# Patient Record
Sex: MALE | Race: WHITE | NOT HISPANIC OR LATINO | Employment: FULL TIME | URBAN - METROPOLITAN AREA
[De-identification: names, ages, dates, MRNs, and addresses within clinical notes are randomized per-mention and may not be internally consistent; named-entity substitution may affect disease eponyms.]

---

## 2019-09-07 ENCOUNTER — HOSPITAL ENCOUNTER (EMERGENCY)
Facility: HOSPITAL | Age: 64
Discharge: HOME | End: 2019-09-07
Attending: INTERNAL MEDICINE

## 2019-09-07 VITALS
TEMPERATURE: 98 F | RESPIRATION RATE: 20 BRPM | HEIGHT: 70 IN | DIASTOLIC BLOOD PRESSURE: 79 MMHG | WEIGHT: 155 LBS | OXYGEN SATURATION: 97 % | BODY MASS INDEX: 22.19 KG/M2 | SYSTOLIC BLOOD PRESSURE: 141 MMHG | HEART RATE: 89 BPM

## 2019-09-07 DIAGNOSIS — K52.9 COLITIS: Primary | ICD-10-CM

## 2019-09-07 PROCEDURE — 99281 EMR DPT VST MAYX REQ PHY/QHP: CPT

## 2019-09-07 NOTE — ED PROVIDER NOTES
Encounter Date: 9/7/2019       History     Chief Complaint   Patient presents with    Abdominal Pain     diarrhea for a month     Patient comes in with a history of diarrhea for 2 months.  The diarrhea is usually after meals.  There is no blood no mucus.  No cramping.  No fever chills sweats. Has no history of any colon disease in the past.  He is not on any meds that would be provocative for colitis.  He has otherwise been healthy has never been no doctor.  Is on no medication.        Review of patient's allergies indicates:  No Known Allergies  History reviewed. No pertinent past medical history.  History reviewed. No pertinent surgical history.  History reviewed. No pertinent family history.  Social History     Tobacco Use    Smoking status: Former Smoker   Substance Use Topics    Alcohol use: Not on file    Drug use: Not on file     Review of Systems   Constitutional: Negative for fever.   HENT: Negative for sore throat.    Respiratory: Negative for shortness of breath.    Cardiovascular: Negative for chest pain.   Gastrointestinal: Positive for diarrhea. Negative for nausea.   Genitourinary: Negative for dysuria.   Musculoskeletal: Negative for back pain.   Skin: Negative for rash.   Neurological: Negative for weakness.   Hematological: Does not bruise/bleed easily.   All other systems reviewed and are negative.      Physical Exam     Initial Vitals [09/07/19 1153]   BP Pulse Resp Temp SpO2   (!) 141/79 89 20 98.1 °F (36.7 °C) 97 %      MAP       --         Physical Exam    Nursing note and vitals reviewed.  Constitutional: Vital signs are normal. He appears well-developed and well-nourished. He is active and cooperative.   HENT:   Head: Normocephalic and atraumatic.   Eyes: Conjunctivae and lids are normal. Lids are everted and swept, no foreign bodies found.   Neck: Trachea normal, normal range of motion and full passive range of motion without pain. Neck supple.   Cardiovascular: Normal rate, regular  rhythm, S1 normal, S2 normal, normal heart sounds, intact distal pulses and normal pulses.  No extrasystoles are present.    Abdominal: Soft. Normal appearance and bowel sounds are normal.   Musculoskeletal: Normal range of motion.   Neurological: He is alert. He has normal reflexes. GCS eye subscore is 4. GCS verbal subscore is 5. GCS motor subscore is 6.   Skin: Skin is warm, dry and intact. Capillary refill takes less than 2 seconds.   Psychiatric: He has a normal mood and affect. His speech is normal and behavior is normal. Cognition and memory are normal.         ED Course   Procedures  Labs Reviewed   CULTURE, STOOL   WBC, STOOL          Imaging Results    None          Medical Decision Making:   Clinical Tests:   Lab Tests: Ordered  ED Management:  Patient stool for WBC, culture, and Clostridium difficile ordered.  Patient needs a colonoscopy.  Referred to surgery.                      Clinical Impression:       ICD-10-CM ICD-9-CM   1. Colitis K52.9 558.9         Disposition:   Disposition: Discharged  Condition: Stable                        To Hagen MD  09/07/19 4596

## 2019-09-09 ENCOUNTER — TELEPHONE (OUTPATIENT)
Dept: SURGERY | Facility: CLINIC | Age: 64
End: 2019-09-09

## 2019-09-09 NOTE — TELEPHONE ENCOUNTER
----- Message from Ce Corcoran sent at 9/9/2019  2:25 PM CDT -----  Type: Needs Medical Advice    Who Called: Patient  Best Call Back Number: [phone]  Additional Information: Needs to schedule colonoscopy after October 1st/please call back to schedule or advise.

## 2019-09-09 NOTE — TELEPHONE ENCOUNTER
Writer spoke to patient and scheduled him for a consult on 10/03/19 @ 9:30 am. Pt expressed verbal understanding.

## 2019-09-24 ENCOUNTER — PATIENT MESSAGE (OUTPATIENT)
Dept: SURGERY | Facility: CLINIC | Age: 64
End: 2019-09-24

## 2019-09-25 ENCOUNTER — PATIENT MESSAGE (OUTPATIENT)
Dept: SURGERY | Facility: CLINIC | Age: 64
End: 2019-09-25

## 2019-10-03 ENCOUNTER — OFFICE VISIT (OUTPATIENT)
Dept: SURGERY | Facility: CLINIC | Age: 64
End: 2019-10-03
Payer: MEDICARE

## 2019-10-03 VITALS
HEIGHT: 70 IN | WEIGHT: 148.13 LBS | HEART RATE: 95 BPM | SYSTOLIC BLOOD PRESSURE: 97 MMHG | OXYGEN SATURATION: 97 % | DIASTOLIC BLOOD PRESSURE: 66 MMHG | BODY MASS INDEX: 21.21 KG/M2 | TEMPERATURE: 98 F | RESPIRATION RATE: 18 BRPM

## 2019-10-03 DIAGNOSIS — Z01.818 PRE-OP TESTING: ICD-10-CM

## 2019-10-03 DIAGNOSIS — Z76.89 ENCOUNTER TO ESTABLISH CARE: Primary | ICD-10-CM

## 2019-10-03 DIAGNOSIS — R19.7 DIARRHEA, UNSPECIFIED TYPE: ICD-10-CM

## 2019-10-03 PROCEDURE — 99204 OFFICE O/P NEW MOD 45 MIN: CPT | Mod: S$GLB,,, | Performed by: SURGERY

## 2019-10-03 PROCEDURE — 99204 PR OFFICE/OUTPT VISIT, NEW, LEVL IV, 45-59 MIN: ICD-10-PCS | Mod: S$GLB,,, | Performed by: SURGERY

## 2019-10-03 RX ORDER — METRONIDAZOLE 500 MG/1
500 TABLET ORAL EVERY 8 HOURS
Qty: 42 TABLET | Refills: 0 | Status: SHIPPED | OUTPATIENT
Start: 2019-10-03 | End: 2019-10-17

## 2019-10-03 RX ORDER — CIPROFLOXACIN 500 MG/1
500 TABLET ORAL EVERY 12 HOURS
Qty: 28 TABLET | Refills: 0 | Status: SHIPPED | OUTPATIENT
Start: 2019-10-03 | End: 2019-10-17

## 2019-10-03 NOTE — LETTER
October 3, 2019      To Hagen MD  [address] [place] [address]           [facility] General [facility]  [address] [place] [address]  Phone: [phone]  Fax: [phone]          Patient: Miguel Angel Apple Sr.   MR Number: [id number]   YOB: 1955   Date of Visit: 10/3/2019       Dear Dr. To Hagen:    Thank you for referring Miguel Angel Apple to me for evaluation. Attached you will find relevant portions of my assessment and plan of care.    If you have questions, please do not hesitate to call me. I look forward to following Miguel Angel Apple along with you.    Sincerely,    Jose Lazo MD    Enclosure  CC:  No Recipients    If you would like to receive this communication electronically, please contact [email or web address] or [phone] to request more information on [facility] Link access.    For providers and/or their staff who would like to refer a patient to [facility], please contact us through our one-stop-shop provider referral line, [facility], at [phone].    If you feel you have received this communication in error or would no longer like to receive these types of communications, please e-mail [email or web address]

## 2019-10-03 NOTE — PROGRESS NOTES
[facility] Surgery H&P Note    Subjective:       Patient ID: Miguel Angel Apple Sr. is a 63 y.o. male.    Chief Complaint: Consult (Referral-ER-Colitis)    HPI:  Miguel Angel Apple Sr. is a 63 y.o. male with no past medical history per his account besides a drinking history which is stopped within the last couple of weeks presents today as a new patient referral from the ER for evaluation of diarrhea.  Patient stated he started having diarrhea 3-4 weeks ago.  Also a weight loss.  Approximately 20 lb in a month and a half.  He has diarrhea 10-15 times per day.  Fevers at times per his account.  He presented to the ER, in the ER was diagnosed with colitis is referred to surgery clinic today for evaluation management treatment.  Patient has not seen any hematochezia.  No history of colonoscopy.  No family history of colon or rectal cancers.  Patient states that almost everything he eats leads to diarrhea.  He now presents today as a new patient referral for evaluation the above.    Past Medical History:   Diagnosis Date    Medical history reviewed with no changes      Past Surgical History:   Procedure Laterality Date    no surgical history       Family History   Problem Relation Age of Onset    Diabetes Mother     Heart disease Father      Social History     Socioeconomic History    Marital status:      Spouse name: Not on file    Number of children: Not on file    Years of education: Not on file    Highest education level: Not on file   Occupational History    Not on file   Social Needs    Financial resource strain: Not on file    Food insecurity:     Worry: Not on file     Inability: Not on file    Transportation needs:     Medical: Not on file     Non-medical: Not on file   Tobacco Use    Smoking status: Former Smoker   Substance and Sexual Activity    Alcohol use: Not on file    Drug use: Not on file    Sexual activity: Not on file   Lifestyle    Physical activity:     Days per week: Not on file      Minutes per session: Not on file    Stress: Not on file   Relationships    Social connections:     Talks on phone: Not on file     Gets together: Not on file     Attends Denominational service: Not on file     Active member of club or organization: Not on file     Attends meetings of clubs or organizations: Not on file     Relationship status: Not on file   Other Topics Concern    Not on file   Social History Narrative    Not on file       Current Outpatient Medications   Medication Sig Dispense Refill    ciprofloxacin HCl (CIPRO) 500 MG tablet Take 1 tablet (500 mg total) by mouth every 12 (twelve) hours. for 14 days 28 tablet 0    metroNIDAZOLE (FLAGYL) 500 MG tablet Take 1 tablet (500 mg total) by mouth every 8 (eight) hours. for 14 days 42 tablet 0     No current facility-administered medications for this visit.      Review of patient's allergies indicates:  No Known Allergies    Review of Systems   Constitutional: Negative for appetite change, chills and fever.   HENT: Negative for congestion, dental problem and drooling.    Eyes: Negative for photophobia, discharge and itching.   Respiratory: Negative for apnea and chest tightness.    Cardiovascular: Negative for chest pain, palpitations and leg swelling.   Gastrointestinal: Positive for diarrhea. Negative for abdominal distention and abdominal pain.   Endocrine: Negative for cold intolerance and heat intolerance.   Genitourinary: Negative for difficulty urinating and dysuria.   Musculoskeletal: Negative for arthralgias and back pain.   Skin: Negative for color change and pallor.   Neurological: Negative for dizziness, facial asymmetry and headaches.   Hematological: Negative for adenopathy. Does not bruise/bleed easily.   Psychiatric/Behavioral: Negative for agitation, behavioral problems and confusion.       Objective:      Vitals:    10/03/19 0949   BP: 97/66   Pulse: 95   Resp: 18   Temp: 97.6 °F (36.4 °C)   TempSrc: Oral   SpO2: 97%   Weight: 67.2 kg  "(148 lb 2.4 oz)   Height: 5' 10" (1.778 m)     Physical Exam   Constitutional: He is oriented to person, place, and time. He appears well-developed and well-nourished.   HENT:   Head: Normocephalic and atraumatic.   Eyes: Pupils are equal, round, and reactive to light. EOM are normal.   Neck: Normal range of motion. Neck supple. No thyromegaly present.   Cardiovascular: Normal rate and regular rhythm.   No murmur heard.  Pulmonary/Chest: Effort normal and breath sounds normal. No respiratory distress.   Abdominal: Soft. Bowel sounds are normal. He exhibits no distension. There is no tenderness.   Musculoskeletal: Normal range of motion. He exhibits no edema.   Neurological: He is alert and oriented to person, place, and time. No cranial nerve deficit.   Skin: Skin is warm. Capillary refill takes less than 2 seconds. No rash noted. He is not diaphoretic. No erythema.   Psychiatric: He has a normal mood and affect.        Assessment:       1. Diarrhea, unspecified type    2. Pre-op testing        Plan:   Diarrhea, unspecified type  -     H. pylori antigen, stool; Future; Expected date: 10/03/2019  -     Pancreatic elastase, fecal; Future; Expected date: 10/03/2019  -     Fecal fat, qualitative; Future; Expected date: 10/03/2019  -     Occult blood x 1, stool; Future; Expected date: 10/03/2019  -     WBC, Stool; Future; Expected date: 10/03/2019  -     Rotavirus antigen, stool; Future; Expected date: 10/03/2019  -     Calprotectin; Future; Expected date: 10/03/2019  -     Giardia / Cryptosporidum, EIA; Future; Expected date: 10/03/2019  -     Stool Exam-Ova,Cysts,Parasites; Future; Expected date: 10/03/2019  -     Clostridium difficile EIA; Future; Expected date: 10/03/2019  -     Stool culture; Future; Expected date: 10/03/2019  -     ciprofloxacin HCl (CIPRO) 500 MG tablet; Take 1 tablet (500 mg total) by mouth every 12 (twelve) hours. for 14 days  Dispense: 28 tablet; Refill: 0  -     metroNIDAZOLE (FLAGYL) 500 MG " tablet; Take 1 tablet (500 mg total) by mouth every 8 (eight) hours. for 14 days  Dispense: 42 tablet; Refill: 0    Pre-op testing        Medical Decision Making/Counseling:  Patient with diarrhea.  Undetermined etiology.  Bacterial versus viral versus inflammatory etc.  I believe the best next step for this patient will be for evaluation with stool studies and treatment with Cipro and Flagyl for the next 2 weeks.  If bacterial, this should treat his diarrhea/colitis.  If inflammatory, this will not resolved.  Patient ultimately will need colonoscopy for further delineation.  Would like to wait until his acute flares resolved to proceed with colonoscopy.  Patient voiced understanding, and agreement with plan of care.    Total clinic time today with patient, in face-to-face interaction was 45 min, of which greater than half of that time was spent in face-to-face counseling.    Patient instructed that best way to communicate with my office staff is for patient to get on the [facility] epic patient portal to expedite communication and communication issues that may occur.  Patient was given instructions on how to get on the portal.  I encouraged patient to obtain portal access as well.  Ultimately it is up to the patient to obtain access.  Patient voiced understanding.

## 2019-10-11 ENCOUNTER — LAB VISIT (OUTPATIENT)
Dept: LAB | Facility: HOSPITAL | Age: 64
End: 2019-10-11
Attending: SURGERY
Payer: MEDICARE

## 2019-10-11 DIAGNOSIS — R19.7 DIARRHEA, UNSPECIFIED TYPE: ICD-10-CM

## 2019-10-11 LAB
C DIFF GDH STL QL: NEGATIVE
C DIFF TOX A+B STL QL IA: NEGATIVE
OB PNL STL: POSITIVE
RV AG STL QL IA.RAPID: NEGATIVE
WBC #/AREA STL HPF: NORMAL /[HPF]

## 2019-10-11 PROCEDURE — 87328 CRYPTOSPORIDIUM AG IA: CPT

## 2019-10-11 PROCEDURE — 89055 LEUKOCYTE ASSESSMENT FECAL: CPT

## 2019-10-11 PROCEDURE — 82656 EL-1 FECAL QUAL/SEMIQ: CPT

## 2019-10-11 PROCEDURE — 87338 HPYLORI STOOL AG IA: CPT

## 2019-10-11 PROCEDURE — 87329 GIARDIA AG IA: CPT

## 2019-10-11 PROCEDURE — 87046 STOOL CULTR AEROBIC BACT EA: CPT

## 2019-10-11 PROCEDURE — 87425 ROTAVIRUS AG IA: CPT

## 2019-10-11 PROCEDURE — 87427 SHIGA-LIKE TOXIN AG IA: CPT | Mod: 59

## 2019-10-11 PROCEDURE — 87209 SMEAR COMPLEX STAIN: CPT

## 2019-10-11 PROCEDURE — 87449 NOS EACH ORGANISM AG IA: CPT

## 2019-10-11 PROCEDURE — 82272 OCCULT BLD FECES 1-3 TESTS: CPT

## 2019-10-11 PROCEDURE — 89125 SPECIMEN FAT STAIN: CPT

## 2019-10-11 PROCEDURE — 87045 FECES CULTURE AEROBIC BACT: CPT

## 2019-10-11 PROCEDURE — 83993 ASSAY FOR CALPROTECTIN FECAL: CPT

## 2019-10-13 LAB
CRYPTOSP AG STL QL IA: NEGATIVE
E COLI SXT1 STL QL IA: NEGATIVE
E COLI SXT2 STL QL IA: NEGATIVE
G LAMBLIA AG STL QL IA: NEGATIVE

## 2019-10-14 LAB
BACTERIA STL CULT: NORMAL
FAT STL SUDAN IV STN: NORMAL

## 2019-10-15 LAB — O+P STL MICRO: NORMAL

## 2019-10-17 LAB — H PYLORI AG STL QL IA: NOT DETECTED

## 2019-10-18 LAB — ELASTASE 1, FECAL: >500 MCG/G

## 2019-10-19 ENCOUNTER — HOSPITAL ENCOUNTER (INPATIENT)
Facility: HOSPITAL | Age: 64
LOS: 17 days | Discharge: HOME HEALTH | DRG: 329 | End: 2019-11-05
Attending: EMERGENCY MEDICINE | Admitting: INTERNAL MEDICINE
Payer: MEDICARE

## 2019-10-19 DIAGNOSIS — K56.609 LARGE BOWEL OBSTRUCTION: ICD-10-CM

## 2019-10-19 DIAGNOSIS — R14.0 ABDOMINAL DISTENTION: ICD-10-CM

## 2019-10-19 DIAGNOSIS — K56.609 INTESTINAL OBSTRUCTION, UNSPECIFIED CAUSE, UNSPECIFIED WHETHER PARTIAL OR COMPLETE: ICD-10-CM

## 2019-10-19 DIAGNOSIS — R19.7 DIARRHEA, UNSPECIFIED TYPE: ICD-10-CM

## 2019-10-19 DIAGNOSIS — Z48.89 ENCOUNTER FOR POSTOPERATIVE CARE: Primary | ICD-10-CM

## 2019-10-19 DIAGNOSIS — R10.9 ABDOMINAL PAIN: ICD-10-CM

## 2019-10-19 DIAGNOSIS — S31.109D OPEN WOUND OF ABDOMEN, SUBSEQUENT ENCOUNTER: ICD-10-CM

## 2019-10-19 DIAGNOSIS — K52.9 COLITIS: ICD-10-CM

## 2019-10-19 LAB
ALBUMIN SERPL BCP-MCNC: 2.8 G/DL (ref 3.5–5.2)
ALP SERPL-CCNC: 53 U/L (ref 55–135)
ALT SERPL W/O P-5'-P-CCNC: 11 U/L (ref 10–44)
ANION GAP SERPL CALC-SCNC: 13 MMOL/L (ref 8–16)
AST SERPL-CCNC: 15 U/L (ref 10–40)
BACTERIA #/AREA URNS HPF: ABNORMAL /HPF
BASOPHILS # BLD AUTO: 0.02 K/UL (ref 0–0.2)
BASOPHILS NFR BLD: 0.1 % (ref 0–1.9)
BILIRUB SERPL-MCNC: 1.3 MG/DL (ref 0.1–1)
BILIRUB UR QL STRIP: ABNORMAL
BUN SERPL-MCNC: 12 MG/DL (ref 8–23)
CALCIUM SERPL-MCNC: 8.4 MG/DL (ref 8.7–10.5)
CHLORIDE SERPL-SCNC: 99 MMOL/L (ref 95–110)
CLARITY UR: CLEAR
CO2 SERPL-SCNC: 26 MMOL/L (ref 23–29)
COLOR UR: YELLOW
CREAT SERPL-MCNC: 0.9 MG/DL (ref 0.5–1.4)
DIFFERENTIAL METHOD: ABNORMAL
EOSINOPHIL # BLD AUTO: 0 K/UL (ref 0–0.5)
EOSINOPHIL NFR BLD: 0.2 % (ref 0–8)
ERYTHROCYTE [DISTWIDTH] IN BLOOD BY AUTOMATED COUNT: 17.6 % (ref 11.5–14.5)
EST. GFR  (AFRICAN AMERICAN): >60 ML/MIN/1.73 M^2
EST. GFR  (NON AFRICAN AMERICAN): >60 ML/MIN/1.73 M^2
GLUCOSE SERPL-MCNC: 98 MG/DL (ref 70–110)
GLUCOSE UR QL STRIP: NEGATIVE
HCT VFR BLD AUTO: 30.8 % (ref 40–54)
HGB BLD-MCNC: 9.9 G/DL (ref 14–18)
HGB UR QL STRIP: NEGATIVE
HYALINE CASTS #/AREA URNS LPF: 0 /LPF
IMM GRANULOCYTES # BLD AUTO: 0.08 K/UL (ref 0–0.04)
IMM GRANULOCYTES NFR BLD AUTO: 0.5 % (ref 0–0.5)
KETONES UR QL STRIP: ABNORMAL
LACTATE SERPL-SCNC: 1.2 MMOL/L (ref 0.5–2.2)
LACTATE SERPL-SCNC: 1.3 MMOL/L (ref 0.5–2.2)
LEUKOCYTE ESTERASE UR QL STRIP: NEGATIVE
LYMPHOCYTES # BLD AUTO: 2 K/UL (ref 1–4.8)
LYMPHOCYTES NFR BLD: 11.9 % (ref 18–48)
MAGNESIUM SERPL-MCNC: 2.2 MG/DL (ref 1.6–2.6)
MCH RBC QN AUTO: 28.1 PG (ref 27–31)
MCHC RBC AUTO-ENTMCNC: 32.1 G/DL (ref 32–36)
MCV RBC AUTO: 88 FL (ref 82–98)
MICROSCOPIC COMMENT: ABNORMAL
MONOCYTES # BLD AUTO: 1.5 K/UL (ref 0.3–1)
MONOCYTES NFR BLD: 8.9 % (ref 4–15)
NEUTROPHILS # BLD AUTO: 12.8 K/UL (ref 1.8–7.7)
NEUTROPHILS NFR BLD: 78.4 % (ref 38–73)
NITRITE UR QL STRIP: NEGATIVE
NON-SQ EPI CELLS #/AREA URNS HPF: ABNORMAL /HPF
NRBC BLD-RTO: 0 /100 WBC
PH UR STRIP: 7 [PH] (ref 5–8)
PLATELET # BLD AUTO: 697 K/UL (ref 150–350)
PMV BLD AUTO: 8.6 FL (ref 9.2–12.9)
POTASSIUM SERPL-SCNC: 2.2 MMOL/L (ref 3.5–5.1)
PROT SERPL-MCNC: 6.6 G/DL (ref 6–8.4)
PROT UR QL STRIP: ABNORMAL
RBC # BLD AUTO: 3.52 M/UL (ref 4.6–6.2)
RBC #/AREA URNS HPF: 2 /HPF (ref 0–4)
SODIUM SERPL-SCNC: 138 MMOL/L (ref 136–145)
SP GR UR STRIP: 1.01 (ref 1–1.03)
URN SPEC COLLECT METH UR: ABNORMAL
UROBILINOGEN UR STRIP-ACNC: NEGATIVE EU/DL
WBC # BLD AUTO: 16.32 K/UL (ref 3.9–12.7)
WBC #/AREA URNS HPF: 0 /HPF (ref 0–5)

## 2019-10-19 PROCEDURE — 74176 CT ABD & PELVIS W/O CONTRAST: CPT | Mod: TC

## 2019-10-19 PROCEDURE — 63600175 PHARM REV CODE 636 W HCPCS: Performed by: EMERGENCY MEDICINE

## 2019-10-19 PROCEDURE — 96374 THER/PROPH/DIAG INJ IV PUSH: CPT

## 2019-10-19 PROCEDURE — 85025 COMPLETE CBC W/AUTO DIFF WBC: CPT

## 2019-10-19 PROCEDURE — 36415 COLL VENOUS BLD VENIPUNCTURE: CPT

## 2019-10-19 PROCEDURE — 63600175 PHARM REV CODE 636 W HCPCS: Performed by: INTERNAL MEDICINE

## 2019-10-19 PROCEDURE — 93005 ELECTROCARDIOGRAM TRACING: CPT

## 2019-10-19 PROCEDURE — 74019 RADEX ABDOMEN 2 VIEWS: CPT | Mod: TC,FY

## 2019-10-19 PROCEDURE — 74176 CT ABD & PELVIS W/O CONTRAST: CPT | Mod: 26,,, | Performed by: RADIOLOGY

## 2019-10-19 PROCEDURE — 83735 ASSAY OF MAGNESIUM: CPT

## 2019-10-19 PROCEDURE — 83605 ASSAY OF LACTIC ACID: CPT | Mod: 91

## 2019-10-19 PROCEDURE — 81000 URINALYSIS NONAUTO W/SCOPE: CPT

## 2019-10-19 PROCEDURE — 71045 X-RAY EXAM CHEST 1 VIEW: CPT | Mod: TC,FY

## 2019-10-19 PROCEDURE — 80053 COMPREHEN METABOLIC PANEL: CPT

## 2019-10-19 PROCEDURE — 87040 BLOOD CULTURE FOR BACTERIA: CPT | Mod: 59

## 2019-10-19 PROCEDURE — 71045 X-RAY EXAM CHEST 1 VIEW: CPT | Mod: 26,,, | Performed by: RADIOLOGY

## 2019-10-19 PROCEDURE — 74019 RADEX ABDOMEN 2 VIEWS: CPT | Mod: 26,,, | Performed by: RADIOLOGY

## 2019-10-19 PROCEDURE — 74019 XR ABDOMEN FLAT AND ERECT: ICD-10-PCS | Mod: 26,,, | Performed by: RADIOLOGY

## 2019-10-19 PROCEDURE — 71045 XR CHEST AP PORTABLE: ICD-10-PCS | Mod: 26,,, | Performed by: RADIOLOGY

## 2019-10-19 PROCEDURE — 83605 ASSAY OF LACTIC ACID: CPT

## 2019-10-19 PROCEDURE — 99285 EMERGENCY DEPT VISIT HI MDM: CPT | Mod: 25

## 2019-10-19 PROCEDURE — 21400001 HC TELEMETRY ROOM

## 2019-10-19 PROCEDURE — 74176 CT ABDOMEN PELVIS WITHOUT CONTRAST: ICD-10-PCS | Mod: 26,,, | Performed by: RADIOLOGY

## 2019-10-19 PROCEDURE — 63600175 PHARM REV CODE 636 W HCPCS: Performed by: NURSE PRACTITIONER

## 2019-10-19 RX ORDER — SODIUM CHLORIDE AND POTASSIUM CHLORIDE 150; 900 MG/100ML; MG/100ML
1000 INJECTION, SOLUTION INTRAVENOUS
Status: COMPLETED | OUTPATIENT
Start: 2019-10-19 | End: 2019-10-19

## 2019-10-19 RX ORDER — SODIUM CHLORIDE 0.9 % (FLUSH) 0.9 %
10 SYRINGE (ML) INJECTION
Status: DISCONTINUED | OUTPATIENT
Start: 2019-10-19 | End: 2019-11-05 | Stop reason: HOSPADM

## 2019-10-19 RX ORDER — POTASSIUM CHLORIDE 7.45 MG/ML
10 INJECTION INTRAVENOUS
Status: COMPLETED | OUTPATIENT
Start: 2019-10-19 | End: 2019-10-20

## 2019-10-19 RX ORDER — SODIUM CHLORIDE AND POTASSIUM CHLORIDE 150; 900 MG/100ML; MG/100ML
INJECTION, SOLUTION INTRAVENOUS CONTINUOUS
Status: DISCONTINUED | OUTPATIENT
Start: 2019-10-19 | End: 2019-10-23

## 2019-10-19 RX ORDER — KETOROLAC TROMETHAMINE 30 MG/ML
30 INJECTION, SOLUTION INTRAMUSCULAR; INTRAVENOUS
Status: DISCONTINUED | OUTPATIENT
Start: 2019-10-19 | End: 2019-10-19

## 2019-10-19 RX ORDER — SODIUM CHLORIDE 9 MG/ML
1000 INJECTION, SOLUTION INTRAVENOUS
Status: DISCONTINUED | OUTPATIENT
Start: 2019-10-19 | End: 2019-10-19

## 2019-10-19 RX ADMIN — POTASSIUM CHLORIDE 10 MEQ: 10 INJECTION, SOLUTION INTRAVENOUS at 11:10

## 2019-10-19 RX ADMIN — ERTAPENEM SODIUM 1 G: 1 INJECTION, POWDER, LYOPHILIZED, FOR SOLUTION INTRAMUSCULAR; INTRAVENOUS at 03:10

## 2019-10-19 RX ADMIN — POTASSIUM CHLORIDE AND SODIUM CHLORIDE 1000 ML: 900; 150 INJECTION, SOLUTION INTRAVENOUS at 03:10

## 2019-10-19 RX ADMIN — POTASSIUM CHLORIDE 10 MEQ: 10 INJECTION, SOLUTION INTRAVENOUS at 05:10

## 2019-10-19 RX ADMIN — POTASSIUM CHLORIDE 10 MEQ: 10 INJECTION, SOLUTION INTRAVENOUS at 06:10

## 2019-10-19 NOTE — SUBJECTIVE & OBJECTIVE
Past Medical History:   Diagnosis Date    Medical history reviewed with no changes        Past Surgical History:   Procedure Laterality Date    no surgical history         Review of patient's allergies indicates:  No Known Allergies    No current facility-administered medications on file prior to encounter.      No current outpatient medications on file prior to encounter.     Family History     Problem Relation (Age of Onset)    Diabetes Mother    Heart disease Father        Tobacco Use    Smoking status: Former Smoker   Substance and Sexual Activity    Alcohol use: Not on file    Drug use: Not on file    Sexual activity: Not on file     Review of Systems   Constitutional: Positive for activity change, appetite change and unexpected weight change. Negative for chills, diaphoresis, fatigue and fever.   HENT: Negative for congestion, drooling, hearing loss and trouble swallowing.    Eyes: Negative for pain and visual disturbance.   Respiratory: Negative.  Negative for apnea, cough, choking, chest tightness, shortness of breath and wheezing.    Cardiovascular: Negative for chest pain, palpitations and leg swelling.        Increased heart rate   Gastrointestinal: Positive for abdominal distention. Negative for abdominal pain, blood in stool, constipation, diarrhea, nausea and vomiting.        Tenderness left lower quadrant   Endocrine: Negative for polydipsia, polyphagia and polyuria.   Genitourinary: Negative for difficulty urinating, dysuria and flank pain.   Musculoskeletal: Negative for arthralgias, back pain, gait problem, joint swelling, neck pain and neck stiffness.   Skin: Negative for color change, rash and wound.   Allergic/Immunologic: Negative for environmental allergies, food allergies and immunocompromised state.   Neurological: Positive for weakness. Negative for dizziness, syncope, numbness and headaches.   Hematological: Negative for adenopathy.   Psychiatric/Behavioral: Negative for agitation,  confusion and sleep disturbance. The patient is not nervous/anxious.      Objective:     Vital Signs (Most Recent):  Temp: 98.5 °F (36.9 °C) (10/19/19 1550)  Pulse: 100 (10/19/19 1550)  Resp: 17 (10/19/19 1550)  BP: (!) 170/81 (10/19/19 1550)  SpO2: 97 % (10/19/19 1550) Vital Signs (24h Range):  Temp:  [98.5 °F (36.9 °C)-98.7 °F (37.1 °C)] 98.5 °F (36.9 °C)  Pulse:  [100-116] 100  Resp:  [16-20] 17  SpO2:  [96 %-98 %] 97 %  BP: ()/(61-90) 170/81     Weight: 64.3 kg (141 lb 12.1 oz)  Body mass index is 20.93 kg/m².    Physical Exam   Constitutional: He is oriented to person, place, and time. He appears well-developed and well-nourished.   HENT:   Head: Normocephalic and atraumatic.   Right Ear: External ear normal.   Left Ear: External ear normal.   Nose: Nose normal.   Eyes: Pupils are equal, round, and reactive to light. Conjunctivae, EOM and lids are normal.   Neck: Trachea normal, normal range of motion and full passive range of motion without pain. Neck supple.   Cardiovascular: Normal rate, regular rhythm, S1 normal, S2 normal, normal heart sounds, intact distal pulses and normal pulses.   Mild tachycardia 110   Pulmonary/Chest: Effort normal and breath sounds normal.   Abdominal: Soft. Normal aorta and bowel sounds are normal. He exhibits distension. There is tenderness.   Tenderness left lower quadrant   Musculoskeletal: Normal range of motion.   Neurological: He is alert and oriented to person, place, and time. He has normal reflexes.   Skin: Skin is warm, dry and intact.   Psychiatric: He has a normal mood and affect. His speech is normal and behavior is normal. Judgment and thought content normal. Cognition and memory are normal.   Nursing note and vitals reviewed.        CRANIAL NERVES     CN III, IV, VI   Pupils are equal, round, and reactive to light.  Extraocular motions are normal.        Significant Labs:   BMP:   Recent Labs   Lab 10/19/19  1322   GLU 98      K 2.2*   CL 99   CO2 26    BUN 12   CREATININE 0.9   CALCIUM 8.4*     CBC:   Recent Labs   Lab 10/19/19  1322   WBC 16.32*   HGB 9.9*   HCT 30.8*   *     CMP:   Recent Labs   Lab 10/19/19  1322      K 2.2*   CL 99   CO2 26   GLU 98   BUN 12   CREATININE 0.9   CALCIUM 8.4*   PROT 6.6   ALBUMIN 2.8*   BILITOT 1.3*   ALKPHOS 53*   AST 15   ALT 11   ANIONGAP 13   EGFRNONAA >60.0     Magnesium: No results for input(s): MG in the last 48 hours.  All pertinent labs within the past 24 hours have been reviewed.    Significant Imaging: CT: I have reviewed all pertinent results/findings within the past 24 hours and my personal findings are:  Dilatation of the colon with the cecum being approximately 10 cm.  Low obstruction left-sided  EKG: I have reviewed all pertinent results/findings within the past 24 hours and my personal findings are: Sinus tachycardia 103  I have reviewed and interpreted all pertinent imaging results/findings within the past 24 hours.

## 2019-10-19 NOTE — ED PROVIDER NOTES
Encounter Date: 10/19/2019       History     Chief Complaint   Patient presents with    Constipation     felling bloated and has hx of diarrhea was treated and now is constipated     Ambulatory to ED with reports of upper abdominal fullness with frequent scanty liquid stools for 2 days.          Review of patient's allergies indicates:  No Known Allergies  Past Medical History:   Diagnosis Date    Medical history reviewed with no changes      Past Surgical History:   Procedure Laterality Date    no surgical history       Family History   Problem Relation Age of Onset    Diabetes Mother     Heart disease Father      Social History     Tobacco Use    Smoking status: Former Smoker   Substance Use Topics    Alcohol use: Not on file    Drug use: Not on file     Review of Systems   Constitutional: Negative.    HENT: Negative.    Eyes: Negative.    Respiratory: Negative.    Cardiovascular: Negative.    Gastrointestinal: Positive for abdominal distention and vomiting.   Endocrine: Negative.    Genitourinary: Negative.    Musculoskeletal: Negative.    Skin: Negative.    Allergic/Immunologic: Negative.    Neurological: Negative.    Hematological: Negative.    Psychiatric/Behavioral: Negative.        Physical Exam     Initial Vitals [10/19/19 1142]   BP Pulse Resp Temp SpO2   97/61 (!) 116 20 98.7 °F (37.1 °C) 97 %      MAP       --         Physical Exam    Nursing note and vitals reviewed.  Constitutional: He appears well-developed and well-nourished.   HENT:   Head: Normocephalic and atraumatic.   Right Ear: External ear normal.   Left Ear: External ear normal.   Nose: Nose normal.   Mouth/Throat: Oropharynx is clear and moist.   Eyes: Conjunctivae and EOM are normal. Pupils are equal, round, and reactive to light.   Neck: Normal range of motion. Neck supple.   Cardiovascular: Normal rate, regular rhythm, normal heart sounds and intact distal pulses.   Pulmonary/Chest: Breath sounds normal.   Abdominal: Bowel sounds  are normal. He exhibits mass. There is tenderness.   Ropy mass to transverse colon.    Neurological: He is alert and oriented to person, place, and time. He has normal strength and normal reflexes. GCS score is 15. GCS eye subscore is 4. GCS verbal subscore is 5. GCS motor subscore is 6.   Skin: Skin is warm and dry. Capillary refill takes 2 to 3 seconds.   Psychiatric: He has a normal mood and affect. His behavior is normal. Thought content normal.         ED Course   Procedures  Labs Reviewed - No data to display  EKG Readings: (Independently Interpreted)   Initial Reading: No STEMI. Rhythm: Normal Sinus Rhythm. Ectopy: No Ectopy. ST Segments: Non-Specific ST Segment Depression. Clinical Impression: Normal Sinus Rhythm       Imaging Results    None       X-Rays:   Independently Interpreted Readings:   Other Readings:  Flat and erect shows dilated bowel and possible obstruction versus constipation.  CT is ordered.      Medical Decision Making:   Initial Assessment:   His abdomen appears distended and has palpable mass to transverse colon, Bowel sounds are normoactive and normally pitched, there is no tenderness or rebound, he is currently under the care of a surgeon and is scheduled for colonoscope later in the month.  Heart is RRR with grade 2 systolic murmur, lungs CTA, reports occasional nausea.  Reviewed labs and stool panel form 10/15 which was mildly positive for blood but negative for leuk or OCP.  His BP is mildly hypotensive and pulse is tachycardic at 116 so I will check CBC/CMP for anemia and electrolyte imbalances.    Differential Diagnosis:   Constipation, diarrhea, diverticulitis, bowel obstruction.    Clinical Tests:   Lab Tests: Ordered  Radiological Study: Ordered  ED Management:  Flat and erect shows dilated loops of bowel and possible obstruction but radiology suggests CT for better detail so CT abd without is ordered.                        Clinical Impression:       ICD-10-CM ICD-9-CM   1.  Diarrhea, unspecified type R19.7 787.91   2. Abdominal distention R14.0 787.3                                Hima Williamson NP  10/19/19 1443       Hima Williamson NP  10/19/19 1644

## 2019-10-19 NOTE — NURSING
"PT RECEIVED FROM ER VIA STRETCHER TO ROOM 115/PT WAS ABLE TO WALK FROM STRETCH TO THE BED. SKIN WDI/COLOR AND TUGOR GOOD. ABD SOFT/SLIGHTLY BUT NORMALLY DISTENDED/BS FAINT/ICE CHIPS GIVEN/MUCUS MEMBRANES PINK/MOIST. PT DENIES PROBLEMS VOIDING. FULL ROM ALL EXTREMITIES/PT STATES "I AM WEAK BUT I CAN GET AROUND". PT REFUSES TO HAVE BED ALARM SET OR CALL FOR ASSIST WHEN UP TO THE BATHROOM. IVF'S INFUSING AS ORDERED. PT HAS A 20G IN THE R/L AC/SITE FREE OF S/SX OF INFILTRATION. PT ORIENTED TO BED CONTROLS/POC/CALL STAFF FOR PROBLEMS/NEEDS/PT VERBALIZED UNDERSTANDING.  "

## 2019-10-19 NOTE — HOSPITAL COURSE
Patient is sitting up in chair today IV fluids IV potassium NPO Surgical consultation repeat KUB and lab in a.m. further clinical decisions depending on clinical course.  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  The patient has had no prior abdominal surgery and no medical history prior to approximately 3 months ago.  10/20/2019.  Long discussion with the patient this morning concerning the findings.  Dr. MOHAMUD General surgery see the patient today since he has been involved in his care on 10/03.  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  Patient continue IV fluids IV antibiotics potassium replacement today and will have 3 Fleet's enemas today.  CEA is been ordered.    10/21/2019:  Patient is comfortable in room although it does appear quite anxious.  The patient will be having EGD and colonoscopy later today.  Based on the findings there the patient will may need undergo surgery.  Patient denies any pain and states that he was not throwing up and having no fever or chills.  Patient is distended and has some abdominal pain associated.  Labs showed [place] blood [place] 268[address] hemoglobin hematocrit 9.8 and 31 sodium 145 potassium 2.4 chloride 108 bicarb 21 and alk-phos was 49    10/22/2019:  Patient is stable postop day 1.  Patient states pain is well controlled.  Patient mildly tachycardic blood pressure 115/61 979 O2 sats 100%.  Labs show white blood cell count 21.5 hemoglobin 9.2 hematocrit 29.3 platelets 407 and BUN creatinine were 8 and 0.6 calcium 7.5 magnesium 1.4.  Patient is in good spirits and bowel sounds are present.    10/23/2019:  Postop day 2.:  Patient is having much more abdominal pain with waves of pain that appeared to be related to air movement in the bowel versus bowel regaining function.  Patient is alert and oriented and is mildly tachycardic with pulse 123, blood pressure 130/65 O2 sat 95%.  Labs show a improved white count of today of 19,000  hemoglobin 8.9 hematocrit 28 platelets 396 and sodium 141 potassium 3.1 albumin 1.6 and liver functions within normal ranges.  GFR greater than 60    10/24/2019:  Patient is stable and improving postop day 2 .  Colostomy bag has some gas and patient bowel sounds are very active.  Intake was 02/30/2027 output was 1745 and a net gain of 1.4 L. labs showed a white blood cell count 15.9 hemoglobin 8.7 hematocrit 27.7 and platelets 373.  Normal differential noted. Labs:  Sodium 146 potassium 3.5 chloride 110 bicarb 24 glucose 66 and BUN creatinine were 9 and 0.6 otherwise GFR greater than 60.  Magnesium 1.9 phosphorus 4.0    10/25/2019:  Patient is awake alert and states his pain is under better control today.  Patient is having gas in to his colostomy bag it had to be relieved on 2 occasions overnight.  Patient otherwise has been stable and heart rate is much improved on beta-blocker.  Labs showed sodium 148 potassium 3.2 chloride 111 bicarb 23 calcium is 7.7 and GFR greater than 60.  White blood cell count 10.5 hemoglobin 8.2 hematocrit 26 platelets 334 and differential was normal.  Patient otherwise is stable but will continue gastric decompression until flatus is obvious.  10/26/2019.  NG tube has been removed.  The patient's colostomy is working.  Aggressive respiratory therapy.  Incentive spirometry the patient best he can do this morning was a 1000.  Lungs decreased breath sounds in the bases secondary to atelectasis.  Discussed expectations respiratory therapy this morning.  Patient will become more mobile this a.m..  Discussed mobility with nursing staff.  Patient has been up in the chair twice last p.m..  Lab and x-rays have been reviewed.  Potassium is 3.2 with supplement.  Sodium is 147 this morning patient is receiving lactated Ringer's.  Patient's protein albumin are low with an albumin of 1.4.  Dietary consult for severe malnutrition.  Continue current level of care unless clinical course changes.  Lab has  been ordered for in a.m..  10/27/2019.  Patient's vital signs are acceptable.  Patient's incentive spirometry at best is a 1000.  Patient's colostomy looks as expected.  Patient does have testicular swelling will elevate today.  Dr. Dawkins general surgery's reviewed the lab and orders lab in a.m..  Patient's mood is down considering his overall findings is expected have will start Celexa on the patient today.  Patient's sodium still 147 patient is on Ringer's lactate.  Continue current level of care unless clinical course changes.  Will order PT today the patient is very weak.  Patient's weakness is expected.  But he will have a rehab.  May have to consider whether the patient needs home health care or rehab care after this admission.    10/28/2019:  Patient is sitting up in chair today.  Patient appears weak and has some facial wasting as well as some abdominal distension.  Colostomy site looks intact and there is maceration around that from the adhesive a of the colostomy bags.  Patient is tolerating some clear liquids but has not had much of an appetite and has not been up walking very much.  Otherwise patient is stable postop.  However I feel this patient needs to progress a little faster back to his movement and hopefully bowel function.  Intake and output:  Intake 3854.2 and output 1650 for total net positive fluid in of 2204 cc  Labs:  Phosphorus 3.1 sodium 144 potassium 3.9 chloride 108 bicarb 28 glucose 116 BUN creatinine were less than 5 and 0.6 calcium 7.3 and GFR greater than 60  CBC:  White blood cell count 9.94 hemoglobin 8.3 hematocrit 27.3 platelets 436 and differential was normal  Magnesium:  1.5 phosphorus 3.4    10/29/2019:  This postop day 7.  Patient is looking stronger and eating and tolerating clear liquids.  Colostomy site is looking improved but there is continued desquamation around the stoma.  This is undoubtedly from the adhesive of the bags.  Otherwise no complaints and we will increase  progressive mobility starting today.  Physical therapy will be seeing this patient as well.  Labs:  Sodium 139 potassium 4.0 chlorid 104 bicarb 27 glucose 105 BUN creatinine 5 and 0.5 total bilirubin 0.3 albumin 1.4 total protein 4.4 calcium 7.0  White blood cell count 9.8 hemoglobin 8.5 hematocrit 27.3 platelets 401 phosphorus 3.1  Will continue current care will consider starting TPN due to prolonged period without food and repeat labs in the a.m.    10/30/2019:  Patient is stable and feeling much better today.  Patient is able to tolerate clear liquids well.  However patient is NPO now due to planned delayed primary closure today.  Patient be transferred to the medical-surgical unit post surgery today.  Intake and output:  Intake 3472 output 2775 with a net gain of 697 cc  CMP sodium 139 potassium 4.0 glucose 105 BUN creatinine were 5 and 0.5  CBC unremarkable.    10/31/2019:  Patient is more active today having less pain is in better spirits.  However patient stoma is continuing to be macerated having difficulty with colostomy bag sticking and not leaking.  There is good return from the colostomy site and no significant pain other than the irritation of the skin around the stomal site.  Labs show chemistry which was normal and calcium 7.5 GFR greater than 60 all other electrolytes normal.  White blood cell count 15.8 hemoglobin 9.3 hematocrit 29 platelets 494 and differential showed 74 granulocytes 15 lymphocytes    11/01/2019::  Patient has been ambulatory in the hallway today and is appearing Much stronger.  White blood cell count 15.0 hemoglobin hematocrit 8.827 stable platelets 500.  Vital signs show blood pressure of 101/59 pulse is 96 respirations 16.  Intake is 01/30/1994 output is 04/20/2025 with a net 769 cc gain    11/02/2019:  Patient is more active today showering by himself.  Physical exam is continuing to improve.  Patient otherwise has no new complaints except some irritation around the stoma  site.  This has been a problem for the colostomy bags to stick.  Labs:  Sodium 135 potassium 4.0 BUN less than 5 creatinine 0.5 and calcium 7.6 total protein 4.9 and albumin 1.4  CBC shows white blood cell count 40866 improved and H&H was 8.3 and 27 platelets 518 and no other significant findings.    11/03/2019:  Patient is alert and oriented.  Vital signs are stable with blood pressure 110/63 patient has been afebrile and O2 sats running in the range of 94-96%  CMP was normal except for sodium 134 potassium 3.9 and calcium 7.7 GFR greater than 60  CBC white blood cell count 13.0 hemoglobin 8.5 hematocrit 27.5 platelets 506 otherwise no significant change    11/04/2019:  Patient is sitting up in no acute distress. Events of last evening were noted patient slipped and slid down beside his bed.  He denies any injury and denies any other pain. Vital signs today revealed blood pressure 132/71 pulse 87 and respirations 16-20 O2 sat 100%  CMP chemistry normal BUN less than 5 creatinine 0.6 GFR greater than 60  CBC normal white blood cell count of 11.7 hemoglobin H&H was 8.8 and 28 normal differential.  Patient states doing well but still feeling some weakness.  I discussed with them approximately 10 min regarding placement and the need for rehab to become stronger before going home he stated that he would think about it.    11/05/2019:  Patient is stable today and surgery has okayed his discharge and signed off.  He has no other complaints and states still weak but getting better every day.  Vital signs have been stable patient has been afebrile.  All labs were normal or unremarkable.  Hemoglobin hematocrit are stable at 8.8 and 28.4.  His determine this patient is ready for discharge. He has adequate help at home and has decided to be discharged to home instead of rehabilitation.  He has all equipment at home and is therefore ready for discharge.

## 2019-10-19 NOTE — PLAN OF CARE
IVF'S/K+ REPLACEMENT/NPO STATUS-OCCASIONAL ICE CHIPS/USE URINAL-CALL STAFF TO EMPTY AND RECORD URINE OUTPUT/REPORT ALL STOOLS AND CALL STAFF SOS THAT STOOL AMOUNT/CONSISTANCY BE RECORDED.

## 2019-10-19 NOTE — H&P
[facility] Hospital Medicine  History & Physical    Patient Name: Miguel Angel Apple Sr.  MRN: [id number]  Admission Date: 10/19/2019  Attending Physician: Hima Mccoy MD   Primary Care Provider: Primary Doctor No         Patient information was obtained from patient, spouse/SO and ER records.     Subjective:     Principal Problem:Bowel obstruction    Chief Complaint:   Chief Complaint   Patient presents with    Constipation     felling bloated and has hx of diarrhea was treated and now is constipated        HPI: Patient's history began approximately 3 months ago.  He presented to the hospital well over a month ago given a diagnosis of he understands colitis.  Treated with antibiotics IV fluids.  He saw Dr. MOHAMUD General surgery on 10/03 placed on Cipro and Flagyl.  His on once completed that dose.  This past Sunday he started noticing that he was becoming  bloated and stopped eating solids at that time went back to liquids.  His had no pain pills for 2-3 days.  No nausea and vomiting unless he ate something that he knew he should the.  He attempted to make himself vomit yesterday and could not.  He has had small bowel movements to continue since that time.  CT scan shows colonic dilatation with the cecum being 10 cm.  The possible obstruction may be low left-sided.  There is even suggesting possibility have a rectal mass.  WBC remained 16,000 with a left shift.  Patient's potassium is 2.2.    Past Medical History:   Diagnosis Date    Medical history reviewed with no changes        Past Surgical History:   Procedure Laterality Date    no surgical history         Review of patient's allergies indicates:  No Known Allergies    No current facility-administered medications on file prior to encounter.      No current outpatient medications on file prior to encounter.     Family History     Problem Relation (Age of Onset)    Diabetes Mother    Heart disease Father        Tobacco Use     Smoking status: Former Smoker   Substance and Sexual Activity    Alcohol use: Not on file    Drug use: Not on file    Sexual activity: Not on file     Review of Systems   Constitutional: Positive for activity change, appetite change and unexpected weight change. Negative for chills, diaphoresis, fatigue and fever.   HENT: Negative for congestion, drooling, hearing loss and trouble swallowing.    Eyes: Negative for pain and visual disturbance.   Respiratory: Negative.  Negative for apnea, cough, choking, chest tightness, shortness of breath and wheezing.    Cardiovascular: Negative for chest pain, palpitations and leg swelling.        Increased heart rate   Gastrointestinal: Positive for abdominal distention. Negative for abdominal pain, blood in stool, constipation, diarrhea, nausea and vomiting.        Tenderness left lower quadrant   Endocrine: Negative for polydipsia, polyphagia and polyuria.   Genitourinary: Negative for difficulty urinating, dysuria and flank pain.   Musculoskeletal: Negative for arthralgias, back pain, gait problem, joint swelling, neck pain and neck stiffness.   Skin: Negative for color change, rash and wound.   Allergic/Immunologic: Negative for environmental allergies, food allergies and immunocompromised state.   Neurological: Positive for weakness. Negative for dizziness, syncope, numbness and headaches.   Hematological: Negative for adenopathy.   Psychiatric/Behavioral: Negative for agitation, confusion and sleep disturbance. The patient is not nervous/anxious.      Objective:     Vital Signs (Most Recent):  Temp: 98.5 °F (36.9 °C) (10/19/19 1550)  Pulse: 100 (10/19/19 1550)  Resp: 17 (10/19/19 1550)  BP: (!) 170/81 (10/19/19 1550)  SpO2: 97 % (10/19/19 1550) Vital Signs (24h Range):  Temp:  [98.5 °F (36.9 °C)-98.7 °F (37.1 °C)] 98.5 °F (36.9 °C)  Pulse:  [100-116] 100  Resp:  [16-20] 17  SpO2:  [96 %-98 %] 97 %  BP: ()/(61-90) 170/81     Weight: 64.3 kg (141 lb 12.1 oz)  Body  mass index is 20.93 kg/m².    Physical Exam   Constitutional: He is oriented to person, place, and time. He appears well-developed and well-nourished.   HENT:   Head: Normocephalic and atraumatic.   Right Ear: External ear normal.   Left Ear: External ear normal.   Nose: Nose normal.   Eyes: Pupils are equal, round, and reactive to light. Conjunctivae, EOM and lids are normal.   Neck: Trachea normal, normal range of motion and full passive range of motion without pain. Neck supple.   Cardiovascular: Normal rate, regular rhythm, S1 normal, S2 normal, normal heart sounds, intact distal pulses and normal pulses.   Mild tachycardia 110   Pulmonary/Chest: Effort normal and breath sounds normal.   Abdominal: Soft. Normal aorta and bowel sounds are normal. He exhibits distension. There is tenderness.   Tenderness left lower quadrant   Musculoskeletal: Normal range of motion.   Neurological: He is alert and oriented to person, place, and time. He has normal reflexes.   Skin: Skin is warm, dry and intact.   Psychiatric: He has a normal mood and affect. His speech is normal and behavior is normal. Judgment and thought content normal. Cognition and memory are normal.   Nursing note and vitals reviewed.        CRANIAL NERVES     CN III, IV, VI   Pupils are equal, round, and reactive to light.  Extraocular motions are normal.        Significant Labs:   BMP:   Recent Labs   Lab 10/19/19  1322   GLU 98      K 2.2*   CL 99   CO2 26   BUN 12   CREATININE 0.9   CALCIUM 8.4*     CBC:   Recent Labs   Lab 10/19/19  1322   WBC 16.32*   HGB 9.9*   HCT 30.8*   *     CMP:   Recent Labs   Lab 10/19/19  1322      K 2.2*   CL 99   CO2 26   GLU 98   BUN 12   CREATININE 0.9   CALCIUM 8.4*   PROT 6.6   ALBUMIN 2.8*   BILITOT 1.3*   ALKPHOS 53*   AST 15   ALT 11   ANIONGAP 13   EGFRNONAA >60.0     Magnesium: No results for input(s): MG in the last 48 hours.  All pertinent labs within the past 24 hours have been  reviewed.    Significant Imaging: CT: I have reviewed all pertinent results/findings within the past 24 hours and my personal findings are:  Dilatation of the colon with the cecum being approximately 10 cm.  Low obstruction left-sided  EKG: I have reviewed all pertinent results/findings within the past 24 hours and my personal findings are: Sinus tachycardia 103  I have reviewed and interpreted all pertinent imaging results/findings within the past 24 hours.    Assessment/Plan:     * Bowel obstruction  IV fluids IV potassium NPO KUB in lab in a.m. monitor closely.  Surgical consultation will be seen in a.m. by Dr. MOHAMUD General surgery seen the patient in the clinic on 10/03.        VTE Risk Mitigation (From admission, onward)    None             Hima Mccoy MD  Department of Hospital Medicine   [facility]

## 2019-10-19 NOTE — ASSESSMENT & PLAN NOTE
IV fluids IV potassium NPO KUB in lab in a.m. monitor closely.  Surgical consultation will be seen in a.m. by Dr. MOHAMUD General surgery seen the patient in the clinic on 10/03.

## 2019-10-19 NOTE — HPI
Patient's history began approximately 3 months ago.  He presented to the hospital well over a month ago given a diagnosis of he understands colitis.  Treated with antibiotics IV fluids.  He saw Dr. MOHAMUD General surgery on 10/03 placed on Cipro and Flagyl.  His on once completed that dose.  This past Sunday he started noticing that he was becoming  bloated and stopped eating solids at that time went back to liquids.  His had no pain pills for 2-3 days.  No nausea and vomiting unless he ate something that he knew he should the.  He attempted to make himself vomit yesterday and could not.  He has had small bowel movements to continue since that time.  CT scan shows colonic dilatation with the cecum being 10 cm.  The possible obstruction may be low left-sided.  There is even suggesting possibility have a rectal mass.  WBC remained 16,000 with a left shift.  Patient's potassium is 2.2.

## 2019-10-20 PROBLEM — E87.6 HYPOKALEMIA: Status: ACTIVE | Noted: 2019-10-20

## 2019-10-20 PROBLEM — K62.89 RECTAL MASS: Status: ACTIVE | Noted: 2019-10-20

## 2019-10-20 LAB
ALBUMIN SERPL BCP-MCNC: 2.5 G/DL (ref 3.5–5.2)
ALP SERPL-CCNC: 50 U/L (ref 55–135)
ALT SERPL W/O P-5'-P-CCNC: 11 U/L (ref 10–44)
ANION GAP SERPL CALC-SCNC: 13 MMOL/L (ref 8–16)
AST SERPL-CCNC: 14 U/L (ref 10–40)
BASOPHILS # BLD AUTO: 0.04 K/UL (ref 0–0.2)
BASOPHILS NFR BLD: 0.3 % (ref 0–1.9)
BILIRUB SERPL-MCNC: 1.2 MG/DL (ref 0.1–1)
BUN SERPL-MCNC: 9 MG/DL (ref 8–23)
CALCIUM SERPL-MCNC: 7.8 MG/DL (ref 8.7–10.5)
CEA SERPL-MCNC: 2.5 NG/ML (ref 0–5)
CHLORIDE SERPL-SCNC: 106 MMOL/L (ref 95–110)
CO2 SERPL-SCNC: 21 MMOL/L (ref 23–29)
CREAT SERPL-MCNC: 0.6 MG/DL (ref 0.5–1.4)
DIFFERENTIAL METHOD: ABNORMAL
EOSINOPHIL # BLD AUTO: 0.1 K/UL (ref 0–0.5)
EOSINOPHIL NFR BLD: 0.7 % (ref 0–8)
ERYTHROCYTE [DISTWIDTH] IN BLOOD BY AUTOMATED COUNT: 18 % (ref 11.5–14.5)
EST. GFR  (AFRICAN AMERICAN): >60 ML/MIN/1.73 M^2
EST. GFR  (NON AFRICAN AMERICAN): >60 ML/MIN/1.73 M^2
GLUCOSE SERPL-MCNC: 73 MG/DL (ref 70–110)
HCT VFR BLD AUTO: 29.7 % (ref 40–54)
HGB BLD-MCNC: 9.3 G/DL (ref 14–18)
IMM GRANULOCYTES # BLD AUTO: 0.05 K/UL (ref 0–0.04)
IMM GRANULOCYTES NFR BLD AUTO: 0.4 % (ref 0–0.5)
LYMPHOCYTES # BLD AUTO: 2.2 K/UL (ref 1–4.8)
LYMPHOCYTES NFR BLD: 16 % (ref 18–48)
MAGNESIUM SERPL-MCNC: 2 MG/DL (ref 1.6–2.6)
MCH RBC QN AUTO: 27.8 PG (ref 27–31)
MCHC RBC AUTO-ENTMCNC: 31.3 G/DL (ref 32–36)
MCV RBC AUTO: 89 FL (ref 82–98)
MONOCYTES # BLD AUTO: 1 K/UL (ref 0.3–1)
MONOCYTES NFR BLD: 7.3 % (ref 4–15)
NEUTROPHILS # BLD AUTO: 10.3 K/UL (ref 1.8–7.7)
NEUTROPHILS NFR BLD: 75.3 % (ref 38–73)
NRBC BLD-RTO: 0 /100 WBC
PLATELET # BLD AUTO: 658 K/UL (ref 150–350)
PMV BLD AUTO: 8.7 FL (ref 9.2–12.9)
POTASSIUM SERPL-SCNC: 2.2 MMOL/L (ref 3.5–5.1)
PROT SERPL-MCNC: 6.2 G/DL (ref 6–8.4)
RBC # BLD AUTO: 3.34 M/UL (ref 4.6–6.2)
SODIUM SERPL-SCNC: 140 MMOL/L (ref 136–145)
WBC # BLD AUTO: 13.65 K/UL (ref 3.9–12.7)

## 2019-10-20 PROCEDURE — 25000003 PHARM REV CODE 250: Performed by: SURGERY

## 2019-10-20 PROCEDURE — S0030 INJECTION, METRONIDAZOLE: HCPCS | Performed by: SURGERY

## 2019-10-20 PROCEDURE — 63600175 PHARM REV CODE 636 W HCPCS

## 2019-10-20 PROCEDURE — 80053 COMPREHEN METABOLIC PANEL: CPT

## 2019-10-20 PROCEDURE — 99222 1ST HOSP IP/OBS MODERATE 55: CPT | Mod: ,,, | Performed by: SURGERY

## 2019-10-20 PROCEDURE — 63600175 PHARM REV CODE 636 W HCPCS: Performed by: SURGERY

## 2019-10-20 PROCEDURE — 36415 COLL VENOUS BLD VENIPUNCTURE: CPT

## 2019-10-20 PROCEDURE — 82378 CARCINOEMBRYONIC ANTIGEN: CPT

## 2019-10-20 PROCEDURE — 83735 ASSAY OF MAGNESIUM: CPT

## 2019-10-20 PROCEDURE — 99222 PR INITIAL HOSPITAL CARE,LEVL II: ICD-10-PCS | Mod: ,,, | Performed by: SURGERY

## 2019-10-20 PROCEDURE — 21400001 HC TELEMETRY ROOM

## 2019-10-20 PROCEDURE — 63600175 PHARM REV CODE 636 W HCPCS: Performed by: INTERNAL MEDICINE

## 2019-10-20 PROCEDURE — 85025 COMPLETE CBC W/AUTO DIFF WBC: CPT

## 2019-10-20 RX ORDER — POTASSIUM CHLORIDE 7.45 MG/ML
10 INJECTION INTRAVENOUS
Status: COMPLETED | OUTPATIENT
Start: 2019-10-20 | End: 2019-10-20

## 2019-10-20 RX ORDER — METRONIDAZOLE 500 MG/100ML
500 INJECTION, SOLUTION INTRAVENOUS
Status: DISCONTINUED | OUTPATIENT
Start: 2019-10-20 | End: 2019-10-21

## 2019-10-20 RX ORDER — POTASSIUM CHLORIDE 7.45 MG/ML
INJECTION INTRAVENOUS
Status: COMPLETED
Start: 2019-10-20 | End: 2019-10-20

## 2019-10-20 RX ORDER — POTASSIUM CHLORIDE 7.45 MG/ML
10 INJECTION INTRAVENOUS
Status: DISCONTINUED | OUTPATIENT
Start: 2019-10-20 | End: 2019-10-20

## 2019-10-20 RX ORDER — BISACODYL 10 MG
10 SUPPOSITORY, RECTAL RECTAL DAILY PRN
Status: DISCONTINUED | OUTPATIENT
Start: 2019-10-20 | End: 2019-10-26

## 2019-10-20 RX ADMIN — POTASSIUM CHLORIDE 10 MEQ: 10 INJECTION, SOLUTION INTRAVENOUS at 01:10

## 2019-10-20 RX ADMIN — POTASSIUM CHLORIDE 10 MEQ: 10 INJECTION, SOLUTION INTRAVENOUS at 11:10

## 2019-10-20 RX ADMIN — SODIUM CHLORIDE AND POTASSIUM CHLORIDE: 9; 1.49 INJECTION, SOLUTION INTRAVENOUS at 01:10

## 2019-10-20 RX ADMIN — METRONIDAZOLE 500 MG: 500 INJECTION, SOLUTION INTRAVENOUS at 05:10

## 2019-10-20 RX ADMIN — ERTAPENEM SODIUM 1 G: 1 INJECTION, POWDER, LYOPHILIZED, FOR SOLUTION INTRAMUSCULAR; INTRAVENOUS at 09:10

## 2019-10-20 RX ADMIN — METRONIDAZOLE 500 MG: 500 INJECTION, SOLUTION INTRAVENOUS at 11:10

## 2019-10-20 RX ADMIN — SODIUM CHLORIDE AND POTASSIUM CHLORIDE: 9; 1.49 INJECTION, SOLUTION INTRAVENOUS at 05:10

## 2019-10-20 RX ADMIN — POTASSIUM CHLORIDE 10 MEQ: 10 INJECTION, SOLUTION INTRAVENOUS at 09:10

## 2019-10-20 NOTE — NURSING
CALL PLACED TO DR KURTZ CONCERNING RESULTS OF FLEET ENEMA/RESULTS HAD NO STOOL OR DISCOLORATION/RESULTS WERE CLEAR. MD GAVE INSTRUCTIONS TO HOLD REMAINING 2 ENEMAS.

## 2019-10-20 NOTE — SUBJECTIVE & OBJECTIVE
Interval History:  Patient states he feels better today.  However his KUB still shows:  Distention.  There is a rectal mass.  Have had extensive conversation with Dr. MOHAMUD General surgery concerning the findings.  Dr. MOHAMUD seen the patient this morning agrees with findings at this time.  Will continue IV fluids IV antibiotics replacement electrolytes fleets enema x3 today also plan on colonoscopy in a.m. if possible.  Findings are very suspicious with a rectal mass of being cancer causing the obstruction.    Review of Systems   Constitutional: Positive for activity change, appetite change and unexpected weight change. Negative for chills, diaphoresis, fatigue and fever.   HENT: Negative for congestion, drooling, hearing loss and trouble swallowing.    Eyes: Negative for pain and visual disturbance.   Respiratory: Negative.  Negative for apnea, cough, choking, chest tightness, shortness of breath and wheezing.    Cardiovascular: Negative for chest pain, palpitations and leg swelling.        Increased heart rate   Gastrointestinal: Positive for abdominal distention. Negative for abdominal pain, blood in stool, constipation, diarrhea, nausea and vomiting.        Tenderness left lower quadrant   Endocrine: Negative for polydipsia, polyphagia and polyuria.   Genitourinary: Negative for difficulty urinating, dysuria and flank pain.   Musculoskeletal: Negative for arthralgias, back pain, gait problem, joint swelling, neck pain and neck stiffness.   Skin: Negative for color change, rash and wound.   Allergic/Immunologic: Negative for environmental allergies, food allergies and immunocompromised state.   Neurological: Positive for weakness. Negative for dizziness, syncope, numbness and headaches.   Hematological: Negative for adenopathy.   Psychiatric/Behavioral: Negative for agitation, confusion and sleep disturbance. The patient is not nervous/anxious.      Objective:     Vital Signs (Most Recent):  Temp: 97.1 °F (36.2 °C)  (10/20/19 0746)  Pulse: (!) 113 (10/20/19 0746)  Resp: 17 (10/20/19 0746)  BP: 108/61 (10/20/19 0746)  SpO2: 97 % (10/20/19 0746) Vital Signs (24h Range):  Temp:  [97.1 °F (36.2 °C)-98.7 °F (37.1 °C)] 97.1 °F (36.2 °C)  Pulse:  [] 113  Resp:  [16-20] 17  SpO2:  [95 %-98 %] 97 %  BP: ()/(61-90) 108/61     Weight: 64.3 kg (141 lb 12.1 oz)  Body mass index is 20.93 kg/m².    Intake/Output Summary (Last 24 hours) at 10/20/2019 0959  Last data filed at 10/20/2019 0948  Gross per 24 hour   Intake 1234.58 ml   Output 700 ml   Net 534.58 ml      Physical Exam   Constitutional: He is oriented to person, place, and time. He appears well-developed and well-nourished.   HENT:   Head: Normocephalic and atraumatic.   Right Ear: External ear normal.   Left Ear: External ear normal.   Nose: Nose normal.   Eyes: Pupils are equal, round, and reactive to light. Conjunctivae, EOM and lids are normal.   Neck: Trachea normal, normal range of motion and full passive range of motion without pain. Neck supple.   Cardiovascular: Normal rate, regular rhythm, S1 normal, S2 normal, normal heart sounds, intact distal pulses and normal pulses.   Mild tachycardia 110   Pulmonary/Chest: Effort normal and breath sounds normal.   Abdominal: Soft. Normal aorta and bowel sounds are normal. He exhibits distension. There is tenderness.   Tenderness left lower quadrant   Musculoskeletal: Normal range of motion.   Neurological: He is alert and oriented to person, place, and time. He has normal reflexes.   Skin: Skin is warm, dry and intact.   Psychiatric: He has a normal mood and affect. His speech is normal and behavior is normal. Judgment and thought content normal. Cognition and memory are normal.   Nursing note and vitals reviewed.      Significant Labs:   BMP:   Recent Labs   Lab 10/20/19  0623   GLU 73      K 2.2*      CO2 21*   BUN 9   CREATININE 0.6   CALCIUM 7.8*   MG 2.0     CBC:   Recent Labs   Lab 10/19/19  1322  10/20/19  0623   WBC 16.32* 13.65*   HGB 9.9* 9.3*   HCT 30.8* 29.7*   * 658*     CMP:   Recent Labs   Lab 10/19/19  1322 10/20/19  0623    140   K 2.2* 2.2*   CL 99 106   CO2 26 21*   GLU 98 73   BUN 12 9   CREATININE 0.9 0.6   CALCIUM 8.4* 7.8*   PROT 6.6 6.2   ALBUMIN 2.8* 2.5*   BILITOT 1.3* 1.2*   ALKPHOS 53* 50*   AST 15 14   ALT 11 11   ANIONGAP 13 13   EGFRNONAA >60.0 >60.0     All pertinent labs within the past 24 hours have been reviewed.    Significant Imaging: CT: I have reviewed all pertinent results/findings within the past 24 hours and my personal findings are:  Large bowel dilatation and obstruction of rectal mass  CXR: I have reviewed all pertinent results/findings within the past 24 hours and my personal findings are:  Negative except for atelectasis  EKG: I have reviewed all pertinent results/findings within the past 24 hours and my personal findings are: Normal sinus  I have reviewed and interpreted all pertinent imaging results/findings within the past 24 hours.  KUB shows continued large bowel distention

## 2019-10-20 NOTE — CONSULTS
[facility]  General Surgery  Consult Note    Patient Name: Miguel Angel Apple Sr.  MRN: [id number]  Admission Date: 10/19/2019  Attending Physician: Hima Mccoy MD   Consult Physician: Jose Lazo MD  Primary Care Provider: Primary Doctor No    Patient information was obtained from patient.     Subjective:     Chief Complaint/Reason for Admission: Abdominal distention, decreased bowel movements.    History of Present Illness:    Miguel Angel Apple Sr. is a 63 y.o. male with a recent history of colitis treated with abx presented to the hospital yesterday with increasing abdominal distention, decreased bowel function.  Patient was seen in my office earlier this month with increased diarrhea, and weight loss.  Patient stated that after he completed the abx course (2 week) he felt better.  His bowel function changed from diarrhea to constipation 1 week or so after the abx complete.  Stool studies reviewed.  Negative besides occult blood.  Patient with increased abd distention for 1 week, no vomiting but some nausea, Decreased bowel function but continue small (1/8 cup every few hours per the patient's account).  Patient presented to the hospital with these symptoms, underwent CT scan which was concerning for large bowel obstruction.  Patient admitted to the medical service and now surgery called this morning in consultation.    Review of patient's allergies indicates:  No Known Allergies    Past Medical History:   Diagnosis Date    Medical history reviewed with no changes      Past Surgical History:   Procedure Laterality Date    no surgical history       Family History     Problem Relation (Age of Onset)    Diabetes Mother    Heart disease Father        Tobacco Use    Smoking status: Former Smoker   Substance and Sexual Activity    Alcohol use: Not on file    Drug use: Not on file    Sexual activity: Not on file     Review of Systems   Constitutional: Positive for unexpected  weight change. Negative for appetite change, chills and fever.   HENT: Negative for congestion, dental problem and drooling.    Eyes: Negative for photophobia, discharge and itching.   Respiratory: Negative for apnea and chest tightness.    Cardiovascular: Negative for chest pain, palpitations and leg swelling.   Gastrointestinal: Positive for abdominal distention, constipation and nausea. Negative for abdominal pain.   Endocrine: Negative for cold intolerance and heat intolerance.   Genitourinary: Negative for difficulty urinating and dysuria.   Musculoskeletal: Negative for arthralgias and back pain.   Skin: Negative for color change and pallor.   Neurological: Negative for dizziness, facial asymmetry and headaches.   Hematological: Negative for adenopathy. Does not bruise/bleed easily.   Psychiatric/Behavioral: Negative for agitation, behavioral problems and confusion.     Objective:     Vital Signs (Most Recent):  Temp: 97.1 °F (36.2 °C) (10/20/19 0746)  Pulse: (!) 113 (10/20/19 0746)  Resp: 17 (10/20/19 0746)  BP: 108/61 (10/20/19 0746)  SpO2: 97 % (10/20/19 0746) Vital Signs (24h Range):  Temp:  [97.1 °F (36.2 °C)-98.7 °F (37.1 °C)] 97.1 °F (36.2 °C)  Pulse:  [] 113  Resp:  [16-20] 17  SpO2:  [95 %-98 %] 97 %  BP: ()/(61-90) 108/61     Weight: 64.3 kg (141 lb 12.1 oz)  Body mass index is 20.93 kg/m².    Physical Exam   Constitutional: He is oriented to person, place, and time. He appears well-developed and well-nourished.   HENT:   Head: Normocephalic and atraumatic.   Eyes: Pupils are equal, round, and reactive to light. EOM are normal.   Neck: Normal range of motion. Neck supple. No thyromegaly present.   Cardiovascular: Normal rate and regular rhythm.   No murmur heard.  Pulmonary/Chest: Effort normal and breath sounds normal. No respiratory distress.   Abdominal: Soft. Bowel sounds are normal. He exhibits distension (Moderate). There is no tenderness.   Musculoskeletal: Normal range of motion.  He exhibits no edema.   Neurological: He is alert and oriented to person, place, and time. No cranial nerve deficit.   Skin: Skin is warm. Capillary refill takes less than 2 seconds. No rash noted. He is not diaphoretic. No erythema.   Psychiatric: He has a normal mood and affect.       Significant Labs:  CBC:   Recent Labs   Lab 10/20/19  0623   WBC 13.65*   RBC 3.34*   HGB 9.3*   HCT 29.7*   *   MCV 89   MCH 27.8   MCHC 31.3*     BMP:   Recent Labs   Lab 10/20/19  0623   GLU 73      K 2.2*      CO2 21*   BUN 9   CREATININE 0.6   CALCIUM 7.8*   MG 2.0     CMP:   Recent Labs   Lab 10/20/19  0623   GLU 73   CALCIUM 7.8*   ALBUMIN 2.5*   PROT 6.2      K 2.2*   CO2 21*      BUN 9   CREATININE 0.6   ALKPHOS 50*   ALT 11   AST 14   BILITOT 1.2*     LFTs:   Recent Labs   Lab 10/20/19  0623   ALT 11   AST 14   ALKPHOS 50*   BILITOT 1.2*   PROT 6.2   ALBUMIN 2.5*     Coagulation: No results for input(s): LABPROT, INR, APTT in the last 168 hours.  Specimen (12h ago, onward)    None        Recent Labs   Lab 10/19/19  1716   COLORU Yellow   SPECGRAV 1.015   PHUR 7.0   PROTEINUA 2+*   BACTERIA Few*   NITRITE Negative   LEUKOCYTESUR Negative   UROBILINOGEN Negative   HYALINECASTS 0       Significant Diagnostics:  CT: I have reviewed all pertinent results/findings within the past 24 hours and my personal findings are:  Dilated colon and dilated cecum.  ? Rectal mass.    Assessment:   Miguel Angel Aplpe Sr. is a 63 y.o. male who presents with Bowel obstruction.    Active Diagnoses:    Diagnosis Date Noted POA    PRINCIPAL PROBLEM:  Bowel obstruction [K56.609] 10/19/2019 Yes    Colitis [K52.9] 10/19/2019 Yes      Problems Resolved During this Admission:     VTE Risk Mitigation (From admission, onward)         Ordered     IP VTE LOW RISK PATIENT  Once      10/19/19 1752     Place MAE hose  Until discontinued      10/19/19 1752                Medical Decision Making/Plan:  Low volume enemas today to clean  out rectum.  Ducolax suppository x1.  This is in order to attempt colonoscopy versus flexible sigmoidoscopy tomorrow.  Risks of perforating the colon was discussed regarding the colonoscopy/sigmoidoscopy today, if this were to happen the patient would likely need an ostomy.  Patient voiced understanding and wishes to proceed today.  Diff Dx: Diverticular stricture, IBD, Severe colitis, Neoplasm, etc.    Jose Lazo MD  General Surgery  [facility]

## 2019-10-20 NOTE — PLAN OF CARE
Problem: Adult Inpatient Plan of Care  Goal: Plan of Care Review  Outcome: Ongoing, Progressing  Flowsheets (Taken 10/20/2019 1627)  Plan of Care Reviewed With: patient  Goal: Optimal Comfort and Wellbeing  Outcome: Ongoing, Progressing  Intervention: Provide Person-Centered Care  Flowsheets (Taken 10/20/2019 1627)  Trust Relationship/Rapport: care explained; choices provided; emotional support provided; empathic listening provided; thoughts/feelings acknowledged; reassurance provided; questions encouraged; questions answered

## 2019-10-20 NOTE — NURSING
CELSO GOODMAN RN, IN TO ADMINISTER FLEET ENEMA TO PT.  PT WAS INSTRUCTED ON HAT IN TOILET/NEEDED TO COLLECT RESULTS OF ENEMA/CALL STAFF WHEN YOU HAVE RESULTS. PT VERBALIZED UNDERSTANDING.

## 2019-10-20 NOTE — PROGRESS NOTES
[facility] Hospital Medicine  Progress Note    Patient Name: Miguel Angel Apple Sr.  MRN: [id number]  Patient Class: IP- Inpatient   Admission Date: 10/19/2019  Length of Stay: 1 days  Attending Physician: Hima Mccoy MD  Primary Care Provider: Primary Doctor No        Subjective:     Principal Problem:Large bowel obstruction        HPI:  Patient's history began approximately 3 months ago.  He presented to the hospital well over a month ago given a diagnosis of he understands colitis.  Treated with antibiotics IV fluids.  He saw Dr. ONEIDA charles on 10/03 placed on Cipro and Flagyl.  His on once completed that dose.  This past Sunday he started noticing that he was becoming  bloated and stopped eating solids at that time went back to liquids.  His had no pain pills for 2-3 days.  No nausea and vomiting unless he ate something that he knew he should the.  He attempted to make himself vomit yesterday and could not.  He has had small bowel movements to continue since that time.  CT scan shows colonic dilatation with the cecum being 10 cm.  The possible obstruction may be low left-sided.  There is even suggesting possibility have a rectal mass.  WBC remained 16,000 with a left shift.  Patient's potassium is 2.2.    Overview/Hospital Course:  IV fluids IV potassium NPO Surgical consultation repeat KUB and lab in a.m. further clinical decisions depending on clinical course.  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  The patient has had no prior abdominal surgery and no medical history prior to approximately 3 months ago.  10/20/2019.  Long discussion with the patient this morning concerning the findings.  Dr. ONEIDA charles see the patient today since he has been involved in his care on 10/03.  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  Patient continue IV fluids IV antibiotics potassium replacement today and will have 3 Fleet's enemas  today.  CEA is been ordered.    Interval History:  Patient states he feels better today.  However his KUB still shows:  Distention.  There is a rectal mass.  Have had extensive conversation with Dr. MOHAMUD General surgery concerning the findings.  Dr. MOHAMUD seen the patient this morning agrees with findings at this time.  Will continue IV fluids IV antibiotics replacement electrolytes fleets enema x3 today also plan on colonoscopy in a.m. if possible.  Findings are very suspicious with a rectal mass of being cancer causing the obstruction.    Review of Systems   Constitutional: Positive for activity change, appetite change and unexpected weight change. Negative for chills, diaphoresis, fatigue and fever.   HENT: Negative for congestion, drooling, hearing loss and trouble swallowing.    Eyes: Negative for pain and visual disturbance.   Respiratory: Negative.  Negative for apnea, cough, choking, chest tightness, shortness of breath and wheezing.    Cardiovascular: Negative for chest pain, palpitations and leg swelling.        Increased heart rate   Gastrointestinal: Positive for abdominal distention. Negative for abdominal pain, blood in stool, constipation, diarrhea, nausea and vomiting.        Tenderness left lower quadrant   Endocrine: Negative for polydipsia, polyphagia and polyuria.   Genitourinary: Negative for difficulty urinating, dysuria and flank pain.   Musculoskeletal: Negative for arthralgias, back pain, gait problem, joint swelling, neck pain and neck stiffness.   Skin: Negative for color change, rash and wound.   Allergic/Immunologic: Negative for environmental allergies, food allergies and immunocompromised state.   Neurological: Positive for weakness. Negative for dizziness, syncope, numbness and headaches.   Hematological: Negative for adenopathy.   Psychiatric/Behavioral: Negative for agitation, confusion and sleep disturbance. The patient is not nervous/anxious.      Objective:     Vital Signs (Most  Recent):  Temp: 97.1 °F (36.2 °C) (10/20/19 0746)  Pulse: (!) 113 (10/20/19 0746)  Resp: 17 (10/20/19 0746)  BP: 108/61 (10/20/19 0746)  SpO2: 97 % (10/20/19 0746) Vital Signs (24h Range):  Temp:  [97.1 °F (36.2 °C)-98.7 °F (37.1 °C)] 97.1 °F (36.2 °C)  Pulse:  [] 113  Resp:  [16-20] 17  SpO2:  [95 %-98 %] 97 %  BP: ()/(61-90) 108/61     Weight: 64.3 kg (141 lb 12.1 oz)  Body mass index is 20.93 kg/m².    Intake/Output Summary (Last 24 hours) at 10/20/2019 0959  Last data filed at 10/20/2019 0948  Gross per 24 hour   Intake 1234.58 ml   Output 700 ml   Net 534.58 ml      Physical Exam   Constitutional: He is oriented to person, place, and time. He appears well-developed and well-nourished.   HENT:   Head: Normocephalic and atraumatic.   Right Ear: External ear normal.   Left Ear: External ear normal.   Nose: Nose normal.   Eyes: Pupils are equal, round, and reactive to light. Conjunctivae, EOM and lids are normal.   Neck: Trachea normal, normal range of motion and full passive range of motion without pain. Neck supple.   Cardiovascular: Normal rate, regular rhythm, S1 normal, S2 normal, normal heart sounds, intact distal pulses and normal pulses.   Mild tachycardia 110   Pulmonary/Chest: Effort normal and breath sounds normal.   Abdominal: Soft. Normal aorta and bowel sounds are normal. He exhibits distension. There is tenderness.   Tenderness left lower quadrant   Musculoskeletal: Normal range of motion.   Neurological: He is alert and oriented to person, place, and time. He has normal reflexes.   Skin: Skin is warm, dry and intact.   Psychiatric: He has a normal mood and affect. His speech is normal and behavior is normal. Judgment and thought content normal. Cognition and memory are normal.   Nursing note and vitals reviewed.      Significant Labs:   BMP:   Recent Labs   Lab 10/20/19  0623   GLU 73      K 2.2*      CO2 21*   BUN 9   CREATININE 0.6   CALCIUM 7.8*   MG 2.0     CBC:   Recent  Labs   Lab 10/19/19  1322 10/20/19  0623   WBC 16.32* 13.65*   HGB 9.9* 9.3*   HCT 30.8* 29.7*   * 658*     CMP:   Recent Labs   Lab 10/19/19  1322 10/20/19  0623    140   K 2.2* 2.2*   CL 99 106   CO2 26 21*   GLU 98 73   BUN 12 9   CREATININE 0.9 0.6   CALCIUM 8.4* 7.8*   PROT 6.6 6.2   ALBUMIN 2.8* 2.5*   BILITOT 1.3* 1.2*   ALKPHOS 53* 50*   AST 15 14   ALT 11 11   ANIONGAP 13 13   EGFRNONAA >60.0 >60.0     All pertinent labs within the past 24 hours have been reviewed.    Significant Imaging: CT: I have reviewed all pertinent results/findings within the past 24 hours and my personal findings are:  Large bowel dilatation and obstruction of rectal mass  CXR: I have reviewed all pertinent results/findings within the past 24 hours and my personal findings are:  Negative except for atelectasis  EKG: I have reviewed all pertinent results/findings within the past 24 hours and my personal findings are: Normal sinus  I have reviewed and interpreted all pertinent imaging results/findings within the past 24 hours.  KUB shows continued large bowel distention      Assessment/Plan:      * Large bowel obstruction  IV fluids IV potassium NPO KUB in lab in a.m. monitor closely.  Surgical consultation will be seen in a.m. by Dr. MOHAMUD General surgery seen the patient in the clinic on 10/03.  10/20/2019.  Extended conversation with Dr. Buck concerning the patient's finding rectal mass bowel obstruction.  Correction of electrolytes IV fluid IV antibiotics NPO fleets enema today x3.  Colonoscope in a.m..  CEA is been ordered.      VTE Risk Mitigation (From admission, onward)         Ordered     IP VTE LOW RISK PATIENT  Once      10/19/19 1752     Place MAE hose  Until discontinued      10/19/19 1752                      Hima Mccoy MD  Department of Hospital Medicine   [facility]

## 2019-10-20 NOTE — ASSESSMENT & PLAN NOTE
IV fluids IV potassium NPO KUB in lab in a.m. monitor closely.  Surgical consultation will be seen in a.m. by Dr. MOHAMUD General surgery seen the patient in the clinic on 10/03.  10/20/2019.  Extended conversation with Dr. Buck concerning the patient's finding rectal mass bowel obstruction.  Correction of electrolytes IV fluid IV antibiotics NPO fleets enema today x3.  Colonoscope in a.m..  CEA is been ordered.

## 2019-10-21 ENCOUNTER — ANESTHESIA EVENT (OUTPATIENT)
Dept: SURGERY | Facility: HOSPITAL | Age: 64
DRG: 329 | End: 2019-10-21
Payer: MEDICARE

## 2019-10-21 ENCOUNTER — ANESTHESIA (OUTPATIENT)
Dept: SURGERY | Facility: HOSPITAL | Age: 64
DRG: 329 | End: 2019-10-21
Payer: MEDICARE

## 2019-10-21 PROBLEM — R19.7 DIARRHEA: Status: ACTIVE | Noted: 2019-10-21

## 2019-10-21 LAB
ALBUMIN SERPL BCP-MCNC: 1.7 G/DL (ref 3.5–5.2)
ALBUMIN SERPL BCP-MCNC: 2.7 G/DL (ref 3.5–5.2)
ALP SERPL-CCNC: 33 U/L (ref 55–135)
ALP SERPL-CCNC: 49 U/L (ref 55–135)
ALT SERPL W/O P-5'-P-CCNC: 12 U/L (ref 10–44)
ALT SERPL W/O P-5'-P-CCNC: 25 U/L (ref 10–44)
ANION GAP SERPL CALC-SCNC: 12 MMOL/L (ref 8–16)
ANION GAP SERPL CALC-SCNC: 16 MMOL/L (ref 8–16)
AST SERPL-CCNC: 16 U/L (ref 10–40)
AST SERPL-CCNC: 43 U/L (ref 10–40)
BASOPHILS # BLD AUTO: 0.03 K/UL (ref 0–0.2)
BASOPHILS # BLD AUTO: 0.05 K/UL (ref 0–0.2)
BASOPHILS NFR BLD: 0.2 % (ref 0–1.9)
BASOPHILS NFR BLD: 0.3 % (ref 0–1.9)
BILIRUB SERPL-MCNC: 0.7 MG/DL (ref 0.1–1)
BILIRUB SERPL-MCNC: 1.7 MG/DL (ref 0.1–1)
BUN SERPL-MCNC: 6 MG/DL (ref 8–23)
BUN SERPL-MCNC: 6 MG/DL (ref 8–23)
CALCIUM SERPL-MCNC: 7.3 MG/DL (ref 8.7–10.5)
CALCIUM SERPL-MCNC: 8.1 MG/DL (ref 8.7–10.5)
CHLORIDE SERPL-SCNC: 108 MMOL/L (ref 95–110)
CHLORIDE SERPL-SCNC: 113 MMOL/L (ref 95–110)
CO2 SERPL-SCNC: 19 MMOL/L (ref 23–29)
CO2 SERPL-SCNC: 21 MMOL/L (ref 23–29)
CREAT SERPL-MCNC: 0.7 MG/DL (ref 0.5–1.4)
CREAT SERPL-MCNC: 0.7 MG/DL (ref 0.5–1.4)
DIFFERENTIAL METHOD: ABNORMAL
DIFFERENTIAL METHOD: ABNORMAL
EOSINOPHIL # BLD AUTO: 0 K/UL (ref 0–0.5)
EOSINOPHIL # BLD AUTO: 0.1 K/UL (ref 0–0.5)
EOSINOPHIL NFR BLD: 0.1 % (ref 0–8)
EOSINOPHIL NFR BLD: 0.8 % (ref 0–8)
ERYTHROCYTE [DISTWIDTH] IN BLOOD BY AUTOMATED COUNT: 18.7 % (ref 11.5–14.5)
ERYTHROCYTE [DISTWIDTH] IN BLOOD BY AUTOMATED COUNT: 19.2 % (ref 11.5–14.5)
EST. GFR  (AFRICAN AMERICAN): >60 ML/MIN/1.73 M^2
EST. GFR  (AFRICAN AMERICAN): >60 ML/MIN/1.73 M^2
EST. GFR  (NON AFRICAN AMERICAN): >60 ML/MIN/1.73 M^2
EST. GFR  (NON AFRICAN AMERICAN): >60 ML/MIN/1.73 M^2
GLUCOSE SERPL-MCNC: 121 MG/DL (ref 70–110)
GLUCOSE SERPL-MCNC: 81 MG/DL (ref 70–110)
HCT VFR BLD AUTO: 26.6 % (ref 40–54)
HCT VFR BLD AUTO: 31.4 % (ref 40–54)
HGB BLD-MCNC: 8.3 G/DL (ref 14–18)
HGB BLD-MCNC: 9.8 G/DL (ref 14–18)
IMM GRANULOCYTES # BLD AUTO: 0.08 K/UL (ref 0–0.04)
IMM GRANULOCYTES # BLD AUTO: 0.11 K/UL (ref 0–0.04)
IMM GRANULOCYTES NFR BLD AUTO: 0.5 % (ref 0–0.5)
IMM GRANULOCYTES NFR BLD AUTO: 0.6 % (ref 0–0.5)
LYMPHOCYTES # BLD AUTO: 1.8 K/UL (ref 1–4.8)
LYMPHOCYTES # BLD AUTO: 2.4 K/UL (ref 1–4.8)
LYMPHOCYTES NFR BLD: 14.5 % (ref 18–48)
LYMPHOCYTES NFR BLD: 9.6 % (ref 18–48)
MAGNESIUM SERPL-MCNC: 1.4 MG/DL (ref 1.6–2.6)
MCH RBC QN AUTO: 27.8 PG (ref 27–31)
MCH RBC QN AUTO: 28.2 PG (ref 27–31)
MCHC RBC AUTO-ENTMCNC: 31.2 G/DL (ref 32–36)
MCHC RBC AUTO-ENTMCNC: 31.2 G/DL (ref 32–36)
MCV RBC AUTO: 89 FL (ref 82–98)
MCV RBC AUTO: 91 FL (ref 82–98)
MONOCYTES # BLD AUTO: 1.2 K/UL (ref 0.3–1)
MONOCYTES # BLD AUTO: 1.2 K/UL (ref 0.3–1)
MONOCYTES NFR BLD: 6.6 % (ref 4–15)
MONOCYTES NFR BLD: 7.2 % (ref 4–15)
NEUTROPHILS # BLD AUTO: 12.9 K/UL (ref 1.8–7.7)
NEUTROPHILS # BLD AUTO: 15.5 K/UL (ref 1.8–7.7)
NEUTROPHILS NFR BLD: 76.7 % (ref 38–73)
NEUTROPHILS NFR BLD: 82.9 % (ref 38–73)
NRBC BLD-RTO: 0 /100 WBC
NRBC BLD-RTO: 0 /100 WBC
PLATELET # BLD AUTO: 586 K/UL (ref 150–350)
PLATELET # BLD AUTO: 698 K/UL (ref 150–350)
PMV BLD AUTO: 8.5 FL (ref 9.2–12.9)
PMV BLD AUTO: 8.7 FL (ref 9.2–12.9)
POTASSIUM SERPL-SCNC: 2.4 MMOL/L (ref 3.5–5.1)
POTASSIUM SERPL-SCNC: 2.9 MMOL/L (ref 3.5–5.1)
POTASSIUM SERPL-SCNC: 3.3 MMOL/L (ref 3.5–5.1)
PROT SERPL-MCNC: 4.1 G/DL (ref 6–8.4)
PROT SERPL-MCNC: 6.5 G/DL (ref 6–8.4)
RBC # BLD AUTO: 2.94 M/UL (ref 4.6–6.2)
RBC # BLD AUTO: 3.52 M/UL (ref 4.6–6.2)
SODIUM SERPL-SCNC: 144 MMOL/L (ref 136–145)
SODIUM SERPL-SCNC: 145 MMOL/L (ref 136–145)
WBC # BLD AUTO: 16.76 K/UL (ref 3.9–12.7)
WBC # BLD AUTO: 18.71 K/UL (ref 3.9–12.7)

## 2019-10-21 PROCEDURE — 88305 TISSUE SPECIMEN TO PATHOLOGY - SURGERY: ICD-10-PCS | Mod: 26,,, | Performed by: PATHOLOGY

## 2019-10-21 PROCEDURE — 85025 COMPLETE CBC W/AUTO DIFF WBC: CPT | Mod: 91

## 2019-10-21 PROCEDURE — 44144 PARTIAL REMOVAL OF COLON: CPT | Mod: 80,,, | Performed by: SURGERY

## 2019-10-21 PROCEDURE — 83735 ASSAY OF MAGNESIUM: CPT

## 2019-10-21 PROCEDURE — 25000003 PHARM REV CODE 250: Performed by: SURGERY

## 2019-10-21 PROCEDURE — 63600175 PHARM REV CODE 636 W HCPCS: Performed by: ANESTHESIOLOGY

## 2019-10-21 PROCEDURE — S0030 INJECTION, METRONIDAZOLE: HCPCS | Performed by: SURGERY

## 2019-10-21 PROCEDURE — 49905 PR OMENTAL FLAP,INTRA-ABDOMINAL: ICD-10-PCS | Mod: ,,, | Performed by: SURGERY

## 2019-10-21 PROCEDURE — D9220A PRA ANESTHESIA: ICD-10-PCS | Mod: CRNA,,, | Performed by: NURSE ANESTHETIST, CERTIFIED REGISTERED

## 2019-10-21 PROCEDURE — 36556 INSERT NON-TUNNEL CV CATH: CPT | Mod: 59,,, | Performed by: ANESTHESIOLOGY

## 2019-10-21 PROCEDURE — 25000003 PHARM REV CODE 250: Performed by: NURSE ANESTHETIST, CERTIFIED REGISTERED

## 2019-10-21 PROCEDURE — 63600175 PHARM REV CODE 636 W HCPCS: Performed by: NURSE ANESTHETIST, CERTIFIED REGISTERED

## 2019-10-21 PROCEDURE — 63600175 PHARM REV CODE 636 W HCPCS: Performed by: INTERNAL MEDICINE

## 2019-10-21 PROCEDURE — 27000221 HC OXYGEN, UP TO 24 HOURS

## 2019-10-21 PROCEDURE — 80053 COMPREHEN METABOLIC PANEL: CPT

## 2019-10-21 PROCEDURE — 88305 TISSUE EXAM BY PATHOLOGIST: CPT | Mod: 26,,, | Performed by: PATHOLOGY

## 2019-10-21 PROCEDURE — 36415 COLL VENOUS BLD VENIPUNCTURE: CPT

## 2019-10-21 PROCEDURE — 44160 REMOVAL OF COLON: CPT | Mod: 59,,, | Performed by: SURGERY

## 2019-10-21 PROCEDURE — 44144 PARTIAL REMOVAL OF COLON: CPT | Mod: 22,,, | Performed by: SURGERY

## 2019-10-21 PROCEDURE — 25000003 PHARM REV CODE 250: Performed by: ANESTHESIOLOGY

## 2019-10-21 PROCEDURE — 44139 PR MOBILIZE SPLENIC FLEX: ICD-10-PCS | Mod: ,,, | Performed by: SURGERY

## 2019-10-21 PROCEDURE — 44139 MOBILIZATION OF COLON: CPT | Mod: ,,, | Performed by: SURGERY

## 2019-10-21 PROCEDURE — 63600175 PHARM REV CODE 636 W HCPCS: Performed by: SURGERY

## 2019-10-21 PROCEDURE — 84132 ASSAY OF SERUM POTASSIUM: CPT

## 2019-10-21 PROCEDURE — 36556 PR INSERT NON-TUNNEL CV CATH 5+ YRS OLD: ICD-10-PCS | Mod: 59,,, | Performed by: ANESTHESIOLOGY

## 2019-10-21 PROCEDURE — D9220A PRA ANESTHESIA: Mod: ANES,,, | Performed by: ANESTHESIOLOGY

## 2019-10-21 PROCEDURE — 47600 CHOLECYSTECTOMY: CPT | Mod: 80,51,, | Performed by: SURGERY

## 2019-10-21 PROCEDURE — 47600 PR REMOVAL GALLBLADDER: ICD-10-PCS | Mod: 51,,, | Performed by: SURGERY

## 2019-10-21 PROCEDURE — C1751 CATH, INF, PER/CENT/MIDLINE: HCPCS | Performed by: NURSE ANESTHETIST, CERTIFIED REGISTERED

## 2019-10-21 PROCEDURE — 99233 PR SUBSEQUENT HOSPITAL CARE,LEVL III: ICD-10-PCS | Mod: ,,, | Performed by: INTERNAL MEDICINE

## 2019-10-21 PROCEDURE — 37000008 HC ANESTHESIA 1ST 15 MINUTES: Performed by: SURGERY

## 2019-10-21 PROCEDURE — 45331 PR SIGMOIDOSCOPY,BIOPSY: ICD-10-PCS | Mod: 52,,, | Performed by: SURGERY

## 2019-10-21 PROCEDURE — 44144 PR PART REMOVAL COLON W OSTOMY/MUCOFIST: ICD-10-PCS | Mod: 80,,, | Performed by: SURGERY

## 2019-10-21 PROCEDURE — 36000708 HC OR TIME LEV III 1ST 15 MIN: Performed by: SURGERY

## 2019-10-21 PROCEDURE — 99233 SBSQ HOSP IP/OBS HIGH 50: CPT | Mod: ,,, | Performed by: INTERNAL MEDICINE

## 2019-10-21 PROCEDURE — 88307 TISSUE SPECIMEN TO PATHOLOGY - SURGERY: ICD-10-PCS | Mod: 26,,, | Performed by: PATHOLOGY

## 2019-10-21 PROCEDURE — S0028 INJECTION, FAMOTIDINE, 20 MG: HCPCS | Performed by: ANESTHESIOLOGY

## 2019-10-21 PROCEDURE — 37000009 HC ANESTHESIA EA ADD 15 MINS: Performed by: SURGERY

## 2019-10-21 PROCEDURE — 47600 PR REMOVAL GALLBLADDER: ICD-10-PCS | Mod: 80,51,, | Performed by: SURGERY

## 2019-10-21 PROCEDURE — 88304 TISSUE SPECIMEN TO PATHOLOGY - SURGERY: ICD-10-PCS | Mod: 26,,, | Performed by: PATHOLOGY

## 2019-10-21 PROCEDURE — D9220A PRA ANESTHESIA: ICD-10-PCS | Mod: ANES,,, | Performed by: ANESTHESIOLOGY

## 2019-10-21 PROCEDURE — 80053 COMPREHEN METABOLIC PANEL: CPT | Mod: 91

## 2019-10-21 PROCEDURE — D9220A PRA ANESTHESIA: Mod: CRNA,,, | Performed by: NURSE ANESTHETIST, CERTIFIED REGISTERED

## 2019-10-21 PROCEDURE — 44160 PR REMVL COLON & TERM ILEUM W/ILEOCOLOSTOMY: ICD-10-PCS | Mod: 59,,, | Performed by: SURGERY

## 2019-10-21 PROCEDURE — 20000000 HC ICU ROOM

## 2019-10-21 PROCEDURE — 45331 SIGMOIDOSCOPY AND BIOPSY: CPT | Mod: 52,,, | Performed by: SURGERY

## 2019-10-21 PROCEDURE — 44139 MOBILIZATION OF COLON: CPT | Mod: 80,,, | Performed by: SURGERY

## 2019-10-21 PROCEDURE — 44144 PR PART REMOVAL COLON W OSTOMY/MUCOFIST: ICD-10-PCS | Mod: 22,,, | Performed by: SURGERY

## 2019-10-21 PROCEDURE — 97802 MEDICAL NUTRITION INDIV IN: CPT

## 2019-10-21 PROCEDURE — 88304 TISSUE EXAM BY PATHOLOGIST: CPT | Mod: 26,,, | Performed by: PATHOLOGY

## 2019-10-21 PROCEDURE — 88305 TISSUE EXAM BY PATHOLOGIST: CPT | Performed by: PATHOLOGY

## 2019-10-21 PROCEDURE — 88307 TISSUE EXAM BY PATHOLOGIST: CPT | Mod: 26,,, | Performed by: PATHOLOGY

## 2019-10-21 PROCEDURE — 44160 PR REMVL COLON & TERM ILEUM W/ILEOCOLOSTOMY: ICD-10-PCS | Mod: 80,59,, | Performed by: SURGERY

## 2019-10-21 PROCEDURE — 27201423 OPTIME MED/SURG SUP & DEVICES STERILE SUPPLY: Performed by: SURGERY

## 2019-10-21 PROCEDURE — 99233 PR SUBSEQUENT HOSPITAL CARE,LEVL III: ICD-10-PCS | Mod: 57,,, | Performed by: SURGERY

## 2019-10-21 PROCEDURE — 99233 SBSQ HOSP IP/OBS HIGH 50: CPT | Mod: 57,,, | Performed by: SURGERY

## 2019-10-21 PROCEDURE — 44139 PR MOBILIZE SPLENIC FLEX: ICD-10-PCS | Mod: 80,,, | Performed by: SURGERY

## 2019-10-21 PROCEDURE — 36000709 HC OR TIME LEV III EA ADD 15 MIN: Performed by: SURGERY

## 2019-10-21 PROCEDURE — 49905 PR OMENTAL FLAP,INTRA-ABDOMINAL: ICD-10-PCS | Mod: 80,,, | Performed by: SURGERY

## 2019-10-21 PROCEDURE — 49905 OMENTAL FLAP INTRA-ABDOM: CPT | Mod: 80,,, | Performed by: SURGERY

## 2019-10-21 PROCEDURE — 47600 CHOLECYSTECTOMY: CPT | Mod: 51,,, | Performed by: SURGERY

## 2019-10-21 PROCEDURE — 49905 OMENTAL FLAP INTRA-ABDOM: CPT | Mod: ,,, | Performed by: SURGERY

## 2019-10-21 PROCEDURE — 44160 REMOVAL OF COLON: CPT | Mod: 80,59,, | Performed by: SURGERY

## 2019-10-21 RX ORDER — ROCURONIUM BROMIDE 10 MG/ML
INJECTION, SOLUTION INTRAVENOUS
Status: DISCONTINUED | OUTPATIENT
Start: 2019-10-21 | End: 2019-10-21

## 2019-10-21 RX ORDER — ONDANSETRON 2 MG/ML
4 INJECTION INTRAMUSCULAR; INTRAVENOUS DAILY PRN
Status: DISCONTINUED | OUTPATIENT
Start: 2019-10-21 | End: 2019-10-30

## 2019-10-21 RX ORDER — DIPHENHYDRAMINE HYDROCHLORIDE 50 MG/ML
12.5 INJECTION INTRAMUSCULAR; INTRAVENOUS
Status: DISCONTINUED | OUTPATIENT
Start: 2019-10-21 | End: 2019-10-30

## 2019-10-21 RX ORDER — CALCIUM GLUCONATE 98 MG/ML
2 INJECTION, SOLUTION INTRAVENOUS ONCE
Status: COMPLETED | OUTPATIENT
Start: 2019-10-21 | End: 2019-10-21

## 2019-10-21 RX ORDER — POTASSIUM CHLORIDE 7.45 MG/ML
10 INJECTION INTRAVENOUS
Status: DISCONTINUED | OUTPATIENT
Start: 2019-10-21 | End: 2019-10-21

## 2019-10-21 RX ORDER — ONDANSETRON 2 MG/ML
INJECTION INTRAMUSCULAR; INTRAVENOUS
Status: DISCONTINUED | OUTPATIENT
Start: 2019-10-21 | End: 2019-10-21

## 2019-10-21 RX ORDER — MORPHINE SULFATE 4 MG/ML
2 INJECTION, SOLUTION INTRAMUSCULAR; INTRAVENOUS EVERY 5 MIN PRN
Status: DISCONTINUED | OUTPATIENT
Start: 2019-10-21 | End: 2019-10-30

## 2019-10-21 RX ORDER — PROPOFOL 10 MG/ML
VIAL (ML) INTRAVENOUS
Status: DISCONTINUED | OUTPATIENT
Start: 2019-10-21 | End: 2019-10-21

## 2019-10-21 RX ORDER — PHENYLEPHRINE HYDROCHLORIDE 10 MG/ML
INJECTION INTRAVENOUS
Status: DISCONTINUED | OUTPATIENT
Start: 2019-10-21 | End: 2019-10-21

## 2019-10-21 RX ORDER — FAMOTIDINE 10 MG/ML
INJECTION INTRAVENOUS
Status: DISPENSED
Start: 2019-10-21 | End: 2019-10-21

## 2019-10-21 RX ORDER — EPHEDRINE SULFATE 50 MG/ML
INJECTION, SOLUTION INTRAVENOUS
Status: DISCONTINUED | OUTPATIENT
Start: 2019-10-21 | End: 2019-10-21

## 2019-10-21 RX ORDER — MORPHINE SULFATE 4 MG/ML
2 INJECTION, SOLUTION INTRAMUSCULAR; INTRAVENOUS
Status: DISCONTINUED | OUTPATIENT
Start: 2019-10-21 | End: 2019-11-05

## 2019-10-21 RX ORDER — POTASSIUM CHLORIDE 7.45 MG/ML
10 INJECTION INTRAVENOUS ONCE
Status: COMPLETED | OUTPATIENT
Start: 2019-10-21 | End: 2019-10-21

## 2019-10-21 RX ORDER — SODIUM CHLORIDE, SODIUM LACTATE, POTASSIUM CHLORIDE, CALCIUM CHLORIDE 600; 310; 30; 20 MG/100ML; MG/100ML; MG/100ML; MG/100ML
INJECTION, SOLUTION INTRAVENOUS CONTINUOUS
Status: DISCONTINUED | OUTPATIENT
Start: 2019-10-21 | End: 2019-10-25

## 2019-10-21 RX ORDER — MORPHINE SULFATE 4 MG/ML
4 INJECTION, SOLUTION INTRAMUSCULAR; INTRAVENOUS
Status: DISCONTINUED | OUTPATIENT
Start: 2019-10-21 | End: 2019-11-05

## 2019-10-21 RX ORDER — HEPARIN SODIUM 5000 [USP'U]/ML
5000 INJECTION, SOLUTION INTRAVENOUS; SUBCUTANEOUS EVERY 8 HOURS
Status: DISCONTINUED | OUTPATIENT
Start: 2019-10-21 | End: 2019-11-05 | Stop reason: HOSPADM

## 2019-10-21 RX ORDER — SODIUM CHLORIDE, SODIUM LACTATE, POTASSIUM CHLORIDE, CALCIUM CHLORIDE 600; 310; 30; 20 MG/100ML; MG/100ML; MG/100ML; MG/100ML
INJECTION, SOLUTION INTRAVENOUS CONTINUOUS
Status: DISCONTINUED | OUTPATIENT
Start: 2019-10-21 | End: 2019-10-21

## 2019-10-21 RX ORDER — MEPERIDINE HYDROCHLORIDE 50 MG/ML
INJECTION INTRAMUSCULAR; INTRAVENOUS; SUBCUTANEOUS
Status: DISCONTINUED | OUTPATIENT
Start: 2019-10-21 | End: 2019-10-21

## 2019-10-21 RX ORDER — LIDOCAINE HYDROCHLORIDE 10 MG/ML
1 INJECTION, SOLUTION EPIDURAL; INFILTRATION; INTRACAUDAL; PERINEURAL ONCE
Status: DISCONTINUED | OUTPATIENT
Start: 2019-10-21 | End: 2019-10-21

## 2019-10-21 RX ORDER — MAGNESIUM SULFATE HEPTAHYDRATE 40 MG/ML
2 INJECTION, SOLUTION INTRAVENOUS ONCE
Status: COMPLETED | OUTPATIENT
Start: 2019-10-21 | End: 2019-10-21

## 2019-10-21 RX ORDER — SODIUM CHLORIDE 0.9 % (FLUSH) 0.9 %
10 SYRINGE (ML) INJECTION
Status: DISCONTINUED | OUTPATIENT
Start: 2019-10-21 | End: 2019-11-05 | Stop reason: HOSPADM

## 2019-10-21 RX ORDER — ONDANSETRON 2 MG/ML
4 INJECTION INTRAMUSCULAR; INTRAVENOUS EVERY 6 HOURS PRN
Status: DISCONTINUED | OUTPATIENT
Start: 2019-10-21 | End: 2019-11-05 | Stop reason: HOSPADM

## 2019-10-21 RX ORDER — POTASSIUM CHLORIDE 20 MEQ/1
40 TABLET, EXTENDED RELEASE ORAL 3 TIMES DAILY
Status: DISCONTINUED | OUTPATIENT
Start: 2019-10-21 | End: 2019-10-21

## 2019-10-21 RX ORDER — MIDAZOLAM HYDROCHLORIDE 1 MG/ML
INJECTION, SOLUTION INTRAMUSCULAR; INTRAVENOUS
Status: DISCONTINUED | OUTPATIENT
Start: 2019-10-21 | End: 2019-10-21

## 2019-10-21 RX ORDER — SUCCINYLCHOLINE CHLORIDE 20 MG/ML
INJECTION INTRAMUSCULAR; INTRAVENOUS
Status: DISCONTINUED | OUTPATIENT
Start: 2019-10-21 | End: 2019-10-21

## 2019-10-21 RX ORDER — SODIUM CHLORIDE, SODIUM LACTATE, POTASSIUM CHLORIDE, CALCIUM CHLORIDE 600; 310; 30; 20 MG/100ML; MG/100ML; MG/100ML; MG/100ML
125 INJECTION, SOLUTION INTRAVENOUS CONTINUOUS
Status: DISCONTINUED | OUTPATIENT
Start: 2019-10-21 | End: 2019-10-23

## 2019-10-21 RX ORDER — POTASSIUM CHLORIDE 7.45 MG/ML
10 INJECTION INTRAVENOUS
Status: DISPENSED | OUTPATIENT
Start: 2019-10-21 | End: 2019-10-21

## 2019-10-21 RX ORDER — PANTOPRAZOLE SODIUM 40 MG/10ML
40 INJECTION, POWDER, LYOPHILIZED, FOR SOLUTION INTRAVENOUS DAILY
Status: DISCONTINUED | OUTPATIENT
Start: 2019-10-22 | End: 2019-10-27

## 2019-10-21 RX ORDER — POTASSIUM CHLORIDE 7.45 MG/ML
20 INJECTION INTRAVENOUS
Status: COMPLETED | OUTPATIENT
Start: 2019-10-21 | End: 2019-10-21

## 2019-10-21 RX ORDER — FAMOTIDINE 10 MG/ML
20 INJECTION INTRAVENOUS ONCE
Status: COMPLETED | OUTPATIENT
Start: 2019-10-21 | End: 2019-10-21

## 2019-10-21 RX ADMIN — PROPOFOL 200 MG: 10 INJECTION, EMULSION INTRAVENOUS at 12:10

## 2019-10-21 RX ADMIN — SODIUM CHLORIDE, POTASSIUM CHLORIDE, SODIUM LACTATE AND CALCIUM CHLORIDE: 600; 310; 30; 20 INJECTION, SOLUTION INTRAVENOUS at 05:10

## 2019-10-21 RX ADMIN — ONDANSETRON 4 MG: 2 INJECTION INTRAMUSCULAR; INTRAVENOUS at 01:10

## 2019-10-21 RX ADMIN — POTASSIUM CHLORIDE 40 MEQ: 1500 TABLET, EXTENDED RELEASE ORAL at 08:10

## 2019-10-21 RX ADMIN — SODIUM CHLORIDE, POTASSIUM CHLORIDE, SODIUM LACTATE AND CALCIUM CHLORIDE: 600; 310; 30; 20 INJECTION, SOLUTION INTRAVENOUS at 02:10

## 2019-10-21 RX ADMIN — CALCIUM GLUCONATE 2 G: 98 INJECTION, SOLUTION INTRAVENOUS at 07:10

## 2019-10-21 RX ADMIN — EPHEDRINE SULFATE 10 MG: 50 INJECTION INTRAMUSCULAR; INTRAVENOUS; SUBCUTANEOUS at 02:10

## 2019-10-21 RX ADMIN — SODIUM CHLORIDE, SODIUM LACTATE, POTASSIUM CHLORIDE, AND CALCIUM CHLORIDE 125 ML/HR: .6; .31; .03; .02 INJECTION, SOLUTION INTRAVENOUS at 06:10

## 2019-10-21 RX ADMIN — SODIUM CHLORIDE, SODIUM LACTATE, POTASSIUM CHLORIDE, AND CALCIUM CHLORIDE: .6; .31; .03; .02 INJECTION, SOLUTION INTRAVENOUS at 08:10

## 2019-10-21 RX ADMIN — PHENYLEPHRINE HYDROCHLORIDE 200 MCG: 10 INJECTION INTRAVENOUS at 04:10

## 2019-10-21 RX ADMIN — EPHEDRINE SULFATE 15 MG: 50 INJECTION INTRAMUSCULAR; INTRAVENOUS; SUBCUTANEOUS at 02:10

## 2019-10-21 RX ADMIN — EPHEDRINE SULFATE 10 MG: 50 INJECTION INTRAMUSCULAR; INTRAVENOUS; SUBCUTANEOUS at 01:10

## 2019-10-21 RX ADMIN — PHENYLEPHRINE HYDROCHLORIDE 100 MCG: 10 INJECTION INTRAVENOUS at 02:10

## 2019-10-21 RX ADMIN — MEPERIDINE HYDROCHLORIDE 25 MG: 50 INJECTION INTRAMUSCULAR; INTRAVENOUS; SUBCUTANEOUS at 05:10

## 2019-10-21 RX ADMIN — PHENYLEPHRINE HYDROCHLORIDE 40 MCG/MIN: 10 INJECTION INTRAVENOUS at 03:10

## 2019-10-21 RX ADMIN — SODIUM CHLORIDE, SODIUM LACTATE, POTASSIUM CHLORIDE, AND CALCIUM CHLORIDE 1000 ML: .6; .31; .03; .02 INJECTION, SOLUTION INTRAVENOUS at 07:10

## 2019-10-21 RX ADMIN — MORPHINE SULFATE 2 MG: 4 INJECTION, SOLUTION INTRAMUSCULAR; INTRAVENOUS at 07:10

## 2019-10-21 RX ADMIN — ERTAPENEM SODIUM 1 G: 1 INJECTION, POWDER, LYOPHILIZED, FOR SOLUTION INTRAMUSCULAR; INTRAVENOUS at 03:10

## 2019-10-21 RX ADMIN — POTASSIUM CHLORIDE 10 MEQ: 10 INJECTION, SOLUTION INTRAVENOUS at 07:10

## 2019-10-21 RX ADMIN — FAMOTIDINE 20 MG: 10 INJECTION INTRAVENOUS at 11:10

## 2019-10-21 RX ADMIN — PHENYLEPHRINE HYDROCHLORIDE 100 MCG: 10 INJECTION INTRAVENOUS at 01:10

## 2019-10-21 RX ADMIN — MAGNESIUM SULFATE IN WATER 2 G: 40 INJECTION, SOLUTION INTRAVENOUS at 07:10

## 2019-10-21 RX ADMIN — METRONIDAZOLE 500 MG: 500 INJECTION, SOLUTION INTRAVENOUS at 01:10

## 2019-10-21 RX ADMIN — SODIUM CHLORIDE, POTASSIUM CHLORIDE, SODIUM LACTATE AND CALCIUM CHLORIDE: 600; 310; 30; 20 INJECTION, SOLUTION INTRAVENOUS at 12:10

## 2019-10-21 RX ADMIN — ROCURONIUM BROMIDE 25 MG: 10 INJECTION, SOLUTION INTRAVENOUS at 02:10

## 2019-10-21 RX ADMIN — SODIUM CHLORIDE, POTASSIUM CHLORIDE, SODIUM LACTATE AND CALCIUM CHLORIDE: 600; 310; 30; 20 INJECTION, SOLUTION INTRAVENOUS at 03:10

## 2019-10-21 RX ADMIN — MEPERIDINE HYDROCHLORIDE 50 MG: 50 INJECTION INTRAMUSCULAR; INTRAVENOUS; SUBCUTANEOUS at 12:10

## 2019-10-21 RX ADMIN — ERTAPENEM SODIUM 1 G: 1 INJECTION, POWDER, LYOPHILIZED, FOR SOLUTION INTRAMUSCULAR; INTRAVENOUS at 10:10

## 2019-10-21 RX ADMIN — SUCCINYLCHOLINE CHLORIDE 150 MG: 20 INJECTION, SOLUTION INTRAMUSCULAR; INTRAVENOUS at 12:10

## 2019-10-21 RX ADMIN — SODIUM CHLORIDE, SODIUM LACTATE, POTASSIUM CHLORIDE, AND CALCIUM CHLORIDE 500 ML: .6; .31; .03; .02 INJECTION, SOLUTION INTRAVENOUS at 06:10

## 2019-10-21 RX ADMIN — EPHEDRINE SULFATE 15 MG: 50 INJECTION INTRAMUSCULAR; INTRAVENOUS; SUBCUTANEOUS at 12:10

## 2019-10-21 RX ADMIN — SODIUM CHLORIDE AND POTASSIUM CHLORIDE 1000 ML: 9; 1.49 INJECTION, SOLUTION INTRAVENOUS at 08:10

## 2019-10-21 RX ADMIN — POTASSIUM CHLORIDE 20 MEQ: 10 INJECTION, SOLUTION INTRAVENOUS at 10:10

## 2019-10-21 RX ADMIN — SUGAMMADEX 200 MG: 100 INJECTION, SOLUTION INTRAVENOUS at 05:10

## 2019-10-21 RX ADMIN — MEPERIDINE HYDROCHLORIDE 12.5 MG: 50 INJECTION INTRAMUSCULAR; INTRAVENOUS; SUBCUTANEOUS at 03:10

## 2019-10-21 RX ADMIN — POTASSIUM CHLORIDE 10 MEQ: 10 INJECTION, SOLUTION INTRAVENOUS at 01:10

## 2019-10-21 RX ADMIN — HEPARIN SODIUM 5000 UNITS: 5000 INJECTION, SOLUTION INTRAVENOUS; SUBCUTANEOUS at 10:10

## 2019-10-21 RX ADMIN — ROCURONIUM BROMIDE 30 MG: 10 INJECTION, SOLUTION INTRAVENOUS at 01:10

## 2019-10-21 RX ADMIN — ROCURONIUM BROMIDE 25 MG: 10 INJECTION, SOLUTION INTRAVENOUS at 03:10

## 2019-10-21 RX ADMIN — POTASSIUM CHLORIDE 10 MEQ: 10 INJECTION, SOLUTION INTRAVENOUS at 02:10

## 2019-10-21 RX ADMIN — MIDAZOLAM HYDROCHLORIDE 2 MG: 1 INJECTION, SOLUTION INTRAMUSCULAR; INTRAVENOUS at 12:10

## 2019-10-21 RX ADMIN — ROCURONIUM BROMIDE 20 MG: 10 INJECTION, SOLUTION INTRAVENOUS at 01:10

## 2019-10-21 RX ADMIN — SODIUM CHLORIDE, POTASSIUM CHLORIDE, SODIUM LACTATE AND CALCIUM CHLORIDE: 600; 310; 30; 20 INJECTION, SOLUTION INTRAVENOUS at 01:10

## 2019-10-21 RX ADMIN — POTASSIUM CHLORIDE 20 MEQ: 10 INJECTION, SOLUTION INTRAVENOUS at 08:10

## 2019-10-21 RX ADMIN — POTASSIUM CHLORIDE 10 MEQ: 10 INJECTION, SOLUTION INTRAVENOUS at 06:10

## 2019-10-21 RX ADMIN — MEPERIDINE HYDROCHLORIDE 12.5 MG: 50 INJECTION INTRAMUSCULAR; INTRAVENOUS; SUBCUTANEOUS at 05:10

## 2019-10-21 RX ADMIN — MORPHINE SULFATE 2 MG: 4 INJECTION, SOLUTION INTRAMUSCULAR; INTRAVENOUS at 06:10

## 2019-10-21 RX ADMIN — SODIUM CHLORIDE, POTASSIUM CHLORIDE, SODIUM LACTATE AND CALCIUM CHLORIDE: 600; 310; 30; 20 INJECTION, SOLUTION INTRAVENOUS at 04:10

## 2019-10-21 RX ADMIN — MORPHINE SULFATE 2 MG: 4 INJECTION, SOLUTION INTRAMUSCULAR; INTRAVENOUS at 10:10

## 2019-10-21 NOTE — SUBJECTIVE & OBJECTIVE
Interval History:     Review of Systems   Constitutional: Positive for activity change, appetite change and unexpected weight change. Negative for chills, diaphoresis, fatigue and fever.   HENT: Negative for congestion, drooling, hearing loss and trouble swallowing.    Eyes: Negative for pain and visual disturbance.   Respiratory: Negative.  Negative for apnea, cough, choking, chest tightness, shortness of breath and wheezing.    Cardiovascular: Negative for chest pain, palpitations and leg swelling.        Increased heart rate   Gastrointestinal: Positive for abdominal distention. Negative for abdominal pain, blood in stool, constipation, diarrhea, nausea and vomiting.        Tenderness left lower quadrant   Endocrine: Negative for polydipsia, polyphagia and polyuria.   Genitourinary: Negative for difficulty urinating, dysuria and flank pain.   Musculoskeletal: Negative for arthralgias, back pain, gait problem, joint swelling, neck pain and neck stiffness.   Skin: Negative for color change, rash and wound.   Allergic/Immunologic: Negative for environmental allergies, food allergies and immunocompromised state.   Neurological: Positive for weakness. Negative for dizziness, syncope, numbness and headaches.   Hematological: Negative for adenopathy.   Psychiatric/Behavioral: Negative for agitation, confusion and sleep disturbance. The patient is not nervous/anxious.      Objective:     Vital Signs (Most Recent):  Temp: 97.9 °F (36.6 °C) (10/21/19 1118)  Pulse: 101 (10/21/19 1118)  Resp: 17 (10/21/19 1118)  BP: 129/70 (10/21/19 1118)  SpO2: 98 % (10/21/19 1118) Vital Signs (24h Range):  Temp:  [96.6 °F (35.9 °C)-97.9 °F (36.6 °C)] 97.9 °F (36.6 °C)  Pulse:  [] 101  Resp:  [16-18] 17  SpO2:  [95 %-98 %] 98 %  BP: (129-182)/(70-86) 129/70     Weight: 64 kg (141 lb)  Body mass index is 20.82 kg/m².    Intake/Output Summary (Last 24 hours) at 10/21/2019 1541  Last data filed at 10/21/2019 1526  Gross per 24 hour    Intake 4794.17 ml   Output 1170 ml   Net 3624.17 ml      Physical Exam   Constitutional: He is oriented to person, place, and time. He appears well-developed and well-nourished.   HENT:   Head: Normocephalic and atraumatic.   Right Ear: External ear normal.   Left Ear: External ear normal.   Nose: Nose normal.   Eyes: Pupils are equal, round, and reactive to light. Conjunctivae, EOM and lids are normal.   Neck: Trachea normal, normal range of motion and full passive range of motion without pain. Neck supple. No tracheal deviation present. No thyromegaly present.   Cardiovascular: Normal rate, regular rhythm, S1 normal, S2 normal, normal heart sounds, intact distal pulses and normal pulses.   Mild tachycardia 110   Pulmonary/Chest: Effort normal and breath sounds normal.   Abdominal: Soft. Normal aorta and bowel sounds are normal. He exhibits distension. There is tenderness. There is rebound. There is no guarding.   Tenderness left lower quadrant   Musculoskeletal: Normal range of motion.   Lymphadenopathy:     He has no cervical adenopathy.   Neurological: He is alert and oriented to person, place, and time. He has normal reflexes.   Skin: Skin is warm, dry and intact. Capillary refill takes less than 2 seconds.   Psychiatric: He has a normal mood and affect. His speech is normal and behavior is normal. Judgment and thought content normal. Cognition and memory are normal.   Nursing note and vitals reviewed.      Significant Labs:   Recent Lab Results       10/21/19  1243   10/21/19  0553        Albumin   2.7     Alkaline Phosphatase   49     ALT   12     Anion Gap   16     AST   16     Baso #   0.05     Basophil%   0.3     BILIRUBIN TOTAL   0.7  Comment:  For infants and newborns, interpretation of results should be based  on gestational age, weight and in agreement with clinical  observations.  Premature Infant recommended reference ranges:  Up to 24 hours.............<8.0 mg/dL  Up to 48 hours............<12.0  mg/dL  3-5 days..................<15.0 mg/dL  6-29 days.................<15.0 mg/dL       BUN, Bld   6     Calcium   8.1     Chloride   108     CO2   21     Creatinine   0.7     Differential Method   Automated     eGFR if    >60.0     eGFR if non    >60.0  Comment:  Calculation used to obtain the estimated glomerular filtration  rate (eGFR) is the CKD-EPI equation.        Eos #   0.1     Eosinophil%   0.8     Glucose   81     Gran # (ANC)   12.9     Gran%   76.7     Hematocrit   31.4     Hemoglobin   9.8     Immature Grans (Abs)   0.08  Comment:  Mild elevation in immature granulocytes is non specific and   can be seen in a variety of conditions including stress response,   acute inflammation, trauma and pregnancy. Correlation with other   laboratory and clinical findings is essential.       Immature Granulocytes   0.5     Lymph #   2.4     Lymph%   14.5     MCH   27.8     MCHC   31.2     MCV   89     Mono #   1.2     Mono%   7.2     MPV   8.7     nRBC   0     Platelets   698     Potassium 2.9 2.4  Comment:  potassium critical result(s) called and verbal readback obtained from   ASHLEY Garcia RN, 10/21/2019 06:35       PROTEIN TOTAL   6.5     RBC   3.52     RDW   18.7     Sodium   145     WBC   16.76         All pertinent labs within the past 24 hours have been reviewed.    Significant Imaging: I have reviewed and interpreted all pertinent imaging results/findings within the past 24 hours.

## 2019-10-21 NOTE — [employer] PATIENT INSTRUCTIONS
Discharge Summary/Instructions after an Endoscopic Procedure  Patient Name: Miguel Angel Apple  Patient MRN: [id number]  Patient YOB: 1955  Monday, October 21, 2019  Jose Lazo MD  RESTRICTIONS:  During your procedure today, you received medications for sedation.  These   medications may affect your judgment, balance and coordination.  Therefore,   for 24 hours, you have the following restrictions:   - DO NOT drive a car, operate machinery, make legal/financial decisions,   sign important papers or drink alcohol.    ACTIVITY:  Today: no heavy lifting, straining or running due to procedural   sedation/anesthesia.  The following day: return to full activity including work.  DIET:  Eat and drink normally unless instructed otherwise.     TREATMENT FOR COMMON SIDE EFFECTS:  - Mild abdominal pain, nausea, belching, bloating or excessive gas:  rest,   eat lightly and use a heating pad.  - Sore Throat: treat with throat lozenges and/or gargle with warm salt   water.  - Because air was used during the procedure, expelling large amounts of air   from your rectum or belching is normal.  - If a bowel prep was taken, you may not have a bowel movement for 1-3 days.    This is normal.  SYMPTOMS TO WATCH FOR AND REPORT TO YOUR PHYSICIAN:  1. Abdominal pain or bloating, other than gas cramps.  2. Chest pain.  3. Back pain.  4. Signs of infection such as: chills or fever occurring within 24 hours   after the procedure.  5. Rectal bleeding, which would show as bright red, maroon, or black stools.   (A tablespoon of blood from the rectum is not serious, especially if   hemorrhoids are present.)  6. Vomiting.  7. Weakness or dizziness.  GO DIRECTLY TO THE NEAREST EMERGENCY ROOM IF YOU HAVE ANY OF THE FOLLOWING:      Difficulty breathing              Chills and/or fever over 101 F   Persistent vomiting and/or vomiting blood   Severe abdominal pain   Severe chest pain   Black, tarry stools   Bleeding- more than one  tablespoon   Any other symptom or condition that you feel may need urgent attention  Your doctor recommends these additional instructions:  If any biopsies were taken, your doctors clinic will contact you in 1 to 2   weeks with any results.  - Proceed with laparotomy for large bowel obstruction with obstructing   rectal mass at 11cms.  For questions, problems or results please call your physician - Jose Lazo MD at Work:  [phone].  [facility] EMERGENCY ROOM PHONE NUMBER: [phone]  IF A COMPLICATION OR EMERGENCY SITUATION ARISES AND YOU ARE UNABLE TO REACH   YOUR PHYSICIAN - GO DIRECTLY TO THE EMERGENCY ROOM.  MD Jose Sánchez MD  10/21/2019 5:29:23 PM  This report has been verified and signed electronically.  [employer]

## 2019-10-21 NOTE — BRIEF OP NOTE
[facility] - Med Surg  Brief Operative Note     SUMMARY     Surgery Date: 10/21/2019     Surgeon(s) and Role:     * Jose Lazo MD - Primary     * Torres Dawkins MD - Assisting    Pre-op Diagnosis:  Large bowel obstruction [K56.609]    Post-op Diagnosis:  Post-Op Diagnosis Codes:     * Large bowel obstruction [K56.609]        Chronic cholecystitis        Perforated cecum with intra-abdominal contamination    Operation:  1) Exploratory laparotomy  2) Sigmoid colectomy with end descending colostomy creation and mucous fistula formation  3) Right hemicolectomy and distal ilectomy with side to side functional end to end anastomosis  4) Open cholecystectomy  5) Omental pedical flap creation for placement over anastomosis  6) Takedown splenic flexure of colon    Modifier 22: This case was significantly more difficult and took more time for dissection given the large bowel obstruction and significantly dilated colon as well as the perforation of the cecum.  Therefore the 22 modifer is being added to this case.    Description of the findings of the procedure:  Large bowel obstruction. Pelvic, colon mass involving the bladder anteriorly and at/below the peritoneal reflection.  Perforated cecum from pressure necrosis related to large bowel obstruction with intra-abdominal contamination.  Chronic cholecystitis necessitating cholecystectomy.      Estimated Blood Loss: 100 mL         Specimens:   Right colon.  Distal ileum.  Sigmoid colon.  Gallbladder.    Complications:  None apparent in the OR.    Anesthesia:  General endotracheal.    Disposition:  ICU.    Implants:  None.

## 2019-10-21 NOTE — PLAN OF CARE
Pt brought to ICU, REPORT GIVEN VIA PHONE TO STEPHANE DAWKINS.  SITE ASSESSED AT BEDSIDE WITH RN. CLEAN,DRY AND INTACT.  FAMILY NOTIFIED OF PATIENT LOCATION.

## 2019-10-21 NOTE — PLAN OF CARE
10/21/19 1148   Medicare Message   Important Message from Medicare regarding Discharge Appeal Rights Given to patient/caregiver;Explained to patient/caregiver;Signed/date by patient/caregiver   Date IMM was signed 10/21/19   Time IMM was signed 1004

## 2019-10-21 NOTE — ANESTHESIA POSTPROCEDURE EVALUATION
Anesthesia Post Evaluation    Patient: Miguel Angel Apple     Procedure(s) Performed: Procedure(s) (LRB):  LAPAROTOMY, EXPLORATORY (N/A)  SIGMOIDOSCOPY, FLEXIBLE (N/A)  HEMICOLECTOMY, RIGHT (Right)  CHOLECYSTECTOMY  COLECTOMY, SIGMOID  CREATION, COLOSTOMY    Final Anesthesia Type: general  Patient location during evaluation: PACU  Patient participation: Yes- Able to Participate  Level of consciousness: awake and alert  Post-procedure vital signs: reviewed and stable  Pain management: adequate  Airway patency: patent  PONV status at discharge: No PONV  Anesthetic complications: no      Cardiovascular status: blood pressure returned to baseline  Respiratory status: unassisted  Hydration status: euvolemic  Follow-up not needed.          Vitals Value Taken Time   BP 87/54 10/21/2019  6:31 PM   Temp 36.3 °C (97.4 °F) 10/21/2019  5:55 PM   Pulse 106 10/21/2019  6:31 PM   Resp 36 10/21/2019  6:31 PM   SpO2 92 % 10/21/2019  6:31 PM   Vitals shown include unvalidated device data.      No case tracking events are documented in the log.      Pain/Martin Score: Pain Rating Prior to Med Admin: 9 (10/21/2019  6:12 PM)

## 2019-10-21 NOTE — PLAN OF CARE
1. Pt will tolerate Bowel rest w/ minimal liquids 2. Pt will tolerate low rate of until goal of Clinimix 5/15 @ 85ml/24 giving 206 grams CHO, 102 grams protein, and 1448 kcal is met

## 2019-10-21 NOTE — ANESTHESIA PROCEDURE NOTES
Central Line    Diagnosis: poor venous access  Patient location during procedure: done in OR  Procedure start time: 10/21/2019 5:16 PM  Timeout: 10/21/2019 5:15 PM  Procedure end time: 10/21/2019 5:32 PM    Staffing  Authorizing Provider: Hima Rudolph MD  Performing Provider: Hima Rudolph MD    Staffing  Anesthesiologist: Hima Rudolph MD  Performed: anesthesiologist   Anesthesiologist was present at the time of the procedure.  Preanesthetic Checklist  Completed: patient identified, site marked, surgical consent, pre-op evaluation, timeout performed, IV checked, risks and benefits discussed, monitors and equipment checked and anesthesia consent given  Indication   Indication: vascular access, med administration, hemodynamic monitoring     Anesthesia   general anesthesia    Central Line   Skin Prep: skin prepped with ChloraPrep, skin prep agent completely dried prior to procedure  maximum sterile barriers used during central venous catheter insertion  hand hygiene performed prior to central venous catheter insertion  Location: right, internal jugular.   Catheter type: triple lumen  Catheter Size: 7.5 Fr  Inserted Catheter Length: 14 cm  Ultrasound: none   Manometry: Venous cannualation confirmed by visual estimation of blood vessel pressure using manometry.  Insertion Attempts: 1   Securement:line sutured, chlorhexidine patch, sterile dressing applied and blood return through all ports    Post-Procedure   X-Ray: no pneumothorax on x-ray and placement verified by x-ray    Guidewire Guidewire removed intact. Guidewire removed intact, verified with nurse.

## 2019-10-21 NOTE — TRANSFER OF CARE
"Anesthesia Transfer of Care Note    Patient: Miguel Angel Apple Sr.    Procedure(s) Performed: Procedure(s) (LRB):  LAPAROTOMY, EXPLORATORY (N/A)  SIGMOIDOSCOPY, FLEXIBLE (N/A)  HEMICOLECTOMY, RIGHT (Right)  CHOLECYSTECTOMY  COLECTOMY, SIGMOID  CREATION, COLOSTOMY    Patient location: ICU    Anesthesia Type: general    Transport from OR: Transported from OR on room air with adequate spontaneous ventilation. Transported from OR on 100% O2 by closed face mask with adequate spontaneous ventilation. Continuous ECG monitoring in transport. Continuous SpO2 monitoring in transport. Continuos invasive BP monitoring in transport    Post pain: adequate analgesia    Post assessment: no apparent anesthetic complications and tolerated procedure well    Post vital signs: stable    Level of consciousness: awake, alert and oriented    Nausea/Vomiting: no nausea/vomiting    Complications: none    Transfer of care protocol was followed      Last vitals:   Visit Vitals  /70 (BP Location: Right arm, Patient Position: Lying)   Pulse 101   Temp 36.6 °C (97.9 °F) (Oral)   Resp 17   Ht 5' 9" (1.753 m)   Wt 64 kg (141 lb)   SpO2 98%   BMI 20.82 kg/m²     "

## 2019-10-21 NOTE — ASSESSMENT & PLAN NOTE
IV fluids IV potassium NPO KUB in lab in a.m. monitor closely.  Surgical consultation will be seen in a.m. by Dr. MOHAMUD General surgery seen the patient in the clinic on 10/03.  10/20/2019.  Extended conversation with Dr. Buck concerning the patient's finding rectal mass bowel obstruction.  Correction of electrolytes IV fluid IV antibiotics NPO fleets enema today x3.  Colonoscope in a.m..  CEA is been ordered.    10/21/2019  1.  Treat today based on findings of colonoscopy or endoscopy.  2.  Continue current antibiotic treatment  3.  Follow surgery recommendations after final diagnosis made.  4.  Treat otherwise as needed based on clinical course  5.  Repeat labs in the a.m. and replete potassium as needed

## 2019-10-21 NOTE — PLAN OF CARE
10/21/19 1144   Discharge Assessment   Assessment Type Discharge Planning Assessment   Confirmed/corrected address and phone number on facesheet? Yes   Assessment information obtained from? Patient;Other  (Significant other: Julius Albarado [phone])   Expected Length of Stay (days) 4   Communicated expected length of stay with patient/caregiver yes   Prior to hospitilization cognitive status: Alert/Oriented   Prior to hospitalization functional status: Independent   Current cognitive status: Alert/Oriented   Current Functional Status: Independent   Lives With significant other  (Significant other: Julius Albarado [phone])   Able to Return to Prior Arrangements yes   Is patient able to care for self after discharge? Yes   Who are your caregiver(s) and their phone number(s)? Significant other: Julius Lora - [phone]   Patient's perception of discharge disposition home or selfcare   Readmission Within the Last 30 Days no previous admission in last 30 days   Patient currently being followed by outpatient case management? No   Patient currently receives any other outside agency services? No   Equipment Currently Used at Home none   Do you have any problems affording any of your prescribed medications? No   Is the patient taking medications as prescribed? yes   Does the patient have transportation home? Yes   Transportation Anticipated family or friend will provide   Does the patient receive services at the [facility]? No   Discharge Plan A Home with family   DME Needed Upon Discharge  none   Patient/Family in Agreement with Plan yes       Patient resting in bed with significant other at bedside.  Lives with significant other.  Denies use of home health services or medical equipment at home.  States his significant other has a cane and walker, and can use hers if he needs to.  Denies any needs at this time.  Case Management will continue to follow for further needs .

## 2019-10-21 NOTE — CONSULTS
"  [facility] - Med Surg  Adult Nutrition  Consult Note    SUMMARY     Recommendations    Recommendation/Intervention: 1. Bowel rest w/ minimal liquids 2. TPN recommendation of Clinimix 5/15 @ 85ml/24 giving 206 grams CHO, 102 grams protein, and 1448 kcal.   Goals: 1. Pt will tolerate Bowel rest w/ minimal liquids 2. Pt will tolerate low rate of until goal of Clinimix 5/15 @ 85ml/24 giving 206 grams CHO, 102 grams protein, and 1448 kcal is met  Nutrition Goal Status: new  Communication of RD Recs: discussed on rounds    Reason for Assessment    Reason For Assessment: consult, other (see comments)(Consulted for NPO/intestinal blockage)  Diagnosis: other (see comments)(Bowel obstruction)  Relevant Medical History: Colitis, diarrhea  Interdisciplinary Rounds: attended  General Information Comments: Pt states symptoms of diarrhea & constipation have occured for 3 mo. Pt reports 30lb weight loss in 3 months. Wife states pt can tolerate chicken, potatoes, rice, mashed potatoes, and green beans in small meals. Pt can not tolerate large meals. Wife reports pt still has appetite.   Nutrition Discharge Planning: RD will plan to talk to pt about possible colitis diet information.     Nutrition Risk Screen    Nutrition Risk Screen: other (see comments)(Bloating after eating.)    Nutrition/Diet History    Spiritual, Cultural Beliefs, Sikh Practices, Values that Affect Care: no  Factors Affecting Nutritional Intake: NPO    Anthropometrics    Temp: 97.4 °F (36.3 °C)  Height: 5' 9" (175.3 cm)  Height (inches): 69 in  Weight Method: Bed Scale  Weight: 64.3 kg (141 lb 12.1 oz)  Weight (lb): 141.76 lb  Ideal Body Weight (IBW), Male: 160 lb  % Ideal Body Weight, Male (lb): 88.13 lb  BMI (Calculated): 20.9  BMI Grade: 18.5-24.9 - normal       Lab/Procedures/Meds    Pertinent Labs Reviewed: reviewed  Pertinent Labs Comments: Low K ( 2.4) & Albumin (2.7)  Pertinent Medications Reviewed: reviewed  Pertinent " Medications Comments: Suppository     Physical Findings/Assessment         Estimated/Assessed Needs    Weight Used For Calorie Calculations: 64.3 kg (141 lb 12.1 oz)  Energy Calorie Requirements (kcal): 25-30 kcal/kg or 1607 - 1929 kcal  Energy Need Method: Kcal/kg  Protein Requirements: 1 g/kg or 64.43 grams of protein  Weight Used For Protein Calculations: 64.3 kg (141 lb 12.1 oz)  Fluid Requirements (mL): 1 ml/kcal or 1607 ml   Estimated Fluid Requirement Method: RDA Method  RDA Method (mL): 25  CHO Requirement: N/A      Nutrition Prescription Ordered    Current Diet Order: NPO due to surgery    Evaluation of Received Nutrient/Fluid Intake       % Intake of Estimated Energy Needs: 0 - 25 %  % Meal Intake: NPO    Nutrition Risk    Level of Risk/Frequency of Follow-up: moderate - high     Assessment and Plan    No new Assessment & Plan notes have been filed under this hospital service since the last note was generated.  Service: Nutrition       Monitor and Evaluation    Food and Nutrient Intake: parenteral nutrition intake  Food and Nutrient Adminstration: diet order, enteral and parenteral nutrition administration  Anthropometric Measurements: weight change  Biochemical Data, Medical Tests and Procedures: electrolyte and renal panel, glucose/endocrine profile, inflammatory profile, lipid profile     Malnutrition Assessment  Malnutrition Type: acute illness or injury  Energy Intake: moderate energy intake          Weight Loss (Malnutrition): other (see comments)(Pt reports 30 lb wt loss over 3 months. )  Energy Intake (Malnutrition): less than 75% for greater than or equal to 3 months  Subcutaneous Fat (Malnutrition): mild depletion                    Moderate Weight Loss (Malnutrition): 7.5% in 3 months    Nutrition Follow-Up  RD will follow up with pt & wife 2 days post op.

## 2019-10-21 NOTE — ANESTHESIA PREPROCEDURE EVALUATION
10/21/2019  Miguel Angel Apple Sr. is a 63 y.o., male.    Anesthesia Evaluation    I have reviewed the Patient Summary Reports.    I have reviewed the Nursing Notes.   I have reviewed the Medications.     Review of Systems  Anesthesia Hx:  No problems with previous Anesthesia    Social:  Former Smoker    Hematology/Oncology:  Hematology Normal        EENT/Dental:EENT/Dental Normal   Cardiovascular:  Cardiovascular Normal     Renal/:  Renal/ Normal     Hepatic/GI:  Hepatic/GI Symptoms: vomiting.  Bowel Conditions:  Bowel Obstruction    Musculoskeletal:  Musculoskeletal Normal    Endocrine:  Endocrine Normal        Physical Exam  General:  Well nourished    Airway/Jaw/Neck:  Airway Findings: Mouth Opening: Normal Tongue: Normal  General Airway Assessment: Adult  Mallampati: II  TM Distance: Normal, at least 6 cm      Dental:  Dental Findings: Upper Dentures, Lower Dentures   Chest/Lungs:  Chest/Lungs Findings: Clear to auscultation     Heart/Vascular:  Heart Findings: Rate: Normal  Rhythm: Regular Rhythm        Mental Status:  Mental Status Findings:  Cooperative, Alert and Oriented         Anesthesia Plan  Type of Anesthesia, risks & benefits discussed:  Anesthesia Type:  general  Patient's Preference:   Intra-op Monitoring Plan: standard ASA monitors  Intra-op Monitoring Plan Comments:   Post Op Pain Control Plan: IV/PO Opioids PRN  Post Op Pain Control Plan Comments:   Induction:   IV  Beta Blocker:  Patient is not currently on a Beta-Blocker (No further documentation required).       Informed Consent: Patient understands risks and agrees with Anesthesia plan.  Questions answered. Anesthesia consent signed with patient.  ASA Score: 4  emergent   Day of Surgery Review of History & Physical: I have interviewed and examined the patient. I have reviewed the patient's H&P dated:            Ready For Surgery From  Anesthesia Perspective.

## 2019-10-21 NOTE — OP NOTE
[facility] - Med Surg  Brief Operative Note     SUMMARY     Surgery Date: 10/21/2019     Surgeon(s) and Role:     * Jose Lazo MD - Primary     * Torres Dawkins MD - Assisting    Pre-op Diagnosis:  Large bowel obstruction [K56.609]    Post-op Diagnosis:  Post-Op Diagnosis Codes:     * Large bowel obstruction [K56.609]        Chronic cholecystitis        Perforated cecum with intra-abdominal contamination    Operation:  1) Exploratory laparotomy  2) Sigmoid colectomy with end descending colostomy creation and mucous fistula formation  3) Right hemicolectomy and distal ilectomy with side to side functional end to end anastomosis  4) Open cholecystectomy  5) Omental pedical flap creation for placement over anastomosis  6) Takedown splenic flexure of colon    Modifier 22: This case was significantly more difficult and took more time for dissection given the large bowel obstruction and significantly dilated colon as well as the perforation of the cecum.  Therefore the 22 modifer is being added to this case.    Description of the findings of the procedure:  Large bowel obstruction. Pelvic, colon mass involving the bladder anteriorly and at/below the peritoneal reflection.  Perforated cecum from pressure necrosis related to large bowel obstruction with intra-abdominal contamination.  Chronic cholecystitis necessitating cholecystectomy.      Estimated Blood Loss: 100 mL         Specimens:   Right colon.  Distal ileum.  Sigmoid colon.  Gallbladder.    Complications:  None apparent in the OR.    Anesthesia:  General endotracheal.    Disposition:  ICU.    Implants:  None.    DATE OF PROCEDURE:  10/21/2019    INDICATIONS FOR PROCEDURE:  Mr. Apple is a 63-year-old male who was  admitted to the Hospitalist Service over the weekend with evidence of large  bowel obstruction.  I, the surgeon on duty, was called in consultation.   The patient stated initially with a diarrheal illness.  The patient  was  previously seen in Surgery Clinic.  Treated for diarrheal illness with  Cipro and Flagyl as well as stool studies.  Stool studies showed  heme-positive stools.  No other bacteria, parasites, etc.  After completing  dose of Cipro and Flagyl, the patient felt good per his account.  The  patient subsequently had obstipation and constipation for approximately a  week.  This led up to his presented to the ER.  In the ER, the patient  underwent CT scan evaluation, which was concerning for rectal mass.  The  patient since admission has had very little bowel function.  Cecum near 10  cm dilated.  Significant distention.  The patient and family were offered  flexible sigmoidoscopy today as well as laparotomy for large bowel  obstruction.  After discussing the risks and benefits of surgical  intervention, the patient and family voiced their understanding, in  agreement with planned procedure and wished to proceed today by signing  informed consent.    DESCRIPTION OF PROCEDURE:  The patient underwent flexible sigmoidoscopy  first.  Please see separate dictated flexible sigmoidoscopy note.  Rectal  mass was approximately 10 cm.  Fungating.  Completely obstructed.  No  ability to get beyond the area of obstruction.  The patient was then  flipped supine, prepped and draped by the OR Team abdomen.  I placed in  supine position.  Not lithotomy.  He was under general anesthesia after the  flexible sigmoidoscopy.  Additional dose of Invanz was given prior to  surgical incision.  Before laparotomy was conducted, I personally went and  discussed the flexible sigmoidoscopy findings with the patient's wife.  I  also discussed plan of care with a surgical oncologist in the same city.   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  This was discussed with the patient's  family.    After discussion with the patient's family as well as Surgical Oncology,  plan was to proceed with laparotomy.  A midline skin incision was made  after Invanz was given and new timeout was conducted with all in the  Operating Room in agreement, correct patient, correct procedure, correct  allergies and correct antibiotics.  Skin incision was carried down to  fascia with Bovie electrocautery.  Fascia was opened widely.  Upon entering  the abdomen, there was ascitic fluid.  Visualization of the sigmoid colon  noted significant distention.  At this point, decision was made to evaluate  the right colon given substantial size on CT scan.  A Bookwalter retractor  was placed.  Right colon was visualized.  There was an area approximately  the size of a quarter on the anterior surface of the cecum, which was  overtly necrotic.  With minimal manipulation, this necrotic area led to a  free perforation.  There was intraabdominal contamination.  TA stapler was  placed across the area of necrosis.  It was fired.  The contamination was  contained at this point.  At this point, the right colon given its pressure  necrosis status, was medial visceral rotated from the white line of Toldt.   This bone was down with Bovie electrocautery.  The distal ileum,  approximately 10 cm from the ileocecal valve was transected with a blue  load LATASHA stapler.  The transverse colon, in the mid transverse colon was  transected with a blue load LATASHA stapler.  Decision was made to perform a  right hemicolectomy given the necrotic nature of the patient's cecum.  This  was performed.  The white line of Toldt was taken down.  LigaSure was used  to take the mesentery of the right colon at the level of the mesenteric and  colon junction.  Right colon and distal ileum  were handed off as specimen.    The gallbladder was felt to the undersurface of the liver.  There was felt  to be chronic cholecystitis changes of the gallbladder.  Given the  extensive nature of the operation and the possibility of acute  cholecystitis in the postoperative phase, decision was made to perform a  cholecystectomy.  The duodenum was retracted inferiorly.  Two ring forceps  were placed on the gallbladder wall and the dome pushed superiorly, one on  the infundibulum pulled inferolaterally.  The critical view of safety was  achieved with the right angle doing blunt dissection within the cystic duct  structures.  Cystic duct and cystic artery were visualized on the liver  below each structure.  Clips were placed, two proximally and one distally  on each structure.  Both structures were transected distally.  The  gallbladder was entered.  Bile was spilled.  The gallbladder was then  resected off the liver bed with Bovie electrocautery.  Hemostasis was  achieved.  Gallbladder was handed off as specimen.    At this point, the pelvis was visualized.  There was a large pelvic mass  within the pelvis.  It appeared to arise from the sigmoid colon.   Significant dense inflammatory changes within the pelvis involving the  bladder as well.  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    At this point, a LATASHA blue load stapler was used to transect the sigmoid  colon at the mid aspect of the sigmoid colon.  The descending colon was  then taken down from the white line of Toldt on the left side of the  abdomen from medial visceral rotation.  Splenic flexure was taken down.   Decision at this time was to make a circular incision in the fascia and  skin, left side of the abdomen over top of the rectus muscle.   Skin  incision was carried down to the fascia with Bovie electrocautery.  A  cruciate incision was made in the fascia.  The fascial incision was allowed  for two fingerbreadths to a transverse.  The distal sigmoid  colon/descending colon were pulled out through the ostomy site.   Additionally, the transected distal end of the sigmoid colon was pulled out  at the ostomy site of the mucous fistula.    At this point, decision was turned towards the distal ileum and mid  transverse colon.  3-0 Vicryl sutures were used in simple interrupted  fashion to reapproximate the posterior wall of the small bowel and proximal  transverse colon.  This was done in interrupted fashion.  Two enterotomies  were performed, one on the small bowel side and one on the colon side.  A  LATASHA blue load stapler was then placed in each colotomy and a side-to-side  functional end-to-end distal ileal to transverse colon anastomosis was  performed.  At the end of the anastomosis, there is no evidence of  hemorrhage.  The common enterotomy was then stapled with a LATASHA blue load  stapler.  The anterior aspect of the anastomosis was oversewn with  interrupted 3-0 Vicryl sutures.  The common enterotomy was oversewn with  3-0 Vicryl suture in interrupted fashion.  The mesenteric defect was wide  based and left open.  Bowel was placed back intraabdominal.  The abdomen  was then washed out with copious  irrigant, approximately 6 liters of   irrigant soaked with Zosyn.    The midline fascia was then closed with a running 0 PDS suture.  Skin and  soft tissues were left open in the midline.  It was packed with damp to dry  dressings.    At this point, a lap pad instrument count and needle count was done.  These  were correct.  Attention was then turned towards end descending colostomy  creation of mucous fistula creation.  This was done with 3-0 Vicryl sutures  in simple interrupted fashion.  A 16-Macedonian red rubber catheter was placed  in the mucous  fistula.  This was sutured down to the skin with 3-0 Vicryl  sutures.  Colostomy bag was placed over top.    The patient tolerated the procedure well.  He was successfully reversed  from his anesthetic agent and extubated in the OR after anesthesia placed a  central line.  Plan will be for admission to the ICU, continued close  intake and output monitoring.  Further management of the patient will  depend upon his clinical course.                DANIELLE/CONNIE dd: 10/21/2019 17:50:24 (CDT)   td: 10/21/2019 21:38:07 (CDT)  Doc ID #[id number]   Job ID #[id number]    CC:            [id number]

## 2019-10-21 NOTE — PROGRESS NOTES
[facility] - [facility] Hospital Medicine  Progress Note    Patient Name: Miguel Angel Apple Sr.  MRN: [id number]  Patient Class: IP- Inpatient   Admission Date: 10/19/2019  Length of Stay: 2 days  Attending Physician: Hima Mccoy MD  Primary Care Provider: Primary Doctor No        Subjective:     Principal Problem:Large bowel obstruction        HPI:  Patient's history began approximately 3 months ago.  He presented to the hospital well over a month ago given a diagnosis of he understands colitis.  Treated with antibiotics IV fluids.  He saw Dr. ONEIDA charles on 10/03 placed on Cipro and Flagyl.  His on once completed that dose.  This past Sunday he started noticing that he was becoming  bloated and stopped eating solids at that time went back to liquids.  His had no pain pills for 2-3 days.  No nausea and vomiting unless he ate something that he knew he should the.  He attempted to make himself vomit yesterday and could not.  He has had small bowel movements to continue since that time.  CT scan shows colonic dilatation with the cecum being 10 cm.  The possible obstruction may be low left-sided.  There is even suggesting possibility have a rectal mass.  WBC remained 16,000 with a left shift.  Patient's potassium is 2.2.    Overview/Hospital Course:  IV fluids IV potassium NPO Surgical consultation repeat KUB and lab in a.m. further clinical decisions depending on clinical course.  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  The patient has had no prior abdominal surgery and no medical history prior to approximately 3 months ago.  10/20/2019.  Long discussion with the patient this morning concerning the findings.  Dr. ONEIDA charles see the patient today since he has been involved in his care on 10/03.  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  Patient continue IV fluids IV antibiotics potassium replacement today and will have 3 Fleet's enemas  today.  CEA is been ordered.    10/21/2019:  Patient is comfortable in room although it does appear quite anxious.  The patient will be having EGD and colonoscopy later today.  Based on the findings there the patient will may need undergo surgery.  Patient denies any pain and states that he was not throwing up and having no fever or chills.  Patient is distended and has some abdominal pain associated.  Labs showed [place] 658[address] hemoglobin hematocrit 9.8 and 31 sodium 145 potassium 2.4 chloride 108 bicarb 21 and alk-phos was 49    Interval History:     Review of Systems   Constitutional: Positive for activity change, appetite change and unexpected weight change. Negative for chills, diaphoresis, fatigue and fever.   HENT: Negative for congestion, drooling, hearing loss and trouble swallowing.    Eyes: Negative for pain and visual disturbance.   Respiratory: Negative.  Negative for apnea, cough, choking, chest tightness, shortness of breath and wheezing.    Cardiovascular: Negative for chest pain, palpitations and leg swelling.        Increased heart rate   Gastrointestinal: Positive for abdominal distention. Negative for abdominal pain, blood in stool, constipation, diarrhea, nausea and vomiting.        Tenderness left lower quadrant   Endocrine: Negative for polydipsia, polyphagia and polyuria.   Genitourinary: Negative for difficulty urinating, dysuria and flank pain.   Musculoskeletal: Negative for arthralgias, back pain, gait problem, joint swelling, neck pain and neck stiffness.   Skin: Negative for color change, rash and wound.   Allergic/Immunologic: Negative for environmental allergies, food allergies and immunocompromised state.   Neurological: Positive for weakness. Negative for dizziness, syncope, numbness and headaches.   Hematological: Negative for adenopathy.   Psychiatric/Behavioral: Negative for agitation, confusion and sleep disturbance. The patient is not nervous/anxious.       Objective:     Vital Signs (Most Recent):  Temp: 97.9 °F (36.6 °C) (10/21/19 1118)  Pulse: 101 (10/21/19 1118)  Resp: 17 (10/21/19 1118)  BP: 129/70 (10/21/19 1118)  SpO2: 98 % (10/21/19 1118) Vital Signs (24h Range):  Temp:  [96.6 °F (35.9 °C)-97.9 °F (36.6 °C)] 97.9 °F (36.6 °C)  Pulse:  [] 101  Resp:  [16-18] 17  SpO2:  [95 %-98 %] 98 %  BP: (129-182)/(70-86) 129/70     Weight: 64 kg (141 lb)  Body mass index is 20.82 kg/m².    Intake/Output Summary (Last 24 hours) at 10/21/2019 1541  Last data filed at 10/21/2019 1526  Gross per 24 hour   Intake 4794.17 ml   Output 1170 ml   Net 3624.17 ml      Physical Exam   Constitutional: He is oriented to person, place, and time. He appears well-developed and well-nourished.   HENT:   Head: Normocephalic and atraumatic.   Right Ear: External ear normal.   Left Ear: External ear normal.   Nose: Nose normal.   Eyes: Pupils are equal, round, and reactive to light. Conjunctivae, EOM and lids are normal.   Neck: Trachea normal, normal range of motion and full passive range of motion without pain. Neck supple. No tracheal deviation present. No thyromegaly present.   Cardiovascular: Normal rate, regular rhythm, S1 normal, S2 normal, normal heart sounds, intact distal pulses and normal pulses.   Mild tachycardia 110   Pulmonary/Chest: Effort normal and breath sounds normal.   Abdominal: Soft. Normal aorta and bowel sounds are normal. He exhibits distension. There is tenderness. There is rebound. There is no guarding.   Tenderness left lower quadrant   Musculoskeletal: Normal range of motion.   Lymphadenopathy:     He has no cervical adenopathy.   Neurological: He is alert and oriented to person, place, and time. He has normal reflexes.   Skin: Skin is warm, dry and intact. Capillary refill takes less than 2 seconds.   Psychiatric: He has a normal mood and affect. His speech is normal and behavior is normal. Judgment and thought content normal. Cognition and memory are  normal.   Nursing note and vitals reviewed.      Significant Labs:   Recent Lab Results       10/21/19  1243   10/21/19  0553        Albumin   2.7     Alkaline Phosphatase   49     ALT   12     Anion Gap   16     AST   16     Baso #   0.05     Basophil%   0.3     BILIRUBIN TOTAL   0.7  Comment:  For infants and newborns, interpretation of results should be based  on gestational age, weight and in agreement with clinical  observations.  Premature Infant recommended reference ranges:  Up to 24 hours.............<8.0 mg/dL  Up to 48 hours............<12.0 mg/dL  3-5 days..................<15.0 mg/dL  6-29 days.................<15.0 mg/dL       BUN, Bld   6     Calcium   8.1     Chloride   108     CO2   21     Creatinine   0.7     Differential Method   Automated     eGFR if    >60.0     eGFR if non    >60.0  Comment:  Calculation used to obtain the estimated glomerular filtration  rate (eGFR) is the CKD-EPI equation.        Eos #   0.1     Eosinophil%   0.8     Glucose   81     Gran # (ANC)   12.9     Gran%   76.7     Hematocrit   31.4     Hemoglobin   9.8     Immature Grans (Abs)   0.08  Comment:  Mild elevation in immature granulocytes is non specific and   can be seen in a variety of conditions including stress response,   acute inflammation, trauma and pregnancy. Correlation with other   laboratory and clinical findings is essential.       Immature Granulocytes   0.5     Lymph #   2.4     Lymph%   14.5     MCH   27.8     MCHC   31.2     MCV   89     Mono #   1.2     Mono%   7.2     MPV   8.7     nRBC   0     Platelets   698     Potassium 2.9 2.4  Comment:  potassium critical result(s) called and verbal readback obtained from   ASHLEY Garcia RN, 10/21/2019 06:35       PROTEIN TOTAL   6.5     RBC   3.52     RDW   18.7     Sodium   145     WBC   16.76         All pertinent labs within the past 24 hours have been reviewed.    Significant Imaging: I have reviewed and interpreted all  pertinent imaging results/findings within the past 24 hours.      Assessment/Plan:      * Large bowel obstruction  IV fluids IV potassium NPO KUB in lab in a.m. monitor closely.  Surgical consultation will be seen in a.m. by Dr. MOHAMUD General surgery seen the patient in the clinic on 10/03.  10/20/2019.  Extended conversation with Dr. Buck concerning the patient's finding rectal mass bowel obstruction.  Correction of electrolytes IV fluid IV antibiotics NPO fleets enema today x3.  Colonoscope in a.m..  CEA is been ordered.    10/21/2019  1.  Treat today based on findings of colonoscopy or endoscopy.  2.  Continue current antibiotic treatment  3.  Follow surgery recommendations after final diagnosis made.  4.  Treat otherwise as needed based on clinical course  5.  Repeat labs in the a.m. and replete potassium as needed      VTE Risk Mitigation (From admission, onward)         Ordered     IP VTE LOW RISK PATIENT  Once      10/19/19 1752     Place MAE hose  Until discontinued      10/19/19 1752                      Lencho Kumar MD  Department of Hospital Medicine   [facility] - [facility]

## 2019-10-22 LAB
ABO + RH BLD: NORMAL
ALBUMIN SERPL BCP-MCNC: 1.5 G/DL (ref 3.5–5.2)
ALBUMIN SERPL BCP-MCNC: 1.6 G/DL (ref 3.5–5.2)
ALP SERPL-CCNC: 30 U/L (ref 55–135)
ALP SERPL-CCNC: 34 U/L (ref 55–135)
ALT SERPL W/O P-5'-P-CCNC: 22 U/L (ref 10–44)
ALT SERPL W/O P-5'-P-CCNC: 24 U/L (ref 10–44)
ANION GAP SERPL CALC-SCNC: 10 MMOL/L (ref 8–16)
ANION GAP SERPL CALC-SCNC: 10 MMOL/L (ref 8–16)
ANION GAP SERPL CALC-SCNC: 8 MMOL/L (ref 8–16)
AST SERPL-CCNC: 35 U/L (ref 10–40)
AST SERPL-CCNC: 36 U/L (ref 10–40)
BASOPHILS # BLD AUTO: 0.03 K/UL (ref 0–0.2)
BASOPHILS NFR BLD: 0.1 % (ref 0–1.9)
BILIRUB SERPL-MCNC: 1.5 MG/DL (ref 0.1–1)
BILIRUB SERPL-MCNC: 2.2 MG/DL (ref 0.1–1)
BLD GP AB SCN CELLS X3 SERPL QL: NORMAL
BLD PROD TYP BPU: NORMAL
BLD PROD TYP BPU: NORMAL
BLOOD UNIT EXPIRATION DATE: NORMAL
BLOOD UNIT EXPIRATION DATE: NORMAL
BLOOD UNIT TYPE CODE: 9500
BLOOD UNIT TYPE CODE: 9500
BLOOD UNIT TYPE: NORMAL
BLOOD UNIT TYPE: NORMAL
BUN SERPL-MCNC: 7 MG/DL (ref 8–23)
BUN SERPL-MCNC: 7 MG/DL (ref 8–23)
BUN SERPL-MCNC: 8 MG/DL (ref 8–23)
CALCIUM SERPL-MCNC: 7.4 MG/DL (ref 8.7–10.5)
CALCIUM SERPL-MCNC: 7.4 MG/DL (ref 8.7–10.5)
CALCIUM SERPL-MCNC: 7.5 MG/DL (ref 8.7–10.5)
CHLORIDE SERPL-SCNC: 112 MMOL/L (ref 95–110)
CO2 SERPL-SCNC: 23 MMOL/L (ref 23–29)
CO2 SERPL-SCNC: 23 MMOL/L (ref 23–29)
CO2 SERPL-SCNC: 25 MMOL/L (ref 23–29)
CODING SYSTEM: NORMAL
CODING SYSTEM: NORMAL
CREAT SERPL-MCNC: 0.6 MG/DL (ref 0.5–1.4)
CREAT SERPL-MCNC: 0.9 MG/DL (ref 0.5–1.4)
CREAT SERPL-MCNC: 0.9 MG/DL (ref 0.5–1.4)
DIFFERENTIAL METHOD: ABNORMAL
DISPENSE STATUS: NORMAL
DISPENSE STATUS: NORMAL
EOSINOPHIL # BLD AUTO: 0 K/UL (ref 0–0.5)
EOSINOPHIL NFR BLD: 0 % (ref 0–8)
ERYTHROCYTE [DISTWIDTH] IN BLOOD BY AUTOMATED COUNT: 18.9 % (ref 11.5–14.5)
ERYTHROCYTE [DISTWIDTH] IN BLOOD BY AUTOMATED COUNT: 19.9 % (ref 11.5–14.5)
ERYTHROCYTE [DISTWIDTH] IN BLOOD BY AUTOMATED COUNT: 19.9 % (ref 11.5–14.5)
EST. GFR  (AFRICAN AMERICAN): >60 ML/MIN/1.73 M^2
EST. GFR  (NON AFRICAN AMERICAN): >60 ML/MIN/1.73 M^2
GLUCOSE SERPL-MCNC: 108 MG/DL (ref 70–110)
GLUCOSE SERPL-MCNC: 108 MG/DL (ref 70–110)
GLUCOSE SERPL-MCNC: 99 MG/DL (ref 70–110)
HCT VFR BLD AUTO: 24.6 % (ref 40–54)
HCT VFR BLD AUTO: 24.6 % (ref 40–54)
HCT VFR BLD AUTO: 29.3 % (ref 40–54)
HGB BLD-MCNC: 7.5 G/DL (ref 14–18)
HGB BLD-MCNC: 7.5 G/DL (ref 14–18)
HGB BLD-MCNC: 9.2 G/DL (ref 14–18)
IMM GRANULOCYTES # BLD AUTO: 0.13 K/UL (ref 0–0.04)
IMM GRANULOCYTES NFR BLD AUTO: 0.6 % (ref 0–0.5)
LYMPHOCYTES # BLD AUTO: 1.9 K/UL (ref 1–4.8)
LYMPHOCYTES NFR BLD: 8.2 % (ref 18–48)
MCH RBC QN AUTO: 27.9 PG (ref 27–31)
MCH RBC QN AUTO: 28.2 PG (ref 27–31)
MCH RBC QN AUTO: 28.2 PG (ref 27–31)
MCHC RBC AUTO-ENTMCNC: 30.5 G/DL (ref 32–36)
MCHC RBC AUTO-ENTMCNC: 30.5 G/DL (ref 32–36)
MCHC RBC AUTO-ENTMCNC: 31.4 G/DL (ref 32–36)
MCV RBC AUTO: 89 FL (ref 82–98)
MCV RBC AUTO: 93 FL (ref 82–98)
MCV RBC AUTO: 93 FL (ref 82–98)
MONOCYTES # BLD AUTO: 1.5 K/UL (ref 0.3–1)
MONOCYTES NFR BLD: 6.5 % (ref 4–15)
NEUTROPHILS # BLD AUTO: 19.6 K/UL (ref 1.8–7.7)
NEUTROPHILS NFR BLD: 84.6 % (ref 38–73)
NRBC BLD-RTO: 0 /100 WBC
NUM UNITS TRANS PACKED RBC: NORMAL
NUM UNITS TRANS PACKED RBC: NORMAL
PLATELET # BLD AUTO: 407 K/UL (ref 150–350)
PLATELET # BLD AUTO: 500 K/UL (ref 150–350)
PLATELET # BLD AUTO: 500 K/UL (ref 150–350)
PMV BLD AUTO: 8.8 FL (ref 9.2–12.9)
PMV BLD AUTO: 8.9 FL (ref 9.2–12.9)
PMV BLD AUTO: 8.9 FL (ref 9.2–12.9)
POTASSIUM SERPL-SCNC: 3.5 MMOL/L (ref 3.5–5.1)
POTASSIUM SERPL-SCNC: 3.7 MMOL/L (ref 3.5–5.1)
POTASSIUM SERPL-SCNC: 3.7 MMOL/L (ref 3.5–5.1)
PROT SERPL-MCNC: 3.9 G/DL (ref 6–8.4)
PROT SERPL-MCNC: 4 G/DL (ref 6–8.4)
RBC # BLD AUTO: 2.66 M/UL (ref 4.6–6.2)
RBC # BLD AUTO: 2.66 M/UL (ref 4.6–6.2)
RBC # BLD AUTO: 3.3 M/UL (ref 4.6–6.2)
SODIUM SERPL-SCNC: 145 MMOL/L (ref 136–145)
WBC # BLD AUTO: 21.51 K/UL (ref 3.9–12.7)
WBC # BLD AUTO: 23.17 K/UL (ref 3.9–12.7)
WBC # BLD AUTO: 23.17 K/UL (ref 3.9–12.7)

## 2019-10-22 PROCEDURE — 80053 COMPREHEN METABOLIC PANEL: CPT | Mod: 91

## 2019-10-22 PROCEDURE — 20000000 HC ICU ROOM

## 2019-10-22 PROCEDURE — 36592 COLLECT BLOOD FROM PICC: CPT

## 2019-10-22 PROCEDURE — 86850 RBC ANTIBODY SCREEN: CPT

## 2019-10-22 PROCEDURE — P9016 RBC LEUKOCYTES REDUCED: HCPCS

## 2019-10-22 PROCEDURE — 63600175 PHARM REV CODE 636 W HCPCS

## 2019-10-22 PROCEDURE — 36430 TRANSFUSION BLD/BLD COMPNT: CPT

## 2019-10-22 PROCEDURE — 36415 COLL VENOUS BLD VENIPUNCTURE: CPT

## 2019-10-22 PROCEDURE — 63600175 PHARM REV CODE 636 W HCPCS: Performed by: SURGERY

## 2019-10-22 PROCEDURE — 85025 COMPLETE CBC W/AUTO DIFF WBC: CPT

## 2019-10-22 PROCEDURE — 86920 COMPATIBILITY TEST SPIN: CPT

## 2019-10-22 PROCEDURE — 85027 COMPLETE CBC AUTOMATED: CPT

## 2019-10-22 PROCEDURE — C9113 INJ PANTOPRAZOLE SODIUM, VIA: HCPCS | Performed by: SURGERY

## 2019-10-22 PROCEDURE — 27000221 HC OXYGEN, UP TO 24 HOURS

## 2019-10-22 PROCEDURE — 99233 PR SUBSEQUENT HOSPITAL CARE,LEVL III: ICD-10-PCS | Mod: ,,, | Performed by: INTERNAL MEDICINE

## 2019-10-22 PROCEDURE — 99233 SBSQ HOSP IP/OBS HIGH 50: CPT | Mod: ,,, | Performed by: INTERNAL MEDICINE

## 2019-10-22 PROCEDURE — 94799 UNLISTED PULMONARY SVC/PX: CPT

## 2019-10-22 PROCEDURE — S0030 INJECTION, METRONIDAZOLE: HCPCS | Performed by: SURGERY

## 2019-10-22 PROCEDURE — 25000003 PHARM REV CODE 250: Performed by: SURGERY

## 2019-10-22 RX ORDER — HYDROCODONE BITARTRATE AND ACETAMINOPHEN 500; 5 MG/1; MG/1
TABLET ORAL
Status: DISCONTINUED | OUTPATIENT
Start: 2019-10-22 | End: 2019-11-05 | Stop reason: HOSPADM

## 2019-10-22 RX ORDER — CALCIUM GLUCONATE 98 MG/ML
INJECTION, SOLUTION INTRAVENOUS
Status: COMPLETED
Start: 2019-10-22 | End: 2019-10-22

## 2019-10-22 RX ORDER — METRONIDAZOLE 500 MG/100ML
500 INJECTION, SOLUTION INTRAVENOUS
Status: DISCONTINUED | OUTPATIENT
Start: 2019-10-22 | End: 2019-11-02

## 2019-10-22 RX ADMIN — SODIUM CHLORIDE, SODIUM LACTATE, POTASSIUM CHLORIDE, AND CALCIUM CHLORIDE 1000 ML: .6; .31; .03; .02 INJECTION, SOLUTION INTRAVENOUS at 03:10

## 2019-10-22 RX ADMIN — SODIUM CHLORIDE: 0.9 INJECTION, SOLUTION INTRAVENOUS at 08:10

## 2019-10-22 RX ADMIN — HEPARIN SODIUM 5000 UNITS: 5000 INJECTION, SOLUTION INTRAVENOUS; SUBCUTANEOUS at 10:10

## 2019-10-22 RX ADMIN — MORPHINE SULFATE 4 MG: 4 INJECTION, SOLUTION INTRAMUSCULAR; INTRAVENOUS at 06:10

## 2019-10-22 RX ADMIN — MORPHINE SULFATE 4 MG: 4 INJECTION, SOLUTION INTRAMUSCULAR; INTRAVENOUS at 03:10

## 2019-10-22 RX ADMIN — PANTOPRAZOLE SODIUM 40 MG: 40 INJECTION, POWDER, LYOPHILIZED, FOR SOLUTION INTRAVENOUS at 08:10

## 2019-10-22 RX ADMIN — METRONIDAZOLE 500 MG: 500 INJECTION, SOLUTION INTRAVENOUS at 05:10

## 2019-10-22 RX ADMIN — SODIUM CHLORIDE, SODIUM LACTATE, POTASSIUM CHLORIDE, AND CALCIUM CHLORIDE 1000 ML: .6; .31; .03; .02 INJECTION, SOLUTION INTRAVENOUS at 11:10

## 2019-10-22 RX ADMIN — SODIUM CHLORIDE, SODIUM LACTATE, POTASSIUM CHLORIDE, AND CALCIUM CHLORIDE: .6; .31; .03; .02 INJECTION, SOLUTION INTRAVENOUS at 10:10

## 2019-10-22 RX ADMIN — CALCIUM GLUCONATE 1000 MG: 98 INJECTION, SOLUTION INTRAVENOUS at 10:10

## 2019-10-22 RX ADMIN — MORPHINE SULFATE 2 MG: 4 INJECTION, SOLUTION INTRAMUSCULAR; INTRAVENOUS at 12:10

## 2019-10-22 RX ADMIN — METRONIDAZOLE 500 MG: 500 INJECTION, SOLUTION INTRAVENOUS at 10:10

## 2019-10-22 RX ADMIN — MORPHINE SULFATE 2 MG: 4 INJECTION, SOLUTION INTRAMUSCULAR; INTRAVENOUS at 08:10

## 2019-10-22 RX ADMIN — SODIUM CHLORIDE, SODIUM LACTATE, POTASSIUM CHLORIDE, AND CALCIUM CHLORIDE: .6; .31; .03; .02 INJECTION, SOLUTION INTRAVENOUS at 12:10

## 2019-10-22 RX ADMIN — MORPHINE SULFATE 4 MG: 4 INJECTION, SOLUTION INTRAMUSCULAR; INTRAVENOUS at 11:10

## 2019-10-22 RX ADMIN — MORPHINE SULFATE 4 MG: 4 INJECTION, SOLUTION INTRAMUSCULAR; INTRAVENOUS at 02:10

## 2019-10-22 RX ADMIN — HEPARIN SODIUM 5000 UNITS: 5000 INJECTION, SOLUTION INTRAVENOUS; SUBCUTANEOUS at 06:10

## 2019-10-22 RX ADMIN — MORPHINE SULFATE 4 MG: 4 INJECTION, SOLUTION INTRAMUSCULAR; INTRAVENOUS at 04:10

## 2019-10-22 RX ADMIN — HEPARIN SODIUM 5000 UNITS: 5000 INJECTION, SOLUTION INTRAVENOUS; SUBCUTANEOUS at 02:10

## 2019-10-22 RX ADMIN — MORPHINE SULFATE 4 MG: 4 INJECTION, SOLUTION INTRAMUSCULAR; INTRAVENOUS at 08:10

## 2019-10-22 NOTE — NURSING
Pt awake and alert, pain controlled with Morphine 2mg @ every 2hrs. Encouraged to cough and deep breath while using pillow to splint. Weak cough, needs encouragement. Information provided regarding medications and colostomy. Handouts provided. Emotional support provided. Will continue to monitor.

## 2019-10-22 NOTE — PLAN OF CARE
Plan of care reviewed with pt, including importance of increasing mobility, turn, cough and deep breath, incentive spirometry, to prevent post-op complications. Pain management discussed. Pt voiced understanding. Needs reinforcement.

## 2019-10-22 NOTE — PLAN OF CARE
Pt received to ICU directly from OR and recovered for one hour in ICU. Bedside monitor applied and CVP monitoring initiated.  updated on pt status. See new orders.

## 2019-10-22 NOTE — PLAN OF CARE
10/22/19 1016   Discharge Reassessment   Assessment Type Discharge Planning Reassessment   Anticipated Discharge Disposition Home   Do you have any problems affording any of your prescribed medications? No   Discharge Plan A Home with family   Discharge Plan B Home Health   DME Needed Upon Discharge  none   Patient choice form signed by patient/caregiver N/A   Patient lives at home with his significant other. States he still works as  & plans to continue working when discharged. His Medicare goes into effect this month but has not received his Medicare card yet. Will assist in a few days with him calling Medicare to get his number. [facility] doesn't want to do it today as not feeling great right now. States he may not need home health when discharged but is an option if he gets his Medicare number. Will continue to follow.

## 2019-10-22 NOTE — PROGRESS NOTES
[facility]  General Surgery  Daily Note    Patient Name: Miguel Angel Apple Sr.  MRN: [id number]  Admission Date: 10/19/2019  Attending Physician: Hima Mccoy MD   Consult Physician: Jose Lazo MD  Primary Care Provider: Primary Doctor No    Subjective:     Principle Problem: Large bowel obstruction    Last 24 hour history:  10/22/19  Afebrile since surgery. Mild tachycardia.  Blood pressure stable.  Good urine output with greater than 40 cc/hour.  CVP is ranged from 8-12.  Ostomy pink and functional.  NG tube output minimal.  No nausea vomiting.  No new issues or complaints this morning.    Objective:     Vital Signs (Most Recent):  Temp: 97.9 °F (36.6 °C) (10/22/19 1500)  Pulse: (!) 120 (10/22/19 1500)  Resp: 13 (10/22/19 1500)  BP: 123/63 (10/22/19 1500)  SpO2: 97 % (10/22/19 1500) Vital Signs (24h Range):  Temp:  [97.4 °F (36.3 °C)-99.1 °F (37.3 °C)] 97.9 °F (36.6 °C)  Pulse:  [100-125] 120  Resp:  [11-33] 13  SpO2:  [94 %-100 %] 97 %  BP: ()/(51-72) 123/63     Intake/Output last 24 hours:    Intake/Output Summary (Last 24 hours) at 10/22/2019 1559  Last data filed at 10/22/2019 1500  Gross per 24 hour   Intake 9927.08 ml   Output 1476 ml   Net 8451.08 ml       I/O last 3 completed shifts:  In: 35716 [P.O.:15; I.V.:94595; IV Piggyback:1800]  Out: 2003 [Urine:1728; Stool:175; Blood:100]  I/O this shift:  In: 2356.3 [I.V.:879.2; Blood:377.1; IV Piggyback:1100]  Out: 643 [Urine:393; Stool:250]    Weight: 69.7 kg (153 lb 10.6 oz)  Body mass index is 22.69 kg/m².    Gen: Wd Wn male currently in NAD  Heent: Nc/At, MMM, NG tube in place with bilious output.  Eyes: Perrl, Eomi  Cv: RRR, no  M/g/r  Lung: Non-labored breathing, clear bilaterally  Abd: Soft, tenderness to be expected postsurgical.  Mild distention.  Ostomy pink patent and left lower quadrant.  Mucous fistula in place with red rubber intact.    Significant Labs:  CBC:   Recent Labs   Lab 10/22/19  1532   WBC  21.51*   RBC 3.30*   HGB 9.2*   HCT 29.3*   *   MCV 89   MCH 27.9   MCHC 31.4*     BMP:   Recent Labs   Lab 10/21/19  1830 10/22/19  0437   * 108  108    145  145   K 3.3* 3.7  3.7   * 112*  112*   CO2 19* 23  23   BUN 6* 7*  7*   CREATININE 0.7 0.9  0.9   CALCIUM 7.3* 7.4*  7.4*   MG 1.4*  --      CMP:   Recent Labs   Lab 10/22/19  0437     108   CALCIUM 7.4*  7.4*   ALBUMIN 1.5*   PROT 3.9*     145   K 3.7  3.7   CO2 23  23   *  112*   BUN 7*  7*   CREATININE 0.9  0.9   ALKPHOS 30*   ALT 24   AST 36   BILITOT 1.5*     LFTs:   Recent Labs   Lab 10/22/19  0437   ALT 24   AST 36   ALKPHOS 30*   BILITOT 1.5*   PROT 3.9*   ALBUMIN 1.5*     Coagulation: No results for input(s): LABPROT, INR, APTT in the last 168 hours.  Specimen (12h ago, onward)    None        Recent Labs   Lab 10/19/19  1716   COLORU Yellow   SPECGRAV 1.015   PHUR 7.0   PROTEINUA 2+*   BACTERIA Few*   NITRITE Negative   LEUKOCYTESUR Negative   UROBILINOGEN Negative   HYALINECASTS 0       Cultures:    Microbiology Results (last 7 days)     Procedure Component Value Units Date/Time    Blood culture x two cultures. Draw prior to antibiotics. [[id number]] Collected:  10/19/19 1507    Order Status:  Completed Specimen:  Blood from Antecubital, Right  Arm Updated:  10/21/19 2212     Blood Culture, Routine No Growth to date      No Growth to date      No Growth to date    Narrative:       Aerobic and anaerobic    Blood culture x two cultures. Draw prior to antibiotics. [[id number]] Collected:  10/19/19 1505    Order Status:  Completed Specimen:  Blood from Antecubital, Left  Arm Updated:  10/21/19 2212     Blood Culture, Routine No Growth to date      No Growth to date      No Growth to date    Narrative:       Aerobic and anaerobic          Assessment:   Miguel Angel Apple . is a 63 y.o. male who presents with Large bowel obstruction.    Active Diagnoses:    Diagnosis Date Noted POA    PRINCIPAL  PROBLEM:  Large bowel obstruction [K56.609] 10/19/2019 Yes    Diarrhea [R19.7] 10/21/2019 Yes    Rectal mass [K62.89] 10/20/2019 Yes    Hypokalemia [E87.6] 10/20/2019 Yes    Intestinal obstruction [K56.609]  Yes    Colitis [K52.9] 10/19/2019 Yes      Problems Resolved During this Admission:     VTE Risk Mitigation (From admission, onward)         Ordered     heparin (porcine) injection 5,000 Units  Every 8 hours      10/21/19 1753     Place sequential compression device  Until discontinued      10/21/19 1753     IP VTE LOW RISK PATIENT  Once      10/19/19 1752     Place MAE hose  Until discontinued      10/19/19 1752                Medical Decision Making/Plan:  Satisfactory postoperative recovery thus far.  Good urine output.  Afebrile.  Monitor tachycardia.  Consider beta-blocker administration however given current need for blood as well as volume hydration, hold off at this point.  Continue DVT prophylaxis.  Continue GI prophylaxis.  Continue NG tube decompression with NPO.  Repeat labs this afternoon.  Continue IV antibiotics given the perforated nature of the colon.  Further management patient depend upon his clinical course.    Jose Lazo MD  General Surgery  [facility] - ICU

## 2019-10-23 LAB
ALBUMIN SERPL BCP-MCNC: 1.6 G/DL (ref 3.5–5.2)
ALP SERPL-CCNC: 38 U/L (ref 55–135)
ALT SERPL W/O P-5'-P-CCNC: 21 U/L (ref 10–44)
ANION GAP SERPL CALC-SCNC: 5 MMOL/L (ref 8–16)
AST SERPL-CCNC: 27 U/L (ref 10–40)
BASOPHILS # BLD AUTO: 0.02 K/UL (ref 0–0.2)
BASOPHILS NFR BLD: 0.1 % (ref 0–1.9)
BILIRUB SERPL-MCNC: 1.2 MG/DL (ref 0.1–1)
BUN SERPL-MCNC: 7 MG/DL (ref 8–23)
CALCIUM SERPL-MCNC: 7.9 MG/DL (ref 8.7–10.5)
CHLORIDE SERPL-SCNC: 109 MMOL/L (ref 95–110)
CO2 SERPL-SCNC: 27 MMOL/L (ref 23–29)
CREAT SERPL-MCNC: 0.6 MG/DL (ref 0.5–1.4)
DIFFERENTIAL METHOD: ABNORMAL
EOSINOPHIL # BLD AUTO: 0 K/UL (ref 0–0.5)
EOSINOPHIL NFR BLD: 0.1 % (ref 0–8)
ERYTHROCYTE [DISTWIDTH] IN BLOOD BY AUTOMATED COUNT: 19.8 % (ref 11.5–14.5)
EST. GFR  (AFRICAN AMERICAN): >60 ML/MIN/1.73 M^2
EST. GFR  (NON AFRICAN AMERICAN): >60 ML/MIN/1.73 M^2
GLUCOSE SERPL-MCNC: 75 MG/DL (ref 70–110)
HCT VFR BLD AUTO: 28 % (ref 40–54)
HGB BLD-MCNC: 8.9 G/DL (ref 14–18)
IMM GRANULOCYTES # BLD AUTO: 0.08 K/UL (ref 0–0.04)
IMM GRANULOCYTES NFR BLD AUTO: 0.4 % (ref 0–0.5)
LYMPHOCYTES # BLD AUTO: 1.8 K/UL (ref 1–4.8)
LYMPHOCYTES NFR BLD: 9.1 % (ref 18–48)
MAGNESIUM SERPL-MCNC: 1.7 MG/DL (ref 1.6–2.6)
MCH RBC QN AUTO: 28.2 PG (ref 27–31)
MCHC RBC AUTO-ENTMCNC: 31.8 G/DL (ref 32–36)
MCV RBC AUTO: 89 FL (ref 82–98)
MONOCYTES # BLD AUTO: 1.4 K/UL (ref 0.3–1)
MONOCYTES NFR BLD: 6.8 % (ref 4–15)
NEUTROPHILS # BLD AUTO: 16.5 K/UL (ref 1.8–7.7)
NEUTROPHILS NFR BLD: 83.5 % (ref 38–73)
NRBC BLD-RTO: 0 /100 WBC
PHOSPHATE SERPL-MCNC: 3.4 MG/DL (ref 2.7–4.5)
PLATELET # BLD AUTO: 396 K/UL (ref 150–350)
PMV BLD AUTO: 9.6 FL (ref 9.2–12.9)
POTASSIUM SERPL-SCNC: 3.1 MMOL/L (ref 3.5–5.1)
PROT SERPL-MCNC: 4.5 G/DL (ref 6–8.4)
RBC # BLD AUTO: 3.16 M/UL (ref 4.6–6.2)
SODIUM SERPL-SCNC: 141 MMOL/L (ref 136–145)
WBC # BLD AUTO: 19.8 K/UL (ref 3.9–12.7)

## 2019-10-23 PROCEDURE — 99233 PR SUBSEQUENT HOSPITAL CARE,LEVL III: ICD-10-PCS | Mod: ,,, | Performed by: INTERNAL MEDICINE

## 2019-10-23 PROCEDURE — 94761 N-INVAS EAR/PLS OXIMETRY MLT: CPT

## 2019-10-23 PROCEDURE — 94760 N-INVAS EAR/PLS OXIMETRY 1: CPT

## 2019-10-23 PROCEDURE — 63600175 PHARM REV CODE 636 W HCPCS: Performed by: SURGERY

## 2019-10-23 PROCEDURE — 20000000 HC ICU ROOM

## 2019-10-23 PROCEDURE — 99233 SBSQ HOSP IP/OBS HIGH 50: CPT | Mod: ,,, | Performed by: INTERNAL MEDICINE

## 2019-10-23 PROCEDURE — 83735 ASSAY OF MAGNESIUM: CPT

## 2019-10-23 PROCEDURE — 94799 UNLISTED PULMONARY SVC/PX: CPT

## 2019-10-23 PROCEDURE — 85025 COMPLETE CBC W/AUTO DIFF WBC: CPT

## 2019-10-23 PROCEDURE — 27000221 HC OXYGEN, UP TO 24 HOURS

## 2019-10-23 PROCEDURE — 25000003 PHARM REV CODE 250: Performed by: SURGERY

## 2019-10-23 PROCEDURE — 80053 COMPREHEN METABOLIC PANEL: CPT

## 2019-10-23 PROCEDURE — 63600175 PHARM REV CODE 636 W HCPCS: Performed by: INTERNAL MEDICINE

## 2019-10-23 PROCEDURE — S0030 INJECTION, METRONIDAZOLE: HCPCS | Performed by: SURGERY

## 2019-10-23 PROCEDURE — 84100 ASSAY OF PHOSPHORUS: CPT

## 2019-10-23 PROCEDURE — C9113 INJ PANTOPRAZOLE SODIUM, VIA: HCPCS | Performed by: SURGERY

## 2019-10-23 RX ORDER — KETOROLAC TROMETHAMINE 30 MG/ML
15 INJECTION, SOLUTION INTRAMUSCULAR; INTRAVENOUS EVERY 6 HOURS
Status: DISPENSED | OUTPATIENT
Start: 2019-10-23 | End: 2019-10-26

## 2019-10-23 RX ORDER — POTASSIUM CHLORIDE 14.9 MG/ML
20 INJECTION INTRAVENOUS ONCE
Status: COMPLETED | OUTPATIENT
Start: 2019-10-23 | End: 2019-10-23

## 2019-10-23 RX ORDER — HYDROMORPHONE HYDROCHLORIDE 2 MG/ML
1 INJECTION, SOLUTION INTRAMUSCULAR; INTRAVENOUS; SUBCUTANEOUS
Status: DISCONTINUED | OUTPATIENT
Start: 2019-10-23 | End: 2019-11-05

## 2019-10-23 RX ADMIN — KETOROLAC TROMETHAMINE 15 MG: 30 INJECTION, SOLUTION INTRAMUSCULAR at 11:10

## 2019-10-23 RX ADMIN — HEPARIN SODIUM 5000 UNITS: 5000 INJECTION, SOLUTION INTRAVENOUS; SUBCUTANEOUS at 05:10

## 2019-10-23 RX ADMIN — CALCIUM GLUCONATE 500 MG: 98 INJECTION, SOLUTION INTRAVENOUS at 09:10

## 2019-10-23 RX ADMIN — POTASSIUM CHLORIDE 20 MEQ: 200 INJECTION, SOLUTION INTRAVENOUS at 09:10

## 2019-10-23 RX ADMIN — METRONIDAZOLE 500 MG: 500 INJECTION, SOLUTION INTRAVENOUS at 02:10

## 2019-10-23 RX ADMIN — HYDROMORPHONE HYDROCHLORIDE 1 MG: 2 INJECTION, SOLUTION INTRAMUSCULAR; INTRAVENOUS; SUBCUTANEOUS at 05:10

## 2019-10-23 RX ADMIN — KETOROLAC TROMETHAMINE 15 MG: 30 INJECTION, SOLUTION INTRAMUSCULAR at 05:10

## 2019-10-23 RX ADMIN — MORPHINE SULFATE 4 MG: 4 INJECTION, SOLUTION INTRAMUSCULAR; INTRAVENOUS at 08:10

## 2019-10-23 RX ADMIN — POTASSIUM CHLORIDE 20 MEQ: 200 INJECTION, SOLUTION INTRAVENOUS at 01:10

## 2019-10-23 RX ADMIN — MORPHINE SULFATE 4 MG: 4 INJECTION, SOLUTION INTRAMUSCULAR; INTRAVENOUS at 02:10

## 2019-10-23 RX ADMIN — HYDROMORPHONE HYDROCHLORIDE 1 MG: 2 INJECTION, SOLUTION INTRAMUSCULAR; INTRAVENOUS; SUBCUTANEOUS at 11:10

## 2019-10-23 RX ADMIN — METRONIDAZOLE 500 MG: 500 INJECTION, SOLUTION INTRAVENOUS at 11:10

## 2019-10-23 RX ADMIN — HEPARIN SODIUM 5000 UNITS: 5000 INJECTION, SOLUTION INTRAVENOUS; SUBCUTANEOUS at 01:10

## 2019-10-23 RX ADMIN — SODIUM CHLORIDE, SODIUM LACTATE, POTASSIUM CHLORIDE, AND CALCIUM CHLORIDE: .6; .31; .03; .02 INJECTION, SOLUTION INTRAVENOUS at 07:10

## 2019-10-23 RX ADMIN — HYDROMORPHONE HYDROCHLORIDE 1 MG: 2 INJECTION, SOLUTION INTRAMUSCULAR; INTRAVENOUS; SUBCUTANEOUS at 09:10

## 2019-10-23 RX ADMIN — MORPHINE SULFATE 4 MG: 4 INJECTION, SOLUTION INTRAMUSCULAR; INTRAVENOUS at 07:10

## 2019-10-23 RX ADMIN — ERTAPENEM SODIUM 1 G: 1 INJECTION, POWDER, LYOPHILIZED, FOR SOLUTION INTRAMUSCULAR; INTRAVENOUS at 10:10

## 2019-10-23 RX ADMIN — SODIUM CHLORIDE, SODIUM LACTATE, POTASSIUM CHLORIDE, AND CALCIUM CHLORIDE: .6; .31; .03; .02 INJECTION, SOLUTION INTRAVENOUS at 04:10

## 2019-10-23 RX ADMIN — HEPARIN SODIUM 5000 UNITS: 5000 INJECTION, SOLUTION INTRAVENOUS; SUBCUTANEOUS at 09:10

## 2019-10-23 RX ADMIN — HYDROMORPHONE HYDROCHLORIDE 1 MG: 2 INJECTION, SOLUTION INTRAMUSCULAR; INTRAVENOUS; SUBCUTANEOUS at 01:10

## 2019-10-23 RX ADMIN — MORPHINE SULFATE 4 MG: 4 INJECTION, SOLUTION INTRAMUSCULAR; INTRAVENOUS at 04:10

## 2019-10-23 RX ADMIN — PANTOPRAZOLE SODIUM 40 MG: 40 INJECTION, POWDER, LYOPHILIZED, FOR SOLUTION INTRAVENOUS at 09:10

## 2019-10-23 RX ADMIN — METRONIDAZOLE 500 MG: 500 INJECTION, SOLUTION INTRAVENOUS at 05:10

## 2019-10-23 NOTE — NURSING
Abdominal incision dressing changed.  Perimeter of wound cleaned with sterile water and wound re-packed with saline wet sterile 4X4s.  Dry 4X4s applied over wet dressing and ABD pad applied.  Taped with paper tape and tegaderm dressing applied to left side of dressing, d/t colostomy bag leaking at the top near the incision.  Colostomy bag changed and attemted to guard incision from possible stool leakage.  Will monitor closely for leaks.

## 2019-10-23 NOTE — NURSING
Pt out of bed with assistance times two, to bedside chair to sit up, while pt out of bed, bed linens and gown changed and waffle overlay placed on mattress

## 2019-10-23 NOTE — SUBJECTIVE & OBJECTIVE
Interval History:     Review of Systems   Constitutional: Positive for activity change, appetite change and unexpected weight change. Negative for chills, diaphoresis, fatigue and fever.   HENT: Negative for congestion, drooling, hearing loss and trouble swallowing.    Eyes: Negative for pain and visual disturbance.   Respiratory: Negative.  Negative for apnea, cough, choking, chest tightness, shortness of breath and wheezing.    Cardiovascular: Negative for chest pain, palpitations and leg swelling.        Increased heart rate   Gastrointestinal: Positive for abdominal distention and abdominal pain. Negative for blood in stool, constipation, diarrhea, nausea and vomiting.        Tenderness left lower quadrant   Endocrine: Negative for polydipsia, polyphagia and polyuria.   Genitourinary: Negative for difficulty urinating, dysuria and flank pain.   Musculoskeletal: Negative for arthralgias, back pain, gait problem, joint swelling, neck pain and neck stiffness.   Skin: Negative for color change, rash and wound.   Allergic/Immunologic: Negative for environmental allergies, food allergies and immunocompromised state.   Neurological: Positive for weakness. Negative for dizziness, syncope, numbness and headaches.   Hematological: Negative for adenopathy.   Psychiatric/Behavioral: Negative for agitation, confusion and sleep disturbance. The patient is nervous/anxious.      Objective:     Vital Signs (Most Recent):  Temp: 98 °F (36.7 °C) (10/22/19 1530)  Pulse: (!) 125 (10/22/19 1900)  Resp: 20 (10/22/19 1900)  BP: 118/66 (10/22/19 1900)  SpO2: (!) 94 % (10/22/19 1900) Vital Signs (24h Range):  Temp:  [97.7 °F (36.5 °C)-99.1 °F (37.3 °C)] 98 °F (36.7 °C)  Pulse:  [104-125] 125  Resp:  [10-33] 20  SpO2:  [94 %-100 %] 94 %  BP: ()/(51-73) 118/66     Weight: 69.7 kg (153 lb 10.6 oz)  Body mass index is 22.69 kg/m².    Intake/Output Summary (Last 24 hours) at 10/22/2019 2112  Last data filed at 10/22/2019 1800  Gross per  24 hour   Intake 4391.25 ml   Output 1646 ml   Net 2745.25 ml      Physical Exam   Constitutional: He is oriented to person, place, and time. He appears well-developed and well-nourished.   HENT:   Head: Normocephalic and atraumatic.   Right Ear: External ear normal.   Left Ear: External ear normal.   Nose: Nose normal.   Eyes: Pupils are equal, round, and reactive to light. Conjunctivae, EOM and lids are normal.   Neck: Trachea normal, normal range of motion and full passive range of motion without pain. Neck supple. No tracheal deviation present. No thyromegaly present.   Cardiovascular: Normal rate, regular rhythm, S1 normal, S2 normal, normal heart sounds, intact distal pulses and normal pulses.   Mild tachycardia 110   Pulmonary/Chest: Effort normal. He has rales.   Abdominal: Soft. Normal aorta and bowel sounds are normal. He exhibits distension. There is tenderness. There is rebound. There is no guarding.   Tenderness left lower quadrant   Musculoskeletal: Normal range of motion.   Lymphadenopathy:     He has no cervical adenopathy.   Neurological: He is alert and oriented to person, place, and time. He has normal reflexes.   Skin: Skin is warm, dry and intact. Capillary refill takes less than 2 seconds.   Psychiatric: He has a normal mood and affect. His speech is normal and behavior is normal. Judgment and thought content normal. Cognition and memory are normal.   Nursing note and vitals reviewed.      Significant Labs:   Recent Lab Results       10/22/19  1532   10/22/19  0716   10/22/19  0437        Unit Blood Type Code   9500[P]          9500[P]       Unit Expiration   [id number][P]          [id number][P]       Unit Blood Type   O NEG[P]          O NEG[P]       Albumin 1.6   1.5     Alkaline Phosphatase 34   30     ALT 22   24     Anion Gap 8   10          10     AST 35   36     Baso #     0.03     Basophil%     0.1     BILIRUBIN TOTAL 2.2  Comment:  For infants and newborns, interpretation of  results should be based  on gestational age, weight and in agreement with clinical  observations.  Premature Infant recommended reference ranges:  Up to 24 hours.............<8.0 mg/dL  Up to 48 hours............<12.0 mg/dL  3-5 days..................<15.0 mg/dL  6-29 days.................<15.0 mg/dL     1.5  Comment:  For infants and newborns, interpretation of results should be based  on gestational age, weight and in agreement with clinical  observations.  Premature Infant recommended reference ranges:  Up to 24 hours.............<8.0 mg/dL  Up to 48 hours............<12.0 mg/dL  3-5 days..................<15.0 mg/dL  6-29 days.................<15.0 mg/dL       BUN, Bld 8   7          7     Calcium 7.5   7.4          7.4     Chloride 112   112          112     CO2 25   23          23     CODING SYSTEM   [id number][P]          [id number][P]       Creatinine 0.6   0.9          0.9     Differential Method     Automated     DISPENSE STATUS   ISSUED[P]          ISSUED[P]       eGFR if  >60.0   >60.0          >60.0     eGFR if non  >60.0  Comment:  Calculation used to obtain the estimated glomerular filtration  rate (eGFR) is the CKD-EPI equation.      >60.0  Comment:  Calculation used to obtain the estimated glomerular filtration  rate (eGFR) is the CKD-EPI equation.             >60.0  Comment:  Calculation used to obtain the estimated glomerular filtration  rate (eGFR) is the CKD-EPI equation.        Eos #     0.0     Eosinophil%     0.0     Glucose 99   108          108     Gran # (ANC)     19.6     Gran%     84.6     Group & Rh   B NEG       Hematocrit 29.3   24.6          24.6     Hemoglobin 9.2   7.5          7.5     Immature Grans (Abs)     0.13  Comment:  Mild elevation in immature granulocytes is non specific and   can be seen in a variety of conditions including stress response,   acute inflammation, trauma and pregnancy. Correlation with other   laboratory and clinical findings is  essential.       Immature Granulocytes     0.6     Indirect Yamileth GEL   NEG       Lymph #     1.9     Lymph%     8.2     MCH 27.9   28.2          28.2     MCHC 31.4   30.5          30.5     MCV 89   93          93     Mono #     1.5     Mono%     6.5     MPV 8.8   8.9          8.9     nRBC     0     Platelets 407   500          500     Potassium 3.5   3.7          3.7     Product Code   [id number][P]          [id number][P]       PROTEIN TOTAL 4.0   3.9     RBC 3.30   2.66          2.66     RDW 18.9   19.9          19.9     Sodium 145   145          145     UNIT NUMBER   [id number][P]          [id number][P]       WBC 21.51   23.17          23.17         All pertinent labs within the past 24 hours have been reviewed.    Significant Imaging: I have reviewed and interpreted all pertinent imaging results/findings within the past 24 hours.

## 2019-10-23 NOTE — PLAN OF CARE
10/23/19 1200   Discharge Reassessment   Assessment Type Discharge Planning Reassessment   Anticipated Discharge Disposition Home   Do you have any problems affording any of your prescribed medications? TBD   Discharge Plan A Home with family   DME Needed Upon Discharge  none   Patient choice form signed by patient/caregiver N/A   Patient's states has not been able to call Medicare yet to get his number. States he will try to do it in the next day or two. Denies any needs at this time.

## 2019-10-23 NOTE — ASSESSMENT & PLAN NOTE
IV fluids IV potassium NPO KUB in lab in a.m. monitor closely.  Surgical consultation will be seen in a.m. by Dr. MOHAMUD General surgery seen the patient in the clinic on 10/03.  10/20/2019.  Extended conversation with Dr. Buck concerning the patient's finding rectal mass bowel obstruction.  Correction of electrolytes IV fluid IV antibiotics NPO fleets enema today x3.  Colonoscope in a.m..  CEA is been ordered.    10/21/2019  1.  Treat today based on findings of colonoscopy or endoscopy.  2.  Continue current antibiotic treatment  3.  Follow surgery recommendations after final diagnosis made.  4.  Treat otherwise as needed based on clinical course  5.  Repeat labs in the a.m. and replete potassium as needed    10/22/2019:  1.  Stable postop day 1 with positive bowel sounds.  2.  We will replace calcium  3.  Repeat labs in the a.m. to include CBC and CMP magnesium  4.  Follow-up otherwise as clinically indicated based clinical course.    10/23/2019:  1.  Continue current antibiotic therapy.  2.  Give will blood bolus of 500 cc x1  3.  Change pain medication to Dilaudid 1 mg every 2 hr as needed for pain  4.  A feel tachycardia may be related to pain versus is mildly fluid depleted.  5.  Replace electrolytes as needed.  6.  Intake and output revealed a positive 2500 cc sore +overnight

## 2019-10-23 NOTE — PLAN OF CARE
Throughout shift discussed pain control with pt, address need for increase in pain management with MD, explained to pt the combination of medication ordered, made a plan for pain medication to be administered prior to getting out of bed, pt voiced understanding of all

## 2019-10-23 NOTE — PROGRESS NOTES
[facility] - ICU Hospital Medicine  Progress Note    Patient Name: Miguel Angel Apple Sr.  MRN: [id number]  Patient Class: IP- Inpatient   Admission Date: 10/19/2019  Length of Stay: 3 days  Attending Physician: Hima Mccoy MD  Primary Care Provider: Primary Doctor No        Subjective:     Principal Problem:Large bowel obstruction        HPI:  Patient's history began approximately 3 months ago.  He presented to the hospital well over a month ago given a diagnosis of he understands colitis.  Treated with antibiotics IV fluids.  He saw Dr. ONEIDA charles on 10/03 placed on Cipro and Flagyl.  His on once completed that dose.  This past Sunday he started noticing that he was becoming  bloated and stopped eating solids at that time went back to liquids.  His had no pain pills for 2-3 days.  No nausea and vomiting unless he ate something that he knew he should the.  He attempted to make himself vomit yesterday and could not.  He has had small bowel movements to continue since that time.  CT scan shows colonic dilatation with the cecum being 10 cm.  The possible obstruction may be low left-sided.  There is even suggesting possibility have a rectal mass.  WBC remained 16,000 with a left shift.  Patient's potassium is 2.2.    Overview/Hospital Course:  IV fluids IV potassium NPO Surgical consultation repeat KUB and lab in a.m. further clinical decisions depending on clinical course.  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  The patient has had no prior abdominal surgery and no medical history prior to approximately 3 months ago.  10/20/2019.  Long discussion with the patient this morning concerning the findings.  Dr. ONEIDA charles see the patient today since he has been involved in his care on 10/03.  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  Patient continue IV fluids IV antibiotics potassium replacement today and will have 3 Fleet's enemas today.   CEA is been ordered.    10/21/2019:  Patient is comfortable in room although it does appear quite anxious.  The patient will be having EGD and colonoscopy later today.  Based on the findings there the patient will may need undergo surgery.  Patient denies any pain and states that he was not throwing up and having no fever or chills.  Patient is distended and has some abdominal pain associated.  Labs showed [place] 19[address] hemoglobin hematocrit 9.8 and 31 sodium 145 potassium 2.4 chloride 108 bicarb 21 and alk-phos was 49    10/22/2019:  Patient is stable postop day 1.  Patient states pain is well controlled.  Patient mildly tachycardic blood pressure 115/61 979 O2 sats 100%.  Labs show white blood cell count 21.5 hemoglobin 9.2 hematocrit 29.3 platelets 407 and BUN creatinine were 8 and 0.6 calcium 7.5 magnesium 1.4.  Patient is in good spirits and bowel sounds are present.    Interval History:     Review of Systems   Constitutional: Positive for activity change, appetite change and unexpected weight change. Negative for chills, diaphoresis, fatigue and fever.   HENT: Negative for congestion, drooling, hearing loss and trouble swallowing.    Eyes: Negative for pain and visual disturbance.   Respiratory: Negative.  Negative for apnea, cough, choking, chest tightness, shortness of breath and wheezing.    Cardiovascular: Negative for chest pain, palpitations and leg swelling.        Increased heart rate   Gastrointestinal: Positive for abdominal distention and abdominal pain. Negative for blood in stool, constipation, diarrhea, nausea and vomiting.        Tenderness left lower quadrant   Endocrine: Negative for polydipsia, polyphagia and polyuria.   Genitourinary: Negative for difficulty urinating, dysuria and flank pain.   Musculoskeletal: Negative for arthralgias, back pain, gait problem, joint swelling, neck pain and neck stiffness.   Skin: Negative for color change, rash and wound.    Allergic/Immunologic: Negative for environmental allergies, food allergies and immunocompromised state.   Neurological: Positive for weakness. Negative for dizziness, syncope, numbness and headaches.   Hematological: Negative for adenopathy.   Psychiatric/Behavioral: Negative for agitation, confusion and sleep disturbance. The patient is nervous/anxious.      Objective:     Vital Signs (Most Recent):  Temp: 98 °F (36.7 °C) (10/22/19 1530)  Pulse: (!) 125 (10/22/19 1900)  Resp: 20 (10/22/19 1900)  BP: 118/66 (10/22/19 1900)  SpO2: (!) 94 % (10/22/19 1900) Vital Signs (24h Range):  Temp:  [97.7 °F (36.5 °C)-99.1 °F (37.3 °C)] 98 °F (36.7 °C)  Pulse:  [104-125] 125  Resp:  [10-33] 20  SpO2:  [94 %-100 %] 94 %  BP: ()/(51-73) 118/66     Weight: 69.7 kg (153 lb 10.6 oz)  Body mass index is 22.69 kg/m².    Intake/Output Summary (Last 24 hours) at 10/22/2019 2112  Last data filed at 10/22/2019 1800  Gross per 24 hour   Intake 4391.25 ml   Output 1646 ml   Net 2745.25 ml      Physical Exam   Constitutional: He is oriented to person, place, and time. He appears well-developed and well-nourished.   HENT:   Head: Normocephalic and atraumatic.   Right Ear: External ear normal.   Left Ear: External ear normal.   Nose: Nose normal.   Eyes: Pupils are equal, round, and reactive to light. Conjunctivae, EOM and lids are normal.   Neck: Trachea normal, normal range of motion and full passive range of motion without pain. Neck supple. No tracheal deviation present. No thyromegaly present.   Cardiovascular: Normal rate, regular rhythm, S1 normal, S2 normal, normal heart sounds, intact distal pulses and normal pulses.   Mild tachycardia 110   Pulmonary/Chest: Effort normal. He has rales.   Abdominal: Soft. Normal aorta and bowel sounds are normal. He exhibits distension. There is tenderness. There is rebound. There is no guarding.   Tenderness left lower quadrant   Musculoskeletal: Normal range of motion.   Lymphadenopathy:      He has no cervical adenopathy.   Neurological: He is alert and oriented to person, place, and time. He has normal reflexes.   Skin: Skin is warm, dry and intact. Capillary refill takes less than 2 seconds.   Psychiatric: He has a normal mood and affect. His speech is normal and behavior is normal. Judgment and thought content normal. Cognition and memory are normal.   Nursing note and vitals reviewed.      Significant Labs:   Recent Lab Results       10/22/19  1532   10/22/19  0716   10/22/19  0437        Unit Blood Type Code   9500[P]          9500[P]       Unit Expiration   [id number][P]          [id number][P]       Unit Blood Type   O NEG[P]          O NEG[P]       Albumin 1.6   1.5     Alkaline Phosphatase 34   30     ALT 22   24     Anion Gap 8   10          10     AST 35   36     Baso #     0.03     Basophil%     0.1     BILIRUBIN TOTAL 2.2  Comment:  For infants and newborns, interpretation of results should be based  on gestational age, weight and in agreement with clinical  observations.  Premature Infant recommended reference ranges:  Up to 24 hours.............<8.0 mg/dL  Up to 48 hours............<12.0 mg/dL  3-5 days..................<15.0 mg/dL  6-29 days.................<15.0 mg/dL     1.5  Comment:  For infants and newborns, interpretation of results should be based  on gestational age, weight and in agreement with clinical  observations.  Premature Infant recommended reference ranges:  Up to 24 hours.............<8.0 mg/dL  Up to 48 hours............<12.0 mg/dL  3-5 days..................<15.0 mg/dL  6-29 days.................<15.0 mg/dL       BUN, Bld 8   7          7     Calcium 7.5   7.4          7.4     Chloride 112   112          112     CO2 25   23          23     CODING SYSTEM   [id number][P]          [id number][P]       Creatinine 0.6   0.9          0.9     Differential Method     Automated     DISPENSE STATUS   ISSUED[P]          ISSUED[P]       eGFR if  >60.0   >60.0           >60.0     eGFR if non  >60.0  Comment:  Calculation used to obtain the estimated glomerular filtration  rate (eGFR) is the CKD-EPI equation.      >60.0  Comment:  Calculation used to obtain the estimated glomerular filtration  rate (eGFR) is the CKD-EPI equation.             >60.0  Comment:  Calculation used to obtain the estimated glomerular filtration  rate (eGFR) is the CKD-EPI equation.        Eos #     0.0     Eosinophil%     0.0     Glucose 99   108          108     Gran # (ANC)     19.6     Gran%     84.6     Group & Rh   B NEG       Hematocrit 29.3   24.6          24.6     Hemoglobin 9.2   7.5          7.5     Immature Grans (Abs)     0.13  Comment:  Mild elevation in immature granulocytes is non specific and   can be seen in a variety of conditions including stress response,   acute inflammation, trauma and pregnancy. Correlation with other   laboratory and clinical findings is essential.       Immature Granulocytes     0.6     Indirect Yamileth GEL   NEG       Lymph #     1.9     Lymph%     8.2     MCH 27.9   28.2          28.2     MCHC 31.4   30.5          30.5     MCV 89   93          93     Mono #     1.5     Mono%     6.5     MPV 8.8   8.9          8.9     nRBC     0     Platelets 407   500          500     Potassium 3.5   3.7          3.7     Product Code   [id number][P]          [id number][P]       PROTEIN TOTAL 4.0   3.9     RBC 3.30   2.66          2.66     RDW 18.9   19.9          19.9     Sodium 145   145          145     UNIT NUMBER   [id number][P]          [id number][P]       WBC 21.51   23.17          23.17         All pertinent labs within the past 24 hours have been reviewed.    Significant Imaging: I have reviewed and interpreted all pertinent imaging results/findings within the past 24 hours.      Assessment/Plan:      * Large bowel obstruction  IV fluids IV potassium NPO KUB in lab in a.m. monitor closely.  Surgical consultation will be seen in a.m. by Dr. ONEIDA Pemberton  surgery seen the patient in the clinic on 10/03.  10/20/2019.  Extended conversation with Dr. Buck concerning the patient's finding rectal mass bowel obstruction.  Correction of electrolytes IV fluid IV antibiotics NPO fleets enema today x3.  Colonoscope in a.m..  CEA is been ordered.    10/21/2019  1.  Treat today based on findings of colonoscopy or endoscopy.  2.  Continue current antibiotic treatment  3.  Follow surgery recommendations after final diagnosis made.  4.  Treat otherwise as needed based on clinical course  5.  Repeat labs in the a.m. and replete potassium as needed    10/22/2019:  1.  Stable postop day 1 with positive bowel sounds.  2.  We will replace calcium  3.  Repeat labs in the a.m. to include CBC and CMP magnesium  4.  Follow-up otherwise as clinically indicated based clinical course.    Intestinal obstruction  Patient now postop day 1 from bowel resection.  Stable postop day 1 doing well and postoperative course at this time        VTE Risk Mitigation (From admission, onward)         Ordered     heparin (porcine) injection 5,000 Units  Every 8 hours      10/21/19 1753     Place sequential compression device  Until discontinued      10/21/19 1753     IP VTE LOW RISK PATIENT  Once      10/19/19 1752     Place MAE hose  Until discontinued      10/19/19 1752                      Lencho Kumar MD  Department of Hospital Medicine   [facility]

## 2019-10-23 NOTE — ASSESSMENT & PLAN NOTE
IV fluids IV potassium NPO KUB in lab in a.m. monitor closely.  Surgical consultation will be seen in a.m. by Dr. MOHAMUD General surgery seen the patient in the clinic on 10/03.  10/20/2019.  Extended conversation with Dr. Buck concerning the patient's finding rectal mass bowel obstruction.  Correction of electrolytes IV fluid IV antibiotics NPO fleets enema today x3.  Colonoscope in a.m..  CEA is been ordered.    10/21/2019  1.  Treat today based on findings of colonoscopy or endoscopy.  2.  Continue current antibiotic treatment  3.  Follow surgery recommendations after final diagnosis made.  4.  Treat otherwise as needed based on clinical course  5.  Repeat labs in the a.m. and replete potassium as needed    10/22/2019:  1.  Stable postop day 1 with positive bowel sounds.  2.  We will replace calcium  3.  Repeat labs in the a.m. to include CBC and CMP magnesium  4.  Follow-up otherwise as clinically indicated based clinical course.

## 2019-10-23 NOTE — PROGRESS NOTES
[facility] - ICU  General Surgery  Daily Note    Patient Name: Miguel Angel Apple Sr.  MRN: [id number]  Admission Date: 10/19/2019  Attending Physician: Hima Mccoy MD   Consult Physician: Jose Lazo MD  Primary Care Provider: Primary Doctor No    Subjective:     Principle Problem: Large bowel obstruction    Last 24 hour history:  10/22/19  Afebrile since surgery. Mild tachycardia.  Blood pressure stable.  Good urine output with greater than 40 cc/hour.  CVP is ranged from 8-12.  Ostomy pink and functional.  NG tube output minimal.  No nausea vomiting.  No new issues or complaints this morning.    10/23/19  Afebrile.  Mild tachycardia.  Stable bp.  Good UOP.  NGT 75cc bilious output.  Ostomy pink and function LLQ, Stool in bag.  No nausea or vomiting.  Patient with slight increased pain this morning.  No new other issues or complaints.    Objective:     Vital Signs (Most Recent):  Temp: 98.1 °F (36.7 °C) (10/23/19 0700)  Pulse: (!) 123 (10/23/19 0808)  Resp: 13 (10/23/19 0808)  BP: 130/65 (10/23/19 0700)  SpO2: 95 % (10/23/19 0808) Vital Signs (24h Range):  Temp:  [97.9 °F (36.6 °C)-99.1 °F (37.3 °C)] 98.1 °F (36.7 °C)  Pulse:  [112-130] 123  Resp:  [10-34] 13  SpO2:  [94 %-98 %] 95 %  BP: ()/() 130/65     Intake/Output last 24 hours:    Intake/Output Summary (Last 24 hours) at 10/23/2019 1005  Last data filed at 10/23/2019 0553  Gross per 24 hour   Intake 4351.67 ml   Output 1810 ml   Net 2541.67 ml       I/O last 3 completed shifts:  In: 7203.8 [I.V.:4116.7; Blood:687.1; IV Piggyback:2400]  Out: 2781 [Urine:2031; Drains:275; Stool:475]  No intake/output data recorded.    Weight: 69.7 kg (153 lb 10.6 oz)  Body mass index is 22.69 kg/m².    Gen: Wd Wn male currently in NAD  Heent: Nc/At, MMM, NG tube in place with bilious output.  Eyes: Perrl, Eomi  Cv: RRR, no  M/g/r  Lung: Non-labored breathing, clear bilaterally  Abd: Soft, tenderness to be expected postsurgical.   Mild distention.  Ostomy pink patent and left lower quadrant.  Mucous fistula in place with red rubber intact.    Significant Labs:  CBC:   Recent Labs   Lab 10/23/19  0500   WBC 19.80*   RBC 3.16*   HGB 8.9*   HCT 28.0*   *   MCV 89   MCH 28.2   MCHC 31.8*     BMP:   Recent Labs   Lab 10/23/19  0500   GLU 75      K 3.1*      CO2 27   BUN 7*   CREATININE 0.6   CALCIUM 7.9*   MG 1.7     CMP:   Recent Labs   Lab 10/23/19  0500   GLU 75   CALCIUM 7.9*   ALBUMIN 1.6*   PROT 4.5*      K 3.1*   CO2 27      BUN 7*   CREATININE 0.6   ALKPHOS 38*   ALT 21   AST 27   BILITOT 1.2*     LFTs:   Recent Labs   Lab 10/23/19  0500   ALT 21   AST 27   ALKPHOS 38*   BILITOT 1.2*   PROT 4.5*   ALBUMIN 1.6*     Coagulation: No results for input(s): LABPROT, INR, APTT in the last 168 hours.  Specimen (12h ago, onward)    None        Recent Labs   Lab 10/19/19  1716   COLORU Yellow   SPECGRAV 1.015   PHUR 7.0   PROTEINUA 2+*   BACTERIA Few*   NITRITE Negative   LEUKOCYTESUR Negative   UROBILINOGEN Negative   HYALINECASTS 0       Cultures:    Microbiology Results (last 7 days)     Procedure Component Value Units Date/Time    Blood culture x two cultures. Draw prior to antibiotics. [[id number]] Collected:  10/19/19 1505    Order Status:  Completed Specimen:  Blood from Antecubital, Right  Arm Updated:  10/22/19 2212     Blood Culture, Routine No Growth to date      No Growth to date      No Growth to date      No Growth to date    Narrative:       Aerobic and anaerobic    Blood culture x two cultures. Draw prior to antibiotics. [[id number]] Collected:  10/19/19 150    Order Status:  Completed Specimen:  Blood from Antecubital, Left  Arm Updated:  10/22/19 2212     Blood Culture, Routine No Growth to date      No Growth to date      No Growth to date      No Growth to date    Narrative:       Aerobic and anaerobic          Assessment:   Miguel Angel Apple Sr. is a 63 y.o. male who presents with Large bowel  obstruction.    Active Diagnoses:    Diagnosis Date Noted POA    PRINCIPAL PROBLEM:  Large bowel obstruction [K56.609] 10/19/2019 Yes    Diarrhea [R19.7] 10/21/2019 Yes    Rectal mass [K62.89] 10/20/2019 Yes    Hypokalemia [E87.6] 10/20/2019 Yes    Intestinal obstruction [K56.609]  Yes    Colitis [K52.9] 10/19/2019 Yes      Problems Resolved During this Admission:     VTE Risk Mitigation (From admission, onward)         Ordered     heparin (porcine) injection 5,000 Units  Every 8 hours      10/21/19 1753     Place sequential compression device  Until discontinued      10/21/19 1753     IP VTE LOW RISK PATIENT  Once      10/19/19 1752     Place MAE hose  Until discontinued      10/19/19 1752                Medical Decision Making/Plan:  Satisfactory postoperative recovery thus far.  Good urine output.  Afebrile.  Monitor tachycardia. Consider B blocker after change of pain medications if continued tachycardia, however will defer to the medical team.  Continue DVT prophylaxis.  Continue GI prophylaxis.  Continue NG tube decompression with NPO.  Repeat labs tomorrow am.  Continue IV antibiotics given the perforated nature of the colon.  Further management patient depend upon his clinical course.    Jose Lazo MD  General Surgery  [facility] - ICU

## 2019-10-23 NOTE — SUBJECTIVE & OBJECTIVE
Interval History:     Review of Systems   Constitutional: Positive for activity change, appetite change and unexpected weight change. Negative for chills, diaphoresis, fatigue and fever.   HENT: Negative for congestion, drooling, hearing loss and trouble swallowing.    Eyes: Negative for pain and visual disturbance.   Respiratory: Negative.  Negative for apnea, cough, choking, chest tightness, shortness of breath and wheezing.    Cardiovascular: Negative for chest pain, palpitations and leg swelling.        Increased heart rate   Gastrointestinal: Positive for abdominal distention and abdominal pain. Negative for blood in stool, constipation, diarrhea, nausea and vomiting.        Tenderness left lower quadrant   Endocrine: Negative for polydipsia, polyphagia and polyuria.   Genitourinary: Negative for difficulty urinating, dysuria and flank pain.   Musculoskeletal: Positive for arthralgias. Negative for back pain, gait problem, joint swelling, neck pain and neck stiffness.   Skin: Negative for color change, rash and wound.   Allergic/Immunologic: Negative for environmental allergies, food allergies and immunocompromised state.   Neurological: Positive for weakness. Negative for dizziness, syncope, numbness and headaches.   Hematological: Negative for adenopathy.   Psychiatric/Behavioral: Negative for agitation, confusion and sleep disturbance. The patient is nervous/anxious.      Objective:     Vital Signs (Most Recent):  Temp: 98.1 °F (36.7 °C) (10/23/19 0700)  Pulse: (!) 123 (10/23/19 0808)  Resp: 13 (10/23/19 0808)  BP: 130/65 (10/23/19 0700)  SpO2: 95 % (10/23/19 0808) Vital Signs (24h Range):  Temp:  [97.9 °F (36.6 °C)-99.1 °F (37.3 °C)] 98.1 °F (36.7 °C)  Pulse:  [112-130] 123  Resp:  [10-34] 13  SpO2:  [94 %-100 %] 95 %  BP: ()/() 130/65     Weight: 69.7 kg (153 lb 10.6 oz)  Body mass index is 22.69 kg/m².    Intake/Output Summary (Last 24 hours) at 10/23/2019 0848  Last data filed at 10/23/2019  0553  Gross per 24 hour   Intake 4601.67 ml   Output 1898 ml   Net 2703.67 ml      Physical Exam   Constitutional: He is oriented to person, place, and time. He appears well-developed and well-nourished.   HENT:   Head: Normocephalic and atraumatic.   Right Ear: External ear normal.   Left Ear: External ear normal.   Nose: Nose normal.   Eyes: Pupils are equal, round, and reactive to light. Conjunctivae, EOM and lids are normal.   Neck: Trachea normal, normal range of motion and full passive range of motion without pain. Neck supple. No tracheal deviation present. No thyromegaly present.   Cardiovascular: Normal rate, regular rhythm, S1 normal, S2 normal, normal heart sounds, intact distal pulses and normal pulses.   Mild tachycardia 110   Pulmonary/Chest: Effort normal. He has rales.   Abdominal: Soft. Normal aorta and bowel sounds are normal. He exhibits distension. There is tenderness. There is rebound. There is no guarding.   Tenderness left lower quadrant   Musculoskeletal: Normal range of motion.   Lymphadenopathy:     He has no cervical adenopathy.   Neurological: He is alert and oriented to person, place, and time. He has normal reflexes.   Skin: Skin is warm, dry and intact. Capillary refill takes less than 2 seconds.   Psychiatric: He has a normal mood and affect. His speech is normal and behavior is normal. Judgment and thought content normal. Cognition and memory are normal.   Nursing note and vitals reviewed.      Significant Labs:   Recent Lab Results       10/23/19  0500   10/22/19  1532        Albumin 1.6 1.6     Alkaline Phosphatase 38 34     ALT 21 22     Anion Gap 5 8     AST 27 35     Baso # 0.02       Basophil% 0.1       BILIRUBIN TOTAL 1.2  Comment:  For infants and newborns, interpretation of results should be based  on gestational age, weight and in agreement with clinical  observations.  Premature Infant recommended reference ranges:  Up to 24 hours.............<8.0 mg/dL  Up to 48  hours............<12.0 mg/dL  3-5 days..................<15.0 mg/dL  6-29 days.................<15.0 mg/dL   2.2  Comment:  For infants and newborns, interpretation of results should be based  on gestational age, weight and in agreement with clinical  observations.  Premature Infant recommended reference ranges:  Up to 24 hours.............<8.0 mg/dL  Up to 48 hours............<12.0 mg/dL  3-5 days..................<15.0 mg/dL  6-29 days.................<15.0 mg/dL       BUN, Bld 7 8     Calcium 7.9 7.5     Chloride 109 112     CO2 27 25     Creatinine 0.6 0.6     Differential Method Automated       eGFR if  >60.0 >60.0     eGFR if non  >60.0  Comment:  Calculation used to obtain the estimated glomerular filtration  rate (eGFR) is the CKD-EPI equation.    >60.0  Comment:  Calculation used to obtain the estimated glomerular filtration  rate (eGFR) is the CKD-EPI equation.        Eos # 0.0       Eosinophil% 0.1       Glucose 75 99     Gran # (ANC) 16.5       Gran% 83.5       Hematocrit 28.0 29.3     Hemoglobin 8.9 9.2     Immature Grans (Abs) 0.08  Comment:  Mild elevation in immature granulocytes is non specific and   can be seen in a variety of conditions including stress response,   acute inflammation, trauma and pregnancy. Correlation with other   laboratory and clinical findings is essential.         Immature Granulocytes 0.4       Lymph # 1.8       Lymph% 9.1       Magnesium 1.7       MCH 28.2 27.9     MCHC 31.8 31.4     MCV 89 89     Mono # 1.4       Mono% 6.8       MPV 9.6 8.8     nRBC 0       Platelets 396 407     Potassium 3.1 3.5     PROTEIN TOTAL 4.5 4.0     RBC 3.16 3.30     RDW 19.8 18.9     Sodium 141 145     WBC 19.80 21.51         All pertinent labs within the past 24 hours have been reviewed.    Significant Imaging: I have reviewed and interpreted all pertinent imaging results/findings within the past 24 hours.

## 2019-10-23 NOTE — NURSING
Central line dressing changed per protocol using sterile technique, hubs changed, all ports flushed with no difficulty, pt tolerated procedure with no difficulty

## 2019-10-23 NOTE — ASSESSMENT & PLAN NOTE
Patient now postop day 1 from bowel resection.  Stable postop day 1 doing well and postoperative course at this time

## 2019-10-24 LAB
ALBUMIN SERPL BCP-MCNC: 1.5 G/DL (ref 3.5–5.2)
ALP SERPL-CCNC: 38 U/L (ref 55–135)
ALT SERPL W/O P-5'-P-CCNC: 18 U/L (ref 10–44)
ANION GAP SERPL CALC-SCNC: 12 MMOL/L (ref 8–16)
AST SERPL-CCNC: 17 U/L (ref 10–40)
BACTERIA BLD CULT: NORMAL
BACTERIA BLD CULT: NORMAL
BASOPHILS # BLD AUTO: ABNORMAL K/UL (ref 0–0.2)
BASOPHILS NFR BLD: 0 % (ref 0–1.9)
BILIRUB SERPL-MCNC: 0.9 MG/DL (ref 0.1–1)
BUN SERPL-MCNC: 9 MG/DL (ref 8–23)
CALCIUM SERPL-MCNC: 8.2 MG/DL (ref 8.7–10.5)
CHLORIDE SERPL-SCNC: 110 MMOL/L (ref 95–110)
CO2 SERPL-SCNC: 24 MMOL/L (ref 23–29)
CREAT SERPL-MCNC: 0.6 MG/DL (ref 0.5–1.4)
DIFFERENTIAL METHOD: ABNORMAL
EOSINOPHIL # BLD AUTO: ABNORMAL K/UL (ref 0–0.5)
EOSINOPHIL NFR BLD: 0 % (ref 0–8)
ERYTHROCYTE [DISTWIDTH] IN BLOOD BY AUTOMATED COUNT: 19.9 % (ref 11.5–14.5)
EST. GFR  (AFRICAN AMERICAN): >60 ML/MIN/1.73 M^2
EST. GFR  (NON AFRICAN AMERICAN): >60 ML/MIN/1.73 M^2
GLUCOSE SERPL-MCNC: 66 MG/DL (ref 70–110)
HCT VFR BLD AUTO: 27.7 % (ref 40–54)
HGB BLD-MCNC: 8.7 G/DL (ref 14–18)
IMM GRANULOCYTES # BLD AUTO: ABNORMAL K/UL (ref 0–0.04)
IMM GRANULOCYTES NFR BLD AUTO: ABNORMAL % (ref 0–0.5)
LYMPHOCYTES # BLD AUTO: ABNORMAL K/UL (ref 1–4.8)
LYMPHOCYTES NFR BLD: 17 % (ref 18–48)
MAGNESIUM SERPL-MCNC: 1.9 MG/DL (ref 1.6–2.6)
MCH RBC QN AUTO: 28.3 PG (ref 27–31)
MCHC RBC AUTO-ENTMCNC: 31.4 G/DL (ref 32–36)
MCV RBC AUTO: 90 FL (ref 82–98)
MONOCYTES # BLD AUTO: ABNORMAL K/UL (ref 0.3–1)
MONOCYTES NFR BLD: 0 % (ref 4–15)
NEUTROPHILS NFR BLD: 76 % (ref 38–73)
NEUTS BAND NFR BLD MANUAL: 7 %
NRBC BLD-RTO: 0 /100 WBC
PHOSPHATE SERPL-MCNC: 4 MG/DL (ref 2.7–4.5)
PLATELET # BLD AUTO: 373 K/UL (ref 150–350)
PMV BLD AUTO: 9.8 FL (ref 9.2–12.9)
POTASSIUM SERPL-SCNC: 3.5 MMOL/L (ref 3.5–5.1)
PROT SERPL-MCNC: 4.5 G/DL (ref 6–8.4)
RBC # BLD AUTO: 3.07 M/UL (ref 4.6–6.2)
SODIUM SERPL-SCNC: 146 MMOL/L (ref 136–145)
WBC # BLD AUTO: 15.93 K/UL (ref 3.9–12.7)

## 2019-10-24 PROCEDURE — 25000003 PHARM REV CODE 250: Performed by: SURGERY

## 2019-10-24 PROCEDURE — 63600175 PHARM REV CODE 636 W HCPCS: Performed by: INTERNAL MEDICINE

## 2019-10-24 PROCEDURE — S0030 INJECTION, METRONIDAZOLE: HCPCS | Performed by: SURGERY

## 2019-10-24 PROCEDURE — 83735 ASSAY OF MAGNESIUM: CPT

## 2019-10-24 PROCEDURE — 20000000 HC ICU ROOM

## 2019-10-24 PROCEDURE — 94799 UNLISTED PULMONARY SVC/PX: CPT

## 2019-10-24 PROCEDURE — 63600175 PHARM REV CODE 636 W HCPCS: Performed by: SURGERY

## 2019-10-24 PROCEDURE — 99233 PR SUBSEQUENT HOSPITAL CARE,LEVL III: ICD-10-PCS | Mod: ,,, | Performed by: INTERNAL MEDICINE

## 2019-10-24 PROCEDURE — 25000003 PHARM REV CODE 250: Performed by: INTERNAL MEDICINE

## 2019-10-24 PROCEDURE — C9113 INJ PANTOPRAZOLE SODIUM, VIA: HCPCS | Performed by: SURGERY

## 2019-10-24 PROCEDURE — 85025 COMPLETE CBC W/AUTO DIFF WBC: CPT

## 2019-10-24 PROCEDURE — 27000221 HC OXYGEN, UP TO 24 HOURS

## 2019-10-24 PROCEDURE — 99233 SBSQ HOSP IP/OBS HIGH 50: CPT | Mod: ,,, | Performed by: INTERNAL MEDICINE

## 2019-10-24 PROCEDURE — 84100 ASSAY OF PHOSPHORUS: CPT

## 2019-10-24 PROCEDURE — 80053 COMPREHEN METABOLIC PANEL: CPT

## 2019-10-24 PROCEDURE — 94761 N-INVAS EAR/PLS OXIMETRY MLT: CPT

## 2019-10-24 RX ORDER — METOPROLOL TARTRATE 1 MG/ML
10 INJECTION, SOLUTION INTRAVENOUS EVERY 6 HOURS
Status: DISCONTINUED | OUTPATIENT
Start: 2019-10-24 | End: 2019-10-25

## 2019-10-24 RX ADMIN — SODIUM CHLORIDE, SODIUM LACTATE, POTASSIUM CHLORIDE, AND CALCIUM CHLORIDE: .6; .31; .03; .02 INJECTION, SOLUTION INTRAVENOUS at 01:10

## 2019-10-24 RX ADMIN — HYDROMORPHONE HYDROCHLORIDE 1 MG: 2 INJECTION, SOLUTION INTRAMUSCULAR; INTRAVENOUS; SUBCUTANEOUS at 09:10

## 2019-10-24 RX ADMIN — METRONIDAZOLE 500 MG: 500 INJECTION, SOLUTION INTRAVENOUS at 06:10

## 2019-10-24 RX ADMIN — HEPARIN SODIUM 5000 UNITS: 5000 INJECTION, SOLUTION INTRAVENOUS; SUBCUTANEOUS at 01:10

## 2019-10-24 RX ADMIN — METOPROLOL TARTRATE 10 MG: 5 INJECTION INTRAVENOUS at 06:10

## 2019-10-24 RX ADMIN — HEPARIN SODIUM 5000 UNITS: 5000 INJECTION, SOLUTION INTRAVENOUS; SUBCUTANEOUS at 05:10

## 2019-10-24 RX ADMIN — METOPROLOL TARTRATE 10 MG: 5 INJECTION INTRAVENOUS at 11:10

## 2019-10-24 RX ADMIN — ERTAPENEM SODIUM 1 G: 1 INJECTION, POWDER, LYOPHILIZED, FOR SOLUTION INTRAMUSCULAR; INTRAVENOUS at 09:10

## 2019-10-24 RX ADMIN — KETOROLAC TROMETHAMINE 15 MG: 30 INJECTION, SOLUTION INTRAMUSCULAR at 05:10

## 2019-10-24 RX ADMIN — HYDROMORPHONE HYDROCHLORIDE 1 MG: 2 INJECTION, SOLUTION INTRAMUSCULAR; INTRAVENOUS; SUBCUTANEOUS at 04:10

## 2019-10-24 RX ADMIN — HYDROMORPHONE HYDROCHLORIDE 1 MG: 2 INJECTION, SOLUTION INTRAMUSCULAR; INTRAVENOUS; SUBCUTANEOUS at 01:10

## 2019-10-24 RX ADMIN — MORPHINE SULFATE 4 MG: 4 INJECTION, SOLUTION INTRAMUSCULAR; INTRAVENOUS at 01:10

## 2019-10-24 RX ADMIN — KETOROLAC TROMETHAMINE 15 MG: 30 INJECTION, SOLUTION INTRAMUSCULAR at 01:10

## 2019-10-24 RX ADMIN — HEPARIN SODIUM 5000 UNITS: 5000 INJECTION, SOLUTION INTRAVENOUS; SUBCUTANEOUS at 09:10

## 2019-10-24 RX ADMIN — MORPHINE SULFATE 4 MG: 4 INJECTION, SOLUTION INTRAMUSCULAR; INTRAVENOUS at 05:10

## 2019-10-24 RX ADMIN — METOPROLOL TARTRATE 10 MG: 5 INJECTION INTRAVENOUS at 01:10

## 2019-10-24 RX ADMIN — KETOROLAC TROMETHAMINE 15 MG: 30 INJECTION, SOLUTION INTRAMUSCULAR at 11:10

## 2019-10-24 RX ADMIN — KETOROLAC TROMETHAMINE 15 MG: 30 INJECTION, SOLUTION INTRAMUSCULAR at 06:10

## 2019-10-24 RX ADMIN — METRONIDAZOLE 500 MG: 500 INJECTION, SOLUTION INTRAVENOUS at 09:10

## 2019-10-24 RX ADMIN — METRONIDAZOLE 500 MG: 500 INJECTION, SOLUTION INTRAVENOUS at 01:10

## 2019-10-24 RX ADMIN — PANTOPRAZOLE SODIUM 40 MG: 40 INJECTION, POWDER, LYOPHILIZED, FOR SOLUTION INTRAVENOUS at 08:10

## 2019-10-24 NOTE — PROGRESS NOTES
[facility] - ICU Hospital Medicine  Progress Note    Patient Name: Miguel Angel Apple Sr.  MRN: [id number]  Patient Class: IP- Inpatient   Admission Date: 10/19/2019  Length of Stay: 5 days  Attending Physician: Hima Mccoy MD  Primary Care Provider: Primary Doctor No        Subjective:     Principal Problem:Large bowel obstruction        HPI:  Patient's history began approximately 3 months ago.  He presented to the hospital well over a month ago given a diagnosis of he understands colitis.  Treated with antibiotics IV fluids.  He saw Dr. ONEIDA charles on 10/03 placed on Cipro and Flagyl.  His on once completed that dose.  This past Sunday he started noticing that he was becoming  bloated and stopped eating solids at that time went back to liquids.  His had no pain pills for 2-3 days.  No nausea and vomiting unless he ate something that he knew he should the.  He attempted to make himself vomit yesterday and could not.  He has had small bowel movements to continue since that time.  CT scan shows colonic dilatation with the cecum being 10 cm.  The possible obstruction may be low left-sided.  There is even suggesting possibility have a rectal mass.  WBC remained 16,000 with a left shift.  Patient's potassium is 2.2.    Overview/Hospital Course:  IV fluids IV potassium NPO Surgical consultation repeat KUB and lab in a.m. further clinical decisions depending on clinical course.  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  The patient has had no prior abdominal surgery and no medical history prior to approximately 3 months ago.  10/20/2019.  Long discussion with the patient this morning concerning the findings.  Dr. ONEIDA charles see the patient today since he has been involved in his care on 10/03.  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  Patient continue IV fluids IV antibiotics potassium replacement today and will have 3 Fleet's enemas today.   CEA is been ordered.    10/21/2019:  Patient is comfortable in room although it does appear quite anxious.  The patient will be having EGD and colonoscopy later today.  Based on the findings there the patient will may need undergo surgery.  Patient denies any pain and states that he was not throwing up and having no fever or chills.  Patient is distended and has some abdominal pain associated.  Labs showed [place] 8[address] hemoglobin hematocrit 9.8 and 31 sodium 145 potassium 2.4 chloride 108 bicarb 21 and alk-phos was 49    10/22/2019:  Patient is stable postop day 1.  Patient states pain is well controlled.  Patient mildly tachycardic blood pressure 115/61 979 O2 sats 100%.  Labs show white blood cell count 21.5 hemoglobin 9.2 hematocrit 29.3 platelets 407 and BUN creatinine were 8 and 0.6 calcium 7.5 magnesium 1.4.  Patient is in good spirits and bowel sounds are present.    10/23/2019:  Postop day 2.:  Patient is having much more abdominal pain with waves of pain that appeared to be related to air movement in the bowel versus bowel regaining function.  Patient is alert and oriented and is mildly tachycardic with pulse 123, blood pressure 130/65 O2 sat 95%.  Labs show a improved white count of today of 19,000 hemoglobin 8.9 hematocrit 28 platelets 396 and sodium 141 potassium 3.1 albumin 1.6 and liver functions within normal ranges.  GFR greater than 60    Interval History:     Review of Systems   Constitutional: Positive for activity change, appetite change and unexpected weight change. Negative for chills, diaphoresis, fatigue and fever.   HENT: Negative for congestion, drooling, hearing loss and trouble swallowing.    Eyes: Negative for pain and visual disturbance.   Respiratory: Negative.  Negative for apnea, cough, choking, chest tightness, shortness of breath and wheezing.    Cardiovascular: Negative for chest pain, palpitations and leg swelling.        Increased heart rate    Gastrointestinal: Positive for abdominal distention and abdominal pain. Negative for blood in stool, constipation, diarrhea, nausea and vomiting.        Tenderness left lower quadrant   Endocrine: Negative for polydipsia, polyphagia and polyuria.   Genitourinary: Negative for difficulty urinating, dysuria and flank pain.   Musculoskeletal: Positive for arthralgias. Negative for back pain, gait problem, joint swelling, neck pain and neck stiffness.   Skin: Negative for color change, rash and wound.   Allergic/Immunologic: Negative for environmental allergies, food allergies and immunocompromised state.   Neurological: Positive for weakness. Negative for dizziness, syncope, numbness and headaches.   Hematological: Negative for adenopathy.   Psychiatric/Behavioral: Negative for agitation, confusion and sleep disturbance. The patient is nervous/anxious.      Objective:     Vital Signs (Most Recent):  Temp: 98.1 °F (36.7 °C) (10/23/19 0700)  Pulse: (!) 123 (10/23/19 0808)  Resp: 13 (10/23/19 0808)  BP: 130/65 (10/23/19 0700)  SpO2: 95 % (10/23/19 0808) Vital Signs (24h Range):  Temp:  [97.9 °F (36.6 °C)-99.1 °F (37.3 °C)] 98.1 °F (36.7 °C)  Pulse:  [112-130] 123  Resp:  [10-34] 13  SpO2:  [94 %-100 %] 95 %  BP: ()/() 130/65     Weight: 69.7 kg (153 lb 10.6 oz)  Body mass index is 22.69 kg/m².    Intake/Output Summary (Last 24 hours) at 10/23/2019 0848  Last data filed at 10/23/2019 0553  Gross per 24 hour   Intake 4601.67 ml   Output 1898 ml   Net 2703.67 ml      Physical Exam   Constitutional: He is oriented to person, place, and time. He appears well-developed and well-nourished.   HENT:   Head: Normocephalic and atraumatic.   Right Ear: External ear normal.   Left Ear: External ear normal.   Nose: Nose normal.   Eyes: Pupils are equal, round, and reactive to light. Conjunctivae, EOM and lids are normal.   Neck: Trachea normal, normal range of motion and full passive range of motion without pain. Neck  supple. No tracheal deviation present. No thyromegaly present.   Cardiovascular: Normal rate, regular rhythm, S1 normal, S2 normal, normal heart sounds, intact distal pulses and normal pulses.   Mild tachycardia 110   Pulmonary/Chest: Effort normal. He has rales.   Abdominal: Soft. Normal aorta and bowel sounds are normal. He exhibits distension. There is tenderness. There is rebound. There is no guarding.   Tenderness left lower quadrant   Musculoskeletal: Normal range of motion.   Lymphadenopathy:     He has no cervical adenopathy.   Neurological: He is alert and oriented to person, place, and time. He has normal reflexes.   Skin: Skin is warm, dry and intact. Capillary refill takes less than 2 seconds.   Psychiatric: He has a normal mood and affect. His speech is normal and behavior is normal. Judgment and thought content normal. Cognition and memory are normal.   Nursing note and vitals reviewed.      Significant Labs:   Recent Lab Results       10/23/19  0500   10/22/19  1532        Albumin 1.6 1.6     Alkaline Phosphatase 38 34     ALT 21 22     Anion Gap 5 8     AST 27 35     Baso # 0.02       Basophil% 0.1       BILIRUBIN TOTAL 1.2  Comment:  For infants and newborns, interpretation of results should be based  on gestational age, weight and in agreement with clinical  observations.  Premature Infant recommended reference ranges:  Up to 24 hours.............<8.0 mg/dL  Up to 48 hours............<12.0 mg/dL  3-5 days..................<15.0 mg/dL  6-29 days.................<15.0 mg/dL   2.2  Comment:  For infants and newborns, interpretation of results should be based  on gestational age, weight and in agreement with clinical  observations.  Premature Infant recommended reference ranges:  Up to 24 hours.............<8.0 mg/dL  Up to 48 hours............<12.0 mg/dL  3-5 days..................<15.0 mg/dL  6-29 days.................<15.0 mg/dL       BUN, Bld 7 8     Calcium 7.9 7.5     Chloride 109 112     CO2 27  25     Creatinine 0.6 0.6     Differential Method Automated       eGFR if  >60.0 >60.0     eGFR if non  >60.0  Comment:  Calculation used to obtain the estimated glomerular filtration  rate (eGFR) is the CKD-EPI equation.    >60.0  Comment:  Calculation used to obtain the estimated glomerular filtration  rate (eGFR) is the CKD-EPI equation.        Eos # 0.0       Eosinophil% 0.1       Glucose 75 99     Gran # (ANC) 16.5       Gran% 83.5       Hematocrit 28.0 29.3     Hemoglobin 8.9 9.2     Immature Grans (Abs) 0.08  Comment:  Mild elevation in immature granulocytes is non specific and   can be seen in a variety of conditions including stress response,   acute inflammation, trauma and pregnancy. Correlation with other   laboratory and clinical findings is essential.         Immature Granulocytes 0.4       Lymph # 1.8       Lymph% 9.1       Magnesium 1.7       MCH 28.2 27.9     MCHC 31.8 31.4     MCV 89 89     Mono # 1.4       Mono% 6.8       MPV 9.6 8.8     nRBC 0       Platelets 396 407     Potassium 3.1 3.5     PROTEIN TOTAL 4.5 4.0     RBC 3.16 3.30     RDW 19.8 18.9     Sodium 141 145     WBC 19.80 21.51         All pertinent labs within the past 24 hours have been reviewed.    Significant Imaging: I have reviewed and interpreted all pertinent imaging results/findings within the past 24 hours.      Assessment/Plan:      * Large bowel obstruction  IV fluids IV potassium NPO KUB in lab in a.m. monitor closely.  Surgical consultation will be seen in a.m. by Dr. MOHAMUD General surgery seen the patient in the clinic on 10/03.  10/20/2019.  Extended conversation with Dr. Buck concerning the patient's finding rectal mass bowel obstruction.  Correction of electrolytes IV fluid IV antibiotics NPO fleets enema today x3.  Colonoscope in a.m..  CEA is been ordered.    10/21/2019  1.  Treat today based on findings of colonoscopy or endoscopy.  2.  Continue current antibiotic treatment  3.   Follow surgery recommendations after final diagnosis made.  4.  Treat otherwise as needed based on clinical course  5.  Repeat labs in the a.m. and replete potassium as needed    10/22/2019:  1.  Stable postop day 1 with positive bowel sounds.  2.  We will replace calcium  3.  Repeat labs in the a.m. to include CBC and CMP magnesium  4.  Follow-up otherwise as clinically indicated based clinical course.    10/23/2019:  1.  Continue current antibiotic therapy.  2.  Give will blood bolus of 500 cc x1  3.  Change pain medication to Dilaudid 1 mg every 2 hr as needed for pain  4.  A feel tachycardia may be related to pain versus is mildly fluid depleted.  5.  Replace electrolytes as needed.  6.  Intake and output revealed a positive 2500 cc sore +overnight    Intestinal obstruction  Patient now postop day 1 from bowel resection.  Stable postop day 1 doing well and postoperative course at this time        VTE Risk Mitigation (From admission, onward)         Ordered     heparin (porcine) injection 5,000 Units  Every 8 hours      10/21/19 1753     Place sequential compression device  Until discontinued      10/21/19 1753     IP VTE LOW RISK PATIENT  Once      10/19/19 1752     Place MAE hose  Until discontinued      10/19/19 1752                      Lencho Kumar MD  Department of Hospital Medicine   [facility]

## 2019-10-24 NOTE — PLAN OF CARE
10/24/19 1442   Discharge Reassessment   Assessment Type Discharge Planning Reassessment   Anticipated Discharge Disposition Home-Health   Do you have any problems affording any of your prescribed medications? TBD   Discharge Plan A Home with family   DME Needed Upon Discharge  none   Patient choice form signed by patient/caregiver N/A   Patient states he doesn't think he will need home health when discharged but spoke to his significant other who states she would like it for a few weeks until they are comfortable with the colostomy. He will call on Monday to get his Medicare number if it doesn't come in this weekend. Will enroll him in free colostomy samples. Will continue to follow for needs.

## 2019-10-24 NOTE — PROGRESS NOTES
[facility] - [facility]  General Surgery  Daily Note    Patient Name: Miguel Angel Apple Sr.  MRN: [id number]  Admission Date: 10/19/2019  Attending Physician: Hima Mccoy MD   Consult Physician: Jose Lazo MD  Primary Care Provider: Primary Doctor No    Subjective:     Principle Problem: Large bowel obstruction    Last 24 hour history:  10/22/19  Afebrile since surgery. Mild tachycardia.  Blood pressure stable.  Good urine output with greater than 40 cc/hour.  CVP is ranged from 8-12.  Ostomy pink and functional.  NG tube output minimal.  No nausea vomiting.  No new issues or complaints this morning.    10/23/19  Afebrile.  Mild tachycardia.  Stable bp.  Good UOP.  NGT 75cc bilious output.  Ostomy pink and function LLQ, Stool in bag.  No nausea or vomiting.  Patient with slight increased pain this morning.  No new other issues or complaints.    10/24/19  Afebrile.  Vital signs stable.  Ostomy pink functional left lower quadrant. NG tube with 70 cc bilious documented output.  No nausea vomiting.  Pain control much improved today.  No other new issues or complaints.    Objective:     Vital Signs (Most Recent):  Temp: 99.1 °F (37.3 °C) (10/24/19 0700)  Pulse: 93 (10/24/19 1500)  Resp: 12 (10/24/19 1500)  BP: 106/63 (10/24/19 1500)  SpO2: 97 % (10/24/19 1500) Vital Signs (24h Range):  Temp:  [97.8 °F (36.6 °C)-99.1 °F (37.3 °C)] 99.1 °F (37.3 °C)  Pulse:  [] 93  Resp:  [6-30] 12  SpO2:  [84 %-97 %] 97 %  BP: ()/(56-78) 106/63     Intake/Output last 24 hours:    Intake/Output Summary (Last 24 hours) at 10/24/2019 1715  Last data filed at 10/24/2019 0842  Gross per 24 hour   Intake 1950 ml   Output 1475 ml   Net 475 ml       I/O last 3 completed shifts:  In: 4812.5 [I.V.:4062.5; NG/GT:50; IV Piggyback:700]  Out: 2780 [Urine:2460; Drains:120; Stool:200]  I/O this shift:  In: -   Out: 150 [Urine:150]    Weight: 69.7 kg (153 lb 10.6 oz)  Body mass index is 22.69 kg/m².    Gen: Sylvia Wn  male currently in NAD  Heent: Nc/At, MMM, NG tube in place with bilious output.  Eyes: Perrl, Eomi  Cv: RRR, no  M/g/r  Lung: Non-labored breathing, clear bilaterally  Abd: Soft, tenderness to be expected postsurgical.  Mild distention.  Ostomy pink patent and left lower quadrant.  Mucous fistula in place with red rubber intact.    Significant Labs:  CBC:   Recent Labs   Lab 10/24/19  0456   WBC 15.93*   RBC 3.07*   HGB 8.7*   HCT 27.7*   *   MCV 90   MCH 28.3   MCHC 31.4*     BMP:   Recent Labs   Lab 10/24/19  0456   GLU 66*   *   K 3.5      CO2 24   BUN 9   CREATININE 0.6   CALCIUM 8.2*   MG 1.9     CMP:   Recent Labs   Lab 10/24/19  0456   GLU 66*   CALCIUM 8.2*   ALBUMIN 1.5*   PROT 4.5*   *   K 3.5   CO2 24      BUN 9   CREATININE 0.6   ALKPHOS 38*   ALT 18   AST 17   BILITOT 0.9     LFTs:   Recent Labs   Lab 10/24/19  0456   ALT 18   AST 17   ALKPHOS 38*   BILITOT 0.9   PROT 4.5*   ALBUMIN 1.5*     Coagulation: No results for input(s): LABPROT, INR, APTT in the last 168 hours.  Specimen (12h ago, onward)    None        Recent Labs   Lab 10/19/19  1716   COLORU Yellow   SPECGRAV 1.015   PHUR 7.0   PROTEINUA 2+*   BACTERIA Few*   NITRITE Negative   LEUKOCYTESUR Negative   UROBILINOGEN Negative   HYALINECASTS 0       Cultures:    Microbiology Results (last 7 days)     Procedure Component Value Units Date/Time    Blood culture x two cultures. Draw prior to antibiotics. [[id number]] Collected:  10/19/19 1505    Order Status:  Completed Specimen:  Blood from Antecubital, Right  Arm Updated:  10/23/19 2212     Blood Culture, Routine No Growth to date      No Growth to date      No Growth to date      No Growth to date      No Growth to date    Narrative:       Aerobic and anaerobic    Blood culture x two cultures. Draw prior to antibiotics. [[id number]] Collected:  10/19/19 1508    Order Status:  Completed Specimen:  Blood from Antecubital, Left  Arm Updated:  10/23/19 2212     Blood  Culture, Routine No Growth to date      No Growth to date      No Growth to date      No Growth to date      No Growth to date    Narrative:       Aerobic and anaerobic          Assessment:   Miguel Angel Apple Sr. is a 63 y.o. male who presents with Large bowel obstruction.    Active Diagnoses:    Diagnosis Date Noted POA    PRINCIPAL PROBLEM:  Large bowel obstruction [K56.609] 10/19/2019 Yes    Diarrhea [R19.7] 10/21/2019 Yes    Rectal mass [K62.89] 10/20/2019 Yes    Hypokalemia [E87.6] 10/20/2019 Yes    Intestinal obstruction [K56.609]  Yes    Colitis [K52.9] 10/19/2019 Yes      Problems Resolved During this Admission:     VTE Risk Mitigation (From admission, onward)         Ordered     heparin (porcine) injection 5,000 Units  Every 8 hours      10/21/19 1753     Place sequential compression device  Until discontinued      10/21/19 1753     IP VTE LOW RISK PATIENT  Once      10/19/19 1752     Place MAE hose  Until discontinued      10/19/19 1752                Medical Decision Making/Plan:  Satisfactory postoperative recovery thus far.  Good urine output.  Afebrile.  Monitor tachycardia. Consider B blocker after change of pain medications if continued tachycardia, however will defer to the medical team.  Continue DVT prophylaxis.  Continue GI prophylaxis.  Continue NG tube decompression with NPO.  Repeat labs tomorrow am.  Continue IV antibiotics given the perforated nature of the colon.  Further management patient depend upon his clinical course.    Jose Lazo MD  General Surgery  [facility]

## 2019-10-25 LAB
ALBUMIN SERPL BCP-MCNC: 1.4 G/DL (ref 3.5–5.2)
ALP SERPL-CCNC: 34 U/L (ref 55–135)
ALT SERPL W/O P-5'-P-CCNC: 15 U/L (ref 10–44)
ANION GAP SERPL CALC-SCNC: 14 MMOL/L (ref 8–16)
AST SERPL-CCNC: 14 U/L (ref 10–40)
BASOPHILS # BLD AUTO: 0.01 K/UL (ref 0–0.2)
BASOPHILS NFR BLD: 0.1 % (ref 0–1.9)
BILIRUB SERPL-MCNC: 1.4 MG/DL (ref 0.1–1)
BUN SERPL-MCNC: 9 MG/DL (ref 8–23)
CALCIUM SERPL-MCNC: 7.7 MG/DL (ref 8.7–10.5)
CALPROTECTIN STL-MCNT: 273 MCG/G
CHLORIDE SERPL-SCNC: 111 MMOL/L (ref 95–110)
CO2 SERPL-SCNC: 23 MMOL/L (ref 23–29)
CREAT SERPL-MCNC: 0.6 MG/DL (ref 0.5–1.4)
DIFFERENTIAL METHOD: ABNORMAL
EOSINOPHIL # BLD AUTO: 0.1 K/UL (ref 0–0.5)
EOSINOPHIL NFR BLD: 0.9 % (ref 0–8)
ERYTHROCYTE [DISTWIDTH] IN BLOOD BY AUTOMATED COUNT: 19.6 % (ref 11.5–14.5)
EST. GFR  (AFRICAN AMERICAN): >60 ML/MIN/1.73 M^2
EST. GFR  (NON AFRICAN AMERICAN): >60 ML/MIN/1.73 M^2
GLUCOSE SERPL-MCNC: 72 MG/DL (ref 70–110)
HCT VFR BLD AUTO: 26 % (ref 40–54)
HGB BLD-MCNC: 8.2 G/DL (ref 14–18)
IMM GRANULOCYTES # BLD AUTO: 0.03 K/UL (ref 0–0.04)
IMM GRANULOCYTES NFR BLD AUTO: 0.3 % (ref 0–0.5)
LYMPHOCYTES # BLD AUTO: 1.1 K/UL (ref 1–4.8)
LYMPHOCYTES NFR BLD: 10.2 % (ref 18–48)
MCH RBC QN AUTO: 28.8 PG (ref 27–31)
MCHC RBC AUTO-ENTMCNC: 31.5 G/DL (ref 32–36)
MCV RBC AUTO: 91 FL (ref 82–98)
MONOCYTES # BLD AUTO: 1.5 K/UL (ref 0.3–1)
MONOCYTES NFR BLD: 13.7 % (ref 4–15)
NEUTROPHILS # BLD AUTO: 7.9 K/UL (ref 1.8–7.7)
NEUTROPHILS NFR BLD: 74.8 % (ref 38–73)
NRBC BLD-RTO: 0 /100 WBC
PLATELET # BLD AUTO: 394 K/UL (ref 150–350)
PMV BLD AUTO: 9.6 FL (ref 9.2–12.9)
POTASSIUM SERPL-SCNC: 3.2 MMOL/L (ref 3.5–5.1)
PROT SERPL-MCNC: 4.3 G/DL (ref 6–8.4)
RBC # BLD AUTO: 2.85 M/UL (ref 4.6–6.2)
SODIUM SERPL-SCNC: 148 MMOL/L (ref 136–145)
WBC # BLD AUTO: 10.59 K/UL (ref 3.9–12.7)

## 2019-10-25 PROCEDURE — 27000221 HC OXYGEN, UP TO 24 HOURS

## 2019-10-25 PROCEDURE — 99233 SBSQ HOSP IP/OBS HIGH 50: CPT | Mod: ,,, | Performed by: INTERNAL MEDICINE

## 2019-10-25 PROCEDURE — 80053 COMPREHEN METABOLIC PANEL: CPT

## 2019-10-25 PROCEDURE — 25000003 PHARM REV CODE 250: Performed by: INTERNAL MEDICINE

## 2019-10-25 PROCEDURE — 20000000 HC ICU ROOM

## 2019-10-25 PROCEDURE — 25000242 PHARM REV CODE 250 ALT 637 W/ HCPCS: Performed by: INTERNAL MEDICINE

## 2019-10-25 PROCEDURE — C9113 INJ PANTOPRAZOLE SODIUM, VIA: HCPCS | Performed by: SURGERY

## 2019-10-25 PROCEDURE — 63600175 PHARM REV CODE 636 W HCPCS: Performed by: SURGERY

## 2019-10-25 PROCEDURE — 94761 N-INVAS EAR/PLS OXIMETRY MLT: CPT

## 2019-10-25 PROCEDURE — 63600175 PHARM REV CODE 636 W HCPCS: Performed by: INTERNAL MEDICINE

## 2019-10-25 PROCEDURE — 99233 PR SUBSEQUENT HOSPITAL CARE,LEVL III: ICD-10-PCS | Mod: ,,, | Performed by: INTERNAL MEDICINE

## 2019-10-25 PROCEDURE — 25000003 PHARM REV CODE 250: Performed by: SURGERY

## 2019-10-25 PROCEDURE — 94640 AIRWAY INHALATION TREATMENT: CPT

## 2019-10-25 PROCEDURE — 85025 COMPLETE CBC W/AUTO DIFF WBC: CPT

## 2019-10-25 PROCEDURE — S0030 INJECTION, METRONIDAZOLE: HCPCS | Performed by: SURGERY

## 2019-10-25 RX ORDER — LEVALBUTEROL 1.25 MG/.5ML
1.25 SOLUTION, CONCENTRATE RESPIRATORY (INHALATION) EVERY 8 HOURS
Status: DISCONTINUED | OUTPATIENT
Start: 2019-10-25 | End: 2019-11-05 | Stop reason: HOSPADM

## 2019-10-25 RX ORDER — IPRATROPIUM BROMIDE 0.5 MG/2.5ML
0.5 SOLUTION RESPIRATORY (INHALATION) EVERY 8 HOURS
Status: DISCONTINUED | OUTPATIENT
Start: 2019-10-26 | End: 2019-10-25

## 2019-10-25 RX ORDER — DEXTROSE MONOHYDRATE, SODIUM CHLORIDE, AND POTASSIUM CHLORIDE 50; 1.49; 4.5 G/1000ML; G/1000ML; G/1000ML
INJECTION, SOLUTION INTRAVENOUS CONTINUOUS
Status: DISCONTINUED | OUTPATIENT
Start: 2019-10-25 | End: 2019-10-26

## 2019-10-25 RX ORDER — POTASSIUM CHLORIDE 14.9 MG/ML
20 INJECTION INTRAVENOUS ONCE
Status: COMPLETED | OUTPATIENT
Start: 2019-10-25 | End: 2019-10-25

## 2019-10-25 RX ORDER — METOPROLOL TARTRATE 1 MG/ML
5 INJECTION, SOLUTION INTRAVENOUS EVERY 6 HOURS
Status: DISCONTINUED | OUTPATIENT
Start: 2019-10-25 | End: 2019-11-01

## 2019-10-25 RX ORDER — IPRATROPIUM BROMIDE 0.5 MG/2.5ML
0.5 SOLUTION RESPIRATORY (INHALATION) EVERY 8 HOURS
Status: DISCONTINUED | OUTPATIENT
Start: 2019-10-25 | End: 2019-11-05 | Stop reason: HOSPADM

## 2019-10-25 RX ADMIN — HEPARIN SODIUM 5000 UNITS: 5000 INJECTION, SOLUTION INTRAVENOUS; SUBCUTANEOUS at 05:10

## 2019-10-25 RX ADMIN — LEVALBUTEROL HYDROCHLORIDE 1.25 MG: 1.25 SOLUTION, CONCENTRATE RESPIRATORY (INHALATION) at 06:10

## 2019-10-25 RX ADMIN — ERTAPENEM SODIUM 1 G: 1 INJECTION, POWDER, LYOPHILIZED, FOR SOLUTION INTRAMUSCULAR; INTRAVENOUS at 09:10

## 2019-10-25 RX ADMIN — POTASSIUM CHLORIDE, DEXTROSE MONOHYDRATE AND SODIUM CHLORIDE: 150; 5; 450 INJECTION, SOLUTION INTRAVENOUS at 01:10

## 2019-10-25 RX ADMIN — IPRATROPIUM BROMIDE 0.5 MG: 0.5 SOLUTION RESPIRATORY (INHALATION) at 06:10

## 2019-10-25 RX ADMIN — KETOROLAC TROMETHAMINE 15 MG: 30 INJECTION, SOLUTION INTRAMUSCULAR at 11:10

## 2019-10-25 RX ADMIN — HYDROMORPHONE HYDROCHLORIDE 1 MG: 2 INJECTION, SOLUTION INTRAMUSCULAR; INTRAVENOUS; SUBCUTANEOUS at 04:10

## 2019-10-25 RX ADMIN — KETOROLAC TROMETHAMINE 15 MG: 30 INJECTION, SOLUTION INTRAMUSCULAR at 05:10

## 2019-10-25 RX ADMIN — METRONIDAZOLE 500 MG: 500 INJECTION, SOLUTION INTRAVENOUS at 09:10

## 2019-10-25 RX ADMIN — METRONIDAZOLE 500 MG: 500 INJECTION, SOLUTION INTRAVENOUS at 05:10

## 2019-10-25 RX ADMIN — METOPROLOL TARTRATE 5 MG: 5 INJECTION INTRAVENOUS at 05:10

## 2019-10-25 RX ADMIN — METRONIDAZOLE 500 MG: 500 INJECTION, SOLUTION INTRAVENOUS at 02:10

## 2019-10-25 RX ADMIN — HYDROMORPHONE HYDROCHLORIDE 1 MG: 2 INJECTION, SOLUTION INTRAMUSCULAR; INTRAVENOUS; SUBCUTANEOUS at 03:10

## 2019-10-25 RX ADMIN — POTASSIUM CHLORIDE 20 MEQ: 200 INJECTION, SOLUTION INTRAVENOUS at 09:10

## 2019-10-25 RX ADMIN — CALCIUM GLUCONATE 1000 MG: 98 INJECTION, SOLUTION INTRAVENOUS at 10:10

## 2019-10-25 RX ADMIN — HYDROMORPHONE HYDROCHLORIDE 1 MG: 2 INJECTION, SOLUTION INTRAMUSCULAR; INTRAVENOUS; SUBCUTANEOUS at 10:10

## 2019-10-25 RX ADMIN — METOPROLOL TARTRATE 10 MG: 5 INJECTION INTRAVENOUS at 05:10

## 2019-10-25 RX ADMIN — PANTOPRAZOLE SODIUM 40 MG: 40 INJECTION, POWDER, LYOPHILIZED, FOR SOLUTION INTRAVENOUS at 08:10

## 2019-10-25 RX ADMIN — HYDROMORPHONE HYDROCHLORIDE 1 MG: 2 INJECTION, SOLUTION INTRAMUSCULAR; INTRAVENOUS; SUBCUTANEOUS at 12:10

## 2019-10-25 RX ADMIN — HEPARIN SODIUM 5000 UNITS: 5000 INJECTION, SOLUTION INTRAVENOUS; SUBCUTANEOUS at 01:10

## 2019-10-25 RX ADMIN — HEPARIN SODIUM 5000 UNITS: 5000 INJECTION, SOLUTION INTRAVENOUS; SUBCUTANEOUS at 10:10

## 2019-10-25 RX ADMIN — HYDROMORPHONE HYDROCHLORIDE 1 MG: 2 INJECTION, SOLUTION INTRAMUSCULAR; INTRAVENOUS; SUBCUTANEOUS at 08:10

## 2019-10-25 NOTE — SUBJECTIVE & OBJECTIVE
Interval History:     Review of Systems   Constitutional: Positive for fatigue. Negative for activity change, appetite change and fever.   HENT: Negative for congestion, ear discharge, mouth sores, nosebleeds, rhinorrhea, sinus pressure, sinus pain and tinnitus.    Eyes: Negative.  Negative for pain, redness and itching.   Respiratory: Negative for apnea, cough, choking, chest tightness, shortness of breath, wheezing and stridor.    Cardiovascular: Negative for chest pain, palpitations and leg swelling.   Gastrointestinal: Positive for abdominal distention and abdominal pain. Negative for anal bleeding, blood in stool, constipation and diarrhea.   Endocrine: Negative.    Genitourinary: Negative for difficulty urinating, flank pain, frequency and urgency.   Musculoskeletal: Negative for arthralgias, back pain, gait problem and myalgias.   Skin: Negative for color change and pallor.   Allergic/Immunologic: Negative.    Neurological: Positive for weakness. Negative for dizziness, facial asymmetry, light-headedness and headaches.   Hematological: Negative for adenopathy. Does not bruise/bleed easily.   Psychiatric/Behavioral: The patient is nervous/anxious.      Objective:     Vital Signs (Most Recent):  Temp: 98 °F (36.7 °C) (10/25/19 0800)  Pulse: 103 (10/25/19 0800)  Resp: 20 (10/25/19 0800)  BP: (!) 129/94 (10/25/19 0800)  SpO2: (!) 94 % (10/25/19 0800) Vital Signs (24h Range):  Temp:  [97.9 °F (36.6 °C)-98.8 °F (37.1 °C)] 98 °F (36.7 °C)  Pulse:  [] 103  Resp:  [8-26] 20  SpO2:  [88 %-99 %] 94 %  BP: ()/(60-94) 129/94     Weight: 69.7 kg (153 lb 10.6 oz)  Body mass index is 22.69 kg/m².    Intake/Output Summary (Last 24 hours) at 10/25/2019 0920  Last data filed at 10/25/2019 0800  Gross per 24 hour   Intake 2938.33 ml   Output 900 ml   Net 2038.33 ml      Physical Exam   Constitutional: He is oriented to person, place, and time. He appears well-developed and well-nourished.   HENT:   Head:  Normocephalic and atraumatic.   Eyes: Pupils are equal, round, and reactive to light. EOM are normal.   Neck: Normal range of motion. Neck supple. No tracheal deviation present. No thyromegaly present.   Cardiovascular: Normal rate, regular rhythm, normal heart sounds and intact distal pulses.   Pulmonary/Chest: Effort normal and breath sounds normal.   Abdominal: Soft. Bowel sounds are normal. He exhibits no distension. There is tenderness. There is guarding. There is no rebound.   Bowel sounds are present and there is air in the colostomy bag.   Musculoskeletal: Normal range of motion.   Lymphadenopathy:     He has no cervical adenopathy.   Neurological: He is alert and oriented to person, place, and time.   Skin: Skin is warm and dry. Capillary refill takes less than 2 seconds.   Psychiatric: He has a normal mood and affect. His behavior is normal. Judgment and thought content normal.   Nursing note and vitals reviewed.      Significant Labs:   Recent Lab Results       10/25/19  0500        Albumin 1.4     Alkaline Phosphatase 34     ALT 15     Anion Gap 14     AST 14     Baso # 0.01     Basophil% 0.1     BILIRUBIN TOTAL 1.4  Comment:  For infants and newborns, interpretation of results should be based  on gestational age, weight and in agreement with clinical  observations.  Premature Infant recommended reference ranges:  Up to 24 hours.............<8.0 mg/dL  Up to 48 hours............<12.0 mg/dL  3-5 days..................<15.0 mg/dL  6-29 days.................<15.0 mg/dL       BUN, Bld 9     Calcium 7.7     Chloride 111     CO2 23     Creatinine 0.6     Differential Method Automated     eGFR if African American >60.0     eGFR if non  >60.0  Comment:  Calculation used to obtain the estimated glomerular filtration  rate (eGFR) is the CKD-EPI equation.        Eos # 0.1     Eosinophil% 0.9     Glucose 72     Gran # (ANC) 7.9     Gran% 74.8     Hematocrit 26.0     Hemoglobin 8.2     Immature Grans  (Abs) 0.03  Comment:  Mild elevation in immature granulocytes is non specific and   can be seen in a variety of conditions including stress response,   acute inflammation, trauma and pregnancy. Correlation with other   laboratory and clinical findings is essential.       Immature Granulocytes 0.3     Lymph # 1.1     Lymph% 10.2     MCH 28.8     MCHC 31.5     MCV 91     Mono # 1.5     Mono% 13.7     MPV 9.6     nRBC 0     Platelets 394     Potassium 3.2     PROTEIN TOTAL 4.3     RBC 2.85     RDW 19.6     Sodium 148     WBC 10.59         All pertinent labs within the past 24 hours have been reviewed.    Significant Imaging: I have reviewed and interpreted all pertinent imaging results/findings within the past 24 hours.

## 2019-10-25 NOTE — NURSING
No acute changes at this time. Pt. Cont. To tolerate bedside chair well. Call bell within reach. Will continue to closely monitor.

## 2019-10-25 NOTE — NURSING
No acute changes at this time. Pt. Appears to be resting in bed comfortably. Call bell within reach. Will continue to closely monitor.

## 2019-10-25 NOTE — PROGRESS NOTES
[facility] - ICU Hospital Medicine  Progress Note    Patient Name: Miguel Angel Apple Sr.  MRN: [id number]  Patient Class: IP- Inpatient   Admission Date: 10/19/2019  Length of Stay: 6 days  Attending Physician: Hima Mccoy MD  Primary Care Provider: Primary Doctor No        Subjective:     Principal Problem:Large bowel obstruction        HPI:  Patient's history began approximately 3 months ago.  He presented to the hospital well over a month ago given a diagnosis of he understands colitis.  Treated with antibiotics IV fluids.  He saw Dr. ONEIDA charles on 10/03 placed on Cipro and Flagyl.  His on once completed that dose.  This past Sunday he started noticing that he was becoming  bloated and stopped eating solids at that time went back to liquids.  His had no pain pills for 2-3 days.  No nausea and vomiting unless he ate something that he knew he should the.  He attempted to make himself vomit yesterday and could not.  He has had small bowel movements to continue since that time.  CT scan shows colonic dilatation with the cecum being 10 cm.  The possible obstruction may be low left-sided.  There is even suggesting possibility have a rectal mass.  WBC remained 16,000 with a left shift.  Patient's potassium is 2.2.    Overview/Hospital Course:  IV fluids IV potassium NPO Surgical consultation repeat KUB and lab in a.m. further clinical decisions depending on clinical course.  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  The patient has had no prior abdominal surgery and no medical history prior to approximately 3 months ago.  10/20/2019.  Long discussion with the patient this morning concerning the findings.  Dr. ONEIDA charles see the patient today since he has been involved in his care on 10/03.  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  Patient continue IV fluids IV antibiotics potassium replacement today and will have 3 Fleet's enemas today.   CEA is been ordered.    10/21/2019:  Patient is comfortable in room although it does appear quite anxious.  The patient will be having EGD and colonoscopy later today.  Based on the findings there the patient will may need undergo surgery.  Patient denies any pain and states that he was not throwing up and having no fever or chills.  Patient is distended and has some abdominal pain associated.  Labs showed [place] [address] hemoglobin hematocrit 9.8 and 31 sodium 145 potassium 2.4 chloride 108 bicarb 21 and alk-phos was 49    10/22/2019:  Patient is stable postop day 1.  Patient states pain is well controlled.  Patient mildly tachycardic blood pressure 115/61 979 O2 sats 100%.  Labs show white blood cell count 21.5 hemoglobin 9.2 hematocrit 29.3 platelets 407 and BUN creatinine were 8 and 0.6 calcium 7.5 magnesium 1.4.  Patient is in good spirits and bowel sounds are present.    10/23/2019:  Postop day 2.:  Patient is having much more abdominal pain with waves of pain that appeared to be related to air movement in the bowel versus bowel regaining function.  Patient is alert and oriented and is mildly tachycardic with pulse 123, blood pressure 130/65 O2 sat 95%.  Labs show a improved white count of today of 19,000 hemoglobin 8.9 hematocrit 28 platelets 396 and sodium 141 potassium 3.1 albumin 1.6 and liver functions within normal ranges.  GFR greater than 60    10/24/2019:  Patient is stable and improving postop day 2 .  Colostomy bag has some gas and patient bowel sounds are very active.  Intake was 02/30/2027 output was 1745 and a net gain of 1.4 L. labs showed a white blood cell count 15.9 hemoglobin 8.7 hematocrit 27.7 and platelets 373.  Normal differential noted. Labs:  Sodium 146 potassium 3.5 chloride 110 bicarb 24 glucose 66 and BUN creatinine were 9 and 0.6 otherwise GFR greater than 60.  Magnesium 1.9 phosphorus 4.0    10/25/2019:  Patient is awake alert and states his pain is under better  control today.  Patient is having gas in to his colostomy bag it had to be relieved on 2 occasions overnight.  Patient otherwise has been stable and heart rate is much improved on beta-blocker.  Labs showed sodium 148 potassium 3.2 chloride 111 bicarb 23 calcium is 7.7 and GFR greater than 60.  White blood cell count 10.5 hemoglobin 8.2 hematocrit 26 platelets 334 and differential was normal.  Patient otherwise is stable but will continue gastric decompression until flatus is obvious.    Interval History:     Review of Systems   Constitutional: Positive for fatigue. Negative for activity change, appetite change and fever.   HENT: Negative for congestion, ear discharge, mouth sores, nosebleeds, rhinorrhea, sinus pressure, sinus pain and tinnitus.    Eyes: Negative.  Negative for pain, redness and itching.   Respiratory: Negative for apnea, cough, choking, chest tightness, shortness of breath, wheezing and stridor.    Cardiovascular: Negative for chest pain, palpitations and leg swelling.   Gastrointestinal: Positive for abdominal distention and abdominal pain. Negative for anal bleeding, blood in stool, constipation and diarrhea.   Endocrine: Negative.    Genitourinary: Negative for difficulty urinating, flank pain, frequency and urgency.   Musculoskeletal: Negative for arthralgias, back pain, gait problem and myalgias.   Skin: Negative for color change and pallor.   Allergic/Immunologic: Negative.    Neurological: Positive for weakness. Negative for dizziness, facial asymmetry, light-headedness and headaches.   Hematological: Negative for adenopathy. Does not bruise/bleed easily.   Psychiatric/Behavioral: The patient is nervous/anxious.      Objective:     Vital Signs (Most Recent):  Temp: 98 °F (36.7 °C) (10/25/19 0800)  Pulse: 103 (10/25/19 0800)  Resp: 20 (10/25/19 0800)  BP: (!) 129/94 (10/25/19 0800)  SpO2: (!) 94 % (10/25/19 0800) Vital Signs (24h Range):  Temp:  [97.9 °F (36.6 °C)-98.8 °F (37.1 °C)] 98 °F  (36.7 °C)  Pulse:  [] 103  Resp:  [8-26] 20  SpO2:  [88 %-99 %] 94 %  BP: ()/(60-94) 129/94     Weight: 69.7 kg (153 lb 10.6 oz)  Body mass index is 22.69 kg/m².    Intake/Output Summary (Last 24 hours) at 10/25/2019 0920  Last data filed at 10/25/2019 0800  Gross per 24 hour   Intake 2938.33 ml   Output 900 ml   Net 2038.33 ml      Physical Exam   Constitutional: He is oriented to person, place, and time. He appears well-developed and well-nourished.   HENT:   Head: Normocephalic and atraumatic.   Eyes: Pupils are equal, round, and reactive to light. EOM are normal.   Neck: Normal range of motion. Neck supple. No tracheal deviation present. No thyromegaly present.   Cardiovascular: Normal rate, regular rhythm, normal heart sounds and intact distal pulses.   Pulmonary/Chest: Effort normal and breath sounds normal.   Abdominal: Soft. Bowel sounds are normal. He exhibits no distension. There is tenderness. There is guarding. There is no rebound.   Bowel sounds are present and there is air in the colostomy bag.   Musculoskeletal: Normal range of motion.   Lymphadenopathy:     He has no cervical adenopathy.   Neurological: He is alert and oriented to person, place, and time.   Skin: Skin is warm and dry. Capillary refill takes less than 2 seconds.   Psychiatric: He has a normal mood and affect. His behavior is normal. Judgment and thought content normal.   Nursing note and vitals reviewed.      Significant Labs:   Recent Lab Results       10/25/19  0500        Albumin 1.4     Alkaline Phosphatase 34     ALT 15     Anion Gap 14     AST 14     Baso # 0.01     Basophil% 0.1     BILIRUBIN TOTAL 1.4  Comment:  For infants and newborns, interpretation of results should be based  on gestational age, weight and in agreement with clinical  observations.  Premature Infant recommended reference ranges:  Up to 24 hours.............<8.0 mg/dL  Up to 48 hours............<12.0 mg/dL  3-5 days..................<15.0  mg/dL  6-29 days.................<15.0 mg/dL       BUN, Bld 9     Calcium 7.7     Chloride 111     CO2 23     Creatinine 0.6     Differential Method Automated     eGFR if African American >60.0     eGFR if non  >60.0  Comment:  Calculation used to obtain the estimated glomerular filtration  rate (eGFR) is the CKD-EPI equation.        Eos # 0.1     Eosinophil% 0.9     Glucose 72     Gran # (ANC) 7.9     Gran% 74.8     Hematocrit 26.0     Hemoglobin 8.2     Immature Grans (Abs) 0.03  Comment:  Mild elevation in immature granulocytes is non specific and   can be seen in a variety of conditions including stress response,   acute inflammation, trauma and pregnancy. Correlation with other   laboratory and clinical findings is essential.       Immature Granulocytes 0.3     Lymph # 1.1     Lymph% 10.2     MCH 28.8     MCHC 31.5     MCV 91     Mono # 1.5     Mono% 13.7     MPV 9.6     nRBC 0     Platelets 394     Potassium 3.2     PROTEIN TOTAL 4.3     RBC 2.85     RDW 19.6     Sodium 148     WBC 10.59         All pertinent labs within the past 24 hours have been reviewed.    Significant Imaging: I have reviewed and interpreted all pertinent imaging results/findings within the past 24 hours.      Assessment/Plan:      * Large bowel obstruction  IV fluids IV potassium NPO KUB in lab in a.m. monitor closely.  Surgical consultation will be seen in a.m. by Dr. MOHAMUD General surgery seen the patient in the clinic on 10/03.  10/20/2019.  Extended conversation with Dr. Buck concerning the patient's finding rectal mass bowel obstruction.  Correction of electrolytes IV fluid IV antibiotics NPO fleets enema today x3.  Colonoscope in a.m..  CEA is been ordered.    10/21/2019  1.  Treat today based on findings of colonoscopy or endoscopy.  2.  Continue current antibiotic treatment  3.  Follow surgery recommendations after final diagnosis made.  4.  Treat otherwise as needed based on clinical course  5.  Repeat labs in  the a.m. and replete potassium as needed    10/22/2019:  1.  Stable postop day 1 with positive bowel sounds.  2.  We will replace calcium  3.  Repeat labs in the a.m. to include CBC and CMP magnesium  4.  Follow-up otherwise as clinically indicated based clinical course.    10/23/2019:  1.  Continue current antibiotic therapy.  2.  Give will blood bolus of 500 cc x1  3.  Change pain medication to Dilaudid 1 mg every 2 hr as needed for pain  4.  A feel tachycardia may be related to pain versus is mildly fluid depleted.  5.  Replace electrolytes as needed.  6.  Intake and output revealed a positive 2500 cc sore +overnight    10/24/2019:  1.  Continue current medications.  2.  Repeat labs in the a.m. to monitor white blood cell count and  3.  Begin metoprolol 12.5 mg IV q.12 hours or heart rate and  4.  Continue other medications as ordered and await bowel function.    10/25/2019:  1.  Postop day 3.  Patient having no fever chills nausea or vomiting and states pain is well controlled this morning.  2.  We will replace potassium and calcium IV  3.  Repeat labs in the a.m. to include CBC and CMP.  Patient otherwise has been afebrile and postop course have been normal    Intestinal obstruction  Patient now postop day 1 from bowel resection.  Stable postop day 1 doing well and postoperative course at this time      Hypokalemia  10/25/2019:  Will replace IV for this morning.        VTE Risk Mitigation (From admission, onward)         Ordered     heparin (porcine) injection 5,000 Units  Every 8 hours      10/21/19 1753     Place sequential compression device  Until discontinued      10/21/19 1753     IP VTE LOW RISK PATIENT  Once      10/19/19 1752     Place MAE hose  Until discontinued      10/19/19 1752                      Lencho Kumar MD  Department of Hospital Medicine   [facility] - ICU

## 2019-10-25 NOTE — SUBJECTIVE & OBJECTIVE
Interval History:     Review of Systems   Constitutional: Positive for activity change, appetite change and unexpected weight change. Negative for chills, diaphoresis, fatigue and fever.   HENT: Negative for congestion, drooling, hearing loss and trouble swallowing.    Eyes: Negative for pain and visual disturbance.   Respiratory: Negative.  Negative for apnea, cough, choking, chest tightness, shortness of breath and wheezing.    Cardiovascular: Negative for chest pain, palpitations and leg swelling.        Increased heart rate   Gastrointestinal: Positive for abdominal distention and abdominal pain. Negative for blood in stool, constipation, diarrhea, nausea and vomiting.        Tenderness left lower quadrant   Endocrine: Negative for polydipsia, polyphagia and polyuria.   Genitourinary: Negative for difficulty urinating, dysuria and flank pain.   Musculoskeletal: Positive for arthralgias. Negative for back pain, gait problem, joint swelling, neck pain and neck stiffness.   Skin: Negative for color change, rash and wound.   Allergic/Immunologic: Negative for environmental allergies, food allergies and immunocompromised state.   Neurological: Positive for weakness. Negative for dizziness, syncope, numbness and headaches.   Hematological: Negative for adenopathy.   Psychiatric/Behavioral: Negative for agitation, confusion and sleep disturbance. The patient is nervous/anxious.      Objective:     Vital Signs (Most Recent):  Temp: 98.8 °F (37.1 °C) (10/24/19 1600)  Pulse: 90 (10/24/19 1900)  Resp: (!) 22 (10/24/19 1900)  BP: 119/74 (10/24/19 1900)  SpO2: 99 % (10/24/19 1900) Vital Signs (24h Range):  Temp:  [97.8 °F (36.6 °C)-99.1 °F (37.3 °C)] 98.8 °F (37.1 °C)  Pulse:  [] 90  Resp:  [6-30] 22  SpO2:  [84 %-99 %] 99 %  BP: ()/(56-78) 119/74     Weight: 69.7 kg (153 lb 10.6 oz)  Body mass index is 22.69 kg/m².    Intake/Output Summary (Last 24 hours) at 10/24/2019 1922  Last data filed at 10/24/2019  1620  Gross per 24 hour   Intake 1225 ml   Output 1375 ml   Net -150 ml      Physical Exam   Constitutional: He is oriented to person, place, and time. He appears well-developed and well-nourished.   HENT:   Head: Normocephalic and atraumatic.   Right Ear: External ear normal.   Left Ear: External ear normal.   Nose: Nose normal.   Eyes: Pupils are equal, round, and reactive to light. Conjunctivae, EOM and lids are normal.   Neck: Trachea normal, normal range of motion and full passive range of motion without pain. Neck supple. No tracheal deviation present. No thyromegaly present.   Cardiovascular: Normal rate, regular rhythm, S1 normal, S2 normal, normal heart sounds, intact distal pulses and normal pulses.   Mild tachycardia 110   Pulmonary/Chest: Effort normal. He has rales.   Abdominal: Soft. Normal aorta and bowel sounds are normal. He exhibits distension. There is tenderness. There is rebound. There is no guarding.   Tenderness left lower quadrant   Musculoskeletal: Normal range of motion.   Lymphadenopathy:     He has no cervical adenopathy.   Neurological: He is alert and oriented to person, place, and time. He has normal reflexes.   Skin: Skin is warm, dry and intact. Capillary refill takes less than 2 seconds.   Psychiatric: He has a normal mood and affect. His speech is normal and behavior is normal. Judgment and thought content normal. Cognition and memory are normal.   Nursing note and vitals reviewed.      Significant Labs:   Recent Lab Results       10/24/19  0456        Albumin 1.5     Alkaline Phosphatase 38     ALT 18     Anion Gap 12     AST 17     BANDS 7.0     Baso # CANCELED  Comment:  Result canceled by the ancillary.     Basophil% 0.0     BILIRUBIN TOTAL 0.9  Comment:  For infants and newborns, interpretation of results should be based  on gestational age, weight and in agreement with clinical  observations.  Premature Infant recommended reference ranges:  Up to 24 hours.............<8.0  mg/dL  Up to 48 hours............<12.0 mg/dL  3-5 days..................<15.0 mg/dL  6-29 days.................<15.0 mg/dL       BUN, Bld 9     Calcium 8.2     Chloride 110     CO2 24     Creatinine 0.6     Differential Method Automated     eGFR if African American >60.0     eGFR if non  >60.0  Comment:  Calculation used to obtain the estimated glomerular filtration  rate (eGFR) is the CKD-EPI equation.        Eos # CANCELED  Comment:  Result canceled by the ancillary.     Eosinophil% 0.0     Glucose 66     Gran% 76.0     Hematocrit 27.7     Hemoglobin 8.7     Immature Grans (Abs) CANCELED  Comment:  Mild elevation in immature granulocytes is non specific and   can be seen in a variety of conditions including stress response,   acute inflammation, trauma and pregnancy. Correlation with other   laboratory and clinical findings is essential.    Result canceled by the ancillary.       Immature Granulocytes CANCELED  Comment:  Result canceled by the ancillary.     Lymph # CANCELED  Comment:  Result canceled by the ancillary.     Lymph% 17.0     Magnesium 1.9     MCH 28.3     MCHC 31.4     MCV 90     Mono # CANCELED  Comment:  Result canceled by the ancillary.     Mono% 0.0     MPV 9.8     nRBC 0     Phosphorus 4.0     Platelets 373     Potassium 3.5     PROTEIN TOTAL 4.5     RBC 3.07     RDW 19.9     Sodium 146     WBC 15.93         All pertinent labs within the past 24 hours have been reviewed.    Significant Imaging: I have reviewed and interpreted all pertinent imaging results/findings within the past 24 hours.

## 2019-10-25 NOTE — PROGRESS NOTES
[facility] - ICU Hospital Medicine  Progress Note    Patient Name: Miguel Angel Apple Sr.  MRN: [id number]  Patient Class: IP- Inpatient   Admission Date: 10/19/2019  Length of Stay: 5 days  Attending Physician: Hima Mccyo MD  Primary Care Provider: Primary Doctor No        Subjective:     Principal Problem:Large bowel obstruction        HPI:  Patient's history began approximately 3 months ago.  He presented to the hospital well over a month ago given a diagnosis of he understands colitis.  Treated with antibiotics IV fluids.  He saw Dr. ONEIDA charles on 10/03 placed on Cipro and Flagyl.  His on once completed that dose.  This past Sunday he started noticing that he was becoming  bloated and stopped eating solids at that time went back to liquids.  His had no pain pills for 2-3 days.  No nausea and vomiting unless he ate something that he knew he should the.  He attempted to make himself vomit yesterday and could not.  He has had small bowel movements to continue since that time.  CT scan shows colonic dilatation with the cecum being 10 cm.  The possible obstruction may be low left-sided.  There is even suggesting possibility have a rectal mass.  WBC remained 16,000 with a left shift.  Patient's potassium is 2.2.    Overview/Hospital Course:  IV fluids IV potassium NPO Surgical consultation repeat KUB and lab in a.m. further clinical decisions depending on clinical course.  Evaluation for [place] syndrome.  The patient has had no prior abdominal surgery and no medical history prior to approximately 3 months ago.  10/20/2019.  Long discussion with the patient this morning concerning the findings.  Dr. ONEIDA charles see the patient today since he has been involved in his care on 10/03.  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  Patient continue IV fluids IV antibiotics potassium replacement today and will have 3 Fleet's enemas today.   CEA is been ordered.    10/21/2019:  Patient is comfortable in room although it does appear quite anxious.  The patient will be having EGD and colonoscopy later today.  Based on the findings there the patient will may need undergo surgery.  Patient denies any pain and states that he was not throwing up and having no fever or chills.  Patient is distended and has some abdominal pain associated.  Labs showed [place] blood [place] 59971 hemoglobin hematocrit 9.8 and 31 sodium 145 potassium 2.4 chloride 108 bicarb 21 and alk-phos was 49    10/22/2019:  Patient is stable postop day 1.  Patient states pain is well controlled.  Patient mildly tachycardic blood pressure 115/61 979 O2 sats 100%.  Labs show white blood cell count 21.5 hemoglobin 9.2 hematocrit 29.3 platelets 407 and BUN creatinine were 8 and 0.6 calcium 7.5 magnesium 1.4.  Patient is in good spirits and bowel sounds are present.    10/23/2019:  Postop day 2.:  Patient is having much more abdominal pain with waves of pain that appeared to be related to air movement in the bowel versus bowel regaining function.  Patient is alert and oriented and is mildly tachycardic with pulse 123, blood pressure 130/65 O2 sat 95%.  Labs show a improved white count of today of 19,000 hemoglobin 8.9 hematocrit 28 platelets 396 and sodium 141 potassium 3.1 albumin 1.6 and liver functions within normal ranges.  GFR greater than 60    10/24/2019:  Patient is stable and improving postop day 2 .  Colostomy bag has some gas and patient bowel sounds are very active.  Intake was 02/30/2027 output was 1745 and a net gain of 1.4 L. labs showed a white blood cell count 15.9 hemoglobin 8.7 hematocrit 27.7 and platelets 373.  Normal differential noted. Labs:  Sodium 146 potassium 3.5 chloride 110 bicarb 24 glucose 66 and BUN creatinine were 9 and 0.6 otherwise GFR greater than 60.  Magnesium 1.9 phosphorus 4.0    Interval History:     Review of Systems   Constitutional: Positive for  activity change, appetite change and unexpected weight change. Negative for chills, diaphoresis, fatigue and fever.   HENT: Negative for congestion, drooling, hearing loss and trouble swallowing.    Eyes: Negative for pain and visual disturbance.   Respiratory: Negative.  Negative for apnea, cough, choking, chest tightness, shortness of breath and wheezing.    Cardiovascular: Negative for chest pain, palpitations and leg swelling.        Increased heart rate   Gastrointestinal: Positive for abdominal distention and abdominal pain. Negative for blood in stool, constipation, diarrhea, nausea and vomiting.        Tenderness left lower quadrant   Endocrine: Negative for polydipsia, polyphagia and polyuria.   Genitourinary: Negative for difficulty urinating, dysuria and flank pain.   Musculoskeletal: Positive for arthralgias. Negative for back pain, gait problem, joint swelling, neck pain and neck stiffness.   Skin: Negative for color change, rash and wound.   Allergic/Immunologic: Negative for environmental allergies, food allergies and immunocompromised state.   Neurological: Positive for weakness. Negative for dizziness, syncope, numbness and headaches.   Hematological: Negative for adenopathy.   Psychiatric/Behavioral: Negative for agitation, confusion and sleep disturbance. The patient is nervous/anxious.      Objective:     Vital Signs (Most Recent):  Temp: 98.8 °F (37.1 °C) (10/24/19 1600)  Pulse: 90 (10/24/19 1900)  Resp: (!) 22 (10/24/19 1900)  BP: 119/74 (10/24/19 1900)  SpO2: 99 % (10/24/19 1900) Vital Signs (24h Range):  Temp:  [97.8 °F (36.6 °C)-99.1 °F (37.3 °C)] 98.8 °F (37.1 °C)  Pulse:  [] 90  Resp:  [6-30] 22  SpO2:  [84 %-99 %] 99 %  BP: ()/(56-78) 119/74     Weight: 69.7 kg (153 lb 10.6 oz)  Body mass index is 22.69 kg/m².    Intake/Output Summary (Last 24 hours) at 10/24/2019 1922  Last data filed at 10/24/2019 1620  Gross per 24 hour   Intake 1225 ml   Output 1375 ml   Net -150 ml       Physical Exam   Constitutional: He is oriented to person, place, and time. He appears well-developed and well-nourished.   HENT:   Head: Normocephalic and atraumatic.   Right Ear: External ear normal.   Left Ear: External ear normal.   Nose: Nose normal.   Eyes: Pupils are equal, round, and reactive to light. Conjunctivae, EOM and lids are normal.   Neck: Trachea normal, normal range of motion and full passive range of motion without pain. Neck supple. No tracheal deviation present. No thyromegaly present.   Cardiovascular: Normal rate, regular rhythm, S1 normal, S2 normal, normal heart sounds, intact distal pulses and normal pulses.   Mild tachycardia 110   Pulmonary/Chest: Effort normal. He has rales.   Abdominal: Soft. Normal aorta and bowel sounds are normal. He exhibits distension. There is tenderness. There is rebound. There is no guarding.   Tenderness left lower quadrant   Musculoskeletal: Normal range of motion.   Lymphadenopathy:     He has no cervical adenopathy.   Neurological: He is alert and oriented to person, place, and time. He has normal reflexes.   Skin: Skin is warm, dry and intact. Capillary refill takes less than 2 seconds.   Psychiatric: He has a normal mood and affect. His speech is normal and behavior is normal. Judgment and thought content normal. Cognition and memory are normal.   Nursing note and vitals reviewed.      Significant Labs:   Recent Lab Results       10/24/19  0456        Albumin 1.5     Alkaline Phosphatase 38     ALT 18     Anion Gap 12     AST 17     BANDS 7.0     Baso # CANCELED  Comment:  Result canceled by the ancillary.     Basophil% 0.0     BILIRUBIN TOTAL 0.9  Comment:  For infants and newborns, interpretation of results should be based  on gestational age, weight and in agreement with clinical  observations.  Premature Infant recommended reference ranges:  Up to 24 hours.............<8.0 mg/dL  Up to 48 hours............<12.0 mg/dL  3-5 days..................<15.0  mg/dL  6-29 days.................<15.0 mg/dL       BUN, Bld 9     Calcium 8.2     Chloride 110     CO2 24     Creatinine 0.6     Differential Method Automated     eGFR if African American >60.0     eGFR if non  >60.0  Comment:  Calculation used to obtain the estimated glomerular filtration  rate (eGFR) is the CKD-EPI equation.        Eos # CANCELED  Comment:  Result canceled by the ancillary.     Eosinophil% 0.0     Glucose 66     Gran% 76.0     Hematocrit 27.7     Hemoglobin 8.7     Immature Grans (Abs) CANCELED  Comment:  Mild elevation in immature granulocytes is non specific and   can be seen in a variety of conditions including stress response,   acute inflammation, trauma and pregnancy. Correlation with other   laboratory and clinical findings is essential.    Result canceled by the ancillary.       Immature Granulocytes CANCELED  Comment:  Result canceled by the ancillary.     Lymph # CANCELED  Comment:  Result canceled by the ancillary.     Lymph% 17.0     Magnesium 1.9     MCH 28.3     MCHC 31.4     MCV 90     Mono # CANCELED  Comment:  Result canceled by the ancillary.     Mono% 0.0     MPV 9.8     nRBC 0     Phosphorus 4.0     Platelets 373     Potassium 3.5     PROTEIN TOTAL 4.5     RBC 3.07     RDW 19.9     Sodium 146     WBC 15.93         All pertinent labs within the past 24 hours have been reviewed.    Significant Imaging: I have reviewed and interpreted all pertinent imaging results/findings within the past 24 hours.      Assessment/Plan:      * Large bowel obstruction  IV fluids IV potassium NPO KUB in lab in a.m. monitor closely.  Surgical consultation will be seen in a.m. by Dr. MOHAMUD General surgery seen the patient in the clinic on 10/03.  10/20/2019.  Extended conversation with Dr. Buck concerning the patient's finding rectal mass bowel obstruction.  Correction of electrolytes IV fluid IV antibiotics NPO fleets enema today x3.  Colonoscope in a.m..  CEA is been  ordered.    10/21/2019  1.  Treat today based on findings of colonoscopy or endoscopy.  2.  Continue current antibiotic treatment  3.  Follow surgery recommendations after final diagnosis made.  4.  Treat otherwise as needed based on clinical course  5.  Repeat labs in the a.m. and replete potassium as needed    10/22/2019:  1.  Stable postop day 1 with positive bowel sounds.  2.  We will replace calcium  3.  Repeat labs in the a.m. to include CBC and CMP magnesium  4.  Follow-up otherwise as clinically indicated based clinical course.    10/23/2019:  1.  Continue current antibiotic therapy.  2.  Give will blood bolus of 500 cc x1  3.  Change pain medication to Dilaudid 1 mg every 2 hr as needed for pain  4.  A feel tachycardia may be related to pain versus is mildly fluid depleted.  5.  Replace electrolytes as needed.  6.  Intake and output revealed a positive 2500 cc sore +overnight    10/24/2019:  1.  Continue current medications.  2.  Repeat labs in the a.m. to monitor white blood cell count and  3.  Begin metoprolol 12.5 mg IV q.12 hours or heart rate and  4.  Continue other medications as ordered and await bowel function.    Intestinal obstruction  Patient now postop day 1 from bowel resection.  Stable postop day 1 doing well and postoperative course at this time        VTE Risk Mitigation (From admission, onward)         Ordered     heparin (porcine) injection 5,000 Units  Every 8 hours      10/21/19 1753     Place sequential compression device  Until discontinued      10/21/19 1753     IP VTE LOW RISK PATIENT  Once      10/19/19 1752     Place MAE hose  Until discontinued      10/19/19 1752                      Lencho Kumar MD  Department of Hospital Medicine   [facility] - [facility]

## 2019-10-25 NOTE — NURSING
On assessment pt. Appears to be resting in bed comfortably, no acute events overnight, will continue to monitor for urinary retention. Midline abd. incision clean, dry, and intact. Hyperactive B/S in all four quads. Plans are to ambulate pt. Today. Call anguiano within reach, Will continue to closely monitor

## 2019-10-25 NOTE — ASSESSMENT & PLAN NOTE
IV fluids IV potassium NPO KUB in lab in a.m. monitor closely.  Surgical consultation will be seen in a.m. by Dr. MOHAMUD General surgery seen the patient in the clinic on 10/03.  10/20/2019.  Extended conversation with Dr. Buck concerning the patient's finding rectal mass bowel obstruction.  Correction of electrolytes IV fluid IV antibiotics NPO fleets enema today x3.  Colonoscope in a.m..  CEA is been ordered.    10/21/2019  1.  Treat today based on findings of colonoscopy or endoscopy.  2.  Continue current antibiotic treatment  3.  Follow surgery recommendations after final diagnosis made.  4.  Treat otherwise as needed based on clinical course  5.  Repeat labs in the a.m. and replete potassium as needed    10/22/2019:  1.  Stable postop day 1 with positive bowel sounds.  2.  We will replace calcium  3.  Repeat labs in the a.m. to include CBC and CMP magnesium  4.  Follow-up otherwise as clinically indicated based clinical course.    10/23/2019:  1.  Continue current antibiotic therapy.  2.  Give will blood bolus of 500 cc x1  3.  Change pain medication to Dilaudid 1 mg every 2 hr as needed for pain  4.  A feel tachycardia may be related to pain versus is mildly fluid depleted.  5.  Replace electrolytes as needed.  6.  Intake and output revealed a positive 2500 cc sore +overnight    10/24/2019:  1.  Continue current medications.  2.  Repeat labs in the a.m. to monitor white blood cell count and  3.  Begin metoprolol 12.5 mg IV q.12 hours or heart rate and  4.  Continue other medications as ordered and await bowel function.    10/25/2019:  1.  Postop day 3.  Patient having no fever chills nausea or vomiting and states pain is well controlled this morning.  2.  We will replace potassium and calcium IV  3.  Repeat labs in the a.m. to include CBC and CMP.  Patient otherwise has been afebrile and postop course have been normal

## 2019-10-25 NOTE — PROGRESS NOTES
[facility] - [facility]  General Surgery  Daily Note    Patient Name: Miguel Angel Apple Sr.  MRN: [id number]  Admission Date: 10/19/2019  Attending Physician: Hima Mccoy MD   Consult Physician: Jose Lazo MD  Primary Care Provider: Primary Doctor No    Subjective:     Principle Problem: Large bowel obstruction    Last 24 hour history:  10/22/19  Afebrile since surgery. Mild tachycardia.  Blood pressure stable.  Good urine output with greater than 40 cc/hour.  CVP is ranged from 8-12.  Ostomy pink and functional.  NG tube output minimal.  No nausea vomiting.  No new issues or complaints this morning.    10/23/19  Afebrile.  Mild tachycardia.  Stable bp.  Good UOP.  NGT 75cc bilious output.  Ostomy pink and function LLQ, Stool in bag.  No nausea or vomiting.  Patient with slight increased pain this morning.  No new other issues or complaints.    10/24/19  Afebrile.  Vital signs stable.  Ostomy pink functional left lower quadrant. NG tube with 70 cc bilious documented output.  No nausea vomiting.  Pain control much improved today.  No other new issues or complaints.    10/25/19  Afebrile.  VSS.  Ostomy pink and patent in LLQ.  NGT minimal output.  In and out cath last night for urinary retention, has spontaneous voiced since this time.  No nausea or vomiting.  No new issues or complaints.      Objective:     Vital Signs (Most Recent):  Temp: 98 °F (36.7 °C) (10/25/19 1122)  Pulse: 93 (10/25/19 1122)  Resp: 20 (10/25/19 1122)  BP: 100/64 (10/25/19 1122)  SpO2: 96 % (10/25/19 1119) Vital Signs (24h Range):  Temp:  [97.9 °F (36.6 °C)-98.8 °F (37.1 °C)] 98 °F (36.7 °C)  Pulse:  [] 93  Resp:  [8-26] 20  SpO2:  [88 %-99 %] 96 %  BP: ()/(58-94) 100/64     Intake/Output last 24 hours:    Intake/Output Summary (Last 24 hours) at 10/25/2019 1212  Last data filed at 10/25/2019 1153  Gross per 24 hour   Intake 3738.33 ml   Output 1260 ml   Net 2478.33 ml       I/O last 3 completed  shifts:  In: 4133.3 [I.V.:4033.3; IV Piggyback:100]  Out: 2125 [Urine:1825; Stool:300]  I/O this shift:  In: 830 [NG/GT:30; IV Piggyback:800]  Out: 360 [Urine:300; Stool:60]    Weight: 69.7 kg (153 lb 10.6 oz)  Body mass index is 22.69 kg/m².    Gen: Wd Wn male currently in NAD  Heent: Nc/At, MMM, NG tube in place with bilious output.  Eyes: Perrl, Eomi  Cv: RRR, no  M/g/r  Lung: Non-labored breathing, clear bilaterally  Abd: Soft, tenderness to be expected postsurgical.  Mild distention.  Ostomy pink patent and left lower quadrant.  Mucous fistula in place with red rubber intact.    Significant Labs:  CBC:   Recent Labs   Lab 10/25/19  0500   WBC 10.59   RBC 2.85*   HGB 8.2*   HCT 26.0*   *   MCV 91   MCH 28.8   MCHC 31.5*     BMP:   Recent Labs   Lab 10/24/19  0456 10/25/19  0500   GLU 66* 72   * 148*   K 3.5 3.2*    111*   CO2 24 23   BUN 9 9   CREATININE 0.6 0.6   CALCIUM 8.2* 7.7*   MG 1.9  --      CMP:   Recent Labs   Lab 10/25/19  0500   GLU 72   CALCIUM 7.7*   ALBUMIN 1.4*   PROT 4.3*   *   K 3.2*   CO2 23   *   BUN 9   CREATININE 0.6   ALKPHOS 34*   ALT 15   AST 14   BILITOT 1.4*     LFTs:   Recent Labs   Lab 10/25/19  0500   ALT 15   AST 14   ALKPHOS 34*   BILITOT 1.4*   PROT 4.3*   ALBUMIN 1.4*     Coagulation: No results for input(s): LABPROT, INR, APTT in the last 168 hours.  Specimen (12h ago, onward)    None        Recent Labs   Lab 10/19/19  1716   COLORU Yellow   SPECGRAV 1.015   PHUR 7.0   PROTEINUA 2+*   BACTERIA Few*   NITRITE Negative   LEUKOCYTESUR Negative   UROBILINOGEN Negative   HYALINECASTS 0       Cultures:    Microbiology Results (last 7 days)     Procedure Component Value Units Date/Time    Blood culture x two cultures. Draw prior to antibiotics. [[id number]] Collected:  10/19/19 1505    Order Status:  Completed Specimen:  Blood from Antecubital, Right  Arm Updated:  10/24/19 2212     Blood Culture, Routine No growth after 5 days.    Narrative:        Aerobic and anaerobic    Blood culture x two cultures. Draw prior to antibiotics. [[id number]] Collected:  10/19/19 1508    Order Status:  Completed Specimen:  Blood from Antecubital, Left  Arm Updated:  10/24/19 2212     Blood Culture, Routine No growth after 5 days.    Narrative:       Aerobic and anaerobic          Assessment:   Miguel Angel Apple Sr. is a 63 y.o. male who presents with Large bowel obstruction.    Active Diagnoses:    Diagnosis Date Noted POA    PRINCIPAL PROBLEM:  Large bowel obstruction [K56.609] 10/19/2019 Yes    Diarrhea [R19.7] 10/21/2019 Yes    Rectal mass [K62.89] 10/20/2019 Yes    Hypokalemia [E87.6] 10/20/2019 Yes    Intestinal obstruction [K56.609]  Yes    Colitis [K52.9] 10/19/2019 Yes      Problems Resolved During this Admission:     VTE Risk Mitigation (From admission, onward)         Ordered     heparin (porcine) injection 5,000 Units  Every 8 hours      10/21/19 1753     Place sequential compression device  Until discontinued      10/21/19 1753     IP VTE LOW RISK PATIENT  Once      10/19/19 1752     Place MAE hose  Until discontinued      10/19/19 1752                Medical Decision Making/Plan:  Satisfactory postoperative recovery thus far.  Good urine output.  Afebrile.  Tachycardia improved with B blocker administration. Continue DVT prophylaxis.  Continue GI prophylaxis.  May d/c ngt with low output.  Hold off on a diet given anastomosis Ileocolonic.  Ice chips and popsicles are ok.  Repeat labs tomorrow am.  Continue IV antibiotics given the perforated nature of the colon.  Further management patient depend upon his clinical course.    Jose Lazo MD  General Surgery  [facility] - ICU

## 2019-10-25 NOTE — ASSESSMENT & PLAN NOTE
IV fluids IV potassium NPO KUB in lab in a.m. monitor closely.  Surgical consultation will be seen in a.m. by Dr. MOHAMUD General surgery seen the patient in the clinic on 10/03.  10/20/2019.  Extended conversation with Dr. Buck concerning the patient's finding rectal mass bowel obstruction.  Correction of electrolytes IV fluid IV antibiotics NPO fleets enema today x3.  Colonoscope in a.m..  CEA is been ordered.    10/21/2019  1.  Treat today based on findings of colonoscopy or endoscopy.  2.  Continue current antibiotic treatment  3.  Follow surgery recommendations after final diagnosis made.  4.  Treat otherwise as needed based on clinical course  5.  Repeat labs in the a.m. and replete potassium as needed    10/22/2019:  1.  Stable postop day 1 with positive bowel sounds.  2.  We will replace calcium  3.  Repeat labs in the a.m. to include CBC and CMP magnesium  4.  Follow-up otherwise as clinically indicated based clinical course.    10/23/2019:  1.  Continue current antibiotic therapy.  2.  Give will blood bolus of 500 cc x1  3.  Change pain medication to Dilaudid 1 mg every 2 hr as needed for pain  4.  A feel tachycardia may be related to pain versus is mildly fluid depleted.  5.  Replace electrolytes as needed.  6.  Intake and output revealed a positive 2500 cc sore +overnight    10/24/2019:  1.  Continue current medications.  2.  Repeat labs in the a.m. to monitor white blood cell count and  3.  Begin metoprolol 12.5 mg IV q.12 hours or heart rate and  4.  Continue other medications as ordered and await bowel function.

## 2019-10-25 NOTE — NURSING
Pt. Up to bedside chair with no complications. Pt. Ambulated approx. 25 feet in room. Vitals wnl. Call bell within reach. Will continue to monitor.

## 2019-10-25 NOTE — NURSING
Patient continues to attempt to void.  Has not voided since damon removed.  In and Out catheterization for 700 ml of concentrated yellow urine.  States feels better.  Scrotum placed on pillowcase in sling to elevate scrotum.

## 2019-10-26 PROBLEM — E43 SEVERE MALNUTRITION: Status: ACTIVE | Noted: 2019-10-26

## 2019-10-26 LAB
ALBUMIN SERPL BCP-MCNC: 1.4 G/DL (ref 3.5–5.2)
ALP SERPL-CCNC: 40 U/L (ref 55–135)
ALT SERPL W/O P-5'-P-CCNC: 14 U/L (ref 10–44)
ANION GAP SERPL CALC-SCNC: 12 MMOL/L (ref 8–16)
AST SERPL-CCNC: 13 U/L (ref 10–40)
BASOPHILS # BLD AUTO: 0.01 K/UL (ref 0–0.2)
BASOPHILS NFR BLD: 0.1 % (ref 0–1.9)
BILIRUB SERPL-MCNC: 0.2 MG/DL (ref 0.1–1)
BUN SERPL-MCNC: 6 MG/DL (ref 8–23)
CALCIUM SERPL-MCNC: 7.8 MG/DL (ref 8.7–10.5)
CHLORIDE SERPL-SCNC: 108 MMOL/L (ref 95–110)
CO2 SERPL-SCNC: 27 MMOL/L (ref 23–29)
CREAT SERPL-MCNC: 0.5 MG/DL (ref 0.5–1.4)
DIFFERENTIAL METHOD: ABNORMAL
EOSINOPHIL # BLD AUTO: 0.3 K/UL (ref 0–0.5)
EOSINOPHIL NFR BLD: 2.3 % (ref 0–8)
ERYTHROCYTE [DISTWIDTH] IN BLOOD BY AUTOMATED COUNT: 19.4 % (ref 11.5–14.5)
EST. GFR  (AFRICAN AMERICAN): >60 ML/MIN/1.73 M^2
EST. GFR  (NON AFRICAN AMERICAN): >60 ML/MIN/1.73 M^2
GLUCOSE SERPL-MCNC: 145 MG/DL (ref 70–110)
HCT VFR BLD AUTO: 27 % (ref 40–54)
HGB BLD-MCNC: 8.3 G/DL (ref 14–18)
IMM GRANULOCYTES # BLD AUTO: 0.06 K/UL (ref 0–0.04)
IMM GRANULOCYTES NFR BLD AUTO: 0.5 % (ref 0–0.5)
LYMPHOCYTES # BLD AUTO: 1.3 K/UL (ref 1–4.8)
LYMPHOCYTES NFR BLD: 10.7 % (ref 18–48)
MCH RBC QN AUTO: 28.1 PG (ref 27–31)
MCHC RBC AUTO-ENTMCNC: 30.7 G/DL (ref 32–36)
MCV RBC AUTO: 92 FL (ref 82–98)
MONOCYTES # BLD AUTO: 1.6 K/UL (ref 0.3–1)
MONOCYTES NFR BLD: 13.7 % (ref 4–15)
NEUTROPHILS # BLD AUTO: 8.5 K/UL (ref 1.8–7.7)
NEUTROPHILS NFR BLD: 72.7 % (ref 38–73)
NRBC BLD-RTO: 0 /100 WBC
PLATELET # BLD AUTO: 386 K/UL (ref 150–350)
PMV BLD AUTO: 9.3 FL (ref 9.2–12.9)
POTASSIUM SERPL-SCNC: 3.2 MMOL/L (ref 3.5–5.1)
PROT SERPL-MCNC: 4.5 G/DL (ref 6–8.4)
RBC # BLD AUTO: 2.95 M/UL (ref 4.6–6.2)
SODIUM SERPL-SCNC: 147 MMOL/L (ref 136–145)
WBC # BLD AUTO: 11.7 K/UL (ref 3.9–12.7)

## 2019-10-26 PROCEDURE — 85025 COMPLETE CBC W/AUTO DIFF WBC: CPT

## 2019-10-26 PROCEDURE — 63600175 PHARM REV CODE 636 W HCPCS: Performed by: SURGERY

## 2019-10-26 PROCEDURE — 63600175 PHARM REV CODE 636 W HCPCS: Performed by: INTERNAL MEDICINE

## 2019-10-26 PROCEDURE — 25000003 PHARM REV CODE 250: Performed by: INTERNAL MEDICINE

## 2019-10-26 PROCEDURE — 99900035 HC TECH TIME PER 15 MIN (STAT)

## 2019-10-26 PROCEDURE — C9113 INJ PANTOPRAZOLE SODIUM, VIA: HCPCS | Performed by: SURGERY

## 2019-10-26 PROCEDURE — 94640 AIRWAY INHALATION TREATMENT: CPT

## 2019-10-26 PROCEDURE — 25000242 PHARM REV CODE 250 ALT 637 W/ HCPCS: Performed by: INTERNAL MEDICINE

## 2019-10-26 PROCEDURE — 25000003 PHARM REV CODE 250: Performed by: SURGERY

## 2019-10-26 PROCEDURE — 99024 POSTOP FOLLOW-UP VISIT: CPT | Mod: ,,, | Performed by: SURGERY

## 2019-10-26 PROCEDURE — 80053 COMPREHEN METABOLIC PANEL: CPT

## 2019-10-26 PROCEDURE — S0030 INJECTION, METRONIDAZOLE: HCPCS | Performed by: SURGERY

## 2019-10-26 PROCEDURE — 20000000 HC ICU ROOM

## 2019-10-26 PROCEDURE — 27000221 HC OXYGEN, UP TO 24 HOURS

## 2019-10-26 PROCEDURE — 99024 PR POST-OP FOLLOW-UP VISIT: ICD-10-PCS | Mod: ,,, | Performed by: SURGERY

## 2019-10-26 PROCEDURE — 94761 N-INVAS EAR/PLS OXIMETRY MLT: CPT

## 2019-10-26 RX ORDER — DEXTROSE MONOHYDRATE, SODIUM CHLORIDE, AND POTASSIUM CHLORIDE 50; 2.98; 4.5 G/1000ML; G/1000ML; G/1000ML
INJECTION, SOLUTION INTRAVENOUS CONTINUOUS
Status: DISCONTINUED | OUTPATIENT
Start: 2019-10-26 | End: 2019-10-28

## 2019-10-26 RX ADMIN — IPRATROPIUM BROMIDE 0.5 MG: 0.5 SOLUTION RESPIRATORY (INHALATION) at 07:10

## 2019-10-26 RX ADMIN — DEXTROSE MONOHYDRATE, SODIUM CHLORIDE, AND POTASSIUM CHLORIDE 1 ML: 50; 4.5; 2.98 INJECTION, SOLUTION INTRAVENOUS at 10:10

## 2019-10-26 RX ADMIN — KETOROLAC TROMETHAMINE 15 MG: 30 INJECTION, SOLUTION INTRAMUSCULAR at 12:10

## 2019-10-26 RX ADMIN — METRONIDAZOLE 500 MG: 500 INJECTION, SOLUTION INTRAVENOUS at 06:10

## 2019-10-26 RX ADMIN — METOPROLOL TARTRATE 5 MG: 5 INJECTION INTRAVENOUS at 05:10

## 2019-10-26 RX ADMIN — HEPARIN SODIUM 5000 UNITS: 5000 INJECTION, SOLUTION INTRAVENOUS; SUBCUTANEOUS at 05:10

## 2019-10-26 RX ADMIN — POTASSIUM CHLORIDE, DEXTROSE MONOHYDRATE AND SODIUM CHLORIDE 1000 ML: 150; 5; 450 INJECTION, SOLUTION INTRAVENOUS at 12:10

## 2019-10-26 RX ADMIN — ERTAPENEM SODIUM 1 G: 1 INJECTION, POWDER, LYOPHILIZED, FOR SOLUTION INTRAMUSCULAR; INTRAVENOUS at 09:10

## 2019-10-26 RX ADMIN — LEVALBUTEROL HYDROCHLORIDE 1.25 MG: 1.25 SOLUTION, CONCENTRATE RESPIRATORY (INHALATION) at 07:10

## 2019-10-26 RX ADMIN — HYDROMORPHONE HYDROCHLORIDE 1 MG: 2 INJECTION, SOLUTION INTRAMUSCULAR; INTRAVENOUS; SUBCUTANEOUS at 08:10

## 2019-10-26 RX ADMIN — PANTOPRAZOLE SODIUM 40 MG: 40 INJECTION, POWDER, LYOPHILIZED, FOR SOLUTION INTRAVENOUS at 08:10

## 2019-10-26 RX ADMIN — HYDROMORPHONE HYDROCHLORIDE 1 MG: 2 INJECTION, SOLUTION INTRAMUSCULAR; INTRAVENOUS; SUBCUTANEOUS at 04:10

## 2019-10-26 RX ADMIN — HEPARIN SODIUM 5000 UNITS: 5000 INJECTION, SOLUTION INTRAVENOUS; SUBCUTANEOUS at 01:10

## 2019-10-26 RX ADMIN — IPRATROPIUM BROMIDE 0.5 MG: 0.5 SOLUTION RESPIRATORY (INHALATION) at 03:10

## 2019-10-26 RX ADMIN — LEVALBUTEROL HYDROCHLORIDE 1.25 MG: 1.25 SOLUTION, CONCENTRATE RESPIRATORY (INHALATION) at 12:10

## 2019-10-26 RX ADMIN — DEXTROSE MONOHYDRATE, SODIUM CHLORIDE, AND POTASSIUM CHLORIDE: 50; 4.5; 2.98 INJECTION, SOLUTION INTRAVENOUS at 11:10

## 2019-10-26 RX ADMIN — ERTAPENEM SODIUM 1 G: 1 INJECTION, POWDER, LYOPHILIZED, FOR SOLUTION INTRAMUSCULAR; INTRAVENOUS at 01:10

## 2019-10-26 RX ADMIN — METOPROLOL TARTRATE 5 MG: 5 INJECTION INTRAVENOUS at 01:10

## 2019-10-26 RX ADMIN — LEVALBUTEROL HYDROCHLORIDE 1.25 MG: 1.25 SOLUTION, CONCENTRATE RESPIRATORY (INHALATION) at 03:10

## 2019-10-26 RX ADMIN — METOPROLOL TARTRATE 5 MG: 5 INJECTION INTRAVENOUS at 11:10

## 2019-10-26 RX ADMIN — METRONIDAZOLE 500 MG: 500 INJECTION, SOLUTION INTRAVENOUS at 09:10

## 2019-10-26 RX ADMIN — HYDROMORPHONE HYDROCHLORIDE 1 MG: 2 INJECTION, SOLUTION INTRAMUSCULAR; INTRAVENOUS; SUBCUTANEOUS at 11:10

## 2019-10-26 RX ADMIN — METOPROLOL TARTRATE 5 MG: 5 INJECTION INTRAVENOUS at 12:10

## 2019-10-26 RX ADMIN — HEPARIN SODIUM 5000 UNITS: 5000 INJECTION, SOLUTION INTRAVENOUS; SUBCUTANEOUS at 09:10

## 2019-10-26 RX ADMIN — IPRATROPIUM BROMIDE 0.5 MG: 0.5 SOLUTION RESPIRATORY (INHALATION) at 12:10

## 2019-10-26 NOTE — PROGRESS NOTES
[facility] - ICU Hospital Medicine  Progress Note    Patient Name: Miguel Angel Apple Sr.  MRN: [id number]  Patient Class: IP- Inpatient   Admission Date: 10/19/2019  Length of Stay: 7 days  Attending Physician: Hima Mccoy MD  Primary Care Provider: Primary Doctor No        Subjective:     Principal Problem:Large bowel obstruction        HPI:  Patient's history began approximately 3 months ago.  He presented to the hospital well over a month ago given a diagnosis of he understands colitis.  Treated with antibiotics IV fluids.  He saw Dr. ONEIDA charles on 10/03 placed on Cipro and Flagyl.  His on once completed that dose.  This past Sunday he started noticing that he was becoming  bloated and stopped eating solids at that time went back to liquids.  His had no pain pills for 2-3 days.  No nausea and vomiting unless he ate something that he knew he should the.  He attempted to make himself vomit yesterday and could not.  He has had small bowel movements to continue since that time.  CT scan shows colonic dilatation with the cecum being 10 cm.  The possible obstruction may be low left-sided.  There is even suggesting possibility have a rectal mass.  WBC remained 16,000 with a left shift.  Patient's potassium is 2.2.    Overview/Hospital Course:  IV fluids IV potassium NPO Surgical consultation repeat KUB and lab in a.m. further clinical decisions depending on clinical course.  Evaluation for [place] syndrome.  The patient has had no prior abdominal surgery and no medical history prior to approximately 3 months ago.  10/20/2019.  Long discussion with the patient this morning concerning the findings.  Dr. ONEIDA charles see the patient today since he has been involved in his care on 10/03.  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  Patient continue IV fluids IV antibiotics potassium replacement today and will have 3 Fleet's enemas today.   CEA is been ordered.    10/21/2019:  Patient is comfortable in room although it does appear quite anxious.  The patient will be having EGD and colonoscopy later today.  Based on the findings there the patient will may need undergo surgery.  Patient denies any pain and states that he was not throwing up and having no fever or chills.  Patient is distended and has some abdominal pain associated.  Labs showed [place] blood [place] 11524 hemoglobin hematocrit 9.8 and 31 sodium 145 potassium 2.4 chloride 108 bicarb 21 and alk-phos was 49    10/22/2019:  Patient is stable postop day 1.  Patient states pain is well controlled.  Patient mildly tachycardic blood pressure 115/61 979 O2 sats 100%.  Labs show white blood cell count 21.5 hemoglobin 9.2 hematocrit 29.3 platelets 407 and BUN creatinine were 8 and 0.6 calcium 7.5 magnesium 1.4.  Patient is in good spirits and bowel sounds are present.    10/23/2019:  Postop day 2.:  Patient is having much more abdominal pain with waves of pain that appeared to be related to air movement in the bowel versus bowel regaining function.  Patient is alert and oriented and is mildly tachycardic with pulse 123, blood pressure 130/65 O2 sat 95%.  Labs show a improved white count of today of 19,000 hemoglobin 8.9 hematocrit 28 platelets 396 and sodium 141 potassium 3.1 albumin 1.6 and liver functions within normal ranges.  GFR greater than 60    10/24/2019:  Patient is stable and improving postop day 2 .  Colostomy bag has some gas and patient bowel sounds are very active.  Intake was 02/30/2027 output was 1745 and a net gain of 1.4 L. labs showed a white blood cell count 15.9 hemoglobin 8.7 hematocrit 27.7 and platelets 373.  Normal differential noted. Labs:  Sodium 146 potassium 3.5 chloride 110 bicarb 24 glucose 66 and BUN creatinine were 9 and 0.6 otherwise GFR greater than 60.  Magnesium 1.9 phosphorus 4.0    10/25/2019:  Patient is awake alert and states his pain is under better  control today.  Patient is having gas in to his colostomy bag it had to be relieved on 2 occasions overnight.  Patient otherwise has been stable and heart rate is much improved on beta-blocker.  Labs showed sodium 148 potassium 3.2 chloride 111 bicarb 23 calcium is 7.7 and GFR greater than 60.  White blood cell count 10.5 hemoglobin 8.2 hematocrit 26 platelets 334 and differential was normal.  Patient otherwise is stable but will continue gastric decompression until flatus is obvious.  10/26/2019.  NG tube has been removed.  The patient's colostomy is working.  Aggressive respiratory therapy.  Incentive spirometry the patient best he can do this morning was a 1000.  Lungs decreased breath sounds in the bases secondary to atelectasis.  Discussed expectations respiratory therapy this morning.  Patient will become more mobile this a.m..  Discussed mobility with nursing staff.  Patient has been up in the chair twice last p.m..  Lab and x-rays have been reviewed.  Potassium is 3.2 with supplement.  Sodium is 147 this morning patient is receiving lactated Ringer's.  Patient's protein albumin are low with an albumin of 1.4.  Dietary consult for severe malnutrition.  Continue current level of care unless clinical course changes.  Lab has been ordered for in a.m..    Interval History:  NG tube has been removed.  Patient's colostomy is working well.  Patient can do 750-1000 on incentive spirometry.  Mobility is being increased.  Potassium is being supplemented.  Patient has significant malnutrition he was soon started diet have consult dietary this time.  All lab has been reviewed.  Continue current level of care unless clinical course changes.    Review of Systems   Constitutional: Positive for activity change, appetite change and unexpected weight change. Negative for chills, diaphoresis, fatigue and fever.   HENT: Negative for congestion, drooling, hearing loss and trouble swallowing.    Eyes: Negative for pain and visual  disturbance.   Respiratory: Negative.  Negative for apnea, cough, choking, chest tightness, shortness of breath and wheezing.         Patient is doing 750 on incentive spirometry was able to get him to do a 1000.   Cardiovascular: Negative for chest pain, palpitations and leg swelling.   Gastrointestinal: Negative for abdominal distention, abdominal pain, blood in stool, constipation, diarrhea, nausea and vomiting.        Colostomy is present and working well.   Endocrine: Negative for polydipsia, polyphagia and polyuria.   Genitourinary: Negative for difficulty urinating, dysuria and flank pain.   Musculoskeletal: Negative for arthralgias, back pain, gait problem, joint swelling, neck pain and neck stiffness.        Muscle mass decreased.   Skin: Negative for color change, rash and wound.   Allergic/Immunologic: Negative for environmental allergies, food allergies and immunocompromised state.   Neurological: Negative for dizziness, syncope, weakness, numbness and headaches.   Hematological: Negative for adenopathy.   Psychiatric/Behavioral: Negative for agitation, confusion and sleep disturbance. The patient is nervous/anxious.      Objective:     Vital Signs (Most Recent):  Temp: 98.1 °F (36.7 °C) (10/26/19 0733)  Pulse: 99 (10/26/19 0739)  Resp: (!) 22 (10/26/19 0739)  BP: 136/64 (10/26/19 0733)  SpO2: 98 % (10/26/19 0739) Vital Signs (24h Range):  Temp:  [98 °F (36.7 °C)-98.4 °F (36.9 °C)] 98.1 °F (36.7 °C)  Pulse:  [] 99  Resp:  [6-29] 22  SpO2:  [91 %-98 %] 98 %  BP: ()/(55-84) 136/64     Weight: 69.7 kg (153 lb 10.6 oz)  Body mass index is 22.69 kg/m².    Intake/Output Summary (Last 24 hours) at 10/26/2019 0900  Last data filed at 10/26/2019 0500  Gross per 24 hour   Intake 2701.67 ml   Output 960 ml   Net 1741.67 ml      Physical Exam   Constitutional: He is oriented to person, place, and time. He appears well-developed and well-nourished.   HENT:   Head: Normocephalic and atraumatic.   Right  Ear: External ear normal.   Left Ear: External ear normal.   Nose: Nose normal.   Eyes: Pupils are equal, round, and reactive to light. Conjunctivae, EOM and lids are normal.   Neck: Trachea normal, normal range of motion and full passive range of motion without pain. Neck supple.   Cardiovascular: Normal rate, regular rhythm, S1 normal, S2 normal, normal heart sounds, intact distal pulses and normal pulses.   Pulmonary/Chest: Effort normal and breath sounds normal.   Decreased breath sounds in the bases   Abdominal: Soft. Normal aorta and bowel sounds are normal.   Colostomy is present and appears to be working well.   Musculoskeletal: Normal range of motion.   Neurological: He is alert and oriented to person, place, and time. He has normal reflexes.   Skin: Skin is warm, dry and intact.   Psychiatric: His speech is normal and behavior is normal. Judgment and thought content normal. Cognition and memory are normal.   Minor depression secondary to condition.   Nursing note and vitals reviewed.      Significant Labs:   CBC:   Recent Labs   Lab 10/25/19  0500 10/26/19  0539   WBC 10.59 11.70   HGB 8.2* 8.3*   HCT 26.0* 27.0*   * 386*     CMP:   Recent Labs   Lab 10/25/19  0500 10/26/19  0539   * 147*   K 3.2* 3.2*   * 108   CO2 23 27   GLU 72 145*   BUN 9 6*   CREATININE 0.6 0.5   CALCIUM 7.7* 7.8*   PROT 4.3* 4.5*   ALBUMIN 1.4* 1.4*   BILITOT 1.4* 0.2   ALKPHOS 34* 40*   AST 14 13   ALT 15 14   ANIONGAP 14 12   EGFRNONAA >60.0 >60.0     All pertinent labs within the past 24 hours have been reviewed.    Significant Imaging: I have reviewed and interpreted all pertinent imaging results/findings within the past 24 hours.      Assessment/Plan:      * Large bowel obstruction  IV fluids IV potassium NPO KUB in lab in a.m. monitor closely.  Surgical consultation will be seen in a.m. by Dr. MOHAMUD General surgery seen the patient in the clinic on 10/03.  10/20/2019.  Extended conversation with Dr. Buck  concerning the patient's finding rectal mass bowel obstruction.  Correction of electrolytes IV fluid IV antibiotics NPO fleets enema today x3.  Colonoscope in a.m..  CEA is been ordered.    10/21/2019  1.  Treat today based on findings of colonoscopy or endoscopy.  2.  Continue current antibiotic treatment  3.  Follow surgery recommendations after final diagnosis made.  4.  Treat otherwise as needed based on clinical course  5.  Repeat labs in the a.m. and replete potassium as needed    10/22/2019:  1.  Stable postop day 1 with positive bowel sounds.  2.  We will replace calcium  3.  Repeat labs in the a.m. to include CBC and CMP magnesium  4.  Follow-up otherwise as clinically indicated based clinical course.    10/23/2019:  1.  Continue current antibiotic therapy.  2.  Give will blood bolus of 500 cc x1  3.  Change pain medication to Dilaudid 1 mg every 2 hr as needed for pain  4.  A feel tachycardia may be related to pain versus is mildly fluid depleted.  5.  Replace electrolytes as needed.  6.  Intake and output revealed a positive 2500 cc sore +overnight    10/24/2019:  1.  Continue current medications.  2.  Repeat labs in the a.m. to monitor white blood cell count and  3.  Begin metoprolol 12.5 mg IV q.12 hours or heart rate and  4.  Continue other medications as ordered and await bowel function.    10/25/2019:  1.  Postop day 3.  Patient having no fever chills nausea or vomiting and states pain is well controlled this morning.  2.  We will replace potassium and calcium IV  3.  Repeat labs in the a.m. to include CBC and CMP.  Patient otherwise has been afebrile and postop course have been normal  10/26/2019.  Postop day 4.  Patient is doing well.  NG tube has been removed.  Colostomy is working well.  Replace potassium today.  All labs have been reviewed.  Albumin 1.4 the patient has severe malnutrition he will soon be starting a diet of consult dietary.  Lab in a.m..  Encouraged incentive spirometry and  mobility.  Patient is concerned about his overall condition.  Probably at this point has mild depression secondary to all the current medical problems. Patient did ask about pathology report I told him that I would look for more than likely would be back to the 1st of the week.  Continue current level of care unless clinical course changes.    Intestinal obstruction  Patient now postop day 1 from bowel resection.  Stable postop day 1 doing well and postoperative course at this time      Hypokalemia  10/25/2019:  Will replace IV for this morning.  10/26/2019 will replace potassium p.o. today.      VTE Risk Mitigation (From admission, onward)         Ordered     heparin (porcine) injection 5,000 Units  Every 8 hours      10/21/19 1753     Place sequential compression device  Until discontinued      10/21/19 1753     IP VTE LOW RISK PATIENT  Once      10/19/19 1752     Place MAE hose  Until discontinued      10/19/19 1752                      Hima Mccoy MD  Department of Hospital Medicine   [facility] - [facility]

## 2019-10-26 NOTE — NURSING
Pt. Ambulated approx. 100 feet with walker. Tolerated well. Pt. Sitting up in bedside chair at this time.

## 2019-10-26 NOTE — PROGRESS NOTES
[facility] - ICU  General Surgery  Progress Note  10/26/2019    Patient Name: Miguel Angel Apple Sr.  MRN: [id number]  Admission Date: 10/19/2019  Hospital Day: 8    POD #:  5  Central Line:  Day 6; Site/Position good  Antibiotics:  Invanz Day 7; Flagyl Day 5    Interval History:  No complaints.  Pain controlled.  No nausea or vomiting.  Passing stool via stoma.  Passing flatus via stoma.  Mobility excellent.  Hungry.    Vitals:  Afebrile.  Good vital signs. Occasional mild sinus tachycardia.  Occasional mild tachypnea.  Good oxygen saturations 2 L BNC.    I/O:  Inaccurate  UOP:  Good      Exam:   Gen - Comfortable.  Nontoxic.  No acute distress.  CV - Regular rate and rhythm.  Good perfusion.  No edema.  Pulm - Clear to auscultation.  Nonlabored.   Abd - Soft.  Nondistended.  Expected tenderness only.  Normoactive normopitched bowel                      sounds.  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  Integument - No rashes.  No decubiti.  No jaundice.  Incision healing well without erythema, edema, exudate, induration, fluctuance, crepitus, necrosis, discoloration, separation, etc.  Ext - No edema.  No cords.  No tenderness.      Lab Results:  No leukocytosis or shift.  Stable normochromic normocytic anemia.  Stable mild thrombocytosis.  Mild hypernatremia.  Mild hypokalemia.  Mild hypocalcemia, likely corrects.  BUN and creatinine good.  Mild hyperglycemia.  No evidence of hepatocellular disease or biliary obstruction.    Radiology Results:  No new radiology results    Pathology Results:  Pending      Assessment:  Satisfactory postoperative recovery.  No evident complications. Slow return of GI function, expected.  Mild acute blood loss anemia, expected and stable.  Hypernatremia and hypokalemia.      Plan:  Adjust IV fluids.  Continue supportive care.  Clear liquid diet.        Torres Dawkins MD FACS

## 2019-10-26 NOTE — SUBJECTIVE & OBJECTIVE
Interval History:  NG tube has been removed.  Patient's colostomy is working well.  Patient can do 750-1000 on incentive spirometry.  Mobility is being increased.  Potassium is being supplemented.  Patient has significant malnutrition he was soon started diet have consult dietary this time.  All lab has been reviewed.  Continue current level of care unless clinical course changes.    Review of Systems   Constitutional: Positive for activity change, appetite change and unexpected weight change. Negative for chills, diaphoresis, fatigue and fever.   HENT: Negative for congestion, drooling, hearing loss and trouble swallowing.    Eyes: Negative for pain and visual disturbance.   Respiratory: Negative.  Negative for apnea, cough, choking, chest tightness, shortness of breath and wheezing.         Patient is doing 750 on incentive spirometry was able to get him to do a 1000.   Cardiovascular: Negative for chest pain, palpitations and leg swelling.   Gastrointestinal: Negative for abdominal distention, abdominal pain, blood in stool, constipation, diarrhea, nausea and vomiting.        Colostomy is present and working well.   Endocrine: Negative for polydipsia, polyphagia and polyuria.   Genitourinary: Negative for difficulty urinating, dysuria and flank pain.   Musculoskeletal: Negative for arthralgias, back pain, gait problem, joint swelling, neck pain and neck stiffness.        Muscle mass decreased.   Skin: Negative for color change, rash and wound.   Allergic/Immunologic: Negative for environmental allergies, food allergies and immunocompromised state.   Neurological: Negative for dizziness, syncope, weakness, numbness and headaches.   Hematological: Negative for adenopathy.   Psychiatric/Behavioral: Negative for agitation, confusion and sleep disturbance. The patient is nervous/anxious.      Objective:     Vital Signs (Most Recent):  Temp: 98.1 °F (36.7 °C) (10/26/19 0733)  Pulse: 99 (10/26/19 0739)  Resp: (!) 22  (10/26/19 0739)  BP: 136/64 (10/26/19 0733)  SpO2: 98 % (10/26/19 0739) Vital Signs (24h Range):  Temp:  [98 °F (36.7 °C)-98.4 °F (36.9 °C)] 98.1 °F (36.7 °C)  Pulse:  [] 99  Resp:  [6-29] 22  SpO2:  [91 %-98 %] 98 %  BP: ()/(55-84) 136/64     Weight: 69.7 kg (153 lb 10.6 oz)  Body mass index is 22.69 kg/m².    Intake/Output Summary (Last 24 hours) at 10/26/2019 0900  Last data filed at 10/26/2019 0500  Gross per 24 hour   Intake 2701.67 ml   Output 960 ml   Net 1741.67 ml      Physical Exam   Constitutional: He is oriented to person, place, and time. He appears well-developed and well-nourished.   HENT:   Head: Normocephalic and atraumatic.   Right Ear: External ear normal.   Left Ear: External ear normal.   Nose: Nose normal.   Eyes: Pupils are equal, round, and reactive to light. Conjunctivae, EOM and lids are normal.   Neck: Trachea normal, normal range of motion and full passive range of motion without pain. Neck supple.   Cardiovascular: Normal rate, regular rhythm, S1 normal, S2 normal, normal heart sounds, intact distal pulses and normal pulses.   Pulmonary/Chest: Effort normal and breath sounds normal.   Decreased breath sounds in the bases   Abdominal: Soft. Normal aorta and bowel sounds are normal.   Colostomy is present and appears to be working well.   Musculoskeletal: Normal range of motion.   Neurological: He is alert and oriented to person, place, and time. He has normal reflexes.   Skin: Skin is warm, dry and intact.   Psychiatric: His speech is normal and behavior is normal. Judgment and thought content normal. Cognition and memory are normal.   Minor depression secondary to condition.   Nursing note and vitals reviewed.      Significant Labs:   CBC:   Recent Labs   Lab 10/25/19  0500 10/26/19  0539   WBC 10.59 11.70   HGB 8.2* 8.3*   HCT 26.0* 27.0*   * 386*     CMP:   Recent Labs   Lab 10/25/19  0500 10/26/19  0539   * 147*   K 3.2* 3.2*   * 108   CO2 23 27   GLU 72  145*   BUN 9 6*   CREATININE 0.6 0.5   CALCIUM 7.7* 7.8*   PROT 4.3* 4.5*   ALBUMIN 1.4* 1.4*   BILITOT 1.4* 0.2   ALKPHOS 34* 40*   AST 14 13   ALT 15 14   ANIONGAP 14 12   EGFRNONAA >60.0 >60.0     All pertinent labs within the past 24 hours have been reviewed.    Significant Imaging: I have reviewed and interpreted all pertinent imaging results/findings within the past 24 hours.

## 2019-10-26 NOTE — NURSING
No acute changes at this time. Pt. Appears to be resting comfortably in bed. Call bell within reach, will continue to monitor.

## 2019-10-26 NOTE — CARE UPDATE
"Uneventful night, VSS, afebrile. Repositions independently with min assist.Tolerating resp tx, continues with thick secretions, able to cough up phlegm. IS exercises performed as ordered, able to pull 1000. Encouraged to increase activity, refused bath and linen change this am. Stated, " I just want to sleep".Medicated x2 for pain. Abd dressing change completed, incision clean, pink, no drainage noted. Colostomy bag replaced, stoma is dark red, scant amount of blood noted, franco drain intact.   "

## 2019-10-26 NOTE — PLAN OF CARE
Continue post op treatment. Cough deep breathing exercises. Pain control, Mobility exercises along with ambulation.

## 2019-10-26 NOTE — NURSING
On assessment pt. Resting in bed comfortably, no acute events overnight, plans are to ambulate pt. With elizabeth. Assistance today in hallways. Midline abd. Incision clean, dry and intact, stoma, pinked up, with normal drainage, colostomy appliance, clean, dry and intact. Bowel sound hyperactive in all four quads. Flatus present in colostomy bag, with dark stool drainage. Vitals wnl, Pt. Has productive cough, with moderate amount of yellow sputum, lungs clear bilateral. Pt. Pulling 1000 mlx10 breaths with incentive spirometry. Call bell within reach, will continue to closely monitor.

## 2019-10-26 NOTE — NURSING
No acute changes at this time, vitals wnl, call bell within reach. Will continue to closely monitor.

## 2019-10-26 NOTE — ASSESSMENT & PLAN NOTE
IV fluids IV potassium NPO KUB in lab in a.m. monitor closely.  Surgical consultation will be seen in a.m. by Dr. MOHAMUD General surgery seen the patient in the clinic on 10/03.  10/20/2019.  Extended conversation with Dr. Buck concerning the patient's finding rectal mass bowel obstruction.  Correction of electrolytes IV fluid IV antibiotics NPO fleets enema today x3.  Colonoscope in a.m..  CEA is been ordered.    10/21/2019  1.  Treat today based on findings of colonoscopy or endoscopy.  2.  Continue current antibiotic treatment  3.  Follow surgery recommendations after final diagnosis made.  4.  Treat otherwise as needed based on clinical course  5.  Repeat labs in the a.m. and replete potassium as needed    10/22/2019:  1.  Stable postop day 1 with positive bowel sounds.  2.  We will replace calcium  3.  Repeat labs in the a.m. to include CBC and CMP magnesium  4.  Follow-up otherwise as clinically indicated based clinical course.    10/23/2019:  1.  Continue current antibiotic therapy.  2.  Give will blood bolus of 500 cc x1  3.  Change pain medication to Dilaudid 1 mg every 2 hr as needed for pain  4.  A feel tachycardia may be related to pain versus is mildly fluid depleted.  5.  Replace electrolytes as needed.  6.  Intake and output revealed a positive 2500 cc sore +overnight    10/24/2019:  1.  Continue current medications.  2.  Repeat labs in the a.m. to monitor white blood cell count and  3.  Begin metoprolol 12.5 mg IV q.12 hours or heart rate and  4.  Continue other medications as ordered and await bowel function.    10/25/2019:  1.  Postop day 3.  Patient having no fever chills nausea or vomiting and states pain is well controlled this morning.  2.  We will replace potassium and calcium IV  3.  Repeat labs in the a.m. to include CBC and CMP.  Patient otherwise has been afebrile and postop course have been normal  10/26/2019.  Postop day 4.  Patient is doing well.  NG tube has been removed.  Colostomy is  working well.  Replace potassium today.  All labs have been reviewed.  Albumin 1.4 the patient has severe malnutrition he will soon be starting a diet of consult dietary.  Lab in a.m..  Encouraged incentive spirometry and mobility.  Patient is concerned about his overall condition.  Probably at this point has mild depression secondary to all the current medical problems. Patient did ask about pathology report I told him that I would look for more than likely would be back to the 1st of the week.  Continue current level of care unless clinical course changes.

## 2019-10-27 LAB
ALBUMIN SERPL BCP-MCNC: 1.4 G/DL (ref 3.5–5.2)
ALP SERPL-CCNC: 52 U/L (ref 55–135)
ALT SERPL W/O P-5'-P-CCNC: 13 U/L (ref 10–44)
ANION GAP SERPL CALC-SCNC: 10 MMOL/L (ref 8–16)
AST SERPL-CCNC: 14 U/L (ref 10–40)
BASOPHILS # BLD AUTO: 0.02 K/UL (ref 0–0.2)
BASOPHILS NFR BLD: 0.2 % (ref 0–1.9)
BILIRUB SERPL-MCNC: 0.4 MG/DL (ref 0.1–1)
BUN SERPL-MCNC: <5 MG/DL (ref 8–23)
CALCIUM SERPL-MCNC: 7.5 MG/DL (ref 8.7–10.5)
CHLORIDE SERPL-SCNC: 108 MMOL/L (ref 95–110)
CO2 SERPL-SCNC: 29 MMOL/L (ref 23–29)
CREAT SERPL-MCNC: 0.6 MG/DL (ref 0.5–1.4)
DIFFERENTIAL METHOD: ABNORMAL
EOSINOPHIL # BLD AUTO: 0.2 K/UL (ref 0–0.5)
EOSINOPHIL NFR BLD: 1.8 % (ref 0–8)
ERYTHROCYTE [DISTWIDTH] IN BLOOD BY AUTOMATED COUNT: 18.6 % (ref 11.5–14.5)
EST. GFR  (AFRICAN AMERICAN): >60 ML/MIN/1.73 M^2
EST. GFR  (NON AFRICAN AMERICAN): >60 ML/MIN/1.73 M^2
GLUCOSE SERPL-MCNC: 116 MG/DL (ref 70–110)
HCT VFR BLD AUTO: 26.8 % (ref 40–54)
HGB BLD-MCNC: 8.1 G/DL (ref 14–18)
IMM GRANULOCYTES # BLD AUTO: 0.07 K/UL (ref 0–0.04)
IMM GRANULOCYTES NFR BLD AUTO: 0.6 % (ref 0–0.5)
LYMPHOCYTES # BLD AUTO: 1.4 K/UL (ref 1–4.8)
LYMPHOCYTES NFR BLD: 12.8 % (ref 18–48)
MAGNESIUM SERPL-MCNC: 1.5 MG/DL (ref 1.6–2.6)
MCH RBC QN AUTO: 27.8 PG (ref 27–31)
MCHC RBC AUTO-ENTMCNC: 30.2 G/DL (ref 32–36)
MCV RBC AUTO: 92 FL (ref 82–98)
MONOCYTES # BLD AUTO: 1.4 K/UL (ref 0.3–1)
MONOCYTES NFR BLD: 12.7 % (ref 4–15)
NEUTROPHILS # BLD AUTO: 7.9 K/UL (ref 1.8–7.7)
NEUTROPHILS NFR BLD: 71.9 % (ref 38–73)
NRBC BLD-RTO: 0 /100 WBC
PHOSPHATE SERPL-MCNC: 3.4 MG/DL (ref 2.7–4.5)
PLATELET # BLD AUTO: 400 K/UL (ref 150–350)
PMV BLD AUTO: 10 FL (ref 9.2–12.9)
POTASSIUM SERPL-SCNC: 3.1 MMOL/L (ref 3.5–5.1)
PROT SERPL-MCNC: 4.4 G/DL (ref 6–8.4)
RBC # BLD AUTO: 2.91 M/UL (ref 4.6–6.2)
SODIUM SERPL-SCNC: 147 MMOL/L (ref 136–145)
WBC # BLD AUTO: 10.95 K/UL (ref 3.9–12.7)

## 2019-10-27 PROCEDURE — 25000003 PHARM REV CODE 250: Performed by: SURGERY

## 2019-10-27 PROCEDURE — 27000221 HC OXYGEN, UP TO 24 HOURS

## 2019-10-27 PROCEDURE — 25000242 PHARM REV CODE 250 ALT 637 W/ HCPCS: Performed by: INTERNAL MEDICINE

## 2019-10-27 PROCEDURE — 25000003 PHARM REV CODE 250: Performed by: INTERNAL MEDICINE

## 2019-10-27 PROCEDURE — 94640 AIRWAY INHALATION TREATMENT: CPT

## 2019-10-27 PROCEDURE — 20000000 HC ICU ROOM

## 2019-10-27 PROCEDURE — 63600175 PHARM REV CODE 636 W HCPCS: Performed by: INTERNAL MEDICINE

## 2019-10-27 PROCEDURE — 94760 N-INVAS EAR/PLS OXIMETRY 1: CPT

## 2019-10-27 PROCEDURE — 99900035 HC TECH TIME PER 15 MIN (STAT)

## 2019-10-27 PROCEDURE — 63600175 PHARM REV CODE 636 W HCPCS: Performed by: SURGERY

## 2019-10-27 PROCEDURE — 84100 ASSAY OF PHOSPHORUS: CPT

## 2019-10-27 PROCEDURE — 94761 N-INVAS EAR/PLS OXIMETRY MLT: CPT

## 2019-10-27 PROCEDURE — 80053 COMPREHEN METABOLIC PANEL: CPT

## 2019-10-27 PROCEDURE — 83735 ASSAY OF MAGNESIUM: CPT

## 2019-10-27 PROCEDURE — S0030 INJECTION, METRONIDAZOLE: HCPCS | Performed by: SURGERY

## 2019-10-27 PROCEDURE — 85025 COMPLETE CBC W/AUTO DIFF WBC: CPT

## 2019-10-27 RX ORDER — POTASSIUM CHLORIDE 14.9 MG/ML
20 INJECTION INTRAVENOUS EVERY 6 HOURS
Status: COMPLETED | OUTPATIENT
Start: 2019-10-27 | End: 2019-10-28

## 2019-10-27 RX ORDER — PANTOPRAZOLE SODIUM 40 MG/10ML
40 INJECTION, POWDER, LYOPHILIZED, FOR SOLUTION INTRAVENOUS DAILY
Status: DISCONTINUED | OUTPATIENT
Start: 2019-10-27 | End: 2019-10-27

## 2019-10-27 RX ORDER — PANTOPRAZOLE SODIUM 40 MG/1
40 TABLET, DELAYED RELEASE ORAL DAILY
Status: DISCONTINUED | OUTPATIENT
Start: 2019-10-27 | End: 2019-10-27

## 2019-10-27 RX ORDER — MAGNESIUM SULFATE HEPTAHYDRATE 40 MG/ML
2 INJECTION, SOLUTION INTRAVENOUS
Status: COMPLETED | OUTPATIENT
Start: 2019-10-27 | End: 2019-10-27

## 2019-10-27 RX ORDER — CITALOPRAM 10 MG/1
10 TABLET ORAL DAILY
Status: DISCONTINUED | OUTPATIENT
Start: 2019-10-27 | End: 2019-11-05 | Stop reason: HOSPADM

## 2019-10-27 RX ORDER — PANTOPRAZOLE SODIUM 40 MG/1
40 TABLET, DELAYED RELEASE ORAL DAILY
Status: DISCONTINUED | OUTPATIENT
Start: 2019-10-27 | End: 2019-11-05 | Stop reason: HOSPADM

## 2019-10-27 RX ADMIN — IPRATROPIUM BROMIDE 0.5 MG: 0.5 SOLUTION RESPIRATORY (INHALATION) at 12:10

## 2019-10-27 RX ADMIN — MAGNESIUM SULFATE 2 G/HR: 2 INJECTION INTRAVENOUS at 08:10

## 2019-10-27 RX ADMIN — POTASSIUM CHLORIDE 20 MEQ: 200 INJECTION, SOLUTION INTRAVENOUS at 12:10

## 2019-10-27 RX ADMIN — METRONIDAZOLE 500 MG: 500 INJECTION, SOLUTION INTRAVENOUS at 02:10

## 2019-10-27 RX ADMIN — LEVALBUTEROL HYDROCHLORIDE 1.25 MG: 1.25 SOLUTION, CONCENTRATE RESPIRATORY (INHALATION) at 12:10

## 2019-10-27 RX ADMIN — HEPARIN SODIUM 5000 UNITS: 5000 INJECTION, SOLUTION INTRAVENOUS; SUBCUTANEOUS at 06:10

## 2019-10-27 RX ADMIN — HYDROMORPHONE HYDROCHLORIDE 1 MG: 2 INJECTION, SOLUTION INTRAMUSCULAR; INTRAVENOUS; SUBCUTANEOUS at 01:10

## 2019-10-27 RX ADMIN — HEPARIN SODIUM 5000 UNITS: 5000 INJECTION, SOLUTION INTRAVENOUS; SUBCUTANEOUS at 10:10

## 2019-10-27 RX ADMIN — DEXTROSE MONOHYDRATE, SODIUM CHLORIDE, AND POTASSIUM CHLORIDE: 50; 4.5; 2.98 INJECTION, SOLUTION INTRAVENOUS at 01:10

## 2019-10-27 RX ADMIN — HYDROMORPHONE HYDROCHLORIDE 1 MG: 2 INJECTION, SOLUTION INTRAMUSCULAR; INTRAVENOUS; SUBCUTANEOUS at 02:10

## 2019-10-27 RX ADMIN — METOPROLOL TARTRATE 5 MG: 5 INJECTION INTRAVENOUS at 06:10

## 2019-10-27 RX ADMIN — CITALOPRAM HYDROBROMIDE 10 MG: 10 TABLET ORAL at 08:10

## 2019-10-27 RX ADMIN — MAGNESIUM SULFATE 2 G/HR: 2 INJECTION INTRAVENOUS at 10:10

## 2019-10-27 RX ADMIN — HYDROMORPHONE HYDROCHLORIDE 1 MG: 2 INJECTION, SOLUTION INTRAMUSCULAR; INTRAVENOUS; SUBCUTANEOUS at 08:10

## 2019-10-27 RX ADMIN — HYDROMORPHONE HYDROCHLORIDE 1 MG: 2 INJECTION, SOLUTION INTRAMUSCULAR; INTRAVENOUS; SUBCUTANEOUS at 07:10

## 2019-10-27 RX ADMIN — ERTAPENEM SODIUM 1 G: 1 INJECTION, POWDER, LYOPHILIZED, FOR SOLUTION INTRAMUSCULAR; INTRAVENOUS at 09:10

## 2019-10-27 RX ADMIN — PANTOPRAZOLE SODIUM 40 MG: 40 TABLET, DELAYED RELEASE ORAL at 09:10

## 2019-10-27 RX ADMIN — POTASSIUM CHLORIDE 20 MEQ: 200 INJECTION, SOLUTION INTRAVENOUS at 05:10

## 2019-10-27 RX ADMIN — METOPROLOL TARTRATE 5 MG: 5 INJECTION INTRAVENOUS at 12:10

## 2019-10-27 RX ADMIN — METRONIDAZOLE 500 MG: 500 INJECTION, SOLUTION INTRAVENOUS at 05:10

## 2019-10-27 RX ADMIN — IPRATROPIUM BROMIDE 0.5 MG: 0.5 SOLUTION RESPIRATORY (INHALATION) at 07:10

## 2019-10-27 RX ADMIN — METRONIDAZOLE 500 MG: 500 INJECTION, SOLUTION INTRAVENOUS at 09:10

## 2019-10-27 RX ADMIN — METOPROLOL TARTRATE 5 MG: 5 INJECTION INTRAVENOUS at 05:10

## 2019-10-27 RX ADMIN — LEVALBUTEROL HYDROCHLORIDE 1.25 MG: 1.25 SOLUTION, CONCENTRATE RESPIRATORY (INHALATION) at 07:10

## 2019-10-27 RX ADMIN — HEPARIN SODIUM 5000 UNITS: 5000 INJECTION, SOLUTION INTRAVENOUS; SUBCUTANEOUS at 01:10

## 2019-10-27 NOTE — NURSING
On assessment, pt. Appears to be resting comfortably, with eyes close. No acute events overnight, will continue to ambulate pt.  at times, get pt. To bedside chair. Vitals wnl, call bell within reach. Will continue to closely monitor.

## 2019-10-27 NOTE — NURSING
Pt. Cont. To rest comfortably, in bed. No acute changes, vitals wnl, call bell within reach. Will continue to closely monitor.

## 2019-10-27 NOTE — PROGRESS NOTES
[facility] - ICU Hospital Medicine  Progress Note    Patient Name: Miguel Angel Apple Sr.  MRN: [id number]  Patient Class: IP- Inpatient   Admission Date: 10/19/2019  Length of Stay: 8 days  Attending Physician: Hima Mccoy MD  Primary Care Provider: Primary Doctor No        Subjective:     Principal Problem:Large bowel obstruction        HPI:  Patient's history began approximately 3 months ago.  He presented to the hospital well over a month ago given a diagnosis of he understands colitis.  Treated with antibiotics IV fluids.  He saw Dr. ONEIDA charles on 10/03 placed on Cipro and Flagyl.  His on once completed that dose.  This past Sunday he started noticing that he was becoming  bloated and stopped eating solids at that time went back to liquids.  His had no pain pills for 2-3 days.  No nausea and vomiting unless he ate something that he knew he should the.  He attempted to make himself vomit yesterday and could not.  He has had small bowel movements to continue since that time.  CT scan shows colonic dilatation with the cecum being 10 cm.  The possible obstruction may be low left-sided.  There is even suggesting possibility have a rectal mass.  WBC remained 16,000 with a left shift.  Patient's potassium is 2.2.    Overview/Hospital Course:  IV fluids IV potassium NPO Surgical consultation repeat KUB and lab in a.m. further clinical decisions depending on clinical course.  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  The patient has had no prior abdominal surgery and no medical history prior to approximately 3 months ago.  10/20/2019.  Long discussion with the patient this morning concerning the findings.  Dr. ONEIDA charles see the patient today since he has been involved in his care on 10/03.  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  Patient continue IV fluids IV antibiotics potassium replacement today and will have 3 Fleet's enemas today.   CEA is been ordered.    10/21/2019:  Patient is comfortable in room although it does appear quite anxious.  The patient will be having EGD and colonoscopy later today.  Based on the findings there the patient will may need undergo surgery.  Patient denies any pain and states that he was not throwing up and having no fever or chills.  Patient is distended and has some abdominal pain associated.  Labs showed [place] [address] hemoglobin hematocrit 9.8 and 31 sodium 145 potassium 2.4 chloride 108 bicarb 21 and alk-phos was 49    10/22/2019:  Patient is stable postop day 1.  Patient states pain is well controlled.  Patient mildly tachycardic blood pressure 115/61 979 O2 sats 100%.  Labs show white blood cell count 21.5 hemoglobin 9.2 hematocrit 29.3 platelets 407 and BUN creatinine were 8 and 0.6 calcium 7.5 magnesium 1.4.  Patient is in good spirits and bowel sounds are present.    10/23/2019:  Postop day 2.:  Patient is having much more abdominal pain with waves of pain that appeared to be related to air movement in the bowel versus bowel regaining function.  Patient is alert and oriented and is mildly tachycardic with pulse 123, blood pressure 130/65 O2 sat 95%.  Labs show a improved white count of today of 19,000 hemoglobin 8.9 hematocrit 28 platelets 396 and sodium 141 potassium 3.1 albumin 1.6 and liver functions within normal ranges.  GFR greater than 60    10/24/2019:  Patient is stable and improving postop day 2 .  Colostomy bag has some gas and patient bowel sounds are very active.  Intake was 02/30/2027 output was 1745 and a net gain of 1.4 L. labs showed a white blood cell count 15.9 hemoglobin 8.7 hematocrit 27.7 and platelets 373.  Normal differential noted. Labs:  Sodium 146 potassium 3.5 chloride 110 bicarb 24 glucose 66 and BUN creatinine were 9 and 0.6 otherwise GFR greater than 60.  Magnesium 1.9 phosphorus 4.0    10/25/2019:  Patient is awake alert and states his pain is under better  control today.  Patient is having gas in to his colostomy bag it had to be relieved on 2 occasions overnight.  Patient otherwise has been stable and heart rate is much improved on beta-blocker.  Labs showed sodium 148 potassium 3.2 chloride 111 bicarb 23 calcium is 7.7 and GFR greater than 60.  White blood cell count 10.5 hemoglobin 8.2 hematocrit 26 platelets 334 and differential was normal.  Patient otherwise is stable but will continue gastric decompression until flatus is obvious.  10/26/2019.  NG tube has been removed.  The patient's colostomy is working.  Aggressive respiratory therapy.  Incentive spirometry the patient best he can do this morning was a 1000.  Lungs decreased breath sounds in the bases secondary to atelectasis.  Discussed expectations respiratory therapy this morning.  Patient will become more mobile this a.m..  Discussed mobility with nursing staff.  Patient has been up in the chair twice last p.m..  Lab and x-rays have been reviewed.  Potassium is 3.2 with supplement.  Sodium is 147 this morning patient is receiving lactated Ringer's.  Patient's protein albumin are low with an albumin of 1.4.  Dietary consult for severe malnutrition.  Continue current level of care unless clinical course changes.  Lab has been ordered for in a.m..  10/27/2019.  Patient's vital signs are acceptable.  Patient's incentive spirometry at best is a 1000.  Patient's colostomy looks as expected.  Patient does have testicular swelling will elevate today.  Dr. Dawkins general surgery's reviewed the lab and orders lab in a.m..  Patient's mood is down considering his overall findings is expected have will start Celexa on the patient today.  Patient's sodium still 147 patient is on Ringer's lactate.  Continue current level of care unless clinical course changes.  Will order PT today the patient is very weak.  Patient's weakness is expected.  But he will have a rehab.  May have to consider whether the patient needs home  health care or rehab care after this admission.    Interval History:  The patient is doing well today.  However the patient's mood is down is expected.  Will start Celexa.  All lab has been reviewed.  Magnesium is been ordered.  Continue current level of care unless clinical course changes.    Review of Systems   Constitutional: Positive for activity change, appetite change and unexpected weight change. Negative for chills, diaphoresis, fatigue and fever.   HENT: Negative for congestion, drooling, hearing loss and trouble swallowing.    Eyes: Negative for pain and visual disturbance.   Respiratory: Negative.  Negative for apnea, cough, choking, chest tightness, shortness of breath and wheezing.         Patient is doing 750 on incentive spirometry was able to get him to do a 1000.   Cardiovascular: Negative for chest pain, palpitations and leg swelling.   Gastrointestinal: Negative for abdominal distention, abdominal pain, blood in stool, constipation, diarrhea, nausea and vomiting.        Colostomy is present and working well.   Endocrine: Negative for polydipsia, polyphagia and polyuria.   Genitourinary: Negative for difficulty urinating, dysuria and flank pain.   Musculoskeletal: Negative for arthralgias, back pain, gait problem, joint swelling, neck pain and neck stiffness.        Muscle mass decreased.   Skin: Negative for color change, rash and wound.   Allergic/Immunologic: Negative for environmental allergies, food allergies and immunocompromised state.   Neurological: Negative for dizziness, syncope, weakness, numbness and headaches.   Hematological: Negative for adenopathy.   Psychiatric/Behavioral: Negative for agitation, confusion and sleep disturbance. The patient is nervous/anxious.      Objective:     Vital Signs (Most Recent):  Temp: 98.2 °F (36.8 °C) (10/27/19 0301)  Pulse: (!) 113 (10/27/19 0600)  Resp: (!) 37 (10/27/19 0600)  BP: (!) 148/79 (10/27/19 0600)  SpO2: 97 % (10/27/19 0600) Vital Signs  (24h Range):  Temp:  [98.2 °F (36.8 °C)-98.5 °F (36.9 °C)] 98.2 °F (36.8 °C)  Pulse:  [] 113  Resp:  [8-65] 37  SpO2:  [86 %-99 %] 97 %  BP: (102-153)/(64-93) 148/79     Weight: 69.7 kg (153 lb 10.6 oz)  Body mass index is 22.69 kg/m².    Intake/Output Summary (Last 24 hours) at 10/27/2019 0754  Last data filed at 10/27/2019 0612  Gross per 24 hour   Intake 2681.67 ml   Output 1240 ml   Net 1441.67 ml      Physical Exam   Constitutional: He is oriented to person, place, and time. He appears well-developed and well-nourished.   HENT:   Head: Normocephalic and atraumatic.   Right Ear: External ear normal.   Left Ear: External ear normal.   Nose: Nose normal.   Eyes: Pupils are equal, round, and reactive to light. Conjunctivae, EOM and lids are normal.   Neck: Trachea normal, normal range of motion and full passive range of motion without pain. Neck supple.   Cardiovascular: Normal rate, regular rhythm, S1 normal, S2 normal, normal heart sounds, intact distal pulses and normal pulses.   Pulmonary/Chest: Effort normal and breath sounds normal.   Decreased breath sounds in the bases   Abdominal: Soft. Normal aorta and bowel sounds are normal.   Colostomy is present and appears to be working well.   Genitourinary:   Genitourinary Comments: The testicles are swollen will elevate.  Swelling is expected.   Musculoskeletal: Normal range of motion.   Neurological: He is alert and oriented to person, place, and time. He has normal reflexes.   Skin: Skin is warm, dry and intact.   Psychiatric: His speech is normal and behavior is normal. Judgment and thought content normal. Cognition and memory are normal.   Minor depression secondary to condition.   Nursing note and vitals reviewed.      Significant Labs:   BMP:   Recent Labs   Lab 10/27/19  0441   *   *   K 3.1*      CO2 29   BUN <5*   CREATININE 0.6   CALCIUM 7.5*   MG 1.5*     CBC:   Recent Labs   Lab 10/26/19  0539 10/27/19  0441   WBC 11.70 10.95    HGB 8.3* 8.1*   HCT 27.0* 26.8*   * 400*     CMP:   Recent Labs   Lab 10/26/19  0539 10/27/19  0441   * 147*   K 3.2* 3.1*    108   CO2 27 29   * 116*   BUN 6* <5*   CREATININE 0.5 0.6   CALCIUM 7.8* 7.5*   PROT 4.5* 4.4*   ALBUMIN 1.4* 1.4*   BILITOT 0.2 0.4   ALKPHOS 40* 52*   AST 13 14   ALT 14 13   ANIONGAP 12 10   EGFRNONAA >60.0 >60.0     Magnesium:   Recent Labs   Lab 10/27/19  0441   MG 1.5*     All pertinent labs within the past 24 hours have been reviewed.    Significant Imaging: I have reviewed and interpreted all pertinent imaging results/findings within the past 24 hours.      Assessment/Plan:      * Large bowel obstruction  IV fluids IV potassium NPO KUB in lab in a.m. monitor closely.  Surgical consultation will be seen in a.m. by Dr. MOHAMUD General surgery seen the patient in the clinic on 10/03.  10/20/2019.  Extended conversation with Dr. Buck concerning the patient's finding rectal mass bowel obstruction.  Correction of electrolytes IV fluid IV antibiotics NPO fleets enema today x3.  Colonoscope in a.m..  CEA is been ordered.    10/21/2019  1.  Treat today based on findings of colonoscopy or endoscopy.  2.  Continue current antibiotic treatment  3.  Follow surgery recommendations after final diagnosis made.  4.  Treat otherwise as needed based on clinical course  5.  Repeat labs in the a.m. and replete potassium as needed    10/22/2019:  1.  Stable postop day 1 with positive bowel sounds.  2.  We will replace calcium  3.  Repeat labs in the a.m. to include CBC and CMP magnesium  4.  Follow-up otherwise as clinically indicated based clinical course.    10/23/2019:  1.  Continue current antibiotic therapy.  2.  Give will blood bolus of 500 cc x1  3.  Change pain medication to Dilaudid 1 mg every 2 hr as needed for pain  4.  A feel tachycardia may be related to pain versus is mildly fluid depleted.  5.  Replace electrolytes as needed.  6.  Intake and output revealed a  positive 2500 cc sore +overnight    10/24/2019:  1.  Continue current medications.  2.  Repeat labs in the a.m. to monitor white blood cell count and  3.  Begin metoprolol 12.5 mg IV q.12 hours or heart rate and  4.  Continue other medications as ordered and await bowel function.    10/25/2019:  1.  Postop day 3.  Patient having no fever chills nausea or vomiting and states pain is well controlled this morning.  2.  We will replace potassium and calcium IV  3.  Repeat labs in the a.m. to include CBC and CMP.  Patient otherwise has been afebrile and postop course have been normal  10/26/2019.  Postop day 4.  Patient is doing well.  NG tube has been removed.  Colostomy is working well.  Replace potassium today.  All labs have been reviewed.  Albumin 1.4 the patient has severe malnutrition he will soon be starting a diet of consult dietary.  Lab in a.m..  Encouraged incentive spirometry and mobility.  Patient is concerned about his overall condition.  Probably at this point has mild depression secondary to all the current medical problems. Patient did ask about pathology report I told him that I would look for more than likely would be back to the 1st of the week.  Continue current level of care unless clinical course changes.  10/27/2019.  See encounter for postop care note.    Encounter for postoperative care  10/27/2019.  Day 5 postop care.  Patient's mood is somewhat down will start Celexa.  His mood is expected to be low considering the current circumstances but feel that he does need help.  Potassium 3.1 has been supplemented today.  Magnesium is 1.5 has been supplemented today.  Sodium is 147 continue to monitor.  Patient can do about a 1000 on incentive spirometry at best.  Will order PT for the patient today he needs some increased movement.  Patient is very weak considering the illness time frame.  Patient's testicle swollen today will elevate.  Lab values in a.m..  Pathology is pending.  Continue current  level of care.      Intestinal obstruction  Patient now postop day 1 from bowel resection.  Stable postop day 1 doing well and postoperative course at this time      Hypokalemia  10/25/2019:  Will replace IV for this morning.  10/26/2019 will replace potassium p.o. Today.  10/27/2019.  Patient potassium 3.1 magnesium is slightly low will supplement potassium and magnesium.  Dr. Dawkins general surgery is already ordered potassium.      VTE Risk Mitigation (From admission, onward)         Ordered     heparin (porcine) injection 5,000 Units  Every 8 hours      10/21/19 1753     Place sequential compression device  Until discontinued      10/21/19 1753     IP VTE LOW RISK PATIENT  Once      10/19/19 1752     Place MAE hose  Until discontinued      10/19/19 1752                      Hima Mccoy MD  Department of Hospital Medicine   [facility] - [facility]

## 2019-10-27 NOTE — ASSESSMENT & PLAN NOTE
10/25/2019:  Will replace IV for this morning.  10/26/2019 will replace potassium p.o. Today.  10/27/2019.  Patient potassium 3.1 magnesium is slightly low will supplement potassium and magnesium.  Dr. Dawkins general surgery is already ordered potassium.

## 2019-10-27 NOTE — PROGRESS NOTES
[facility] - ICU  General Surgery  Progress Note  10/27/2019    Patient Name: Miguel Angel Apple Sr.  MRN: [id number]  Admission Date: 10/19/2019  Hospital Day: 9    POD #:  6  Central Line:  Day 7; Site/Position good  Antibiotics:  Invanz Day 8; Flagyl Day 6    Interval History:  No complaints.  Pain controlled.  No nausea or vomiting.  Passing stool via stoma.  Passing flatus via stoma.  Mobility excellent.  Hungry.    Vitals:  Afebrile.  Good vital signs. Occasional mild sinus tachycardia.  Occasional mild tachypnea.  Good oxygen saturations 2 L BNC.    I/O:  Inaccurate  UOP:  Good      Exam:   Gen - Comfortable.  Nontoxic.  No acute distress.  CV - Regular rate and rhythm.  Good perfusion.  No edema.  Pulm - Clear to auscultation.  Nonlabored.   Abd - Soft.  Nondistended.  Expected tenderness only.  Normoactive normopitched bowel                      sounds.  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  Integument - No rashes.  No decubiti.  No jaundice.  Incision healing well without erythema, edema, exudate, induration, fluctuance, crepitus, necrosis, discoloration, separation, etc.  Ext - No edema.  No cords.  No tenderness.      Lab Results:  No leukocytosis or shift.  Stable normochromic normocytic anemia.  Stable mild thrombocytosis.  Mild hypernatremia.  Mild hypokalemia.  Mild hypocalcemia, likely corrects.  BUN and creatinine good.  Mild hyperglycemia.  No evidence of hepatocellular disease or biliary obstruction.    Radiology Results:  No new radiology results    Pathology Results:  Pending      Assessment:  Satisfactory postoperative recovery.  No evident complications. Slow return of GI function, expected.  Mild acute blood loss anemia, expected and stable.  Hypernatremia and hypokalemia.      Plan:  Adjust IV fluids.  Continue supportive care.  Clear liquid diet.        Torres Dawkins MD FACS

## 2019-10-27 NOTE — ASSESSMENT & PLAN NOTE
10/27/2019.  Day 5 postop care.  Patient's mood is somewhat down will start Celexa.  His mood is expected to be low considering the current circumstances but feel that he does need help.  Potassium 3.1 has been supplemented today.  Magnesium is 1.5 has been supplemented today.  Sodium is 147 continue to monitor.  Patient can do about a 1000 on incentive spirometry at best.  Will order PT for the patient today he needs some increased movement.  Patient is very weak considering the illness time frame.  Patient's testicle swollen today will elevate.  Lab values in a.m..  Pathology is pending.  Continue current level of care.

## 2019-10-27 NOTE — NURSING
"No acute changes at this time. Pt. States "he doesn't want to sit up in bed side chair, its my birthday, I just want to rest today". Pt. Is cont. To do well with incentive spirometry, did perform passive rom with pt in bed, along with ankle pumps and cough,deep breathing exercises, vitals wnl, call bell within reach. Will continue to monitor.   "

## 2019-10-27 NOTE — ASSESSMENT & PLAN NOTE
IV fluids IV potassium NPO KUB in lab in a.m. monitor closely.  Surgical consultation will be seen in a.m. by Dr. MOHAMUD General surgery seen the patient in the clinic on 10/03.  10/20/2019.  Extended conversation with Dr. Buck concerning the patient's finding rectal mass bowel obstruction.  Correction of electrolytes IV fluid IV antibiotics NPO fleets enema today x3.  Colonoscope in a.m..  CEA is been ordered.    10/21/2019  1.  Treat today based on findings of colonoscopy or endoscopy.  2.  Continue current antibiotic treatment  3.  Follow surgery recommendations after final diagnosis made.  4.  Treat otherwise as needed based on clinical course  5.  Repeat labs in the a.m. and replete potassium as needed    10/22/2019:  1.  Stable postop day 1 with positive bowel sounds.  2.  We will replace calcium  3.  Repeat labs in the a.m. to include CBC and CMP magnesium  4.  Follow-up otherwise as clinically indicated based clinical course.    10/23/2019:  1.  Continue current antibiotic therapy.  2.  Give will blood bolus of 500 cc x1  3.  Change pain medication to Dilaudid 1 mg every 2 hr as needed for pain  4.  A feel tachycardia may be related to pain versus is mildly fluid depleted.  5.  Replace electrolytes as needed.  6.  Intake and output revealed a positive 2500 cc sore +overnight    10/24/2019:  1.  Continue current medications.  2.  Repeat labs in the a.m. to monitor white blood cell count and  3.  Begin metoprolol 12.5 mg IV q.12 hours or heart rate and  4.  Continue other medications as ordered and await bowel function.    10/25/2019:  1.  Postop day 3.  Patient having no fever chills nausea or vomiting and states pain is well controlled this morning.  2.  We will replace potassium and calcium IV  3.  Repeat labs in the a.m. to include CBC and CMP.  Patient otherwise has been afebrile and postop course have been normal  10/26/2019.  Postop day 4.  Patient is doing well.  NG tube has been removed.  Colostomy is  working well.  Replace potassium today.  All labs have been reviewed.  Albumin 1.4 the patient has severe malnutrition he will soon be starting a diet of consult dietary.  Lab in a.m..  Encouraged incentive spirometry and mobility.  Patient is concerned about his overall condition.  Probably at this point has mild depression secondary to all the current medical problems. Patient did ask about pathology report I told him that I would look for more than likely would be back to the 1st of the week.  Continue current level of care unless clinical course changes.  10/27/2019.  See encounter for postop care note.

## 2019-10-27 NOTE — SUBJECTIVE & OBJECTIVE
Interval History:  The patient is doing well today.  However the patient's mood is down is expected.  Will start Celexa.  All lab has been reviewed.  Magnesium is been ordered.  Continue current level of care unless clinical course changes.    Review of Systems   Constitutional: Positive for activity change, appetite change and unexpected weight change. Negative for chills, diaphoresis, fatigue and fever.   HENT: Negative for congestion, drooling, hearing loss and trouble swallowing.    Eyes: Negative for pain and visual disturbance.   Respiratory: Negative.  Negative for apnea, cough, choking, chest tightness, shortness of breath and wheezing.         Patient is doing 750 on incentive spirometry was able to get him to do a 1000.   Cardiovascular: Negative for chest pain, palpitations and leg swelling.   Gastrointestinal: Negative for abdominal distention, abdominal pain, blood in stool, constipation, diarrhea, nausea and vomiting.        Colostomy is present and working well.   Endocrine: Negative for polydipsia, polyphagia and polyuria.   Genitourinary: Negative for difficulty urinating, dysuria and flank pain.   Musculoskeletal: Negative for arthralgias, back pain, gait problem, joint swelling, neck pain and neck stiffness.        Muscle mass decreased.   Skin: Negative for color change, rash and wound.   Allergic/Immunologic: Negative for environmental allergies, food allergies and immunocompromised state.   Neurological: Negative for dizziness, syncope, weakness, numbness and headaches.   Hematological: Negative for adenopathy.   Psychiatric/Behavioral: Negative for agitation, confusion and sleep disturbance. The patient is nervous/anxious.      Objective:     Vital Signs (Most Recent):  Temp: 98.2 °F (36.8 °C) (10/27/19 0301)  Pulse: (!) 113 (10/27/19 0600)  Resp: (!) 37 (10/27/19 0600)  BP: (!) 148/79 (10/27/19 0600)  SpO2: 97 % (10/27/19 0600) Vital Signs (24h Range):  Temp:  [98.2 °F (36.8 °C)-98.5 °F (36.9  °C)] 98.2 °F (36.8 °C)  Pulse:  [] 113  Resp:  [8-65] 37  SpO2:  [86 %-99 %] 97 %  BP: (102-153)/(64-93) 148/79     Weight: 69.7 kg (153 lb 10.6 oz)  Body mass index is 22.69 kg/m².    Intake/Output Summary (Last 24 hours) at 10/27/2019 0754  Last data filed at 10/27/2019 0612  Gross per 24 hour   Intake 2681.67 ml   Output 1240 ml   Net 1441.67 ml      Physical Exam   Constitutional: He is oriented to person, place, and time. He appears well-developed and well-nourished.   HENT:   Head: Normocephalic and atraumatic.   Right Ear: External ear normal.   Left Ear: External ear normal.   Nose: Nose normal.   Eyes: Pupils are equal, round, and reactive to light. Conjunctivae, EOM and lids are normal.   Neck: Trachea normal, normal range of motion and full passive range of motion without pain. Neck supple.   Cardiovascular: Normal rate, regular rhythm, S1 normal, S2 normal, normal heart sounds, intact distal pulses and normal pulses.   Pulmonary/Chest: Effort normal and breath sounds normal.   Decreased breath sounds in the bases   Abdominal: Soft. Normal aorta and bowel sounds are normal.   Colostomy is present and appears to be working well.   Genitourinary:   Genitourinary Comments: The testicles are swollen will elevate.  Swelling is expected.   Musculoskeletal: Normal range of motion.   Neurological: He is alert and oriented to person, place, and time. He has normal reflexes.   Skin: Skin is warm, dry and intact.   Psychiatric: His speech is normal and behavior is normal. Judgment and thought content normal. Cognition and memory are normal.   Minor depression secondary to condition.   Nursing note and vitals reviewed.      Significant Labs:   BMP:   Recent Labs   Lab 10/27/19  0441   *   *   K 3.1*      CO2 29   BUN <5*   CREATININE 0.6   CALCIUM 7.5*   MG 1.5*     CBC:   Recent Labs   Lab 10/26/19  0539 10/27/19  0441   WBC 11.70 10.95   HGB 8.3* 8.1*   HCT 27.0* 26.8*   * 400*      CMP:   Recent Labs   Lab 10/26/19  0539 10/27/19  0441   * 147*   K 3.2* 3.1*    108   CO2 27 29   * 116*   BUN 6* <5*   CREATININE 0.5 0.6   CALCIUM 7.8* 7.5*   PROT 4.5* 4.4*   ALBUMIN 1.4* 1.4*   BILITOT 0.2 0.4   ALKPHOS 40* 52*   AST 13 14   ALT 14 13   ANIONGAP 12 10   EGFRNONAA >60.0 >60.0     Magnesium:   Recent Labs   Lab 10/27/19  0441   MG 1.5*     All pertinent labs within the past 24 hours have been reviewed.    Significant Imaging: I have reviewed and interpreted all pertinent imaging results/findings within the past 24 hours.

## 2019-10-28 LAB
ANION GAP SERPL CALC-SCNC: 8 MMOL/L (ref 8–16)
BASOPHILS # BLD AUTO: 0.02 K/UL (ref 0–0.2)
BASOPHILS NFR BLD: 0.2 % (ref 0–1.9)
BUN SERPL-MCNC: <5 MG/DL (ref 8–23)
CALCIUM SERPL-MCNC: 7.3 MG/DL (ref 8.7–10.5)
CHLORIDE SERPL-SCNC: 108 MMOL/L (ref 95–110)
CO2 SERPL-SCNC: 28 MMOL/L (ref 23–29)
CREAT SERPL-MCNC: 0.6 MG/DL (ref 0.5–1.4)
DIFFERENTIAL METHOD: ABNORMAL
EOSINOPHIL # BLD AUTO: 0.2 K/UL (ref 0–0.5)
EOSINOPHIL NFR BLD: 2 % (ref 0–8)
ERYTHROCYTE [DISTWIDTH] IN BLOOD BY AUTOMATED COUNT: 18.6 % (ref 11.5–14.5)
EST. GFR  (AFRICAN AMERICAN): >60 ML/MIN/1.73 M^2
EST. GFR  (NON AFRICAN AMERICAN): >60 ML/MIN/1.73 M^2
GLUCOSE SERPL-MCNC: 116 MG/DL (ref 70–110)
HCT VFR BLD AUTO: 27.3 % (ref 40–54)
HGB BLD-MCNC: 8.3 G/DL (ref 14–18)
IMM GRANULOCYTES # BLD AUTO: 0.1 K/UL (ref 0–0.04)
IMM GRANULOCYTES NFR BLD AUTO: 1 % (ref 0–0.5)
LYMPHOCYTES # BLD AUTO: 2 K/UL (ref 1–4.8)
LYMPHOCYTES NFR BLD: 19.7 % (ref 18–48)
MAGNESIUM SERPL-MCNC: 2 MG/DL (ref 1.6–2.6)
MCH RBC QN AUTO: 28.1 PG (ref 27–31)
MCHC RBC AUTO-ENTMCNC: 30.4 G/DL (ref 32–36)
MCV RBC AUTO: 93 FL (ref 82–98)
MONOCYTES # BLD AUTO: 1.3 K/UL (ref 0.3–1)
MONOCYTES NFR BLD: 12.7 % (ref 4–15)
NEUTROPHILS # BLD AUTO: 6.4 K/UL (ref 1.8–7.7)
NEUTROPHILS NFR BLD: 64.4 % (ref 38–73)
NRBC BLD-RTO: 0 /100 WBC
PHOSPHATE SERPL-MCNC: 3.1 MG/DL (ref 2.7–4.5)
PLATELET # BLD AUTO: 436 K/UL (ref 150–350)
PMV BLD AUTO: 9.3 FL (ref 9.2–12.9)
POTASSIUM SERPL-SCNC: 3.9 MMOL/L (ref 3.5–5.1)
RBC # BLD AUTO: 2.95 M/UL (ref 4.6–6.2)
SODIUM SERPL-SCNC: 144 MMOL/L (ref 136–145)
WBC # BLD AUTO: 9.94 K/UL (ref 3.9–12.7)

## 2019-10-28 PROCEDURE — 27000221 HC OXYGEN, UP TO 24 HOURS

## 2019-10-28 PROCEDURE — S0030 INJECTION, METRONIDAZOLE: HCPCS | Performed by: SURGERY

## 2019-10-28 PROCEDURE — 94799 UNLISTED PULMONARY SVC/PX: CPT

## 2019-10-28 PROCEDURE — 63600175 PHARM REV CODE 636 W HCPCS: Performed by: INTERNAL MEDICINE

## 2019-10-28 PROCEDURE — 20000000 HC ICU ROOM

## 2019-10-28 PROCEDURE — 25000003 PHARM REV CODE 250: Performed by: SURGERY

## 2019-10-28 PROCEDURE — 99900035 HC TECH TIME PER 15 MIN (STAT)

## 2019-10-28 PROCEDURE — 25000003 PHARM REV CODE 250

## 2019-10-28 PROCEDURE — 94640 AIRWAY INHALATION TREATMENT: CPT

## 2019-10-28 PROCEDURE — 25000003 PHARM REV CODE 250: Performed by: INTERNAL MEDICINE

## 2019-10-28 PROCEDURE — 25000003 PHARM REV CODE 250: Performed by: HOSPITALIST

## 2019-10-28 PROCEDURE — 84100 ASSAY OF PHOSPHORUS: CPT

## 2019-10-28 PROCEDURE — 80048 BASIC METABOLIC PNL TOTAL CA: CPT

## 2019-10-28 PROCEDURE — 83735 ASSAY OF MAGNESIUM: CPT

## 2019-10-28 PROCEDURE — 94761 N-INVAS EAR/PLS OXIMETRY MLT: CPT

## 2019-10-28 PROCEDURE — 25000242 PHARM REV CODE 250 ALT 637 W/ HCPCS: Performed by: INTERNAL MEDICINE

## 2019-10-28 PROCEDURE — 85025 COMPLETE CBC W/AUTO DIFF WBC: CPT

## 2019-10-28 PROCEDURE — 63600175 PHARM REV CODE 636 W HCPCS: Performed by: SURGERY

## 2019-10-28 PROCEDURE — 97803 MED NUTRITION INDIV SUBSEQ: CPT

## 2019-10-28 RX ORDER — DEXTROSE MONOHYDRATE, SODIUM CHLORIDE, AND POTASSIUM CHLORIDE 50; 1.49; 4.5 G/1000ML; G/1000ML; G/1000ML
INJECTION, SOLUTION INTRAVENOUS
Status: COMPLETED
Start: 2019-10-28 | End: 2019-10-28

## 2019-10-28 RX ORDER — DEXTROSE MONOHYDRATE, SODIUM CHLORIDE, AND POTASSIUM CHLORIDE 50; 1.49; 4.5 G/1000ML; G/1000ML; G/1000ML
INJECTION, SOLUTION INTRAVENOUS ONCE
Status: COMPLETED | OUTPATIENT
Start: 2019-10-28 | End: 2019-10-28

## 2019-10-28 RX ORDER — MAGNESIUM SULFATE HEPTAHYDRATE 40 MG/ML
2 INJECTION, SOLUTION INTRAVENOUS ONCE
Status: COMPLETED | OUTPATIENT
Start: 2019-10-28 | End: 2019-10-28

## 2019-10-28 RX ORDER — DEXTROSE MONOHYDRATE, SODIUM CHLORIDE, AND POTASSIUM CHLORIDE 50; 1.49; 4.5 G/1000ML; G/1000ML; G/1000ML
INJECTION, SOLUTION INTRAVENOUS CONTINUOUS
Status: DISCONTINUED | OUTPATIENT
Start: 2019-10-28 | End: 2019-11-02

## 2019-10-28 RX ADMIN — POTASSIUM CHLORIDE, DEXTROSE MONOHYDRATE AND SODIUM CHLORIDE 1000 ML: 150; 5; 450 INJECTION, SOLUTION INTRAVENOUS at 01:10

## 2019-10-28 RX ADMIN — CITALOPRAM HYDROBROMIDE 10 MG: 10 TABLET ORAL at 09:10

## 2019-10-28 RX ADMIN — MAGNESIUM SULFATE IN WATER 2 G: 40 INJECTION, SOLUTION INTRAVENOUS at 11:10

## 2019-10-28 RX ADMIN — LEVALBUTEROL HYDROCHLORIDE 1.25 MG: 1.25 SOLUTION, CONCENTRATE RESPIRATORY (INHALATION) at 12:10

## 2019-10-28 RX ADMIN — METOPROLOL TARTRATE 5 MG: 5 INJECTION INTRAVENOUS at 05:10

## 2019-10-28 RX ADMIN — HEPARIN SODIUM 5000 UNITS: 5000 INJECTION, SOLUTION INTRAVENOUS; SUBCUTANEOUS at 05:10

## 2019-10-28 RX ADMIN — HEPARIN SODIUM 5000 UNITS: 5000 INJECTION, SOLUTION INTRAVENOUS; SUBCUTANEOUS at 01:10

## 2019-10-28 RX ADMIN — POTASSIUM CHLORIDE 20 MEQ: 200 INJECTION, SOLUTION INTRAVENOUS at 12:10

## 2019-10-28 RX ADMIN — POTASSIUM CHLORIDE, DEXTROSE MONOHYDRATE AND SODIUM CHLORIDE 1000 ML: 150; 5; 450 INJECTION, SOLUTION INTRAVENOUS at 12:10

## 2019-10-28 RX ADMIN — METRONIDAZOLE 500 MG: 500 INJECTION, SOLUTION INTRAVENOUS at 06:10

## 2019-10-28 RX ADMIN — LEVALBUTEROL HYDROCHLORIDE 1.25 MG: 1.25 SOLUTION, CONCENTRATE RESPIRATORY (INHALATION) at 03:10

## 2019-10-28 RX ADMIN — HYDROMORPHONE HYDROCHLORIDE 1 MG: 2 INJECTION, SOLUTION INTRAMUSCULAR; INTRAVENOUS; SUBCUTANEOUS at 12:10

## 2019-10-28 RX ADMIN — HYDROMORPHONE HYDROCHLORIDE 1 MG: 2 INJECTION, SOLUTION INTRAMUSCULAR; INTRAVENOUS; SUBCUTANEOUS at 11:10

## 2019-10-28 RX ADMIN — CALCIUM GLUCONATE 1000 MG: 98 INJECTION, SOLUTION INTRAVENOUS at 09:10

## 2019-10-28 RX ADMIN — IPRATROPIUM BROMIDE 0.5 MG: 0.5 SOLUTION RESPIRATORY (INHALATION) at 11:10

## 2019-10-28 RX ADMIN — IPRATROPIUM BROMIDE 0.5 MG: 0.5 SOLUTION RESPIRATORY (INHALATION) at 12:10

## 2019-10-28 RX ADMIN — HEPARIN SODIUM 5000 UNITS: 5000 INJECTION, SOLUTION INTRAVENOUS; SUBCUTANEOUS at 09:10

## 2019-10-28 RX ADMIN — HYDROMORPHONE HYDROCHLORIDE 1 MG: 2 INJECTION, SOLUTION INTRAMUSCULAR; INTRAVENOUS; SUBCUTANEOUS at 05:10

## 2019-10-28 RX ADMIN — METRONIDAZOLE 500 MG: 500 INJECTION, SOLUTION INTRAVENOUS at 10:10

## 2019-10-28 RX ADMIN — METOPROLOL TARTRATE 5 MG: 5 INJECTION INTRAVENOUS at 12:10

## 2019-10-28 RX ADMIN — DEXTROSE MONOHYDRATE, SODIUM CHLORIDE, AND POTASSIUM CHLORIDE: 50; 4.5; 2.98 INJECTION, SOLUTION INTRAVENOUS at 09:10

## 2019-10-28 RX ADMIN — IPRATROPIUM BROMIDE 0.5 MG: 0.5 SOLUTION RESPIRATORY (INHALATION) at 03:10

## 2019-10-28 RX ADMIN — ERTAPENEM SODIUM 1 G: 1 INJECTION, POWDER, LYOPHILIZED, FOR SOLUTION INTRAMUSCULAR; INTRAVENOUS at 09:10

## 2019-10-28 RX ADMIN — LEVALBUTEROL HYDROCHLORIDE 1.25 MG: 1.25 SOLUTION, CONCENTRATE RESPIRATORY (INHALATION) at 11:10

## 2019-10-28 RX ADMIN — POTASSIUM CHLORIDE, DEXTROSE MONOHYDRATE AND SODIUM CHLORIDE: 150; 5; 450 INJECTION, SOLUTION INTRAVENOUS at 06:10

## 2019-10-28 RX ADMIN — PANTOPRAZOLE SODIUM 40 MG: 40 TABLET, DELAYED RELEASE ORAL at 09:10

## 2019-10-28 RX ADMIN — METRONIDAZOLE 500 MG: 500 INJECTION, SOLUTION INTRAVENOUS at 02:10

## 2019-10-28 RX ADMIN — METOPROLOL TARTRATE 5 MG: 5 INJECTION INTRAVENOUS at 11:10

## 2019-10-28 NOTE — PROGRESS NOTES
[facility] - ICU Hospital Medicine  Progress Note    Patient Name: Miguel Angel Apple Sr.  MRN: [id number]  Patient Class: IP- Inpatient   Admission Date: 10/19/2019  Length of Stay: 9 days  Attending Physician: Hima Mccoy MD  Primary Care Provider: Primary Doctor No        Subjective:     Principal Problem:Large bowel obstruction        HPI:  Patient's history began approximately 3 months ago.  He presented to the hospital well over a month ago given a diagnosis of he understands colitis.  Treated with antibiotics IV fluids.  He saw Dr. ONEIDA charles on 10/03 placed on Cipro and Flagyl.  His on once completed that dose.  This past Sunday he started noticing that he was becoming  bloated and stopped eating solids at that time went back to liquids.  His had no pain pills for 2-3 days.  No nausea and vomiting unless he ate something that he knew he should the.  He attempted to make himself vomit yesterday and could not.  He has had small bowel movements to continue since that time.  CT scan shows colonic dilatation with the cecum being 10 cm.  The possible obstruction may be low left-sided.  There is even suggesting possibility have a rectal mass.  WBC remained 16,000 with a left shift.  Patient's potassium is 2.2.    Overview/Hospital Course:  Patient is sitting up in chair today IV fluids IV potassium NPO Surgical consultation repeat KUB and lab in a.m. further clinical decisions depending on clinical course.  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  The patient has had no prior abdominal surgery and no medical history prior to approximately 3 months ago.  10/20/2019.  Long discussion with the patient this morning concerning the findings.  Dr. ONEIDA charles see the patient today since he has been involved in his care on 10/03.  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  Patient continue IV fluids IV antibiotics potassium replacement today  and will have 3 Fleet's enemas today.  CEA is been ordered.    10/21/2019:  Patient is comfortable in room although it does appear quite anxious.  The patient will be having EGD and colonoscopy later today.  Based on the findings there the patient will may need undergo surgery.  Patient denies any pain and states that he was not throwing up and having no fever or chills.  Patient is distended and has some abdominal pain associated.  Labs showed [place] [address] hemoglobin hematocrit 9.8 and 31 sodium 145 potassium 2.4 chloride 108 bicarb 21 and alk-phos was 49    10/22/2019:  Patient is stable postop day 1.  Patient states pain is well controlled.  Patient mildly tachycardic blood pressure 115/61 979 O2 sats 100%.  Labs show white blood cell count 21.5 hemoglobin 9.2 hematocrit 29.3 platelets 407 and BUN creatinine were 8 and 0.6 calcium 7.5 magnesium 1.4.  Patient is in good spirits and bowel sounds are present.    10/23/2019:  Postop day 2.:  Patient is having much more abdominal pain with waves of pain that appeared to be related to air movement in the bowel versus bowel regaining function.  Patient is alert and oriented and is mildly tachycardic with pulse 123, blood pressure 130/65 O2 sat 95%.  Labs show a improved white count of today of 19,000 hemoglobin 8.9 hematocrit 28 platelets 396 and sodium 141 potassium 3.1 albumin 1.6 and liver functions within normal ranges.  GFR greater than 60    10/24/2019:  Patient is stable and improving postop day 2 .  Colostomy bag has some gas and patient bowel sounds are very active.  Intake was 02/30/2027 output was 1745 and a net gain of 1.4 L. labs showed a white blood cell count 15.9 hemoglobin 8.7 hematocrit 27.7 and platelets 373.  Normal differential noted. Labs:  Sodium 146 potassium 3.5 chloride 110 bicarb 24 glucose 66 and BUN creatinine were 9 and 0.6 otherwise GFR greater than 60.  Magnesium 1.9 phosphorus 4.0    10/25/2019:  Patient is awake alert  and states his pain is under better control today.  Patient is having gas in to his colostomy bag it had to be relieved on 2 occasions overnight.  Patient otherwise has been stable and heart rate is much improved on beta-blocker.  Labs showed sodium 148 potassium 3.2 chloride 111 bicarb 23 calcium is 7.7 and GFR greater than 60.  White blood cell count 10.5 hemoglobin 8.2 hematocrit 26 platelets 334 and differential was normal.  Patient otherwise is stable but will continue gastric decompression until flatus is obvious.  10/26/2019.  NG tube has been removed.  The patient's colostomy is working.  Aggressive respiratory therapy.  Incentive spirometry the patient best he can do this morning was a 1000.  Lungs decreased breath sounds in the bases secondary to atelectasis.  Discussed expectations respiratory therapy this morning.  Patient will become more mobile this a.m..  Discussed mobility with nursing staff.  Patient has been up in the chair twice last p.m..  Lab and x-rays have been reviewed.  Potassium is 3.2 with supplement.  Sodium is 147 this morning patient is receiving lactated Ringer's.  Patient's protein albumin are low with an albumin of 1.4.  Dietary consult for severe malnutrition.  Continue current level of care unless clinical course changes.  Lab has been ordered for in a.m..  10/27/2019.  Patient's vital signs are acceptable.  Patient's incentive spirometry at best is a 1000.  Patient's colostomy looks as expected.  Patient does have testicular swelling will elevate today.  Dr. Dawkins general surgery's reviewed the lab and orders lab in a.m..  Patient's mood is down considering his overall findings is expected have will start Celexa on the patient today.  Patient's sodium still 147 patient is on Ringer's lactate.  Continue current level of care unless clinical course changes.  Will order PT today the patient is very weak.  Patient's weakness is expected.  But he will have a rehab.  May have to  consider whether the patient needs home health care or rehab care after this admission.    10/28/2019:  Patient is sitting up in chair today.  Patient appears weak and has some facial wasting as well as some abdominal distension.  Colostomy site looks intact and there is maceration around that from the adhesive a of the colostomy bags.  Patient is tolerating some clear liquids but has not had much of an appetite and has not been up walking very much.  Otherwise patient is stable postop.  However I feel this patient needs to progress a little faster back to his movement and hopefully bowel function.  Intake and output:  Intake 3854.2 and output 1650 for total net positive fluid in of 2204 cc  Labs:  Phosphorus 3.1 sodium 144 potassium 3.9 chloride 108 bicarb 28 glucose 116 BUN creatinine were less than 5 and 0.6 calcium 7.3 and GFR greater than 60  CBC:  White blood cell count 9.94 hemoglobin 8.3 hematocrit 27.3 platelets 436 and differential was normal  Magnesium:  1.5 phosphorus 3.4        Interval History:     Review of Systems   Constitutional: Positive for activity change, appetite change and unexpected weight change. Negative for chills, diaphoresis, fatigue and fever.   HENT: Negative for congestion, drooling, hearing loss and trouble swallowing.    Eyes: Negative for pain and visual disturbance.   Respiratory: Negative.  Negative for apnea, cough, choking, chest tightness, shortness of breath and wheezing.         Patient is doing 750 on incentive spirometry was able to get him to do a 1000.   Cardiovascular: Negative for chest pain, palpitations and leg swelling.   Gastrointestinal: Positive for abdominal distention. Negative for abdominal pain, blood in stool, constipation, diarrhea, nausea and vomiting.        Colostomy is present and working well.   Endocrine: Negative for polydipsia, polyphagia and polyuria.   Genitourinary: Negative for difficulty urinating, dysuria and flank pain.   Musculoskeletal:  Negative for arthralgias, back pain, gait problem, joint swelling, neck pain and neck stiffness.        Muscle mass decreased.   Skin: Negative for color change, rash and wound.   Allergic/Immunologic: Negative for environmental allergies, food allergies and immunocompromised state.   Neurological: Negative for dizziness, syncope, weakness, numbness and headaches.   Hematological: Negative for adenopathy.   Psychiatric/Behavioral: Negative for agitation, confusion and sleep disturbance. The patient is nervous/anxious.      Objective:     Vital Signs (Most Recent):  Temp: 97.2 °F (36.2 °C) (10/27/19 2300)  Pulse: 88 (10/28/19 0813)  Resp: 20 (10/28/19 0813)  BP: 102/73 (10/28/19 0700)  SpO2: 100 % (10/28/19 0813) Vital Signs (24h Range):  Temp:  [97.2 °F (36.2 °C)-97.4 °F (36.3 °C)] 97.2 °F (36.2 °C)  Pulse:  [] 88  Resp:  [8-27] 20  SpO2:  [76 %-100 %] 100 %  BP: ()/(59-84) 102/73     Weight: 69.7 kg (153 lb 10.6 oz)  Body mass index is 22.69 kg/m².    Intake/Output Summary (Last 24 hours) at 10/28/2019 0945  Last data filed at 10/28/2019 0530  Gross per 24 hour   Intake 3414.17 ml   Output 1650 ml   Net 1764.17 ml      Physical Exam   Constitutional: He is oriented to person, place, and time. He appears well-developed and well-nourished.   HENT:   Head: Normocephalic and atraumatic.   Right Ear: External ear normal.   Left Ear: External ear normal.   Nose: Nose normal.   Eyes: Pupils are equal, round, and reactive to light. Conjunctivae, EOM and lids are normal.   Neck: Trachea normal, normal range of motion and full passive range of motion without pain. Neck supple.   Cardiovascular: Normal rate, regular rhythm, S1 normal, S2 normal, normal heart sounds, intact distal pulses and normal pulses.   Pulmonary/Chest: He is in respiratory distress. He has rales.   Decreased breath sounds in the bases   Abdominal: Soft. Normal aorta and bowel sounds are normal.   Colostomy is present and appears to be working  well.   Genitourinary:   Genitourinary Comments: The testicles are swollen will elevate.  Swelling is expected.   Musculoskeletal: Normal range of motion.   Neurological: He is alert and oriented to person, place, and time. He has normal reflexes.   Skin: Skin is warm, dry and intact. Capillary refill takes less than 2 seconds.   Psychiatric: His speech is normal and behavior is normal. Judgment and thought content normal. Cognition and memory are normal.   Minor depression secondary to condition.   Nursing note and vitals reviewed.      Significant Labs:   Recent Lab Results       10/28/19  0454        Anion Gap 8     Baso # 0.02     Basophil% 0.2     BUN, Bld <5     Calcium 7.3     Chloride 108     CO2 28     Creatinine 0.6     Differential Method Automated     eGFR if African American >60.0     eGFR if non  >60.0  Comment:  Calculation used to obtain the estimated glomerular filtration  rate (eGFR) is the CKD-EPI equation.        Eos # 0.2     Eosinophil% 2.0     Glucose 116     Gran # (ANC) 6.4     Gran% 64.4     Hematocrit 27.3     Hemoglobin 8.3     Immature Grans (Abs) 0.10  Comment:  Mild elevation in immature granulocytes is non specific and   can be seen in a variety of conditions including stress response,   acute inflammation, trauma and pregnancy. Correlation with other   laboratory and clinical findings is essential.       Immature Granulocytes 1.0     Lymph # 2.0     Lymph% 19.7     Magnesium 2.0     MCH 28.1     MCHC 30.4     MCV 93     Mono # 1.3     Mono% 12.7     MPV 9.3     nRBC 0     Phosphorus 3.1     Platelets 436     Potassium 3.9     RBC 2.95     RDW 18.6     Sodium 144     WBC 9.94         All pertinent labs within the past 24 hours have been reviewed.    Significant Imaging: I have reviewed and interpreted all pertinent imaging results/findings within the past 24 hours.      Assessment/Plan:      * Large bowel obstruction  IV fluids IV potassium NPO KUB in lab in a.m.  monitor closely.  Surgical consultation will be seen in a.m. by Dr. MOHAMUD General surgery seen the patient in the clinic on 10/03.  10/20/2019.  Extended conversation with Dr. Buck concerning the patient's finding rectal mass bowel obstruction.  Correction of electrolytes IV fluid IV antibiotics NPO fleets enema today x3.  Colonoscope in a.m..  CEA is been ordered.    10/21/2019  1.  Treat today based on findings of colonoscopy or endoscopy.  2.  Continue current antibiotic treatment  3.  Follow surgery recommendations after final diagnosis made.  4.  Treat otherwise as needed based on clinical course  5.  Repeat labs in the a.m. and replete potassium as needed    10/22/2019:  1.  Stable postop day 1 with positive bowel sounds.  2.  We will replace calcium  3.  Repeat labs in the a.m. to include CBC and CMP magnesium  4.  Follow-up otherwise as clinically indicated based clinical course.    10/23/2019:  1.  Continue current antibiotic therapy.  2.  Give will blood bolus of 500 cc x1  3.  Change pain medication to Dilaudid 1 mg every 2 hr as needed for pain  4.  A feel tachycardia may be related to pain versus is mildly fluid depleted.  5.  Replace electrolytes as needed.  6.  Intake and output revealed a positive 2500 cc sore +overnight    10/24/2019:  1.  Continue current medications.  2.  Repeat labs in the a.m. to monitor white blood cell count and  3.  Begin metoprolol 12.5 mg IV q.12 hours or heart rate and  4.  Continue other medications as ordered and await bowel function.    10/25/2019:  1.  Postop day 3.  Patient having no fever chills nausea or vomiting and states pain is well controlled this morning.  2.  We will replace potassium and calcium IV  3.  Repeat labs in the a.m. to include CBC and CMP.  Patient otherwise has been afebrile and postop course have been normal  10/26/2019.  Postop day 4.  Patient is doing well.  NG tube has been removed.  Colostomy is working well.  Replace potassium today.  All  labs have been reviewed.  Albumin 1.4 the patient has severe malnutrition he will soon be starting a diet of consult dietary.  Lab in a.m..  Encouraged incentive spirometry and mobility.  Patient is concerned about his overall condition.  Probably at this point has mild depression secondary to all the current medical problems. Patient did ask about pathology report I told him that I would look for more than likely would be back to the 1st of the week.  Continue current level of care unless clinical course changes.  10/27/2019.  See encounter for postop care note.    10/28/2019:  Postop day 6.:  Patient is still feeling weak and has had flat affect over the last several days.  Over the weekend and antidepressant citalopram was started.  This was started 10 mg p.o. daily.  Patient has colostomy side is intact.  1.  Reconsult PT for this patient  2.  Continue progressive mobility to increase his activity  3.  Advance diet when better bowel function occurs  4.  Replace calcium and magnesium  5.  CBC, CMP in the a.m..  With magnesium    Encounter for postoperative care  10/27/2019.  Day 5 postop care.  Patient's mood is somewhat down will start Celexa.  His mood is expected to be low considering the current circumstances but feel that he does need help.  Potassium 3.1 has been supplemented today.  Magnesium is 1.5 has been supplemented today.  Sodium is 147 continue to monitor.  Patient can do about a 1000 on incentive spirometry at best.  Will order PT for the patient today he needs some increased movement.  Patient is very weak considering the illness time frame.  Patient's testicle swollen today will elevate.  Lab values in a.m..  Pathology is pending.  Continue current level of care.      Intestinal obstruction  Patient now postop day 1 from bowel resection.  Stable postop day 1 doing well and postoperative course at this time      Hypokalemia  10/25/2019:  Will replace IV for this morning.  10/26/2019 will replace  potassium p.o. Today.  10/27/2019.  Patient potassium 3.1 magnesium is slightly low will supplement potassium and magnesium.  Dr. Dawkins general surgery is already ordered potassium.      VTE Risk Mitigation (From admission, onward)         Ordered     heparin (porcine) injection 5,000 Units  Every 8 hours      10/21/19 1753     Place sequential compression device  Until discontinued      10/21/19 1753     IP VTE LOW RISK PATIENT  Once      10/19/19 1752     Place MAE hose  Until discontinued      10/19/19 1752                      Lencho Kumar MD  Department of Hospital Medicine   [facility]

## 2019-10-28 NOTE — ASSESSMENT & PLAN NOTE
IV fluids IV potassium NPO KUB in lab in a.m. monitor closely.  Surgical consultation will be seen in a.m. by Dr. MOHAMUD General surgery seen the patient in the clinic on 10/03.  10/20/2019.  Extended conversation with Dr. Buck concerning the patient's finding rectal mass bowel obstruction.  Correction of electrolytes IV fluid IV antibiotics NPO fleets enema today x3.  Colonoscope in a.m..  CEA is been ordered.    10/21/2019  1.  Treat today based on findings of colonoscopy or endoscopy.  2.  Continue current antibiotic treatment  3.  Follow surgery recommendations after final diagnosis made.  4.  Treat otherwise as needed based on clinical course  5.  Repeat labs in the a.m. and replete potassium as needed    10/22/2019:  1.  Stable postop day 1 with positive bowel sounds.  2.  We will replace calcium  3.  Repeat labs in the a.m. to include CBC and CMP magnesium  4.  Follow-up otherwise as clinically indicated based clinical course.    10/23/2019:  1.  Continue current antibiotic therapy.  2.  Give will blood bolus of 500 cc x1  3.  Change pain medication to Dilaudid 1 mg every 2 hr as needed for pain  4.  A feel tachycardia may be related to pain versus is mildly fluid depleted.  5.  Replace electrolytes as needed.  6.  Intake and output revealed a positive 2500 cc sore +overnight    10/24/2019:  1.  Continue current medications.  2.  Repeat labs in the a.m. to monitor white blood cell count and  3.  Begin metoprolol 12.5 mg IV q.12 hours or heart rate and  4.  Continue other medications as ordered and await bowel function.    10/25/2019:  1.  Postop day 3.  Patient having no fever chills nausea or vomiting and states pain is well controlled this morning.  2.  We will replace potassium and calcium IV  3.  Repeat labs in the a.m. to include CBC and CMP.  Patient otherwise has been afebrile and postop course have been normal  10/26/2019.  Postop day 4.  Patient is doing well.  NG tube has been removed.  Colostomy is  working well.  Replace potassium today.  All labs have been reviewed.  Albumin 1.4 the patient has severe malnutrition he will soon be starting a diet of consult dietary.  Lab in a.m..  Encouraged incentive spirometry and mobility.  Patient is concerned about his overall condition.  Probably at this point has mild depression secondary to all the current medical problems. Patient did ask about pathology report I told him that I would look for more than likely would be back to the 1st of the week.  Continue current level of care unless clinical course changes.  10/27/2019.  See encounter for postop care note.    10/28/2019:  Postop day 6.:  Patient is still feeling weak and has had flat affect over the last several days.  Over the weekend and antidepressant citalopram was started.  This was started 10 mg p.o. daily.  Patient has colostomy side is intact.  1.  Reconsult PT for this patient  2.  Continue progressive mobility to increase his activity  3.  Advance diet when better bowel function occurs  4.  Replace calcium and magnesium  5.  CBC, CMP in the a.m..  With magnesium

## 2019-10-28 NOTE — NURSING
Procedure explained to patient and significant other, Central line removed as per MD order, no bleeding noted, end intact upon removal, gauze and tegaderm dressing placed, pt tolerated procedure well

## 2019-10-28 NOTE — PLAN OF CARE
1. Consume liquids as tolerated 2. Provide 50% of needs through TPN. RD recommendation Clinimix 4.25/10 @ 60ml/24 giving 144 grams CHO, 61 grams of protein, 740 kcals, and 1440 volume.

## 2019-10-28 NOTE — NURSING
Dr. Lazo at bedside for pt assessment, order received to place two peripheral IV's and remove central line

## 2019-10-28 NOTE — NURSING
Dr JAMES Hope called regarding IVF. D5 1/2 with 40meq kcl not available at this time. Order received for one time order for D5 1/2 with 20meq kcl .

## 2019-10-28 NOTE — SUBJECTIVE & OBJECTIVE
Interval History:     Review of Systems   Constitutional: Positive for activity change, appetite change and unexpected weight change. Negative for chills, diaphoresis, fatigue and fever.   HENT: Negative for congestion, drooling, hearing loss and trouble swallowing.    Eyes: Negative for pain and visual disturbance.   Respiratory: Negative.  Negative for apnea, cough, choking, chest tightness, shortness of breath and wheezing.         Patient is doing 750 on incentive spirometry was able to get him to do a 1000.   Cardiovascular: Negative for chest pain, palpitations and leg swelling.   Gastrointestinal: Positive for abdominal distention. Negative for abdominal pain, blood in stool, constipation, diarrhea, nausea and vomiting.        Colostomy is present and working well.   Endocrine: Negative for polydipsia, polyphagia and polyuria.   Genitourinary: Negative for difficulty urinating, dysuria and flank pain.   Musculoskeletal: Negative for arthralgias, back pain, gait problem, joint swelling, neck pain and neck stiffness.        Muscle mass decreased.   Skin: Negative for color change, rash and wound.   Allergic/Immunologic: Negative for environmental allergies, food allergies and immunocompromised state.   Neurological: Negative for dizziness, syncope, weakness, numbness and headaches.   Hematological: Negative for adenopathy.   Psychiatric/Behavioral: Negative for agitation, confusion and sleep disturbance. The patient is nervous/anxious.      Objective:     Vital Signs (Most Recent):  Temp: 97.2 °F (36.2 °C) (10/27/19 2300)  Pulse: 88 (10/28/19 0813)  Resp: 20 (10/28/19 0813)  BP: 102/73 (10/28/19 0700)  SpO2: 100 % (10/28/19 0813) Vital Signs (24h Range):  Temp:  [97.2 °F (36.2 °C)-97.4 °F (36.3 °C)] 97.2 °F (36.2 °C)  Pulse:  [] 88  Resp:  [8-27] 20  SpO2:  [76 %-100 %] 100 %  BP: ()/(59-84) 102/73     Weight: 69.7 kg (153 lb 10.6 oz)  Body mass index is 22.69 kg/m².    Intake/Output Summary (Last  24 hours) at 10/28/2019 0945  Last data filed at 10/28/2019 0530  Gross per 24 hour   Intake 3414.17 ml   Output 1650 ml   Net 1764.17 ml      Physical Exam   Constitutional: He is oriented to person, place, and time. He appears well-developed and well-nourished.   HENT:   Head: Normocephalic and atraumatic.   Right Ear: External ear normal.   Left Ear: External ear normal.   Nose: Nose normal.   Eyes: Pupils are equal, round, and reactive to light. Conjunctivae, EOM and lids are normal.   Neck: Trachea normal, normal range of motion and full passive range of motion without pain. Neck supple.   Cardiovascular: Normal rate, regular rhythm, S1 normal, S2 normal, normal heart sounds, intact distal pulses and normal pulses.   Pulmonary/Chest: He is in respiratory distress. He has rales.   Decreased breath sounds in the bases   Abdominal: Soft. Normal aorta and bowel sounds are normal.   Colostomy is present and appears to be working well.   Genitourinary:   Genitourinary Comments: The testicles are swollen will elevate.  Swelling is expected.   Musculoskeletal: Normal range of motion.   Neurological: He is alert and oriented to person, place, and time. He has normal reflexes.   Skin: Skin is warm, dry and intact. Capillary refill takes less than 2 seconds.   Psychiatric: His speech is normal and behavior is normal. Judgment and thought content normal. Cognition and memory are normal.   Minor depression secondary to condition.   Nursing note and vitals reviewed.      Significant Labs:   Recent Lab Results       10/28/19  0454        Anion Gap 8     Baso # 0.02     Basophil% 0.2     BUN, Bld <5     Calcium 7.3     Chloride 108     CO2 28     Creatinine 0.6     Differential Method Automated     eGFR if African American >60.0     eGFR if non  >60.0  Comment:  Calculation used to obtain the estimated glomerular filtration  rate (eGFR) is the CKD-EPI equation.        Eos # 0.2     Eosinophil% 2.0     Glucose  116     Gran # (ANC) 6.4     Gran% 64.4     Hematocrit 27.3     Hemoglobin 8.3     Immature Grans (Abs) 0.10  Comment:  Mild elevation in immature granulocytes is non specific and   can be seen in a variety of conditions including stress response,   acute inflammation, trauma and pregnancy. Correlation with other   laboratory and clinical findings is essential.       Immature Granulocytes 1.0     Lymph # 2.0     Lymph% 19.7     Magnesium 2.0     MCH 28.1     MCHC 30.4     MCV 93     Mono # 1.3     Mono% 12.7     MPV 9.3     nRBC 0     Phosphorus 3.1     Platelets 436     Potassium 3.9     RBC 2.95     RDW 18.6     Sodium 144     WBC 9.94         All pertinent labs within the past 24 hours have been reviewed.    Significant Imaging: I have reviewed and interpreted all pertinent imaging results/findings within the past 24 hours.

## 2019-10-28 NOTE — PROGRESS NOTES
[facility] - ICU  General Surgery  Daily Note    Patient Name: Miguel Angel Apple Sr.  MRN: [id number]  Admission Date: 10/19/2019  Attending Physician: Hima Mccoy MD   Consult Physician: Jose Lazo MD  Primary Care Provider: Primary Doctor No    Subjective:     Principle Problem: Large bowel obstruction    Last 24 hour history:  10/28/19  Afebrile.  Vital signs stable.  No nausea vomiting.  Tolerating clear liquid diet well.  Ostomy functional left lower quadrant.  Some venous congestion of the ostomy however viable.  Midline wound with pink healthy granulation tissue in base.  No signs or symptoms of infection.  No other new complaints or issues today.    Objective:     Vital Signs (Most Recent):  Temp: 97.8 °F (36.6 °C) (10/28/19 0715)  Pulse: 87 (10/28/19 1100)  Resp: 18 (10/28/19 1100)  BP: 124/76 (10/28/19 1100)  SpO2: 99 % (10/28/19 1100) Vital Signs (24h Range):  Temp:  [97.2 °F (36.2 °C)-97.8 °F (36.6 °C)] 97.8 °F (36.6 °C)  Pulse:  [82-98] 87  Resp:  [8-27] 18  SpO2:  [76 %-100 %] 99 %  BP: ()/(59-84) 124/76     Intake/Output last 24 hours:    Intake/Output Summary (Last 24 hours) at 10/28/2019 1306  Last data filed at 10/28/2019 1230  Gross per 24 hour   Intake 3658.33 ml   Output 1725 ml   Net 1933.33 ml       I/O last 3 completed shifts:  In: 5935.8 [P.O.:920; I.V.:4215.8; IV Piggyback:800]  Out: 2375 [Urine:1575; Stool:800]  I/O this shift:  In: 413.3 [P.O.:100; I.V.:113.3; IV Piggyback:200]  Out: 500 [Urine:450; Stool:50]    Weight: 69.7 kg (153 lb 10.6 oz)  Body mass index is 22.69 kg/m².    Gen: Wd Wn male currently in NAD  Heent: Nc/At, MMM  Eyes: Perrl, Eomi  Cv: RRR, no  M/g/r  Lung: Non-labored breathing, clear bilaterally  Abd: Soft, midline wound with healthy pink granulation tissue in base, ostomy with minor venous congestion left lower quadrant, viable, with output of stool in bag.  Red rubber catheter within mucous fistula.    Significant Labs:  CBC:    Recent Labs   Lab 10/28/19  0454   WBC 9.94   RBC 2.95*   HGB 8.3*   HCT 27.3*   *   MCV 93   MCH 28.1   MCHC 30.4*     BMP:   Recent Labs   Lab 10/28/19  0454   *      K 3.9      CO2 28   BUN <5*   CREATININE 0.6   CALCIUM 7.3*   MG 2.0     CMP:   Recent Labs   Lab 10/27/19  0441 10/28/19  0454   * 116*   CALCIUM 7.5* 7.3*   ALBUMIN 1.4*  --    PROT 4.4*  --    * 144   K 3.1* 3.9   CO2 29 28    108   BUN <5* <5*   CREATININE 0.6 0.6   ALKPHOS 52*  --    ALT 13  --    AST 14  --    BILITOT 0.4  --      LFTs:   Recent Labs   Lab 10/27/19  0441   ALT 13   AST 14   ALKPHOS 52*   BILITOT 0.4   PROT 4.4*   ALBUMIN 1.4*     Coagulation: No results for input(s): LABPROT, INR, APTT in the last 168 hours.  Specimen (12h ago, onward)    None        No results for input(s): COLORU, CLARITYU, SPECGRAV, PHUR, PROTEINUA, GLUCOSEU, BILIRUBINCON, BLOODU, WBCU, RBCU, BACTERIA, MUCUS, NITRITE, LEUKOCYTESUR, UROBILINOGEN, HYALINECASTS in the last 168 hours.    Cultures:    Microbiology Results (last 7 days)     Procedure Component Value Units Date/Time    Blood culture x two cultures. Draw prior to antibiotics. [[id number]] Collected:  10/19/19 1501    Order Status:  Completed Specimen:  Blood from Antecubital, Right  Arm Updated:  10/24/19 2212     Blood Culture, Routine No growth after 5 days.    Narrative:       Aerobic and anaerobic    Blood culture x two cultures. Draw prior to antibiotics. [[id number]] Collected:  10/19/19 1507    Order Status:  Completed Specimen:  Blood from Antecubital, Left  Arm Updated:  10/24/19 2212     Blood Culture, Routine No growth after 5 days.    Narrative:       Aerobic and anaerobic          Assessment:   Miguel Angel Apple Sr. is a 64 y.o. male who presents with Large bowel obstruction.    Active Diagnoses:    Diagnosis Date Noted POA    PRINCIPAL PROBLEM:  Large bowel obstruction [K56.609] 10/19/2019 Yes    Severe malnutrition [E43] 10/26/2019 Yes     Encounter for postoperative care [Z48.89]  Not Applicable    Diarrhea [R19.7] 10/21/2019 Yes    Rectal mass [K62.89] 10/20/2019 Yes    Hypokalemia [E87.6] 10/20/2019 Yes    Intestinal obstruction [K56.609]  Yes    Colitis [K52.9] 10/19/2019 Yes      Problems Resolved During this Admission:     VTE Risk Mitigation (From admission, onward)         Ordered     heparin (porcine) injection 5,000 Units  Every 8 hours      10/21/19 1753     Place sequential compression device  Until discontinued      10/21/19 1753     IP VTE LOW RISK PATIENT  Once      10/19/19 1752     Place MAE hose  Until discontinued      10/19/19 1752                Medical Decision Making/Plan:  Satisfactory postop recovery thus far.  Recommend DC central line, initiate peripheral IV insertion x2.  Continue IV fluids.  Continue IV antibiotics.  Continue clear liquid diet.  Consider transfer to the floor, will defer to Medicine.  Initiate discharge planning including home health.  Will consider closure, delayed primary closure, of midline wound prior to discharge.  Anticipate discharge mid to late week.    Jose Lazo MD  General Surgery  [facility]

## 2019-10-28 NOTE — PROGRESS NOTES
Physical therapy therapy consult received, chart reviewed, and case discussed with RN. PT/OT initiated eval however were unable to proceed with bed mobility or transfer activities due to patient stating his nurse is at lunch and he can not get up right now secondary to having a BM. PT/OT spoke with RN, Jennifer who stated that patient has a colostomy but has been experiencing mucus discharge which should not interfere with therapy and may require use of diaper or brief when up. This was relayed back to patient who again requested to wait. Will return at next available opportunity to complete eval.

## 2019-10-28 NOTE — CONSULTS
"[facility]  Adult Nutrition  Progress Note    SUMMARY       Recommendations    Recommendation/Intervention: . Consume liquids as tolerated 2. Provide 50% of needs through TPN. RD recommendation Clinimix 4.25/10 @ 60ml/24 giving 144 grams CHO, 61 grams of protein, 740 kcals, and 1440 volume.   Goals: 1. Pt will tolerate  liquids 2. Patient will tolerate 50% of needs through TPN. RD recommendation Clinimix 4.25/10 @ 60ml/24 giving 144 grams CHO, 61 grams of protein, 740 kcals, and 1440 volume  Nutrition Goal Status: progressing towards goal  Communication of RD Recs: discussed on rounds    Reason for Assessment    Reason For Assessment: consult, RD follow-up  Diagnosis: other (see comments)(Bowel obstruction)  Relevant Medical History: Colitis, diarrhea  Interdisciplinary Rounds: attended  General Information Comments: Pt reported barely toelrating liquid diet. He requests clear gatorade - dietary staff is not sure if Sweetie has clear gatorade. However, pt did state he wanted to try diluted boost breeze in water.   Nutrition Discharge Planning: RD will plan to talk to pt about possible colitis diet information.     Nutrition Risk Screen    Nutrition Risk Screen: other (see comments)(N/V after drinking small amounts)    Nutrition/Diet History    Spiritual, Cultural Beliefs, Worship Practices, Values that Affect Care: no  Factors Affecting Nutritional Intake: NPO    Anthropometrics    Temp: 97.2 °F (36.2 °C)  Height Method: Stated  Height: 5' 9" (175.3 cm)  Height (inches): 69 in  Weight Method: Bed Scale  Weight: 69.7 kg (153 lb 10.6 oz)  Weight (lb): 153.66 lb  Ideal Body Weight (IBW), Male: 160 lb  % Ideal Body Weight, Male (lb): 96.04 lb  BMI (Calculated): 22.7  BMI Grade: 18.5-24.9 - normal       Lab/Procedures/Meds    Pertinent Labs Reviewed: reviewed  Pertinent Labs Comments: BUN <5, Albumin 1.4  Pertinent Medications Reviewed: reviewed  Pertinent Medications Comments: " Pantoprazole    Physical Findings/Assessment         Estimated/Assessed Needs    Weight Used For Calorie Calculations: 64.3 kg (141 lb 12.1 oz)  Energy Calorie Requirements (kcal): 25-30 kcal/kg or 1607 - 1929 kcal  Energy Need Method: Kcal/kg  Protein Requirements: 1 g/kg or 64.43 grams of protein  Weight Used For Protein Calculations: 64.3 kg (141 lb 12.1 oz)  Fluid Requirements (mL): 1 ml/kcal or 1607 ml   Estimated Fluid Requirement Method: RDA Method  RDA Method (mL): 25  CHO Requirement: N/A      Nutrition Prescription Ordered    Current Diet Order: Clear Liquid    Evaluation of Received Nutrient/Fluid Intake       % Intake of Estimated Energy Needs: 0 - 25 %  % Meal Intake: 0 - 25 %    Nutrition Risk    Level of Risk/Frequency of Follow-up: low - moderate     Assessment and Plan    No new Assessment & Plan notes have been filed under this hospital service since the last note was generated.  Service: Nutrition       Monitor and Evaluation    Food and Nutrient Intake: energy intake, food and beverage intake  Food and Nutrient Adminstration: diet order, enteral and parenteral nutrition administration  Anthropometric Measurements: weight, weight change  Biochemical Data, Medical Tests and Procedures: electrolyte and renal panel, gastrointestinal profile, glucose/endocrine profile, inflammatory profile, lipid profile  Nutrition-Focused Physical Findings: overall appearance, extremities, muscles and bones, head and eyes, skin     Malnutrition Assessment  Malnutrition Type: acute illness or injury  Energy Intake: moderate energy intake          Weight Loss (Malnutrition): other (see comments)(Pt reports 30 lb wt loss over 3 months. )  Energy Intake (Malnutrition): less than 75% for greater than or equal to 3 months  Subcutaneous Fat (Malnutrition): mild depletion                    Moderate Weight Loss (Malnutrition): 7.5% in 3 months    Nutrition Follow-Up

## 2019-10-28 NOTE — PLAN OF CARE
10/28/19 1225   Discharge Reassessment   Assessment Type Discharge Planning Reassessment   Anticipated Discharge Disposition Home-Health   Do you have any problems affording any of your prescribed medications? TBD   Discharge Plan A Home Health   Discharge Plan B Home with family   DME Needed Upon Discharge  colostomy/ostomy supplies   Patient resting in bed. States sat up in the chair today. Significant other at bedside. States she will help him call Medicare/Social security to try to get his Medicare number. States he doesn't think he needs to go anywhere for rehab. Will assist with setting up home health if he can get his Medicare number. Denies any other needs at this time.

## 2019-10-29 LAB
ALBUMIN SERPL BCP-MCNC: 1.4 G/DL (ref 3.5–5.2)
ALP SERPL-CCNC: 47 U/L (ref 55–135)
ALT SERPL W/O P-5'-P-CCNC: 12 U/L (ref 10–44)
ANION GAP SERPL CALC-SCNC: 8 MMOL/L (ref 8–16)
AST SERPL-CCNC: 17 U/L (ref 10–40)
BASOPHILS # BLD AUTO: 0.01 K/UL (ref 0–0.2)
BASOPHILS NFR BLD: 0.1 % (ref 0–1.9)
BILIRUB SERPL-MCNC: 0.3 MG/DL (ref 0.1–1)
BUN SERPL-MCNC: <5 MG/DL (ref 8–23)
CALCIUM SERPL-MCNC: 7 MG/DL (ref 8.7–10.5)
CHLORIDE SERPL-SCNC: 104 MMOL/L (ref 95–110)
CO2 SERPL-SCNC: 27 MMOL/L (ref 23–29)
CREAT SERPL-MCNC: 0.5 MG/DL (ref 0.5–1.4)
DIFFERENTIAL METHOD: ABNORMAL
EOSINOPHIL # BLD AUTO: 0.1 K/UL (ref 0–0.5)
EOSINOPHIL NFR BLD: 1.3 % (ref 0–8)
ERYTHROCYTE [DISTWIDTH] IN BLOOD BY AUTOMATED COUNT: 18.5 % (ref 11.5–14.5)
EST. GFR  (AFRICAN AMERICAN): >60 ML/MIN/1.73 M^2
EST. GFR  (NON AFRICAN AMERICAN): >60 ML/MIN/1.73 M^2
GLUCOSE SERPL-MCNC: 105 MG/DL (ref 70–110)
HCT VFR BLD AUTO: 27.3 % (ref 40–54)
HGB BLD-MCNC: 8.5 G/DL (ref 14–18)
IMM GRANULOCYTES # BLD AUTO: 0.1 K/UL (ref 0–0.04)
IMM GRANULOCYTES NFR BLD AUTO: 1 % (ref 0–0.5)
LYMPHOCYTES # BLD AUTO: 1.8 K/UL (ref 1–4.8)
LYMPHOCYTES NFR BLD: 18.5 % (ref 18–48)
MCH RBC QN AUTO: 28.2 PG (ref 27–31)
MCHC RBC AUTO-ENTMCNC: 31.1 G/DL (ref 32–36)
MCV RBC AUTO: 91 FL (ref 82–98)
MONOCYTES # BLD AUTO: 1.3 K/UL (ref 0.3–1)
MONOCYTES NFR BLD: 13.4 % (ref 4–15)
NEUTROPHILS # BLD AUTO: 6.4 K/UL (ref 1.8–7.7)
NEUTROPHILS NFR BLD: 65.7 % (ref 38–73)
NRBC BLD-RTO: 0 /100 WBC
PLATELET # BLD AUTO: 401 K/UL (ref 150–350)
PMV BLD AUTO: 10.3 FL (ref 9.2–12.9)
POTASSIUM SERPL-SCNC: 4 MMOL/L (ref 3.5–5.1)
PROT SERPL-MCNC: 4.4 G/DL (ref 6–8.4)
RBC # BLD AUTO: 3.01 M/UL (ref 4.6–6.2)
SODIUM SERPL-SCNC: 139 MMOL/L (ref 136–145)
WBC # BLD AUTO: 9.8 K/UL (ref 3.9–12.7)

## 2019-10-29 PROCEDURE — 99233 SBSQ HOSP IP/OBS HIGH 50: CPT | Mod: ,,, | Performed by: INTERNAL MEDICINE

## 2019-10-29 PROCEDURE — 20000000 HC ICU ROOM

## 2019-10-29 PROCEDURE — 25000242 PHARM REV CODE 250 ALT 637 W/ HCPCS: Performed by: INTERNAL MEDICINE

## 2019-10-29 PROCEDURE — 80053 COMPREHEN METABOLIC PANEL: CPT

## 2019-10-29 PROCEDURE — 94640 AIRWAY INHALATION TREATMENT: CPT

## 2019-10-29 PROCEDURE — 97161 PT EVAL LOW COMPLEX 20 MIN: CPT

## 2019-10-29 PROCEDURE — 25000003 PHARM REV CODE 250: Performed by: INTERNAL MEDICINE

## 2019-10-29 PROCEDURE — S0030 INJECTION, METRONIDAZOLE: HCPCS | Performed by: SURGERY

## 2019-10-29 PROCEDURE — 99233 PR SUBSEQUENT HOSPITAL CARE,LEVL III: ICD-10-PCS | Mod: ,,, | Performed by: INTERNAL MEDICINE

## 2019-10-29 PROCEDURE — 63600175 PHARM REV CODE 636 W HCPCS: Performed by: INTERNAL MEDICINE

## 2019-10-29 PROCEDURE — 63600175 PHARM REV CODE 636 W HCPCS: Performed by: SURGERY

## 2019-10-29 PROCEDURE — 85025 COMPLETE CBC W/AUTO DIFF WBC: CPT

## 2019-10-29 PROCEDURE — 99900035 HC TECH TIME PER 15 MIN (STAT)

## 2019-10-29 PROCEDURE — 25000003 PHARM REV CODE 250: Performed by: SURGERY

## 2019-10-29 PROCEDURE — 94799 UNLISTED PULMONARY SVC/PX: CPT

## 2019-10-29 PROCEDURE — 94761 N-INVAS EAR/PLS OXIMETRY MLT: CPT

## 2019-10-29 RX ADMIN — METRONIDAZOLE 500 MG: 500 INJECTION, SOLUTION INTRAVENOUS at 05:10

## 2019-10-29 RX ADMIN — CITALOPRAM HYDROBROMIDE 10 MG: 10 TABLET ORAL at 09:10

## 2019-10-29 RX ADMIN — IPRATROPIUM BROMIDE 0.5 MG: 0.5 SOLUTION RESPIRATORY (INHALATION) at 03:10

## 2019-10-29 RX ADMIN — HEPARIN SODIUM 5000 UNITS: 5000 INJECTION, SOLUTION INTRAVENOUS; SUBCUTANEOUS at 10:10

## 2019-10-29 RX ADMIN — HYDROMORPHONE HYDROCHLORIDE 1 MG: 2 INJECTION, SOLUTION INTRAMUSCULAR; INTRAVENOUS; SUBCUTANEOUS at 03:10

## 2019-10-29 RX ADMIN — LEVALBUTEROL HYDROCHLORIDE 1.25 MG: 1.25 SOLUTION, CONCENTRATE RESPIRATORY (INHALATION) at 08:10

## 2019-10-29 RX ADMIN — METRONIDAZOLE 500 MG: 500 INJECTION, SOLUTION INTRAVENOUS at 10:10

## 2019-10-29 RX ADMIN — HYDROMORPHONE HYDROCHLORIDE 1 MG: 2 INJECTION, SOLUTION INTRAMUSCULAR; INTRAVENOUS; SUBCUTANEOUS at 04:10

## 2019-10-29 RX ADMIN — HYDROMORPHONE HYDROCHLORIDE 1 MG: 2 INJECTION, SOLUTION INTRAMUSCULAR; INTRAVENOUS; SUBCUTANEOUS at 11:10

## 2019-10-29 RX ADMIN — METRONIDAZOLE 500 MG: 500 INJECTION, SOLUTION INTRAVENOUS at 02:10

## 2019-10-29 RX ADMIN — PANTOPRAZOLE SODIUM 40 MG: 40 TABLET, DELAYED RELEASE ORAL at 09:10

## 2019-10-29 RX ADMIN — CALCIUM GLUCONATE 1000 MG: 98 INJECTION, SOLUTION INTRAVENOUS at 12:10

## 2019-10-29 RX ADMIN — IPRATROPIUM BROMIDE 0.5 MG: 0.5 SOLUTION RESPIRATORY (INHALATION) at 08:10

## 2019-10-29 RX ADMIN — HEPARIN SODIUM 5000 UNITS: 5000 INJECTION, SOLUTION INTRAVENOUS; SUBCUTANEOUS at 02:10

## 2019-10-29 RX ADMIN — POTASSIUM CHLORIDE, DEXTROSE MONOHYDRATE AND SODIUM CHLORIDE: 150; 5; 450 INJECTION, SOLUTION INTRAVENOUS at 04:10

## 2019-10-29 RX ADMIN — HYDROMORPHONE HYDROCHLORIDE 1 MG: 2 INJECTION, SOLUTION INTRAMUSCULAR; INTRAVENOUS; SUBCUTANEOUS at 08:10

## 2019-10-29 RX ADMIN — LEVALBUTEROL HYDROCHLORIDE 1.25 MG: 1.25 SOLUTION, CONCENTRATE RESPIRATORY (INHALATION) at 03:10

## 2019-10-29 RX ADMIN — HEPARIN SODIUM 5000 UNITS: 5000 INJECTION, SOLUTION INTRAVENOUS; SUBCUTANEOUS at 06:10

## 2019-10-29 RX ADMIN — METOPROLOL TARTRATE 5 MG: 5 INJECTION INTRAVENOUS at 05:10

## 2019-10-29 RX ADMIN — ERTAPENEM SODIUM 1 G: 1 INJECTION, POWDER, LYOPHILIZED, FOR SOLUTION INTRAMUSCULAR; INTRAVENOUS at 10:10

## 2019-10-29 RX ADMIN — CALCIUM GLUCONATE 1000 MG: 98 INJECTION, SOLUTION INTRAVENOUS at 07:10

## 2019-10-29 RX ADMIN — METOPROLOL TARTRATE 5 MG: 5 INJECTION INTRAVENOUS at 06:10

## 2019-10-29 RX ADMIN — POTASSIUM CHLORIDE, DEXTROSE MONOHYDRATE AND SODIUM CHLORIDE: 150; 5; 450 INJECTION, SOLUTION INTRAVENOUS at 05:10

## 2019-10-29 RX ADMIN — METOPROLOL TARTRATE 5 MG: 5 INJECTION INTRAVENOUS at 12:10

## 2019-10-29 NOTE — PLAN OF CARE
Problem: Adult Inpatient Plan of Care  Goal: Plan of Care Review  Outcome: Ongoing, Progressing  Goal: Patient-Specific Goal (Individualization)  Outcome: Ongoing, Progressing  Goal: Absence of Hospital-Acquired Illness or Injury  Outcome: Ongoing, Progressing  Goal: Optimal Comfort and Wellbeing  Outcome: Ongoing, Progressing  Goal: Readiness for Transition of Care  Outcome: Ongoing, Progressing  Goal: Rounds/Family Conference  Outcome: Ongoing, Progressing     Problem: Fluid Deficit (Intestinal Obstruction)  Goal: Fluid Balance  Outcome: Ongoing, Progressing     Problem: Infection (Intestinal Obstruction)  Goal: Absence of Infection Signs/Symptoms  Outcome: Ongoing, Progressing     Problem: Pain (Intestinal Obstruction)  Goal: Acceptable Pain Control  Outcome: Ongoing, Progressing     Problem: Oral Intake Inadequate  Goal: Improved Oral Intake  Outcome: Ongoing, Progressing     Problem: Infection  Goal: Infection Symptom Resolution  Outcome: Ongoing, Progressing     Problem: Fall Injury Risk  Goal: Absence of Fall and Fall-Related Injury  Outcome: Ongoing, Progressing     Problem: Skin Injury Risk Increased  Goal: Skin Health and Integrity  Outcome: Ongoing, Progressing     Problem: Bowel Elimination Impaired (Surgery Nonspecified)  Goal: Effective Bowel Elimination  Outcome: Ongoing, Progressing     Problem: Infection (Surgery Nonspecified)  Goal: Absence of Infection Signs/Symptoms  Outcome: Ongoing, Progressing     Problem: Ongoing Anesthesia Effects (Surgery Nonspecified)  Goal: Anesthesia/Sedation Recovery  Outcome: Ongoing, Progressing     Problem: Pain (Surgery Nonspecified)  Goal: Acceptable Pain Control  Outcome: Ongoing, Progressing     Problem: Postoperative Nausea and Vomiting (Surgery Nonspecified)  Goal: Nausea and Vomiting Relief  Outcome: Ongoing, Progressing     Problem: Postoperative Urinary Retention (Surgery Nonspecified)  Goal: Effective Urinary Elimination  Outcome: Ongoing, Progressing      Problem: Pain Acute  Goal: Optimal Pain Control  Outcome: Ongoing, Progressing

## 2019-10-29 NOTE — PLAN OF CARE
10/29/19 0901   Discharge Reassessment   Assessment Type Discharge Planning Reassessment   Anticipated Discharge Disposition Home-Health   Do you have any problems affording any of your prescribed medications? TBD   Discharge Plan A Home Health   Discharge Plan B Home with family   Patient choice form signed by patient/caregiver N/A   Patient's significant other in room. States she is going to call Medicare/Social Security today to see if she can get his Medicare number. Hopefully he has it & can get home health. He doesn't think he will need inpatient rehab. Will continue to follow for needs.

## 2019-10-29 NOTE — SUBJECTIVE & OBJECTIVE
Interval History:     Review of Systems   Constitutional: Positive for fatigue and unexpected weight change. Negative for activity change, appetite change, chills, diaphoresis and fever.   HENT: Negative for congestion, drooling, ear discharge, hearing loss, mouth sores, nosebleeds, rhinorrhea, sinus pressure, sinus pain, tinnitus and trouble swallowing.    Eyes: Negative.  Negative for pain, redness, itching and visual disturbance.   Respiratory: Positive for shortness of breath. Negative for apnea, cough, choking, chest tightness, wheezing and stridor.         Patient is doing 750 on incentive spirometry was able to get him to do a 1000.   Cardiovascular: Negative for chest pain, palpitations and leg swelling.   Gastrointestinal: Positive for abdominal pain, blood in stool and constipation. Negative for abdominal distention, anal bleeding, diarrhea, nausea and vomiting.        Colostomy is present and working well.   Endocrine: Negative.  Negative for polydipsia, polyphagia and polyuria.   Genitourinary: Negative for difficulty urinating, dysuria, flank pain, frequency and urgency.   Musculoskeletal: Positive for arthralgias and myalgias. Negative for back pain, gait problem, joint swelling, neck pain and neck stiffness.        Muscle mass decreased.   Skin: Negative for color change, pallor, rash and wound.   Allergic/Immunologic: Negative.  Negative for environmental allergies, food allergies and immunocompromised state.   Neurological: Positive for weakness. Negative for dizziness, syncope, facial asymmetry, light-headedness, numbness and headaches.   Hematological: Negative for adenopathy. Does not bruise/bleed easily.   Psychiatric/Behavioral: Positive for dysphoric mood. Negative for agitation, confusion and sleep disturbance. The patient is nervous/anxious.      Objective:     Vital Signs (Most Recent):  Temp: 97.8 °F (36.6 °C) (10/29/19 0730)  Pulse: 96 (10/29/19 0900)  Resp: 20 (10/29/19 0900)  BP: 129/84  (10/29/19 0900)  SpO2: (!) 93 % (10/29/19 0900) Vital Signs (24h Range):  Temp:  [97.8 °F (36.6 °C)-98.3 °F (36.8 °C)] 97.8 °F (36.6 °C)  Pulse:  [79-98] 96  Resp:  [10-25] 20  SpO2:  [93 %-100 %] 93 %  BP: ()/(64-93) 129/84     Weight: 69.7 kg (153 lb 10.6 oz)  Body mass index is 22.69 kg/m².    Intake/Output Summary (Last 24 hours) at 10/29/2019 1030  Last data filed at 10/29/2019 0900  Gross per 24 hour   Intake 1223.33 ml   Output 1925 ml   Net -701.67 ml      Physical Exam    Significant Labs:   Recent Lab Results       10/29/19  0506        Albumin 1.4     Alkaline Phosphatase 47     ALT 12     Anion Gap 8     AST 17     Baso # 0.01     Basophil% 0.1     BILIRUBIN TOTAL 0.3  Comment:  For infants and newborns, interpretation of results should be based  on gestational age, weight and in agreement with clinical  observations.  Premature Infant recommended reference ranges:  Up to 24 hours.............<8.0 mg/dL  Up to 48 hours............<12.0 mg/dL  3-5 days..................<15.0 mg/dL  6-29 days.................<15.0 mg/dL       BUN, Bld <5     Calcium 7.0     Chloride 104     CO2 27     Creatinine 0.5     Differential Method Automated     eGFR if African American >60.0     eGFR if non  >60.0  Comment:  Calculation used to obtain the estimated glomerular filtration  rate (eGFR) is the CKD-EPI equation.        Eos # 0.1     Eosinophil% 1.3     Glucose 105     Gran # (ANC) 6.4     Gran% 65.7     Hematocrit 27.3     Hemoglobin 8.5     Immature Grans (Abs) 0.10  Comment:  Mild elevation in immature granulocytes is non specific and   can be seen in a variety of conditions including stress response,   acute inflammation, trauma and pregnancy. Correlation with other   laboratory and clinical findings is essential.       Immature Granulocytes 1.0     Lymph # 1.8     Lymph% 18.5     MCH 28.2     MCHC 31.1     MCV 91     Mono # 1.3     Mono% 13.4     MPV 10.3     nRBC 0     Platelets 401      Potassium 4.0     PROTEIN TOTAL 4.4     RBC 3.01     RDW 18.5     Sodium 139     WBC 9.80         All pertinent labs within the past 24 hours have been reviewed.    Significant Imaging: I have reviewed and interpreted all pertinent imaging results/findings within the past 24 hours.

## 2019-10-29 NOTE — PROGRESS NOTES
[facility] - ICU Hospital Medicine  Progress Note    Patient Name: Miguel Angel Apple Sr.  MRN: [id number]  Patient Class: IP- Inpatient   Admission Date: 10/19/2019  Length of Stay: 10 days  Attending Physician: Hima Mccoy MD  Primary Care Provider: Primary Doctor No        Subjective:     Principal Problem:Large bowel obstruction        HPI:  Patient's history began approximately 3 months ago.  He presented to the hospital well over a month ago given a diagnosis of he understands colitis.  Treated with antibiotics IV fluids.  He saw Dr. ONEIDA charles on 10/03 placed on Cipro and Flagyl.  His on once completed that dose.  This past Sunday he started noticing that he was becoming  bloated and stopped eating solids at that time went back to liquids.  His had no pain pills for 2-3 days.  No nausea and vomiting unless he ate something that he knew he should the.  He attempted to make himself vomit yesterday and could not.  He has had small bowel movements to continue since that time.  CT scan shows colonic dilatation with the cecum being 10 cm.  The possible obstruction may be low left-sided.  There is even suggesting possibility have a rectal mass.  WBC remained 16,000 with a left shift.  Patient's potassium is 2.2.    Overview/Hospital Course:  Patient is sitting up in chair today IV fluids IV potassium NPO Surgical consultation repeat KUB and lab in a.m. further clinical decisions depending on clinical course.  Evaluation for Cunningham syndrome.  The patient has had no prior abdominal surgery and no medical history prior to approximately 3 months ago.  10/20/2019.  Long discussion with the patient this morning concerning the findings.  Dr. ONEIDA charles see the patient today since he has been involved in his care on 10/03.  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  Patient continue IV fluids IV antibiotics potassium replacement today  and will have 3 Fleet's enemas today.  CEA is been ordered.    10/21/2019:  Patient is comfortable in room although it does appear quite anxious.  The patient will be having EGD and colonoscopy later today.  Based on the findings there the patient will may need undergo surgery.  Patient denies any pain and states that he was not throwing up and having no fever or chills.  Patient is distended and has some abdominal pain associated.  Labs showed [place] 6[address] hemoglobin hematocrit 9.8 and 31 sodium 145 potassium 2.4 chloride 108 bicarb 21 and alk-phos was 49    10/22/2019:  Patient is stable postop day 1.  Patient states pain is well controlled.  Patient mildly tachycardic blood pressure 115/61 979 O2 sats 100%.  Labs show white blood cell count 21.5 hemoglobin 9.2 hematocrit 29.3 platelets 407 and BUN creatinine were 8 and 0.6 calcium 7.5 magnesium 1.4.  Patient is in good spirits and bowel sounds are present.    10/23/2019:  Postop day 2.:  Patient is having much more abdominal pain with waves of pain that appeared to be related to air movement in the bowel versus bowel regaining function.  Patient is alert and oriented and is mildly tachycardic with pulse 123, blood pressure 130/65 O2 sat 95%.  Labs show a improved white count of today of 19,000 hemoglobin 8.9 hematocrit 28 platelets 396 and sodium 141 potassium 3.1 albumin 1.6 and liver functions within normal ranges.  GFR greater than 60    10/24/2019:  Patient is stable and improving postop day 2 .  Colostomy bag has some gas and patient bowel sounds are very active.  Intake was 02/30/2027 output was 1745 and a net gain of 1.4 L. labs showed a white blood cell count 15.9 hemoglobin 8.7 hematocrit 27.7 and platelets 373.  Normal differential noted. Labs:  Sodium 146 potassium 3.5 chloride 110 bicarb 24 glucose 66 and BUN creatinine were 9 and 0.6 otherwise GFR greater than 60.  Magnesium 1.9 phosphorus 4.0    10/25/2019:  Patient is awake alert  and states his pain is under better control today.  Patient is having gas in to his colostomy bag it had to be relieved on 2 occasions overnight.  Patient otherwise has been stable and heart rate is much improved on beta-blocker.  Labs showed sodium 148 potassium 3.2 chloride 111 bicarb 23 calcium is 7.7 and GFR greater than 60.  White blood cell count 10.5 hemoglobin 8.2 hematocrit 26 platelets 334 and differential was normal.  Patient otherwise is stable but will continue gastric decompression until flatus is obvious.  10/26/2019.  NG tube has been removed.  The patient's colostomy is working.  Aggressive respiratory therapy.  Incentive spirometry the patient best he can do this morning was a 1000.  Lungs decreased breath sounds in the bases secondary to atelectasis.  Discussed expectations respiratory therapy this morning.  Patient will become more mobile this a.m..  Discussed mobility with nursing staff.  Patient has been up in the chair twice last p.m..  Lab and x-rays have been reviewed.  Potassium is 3.2 with supplement.  Sodium is 147 this morning patient is receiving lactated Ringer's.  Patient's protein albumin are low with an albumin of 1.4.  Dietary consult for severe malnutrition.  Continue current level of care unless clinical course changes.  Lab has been ordered for in a.m..  10/27/2019.  Patient's vital signs are acceptable.  Patient's incentive spirometry at best is a 1000.  Patient's colostomy looks as expected.  Patient does have testicular swelling will elevate today.  Dr. Dawkins general surgery's reviewed the lab and orders lab in a.m..  Patient's mood is down considering his overall findings is expected have will start Celexa on the patient today.  Patient's sodium still 147 patient is on Ringer's lactate.  Continue current level of care unless clinical course changes.  Will order PT today the patient is very weak.  Patient's weakness is expected.  But he will have a rehab.  May have to  consider whether the patient needs home health care or rehab care after this admission.    10/28/2019:  Patient is sitting up in chair today.  Patient appears weak and has some facial wasting as well as some abdominal distension.  Colostomy site looks intact and there is maceration around that from the adhesive a of the colostomy bags.  Patient is tolerating some clear liquids but has not had much of an appetite and has not been up walking very much.  Otherwise patient is stable postop.  However I feel this patient needs to progress a little faster back to his movement and hopefully bowel function.  Intake and output:  Intake 3854.2 and output 1650 for total net positive fluid in of 2204 cc  Labs:  Phosphorus 3.1 sodium 144 potassium 3.9 chloride 108 bicarb 28 glucose 116 BUN creatinine were less than 5 and 0.6 calcium 7.3 and GFR greater than 60  CBC:  White blood cell count 9.94 hemoglobin 8.3 hematocrit 27.3 platelets 436 and differential was normal  Magnesium:  1.5 phosphorus 3.4    10/29/2019:  This postop day 7.  Patient is looking stronger and eating and tolerating clear liquids.  Colostomy site is looking improved but there is continued desquamation around the stoma.  This is undoubtedly from the adhesive of the bags.  Otherwise no complaints and we will increase progressive mobility starting today.  Physical therapy will be seeing this patient as well.  Labs:  Sodium 139 potassium 4.0 chlorid 104 bicarb 27 glucose 105 BUN creatinine 5 and 0.5 total bilirubin 0.3 albumin 1.4 total protein 4.4 calcium 7.0  White blood cell count 9.8 hemoglobin 8.5 hematocrit 27.3 platelets 401 phosphorus 3.1  Will continue current care will consider starting TPN due to prolonged period without food and repeat labs in the a.m.    Interval History:     Review of Systems   Constitutional: Positive for fatigue and unexpected weight change. Negative for activity change, appetite change, chills, diaphoresis and fever.   HENT:  Negative for congestion, drooling, ear discharge, hearing loss, mouth sores, nosebleeds, rhinorrhea, sinus pressure, sinus pain, tinnitus and trouble swallowing.    Eyes: Negative.  Negative for pain, redness, itching and visual disturbance.   Respiratory: Positive for shortness of breath. Negative for apnea, cough, choking, chest tightness, wheezing and stridor.         Patient is doing 750 on incentive spirometry was able to get him to do a 1000.   Cardiovascular: Negative for chest pain, palpitations and leg swelling.   Gastrointestinal: Positive for abdominal pain, blood in stool and constipation. Negative for abdominal distention, anal bleeding, diarrhea, nausea and vomiting.        Colostomy is present and working well.   Endocrine: Negative.  Negative for polydipsia, polyphagia and polyuria.   Genitourinary: Negative for difficulty urinating, dysuria, flank pain, frequency and urgency.   Musculoskeletal: Positive for arthralgias and myalgias. Negative for back pain, gait problem, joint swelling, neck pain and neck stiffness.        Muscle mass decreased.   Skin: Negative for color change, pallor, rash and wound.   Allergic/Immunologic: Negative.  Negative for environmental allergies, food allergies and immunocompromised state.   Neurological: Positive for weakness. Negative for dizziness, syncope, facial asymmetry, light-headedness, numbness and headaches.   Hematological: Negative for adenopathy. Does not bruise/bleed easily.   Psychiatric/Behavioral: Positive for dysphoric mood. Negative for agitation, confusion and sleep disturbance. The patient is nervous/anxious.      Objective:     Vital Signs (Most Recent):  Temp: 97.8 °F (36.6 °C) (10/29/19 0730)  Pulse: 96 (10/29/19 0900)  Resp: 20 (10/29/19 0900)  BP: 129/84 (10/29/19 0900)  SpO2: (!) 93 % (10/29/19 0900) Vital Signs (24h Range):  Temp:  [97.8 °F (36.6 °C)-98.3 °F (36.8 °C)] 97.8 °F (36.6 °C)  Pulse:  [79-98] 96  Resp:  [10-25] 20  SpO2:  [93 %-100  %] 93 %  BP: ()/(64-93) 129/84     Weight: 69.7 kg (153 lb 10.6 oz)  Body mass index is 22.69 kg/m².    Intake/Output Summary (Last 24 hours) at 10/29/2019 1030  Last data filed at 10/29/2019 0900  Gross per 24 hour   Intake 1223.33 ml   Output 1925 ml   Net -701.67 ml      Physical Exam    Significant Labs:   Recent Lab Results       10/29/19  0506        Albumin 1.4     Alkaline Phosphatase 47     ALT 12     Anion Gap 8     AST 17     Baso # 0.01     Basophil% 0.1     BILIRUBIN TOTAL 0.3  Comment:  For infants and newborns, interpretation of results should be based  on gestational age, weight and in agreement with clinical  observations.  Premature Infant recommended reference ranges:  Up to 24 hours.............<8.0 mg/dL  Up to 48 hours............<12.0 mg/dL  3-5 days..................<15.0 mg/dL  6-29 days.................<15.0 mg/dL       BUN, Bld <5     Calcium 7.0     Chloride 104     CO2 27     Creatinine 0.5     Differential Method Automated     eGFR if African American >60.0     eGFR if non  >60.0  Comment:  Calculation used to obtain the estimated glomerular filtration  rate (eGFR) is the CKD-EPI equation.        Eos # 0.1     Eosinophil% 1.3     Glucose 105     Gran # (ANC) 6.4     Gran% 65.7     Hematocrit 27.3     Hemoglobin 8.5     Immature Grans (Abs) 0.10  Comment:  Mild elevation in immature granulocytes is non specific and   can be seen in a variety of conditions including stress response,   acute inflammation, trauma and pregnancy. Correlation with other   laboratory and clinical findings is essential.       Immature Granulocytes 1.0     Lymph # 1.8     Lymph% 18.5     MCH 28.2     MCHC 31.1     MCV 91     Mono # 1.3     Mono% 13.4     MPV 10.3     nRBC 0     Platelets 401     Potassium 4.0     PROTEIN TOTAL 4.4     RBC 3.01     RDW 18.5     Sodium 139     WBC 9.80         All pertinent labs within the past 24 hours have been reviewed.    Significant Imaging: I have  reviewed and interpreted all pertinent imaging results/findings within the past 24 hours.      Assessment/Plan:      * Large bowel obstruction  IV fluids IV potassium NPO KUB in lab in a.m. monitor closely.  Surgical consultation will be seen in a.m. by Dr. MOHAMUD General surgery seen the patient in the clinic on 10/03.  10/20/2019.  Extended conversation with Dr. Buck concerning the patient's finding rectal mass bowel obstruction.  Correction of electrolytes IV fluid IV antibiotics NPO fleets enema today x3.  Colonoscope in a.m..  CEA is been ordered.    10/21/2019  1.  Treat today based on findings of colonoscopy or endoscopy.  2.  Continue current antibiotic treatment  3.  Follow surgery recommendations after final diagnosis made.  4.  Treat otherwise as needed based on clinical course  5.  Repeat labs in the a.m. and replete potassium as needed    10/22/2019:  1.  Stable postop day 1 with positive bowel sounds.  2.  We will replace calcium  3.  Repeat labs in the a.m. to include CBC and CMP magnesium  4.  Follow-up otherwise as clinically indicated based clinical course.    10/23/2019:  1.  Continue current antibiotic therapy.  2.  Give will blood bolus of 500 cc x1  3.  Change pain medication to Dilaudid 1 mg every 2 hr as needed for pain  4.  A feel tachycardia may be related to pain versus is mildly fluid depleted.  5.  Replace electrolytes as needed.  6.  Intake and output revealed a positive 2500 cc sore +overnight    10/24/2019:  1.  Continue current medications.  2.  Repeat labs in the a.m. to monitor white blood cell count and  3.  Begin metoprolol 12.5 mg IV q.12 hours or heart rate and  4.  Continue other medications as ordered and await bowel function.    10/25/2019:  1.  Postop day 3.  Patient having no fever chills nausea or vomiting and states pain is well controlled this morning.  2.  We will replace potassium and calcium IV  3.  Repeat labs in the a.m. to include CBC and CMP.  Patient otherwise  has been afebrile and postop course have been normal  10/26/2019.  Postop day 4.  Patient is doing well.  NG tube has been removed.  Colostomy is working well.  Replace potassium today.  All labs have been reviewed.  Albumin 1.4 the patient has severe malnutrition he will soon be starting a diet of consult dietary.  Lab in a.m..  Encouraged incentive spirometry and mobility.  Patient is concerned about his overall condition.  Probably at this point has mild depression secondary to all the current medical problems. Patient did ask about pathology report I told him that I would look for more than likely would be back to the 1st of the week.  Continue current level of care unless clinical course changes.  10/27/2019.  See encounter for postop care note.    10/28/2019:  Postop day 6.:  Patient is still feeling weak and has had flat affect over the last several days.  Over the weekend and antidepressant citalopram was started.  This was started 10 mg p.o. daily.  Patient has colostomy side is intact.  1.  Reconsult PT for this patient  2.  Continue progressive mobility to increase his activity  3.  Advance diet when better bowel function occurs  4.  Replace calcium and magnesium  5.  CBC, CMP in the a.m..  With magnesium    10/29/2019 postop day 7:  1.  Continue PT consult and increased progressive mobility.  Would like to get the patient up in a chair today.  2.  Patient tolerating clear liquid diet  3.  Repeat labs in the a.m. include CBC, CMP, magnesium, phosphate  4.  Consider TPN to begin tomorrow if patient does not advance his diet well.    Encounter for postoperative care  10/27/2019.  Day 5 postop care.  Patient's mood is somewhat down will start Celexa.  His mood is expected to be low considering the current circumstances but feel that he does need help.  Potassium 3.1 has been supplemented today.  Magnesium is 1.5 has been supplemented today.  Sodium is 147 continue to monitor.  Patient can do about a 1000 on  incentive spirometry at best.  Will order PT for the patient today he needs some increased movement.  Patient is very weak considering the illness time frame.  Patient's testicle swollen today will elevate.  Lab values in a.m..  Pathology is pending.  Continue current level of care.      Intestinal obstruction  Patient now postop day 1 from bowel resection.  Stable postop day 1 doing well and postoperative course at this time      Hypokalemia  10/25/2019:  Will replace IV for this morning.  10/26/2019 will replace potassium p.o. Today.  10/27/2019.  Patient potassium 3.1 magnesium is slightly low will supplement potassium and magnesium.  Dr. Dawkins general surgery is already ordered potassium.      VTE Risk Mitigation (From admission, onward)         Ordered     heparin (porcine) injection 5,000 Units  Every 8 hours      10/21/19 1753     Place sequential compression device  Until discontinued      10/21/19 1753     IP VTE LOW RISK PATIENT  Once      10/19/19 1752     Place MAE hose  Until discontinued      10/19/19 1752                      Lencho Kumar MD  Department of Hospital Medicine   [facility] - [facility]

## 2019-10-29 NOTE — ASSESSMENT & PLAN NOTE
IV fluids IV potassium NPO KUB in lab in a.m. monitor closely.  Surgical consultation will be seen in a.m. by Dr. MOHAMUD General surgery seen the patient in the clinic on 10/03.  10/20/2019.  Extended conversation with Dr. Buck concerning the patient's finding rectal mass bowel obstruction.  Correction of electrolytes IV fluid IV antibiotics NPO fleets enema today x3.  Colonoscope in a.m..  CEA is been ordered.    10/21/2019  1.  Treat today based on findings of colonoscopy or endoscopy.  2.  Continue current antibiotic treatment  3.  Follow surgery recommendations after final diagnosis made.  4.  Treat otherwise as needed based on clinical course  5.  Repeat labs in the a.m. and replete potassium as needed    10/22/2019:  1.  Stable postop day 1 with positive bowel sounds.  2.  We will replace calcium  3.  Repeat labs in the a.m. to include CBC and CMP magnesium  4.  Follow-up otherwise as clinically indicated based clinical course.    10/23/2019:  1.  Continue current antibiotic therapy.  2.  Give will blood bolus of 500 cc x1  3.  Change pain medication to Dilaudid 1 mg every 2 hr as needed for pain  4.  A feel tachycardia may be related to pain versus is mildly fluid depleted.  5.  Replace electrolytes as needed.  6.  Intake and output revealed a positive 2500 cc sore +overnight    10/24/2019:  1.  Continue current medications.  2.  Repeat labs in the a.m. to monitor white blood cell count and  3.  Begin metoprolol 12.5 mg IV q.12 hours or heart rate and  4.  Continue other medications as ordered and await bowel function.    10/25/2019:  1.  Postop day 3.  Patient having no fever chills nausea or vomiting and states pain is well controlled this morning.  2.  We will replace potassium and calcium IV  3.  Repeat labs in the a.m. to include CBC and CMP.  Patient otherwise has been afebrile and postop course have been normal  10/26/2019.  Postop day 4.  Patient is doing well.  NG tube has been removed.  Colostomy is  working well.  Replace potassium today.  All labs have been reviewed.  Albumin 1.4 the patient has severe malnutrition he will soon be starting a diet of consult dietary.  Lab in a.m..  Encouraged incentive spirometry and mobility.  Patient is concerned about his overall condition.  Probably at this point has mild depression secondary to all the current medical problems. Patient did ask about pathology report I told him that I would look for more than likely would be back to the 1st of the week.  Continue current level of care unless clinical course changes.  10/27/2019.  See encounter for postop care note.    10/28/2019:  Postop day 6.:  Patient is still feeling weak and has had flat affect over the last several days.  Over the weekend and antidepressant citalopram was started.  This was started 10 mg p.o. daily.  Patient has colostomy side is intact.  1.  Reconsult PT for this patient  2.  Continue progressive mobility to increase his activity  3.  Advance diet when better bowel function occurs  4.  Replace calcium and magnesium  5.  CBC, CMP in the a.m..  With magnesium    10/29/2019 postop day 7:  1.  Continue PT consult and increased progressive mobility.  Would like to get the patient up in a chair today.  2.  Patient tolerating clear liquid diet  3.  Repeat labs in the a.m. include CBC, CMP, magnesium, phosphate  4.  Consider TPN to begin tomorrow if patient does not advance his diet well.

## 2019-10-29 NOTE — PROGRESS NOTES
[facility] - ICU  General Surgery  Daily Note    Patient Name: Miguel Angel Apple Sr.  MRN: [id number]  Admission Date: 10/19/2019  Attending Physician: Hima Mccoy MD   Consult Physician: Jose Lazo MD  Primary Care Provider: Primary Doctor No    Subjective:     Principle Problem: Large bowel obstruction    Last 24 hour history:  10/28/19  Afebrile.  Vital signs stable.  No nausea vomiting.  Tolerating clear liquid diet well.  Ostomy functional left lower quadrant.  Some venous congestion of the ostomy however viable.  Midline wound with pink healthy granulation tissue in base.  No signs or symptoms of infection.  No other new complaints or issues today.    10/29/19  Afebrile.  VSS.  No nausea or vomiting.  Ostomy functional in the LLQ.  No new issues or complaints.     Objective:     Vital Signs (Most Recent):  Temp: 97.9 °F (36.6 °C) (10/29/19 1200)  Pulse: 84 (10/29/19 1300)  Resp: 14 (10/29/19 1300)  BP: 115/71 (10/29/19 1300)  SpO2: 95 % (10/29/19 1300) Vital Signs (24h Range):  Temp:  [97.8 °F (36.6 °C)-98.3 °F (36.8 °C)] 97.9 °F (36.6 °C)  Pulse:  [80-98] 84  Resp:  [10-25] 14  SpO2:  [93 %-100 %] 95 %  BP: ()/(64-93) 115/71     Intake/Output last 24 hours:    Intake/Output Summary (Last 24 hours) at 10/29/2019 1456  Last data filed at 10/29/2019 1300  Gross per 24 hour   Intake 1110 ml   Output 1675 ml   Net -565 ml       I/O last 3 completed shifts:  In: 2528.3 [P.O.:460; I.V.:1668.3; IV Piggyback:400]  Out: 2225 [Urine:1775; Stool:450]  I/O this shift:  In: 440 [P.O.:440]  Out: 800 [Urine:600; Stool:200]    Weight: 69.7 kg (153 lb 10.6 oz)  Body mass index is 22.69 kg/m².    Gen: Wd Wn male currently in NAD  Heent: Nc/At, MMM  Eyes: Perrl, Eomi  Cv: RRR, no  M/g/r  Lung: Non-labored breathing, clear bilaterally  Abd: Soft, midline wound with healthy pink granulation tissue in base, ostomy with minor venous congestion left lower quadrant, viable, with output of stool  in bag.  Red rubber catheter within mucous fistula.    Significant Labs:  CBC:   Recent Labs   Lab 10/29/19  0506   WBC 9.80   RBC 3.01*   HGB 8.5*   HCT 27.3*   *   MCV 91   MCH 28.2   MCHC 31.1*     BMP:   Recent Labs   Lab 10/28/19  0454 10/29/19  0506   * 105    139   K 3.9 4.0    104   CO2 28 27   BUN <5* <5*   CREATININE 0.6 0.5   CALCIUM 7.3* 7.0*   MG 2.0  --      CMP:   Recent Labs   Lab 10/29/19  0506      CALCIUM 7.0*   ALBUMIN 1.4*   PROT 4.4*      K 4.0   CO2 27      BUN <5*   CREATININE 0.5   ALKPHOS 47*   ALT 12   AST 17   BILITOT 0.3     LFTs:   Recent Labs   Lab 10/29/19  0506   ALT 12   AST 17   ALKPHOS 47*   BILITOT 0.3   PROT 4.4*   ALBUMIN 1.4*     Coagulation: No results for input(s): LABPROT, INR, APTT in the last 168 hours.  Specimen (12h ago, onward)    None        No results for input(s): COLORU, CLARITYU, SPECGRAV, PHUR, PROTEINUA, GLUCOSEU, BILIRUBINCON, BLOODU, WBCU, RBCU, BACTERIA, MUCUS, NITRITE, LEUKOCYTESUR, UROBILINOGEN, HYALINECASTS in the last 168 hours.    Cultures:    Microbiology Results (last 7 days)     Procedure Component Value Units Date/Time    Blood culture x two cultures. Draw prior to antibiotics. [[id number]] Collected:  10/19/19 1505    Order Status:  Completed Specimen:  Blood from Antecubital, Right  Arm Updated:  10/24/19 2212     Blood Culture, Routine No growth after 5 days.    Narrative:       Aerobic and anaerobic    Blood culture x two cultures. Draw prior to antibiotics. [[id number]] Collected:  10/19/19 1506    Order Status:  Completed Specimen:  Blood from Antecubital, Left  Arm Updated:  10/24/19 2212     Blood Culture, Routine No growth after 5 days.    Narrative:       Aerobic and anaerobic          Assessment:   Miguel Angel Apple Sr. is a 64 y.o. male who presents with Large bowel obstruction.    Active Diagnoses:    Diagnosis Date Noted POA    PRINCIPAL PROBLEM:  Large bowel obstruction [K56.609] 10/19/2019 Yes     Severe malnutrition [E43] 10/26/2019 Yes    Encounter for postoperative care [Z48.89]  Not Applicable    Diarrhea [R19.7] 10/21/2019 Yes    Rectal mass [K62.89] 10/20/2019 Yes    Hypokalemia [E87.6] 10/20/2019 Yes    Intestinal obstruction [K56.609]  Yes    Colitis [K52.9] 10/19/2019 Yes      Problems Resolved During this Admission:     VTE Risk Mitigation (From admission, onward)         Ordered     heparin (porcine) injection 5,000 Units  Every 8 hours      10/21/19 1753     Place sequential compression device  Until discontinued      10/21/19 1753     IP VTE LOW RISK PATIENT  Once      10/19/19 1752     Place MAE hose  Until discontinued      10/19/19 1752                Medical Decision Making/Plan:  Satisfactory postop recovery thus far.  Continue IV fluids.  Continue IV antibiotics.  Advance to FLD.  Consider transfer to the floor, will defer to Medicine.  Initiate discharge planning including home health.  Will consider closure, delayed primary closure, of midline wound prior to discharge.  Anticipate discharge mid to late week.    Jose Lazo MD  General Surgery  [facility] - [facility]

## 2019-10-30 ENCOUNTER — ANESTHESIA (OUTPATIENT)
Dept: SURGERY | Facility: HOSPITAL | Age: 64
DRG: 329 | End: 2019-10-30
Payer: MEDICARE

## 2019-10-30 ENCOUNTER — ANESTHESIA EVENT (OUTPATIENT)
Dept: SURGERY | Facility: HOSPITAL | Age: 64
DRG: 329 | End: 2019-10-30
Payer: MEDICARE

## 2019-10-30 PROCEDURE — 87106 FUNGI IDENTIFICATION YEAST: CPT

## 2019-10-30 PROCEDURE — 71000039 HC RECOVERY, EACH ADD'L HOUR: Performed by: SURGERY

## 2019-10-30 PROCEDURE — 87070 CULTURE OTHR SPECIMN AEROBIC: CPT

## 2019-10-30 PROCEDURE — 37000008 HC ANESTHESIA 1ST 15 MINUTES: Performed by: SURGERY

## 2019-10-30 PROCEDURE — 25000003 PHARM REV CODE 250: Performed by: INTERNAL MEDICINE

## 2019-10-30 PROCEDURE — 25000242 PHARM REV CODE 250 ALT 637 W/ HCPCS: Performed by: INTERNAL MEDICINE

## 2019-10-30 PROCEDURE — 21400001 HC TELEMETRY ROOM

## 2019-10-30 PROCEDURE — 37000009 HC ANESTHESIA EA ADD 15 MINS: Performed by: SURGERY

## 2019-10-30 PROCEDURE — 63600175 PHARM REV CODE 636 W HCPCS: Performed by: NURSE ANESTHETIST, CERTIFIED REGISTERED

## 2019-10-30 PROCEDURE — 36000706: Performed by: SURGERY

## 2019-10-30 PROCEDURE — S0030 INJECTION, METRONIDAZOLE: HCPCS | Performed by: INTERNAL MEDICINE

## 2019-10-30 PROCEDURE — D9220A PRA ANESTHESIA: Mod: CRNA,,, | Performed by: NURSE ANESTHETIST, CERTIFIED REGISTERED

## 2019-10-30 PROCEDURE — 99233 PR SUBSEQUENT HOSPITAL CARE,LEVL III: ICD-10-PCS | Mod: ,,, | Performed by: INTERNAL MEDICINE

## 2019-10-30 PROCEDURE — 99233 SBSQ HOSP IP/OBS HIGH 50: CPT | Mod: ,,, | Performed by: INTERNAL MEDICINE

## 2019-10-30 PROCEDURE — 63600175 PHARM REV CODE 636 W HCPCS: Performed by: INTERNAL MEDICINE

## 2019-10-30 PROCEDURE — 63600175 PHARM REV CODE 636 W HCPCS: Performed by: ANESTHESIOLOGY

## 2019-10-30 PROCEDURE — 13160 PR SECD CLOS SURG WND EXTEN/COMPLIC: ICD-10-PCS | Mod: 58,,, | Performed by: SURGERY

## 2019-10-30 PROCEDURE — 94640 AIRWAY INHALATION TREATMENT: CPT

## 2019-10-30 PROCEDURE — 71000033 HC RECOVERY, INTIAL HOUR: Performed by: SURGERY

## 2019-10-30 PROCEDURE — 25000003 PHARM REV CODE 250: Performed by: NURSE ANESTHETIST, CERTIFIED REGISTERED

## 2019-10-30 PROCEDURE — 99900035 HC TECH TIME PER 15 MIN (STAT)

## 2019-10-30 PROCEDURE — D9220A PRA ANESTHESIA: Mod: ANES,,, | Performed by: ANESTHESIOLOGY

## 2019-10-30 PROCEDURE — D9220A PRA ANESTHESIA: ICD-10-PCS | Mod: ANES,,, | Performed by: ANESTHESIOLOGY

## 2019-10-30 PROCEDURE — 36000707: Performed by: SURGERY

## 2019-10-30 PROCEDURE — 94761 N-INVAS EAR/PLS OXIMETRY MLT: CPT

## 2019-10-30 PROCEDURE — D9220A PRA ANESTHESIA: ICD-10-PCS | Mod: CRNA,,, | Performed by: NURSE ANESTHETIST, CERTIFIED REGISTERED

## 2019-10-30 PROCEDURE — 13160 SEC CLSR SURG WND/DEHSN XTN: CPT | Mod: 58,,, | Performed by: SURGERY

## 2019-10-30 RX ORDER — SODIUM CHLORIDE, SODIUM LACTATE, POTASSIUM CHLORIDE, CALCIUM CHLORIDE 600; 310; 30; 20 MG/100ML; MG/100ML; MG/100ML; MG/100ML
INJECTION, SOLUTION INTRAVENOUS CONTINUOUS
Status: DISCONTINUED | OUTPATIENT
Start: 2019-10-30 | End: 2019-11-02

## 2019-10-30 RX ORDER — SODIUM CHLORIDE, SODIUM LACTATE, POTASSIUM CHLORIDE, CALCIUM CHLORIDE 600; 310; 30; 20 MG/100ML; MG/100ML; MG/100ML; MG/100ML
INJECTION, SOLUTION INTRAVENOUS CONTINUOUS PRN
Status: DISCONTINUED | OUTPATIENT
Start: 2019-10-30 | End: 2019-10-30

## 2019-10-30 RX ORDER — LIDOCAINE HYDROCHLORIDE 10 MG/ML
1 INJECTION, SOLUTION EPIDURAL; INFILTRATION; INTRACAUDAL; PERINEURAL ONCE
Status: DISCONTINUED | OUTPATIENT
Start: 2019-10-30 | End: 2019-11-05 | Stop reason: HOSPADM

## 2019-10-30 RX ORDER — OXYCODONE AND ACETAMINOPHEN 5; 325 MG/1; MG/1
1 TABLET ORAL
Status: DISCONTINUED | OUTPATIENT
Start: 2019-10-30 | End: 2019-11-05 | Stop reason: HOSPADM

## 2019-10-30 RX ORDER — EPHEDRINE SULFATE 50 MG/ML
INJECTION, SOLUTION INTRAVENOUS
Status: DISCONTINUED | OUTPATIENT
Start: 2019-10-30 | End: 2019-10-30

## 2019-10-30 RX ORDER — PROPOFOL 10 MG/ML
VIAL (ML) INTRAVENOUS
Status: DISCONTINUED | OUTPATIENT
Start: 2019-10-30 | End: 2019-10-30

## 2019-10-30 RX ORDER — ONDANSETRON 2 MG/ML
4 INJECTION INTRAMUSCULAR; INTRAVENOUS DAILY PRN
Status: DISCONTINUED | OUTPATIENT
Start: 2019-10-30 | End: 2019-11-05 | Stop reason: HOSPADM

## 2019-10-30 RX ORDER — MIDAZOLAM HYDROCHLORIDE 1 MG/ML
INJECTION, SOLUTION INTRAMUSCULAR; INTRAVENOUS
Status: DISCONTINUED | OUTPATIENT
Start: 2019-10-30 | End: 2019-10-30

## 2019-10-30 RX ORDER — MORPHINE SULFATE 4 MG/ML
INJECTION, SOLUTION INTRAMUSCULAR; INTRAVENOUS
Status: DISPENSED
Start: 2019-10-30 | End: 2019-10-31

## 2019-10-30 RX ORDER — MORPHINE SULFATE 4 MG/ML
2 INJECTION, SOLUTION INTRAMUSCULAR; INTRAVENOUS EVERY 5 MIN PRN
Status: DISCONTINUED | OUTPATIENT
Start: 2019-10-30 | End: 2019-11-05

## 2019-10-30 RX ORDER — SODIUM CHLORIDE, SODIUM LACTATE, POTASSIUM CHLORIDE, CALCIUM CHLORIDE 600; 310; 30; 20 MG/100ML; MG/100ML; MG/100ML; MG/100ML
125 INJECTION, SOLUTION INTRAVENOUS CONTINUOUS
Status: DISCONTINUED | OUTPATIENT
Start: 2019-10-30 | End: 2019-11-02

## 2019-10-30 RX ORDER — SUCCINYLCHOLINE CHLORIDE 20 MG/ML
INJECTION INTRAMUSCULAR; INTRAVENOUS
Status: DISCONTINUED | OUTPATIENT
Start: 2019-10-30 | End: 2019-10-30

## 2019-10-30 RX ORDER — KETOROLAC TROMETHAMINE 30 MG/ML
15 INJECTION, SOLUTION INTRAMUSCULAR; INTRAVENOUS ONCE
Status: ACTIVE | OUTPATIENT
Start: 2019-10-30 | End: 2019-11-02

## 2019-10-30 RX ORDER — ROCURONIUM BROMIDE 10 MG/ML
INJECTION, SOLUTION INTRAVENOUS
Status: DISCONTINUED | OUTPATIENT
Start: 2019-10-30 | End: 2019-10-30

## 2019-10-30 RX ADMIN — EPHEDRINE SULFATE 10 MG: 50 INJECTION INTRAMUSCULAR; INTRAVENOUS; SUBCUTANEOUS at 02:10

## 2019-10-30 RX ADMIN — METRONIDAZOLE 500 MG: 500 INJECTION, SOLUTION INTRAVENOUS at 09:10

## 2019-10-30 RX ADMIN — HEPARIN SODIUM 5000 UNITS: 5000 INJECTION, SOLUTION INTRAVENOUS; SUBCUTANEOUS at 08:10

## 2019-10-30 RX ADMIN — POTASSIUM CHLORIDE, DEXTROSE MONOHYDRATE AND SODIUM CHLORIDE: 150; 5; 450 INJECTION, SOLUTION INTRAVENOUS at 02:10

## 2019-10-30 RX ADMIN — HYDROMORPHONE HYDROCHLORIDE 1 MG: 2 INJECTION, SOLUTION INTRAMUSCULAR; INTRAVENOUS; SUBCUTANEOUS at 08:10

## 2019-10-30 RX ADMIN — POTASSIUM CHLORIDE, DEXTROSE MONOHYDRATE AND SODIUM CHLORIDE: 150; 5; 450 INJECTION, SOLUTION INTRAVENOUS at 07:10

## 2019-10-30 RX ADMIN — HYDROMORPHONE HYDROCHLORIDE 1 MG: 2 INJECTION, SOLUTION INTRAMUSCULAR; INTRAVENOUS; SUBCUTANEOUS at 02:10

## 2019-10-30 RX ADMIN — IPRATROPIUM BROMIDE 0.5 MG: 0.5 SOLUTION RESPIRATORY (INHALATION) at 07:10

## 2019-10-30 RX ADMIN — METOPROLOL TARTRATE 5 MG: 5 INJECTION INTRAVENOUS at 11:10

## 2019-10-30 RX ADMIN — ROCURONIUM BROMIDE 20 MG: 10 INJECTION, SOLUTION INTRAVENOUS at 02:10

## 2019-10-30 RX ADMIN — EPHEDRINE SULFATE 10 MG: 50 INJECTION INTRAMUSCULAR; INTRAVENOUS; SUBCUTANEOUS at 01:10

## 2019-10-30 RX ADMIN — METRONIDAZOLE 500 MG: 500 INJECTION, SOLUTION INTRAVENOUS at 02:10

## 2019-10-30 RX ADMIN — MORPHINE SULFATE 2 MG: 4 INJECTION, SOLUTION INTRAMUSCULAR; INTRAVENOUS at 03:10

## 2019-10-30 RX ADMIN — ERTAPENEM SODIUM 1 G: 1 INJECTION, POWDER, LYOPHILIZED, FOR SOLUTION INTRAMUSCULAR; INTRAVENOUS at 09:10

## 2019-10-30 RX ADMIN — SUCCINYLCHOLINE CHLORIDE 100 MG: 20 INJECTION, SOLUTION INTRAMUSCULAR; INTRAVENOUS at 01:10

## 2019-10-30 RX ADMIN — METRONIDAZOLE 500 MG: 500 INJECTION, SOLUTION INTRAVENOUS at 07:10

## 2019-10-30 RX ADMIN — MIDAZOLAM HYDROCHLORIDE 2 MG: 1 INJECTION, SOLUTION INTRAMUSCULAR; INTRAVENOUS at 01:10

## 2019-10-30 RX ADMIN — MORPHINE SULFATE 4 MG: 4 INJECTION, SOLUTION INTRAMUSCULAR; INTRAVENOUS at 09:10

## 2019-10-30 RX ADMIN — HEPARIN SODIUM 5000 UNITS: 5000 INJECTION, SOLUTION INTRAVENOUS; SUBCUTANEOUS at 09:10

## 2019-10-30 RX ADMIN — METOPROLOL TARTRATE 5 MG: 5 INJECTION INTRAVENOUS at 08:10

## 2019-10-30 RX ADMIN — SODIUM CHLORIDE, POTASSIUM CHLORIDE, SODIUM LACTATE AND CALCIUM CHLORIDE: 600; 310; 30; 20 INJECTION, SOLUTION INTRAVENOUS at 02:10

## 2019-10-30 RX ADMIN — PROPOFOL 200 MG: 10 INJECTION, EMULSION INTRAVENOUS at 01:10

## 2019-10-30 RX ADMIN — LEVALBUTEROL HYDROCHLORIDE 1.25 MG: 1.25 SOLUTION, CONCENTRATE RESPIRATORY (INHALATION) at 07:10

## 2019-10-30 RX ADMIN — METOPROLOL TARTRATE 5 MG: 5 INJECTION INTRAVENOUS at 12:10

## 2019-10-30 RX ADMIN — LEVALBUTEROL HYDROCHLORIDE 1.25 MG: 1.25 SOLUTION, CONCENTRATE RESPIRATORY (INHALATION) at 12:10

## 2019-10-30 RX ADMIN — IPRATROPIUM BROMIDE 0.5 MG: 0.5 SOLUTION RESPIRATORY (INHALATION) at 12:10

## 2019-10-30 RX ADMIN — SUGAMMADEX 200 MG: 100 INJECTION, SOLUTION INTRAVENOUS at 02:10

## 2019-10-30 NOTE — BRIEF OP NOTE
[facility] - [facility]  General Surgery  Brief Op Note  10/30/2019    Patient Name: Miguel Angel Apple Sr.  MRN: [id number]  Admission Date: 10/19/2019      Preop Diagnosis:  Open abdominal wound    Postop Diagnosis:  Same    Procedure:  Delayed primary closure    Surgeon:  Juancho CHAUDHRY    Assistant:  None    Anesthesia:  General    EBL:  Less than 5 cc    Findings:  Above    Specimens:  Wound culture    Complications:  None    Disposition:  PACU stable      Torres Dawkins MD

## 2019-10-30 NOTE — NURSING
1607 PT ON T 32 ST . CALL BELL IN REACH.TEDS ON.BED IN LOW POSITION. BED ALARM IN USE.NO SIGNS OF ACUTE DISTRESS. ELISA CROWLEY RN

## 2019-10-30 NOTE — PROGRESS NOTES
[facility] - ICU  General Surgery  Daily Note    Patient Name: Miguel Angel Apple Sr.  MRN: [id number]  Admission Date: 10/19/2019  Attending Physician: Hima Mccoy MD   Consult Physician: Jose Lazo MD  Primary Care Provider: Primary Doctor No    Subjective:     Principle Problem: Large bowel obstruction    Last 24 hour history:  10/28/19  Afebrile.  Vital signs stable.  No nausea vomiting.  Tolerating clear liquid diet well.  Ostomy functional left lower quadrant.  Some venous congestion of the ostomy however viable.  Midline wound with pink healthy granulation tissue in base.  No signs or symptoms of infection.  No other new complaints or issues today.    10/29/19  Afebrile.  VSS.  No nausea or vomiting.  Ostomy functional in the LLQ.  No new issues or complaints.     10/30/19  Afebrile.  Vital signs stable.  No nausea vomiting.  Ostomy functional.  Preliminary pathology from pathologist obtained.  Rectal adenocarcinoma.  Formal pathology report to follow.  Patient was informed of this this morning.  Appropriately sad.  Discussed with patient next steps and care and treatment.  Patient voiced understanding.  All questions were elicited and answered this morning to the patient's satisfaction.    Objective:     Vital Signs (Most Recent):  Temp: 98.4 °F (36.9 °C) (10/30/19 1200)  Pulse: 84 (10/30/19 1200)  Resp: 13 (10/30/19 1200)  BP: 122/88 (10/30/19 1200)  SpO2: 95 % (10/30/19 1200) Vital Signs (24h Range):  Temp:  [97.9 °F (36.6 °C)-98.4 °F (36.9 °C)] 98.4 °F (36.9 °C)  Pulse:  [] 84  Resp:  [10-28] 13  SpO2:  [94 %-100 %] 95 %  BP: ()/() 122/88     Intake/Output last 24 hours:    Intake/Output Summary (Last 24 hours) at 10/30/2019 1226  Last data filed at 10/30/2019 1000  Gross per 24 hour   Intake 3532 ml   Output 2050 ml   Net 1482 ml       I/O last 3 completed shifts:  In: 3292 [P.O.:680; I.V.:2212; IV Piggyback:400]  Out: 3150 [Urine:2700; Stool:450]  I/O  this shift:  In: 800 [I.V.:400; IV Piggyback:400]  Out: 375 [Urine:375]    Weight: 69.7 kg (153 lb 10.6 oz)  Body mass index is 22.69 kg/m².    Gen: Wd Wn male currently in NAD  Heent: Nc/At, MMM  Eyes: Perrl, Eomi  Cv: RRR, no  M/g/r  Lung: Non-labored breathing, clear bilaterally  Abd: Soft, midline wound with healthy pink granulation tissue in base, ostomy with minor venous congestion left lower quadrant, viable, with output of stool in bag.  Red rubber catheter within mucous fistula.    Significant Labs:  CBC:   Recent Labs   Lab 10/29/19  0506   WBC 9.80   RBC 3.01*   HGB 8.5*   HCT 27.3*   *   MCV 91   MCH 28.2   MCHC 31.1*     BMP:   Recent Labs   Lab 10/28/19  0454 10/29/19  0506   * 105    139   K 3.9 4.0    104   CO2 28 27   BUN <5* <5*   CREATININE 0.6 0.5   CALCIUM 7.3* 7.0*   MG 2.0  --      CMP:   Recent Labs   Lab 10/29/19  0506      CALCIUM 7.0*   ALBUMIN 1.4*   PROT 4.4*      K 4.0   CO2 27      BUN <5*   CREATININE 0.5   ALKPHOS 47*   ALT 12   AST 17   BILITOT 0.3     LFTs:   Recent Labs   Lab 10/29/19  0506   ALT 12   AST 17   ALKPHOS 47*   BILITOT 0.3   PROT 4.4*   ALBUMIN 1.4*     Coagulation: No results for input(s): LABPROT, INR, APTT in the last 168 hours.  Specimen (12h ago, onward)    None        No results for input(s): COLORU, CLARITYU, SPECGRAV, PHUR, PROTEINUA, GLUCOSEU, BILIRUBINCON, BLOODU, WBCU, RBCU, BACTERIA, MUCUS, NITRITE, LEUKOCYTESUR, UROBILINOGEN, HYALINECASTS in the last 168 hours.    Cultures:    Microbiology Results (last 7 days)     Procedure Component Value Units Date/Time    Blood culture x two cultures. Draw prior to antibiotics. [[id number]] Collected:  10/19/19 0094    Order Status:  Completed Specimen:  Blood from Antecubital, Right  Arm Updated:  10/24/19 1354     Blood Culture, Routine No growth after 5 days.    Narrative:       Aerobic and anaerobic    Blood culture x two cultures. Draw prior to antibiotics. [[id number]]  Collected:  10/19/19 1508    Order Status:  Completed Specimen:  Blood from Antecubital, Left  Arm Updated:  10/24/19 2212     Blood Culture, Routine No growth after 5 days.    Narrative:       Aerobic and anaerobic          Assessment:   Miguel Angel Apple Sr. is a 64 y.o. male who presents with Large bowel obstruction.    Active Diagnoses:    Diagnosis Date Noted POA    PRINCIPAL PROBLEM:  Large bowel obstruction [K56.609] 10/19/2019 Yes    Severe malnutrition [E43] 10/26/2019 Yes    Encounter for postoperative care [Z48.89]  Not Applicable    Diarrhea [R19.7] 10/21/2019 Yes    Rectal mass [K62.89] 10/20/2019 Yes    Hypokalemia [E87.6] 10/20/2019 Yes    Intestinal obstruction [K56.609]  Yes    Colitis [K52.9] 10/19/2019 Yes      Problems Resolved During this Admission:     VTE Risk Mitigation (From admission, onward)         Ordered     heparin (porcine) injection 5,000 Units  Every 8 hours      10/21/19 1753     Place sequential compression device  Until discontinued      10/21/19 1753     IP VTE LOW RISK PATIENT  Once      10/19/19 1752     Place MAE hose  Until discontinued      10/19/19 1752                Medical Decision Making/Plan:  Satisfactory postop recovery thus far.  Plan for delayed primary closure today.  Transfer to the floor afterwards.  Full liquid diet to continue their afterwards.  Discharge planning, consider discharge possibly tomorrow.  Medical oncology referral, warranted as an outpatient.  Initiation of neoadjuvant chemo and radiation also warranted.    Jose Lazo MD  General Surgery  [facility] - [facility]

## 2019-10-30 NOTE — OP NOTE
DATE OF PROCEDURE:  10/30/2019    SURGEON:  Torres Dawkins M.D.    ASSISTANT:  None.    PREOPERATIVE DIAGNOSIS:  Open abdominal wound.    POSTOPERATIVE DIAGNOSIS:  Open abdominal wound.    SURGICAL PROCEDURE PERFORMED:  Delayed primary closure of open abdominal  wound.    INDICATIONS FOR PROCEDURE:  A 64-year-old male recently had an emergent  colectomy with end colostomy and Deuardo pouch.  His midline incision was  left open due to peritonitis.  His wound has been healing well.    SURGICAL FINDINGS:  There was excellent healthy granulation tissue  throughout and no evidence of infection.    PROCEDURE IN DETAIL:  In preoperative holding, Mr. Apple was identified by  name, wristband and birthdate, he confirmed identity.  Informed consent  process was reviewed.  He verbalized consent as well as understanding of  all treatment options, risks, benefits, possible complications, details of  and indications for each option.  He has received intravenous antibiotics  throughout this hospital stay.  He was transported to the Operating Room.   A timeout was completed.  Correct patient, procedure, position, consent,  administration of antibiotics, allergies, etc., confirmed by every member  of the operating team including the patient.  He was transferred to the OR  bed.  General endotracheal anesthesia was induced without difficulty or  complications.  The abdomen was then prepped and draped in the usual  sterile fashion with sterile towels and drapes.  The wound was then  pulsavaced with 3 L of saline using a pulsatile lavage system.  Hemostasis  was ensured.  Culture was obtained of the wound for possible future  reference if necessary.  Hemostasis was again ensured.  The wound was then  closed with skin staples and sterile dressing was applied.  He tolerated  the procedure well.    BLOOD LOSS:  Less than 5 mL.    DISPOSITION:  To PACU, stable.    COMPLICATIONS:  None.    SPECIMEN:  Culture of wound.        JEANNIE dd:  10/30/2019 14:36:30 (CDT)   td: 10/30/2019 18:06:27 (TRISHA)  Doc ID #[id number]   Job ID #[id number]    CC:

## 2019-10-30 NOTE — TRANSFER OF CARE
"Anesthesia Transfer of Care Note    Patient: Miguel Angel Apple Sr.    Procedure(s) Performed: Procedure(s) (LRB):  REPAIR, ABDOMINAL WALL (N/A)    Patient location: PACU    Anesthesia Type: general    Transport from OR: Transported from OR on room air with adequate spontaneous ventilation    Post pain: adequate analgesia    Post assessment: no apparent anesthetic complications and tolerated procedure well    Post vital signs: stable    Level of consciousness: awake, alert and oriented    Nausea/Vomiting: no nausea/vomiting    Complications: none    Transfer of care protocol was followed      Last vitals:   Visit Vitals  /88 (BP Location: Right arm, Patient Position: Lying)   Pulse 84   Temp 36.9 °C (98.4 °F) (Oral)   Resp 13   Ht 5' 9" (1.753 m)   Wt 69.7 kg (153 lb 10.6 oz)   SpO2 95%   BMI 22.69 kg/m²     "

## 2019-10-30 NOTE — ANESTHESIA POSTPROCEDURE EVALUATION
Anesthesia Post Evaluation    Patient: Miguel Angel Apple Sr.    Procedure(s) Performed: Procedure(s) (LRB):  REPAIR, ABDOMINAL WALL (N/A)    Final Anesthesia Type: general  Patient location during evaluation: PACU  Patient participation: Yes- Able to Participate  Level of consciousness: awake and awake and alert  Post-procedure vital signs: reviewed and stable  Pain management: adequate  Airway patency: patent  PONV status at discharge: No PONV  Anesthetic complications: no      Cardiovascular status: blood pressure returned to baseline  Respiratory status: unassisted and spontaneous ventilation  Hydration status: euvolemic  Follow-up not needed.          Vitals Value Taken Time   BP 85/71 10/30/2019  2:52 PM   Temp 36.9 °C (98.4 °F) 10/30/2019 12:00 PM   Pulse 107 10/30/2019  2:53 PM   Resp 18 10/30/2019  2:53 PM   SpO2 98 % 10/30/2019  2:52 PM   Vitals shown include unvalidated device data.      No case tracking events are documented in the log.      Pain/Martin Score: Pain Rating Prior to Med Admin: 8 (10/30/2019  8:44 AM)  Pain Rating Post Med Admin: 3 (10/30/2019  9:14 AM)  Martin Score: 9 (10/30/2019  2:52 PM)

## 2019-10-30 NOTE — NURSING
1605 PT WAS IN ICU AND WENT TO SURGERY.RECEIVED PT FROM SURGERY PT S/P WOUND CLOSURE TO ABD. WALL. REPORT GIVEN THAT WOUND WAS SUTURED.ABD. DRESSING INTACT.DRESSING CLEAN,DRY,INTACT.PT HAS A COLOSTOMY TO L SIDE OF ABD. DRAINING LIQUID.  PT AWAKE, ALERT, ORIENTED X 3. RESP. EVEN AND UNLABORED. LUNGS CLEAR BILATERAL. O22L PER NC . O2 SAT 94%. PT HAS LIMITED ROM.  HL X2 TO L WRIST, L FA. NO REDNESS/EDEMA. BOWEL SOUNDS ARE PRESENT. PT VOIDING YELLOW URNE. PT VOIDED 600 CC.PT HAS EDEMA  TO SCROTUM.  NO C/O PAIN AT THIS TIME. NO SIGNS OF ACUTE DISTRESS. ELISA CROWLEY RN

## 2019-10-30 NOTE — PT/OT/SLP EVAL
Physical Therapy Evaluation    Patient Name:  Miguel Angel Apple Sr.   MRN:  [id number]    Recommendations:     Discharge Recommendations:  home with home health   Discharge Equipment Recommendations: bedside commode, grab bar, walker, rolling, shower chair   Barriers to discharge: None    Assessment:     Miguel Angel Apple Sr. is a 64 y.o. male admitted with a medical diagnosis of Large bowel obstruction.  He presents with the following impairments/functional limitations:  weakness, impaired endurance, impaired self care skills, impaired functional mobilty, gait instability, pain.    Rehab Prognosis: Good; patient would benefit from acute skilled PT services to address these deficits and reach maximum level of function.    Recent Surgery: Procedure(s) (LRB):  LAPAROTOMY, EXPLORATORY (N/A)  SIGMOIDOSCOPY, FLEXIBLE (N/A)  HEMICOLECTOMY, RIGHT (Right)  CHOLECYSTECTOMY  COLECTOMY, SIGMOID  CREATION, COLOSTOMY 8 Days Post-Op    Plan:     During this hospitalization, patient to be seen (QD M-F) to address the identified rehab impairments via gait training, therapeutic activities, therapeutic exercises and progress toward the following goals:    · Plan of Care Expires:  11/12/19    Subjective     Chief Complaint: pain and weakness  Patient/Family Comments/goals: increase mobility and decrease pain  Pain/Comfort:  · Pain Rating 1: 6/10  · Location 1: abdomen  · Pain Addressed 2: Reposition, Distraction(patient on pain protocol)    Patients cultural, spiritual, Restorationism conflicts given the current situation: no    Living Environment:  Patient lives with his SO in a single story home with no steps to enter.  Prior to admission, patients level of function was independent.  Equipment used at home: none.  DME owned (not currently used): none.  Upon discharge, patient will have assistance from significant other.    Objective:     Communicated with RN and patient/SO prior to session.  Patient found supine with blood pressure cuff, oxygen,  peripheral IV, pulse ox (continuous), SCD, telemetry  upon PT entry to room.    General Precautions: Standard, fall   Orthopedic Precautions:N/A   Braces: N/A     Exams:  · Cognitive Exam:  Patient is oriented to Person, Place, Time and Situation  · Fine Motor Coordination: -       Intact  · Gross Motor Coordination:  limited by pain  · Sensation: -       Intact  · RUE ROM: WFL  · RUE Strength: WFL  · LUE ROM: WFL  · LUE Strength: WFL  · RLE ROM: AROM limited by pain and weakness, PROM WFL's  · RLE Strength: 3/5 to 3+/5  · LLE ROM: AROM limited by pain and weakness, PROM WFL's  · LLE Strength:  3/5 to 3+/5  ·     Functional Mobility:  · Bed Mobility:  Rolling Right: moderate assistance  · Scooting: minimum assistance  · Supine to Sit: moderate assistance  · Transfers:  Sit to Stand:  minimum assistance with rolling walker  · Bed to Chair: minimum assistance with  rolling walker  using  Step Transfer  · Gait: patient able to take 4 steps to chair at bedside using RW and CGA, verbal cueing for posture and technique      Therapeutic Activities and Exercises:   patient performed seated exercise x 10 reps: heel/toe raises, LAQ's, and hip flexion    AM-PAC 6 CLICK MOBILITY  Total Score:17     Patient left up in chair with all lines intact, call button in reach, RN notified and SO present.    GOALS:   Multidisciplinary Problems     Physical Therapy Goals        Problem: Physical Therapy Goal    Goal Priority Disciplines Outcome Goal Variances Interventions   Physical Therapy Goal     PT, PT/OT      Description:  Goals to be met by: discharge     Patient will increase functional independence with mobility by performin. Supine to sit with Contact Guard Assistance  2. Sit to supine with Contact Guard Assistance  3. Rolling to Left with Modified [place].  4. Sit to stand transfer with Contact Guard Assistance  5. Bed to chair transfer with Contact Guard Assistance using Rolling Walker  6. Gait  x 100 feet with  Contact Guard Assistance using Rolling Walker.                       History:     Past Medical History:   Diagnosis Date    Medical history reviewed with no changes        Past Surgical History:   Procedure Laterality Date    CHOLECYSTECTOMY  10/21/2019    Procedure: CHOLECYSTECTOMY;  Surgeon: Jose Lazo MD;  Location: [facility] OR;  Service: General;;    COLOSTOMY  10/21/2019    Procedure: CREATION, COLOSTOMY;  Surgeon: Jose Lazo MD;  Location: [facility] OR;  Service: General;;  END DESCENDING COLOSTOMY    FLEXIBLE SIGMOIDOSCOPY N/A 10/21/2019    Procedure: SIGMOIDOSCOPY, FLEXIBLE;  Surgeon: Jose Lazo MD;  Location: [facility] OR;  Service: General;  Laterality: N/A;    no surgical history      RIGHT HEMICOLECTOMY Right 10/21/2019    Procedure: HEMICOLECTOMY, RIGHT;  Surgeon: Jose Lazo MD;  Location: [facility];  Service: General;  Laterality: Right;       Time Tracking:     PT Received On: 10/29/19  PT Start Time: 1314     PT Stop Time: 1328  PT Total Time (min): 14 min     Billable Minutes: Evaluation lor Henley, PT  10/29/2019

## 2019-10-30 NOTE — ANESTHESIA PREPROCEDURE EVALUATION
10/30/2019  Miguel Angel Apple Sr. is a 64 y.o., male.    Pre-op Assessment    I have reviewed the Patient Summary Reports.    I have reviewed the Nursing Notes.   I have reviewed the Medications.     Review of Systems  Anesthesia Hx:  No problems with previous Anesthesia  Neg history of prior surgery. Denies Family Hx of Anesthesia complications.   Denies Personal Hx of Anesthesia complications.   Social:  Smoker    Hematology/Oncology:  Hematology Normal   Oncology Normal     EENT/Dental:EENT/Dental Normal   Cardiovascular:  Cardiovascular Normal     Pulmonary:  Pulmonary Normal    Renal/:  Renal/ Normal     Hepatic/GI:  Hepatic/GI Normal    Musculoskeletal:  Musculoskeletal Normal    Neurological:  Neurology Normal    Endocrine:  Endocrine Normal    Dermatological:  Skin Normal    Psych:  Psychiatric Normal           Physical Exam  General:  Well nourished    Airway/Jaw/Neck:  Airway Findings: Mouth Opening: Normal Tongue: Normal  Mallampati: I        Eyes/Ears/Nose:  EYES/EARS/NOSE FINDINGS: Normal   Dental:  DENTAL FINDINGS: Normal   Chest/Lungs:  Chest/Lungs Clear    Heart/Vascular:  Heart Findings: Normal Heart murmur: negative Vascular Findings: Normal    Abdomen:  Abdomen Findings: Normal    Musculoskeletal:  Musculoskeletal Findings: Normal   Skin:  Skin Findings: Normal         Anesthesia Plan  Type of Anesthesia, risks & benefits discussed:  Anesthesia Type:  general  Patient's Preference:   Intra-op Monitoring Plan: standard ASA monitors  Intra-op Monitoring Plan Comments:   Post Op Pain Control Plan:   Post Op Pain Control Plan Comments:   Induction:   IV  Beta Blocker:  Patient is not currently on a Beta-Blocker (No further documentation required).       Informed Consent: Patient understands risks and agrees with Anesthesia plan.  Questions answered. Anesthesia consent signed with patient.  ASA  Score: 2     Day of Surgery Review of History & Physical:    H&P update referred to the provider.

## 2019-10-30 NOTE — NURSING
OR here to get pt. At this time. Reported off to STEPHANE Avalos. Pt. Will be going to med. Surge room 116.

## 2019-10-30 NOTE — NURSING
On assessment, pt. Appears to be resting in bed comfortably, with no acute events overnight, vitals wnl, call bell within reach. Will continue to monitor.

## 2019-10-31 LAB
ALBUMIN SERPL BCP-MCNC: 1.5 G/DL (ref 3.5–5.2)
ALP SERPL-CCNC: 49 U/L (ref 55–135)
ALT SERPL W/O P-5'-P-CCNC: 9 U/L (ref 10–44)
ANION GAP SERPL CALC-SCNC: 4 MMOL/L (ref 8–16)
ANION GAP SERPL CALC-SCNC: 8 MMOL/L (ref 8–16)
AST SERPL-CCNC: 13 U/L (ref 10–40)
BASOPHILS # BLD AUTO: 0.03 K/UL (ref 0–0.2)
BASOPHILS NFR BLD: 0.2 % (ref 0–1.9)
BILIRUB SERPL-MCNC: 0.4 MG/DL (ref 0.1–1)
BUN SERPL-MCNC: <5 MG/DL (ref 8–23)
BUN SERPL-MCNC: <5 MG/DL (ref 8–23)
CALCIUM SERPL-MCNC: 7.5 MG/DL (ref 8.7–10.5)
CALCIUM SERPL-MCNC: 7.5 MG/DL (ref 8.7–10.5)
CHLORIDE SERPL-SCNC: 102 MMOL/L (ref 95–110)
CHLORIDE SERPL-SCNC: 103 MMOL/L (ref 95–110)
CO2 SERPL-SCNC: 26 MMOL/L (ref 23–29)
CO2 SERPL-SCNC: 26 MMOL/L (ref 23–29)
CREAT SERPL-MCNC: 0.5 MG/DL (ref 0.5–1.4)
CREAT SERPL-MCNC: 0.6 MG/DL (ref 0.5–1.4)
DIFFERENTIAL METHOD: ABNORMAL
EOSINOPHIL # BLD AUTO: 0.2 K/UL (ref 0–0.5)
EOSINOPHIL NFR BLD: 1.1 % (ref 0–8)
ERYTHROCYTE [DISTWIDTH] IN BLOOD BY AUTOMATED COUNT: 18.2 % (ref 11.5–14.5)
EST. GFR  (AFRICAN AMERICAN): >60 ML/MIN/1.73 M^2
EST. GFR  (AFRICAN AMERICAN): >60 ML/MIN/1.73 M^2
EST. GFR  (NON AFRICAN AMERICAN): >60 ML/MIN/1.73 M^2
EST. GFR  (NON AFRICAN AMERICAN): >60 ML/MIN/1.73 M^2
GLUCOSE SERPL-MCNC: 92 MG/DL (ref 70–110)
GLUCOSE SERPL-MCNC: 94 MG/DL (ref 70–110)
HCT VFR BLD AUTO: 29.9 % (ref 40–54)
HGB BLD-MCNC: 9.3 G/DL (ref 14–18)
IMM GRANULOCYTES # BLD AUTO: 0.17 K/UL (ref 0–0.04)
IMM GRANULOCYTES NFR BLD AUTO: 1.1 % (ref 0–0.5)
LYMPHOCYTES # BLD AUTO: 2.4 K/UL (ref 1–4.8)
LYMPHOCYTES NFR BLD: 14.9 % (ref 18–48)
MCH RBC QN AUTO: 28 PG (ref 27–31)
MCHC RBC AUTO-ENTMCNC: 31.1 G/DL (ref 32–36)
MCV RBC AUTO: 90 FL (ref 82–98)
MONOCYTES # BLD AUTO: 1.4 K/UL (ref 0.3–1)
MONOCYTES NFR BLD: 8.9 % (ref 4–15)
NEUTROPHILS # BLD AUTO: 11.7 K/UL (ref 1.8–7.7)
NEUTROPHILS NFR BLD: 73.8 % (ref 38–73)
NRBC BLD-RTO: 0 /100 WBC
PLATELET # BLD AUTO: 494 K/UL (ref 150–350)
PMV BLD AUTO: 9.4 FL (ref 9.2–12.9)
POTASSIUM SERPL-SCNC: 4 MMOL/L (ref 3.5–5.1)
POTASSIUM SERPL-SCNC: 4.1 MMOL/L (ref 3.5–5.1)
PROT SERPL-MCNC: 5 G/DL (ref 6–8.4)
RBC # BLD AUTO: 3.32 M/UL (ref 4.6–6.2)
SODIUM SERPL-SCNC: 132 MMOL/L (ref 136–145)
SODIUM SERPL-SCNC: 137 MMOL/L (ref 136–145)
WBC # BLD AUTO: 15.82 K/UL (ref 3.9–12.7)

## 2019-10-31 PROCEDURE — 63600175 PHARM REV CODE 636 W HCPCS: Performed by: INTERNAL MEDICINE

## 2019-10-31 PROCEDURE — 80048 BASIC METABOLIC PNL TOTAL CA: CPT

## 2019-10-31 PROCEDURE — 94761 N-INVAS EAR/PLS OXIMETRY MLT: CPT

## 2019-10-31 PROCEDURE — 25000242 PHARM REV CODE 250 ALT 637 W/ HCPCS: Performed by: INTERNAL MEDICINE

## 2019-10-31 PROCEDURE — 99233 PR SUBSEQUENT HOSPITAL CARE,LEVL III: ICD-10-PCS | Mod: ,,, | Performed by: INTERNAL MEDICINE

## 2019-10-31 PROCEDURE — 25000003 PHARM REV CODE 250: Performed by: INTERNAL MEDICINE

## 2019-10-31 PROCEDURE — 97116 GAIT TRAINING THERAPY: CPT

## 2019-10-31 PROCEDURE — S0030 INJECTION, METRONIDAZOLE: HCPCS | Performed by: INTERNAL MEDICINE

## 2019-10-31 PROCEDURE — 99900035 HC TECH TIME PER 15 MIN (STAT)

## 2019-10-31 PROCEDURE — 85025 COMPLETE CBC W/AUTO DIFF WBC: CPT

## 2019-10-31 PROCEDURE — 21400001 HC TELEMETRY ROOM

## 2019-10-31 PROCEDURE — 27000221 HC OXYGEN, UP TO 24 HOURS

## 2019-10-31 PROCEDURE — 80053 COMPREHEN METABOLIC PANEL: CPT

## 2019-10-31 PROCEDURE — 36415 COLL VENOUS BLD VENIPUNCTURE: CPT

## 2019-10-31 PROCEDURE — 99233 SBSQ HOSP IP/OBS HIGH 50: CPT | Mod: ,,, | Performed by: INTERNAL MEDICINE

## 2019-10-31 PROCEDURE — 94640 AIRWAY INHALATION TREATMENT: CPT

## 2019-10-31 RX ADMIN — LEVALBUTEROL HYDROCHLORIDE 1.25 MG: 1.25 SOLUTION, CONCENTRATE RESPIRATORY (INHALATION) at 11:10

## 2019-10-31 RX ADMIN — LEVALBUTEROL HYDROCHLORIDE 1.25 MG: 1.25 SOLUTION, CONCENTRATE RESPIRATORY (INHALATION) at 12:10

## 2019-10-31 RX ADMIN — CALCIUM GLUCONATE 1000 MG: 98 INJECTION, SOLUTION INTRAVENOUS at 04:10

## 2019-10-31 RX ADMIN — PANTOPRAZOLE SODIUM 40 MG: 40 TABLET, DELAYED RELEASE ORAL at 09:10

## 2019-10-31 RX ADMIN — IPRATROPIUM BROMIDE 0.5 MG: 0.5 SOLUTION RESPIRATORY (INHALATION) at 12:10

## 2019-10-31 RX ADMIN — HYDROMORPHONE HYDROCHLORIDE 1 MG: 2 INJECTION, SOLUTION INTRAMUSCULAR; INTRAVENOUS; SUBCUTANEOUS at 09:10

## 2019-10-31 RX ADMIN — HEPARIN SODIUM 5000 UNITS: 5000 INJECTION, SOLUTION INTRAVENOUS; SUBCUTANEOUS at 10:10

## 2019-10-31 RX ADMIN — POTASSIUM CHLORIDE, DEXTROSE MONOHYDRATE AND SODIUM CHLORIDE: 150; 5; 450 INJECTION, SOLUTION INTRAVENOUS at 06:10

## 2019-10-31 RX ADMIN — METRONIDAZOLE 500 MG: 500 INJECTION, SOLUTION INTRAVENOUS at 10:10

## 2019-10-31 RX ADMIN — METRONIDAZOLE 500 MG: 500 INJECTION, SOLUTION INTRAVENOUS at 03:10

## 2019-10-31 RX ADMIN — POTASSIUM CHLORIDE, DEXTROSE MONOHYDRATE AND SODIUM CHLORIDE: 150; 5; 450 INJECTION, SOLUTION INTRAVENOUS at 08:10

## 2019-10-31 RX ADMIN — IPRATROPIUM BROMIDE 0.5 MG: 0.5 SOLUTION RESPIRATORY (INHALATION) at 11:10

## 2019-10-31 RX ADMIN — IPRATROPIUM BROMIDE 0.5 MG: 0.5 SOLUTION RESPIRATORY (INHALATION) at 07:10

## 2019-10-31 RX ADMIN — LEVALBUTEROL HYDROCHLORIDE 1.25 MG: 1.25 SOLUTION, CONCENTRATE RESPIRATORY (INHALATION) at 07:10

## 2019-10-31 RX ADMIN — HYDROMORPHONE HYDROCHLORIDE 1 MG: 2 INJECTION, SOLUTION INTRAMUSCULAR; INTRAVENOUS; SUBCUTANEOUS at 03:10

## 2019-10-31 RX ADMIN — CITALOPRAM HYDROBROMIDE 10 MG: 10 TABLET ORAL at 09:10

## 2019-10-31 RX ADMIN — HEPARIN SODIUM 5000 UNITS: 5000 INJECTION, SOLUTION INTRAVENOUS; SUBCUTANEOUS at 03:10

## 2019-10-31 RX ADMIN — MORPHINE SULFATE 4 MG: 4 INJECTION, SOLUTION INTRAMUSCULAR; INTRAVENOUS at 03:10

## 2019-10-31 RX ADMIN — HYDROMORPHONE HYDROCHLORIDE 1 MG: 2 INJECTION, SOLUTION INTRAMUSCULAR; INTRAVENOUS; SUBCUTANEOUS at 07:10

## 2019-10-31 RX ADMIN — HEPARIN SODIUM 5000 UNITS: 5000 INJECTION, SOLUTION INTRAVENOUS; SUBCUTANEOUS at 06:10

## 2019-10-31 RX ADMIN — METRONIDAZOLE 500 MG: 500 INJECTION, SOLUTION INTRAVENOUS at 06:10

## 2019-10-31 RX ADMIN — METOPROLOL TARTRATE 5 MG: 5 INJECTION INTRAVENOUS at 06:10

## 2019-10-31 RX ADMIN — ERTAPENEM SODIUM 1 G: 1 INJECTION, POWDER, LYOPHILIZED, FOR SOLUTION INTRAMUSCULAR; INTRAVENOUS at 09:10

## 2019-10-31 NOTE — PROGRESS NOTES
[facility] Medicine  Progress Note    Patient Name: Miguel Angel Apple Sr.  MRN: [id number]  Patient Class: IP- Inpatient   Admission Date: 10/19/2019  Length of Stay: 11 days  Attending Physician: Lencho Kumar MD  Primary Care Provider: Primary Doctor No        Subjective:     Principal Problem:Large bowel obstruction        HPI:  Patient's history began approximately 3 months ago.  He presented to the hospital well over a month ago given a diagnosis of he understands colitis.  Treated with antibiotics IV fluids.  He saw Dr. ONEIDA charles on 10/03 placed on Cipro and Flagyl.  His on once completed that dose.  This past Sunday he started noticing that he was becoming  bloated and stopped eating solids at that time went back to liquids.  His had no pain pills for 2-3 days.  No nausea and vomiting unless he ate something that he knew he should the.  He attempted to make himself vomit yesterday and could not.  He has had small bowel movements to continue since that time.  CT scan shows colonic dilatation with the cecum being 10 cm.  The possible obstruction may be low left-sided.  There is even suggesting possibility have a rectal mass.  WBC remained 16,000 with a left shift.  Patient's potassium is 2.2.    Overview/Hospital Course:  Patient is sitting up in chair today IV fluids IV potassium NPO Surgical consultation repeat KUB and lab in a.m. further clinical decisions depending on clinical course.  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  The patient has had no prior abdominal surgery and no medical history prior to approximately 3 months ago.  10/20/2019.  Long discussion with the patient this morning concerning the findings.  Dr. ONEIDA charles see the patient today since he has been involved in his care on 10/03.  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  Patient continue IV fluids IV antibiotics potassium replacement  today and will have 3 Fleet's enemas today.  CEA is been ordered.    10/21/2019:  Patient is comfortable in room although it does appear quite anxious.  The patient will be having EGD and colonoscopy later today.  Based on the findings there the patient will may need undergo surgery.  Patient denies any pain and states that he was not throwing up and having no fever or chills.  Patient is distended and has some abdominal pain associated.  Labs showed [place] 240[address] hemoglobin hematocrit 9.8 and 31 sodium 145 potassium 2.4 chloride 108 bicarb 21 and alk-phos was 49    10/22/2019:  Patient is stable postop day 1.  Patient states pain is well controlled.  Patient mildly tachycardic blood pressure 115/61 979 O2 sats 100%.  Labs show white blood cell count 21.5 hemoglobin 9.2 hematocrit 29.3 platelets 407 and BUN creatinine were 8 and 0.6 calcium 7.5 magnesium 1.4.  Patient is in good spirits and bowel sounds are present.    10/23/2019:  Postop day 2.:  Patient is having much more abdominal pain with waves of pain that appeared to be related to air movement in the bowel versus bowel regaining function.  Patient is alert and oriented and is mildly tachycardic with pulse 123, blood pressure 130/65 O2 sat 95%.  Labs show a improved white count of today of 19,000 hemoglobin 8.9 hematocrit 28 platelets 396 and sodium 141 potassium 3.1 albumin 1.6 and liver functions within normal ranges.  GFR greater than 60    10/24/2019:  Patient is stable and improving postop day 2 .  Colostomy bag has some gas and patient bowel sounds are very active.  Intake was 02/30/2027 output was 1745 and a net gain of 1.4 L. labs showed a white blood cell count 15.9 hemoglobin 8.7 hematocrit 27.7 and platelets 373.  Normal differential noted. Labs:  Sodium 146 potassium 3.5 chloride 110 bicarb 24 glucose 66 and BUN creatinine were 9 and 0.6 otherwise GFR greater than 60.  Magnesium 1.9 phosphorus 4.0    10/25/2019:  Patient is awake  alert and states his pain is under better control today.  Patient is having gas in to his colostomy bag it had to be relieved on 2 occasions overnight.  Patient otherwise has been stable and heart rate is much improved on beta-blocker.  Labs showed sodium 148 potassium 3.2 chloride 111 bicarb 23 calcium is 7.7 and GFR greater than 60.  White blood cell count 10.5 hemoglobin 8.2 hematocrit 26 platelets 334 and differential was normal.  Patient otherwise is stable but will continue gastric decompression until flatus is obvious.  10/26/2019.  NG tube has been removed.  The patient's colostomy is working.  Aggressive respiratory therapy.  Incentive spirometry the patient best he can do this morning was a 1000.  Lungs decreased breath sounds in the bases secondary to atelectasis.  Discussed expectations respiratory therapy this morning.  Patient will become more mobile this a.m..  Discussed mobility with nursing staff.  Patient has been up in the chair twice last p.m..  Lab and x-rays have been reviewed.  Potassium is 3.2 with supplement.  Sodium is 147 this morning patient is receiving lactated Ringer's.  Patient's protein albumin are low with an albumin of 1.4.  Dietary consult for severe malnutrition.  Continue current level of care unless clinical course changes.  Lab has been ordered for in a.m..  10/27/2019.  Patient's vital signs are acceptable.  Patient's incentive spirometry at best is a 1000.  Patient's colostomy looks as expected.  Patient does have testicular swelling will elevate today.  Dr. Dawkins general surgery's reviewed the lab and orders lab in a.m..  Patient's mood is down considering his overall findings is expected have will start Celexa on the patient today.  Patient's sodium still 147 patient is on Ringer's lactate.  Continue current level of care unless clinical course changes.  Will order PT today the patient is very weak.  Patient's weakness is expected.  But he will have a rehab.  May have to  consider whether the patient needs home health care or rehab care after this admission.    10/28/2019:  Patient is sitting up in chair today.  Patient appears weak and has some facial wasting as well as some abdominal distension.  Colostomy site looks intact and there is maceration around that from the adhesive a of the colostomy bags.  Patient is tolerating some clear liquids but has not had much of an appetite and has not been up walking very much.  Otherwise patient is stable postop.  However I feel this patient needs to progress a little faster back to his movement and hopefully bowel function.  Intake and output:  Intake 3854.2 and output 1650 for total net positive fluid in of 2204 cc  Labs:  Phosphorus 3.1 sodium 144 potassium 3.9 chloride 108 bicarb 28 glucose 116 BUN creatinine were less than 5 and 0.6 calcium 7.3 and GFR greater than 60  CBC:  White blood cell count 9.94 hemoglobin 8.3 hematocrit 27.3 platelets 436 and differential was normal  Magnesium:  1.5 phosphorus 3.4    10/29/2019:  This postop day 7.  Patient is looking stronger and eating and tolerating clear liquids.  Colostomy site is looking improved but there is continued desquamation around the stoma.  This is undoubtedly from the adhesive of the bags.  Otherwise no complaints and we will increase progressive mobility starting today.  Physical therapy will be seeing this patient as well.  Labs:  Sodium 139 potassium 4.0 chlorid 104 bicarb 27 glucose 105 BUN creatinine 5 and 0.5 total bilirubin 0.3 albumin 1.4 total protein 4.4 calcium 7.0  White blood cell count 9.8 hemoglobin 8.5 hematocrit 27.3 platelets 401 phosphorus 3.1  Will continue current care will consider starting TPN due to prolonged period without food and repeat labs in the a.m.    10/30/2019:  Patient is stable and feeling much better today.  Patient is able to tolerate clear liquids well.  However patient is NPO now due to planned delayed primary closure today.  Patient be  transferred to the medical-surgical unit post surgery today.  Intake and output:  Intake 3472 output 2775 with a net gain of 697 cc  CMP sodium 139 potassium 4.0 glucose 105 BUN creatinine were 5 and 0.5  CBC unremarkable      Interval History:     Review of Systems   Constitutional: Positive for fatigue and unexpected weight change. Negative for activity change, appetite change, chills, diaphoresis and fever.   HENT: Negative for congestion, drooling, ear discharge, hearing loss, mouth sores, nosebleeds, rhinorrhea, sinus pressure, sinus pain, tinnitus and trouble swallowing.    Eyes: Negative.  Negative for pain, redness, itching and visual disturbance.   Respiratory: Positive for shortness of breath. Negative for apnea, cough, choking, chest tightness, wheezing and stridor.         Patient is doing 750 on incentive spirometry was able to get him to do a 1000.   Cardiovascular: Negative for chest pain, palpitations and leg swelling.   Gastrointestinal: Positive for abdominal pain, blood in stool and constipation. Negative for abdominal distention, anal bleeding, diarrhea, nausea and vomiting.        Colostomy is present and working well.   Endocrine: Negative.  Negative for polydipsia, polyphagia and polyuria.   Genitourinary: Negative for difficulty urinating, dysuria, flank pain, frequency and urgency.   Musculoskeletal: Positive for arthralgias and myalgias. Negative for back pain, gait problem, joint swelling, neck pain and neck stiffness.        Muscle mass decreased.   Skin: Negative for color change, pallor, rash and wound.   Allergic/Immunologic: Negative.  Negative for environmental allergies, food allergies and immunocompromised state.   Neurological: Positive for weakness. Negative for dizziness, syncope, facial asymmetry, light-headedness, numbness and headaches.   Hematological: Negative for adenopathy. Does not bruise/bleed easily.   Psychiatric/Behavioral: Positive for dysphoric mood. Negative for  agitation, confusion and sleep disturbance. The patient is nervous/anxious.      Objective:     Vital Signs (Most Recent):  Temp: 97.9 °F (36.6 °C) (10/30/19 1914)  Pulse: 109 (10/30/19 1914)  Resp: 18 (10/30/19 1914)  BP: 116/79 (10/30/19 1914)  SpO2: 96 % (10/30/19 1914) Vital Signs (24h Range):  Temp:  [97.7 °F (36.5 °C)-98.4 °F (36.9 °C)] 97.9 °F (36.6 °C)  Pulse:  [] 109  Resp:  [10-23] 18  SpO2:  [92 %-100 %] 96 %  BP: ()/() 116/79     Weight: 69.7 kg (153 lb 10.6 oz)  Body mass index is 22.69 kg/m².    Intake/Output Summary (Last 24 hours) at 10/30/2019 2116  Last data filed at 10/30/2019 1800  Gross per 24 hour   Intake 3090 ml   Output 2410 ml   Net 680 ml      Physical Exam   Constitutional: He is oriented to person, place, and time. He appears well-developed and well-nourished.   HENT:   Head: Normocephalic and atraumatic.   Eyes: Pupils are equal, round, and reactive to light. EOM are normal.   Neck: Normal range of motion. Neck supple. No tracheal deviation present. No thyromegaly present.   Cardiovascular: Normal rate, regular rhythm and normal heart sounds.   Pulmonary/Chest: Effort normal and breath sounds normal.   Abdominal: Soft. Bowel sounds are normal. He exhibits no distension. There is tenderness. There is guarding. There is no rebound.   Musculoskeletal: Normal range of motion.   Lymphadenopathy:     He has no cervical adenopathy.   Neurological: He is alert and oriented to person, place, and time.   Skin: Skin is warm and dry. Capillary refill takes less than 2 seconds.   Psychiatric: He has a normal mood and affect. His behavior is normal. Judgment and thought content normal.   Nursing note and vitals reviewed.      Significant Labs:   Recent Lab Results     None        All pertinent labs within the past 24 hours have been reviewed.    Significant Imaging: I have reviewed and interpreted all pertinent imaging results/findings within the past 24 hours.      Assessment/Plan:       * Large bowel obstruction  IV fluids IV potassium NPO KUB in lab in a.m. monitor closely.  Surgical consultation will be seen in a.m. by Dr. MOHAMUD General surgery seen the patient in the clinic on 10/03.  10/20/2019.  Extended conversation with Dr. Buck concerning the patient's finding rectal mass bowel obstruction.  Correction of electrolytes IV fluid IV antibiotics NPO fleets enema today x3.  Colonoscope in a.m..  CEA is been ordered.    10/21/2019  1.  Treat today based on findings of colonoscopy or endoscopy.  2.  Continue current antibiotic treatment  3.  Follow surgery recommendations after final diagnosis made.  4.  Treat otherwise as needed based on clinical course  5.  Repeat labs in the a.m. and replete potassium as needed    10/22/2019:  1.  Stable postop day 1 with positive bowel sounds.  2.  We will replace calcium  3.  Repeat labs in the a.m. to include CBC and CMP magnesium  4.  Follow-up otherwise as clinically indicated based clinical course.    10/23/2019:  1.  Continue current antibiotic therapy.  2.  Give will blood bolus of 500 cc x1  3.  Change pain medication to Dilaudid 1 mg every 2 hr as needed for pain  4.  A feel tachycardia may be related to pain versus is mildly fluid depleted.  5.  Replace electrolytes as needed.  6.  Intake and output revealed a positive 2500 cc sore +overnight    10/24/2019:  1.  Continue current medications.  2.  Repeat labs in the a.m. to monitor white blood cell count and  3.  Begin metoprolol 12.5 mg IV q.12 hours or heart rate and  4.  Continue other medications as ordered and await bowel function.    10/25/2019:  1.  Postop day 3.  Patient having no fever chills nausea or vomiting and states pain is well controlled this morning.  2.  We will replace potassium and calcium IV  3.  Repeat labs in the a.m. to include CBC and CMP.  Patient otherwise has been afebrile and postop course have been normal  10/26/2019.  Postop day 4.  Patient is doing well.  NG tube  has been removed.  Colostomy is working well.  Replace potassium today.  All labs have been reviewed.  Albumin 1.4 the patient has severe malnutrition he will soon be starting a diet of consult dietary.  Lab in a.m..  Encouraged incentive spirometry and mobility.  Patient is concerned about his overall condition.  Probably at this point has mild depression secondary to all the current medical problems. Patient did ask about pathology report I told him that I would look for more than likely would be back to the 1st of the week.  Continue current level of care unless clinical course changes.  10/27/2019.  See encounter for postop care note.    10/28/2019:  Postop day 6.:  Patient is still feeling weak and has had flat affect over the last several days.  Over the weekend and antidepressant citalopram was started.  This was started 10 mg p.o. daily.  Patient has colostomy side is intact.  1.  Reconsult PT for this patient  2.  Continue progressive mobility to increase his activity  3.  Advance diet when better bowel function occurs  4.  Replace calcium and magnesium  5.  CBC, CMP in the a.m..  With magnesium    10/29/2019 postop day 7:  1.  Continue PT consult and increased progressive mobility.  Would like to get the patient up in a chair today.  2.  Patient tolerating clear liquid diet  3.  Repeat labs in the a.m. include CBC, CMP, magnesium, phosphate  4.  Consider TPN to begin tomorrow if patient does not advance his diet well.    10/30/2019:  Postop day 8  1.  Continue current treatment.  Patient NPO for delayed primary closure today  2.  Transfer to medical-surgical unit post surgery  3.  Continue current diet after surgery.  4.  Repeat labs in the a.m. to include CBC CMP    Encounter for postoperative care  10/27/2019.  Day 5 postop care.  Patient's mood is somewhat down will start Celexa.  His mood is expected to be low considering the current circumstances but feel that he does need help.  Potassium 3.1 has  been supplemented today.  Magnesium is 1.5 has been supplemented today.  Sodium is 147 continue to monitor.  Patient can do about a 1000 on incentive spirometry at best.  Will order PT for the patient today he needs some increased movement.  Patient is very weak considering the illness time frame.  Patient's testicle swollen today will elevate.  Lab values in a.m..  Pathology is pending.  Continue current level of care.      Intestinal obstruction  Patient now postop day 1 from bowel resection.  Stable postop day 1 doing well and postoperative course at this time      Hypokalemia  10/25/2019:  Will replace IV for this morning.  10/26/2019 will replace potassium p.o. Today.  10/27/2019.  Patient potassium 3.1 magnesium is slightly low will supplement potassium and magnesium.  Dr. Dawkins general surgery is already ordered potassium.      VTE Risk Mitigation (From admission, onward)         Ordered     heparin (porcine) injection 5,000 Units  Every 8 hours      10/21/19 1753     Place sequential compression device  Until discontinued      10/21/19 1753     IP VTE LOW RISK PATIENT  Once      10/19/19 1752     Place MAE hose  Until discontinued      10/19/19 1752                      Lencho Kumar MD  Department of Hospital Medicine   [facility]

## 2019-10-31 NOTE — SUBJECTIVE & OBJECTIVE
Interval History:     Review of Systems   Constitutional: Positive for fatigue and unexpected weight change. Negative for activity change, appetite change, chills, diaphoresis and fever.   HENT: Negative for congestion, drooling, ear discharge, hearing loss, mouth sores, nosebleeds, rhinorrhea, sinus pressure, sinus pain, tinnitus and trouble swallowing.    Eyes: Negative.  Negative for pain, redness, itching and visual disturbance.   Respiratory: Positive for shortness of breath. Negative for apnea, cough, choking, chest tightness, wheezing and stridor.         Patient is doing 750 on incentive spirometry was able to get him to do a 1000.   Cardiovascular: Negative for chest pain, palpitations and leg swelling.   Gastrointestinal: Positive for abdominal pain, blood in stool and constipation. Negative for abdominal distention, anal bleeding, diarrhea, nausea and vomiting.        Colostomy is present and working well.   Endocrine: Negative.  Negative for polydipsia, polyphagia and polyuria.   Genitourinary: Negative for difficulty urinating, dysuria, flank pain, frequency and urgency.   Musculoskeletal: Positive for arthralgias and myalgias. Negative for back pain, gait problem, joint swelling, neck pain and neck stiffness.        Muscle mass decreased.   Skin: Negative for color change, pallor, rash and wound.   Allergic/Immunologic: Negative.  Negative for environmental allergies, food allergies and immunocompromised state.   Neurological: Positive for weakness. Negative for dizziness, syncope, facial asymmetry, light-headedness, numbness and headaches.   Hematological: Negative for adenopathy. Does not bruise/bleed easily.   Psychiatric/Behavioral: Positive for dysphoric mood. Negative for agitation, confusion and sleep disturbance. The patient is nervous/anxious.      Objective:     Vital Signs (Most Recent):  Temp: 97.9 °F (36.6 °C) (10/30/19 1914)  Pulse: 109 (10/30/19 1914)  Resp: 18 (10/30/19 1914)  BP:  116/79 (10/30/19 1914)  SpO2: 96 % (10/30/19 1914) Vital Signs (24h Range):  Temp:  [97.7 °F (36.5 °C)-98.4 °F (36.9 °C)] 97.9 °F (36.6 °C)  Pulse:  [] 109  Resp:  [10-23] 18  SpO2:  [92 %-100 %] 96 %  BP: ()/() 116/79     Weight: 69.7 kg (153 lb 10.6 oz)  Body mass index is 22.69 kg/m².    Intake/Output Summary (Last 24 hours) at 10/30/2019 2116  Last data filed at 10/30/2019 1800  Gross per 24 hour   Intake 3090 ml   Output 2410 ml   Net 680 ml      Physical Exam   Constitutional: He is oriented to person, place, and time. He appears well-developed and well-nourished.   HENT:   Head: Normocephalic and atraumatic.   Eyes: Pupils are equal, round, and reactive to light. EOM are normal.   Neck: Normal range of motion. Neck supple. No tracheal deviation present. No thyromegaly present.   Cardiovascular: Normal rate, regular rhythm and normal heart sounds.   Pulmonary/Chest: Effort normal and breath sounds normal.   Abdominal: Soft. Bowel sounds are normal. He exhibits no distension. There is tenderness. There is guarding. There is no rebound.   Musculoskeletal: Normal range of motion.   Lymphadenopathy:     He has no cervical adenopathy.   Neurological: He is alert and oriented to person, place, and time.   Skin: Skin is warm and dry. Capillary refill takes less than 2 seconds.   Psychiatric: He has a normal mood and affect. His behavior is normal. Judgment and thought content normal.   Nursing note and vitals reviewed.      Significant Labs:   Recent Lab Results     None        All pertinent labs within the past 24 hours have been reviewed.    Significant Imaging: I have reviewed and interpreted all pertinent imaging results/findings within the past 24 hours.

## 2019-10-31 NOTE — NURSING
New colostomy bag changed at this time due to copious amounts of bowel contents leaking around the site.  New dry dressing also applied to patient's midline abdominal incision.  Site around incision cleansed with hibiclens.  Dr. Kumar notified of patient's scrotal edema.  Ordered to elevate scrotum on towels at this time.  Patient tolerated dressing changes well.  NAD noted.

## 2019-10-31 NOTE — PROGRESS NOTES
[facility]  General Surgery  Daily Note    Patient Name: Miguel Angel Apple Sr.  MRN: [id number]  Admission Date: 10/19/2019  Attending Physician: Lencho Kumar MD   Consult Physician: Jose Lazo MD  Primary Care Provider: Primary Doctor No    Subjective:     Principle Problem: Large bowel obstruction    Last 24 hour history:  10/28/19  Afebrile.  Vital signs stable.  No nausea vomiting.  Tolerating clear liquid diet well.  Ostomy functional left lower quadrant.  Some venous congestion of the ostomy however viable.  Midline wound with pink healthy granulation tissue in base.  No signs or symptoms of infection.  No other new complaints or issues today.    10/29/19  Afebrile.  VSS.  No nausea or vomiting.  Ostomy functional in the LLQ.  No new issues or complaints.     10/30/19  Afebrile.  Vital signs stable.  No nausea vomiting.  Ostomy functional.  Preliminary pathology from pathologist obtained.  Rectal adenocarcinoma.  Formal pathology report to follow.  Patient was informed of this this morning.  Appropriately sad.  Discussed with patient next steps and care and treatment.  Patient voiced understanding.  All questions were elicited and answered this morning to the patient's satisfaction.    10/31/19  Afebrile.  Vital signs stable.  Mild leukocytosis to be expected postoperative from wound closure. Ostomy with venous congestion still however functional.  No nausea vomiting.  Hungry.  No new issues or complaints.    Objective:     Vital Signs (Most Recent):  Temp: 96.9 °F (36.1 °C) (10/31/19 1054)  Pulse: 88 (10/31/19 1054)  Resp: 14 (10/31/19 1054)  BP: (!) 97/59 (10/31/19 1054)  SpO2: 97 % (10/31/19 1054) Vital Signs (24h Range):  Temp:  [96.5 °F (35.8 °C)-97.9 °F (36.6 °C)] 96.9 °F (36.1 °C)  Pulse:  [] 88  Resp:  [14-23] 14  SpO2:  [92 %-99 %] 97 %  BP: ()/(59-79) 97/59     Intake/Output last 24 hours:    Intake/Output Summary (Last 24 hours) at 10/31/2019  1221  Last data filed at 10/31/2019 0950  Gross per 24 hour   Intake 1250 ml   Output 3110 ml   Net -1860 ml       I/O last 3 completed shifts:  In: 3310 [P.O.:360; I.V.:2550; IV Piggyback:400]  Out: 3810 [Urine:3650; Stool:150; Blood:10]  I/O this shift:  In: 240 [P.O.:240]  Out: 600 [Urine:600]    Weight: 69.7 kg (153 lb 10.6 oz)  Body mass index is 22.69 kg/m².    Gen: Wd Wn male currently in NAD  Heent: Nc/At, MMM  Eyes: Perrl, Eomi  Cv: RRR, no  M/g/r  Lung: Non-labored breathing, clear bilaterally  Abd: Soft, midline wound with reactive erythema around staples.  No signs or symptoms of active infection.  Wound closed., ostomy with minor venous congestion left lower quadrant, viable, with output of stool in bag.  Red rubber catheter within mucous fistula.    Significant Labs:  CBC:   Recent Labs   Lab 10/31/19  0803   WBC 15.82*   RBC 3.32*   HGB 9.3*   HCT 29.9*   *   MCV 90   MCH 28.0   MCHC 31.1*     BMP:   Recent Labs   Lab 10/28/19  0454  10/31/19  0803   *   < > 92      < > 137   K 3.9   < > 4.0      < > 103   CO2 28   < > 26   BUN <5*   < > <5*   CREATININE 0.6   < > 0.5   CALCIUM 7.3*   < > 7.5*   MG 2.0  --   --     < > = values in this interval not displayed.     CMP:   Recent Labs   Lab 10/31/19  0803   GLU 92   CALCIUM 7.5*   ALBUMIN 1.5*   PROT 5.0*      K 4.0   CO2 26      BUN <5*   CREATININE 0.5   ALKPHOS 49*   ALT 9*   AST 13   BILITOT 0.4     LFTs:   Recent Labs   Lab 10/31/19  0803   ALT 9*   AST 13   ALKPHOS 49*   BILITOT 0.4   PROT 5.0*   ALBUMIN 1.5*     Coagulation: No results for input(s): LABPROT, INR, APTT in the last 168 hours.  Specimen (12h ago, onward)    None        No results for input(s): COLORU, CLARITYU, SPECGRAV, PHUR, PROTEINUA, GLUCOSEU, BILIRUBINCON, BLOODU, WBCU, RBCU, BACTERIA, MUCUS, NITRITE, LEUKOCYTESUR, UROBILINOGEN, HYALINECASTS in the last 168 hours.    Cultures:    Microbiology Results (last 7 days)     Procedure Component  Value Units Date/Time    Aerobic culture [[id number]] Collected:  10/30/19 1458    Order Status:  Sent Specimen:  Wound from Abdomen Updated:  10/30/19 2251    Blood culture x two cultures. Draw prior to antibiotics. [[id number]] Collected:  10/19/19 1505    Order Status:  Completed Specimen:  Blood from Antecubital, Right  Arm Updated:  10/24/19 2212     Blood Culture, Routine No growth after 5 days.    Narrative:       Aerobic and anaerobic    Blood culture x two cultures. Draw prior to antibiotics. [[id number]] Collected:  10/19/19 1508    Order Status:  Completed Specimen:  Blood from Antecubital, Left  Arm Updated:  10/24/19 2212     Blood Culture, Routine No growth after 5 days.    Narrative:       Aerobic and anaerobic          Assessment:   Miguel Angel Apple Sr. is a 64 y.o. male who presents with Large bowel obstruction.    Active Diagnoses:    Diagnosis Date Noted POA    PRINCIPAL PROBLEM:  Large bowel obstruction [K56.609] 10/19/2019 Yes    Severe malnutrition [E43] 10/26/2019 Yes    Encounter for postoperative care [Z48.89]  Not Applicable    Diarrhea [R19.7] 10/21/2019 Yes    Rectal mass [K62.89] 10/20/2019 Yes    Hypokalemia [E87.6] 10/20/2019 Yes    Intestinal obstruction [K56.609]  Yes    Colitis [K52.9] 10/19/2019 Yes      Problems Resolved During this Admission:     VTE Risk Mitigation (From admission, onward)         Ordered     heparin (porcine) injection 5,000 Units  Every 8 hours      10/21/19 1753     Place sequential compression device  Until discontinued      10/21/19 1753     IP VTE LOW RISK PATIENT  Once      10/19/19 1752     Place MAE hose  Until discontinued      10/19/19 1752                Medical Decision Making/Plan:  Satisfactory postop recovery thus far.  Mild reactive erythema around surgical closed wound. Continue antibiotics for now.  Continue full liquid diet.  Anticipate discharge next 24-48 hours.    Jose Lazo MD  General Surgery  [facility]  - ICU

## 2019-10-31 NOTE — ASSESSMENT & PLAN NOTE
IV fluids IV potassium NPO KUB in lab in a.m. monitor closely.  Surgical consultation will be seen in a.m. by Dr. MOHAMUD General surgery seen the patient in the clinic on 10/03.  10/20/2019.  Extended conversation with Dr. Buck concerning the patient's finding rectal mass bowel obstruction.  Correction of electrolytes IV fluid IV antibiotics NPO fleets enema today x3.  Colonoscope in a.m..  CEA is been ordered.    10/21/2019  1.  Treat today based on findings of colonoscopy or endoscopy.  2.  Continue current antibiotic treatment  3.  Follow surgery recommendations after final diagnosis made.  4.  Treat otherwise as needed based on clinical course  5.  Repeat labs in the a.m. and replete potassium as needed    10/22/2019:  1.  Stable postop day 1 with positive bowel sounds.  2.  We will replace calcium  3.  Repeat labs in the a.m. to include CBC and CMP magnesium  4.  Follow-up otherwise as clinically indicated based clinical course.    10/23/2019:  1.  Continue current antibiotic therapy.  2.  Give will blood bolus of 500 cc x1  3.  Change pain medication to Dilaudid 1 mg every 2 hr as needed for pain  4.  A feel tachycardia may be related to pain versus is mildly fluid depleted.  5.  Replace electrolytes as needed.  6.  Intake and output revealed a positive 2500 cc sore +overnight    10/24/2019:  1.  Continue current medications.  2.  Repeat labs in the a.m. to monitor white blood cell count and  3.  Begin metoprolol 12.5 mg IV q.12 hours or heart rate and  4.  Continue other medications as ordered and await bowel function.    10/25/2019:  1.  Postop day 3.  Patient having no fever chills nausea or vomiting and states pain is well controlled this morning.  2.  We will replace potassium and calcium IV  3.  Repeat labs in the a.m. to include CBC and CMP.  Patient otherwise has been afebrile and postop course have been normal  10/26/2019.  Postop day 4.  Patient is doing well.  NG tube has been removed.  Colostomy is  working well.  Replace potassium today.  All labs have been reviewed.  Albumin 1.4 the patient has severe malnutrition he will soon be starting a diet of consult dietary.  Lab in a.m..  Encouraged incentive spirometry and mobility.  Patient is concerned about his overall condition.  Probably at this point has mild depression secondary to all the current medical problems. Patient did ask about pathology report I told him that I would look for more than likely would be back to the 1st of the week.  Continue current level of care unless clinical course changes.  10/27/2019.  See encounter for postop care note.    10/28/2019:  Postop day 6.:  Patient is still feeling weak and has had flat affect over the last several days.  Over the weekend and antidepressant citalopram was started.  This was started 10 mg p.o. daily.  Patient has colostomy side is intact.  1.  Reconsult PT for this patient  2.  Continue progressive mobility to increase his activity  3.  Advance diet when better bowel function occurs  4.  Replace calcium and magnesium  5.  CBC, CMP in the a.m..  With magnesium    10/29/2019 postop day 7:  1.  Continue PT consult and increased progressive mobility.  Would like to get the patient up in a chair today.  2.  Patient tolerating clear liquid diet  3.  Repeat labs in the a.m. include CBC, CMP, magnesium, phosphate  4.  Consider TPN to begin tomorrow if patient does not advance his diet well.    10/30/2019:  Postop day 8  1.  Continue current treatment.  Patient NPO for delayed primary closure today  2.  Transfer to medical-surgical unit post surgery  3.  Continue current diet after surgery.  4.  Repeat labs in the a.m. to include CBC CMP    10/30/2019 postop day 9:  1.  Patient with leukocytosis of 15,000, will monitor fever curve, check lactic acid, and increase IV fluids due to fluid deficit overnight as well as low blood pressure.  2.  Recheck labs in the morning to include CBC, CMP and repeat chest  x-ray  3.  Continue current antibiotics and follow otherwise as clinically indicated based on course  4.  Patient with possible discharge in the next 24-48 hours if stable

## 2019-10-31 NOTE — PLAN OF CARE
10/31/19 0840   Discharge Reassessment   Assessment Type Discharge Planning Reassessment   Anticipated Discharge Disposition Home-Health   Do you have any problems affording any of your prescribed medications? TBD   Discharge Plan A Home Health   Patient choice form signed by patient/caregiver Yes   Post-Acute Status   Post-Acute Authorization Home Health/Hospice   Home Health/Hospice Status Referrals Sent   Patient resting in bed. States he hasn't been up walking around except for 1 time.  Will have nurse assist with getting him up. States he thinks he will only need home health. He has a lot of equipment at home like wheelchair & walker. Preference form signed for home health. If he needs skilled will assist with setting him up but he's hoping to just be able to go home with home health. Will continue to follow for needs.

## 2019-10-31 NOTE — ASSESSMENT & PLAN NOTE
IV fluids IV potassium NPO KUB in lab in a.m. monitor closely.  Surgical consultation will be seen in a.m. by Dr. MOHAMUD General surgery seen the patient in the clinic on 10/03.  10/20/2019.  Extended conversation with Dr. Buck concerning the patient's finding rectal mass bowel obstruction.  Correction of electrolytes IV fluid IV antibiotics NPO fleets enema today x3.  Colonoscope in a.m..  CEA is been ordered.    10/21/2019  1.  Treat today based on findings of colonoscopy or endoscopy.  2.  Continue current antibiotic treatment  3.  Follow surgery recommendations after final diagnosis made.  4.  Treat otherwise as needed based on clinical course  5.  Repeat labs in the a.m. and replete potassium as needed    10/22/2019:  1.  Stable postop day 1 with positive bowel sounds.  2.  We will replace calcium  3.  Repeat labs in the a.m. to include CBC and CMP magnesium  4.  Follow-up otherwise as clinically indicated based clinical course.    10/23/2019:  1.  Continue current antibiotic therapy.  2.  Give will blood bolus of 500 cc x1  3.  Change pain medication to Dilaudid 1 mg every 2 hr as needed for pain  4.  A feel tachycardia may be related to pain versus is mildly fluid depleted.  5.  Replace electrolytes as needed.  6.  Intake and output revealed a positive 2500 cc sore +overnight    10/24/2019:  1.  Continue current medications.  2.  Repeat labs in the a.m. to monitor white blood cell count and  3.  Begin metoprolol 12.5 mg IV q.12 hours or heart rate and  4.  Continue other medications as ordered and await bowel function.    10/25/2019:  1.  Postop day 3.  Patient having no fever chills nausea or vomiting and states pain is well controlled this morning.  2.  We will replace potassium and calcium IV  3.  Repeat labs in the a.m. to include CBC and CMP.  Patient otherwise has been afebrile and postop course have been normal  10/26/2019.  Postop day 4.  Patient is doing well.  NG tube has been removed.  Colostomy is  working well.  Replace potassium today.  All labs have been reviewed.  Albumin 1.4 the patient has severe malnutrition he will soon be starting a diet of consult dietary.  Lab in a.m..  Encouraged incentive spirometry and mobility.  Patient is concerned about his overall condition.  Probably at this point has mild depression secondary to all the current medical problems. Patient did ask about pathology report I told him that I would look for more than likely would be back to the 1st of the week.  Continue current level of care unless clinical course changes.  10/27/2019.  See encounter for postop care note.    10/28/2019:  Postop day 6.:  Patient is still feeling weak and has had flat affect over the last several days.  Over the weekend and antidepressant citalopram was started.  This was started 10 mg p.o. daily.  Patient has colostomy side is intact.  1.  Reconsult PT for this patient  2.  Continue progressive mobility to increase his activity  3.  Advance diet when better bowel function occurs  4.  Replace calcium and magnesium  5.  CBC, CMP in the a.m..  With magnesium    10/29/2019 postop day 7:  1.  Continue PT consult and increased progressive mobility.  Would like to get the patient up in a chair today.  2.  Patient tolerating clear liquid diet  3.  Repeat labs in the a.m. include CBC, CMP, magnesium, phosphate  4.  Consider TPN to begin tomorrow if patient does not advance his diet well.    10/30/2019:  Postop day 8  1.  Continue current treatment.  Patient NPO for delayed primary closure today  2.  Transfer to medical-surgical unit post surgery  3.  Continue current diet after surgery.  4.  Repeat labs in the a.m. to include CBC CMP

## 2019-10-31 NOTE — PT/OT/SLP PROGRESS
Physical Therapy         Treatment        Miguel Angel Apple Sr. 1955  MRN: [id number]     Date: 10/31/2019  PT Received On: 10/31/19  PT Start Time: 0850     PT Stop Time: 0912    PT Total Time (min): 22 min       Gait Trainingx 17 min    Treatment Diagnosis: generalized weakness and decreased functional strength    General Precautions: Standard, fall  Orthopedic Precautions: none   Braces: no           Previous Level of Function:       Subjective:  Communicated with patient and RN prior to session. Patient and RN state he has not been up yet today, however patient is in agreement to work with PT. Patient states he is ready for pain meds    Chief Complaint: pain at incision site         Treatment:    Bed Mobility :   Supine to sit: Minimal Assistance   Sit to supine: Activity did not occur   Rolling: Contact Guard Assistance   Scooting: Standby Assistance    Transfers:  Sit to stand:Contact Guard Assistance with Rolling Walker with VC's for hand placement and raising height of bed prior to transfer    Gait:  unsteady, with kyphotic posture, decreased step length and mariah. however able to ambulate 45 feet with RW and CGA on level tile with no LOB. did report one episode of diziness that subsided after a standing rest break.     Balance:   Static Sit: FAIR+: Able to take MINIMAL challenges from all directions  Dynamic Sit:  FAIR+: Maintains balance through MINIMAL excursions of active trunk motion  Static Stand: FAIR: Maintains without assist but unable to take challenges  Dynamic stand: FAIR: Needs CONTACT GUARD during gait    Assessment:    Miguel Angel Apple Sr. is a 64 y.o. male admitted with a medical diagnosis of Large bowel obstruction.  He presents with decreased functional strength and pain at incision limiting activity tolerance. Able to show progression today with gait training without complications. See above for gait deficits.    Pain Scale: 6/10     Pain Location: abdominal incision    Cognitive  Exam:  Oriented to: Person, Place, Time and Situation  Follows Commands/attention: Follows multistep  commands  Communication: clear/fluent  Safety awareness/insight to disability: intact    Endurance deficit:  Decreased functional strength/endurance    Patient left up in chair with all lines intact, call button in reach and nursing notified.    Rehab potential is excellent.    Activity tolerance: Excellent    Equipment recommendations:   RW to promote a safe and steady gait cycle to decrease fall risk    Education:  Importance of out of bed activity      GOALS:   Patient goals: refer to eval  Family goals: refer to eval    Plan:   continue with current plan      Discharge recommendations:   Home with possible home health      Darrion Foreman, PTA

## 2019-10-31 NOTE — SUBJECTIVE & OBJECTIVE
Interval History:     Review of Systems   Constitutional: Positive for fatigue. Negative for activity change, appetite change and fever.   HENT: Negative for congestion, ear discharge, mouth sores, nosebleeds, rhinorrhea, sinus pressure, sinus pain and tinnitus.    Eyes: Negative.  Negative for pain, redness and itching.   Respiratory: Negative for apnea, cough, choking, chest tightness, shortness of breath, wheezing and stridor.    Cardiovascular: Negative for chest pain, palpitations and leg swelling.   Gastrointestinal: Positive for abdominal pain. Negative for abdominal distention, anal bleeding, blood in stool, constipation and diarrhea.   Endocrine: Negative.    Genitourinary: Positive for scrotal swelling. Negative for difficulty urinating, flank pain, frequency and urgency.   Musculoskeletal: Positive for myalgias. Negative for arthralgias, back pain and gait problem.   Skin: Negative for color change and pallor.   Allergic/Immunologic: Negative.    Neurological: Positive for weakness. Negative for dizziness, facial asymmetry, light-headedness and headaches.   Hematological: Negative for adenopathy. Does not bruise/bleed easily.     Objective:     Vital Signs (Most Recent):  Temp: 97.4 °F (36.3 °C) (10/31/19 1459)  Pulse: 95 (10/31/19 1459)  Resp: 18 (10/31/19 1459)  BP: 96/60 (10/31/19 1459)  SpO2: 97 % (10/31/19 1459) Vital Signs (24h Range):  Temp:  [96.5 °F (35.8 °C)-97.9 °F (36.6 °C)] 97.4 °F (36.3 °C)  Pulse:  [] 95  Resp:  [14-18] 18  SpO2:  [96 %-99 %] 97 %  BP: ()/(59-79) 96/60     Weight: 69.7 kg (153 lb 10.6 oz)  Body mass index is 22.69 kg/m².    Intake/Output Summary (Last 24 hours) at 10/31/2019 1748  Last data filed at 10/31/2019 1658  Gross per 24 hour   Intake 2061 ml   Output 3325 ml   Net -1264 ml      Physical Exam   Constitutional: He is oriented to person, place, and time. He appears well-developed and well-nourished.   HENT:   Head: Normocephalic and atraumatic.   Eyes:  Pupils are equal, round, and reactive to light. EOM are normal.   Neck: Normal range of motion. Neck supple. No tracheal deviation present. No thyromegaly present.   Cardiovascular: Normal rate, regular rhythm and normal heart sounds.   Pulmonary/Chest: He is in respiratory distress. He has rales.   Abdominal: Soft. Bowel sounds are normal. He exhibits no distension. There is tenderness. There is guarding. There is no rebound.   Musculoskeletal: Normal range of motion. He exhibits edema.   Lymphadenopathy:     He has no cervical adenopathy.   Neurological: He is alert and oriented to person, place, and time.   Skin: Skin is warm and dry. Capillary refill takes less than 2 seconds.   Psychiatric: He has a normal mood and affect. His behavior is normal. Judgment and thought content normal.   Nursing note and vitals reviewed.      Significant Labs:   Recent Lab Results       10/31/19  1435   10/31/19  0803        Albumin   1.5     Alkaline Phosphatase   49     ALT   9     Anion Gap 4 8     AST   13     Baso #   0.03     Basophil%   0.2     BILIRUBIN TOTAL   0.4  Comment:  For infants and newborns, interpretation of results should be based  on gestational age, weight and in agreement with clinical  observations.  Premature Infant recommended reference ranges:  Up to 24 hours.............<8.0 mg/dL  Up to 48 hours............<12.0 mg/dL  3-5 days..................<15.0 mg/dL  6-29 days.................<15.0 mg/dL       BUN, Bld <5 <5     Calcium 7.5 7.5     Chloride 102 103     CO2 26 26     Creatinine 0.6 0.5     Differential Method   Automated     eGFR if  >60.0 >60.0     eGFR if non  >60.0  Comment:  Calculation used to obtain the estimated glomerular filtration  rate (eGFR) is the CKD-EPI equation.    >60.0  Comment:  Calculation used to obtain the estimated glomerular filtration  rate (eGFR) is the CKD-EPI equation.        Eos #   0.2     Eosinophil%   1.1     Glucose 94 92     Gran #  (ANC)   11.7     Gran%   73.8     Hematocrit   29.9     Hemoglobin   9.3     Immature Grans (Abs)   0.17  Comment:  Mild elevation in immature granulocytes is non specific and   can be seen in a variety of conditions including stress response,   acute inflammation, trauma and pregnancy. Correlation with other   laboratory and clinical findings is essential.       Immature Granulocytes   1.1     Lymph #   2.4     Lymph%   14.9     MCH   28.0     MCHC   31.1     MCV   90     Mono #   1.4     Mono%   8.9     MPV   9.4     nRBC   0     Platelets   494     Potassium 4.1 4.0     PROTEIN TOTAL   5.0     RBC   3.32     RDW   18.2     Sodium 132 137     WBC   15.82         All pertinent labs within the past 24 hours have been reviewed.    Significant Imaging: I have reviewed and interpreted all pertinent imaging results/findings within the past 24 hours.

## 2019-10-31 NOTE — NURSING
0945: Patient assisted back to bed from chair with assist x2.  Colostomy was noted to be leaking around site and on gown.  Colostomy bag and dry dressing was changed over incision site at this time.24 Staples are intact.  Redness is noted approx. an inch around the incision and to lower abdomen.  Penrose drain noted to left of stoma site. Patient tolerated well.

## 2019-10-31 NOTE — PROGRESS NOTES
[facility] Medicine  Progress Note    Patient Name: Miguel Angel Apple Sr.  MRN: [id number]  Patient Class: IP- Inpatient   Admission Date: 10/19/2019  Length of Stay: 12 days  Attending Physician: Lencho Kumar MD  Primary Care Provider: Primary Doctor No        Subjective:     Principal Problem:Large bowel obstruction        HPI:  Patient's history began approximately 3 months ago.  He presented to the hospital well over a month ago given a diagnosis of he understands colitis.  Treated with antibiotics IV fluids.  He saw Dr. ONEIDA charles on 10/03 placed on Cipro and Flagyl.  His on once completed that dose.  This past Sunday he started noticing that he was becoming  bloated and stopped eating solids at that time went back to liquids.  His had no pain pills for 2-3 days.  No nausea and vomiting unless he ate something that he knew he should the.  He attempted to make himself vomit yesterday and could not.  He has had small bowel movements to continue since that time.  CT scan shows colonic dilatation with the cecum being 10 cm.  The possible obstruction may be low left-sided.  There is even suggesting possibility have a rectal mass.  WBC remained 16,000 with a left shift.  Patient's potassium is 2.2.    Overview/Hospital Course:  Patient is sitting up in chair today IV fluids IV potassium NPO Surgical consultation repeat KUB and lab in a.m. further clinical decisions depending on clinical course.  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  The patient has had no prior abdominal surgery and no medical history prior to approximately 3 months ago.  10/20/2019.  Long discussion with the patient this morning concerning the findings.  Dr. ONEIDA charles see the patient today since he has been involved in his care on 10/03.  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  Patient continue IV fluids IV antibiotics potassium replacement  today and will have 3 Fleet's enemas today.  CEA is been ordered.    10/21/2019:  Patient is comfortable in room although it does appear quite anxious.  The patient will be having EGD and colonoscopy later today.  Based on the findings there the patient will may need undergo surgery.  Patient denies any pain and states that he was not throwing up and having no fever or chills.  Patient is distended and has some abdominal pain associated.  Labs showed [place] 9[address] hemoglobin hematocrit 9.8 and 31 sodium 145 potassium 2.4 chloride 108 bicarb 21 and alk-phos was 49    10/22/2019:  Patient is stable postop day 1.  Patient states pain is well controlled.  Patient mildly tachycardic blood pressure 115/61 979 O2 sats 100%.  Labs show white blood cell count 21.5 hemoglobin 9.2 hematocrit 29.3 platelets 407 and BUN creatinine were 8 and 0.6 calcium 7.5 magnesium 1.4.  Patient is in good spirits and bowel sounds are present.    10/23/2019:  Postop day 2.:  Patient is having much more abdominal pain with waves of pain that appeared to be related to air movement in the bowel versus bowel regaining function.  Patient is alert and oriented and is mildly tachycardic with pulse 123, blood pressure 130/65 O2 sat 95%.  Labs show a improved white count of today of 19,000 hemoglobin 8.9 hematocrit 28 platelets 396 and sodium 141 potassium 3.1 albumin 1.6 and liver functions within normal ranges.  GFR greater than 60    10/24/2019:  Patient is stable and improving postop day 2 .  Colostomy bag has some gas and patient bowel sounds are very active.  Intake was 02/30/2027 output was 1745 and a net gain of 1.4 L. labs showed a white blood cell count 15.9 hemoglobin 8.7 hematocrit 27.7 and platelets 373.  Normal differential noted. Labs:  Sodium 146 potassium 3.5 chloride 110 bicarb 24 glucose 66 and BUN creatinine were 9 and 0.6 otherwise GFR greater than 60.  Magnesium 1.9 phosphorus 4.0    10/25/2019:  Patient is awake  alert and states his pain is under better control today.  Patient is having gas in to his colostomy bag it had to be relieved on 2 occasions overnight.  Patient otherwise has been stable and heart rate is much improved on beta-blocker.  Labs showed sodium 148 potassium 3.2 chloride 111 bicarb 23 calcium is 7.7 and GFR greater than 60.  White blood cell count 10.5 hemoglobin 8.2 hematocrit 26 platelets 334 and differential was normal.  Patient otherwise is stable but will continue gastric decompression until flatus is obvious.  10/26/2019.  NG tube has been removed.  The patient's colostomy is working.  Aggressive respiratory therapy.  Incentive spirometry the patient best he can do this morning was a 1000.  Lungs decreased breath sounds in the bases secondary to atelectasis.  Discussed expectations respiratory therapy this morning.  Patient will become more mobile this a.m..  Discussed mobility with nursing staff.  Patient has been up in the chair twice last p.m..  Lab and x-rays have been reviewed.  Potassium is 3.2 with supplement.  Sodium is 147 this morning patient is receiving lactated Ringer's.  Patient's protein albumin are low with an albumin of 1.4.  Dietary consult for severe malnutrition.  Continue current level of care unless clinical course changes.  Lab has been ordered for in a.m..  10/27/2019.  Patient's vital signs are acceptable.  Patient's incentive spirometry at best is a 1000.  Patient's colostomy looks as expected.  Patient does have testicular swelling will elevate today.  Dr. Dawkins general surgery's reviewed the lab and orders lab in a.m..  Patient's mood is down considering his overall findings is expected have will start Celexa on the patient today.  Patient's sodium still 147 patient is on Ringer's lactate.  Continue current level of care unless clinical course changes.  Will order PT today the patient is very weak.  Patient's weakness is expected.  But he will have a rehab.  May have to  consider whether the patient needs home health care or rehab care after this admission.    10/28/2019:  Patient is sitting up in chair today.  Patient appears weak and has some facial wasting as well as some abdominal distension.  Colostomy site looks intact and there is maceration around that from the adhesive a of the colostomy bags.  Patient is tolerating some clear liquids but has not had much of an appetite and has not been up walking very much.  Otherwise patient is stable postop.  However I feel this patient needs to progress a little faster back to his movement and hopefully bowel function.  Intake and output:  Intake 3854.2 and output 1650 for total net positive fluid in of 2204 cc  Labs:  Phosphorus 3.1 sodium 144 potassium 3.9 chloride 108 bicarb 28 glucose 116 BUN creatinine were less than 5 and 0.6 calcium 7.3 and GFR greater than 60  CBC:  White blood cell count 9.94 hemoglobin 8.3 hematocrit 27.3 platelets 436 and differential was normal  Magnesium:  1.5 phosphorus 3.4    10/29/2019:  This postop day 7.  Patient is looking stronger and eating and tolerating clear liquids.  Colostomy site is looking improved but there is continued desquamation around the stoma.  This is undoubtedly from the adhesive of the bags.  Otherwise no complaints and we will increase progressive mobility starting today.  Physical therapy will be seeing this patient as well.  Labs:  Sodium 139 potassium 4.0 chlorid 104 bicarb 27 glucose 105 BUN creatinine 5 and 0.5 total bilirubin 0.3 albumin 1.4 total protein 4.4 calcium 7.0  White blood cell count 9.8 hemoglobin 8.5 hematocrit 27.3 platelets 401 phosphorus 3.1  Will continue current care will consider starting TPN due to prolonged period without food and repeat labs in the a.m.    10/30/2019:  Patient is stable and feeling much better today.  Patient is able to tolerate clear liquids well.  However patient is NPO now due to planned delayed primary closure today.  Patient be  transferred to the medical-surgical unit post surgery today.  Intake and output:  Intake 3472 output 2775 with a net gain of 697 cc  CMP sodium 139 potassium 4.0 glucose 105 BUN creatinine were 5 and 0.5  CBC unremarkable.    10/31/2019:  Patient is more active today having less pain is in better spirits.  However patient stoma is continuing to be macerated having difficulty with colostomy bag sticking and not leaking.  There is good return from the colostomy site and no significant pain other than the irritation of the skin around the stomal site.  Labs show chemistry which was normal and calcium 7.5 GFR greater than 60 all other electrolytes normal.  White blood cell count 15.8 hemoglobin 9.3 hematocrit 29 platelets 494 and differential showed 74 granulocytes 15 lymphocytes    Interval History:     Review of Systems   Constitutional: Positive for fatigue. Negative for activity change, appetite change and fever.   HENT: Negative for congestion, ear discharge, mouth sores, nosebleeds, rhinorrhea, sinus pressure, sinus pain and tinnitus.    Eyes: Negative.  Negative for pain, redness and itching.   Respiratory: Negative for apnea, cough, choking, chest tightness, shortness of breath, wheezing and stridor.    Cardiovascular: Negative for chest pain, palpitations and leg swelling.   Gastrointestinal: Positive for abdominal pain. Negative for abdominal distention, anal bleeding, blood in stool, constipation and diarrhea.   Endocrine: Negative.    Genitourinary: Positive for scrotal swelling. Negative for difficulty urinating, flank pain, frequency and urgency.   Musculoskeletal: Positive for myalgias. Negative for arthralgias, back pain and gait problem.   Skin: Negative for color change and pallor.   Allergic/Immunologic: Negative.    Neurological: Positive for weakness. Negative for dizziness, facial asymmetry, light-headedness and headaches.   Hematological: Negative for adenopathy. Does not bruise/bleed easily.      Objective:     Vital Signs (Most Recent):  Temp: 97.4 °F (36.3 °C) (10/31/19 1459)  Pulse: 95 (10/31/19 1459)  Resp: 18 (10/31/19 1459)  BP: 96/60 (10/31/19 1459)  SpO2: 97 % (10/31/19 1459) Vital Signs (24h Range):  Temp:  [96.5 °F (35.8 °C)-97.9 °F (36.6 °C)] 97.4 °F (36.3 °C)  Pulse:  [] 95  Resp:  [14-18] 18  SpO2:  [96 %-99 %] 97 %  BP: ()/(59-79) 96/60     Weight: 69.7 kg (153 lb 10.6 oz)  Body mass index is 22.69 kg/m².    Intake/Output Summary (Last 24 hours) at 10/31/2019 1748  Last data filed at 10/31/2019 1658  Gross per 24 hour   Intake 2061 ml   Output 3325 ml   Net -1264 ml      Physical Exam   Constitutional: He is oriented to person, place, and time. He appears well-developed and well-nourished.   HENT:   Head: Normocephalic and atraumatic.   Eyes: Pupils are equal, round, and reactive to light. EOM are normal.   Neck: Normal range of motion. Neck supple. No tracheal deviation present. No thyromegaly present.   Cardiovascular: Normal rate, regular rhythm and normal heart sounds.   Pulmonary/Chest: He is in respiratory distress. He has rales.   Abdominal: Soft. Bowel sounds are normal. He exhibits no distension. There is tenderness. There is guarding. There is no rebound.   Musculoskeletal: Normal range of motion. He exhibits edema.   Lymphadenopathy:     He has no cervical adenopathy.   Neurological: He is alert and oriented to person, place, and time.   Skin: Skin is warm and dry. Capillary refill takes less than 2 seconds.   Psychiatric: He has a normal mood and affect. His behavior is normal. Judgment and thought content normal.   Nursing note and vitals reviewed.      Significant Labs:   Recent Lab Results       10/31/19  1435   10/31/19  0803        Albumin   1.5     Alkaline Phosphatase   49     ALT   9     Anion Gap 4 8     AST   13     Baso #   0.03     Basophil%   0.2     BILIRUBIN TOTAL   0.4  Comment:  For infants and newborns, interpretation of results should be based  on  gestational age, weight and in agreement with clinical  observations.  Premature Infant recommended reference ranges:  Up to 24 hours.............<8.0 mg/dL  Up to 48 hours............<12.0 mg/dL  3-5 days..................<15.0 mg/dL  6-29 days.................<15.0 mg/dL       BUN, Bld <5 <5     Calcium 7.5 7.5     Chloride 102 103     CO2 26 26     Creatinine 0.6 0.5     Differential Method   Automated     eGFR if  >60.0 >60.0     eGFR if non  >60.0  Comment:  Calculation used to obtain the estimated glomerular filtration  rate (eGFR) is the CKD-EPI equation.    >60.0  Comment:  Calculation used to obtain the estimated glomerular filtration  rate (eGFR) is the CKD-EPI equation.        Eos #   0.2     Eosinophil%   1.1     Glucose 94 92     Gran # (ANC)   11.7     Gran%   73.8     Hematocrit   29.9     Hemoglobin   9.3     Immature Grans (Abs)   0.17  Comment:  Mild elevation in immature granulocytes is non specific and   can be seen in a variety of conditions including stress response,   acute inflammation, trauma and pregnancy. Correlation with other   laboratory and clinical findings is essential.       Immature Granulocytes   1.1     Lymph #   2.4     Lymph%   14.9     MCH   28.0     MCHC   31.1     MCV   90     Mono #   1.4     Mono%   8.9     MPV   9.4     nRBC   0     Platelets   494     Potassium 4.1 4.0     PROTEIN TOTAL   5.0     RBC   3.32     RDW   18.2     Sodium 132 137     WBC   15.82         All pertinent labs within the past 24 hours have been reviewed.    Significant Imaging: I have reviewed and interpreted all pertinent imaging results/findings within the past 24 hours.      Assessment/Plan:      * Large bowel obstruction  IV fluids IV potassium NPO KUB in lab in a.m. monitor closely.  Surgical consultation will be seen in a.m. by Dr. MOHAMUD General surgery seen the patient in the clinic on 10/03.  10/20/2019.  Extended conversation with Dr. Buck concerning the  patient's finding rectal mass bowel obstruction.  Correction of electrolytes IV fluid IV antibiotics NPO fleets enema today x3.  Colonoscope in a.m..  CEA is been ordered.    10/21/2019  1.  Treat today based on findings of colonoscopy or endoscopy.  2.  Continue current antibiotic treatment  3.  Follow surgery recommendations after final diagnosis made.  4.  Treat otherwise as needed based on clinical course  5.  Repeat labs in the a.m. and replete potassium as needed    10/22/2019:  1.  Stable postop day 1 with positive bowel sounds.  2.  We will replace calcium  3.  Repeat labs in the a.m. to include CBC and CMP magnesium  4.  Follow-up otherwise as clinically indicated based clinical course.    10/23/2019:  1.  Continue current antibiotic therapy.  2.  Give will blood bolus of 500 cc x1  3.  Change pain medication to Dilaudid 1 mg every 2 hr as needed for pain  4.  A feel tachycardia may be related to pain versus is mildly fluid depleted.  5.  Replace electrolytes as needed.  6.  Intake and output revealed a positive 2500 cc sore +overnight    10/24/2019:  1.  Continue current medications.  2.  Repeat labs in the a.m. to monitor white blood cell count and  3.  Begin metoprolol 12.5 mg IV q.12 hours or heart rate and  4.  Continue other medications as ordered and await bowel function.    10/25/2019:  1.  Postop day 3.  Patient having no fever chills nausea or vomiting and states pain is well controlled this morning.  2.  We will replace potassium and calcium IV  3.  Repeat labs in the a.m. to include CBC and CMP.  Patient otherwise has been afebrile and postop course have been normal  10/26/2019.  Postop day 4.  Patient is doing well.  NG tube has been removed.  Colostomy is working well.  Replace potassium today.  All labs have been reviewed.  Albumin 1.4 the patient has severe malnutrition he will soon be starting a diet of consult dietary.  Lab in a.m..  Encouraged incentive spirometry and mobility.  Patient  is concerned about his overall condition.  Probably at this point has mild depression secondary to all the current medical problems. Patient did ask about pathology report I told him that I would look for more than likely would be back to the 1st of the week.  Continue current level of care unless clinical course changes.  10/27/2019.  See encounter for postop care note.    10/28/2019:  Postop day 6.:  Patient is still feeling weak and has had flat affect over the last several days.  Over the weekend and antidepressant citalopram was started.  This was started 10 mg p.o. daily.  Patient has colostomy side is intact.  1.  Reconsult PT for this patient  2.  Continue progressive mobility to increase his activity  3.  Advance diet when better bowel function occurs  4.  Replace calcium and magnesium  5.  CBC, CMP in the a.m..  With magnesium    10/29/2019 postop day 7:  1.  Continue PT consult and increased progressive mobility.  Would like to get the patient up in a chair today.  2.  Patient tolerating clear liquid diet  3.  Repeat labs in the a.m. include CBC, CMP, magnesium, phosphate  4.  Consider TPN to begin tomorrow if patient does not advance his diet well.    10/30/2019:  Postop day 8  1.  Continue current treatment.  Patient NPO for delayed primary closure today  2.  Transfer to medical-surgical unit post surgery  3.  Continue current diet after surgery.  4.  Repeat labs in the a.m. to include CBC CMP    10/30/2019 postop day 9:  1.  Patient with leukocytosis of 15,000, will monitor fever curve, check lactic acid, and increase IV fluids due to fluid deficit overnight as well as low blood pressure.  2.  Recheck labs in the morning to include CBC, CMP and repeat chest x-ray  3.  Continue current antibiotics and follow otherwise as clinically indicated based on course  4.  Patient with possible discharge in the next 24-48 hours if stable    Encounter for postoperative care  10/27/2019.  Day 5 postop care.   Patient's mood is somewhat down will start Celexa.  His mood is expected to be low considering the current circumstances but feel that he does need help.  Potassium 3.1 has been supplemented today.  Magnesium is 1.5 has been supplemented today.  Sodium is 147 continue to monitor.  Patient can do about a 1000 on incentive spirometry at best.  Will order PT for the patient today he needs some increased movement.  Patient is very weak considering the illness time frame.  Patient's testicle swollen today will elevate.  Lab values in a.m..  Pathology is pending.  Continue current level of care.      Intestinal obstruction  Patient now postop day 1 from bowel resection.  Stable postop day 1 doing well and postoperative course at this time      Hypokalemia  10/25/2019:  Will replace IV for this morning.  10/26/2019 will replace potassium p.o. Today.  10/27/2019.  Patient potassium 3.1 magnesium is slightly low will supplement potassium and magnesium.  Dr. Dawkins general surgery is already ordered potassium.      VTE Risk Mitigation (From admission, onward)         Ordered     heparin (porcine) injection 5,000 Units  Every 8 hours      10/21/19 1753     Place sequential compression device  Until discontinued      10/21/19 1753     IP VTE LOW RISK PATIENT  Once      10/19/19 1752     Place MAE hose  Until discontinued      10/19/19 1752                      Lencho Kumar MD  Department of Hospital Medicine   [facility]

## 2019-11-01 LAB
BASOPHILS # BLD AUTO: 0.03 K/UL (ref 0–0.2)
BASOPHILS NFR BLD: 0.2 % (ref 0–1.9)
DIFFERENTIAL METHOD: ABNORMAL
EOSINOPHIL # BLD AUTO: 0.1 K/UL (ref 0–0.5)
EOSINOPHIL NFR BLD: 0.9 % (ref 0–8)
ERYTHROCYTE [DISTWIDTH] IN BLOOD BY AUTOMATED COUNT: 18.1 % (ref 11.5–14.5)
HCT VFR BLD AUTO: 27.8 % (ref 40–54)
HGB BLD-MCNC: 8.8 G/DL (ref 14–18)
IMM GRANULOCYTES # BLD AUTO: 0.24 K/UL (ref 0–0.04)
IMM GRANULOCYTES NFR BLD AUTO: 1.6 % (ref 0–0.5)
LYMPHOCYTES # BLD AUTO: 2.1 K/UL (ref 1–4.8)
LYMPHOCYTES NFR BLD: 13.9 % (ref 18–48)
MCH RBC QN AUTO: 28.4 PG (ref 27–31)
MCHC RBC AUTO-ENTMCNC: 31.7 G/DL (ref 32–36)
MCV RBC AUTO: 90 FL (ref 82–98)
MONOCYTES # BLD AUTO: 1.2 K/UL (ref 0.3–1)
MONOCYTES NFR BLD: 8.2 % (ref 4–15)
NEUTROPHILS # BLD AUTO: 11.3 K/UL (ref 1.8–7.7)
NEUTROPHILS NFR BLD: 75.2 % (ref 38–73)
NRBC BLD-RTO: 0 /100 WBC
PLATELET # BLD AUTO: 500 K/UL (ref 150–350)
PMV BLD AUTO: 9.4 FL (ref 9.2–12.9)
RBC # BLD AUTO: 3.1 M/UL (ref 4.6–6.2)
WBC # BLD AUTO: 15.05 K/UL (ref 3.9–12.7)

## 2019-11-01 PROCEDURE — 36415 COLL VENOUS BLD VENIPUNCTURE: CPT

## 2019-11-01 PROCEDURE — 85025 COMPLETE CBC W/AUTO DIFF WBC: CPT

## 2019-11-01 PROCEDURE — S0030 INJECTION, METRONIDAZOLE: HCPCS | Performed by: INTERNAL MEDICINE

## 2019-11-01 PROCEDURE — 25000242 PHARM REV CODE 250 ALT 637 W/ HCPCS: Performed by: INTERNAL MEDICINE

## 2019-11-01 PROCEDURE — 25000003 PHARM REV CODE 250: Performed by: INTERNAL MEDICINE

## 2019-11-01 PROCEDURE — 63600175 PHARM REV CODE 636 W HCPCS: Performed by: SURGERY

## 2019-11-01 PROCEDURE — 99233 SBSQ HOSP IP/OBS HIGH 50: CPT | Mod: ,,, | Performed by: INTERNAL MEDICINE

## 2019-11-01 PROCEDURE — 97116 GAIT TRAINING THERAPY: CPT

## 2019-11-01 PROCEDURE — 94640 AIRWAY INHALATION TREATMENT: CPT

## 2019-11-01 PROCEDURE — 63600175 PHARM REV CODE 636 W HCPCS: Performed by: INTERNAL MEDICINE

## 2019-11-01 PROCEDURE — 21400001 HC TELEMETRY ROOM

## 2019-11-01 PROCEDURE — 99233 PR SUBSEQUENT HOSPITAL CARE,LEVL III: ICD-10-PCS | Mod: ,,, | Performed by: INTERNAL MEDICINE

## 2019-11-01 PROCEDURE — 94761 N-INVAS EAR/PLS OXIMETRY MLT: CPT

## 2019-11-01 RX ORDER — FLUCONAZOLE 2 MG/ML
100 INJECTION, SOLUTION INTRAVENOUS
Status: DISCONTINUED | OUTPATIENT
Start: 2019-11-01 | End: 2019-11-05 | Stop reason: HOSPADM

## 2019-11-01 RX ADMIN — HYDROMORPHONE HYDROCHLORIDE 1 MG: 2 INJECTION, SOLUTION INTRAMUSCULAR; INTRAVENOUS; SUBCUTANEOUS at 11:11

## 2019-11-01 RX ADMIN — HEPARIN SODIUM 5000 UNITS: 5000 INJECTION, SOLUTION INTRAVENOUS; SUBCUTANEOUS at 10:11

## 2019-11-01 RX ADMIN — IPRATROPIUM BROMIDE 0.5 MG: 0.5 SOLUTION RESPIRATORY (INHALATION) at 07:11

## 2019-11-01 RX ADMIN — PANTOPRAZOLE SODIUM 40 MG: 40 TABLET, DELAYED RELEASE ORAL at 09:11

## 2019-11-01 RX ADMIN — HYDROMORPHONE HYDROCHLORIDE 1 MG: 2 INJECTION, SOLUTION INTRAMUSCULAR; INTRAVENOUS; SUBCUTANEOUS at 08:11

## 2019-11-01 RX ADMIN — POTASSIUM CHLORIDE, DEXTROSE MONOHYDRATE AND SODIUM CHLORIDE: 150; 5; 450 INJECTION, SOLUTION INTRAVENOUS at 09:11

## 2019-11-01 RX ADMIN — METRONIDAZOLE 500 MG: 500 INJECTION, SOLUTION INTRAVENOUS at 02:11

## 2019-11-01 RX ADMIN — CITALOPRAM HYDROBROMIDE 10 MG: 10 TABLET ORAL at 09:11

## 2019-11-01 RX ADMIN — POTASSIUM CHLORIDE, DEXTROSE MONOHYDRATE AND SODIUM CHLORIDE: 150; 5; 450 INJECTION, SOLUTION INTRAVENOUS at 10:11

## 2019-11-01 RX ADMIN — FLUCONAZOLE 100 MG: 2 INJECTION, SOLUTION INTRAVENOUS at 01:11

## 2019-11-01 RX ADMIN — METOPROLOL TARTRATE 5 MG: 5 INJECTION INTRAVENOUS at 02:11

## 2019-11-01 RX ADMIN — LEVALBUTEROL HYDROCHLORIDE 1.25 MG: 1.25 SOLUTION, CONCENTRATE RESPIRATORY (INHALATION) at 07:11

## 2019-11-01 RX ADMIN — HYDROMORPHONE HYDROCHLORIDE 1 MG: 2 INJECTION, SOLUTION INTRAMUSCULAR; INTRAVENOUS; SUBCUTANEOUS at 03:11

## 2019-11-01 RX ADMIN — METRONIDAZOLE 500 MG: 500 INJECTION, SOLUTION INTRAVENOUS at 09:11

## 2019-11-01 RX ADMIN — LEVALBUTEROL HYDROCHLORIDE 1.25 MG: 1.25 SOLUTION, CONCENTRATE RESPIRATORY (INHALATION) at 11:11

## 2019-11-01 RX ADMIN — METRONIDAZOLE 500 MG: 500 INJECTION, SOLUTION INTRAVENOUS at 05:11

## 2019-11-01 RX ADMIN — IPRATROPIUM BROMIDE 0.5 MG: 0.5 SOLUTION RESPIRATORY (INHALATION) at 11:11

## 2019-11-01 RX ADMIN — HYDROMORPHONE HYDROCHLORIDE 1 MG: 2 INJECTION, SOLUTION INTRAMUSCULAR; INTRAVENOUS; SUBCUTANEOUS at 05:11

## 2019-11-01 RX ADMIN — LEVALBUTEROL HYDROCHLORIDE 1.25 MG: 1.25 SOLUTION, CONCENTRATE RESPIRATORY (INHALATION) at 04:11

## 2019-11-01 RX ADMIN — HEPARIN SODIUM 5000 UNITS: 5000 INJECTION, SOLUTION INTRAVENOUS; SUBCUTANEOUS at 01:11

## 2019-11-01 RX ADMIN — HEPARIN SODIUM 5000 UNITS: 5000 INJECTION, SOLUTION INTRAVENOUS; SUBCUTANEOUS at 05:11

## 2019-11-01 RX ADMIN — IPRATROPIUM BROMIDE 0.5 MG: 0.5 SOLUTION RESPIRATORY (INHALATION) at 04:11

## 2019-11-01 RX ADMIN — HYDROMORPHONE HYDROCHLORIDE 1 MG: 2 INJECTION, SOLUTION INTRAMUSCULAR; INTRAVENOUS; SUBCUTANEOUS at 02:11

## 2019-11-01 RX ADMIN — ERTAPENEM SODIUM 1 G: 1 INJECTION, POWDER, LYOPHILIZED, FOR SOLUTION INTRAMUSCULAR; INTRAVENOUS at 11:11

## 2019-11-01 NOTE — PT/OT/SLP PROGRESS
Physical Therapy         Treatment        Miguel Angel Apple Sr. 1955  MRN: [id number]     Date: 11/1/2019  PT Received On: 11/01/19  PT Start Time: 1252     PT Stop Time: 1316    PT Total Time (min): 24 min       Gait Trainingx 23 min    Treatment Diagnosis: generalized weakness and decreased functional strength    General Precautions: Standard, fall  Orthopedic Precautions: none   Braces: no      Subjective:  Communicated with patient prior to treatment, states he has some pain at abdominal incision site however is aware he needs to get out of bed.     Chief Complaint: pain at incision site         Treatment:    Bed Mobility :   Supine to sit: Minimal Assistance   Sit to supine: Activity did not occur   Rolling: Contact Guard Assistance   Scooting: Standby Assistance    Transfers:  Sit to stand:Contact Guard Assistance with Rolling Walker with VC's for hand placement and raising height of bed prior to transfer    Gait:  unsteady, with kyphotic posture, decreased step length and mariah. however able to ambulate 105 feet with RW and CGA on level tile with no LOB. Once returning to room, RN was changing bed therefore patient perfromed static standing leaning his trunk on wall for 5 min. no complications with gait training.     Balance:   Static Sit: FAIR+: Able to take MINIMAL challenges from all directions  Dynamic Sit:  FAIR+: Maintains balance through MINIMAL excursions of active trunk motion  Static Stand: FAIR: Maintains without assist but unable to take challenges  Dynamic stand: FAIR: Needs CONTACT GUARD during gait    Assessment:    Miguel Angel Appel Sr. is a 64 y.o. male admitted with a medical diagnosis of Large bowel obstruction.  He presents with decreased functional strength and pain at incision limiting activity tolerance. Demonstrated progress with increased gait distance today without complications.     Pain Scale: 6/10     Pain Location: abdominal incision    Cognitive Exam:  Oriented to: Person, Place,  Time and Situation  Follows Commands/attention: Follows multistep  commands  Communication: clear/fluent  Safety awareness/insight to disability: intact    Endurance deficit:  Decreased functional strength/endurance    Patient left supine with all lines intact, call button in reach and nursing notified.family present    Rehab potential is excellent.    Activity tolerance: Excellent    Equipment recommendations:   RW to promote a safe and steady gait cycle to decrease fall risk    Education:  Importance of out of bed activity      GOALS:   Patient goals: refer to eval  Family goals: refer to eval    Plan:   continue with current plan      Discharge recommendations:   Home with possible home health      Darrionalie Foreman, PTA

## 2019-11-01 NOTE — NURSING
"PT MEDICATED FOR C/O INCISIONAL PAIN, 8/10. RN ASK PT IF HE HAD  TRIED TAKING THE PAIN PILL, PT STATED "NO, I HAVE TROUBLE SWALLOWING PILLS, I CHOKE WITH THESE DENTURES IN". RN SUGGESTED PT REMOVE HIS DENTURES AND PT REFUSED. RN TOLD PT THAT WHEN SUPPER CAME SHE WOULD HELP HIM GET UP IN THE CHAIR, PT STATED "I CAN'T, I CAN'T GET UP WITH THESE NEW STITCHES, I JUST CAN'T DO IT". PTS SIG OTHER IS ALSO ENCOURAGING PT TP GET OUT OF BED. PT AT THIS TIME REFUSES TO DO SO.  "

## 2019-11-01 NOTE — PLAN OF CARE
11/01/19 0834   Medicare Message   Important Message from Medicare regarding Discharge Appeal Rights Given to patient/caregiver;Explained to patient/caregiver;Signed/date by patient/caregiver   Date IMM was signed 11/01/19   Time IMM was signed 0834

## 2019-11-01 NOTE — ASSESSMENT & PLAN NOTE
IV fluids IV potassium NPO KUB in lab in a.m. monitor closely.  Surgical consultation will be seen in a.m. by Dr. MOHAMUD General surgery seen the patient in the clinic on 10/03.  10/20/2019.  Extended conversation with Dr. Buck concerning the patient's finding rectal mass bowel obstruction.  Correction of electrolytes IV fluid IV antibiotics NPO fleets enema today x3.  Colonoscope in a.m..  CEA is been ordered.    10/21/2019  1.  Treat today based on findings of colonoscopy or endoscopy.  2.  Continue current antibiotic treatment  3.  Follow surgery recommendations after final diagnosis made.  4.  Treat otherwise as needed based on clinical course  5.  Repeat labs in the a.m. and replete potassium as needed    10/22/2019:  1.  Stable postop day 1 with positive bowel sounds.  2.  We will replace calcium  3.  Repeat labs in the a.m. to include CBC and CMP magnesium  4.  Follow-up otherwise as clinically indicated based clinical course.    10/23/2019:  1.  Continue current antibiotic therapy.  2.  Give will blood bolus of 500 cc x1  3.  Change pain medication to Dilaudid 1 mg every 2 hr as needed for pain  4.  A feel tachycardia may be related to pain versus is mildly fluid depleted.  5.  Replace electrolytes as needed.  6.  Intake and output revealed a positive 2500 cc sore +overnight    10/24/2019:  1.  Continue current medications.  2.  Repeat labs in the a.m. to monitor white blood cell count and  3.  Begin metoprolol 12.5 mg IV q.12 hours or heart rate and  4.  Continue other medications as ordered and await bowel function.    10/25/2019:  1.  Postop day 3.  Patient having no fever chills nausea or vomiting and states pain is well controlled this morning.  2.  We will replace potassium and calcium IV  3.  Repeat labs in the a.m. to include CBC and CMP.  Patient otherwise has been afebrile and postop course have been normal  10/26/2019.  Postop day 4.  Patient is doing well.  NG tube has been removed.  Colostomy is  working well.  Replace potassium today.  All labs have been reviewed.  Albumin 1.4 the patient has severe malnutrition he will soon be starting a diet of consult dietary.  Lab in a.m..  Encouraged incentive spirometry and mobility.  Patient is concerned about his overall condition.  Probably at this point has mild depression secondary to all the current medical problems. Patient did ask about pathology report I told him that I would look for more than likely would be back to the 1st of the week.  Continue current level of care unless clinical course changes.  10/27/2019.  See encounter for postop care note.    10/28/2019:  Postop day 6.:  Patient is still feeling weak and has had flat affect over the last several days.  Over the weekend and antidepressant citalopram was started.  This was started 10 mg p.o. daily.  Patient has colostomy side is intact.  1.  Reconsult PT for this patient  2.  Continue progressive mobility to increase his activity  3.  Advance diet when better bowel function occurs  4.  Replace calcium and magnesium  5.  CBC, CMP in the a.m..  With magnesium    10/29/2019 postop day 7:  1.  Continue PT consult and increased progressive mobility.  Would like to get the patient up in a chair today.  2.  Patient tolerating clear liquid diet  3.  Repeat labs in the a.m. include CBC, CMP, magnesium, phosphate  4.  Consider TPN to begin tomorrow if patient does not advance his diet well.    10/30/2019:  Postop day 8  1.  Continue current treatment.  Patient NPO for delayed primary closure today  2.  Transfer to medical-surgical unit post surgery  3.  Continue current diet after surgery.  4.  Repeat labs in the a.m. to include CBC CMP    10/30/2019 postop day 9:  1.  Patient with leukocytosis of 15,000, will monitor fever curve, check lactic acid, and increase IV fluids due to fluid deficit overnight as well as low blood pressure.  2.  Recheck labs in the morning to include CBC, CMP and repeat chest  x-ray  3.  Continue current antibiotics and follow otherwise as clinically indicated based on course  4.  Patient with possible discharge in the next 24-48 hours if stable    10/31/2019:  White blood cell count is mildly improved but will continue current antibiotics  2.  Repeat labs in the a.m.  3.  Will continue physical therapy for strengthening and possible discharge soon    11/01/2019:  1.  Patient in more active and participating with physical therapy.  Continue current antibiotics  2.  Begin Diflucan 200 mg IV daily  3.  Follow-up otherwise as needed  4.  Repeat labs in the a.m.

## 2019-11-01 NOTE — PROGRESS NOTES
[facility] Hospital Medicine  Progress Note    Patient Name: Miguel Angel Apple Sr.  MRN: [id number]  Patient Class: IP- Inpatient   Admission Date: 10/19/2019  Length of Stay: 13 days  Attending Physician: Lencho Kumar MD  Primary Care Provider: Primary Doctor No        Subjective:     Principal Problem:Large bowel obstruction        HPI:  Patient's history began approximately 3 months ago.  He presented to the hospital well over a month ago given a diagnosis of he understands colitis.  Treated with antibiotics IV fluids.  He saw Dr. ONEIDA charles on 10/03 placed on Cipro and Flagyl.  His on once completed that dose.  This past Sunday he started noticing that he was becoming  bloated and stopped eating solids at that time went back to liquids.  His had no pain pills for 2-3 days.  No nausea and vomiting unless he ate something that he knew he should the.  He attempted to make himself vomit yesterday and could not.  He has had small bowel movements to continue since that time.  CT scan shows colonic dilatation with the cecum being 10 cm.  The possible obstruction may be low left-sided.  There is even suggesting possibility have a rectal mass.  WBC remained 16,000 with a left shift.  Patient's potassium is 2.2.    Overview/Hospital Course:  Patient is sitting up in chair today IV fluids IV potassium NPO Surgical consultation repeat KUB and lab in a.m. further clinical decisions depending on clinical course.  Evaluation for [place] syndrome.  The patient has had no prior abdominal surgery and no medical history prior to approximately 3 months ago.  10/20/2019.  Long discussion with the patient this morning concerning the findings.  Dr. ONEIDA charles see the patient today since he has been involved in his care on 10/03.  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  Patient continue IV fluids IV antibiotics potassium replacement  today and will have 3 Fleet's enemas today.  CEA is been ordered.    10/21/2019:  Patient is comfortable in room although it does appear quite anxious.  The patient will be having EGD and colonoscopy later today.  Based on the findings there the patient will may need undergo surgery.  Patient denies any pain and states that he was not throwing up and having no fever or chills.  Patient is distended and has some abdominal pain associated.  Labs showed [place] 14476 hemoglobin hematocrit 9.8 and 31 sodium 145 potassium 2.4 chloride 108 bicarb 21 and alk-phos was 49    10/22/2019:  Patient is stable postop day 1.  Patient states pain is well controlled.  Patient mildly tachycardic blood pressure 115/61 979 O2 sats 100%.  Labs show white blood cell count 21.5 hemoglobin 9.2 hematocrit 29.3 platelets 407 and BUN creatinine were 8 and 0.6 calcium 7.5 magnesium 1.4.  Patient is in good spirits and bowel sounds are present.    10/23/2019:  Postop day 2.:  Patient is having much more abdominal pain with waves of pain that appeared to be related to air movement in the bowel versus bowel regaining function.  Patient is alert and oriented and is mildly tachycardic with pulse 123, blood pressure 130/65 O2 sat 95%.  Labs show a improved white count of today of 19,000 hemoglobin 8.9 hematocrit 28 platelets 396 and sodium 141 potassium 3.1 albumin 1.6 and liver functions within normal ranges.  GFR greater than 60    10/24/2019:  Patient is stable and improving postop day 2 .  Colostomy bag has some gas and patient bowel sounds are very active.  Intake was 02/30/2027 output was 1745 and a net gain of 1.4 L. labs showed a white blood cell count 15.9 hemoglobin 8.7 hematocrit 27.7 and platelets 373.  Normal differential noted. Labs:  Sodium 146 potassium 3.5 chloride 110 bicarb 24 glucose 66 and BUN creatinine were 9 and 0.6 otherwise GFR greater than 60.  Magnesium 1.9 phosphorus 4.0    10/25/2019:  Patient is awake  alert and states his pain is under better control today.  Patient is having gas in to his colostomy bag it had to be relieved on 2 occasions overnight.  Patient otherwise has been stable and heart rate is much improved on beta-blocker.  Labs showed sodium 148 potassium 3.2 chloride 111 bicarb 23 calcium is 7.7 and GFR greater than 60.  White blood cell count 10.5 hemoglobin 8.2 hematocrit 26 platelets 334 and differential was normal.  Patient otherwise is stable but will continue gastric decompression until flatus is obvious.  10/26/2019.  NG tube has been removed.  The patient's colostomy is working.  Aggressive respiratory therapy.  Incentive spirometry the patient best he can do this morning was a 1000.  Lungs decreased breath sounds in the bases secondary to atelectasis.  Discussed expectations respiratory therapy this morning.  Patient will become more mobile this a.m..  Discussed mobility with nursing staff.  Patient has been up in the chair twice last p.m..  Lab and x-rays have been reviewed.  Potassium is 3.2 with supplement.  Sodium is 147 this morning patient is receiving lactated Ringer's.  Patient's protein albumin are low with an albumin of 1.4.  Dietary consult for severe malnutrition.  Continue current level of care unless clinical course changes.  Lab has been ordered for in a.m..  10/27/2019.  Patient's vital signs are acceptable.  Patient's incentive spirometry at best is a 1000.  Patient's colostomy looks as expected.  Patient does have testicular swelling will elevate today.  Dr. Dawkins general surgery's reviewed the lab and orders lab in a.m..  Patient's mood is down considering his overall findings is expected have will start Celexa on the patient today.  Patient's sodium still 147 patient is on Ringer's lactate.  Continue current level of care unless clinical course changes.  Will order PT today the patient is very weak.  Patient's weakness is expected.  But he will have a rehab.  May have to  consider whether the patient needs home health care or rehab care after this admission.    10/28/2019:  Patient is sitting up in chair today.  Patient appears weak and has some facial wasting as well as some abdominal distension.  Colostomy site looks intact and there is maceration around that from the adhesive a of the colostomy bags.  Patient is tolerating some clear liquids but has not had much of an appetite and has not been up walking very much.  Otherwise patient is stable postop.  However I feel this patient needs to progress a little faster back to his movement and hopefully bowel function.  Intake and output:  Intake 3854.2 and output 1650 for total net positive fluid in of 2204 cc  Labs:  Phosphorus 3.1 sodium 144 potassium 3.9 chloride 108 bicarb 28 glucose 116 BUN creatinine were less than 5 and 0.6 calcium 7.3 and GFR greater than 60  CBC:  White blood cell count 9.94 hemoglobin 8.3 hematocrit 27.3 platelets 436 and differential was normal  Magnesium:  1.5 phosphorus 3.4    10/29/2019:  This postop day 7.  Patient is looking stronger and eating and tolerating clear liquids.  Colostomy site is looking improved but there is continued desquamation around the stoma.  This is undoubtedly from the adhesive of the bags.  Otherwise no complaints and we will increase progressive mobility starting today.  Physical therapy will be seeing this patient as well.  Labs:  Sodium 139 potassium 4.0 chlorid 104 bicarb 27 glucose 105 BUN creatinine 5 and 0.5 total bilirubin 0.3 albumin 1.4 total protein 4.4 calcium 7.0  White blood cell count 9.8 hemoglobin 8.5 hematocrit 27.3 platelets 401 phosphorus 3.1  Will continue current care will consider starting TPN due to prolonged period without food and repeat labs in the a.m.    10/30/2019:  Patient is stable and feeling much better today.  Patient is able to tolerate clear liquids well.  However patient is NPO now due to planned delayed primary closure today.  Patient be  transferred to the medical-surgical unit post surgery today.  Intake and output:  Intake 3472 output 2775 with a net gain of 697 cc  CMP sodium 139 potassium 4.0 glucose 105 BUN creatinine were 5 and 0.5  CBC unremarkable.    10/31/2019:  Patient is more active today having less pain is in better spirits.  However patient stoma is continuing to be macerated having difficulty with colostomy bag sticking and not leaking.  There is good return from the colostomy site and no significant pain other than the irritation of the skin around the stomal site.  Labs show chemistry which was normal and calcium 7.5 GFR greater than 60 all other electrolytes normal.  White blood cell count 15.8 hemoglobin 9.3 hematocrit 29 platelets 494 and differential showed 74 granulocytes 15 lymphocytes    11/01/2019::  Patient has been ambulatory in the hallway today and is appearing Much stronger.  White blood cell count 15.0 hemoglobin hematocrit 8.827 stable platelets 500.  Vital signs show blood pressure of 101/59 pulse is 96 respirations 16.  Intake is 01/30/1994 output is 04/20/2025 with a net 769 cc gain    Interval History:     Review of Systems   Constitutional: Negative for activity change, appetite change, fatigue and fever.   HENT: Positive for congestion. Negative for ear discharge, mouth sores, nosebleeds, rhinorrhea, sinus pressure, sinus pain and tinnitus.    Eyes: Negative.  Negative for pain, redness and itching.   Respiratory: Positive for shortness of breath. Negative for apnea, cough, choking, chest tightness, wheezing and stridor.    Cardiovascular: Negative for chest pain, palpitations and leg swelling.   Gastrointestinal: Negative for abdominal distention, abdominal pain, anal bleeding, blood in stool, constipation and diarrhea.   Endocrine: Negative.    Genitourinary: Positive for scrotal swelling. Negative for difficulty urinating, flank pain, frequency and urgency.   Musculoskeletal: Positive for arthralgias.  Negative for back pain, gait problem and myalgias.   Skin: Negative for color change and pallor.   Allergic/Immunologic: Negative.    Neurological: Positive for weakness. Negative for dizziness, facial asymmetry, light-headedness and headaches.   Hematological: Negative for adenopathy. Does not bruise/bleed easily.   Psychiatric/Behavioral: The patient is nervous/anxious.      Objective:     Vital Signs (Most Recent):  Temp: 97.2 °F (36.2 °C) (11/01/19 0912)  Pulse: 90 (11/01/19 1615)  Resp: 16 (11/01/19 1615)  BP: (!) 101/59 (11/01/19 0912)  SpO2: (!) 94 % (11/01/19 1615) Vital Signs (24h Range):  Temp:  [96.8 °F (36 °C)-97.9 °F (36.6 °C)] 97.2 °F (36.2 °C)  Pulse:  [88-99] 90  Resp:  [16-18] 16  SpO2:  [93 %-98 %] 94 %  BP: (101-123)/(56-84) 101/59     Weight: 69.7 kg (153 lb 10.6 oz)  Body mass index is 22.69 kg/m².    Intake/Output Summary (Last 24 hours) at 11/1/2019 1819  Last data filed at 11/1/2019 1746  Gross per 24 hour   Intake 3610 ml   Output 3050 ml   Net 560 ml      Physical Exam   Constitutional: He is oriented to person, place, and time. He appears well-developed and well-nourished.   HENT:   Head: Normocephalic and atraumatic.   Eyes: Pupils are equal, round, and reactive to light. EOM are normal.   Neck: Normal range of motion. Neck supple. No tracheal deviation present. No thyromegaly present.   Cardiovascular: Normal rate, regular rhythm and normal heart sounds.   Pulmonary/Chest: He is in respiratory distress. He has rales.   Abdominal: Soft. Bowel sounds are normal. He exhibits no distension. There is tenderness. There is guarding. There is no rebound.   Musculoskeletal: Normal range of motion. He exhibits edema.   Lymphadenopathy:     He has no cervical adenopathy.   Neurological: He is alert and oriented to person, place, and time.   Skin: Skin is warm and dry. Capillary refill takes less than 2 seconds.   Psychiatric: He has a normal mood and affect. His behavior is normal. Judgment and  thought content normal.   Nursing note and vitals reviewed.      Significant Labs:   Recent Lab Results       11/01/19  0540        Baso # 0.03     Basophil% 0.2     Differential Method Automated     Eos # 0.1     Eosinophil% 0.9     Gran # (ANC) 11.3     Gran% 75.2     Hematocrit 27.8     Hemoglobin 8.8     Immature Grans (Abs) 0.24  Comment:  Mild elevation in immature granulocytes is non specific and   can be seen in a variety of conditions including stress response,   acute inflammation, trauma and pregnancy. Correlation with other   laboratory and clinical findings is essential.       Immature Granulocytes 1.6     Lymph # 2.1     Lymph% 13.9     MCH 28.4     MCHC 31.7     MCV 90     Mono # 1.2     Mono% 8.2     MPV 9.4     nRBC 0     Platelets 500     RBC 3.10     RDW 18.1     WBC 15.05         All pertinent labs within the past 24 hours have been reviewed.    Significant Imaging: I have reviewed and interpreted all pertinent imaging results/findings within the past 24 hours.      Assessment/Plan:      * Large bowel obstruction  IV fluids IV potassium NPO KUB in lab in a.m. monitor closely.  Surgical consultation will be seen in a.m. by Dr. MOHAMUD General surgery seen the patient in the clinic on 10/03.  10/20/2019.  Extended conversation with Dr. Buck concerning the patient's finding rectal mass bowel obstruction.  Correction of electrolytes IV fluid IV antibiotics NPO fleets enema today x3.  Colonoscope in a.m..  CEA is been ordered.    10/21/2019  1.  Treat today based on findings of colonoscopy or endoscopy.  2.  Continue current antibiotic treatment  3.  Follow surgery recommendations after final diagnosis made.  4.  Treat otherwise as needed based on clinical course  5.  Repeat labs in the a.m. and replete potassium as needed    10/22/2019:  1.  Stable postop day 1 with positive bowel sounds.  2.  We will replace calcium  3.  Repeat labs in the a.m. to include CBC and CMP magnesium  4.  Follow-up otherwise  as clinically indicated based clinical course.    10/23/2019:  1.  Continue current antibiotic therapy.  2.  Give will blood bolus of 500 cc x1  3.  Change pain medication to Dilaudid 1 mg every 2 hr as needed for pain  4.  A feel tachycardia may be related to pain versus is mildly fluid depleted.  5.  Replace electrolytes as needed.  6.  Intake and output revealed a positive 2500 cc sore +overnight    10/24/2019:  1.  Continue current medications.  2.  Repeat labs in the a.m. to monitor white blood cell count and  3.  Begin metoprolol 12.5 mg IV q.12 hours or heart rate and  4.  Continue other medications as ordered and await bowel function.    10/25/2019:  1.  Postop day 3.  Patient having no fever chills nausea or vomiting and states pain is well controlled this morning.  2.  We will replace potassium and calcium IV  3.  Repeat labs in the a.m. to include CBC and CMP.  Patient otherwise has been afebrile and postop course have been normal  10/26/2019.  Postop day 4.  Patient is doing well.  NG tube has been removed.  Colostomy is working well.  Replace potassium today.  All labs have been reviewed.  Albumin 1.4 the patient has severe malnutrition he will soon be starting a diet of consult dietary.  Lab in a.m..  Encouraged incentive spirometry and mobility.  Patient is concerned about his overall condition.  Probably at this point has mild depression secondary to all the current medical problems. Patient did ask about pathology report I told him that I would look for more than likely would be back to the 1st of the week.  Continue current level of care unless clinical course changes.  10/27/2019.  See encounter for postop care note.    10/28/2019:  Postop day 6.:  Patient is still feeling weak and has had flat affect over the last several days.  Over the weekend and antidepressant citalopram was started.  This was started 10 mg p.o. daily.  Patient has colostomy side is intact.  1.  Reconsult PT for this  patient  2.  Continue progressive mobility to increase his activity  3.  Advance diet when better bowel function occurs  4.  Replace calcium and magnesium  5.  CBC, CMP in the a.m..  With magnesium    10/29/2019 postop day 7:  1.  Continue PT consult and increased progressive mobility.  Would like to get the patient up in a chair today.  2.  Patient tolerating clear liquid diet  3.  Repeat labs in the a.m. include CBC, CMP, magnesium, phosphate  4.  Consider TPN to begin tomorrow if patient does not advance his diet well.    10/30/2019:  Postop day 8  1.  Continue current treatment.  Patient NPO for delayed primary closure today  2.  Transfer to medical-surgical unit post surgery  3.  Continue current diet after surgery.  4.  Repeat labs in the a.m. to include CBC CMP    10/30/2019 postop day 9:  1.  Patient with leukocytosis of 15,000, will monitor fever curve, check lactic acid, and increase IV fluids due to fluid deficit overnight as well as low blood pressure.  2.  Recheck labs in the morning to include CBC, CMP and repeat chest x-ray  3.  Continue current antibiotics and follow otherwise as clinically indicated based on course  4.  Patient with possible discharge in the next 24-48 hours if stable    10/31/2019:  White blood cell count is mildly improved but will continue current antibiotics  2.  Repeat labs in the a.m.  3.  Will continue physical therapy for strengthening and possible discharge soon    11/01/2019:  1.  Patient in more active and participating with physical therapy.  Continue current antibiotics  2.  Begin Diflucan 200 mg IV daily  3.  Follow-up otherwise as needed  4.  Repeat labs in the a.m.    Encounter for postoperative care  10/27/2019.  Day 5 postop care.  Patient's mood is somewhat down will start Celexa.  His mood is expected to be low considering the current circumstances but feel that he does need help.  Potassium 3.1 has been supplemented today.  Magnesium is 1.5 has been supplemented  today.  Sodium is 147 continue to monitor.  Patient can do about a 1000 on incentive spirometry at best.  Will order PT for the patient today he needs some increased movement.  Patient is very weak considering the illness time frame.  Patient's testicle swollen today will elevate.  Lab values in a.m..  Pathology is pending.  Continue current level of care.      Intestinal obstruction  Patient now postop day 1 from bowel resection.  Stable postop day 1 doing well and postoperative course at this time      Hypokalemia  10/25/2019:  Will replace IV for this morning.  10/26/2019 will replace potassium p.o. Today.  10/27/2019.  Patient potassium 3.1 magnesium is slightly low will supplement potassium and magnesium.  Dr. Dawkins general surgery is already ordered potassium.      VTE Risk Mitigation (From admission, onward)         Ordered     heparin (porcine) injection 5,000 Units  Every 8 hours      10/21/19 1753     Place sequential compression device  Until discontinued      10/21/19 1753     IP VTE LOW RISK PATIENT  Once      10/19/19 1752     Place MAE hose  Until discontinued      10/19/19 1752                      Lencho Kumar MD  Department of Hospital Medicine   [facility]

## 2019-11-01 NOTE — NURSING
AM MEDS GIVEN/DR JIM ROUNDING/RN SPOKE TO HIM REGARDING PTS BP/PULSE FROM THIS AM/LAST EVENING, MD GAVE VERBAL ORDER TO DC METOPROLOL.

## 2019-11-01 NOTE — SUBJECTIVE & OBJECTIVE
Interval History:     Review of Systems   Constitutional: Negative for activity change, appetite change, fatigue and fever.   HENT: Positive for congestion. Negative for ear discharge, mouth sores, nosebleeds, rhinorrhea, sinus pressure, sinus pain and tinnitus.    Eyes: Negative.  Negative for pain, redness and itching.   Respiratory: Positive for shortness of breath. Negative for apnea, cough, choking, chest tightness, wheezing and stridor.    Cardiovascular: Negative for chest pain, palpitations and leg swelling.   Gastrointestinal: Negative for abdominal distention, abdominal pain, anal bleeding, blood in stool, constipation and diarrhea.   Endocrine: Negative.    Genitourinary: Positive for scrotal swelling. Negative for difficulty urinating, flank pain, frequency and urgency.   Musculoskeletal: Positive for arthralgias. Negative for back pain, gait problem and myalgias.   Skin: Negative for color change and pallor.   Allergic/Immunologic: Negative.    Neurological: Positive for weakness. Negative for dizziness, facial asymmetry, light-headedness and headaches.   Hematological: Negative for adenopathy. Does not bruise/bleed easily.   Psychiatric/Behavioral: The patient is nervous/anxious.      Objective:     Vital Signs (Most Recent):  Temp: 97.2 °F (36.2 °C) (11/01/19 0912)  Pulse: 90 (11/01/19 1615)  Resp: 16 (11/01/19 1615)  BP: (!) 101/59 (11/01/19 0912)  SpO2: (!) 94 % (11/01/19 1615) Vital Signs (24h Range):  Temp:  [96.8 °F (36 °C)-97.9 °F (36.6 °C)] 97.2 °F (36.2 °C)  Pulse:  [88-99] 90  Resp:  [16-18] 16  SpO2:  [93 %-98 %] 94 %  BP: (101-123)/(56-84) 101/59     Weight: 69.7 kg (153 lb 10.6 oz)  Body mass index is 22.69 kg/m².    Intake/Output Summary (Last 24 hours) at 11/1/2019 1819  Last data filed at 11/1/2019 1746  Gross per 24 hour   Intake 3610 ml   Output 3050 ml   Net 560 ml      Physical Exam   Constitutional: He is oriented to person, place, and time. He appears well-developed and  well-nourished.   HENT:   Head: Normocephalic and atraumatic.   Eyes: Pupils are equal, round, and reactive to light. EOM are normal.   Neck: Normal range of motion. Neck supple. No tracheal deviation present. No thyromegaly present.   Cardiovascular: Normal rate, regular rhythm and normal heart sounds.   Pulmonary/Chest: He is in respiratory distress. He has rales.   Abdominal: Soft. Bowel sounds are normal. He exhibits no distension. There is tenderness. There is guarding. There is no rebound.   Musculoskeletal: Normal range of motion. He exhibits edema.   Lymphadenopathy:     He has no cervical adenopathy.   Neurological: He is alert and oriented to person, place, and time.   Skin: Skin is warm and dry. Capillary refill takes less than 2 seconds.   Psychiatric: He has a normal mood and affect. His behavior is normal. Judgment and thought content normal.   Nursing note and vitals reviewed.      Significant Labs:   Recent Lab Results       11/01/19  0540        Baso # 0.03     Basophil% 0.2     Differential Method Automated     Eos # 0.1     Eosinophil% 0.9     Gran # (ANC) 11.3     Gran% 75.2     Hematocrit 27.8     Hemoglobin 8.8     Immature Grans (Abs) 0.24  Comment:  Mild elevation in immature granulocytes is non specific and   can be seen in a variety of conditions including stress response,   acute inflammation, trauma and pregnancy. Correlation with other   laboratory and clinical findings is essential.       Immature Granulocytes 1.6     Lymph # 2.1     Lymph% 13.9     MCH 28.4     MCHC 31.7     MCV 90     Mono # 1.2     Mono% 8.2     MPV 9.4     nRBC 0     Platelets 500     RBC 3.10     RDW 18.1     WBC 15.05         All pertinent labs within the past 24 hours have been reviewed.    Significant Imaging: I have reviewed and interpreted all pertinent imaging results/findings within the past 24 hours.

## 2019-11-01 NOTE — PROGRESS NOTES
[facility] - ICU  General Surgery  Daily Note    Patient Name: Miguel Angel Apple Sr.  MRN: [id number]  Admission Date: 10/19/2019  Attending Physician: Lencho Kumar MD   Consult Physician: Jose Lazo MD  Primary Care Provider: Primary Doctor No    Subjective:     Principle Problem: Large bowel obstruction    Last 24 hour history:  10/28/19  Afebrile.  Vital signs stable.  No nausea vomiting.  Tolerating clear liquid diet well.  Ostomy functional left lower quadrant.  Some venous congestion of the ostomy however viable.  Midline wound with pink healthy granulation tissue in base.  No signs or symptoms of infection.  No other new complaints or issues today.    10/29/19  Afebrile.  VSS.  No nausea or vomiting.  Ostomy functional in the LLQ.  No new issues or complaints.     10/30/19  Afebrile.  Vital signs stable.  No nausea vomiting.  Ostomy functional.  Preliminary pathology from pathologist obtained.  Rectal adenocarcinoma.  Formal pathology report to follow.  Patient was informed of this this morning.  Appropriately sad.  Discussed with patient next steps and care and treatment.  Patient voiced understanding.  All questions were elicited and answered this morning to the patient's satisfaction.    10/31/19  Afebrile.  Vital signs stable.  Mild leukocytosis to be expected postoperative from wound closure. Ostomy with venous congestion still however functional.  No nausea vomiting.  Hungry.  No new issues or complaints.    11/1/19  Afebrile.  Vital signs stable.  Mild leukocytosis, improving.  Ostomy is venous congestion, continues to function.  No nausea vomiting.  Tolerating full liquids well.  No new issues or complaints.    Objective:     Vital Signs (Most Recent):  Temp: 97.2 °F (36.2 °C) (11/01/19 0912)  Pulse: 96 (11/01/19 0912)  Resp: 16 (11/01/19 0912)  BP: (!) 101/59 (11/01/19 0912)  SpO2: 95 % (11/01/19 0912) Vital Signs (24h Range):  Temp:  [96.8 °F (36 °C)-97.9 °F (36.6 °C)]  97.2 °F (36.2 °C)  Pulse:  [88-99] 96  Resp:  [16-18] 16  SpO2:  [93 %-98 %] 95 %  BP: ()/(56-84) 101/59     Intake/Output last 24 hours:    Intake/Output Summary (Last 24 hours) at 11/1/2019 1448  Last data filed at 11/1/2019 1142  Gross per 24 hour   Intake 3114.33 ml   Output 2700 ml   Net 414.33 ml       I/O last 3 completed shifts:  In: 3194.3 [P.O.:840; I.V.:2254.3; IV Piggyback:100]  Out: 3825 [Urine:3475; Stool:350]  I/O this shift:  In: 520 [P.O.:420; IV Piggyback:100]  Out: 1100 [Urine:950; Stool:150]    Weight: 69.7 kg (153 lb 10.6 oz)  Body mass index is 22.69 kg/m².    Gen: Wd Wn male currently in NAD  Heent: Nc/At, MMM  Eyes: Perrl, Eomi  Cv: RRR, no  M/g/r  Lung: Non-labored breathing, clear bilaterally  Abd: Soft, midline wound with reactive erythema around staples.  No signs or symptoms of active infection.  Wound closed., ostomy with minor venous congestion left lower quadrant, viable, with output of stool in bag.  Red rubber catheter within mucous fistula.    Significant Labs:  CBC:   Recent Labs   Lab 11/01/19  0540   WBC 15.05*   RBC 3.10*   HGB 8.8*   HCT 27.8*   *   MCV 90   MCH 28.4   MCHC 31.7*     BMP:   Recent Labs   Lab 10/28/19  0454  10/31/19  1435   *   < > 94      < > 132*   K 3.9   < > 4.1      < > 102   CO2 28   < > 26   BUN <5*   < > <5*   CREATININE 0.6   < > 0.6   CALCIUM 7.3*   < > 7.5*   MG 2.0  --   --     < > = values in this interval not displayed.     CMP:   Recent Labs   Lab 10/31/19  0803 10/31/19  1435   GLU 92 94   CALCIUM 7.5* 7.5*   ALBUMIN 1.5*  --    PROT 5.0*  --     132*   K 4.0 4.1   CO2 26 26    102   BUN <5* <5*   CREATININE 0.5 0.6   ALKPHOS 49*  --    ALT 9*  --    AST 13  --    BILITOT 0.4  --      LFTs:   Recent Labs   Lab 10/31/19  0803   ALT 9*   AST 13   ALKPHOS 49*   BILITOT 0.4   PROT 5.0*   ALBUMIN 1.5*     Coagulation: No results for input(s): LABPROT, INR, APTT in the last 168 hours.  Specimen (12h ago,  onward)    None        No results for input(s): COLORU, CLARITYU, SPECGRAV, PHUR, PROTEINUA, GLUCOSEU, BILIRUBINCON, BLOODU, WBCU, RBCU, BACTERIA, MUCUS, NITRITE, LEUKOCYTESUR, UROBILINOGEN, HYALINECASTS in the last 168 hours.    Cultures:    Microbiology Results (last 7 days)     Procedure Component Value Units Date/Time    Aerobic culture [[id number]]  (Abnormal) Collected:  10/30/19 1458    Order Status:  Completed Specimen:  Wound from Abdomen Updated:  11/01/19 1303     Aerobic Bacterial Culture CANDIDA ALBICANS  Few            Assessment:   Miguel Angel Apple Sr. is a 64 y.o. male who presents with Large bowel obstruction.    Active Diagnoses:    Diagnosis Date Noted POA    PRINCIPAL PROBLEM:  Large bowel obstruction [K56.609] 10/19/2019 Yes    Severe malnutrition [E43] 10/26/2019 Yes    Encounter for postoperative care [Z48.89]  Not Applicable    Diarrhea [R19.7] 10/21/2019 Yes    Rectal mass [K62.89] 10/20/2019 Yes    Hypokalemia [E87.6] 10/20/2019 Yes    Intestinal obstruction [K56.609]  Yes    Colitis [K52.9] 10/19/2019 Yes      Problems Resolved During this Admission:     VTE Risk Mitigation (From admission, onward)         Ordered     heparin (porcine) injection 5,000 Units  Every 8 hours      10/21/19 1753     Place sequential compression device  Until discontinued      10/21/19 1753     IP VTE LOW RISK PATIENT  Once      10/19/19 1752     Place MAE hose  Until discontinued      10/19/19 1752                Medical Decision Making/Plan:  Satisfactory postop recovery thus far.  Mild reactive erythema around surgical closed wound which is improving.  Cultures from 10/31 = candida, will add diflucan to regimen.  Continue current level of care.  Anticipate d/c next week.    Jose Lazo MD  General Surgery  [facility] - ICU

## 2019-11-01 NOTE — PLAN OF CARE
11/01/19 0834   Discharge Reassessment   Assessment Type Discharge Planning Reassessment   Anticipated Discharge Disposition Home-Health   Provided patient/caregiver education on the expected discharge date and the discharge plan Yes   Do you have any problems affording any of your prescribed medications? TBD   Discharge Plan A Home Health   DME Needed Upon Discharge  none   Patient choice form signed by patient/caregiver N/A   Post-Acute Status   Post-Acute Authorization Home Health/Hospice   Patient states received a box of colostomy samples yesterday. States he walked yesterday & wants to go home with home health as doesn't want to go to rehab. He's hoping to be ready for discharged by tomorrow. Will have home health arrangements made with [facility]. No other needs at this time.

## 2019-11-01 NOTE — PLAN OF CARE
Problem: Adult Inpatient Plan of Care  Goal: Plan of Care Review  Description  Aerosol TX given, PT resting in no distress, see flow sheet for details.   Outcome: Ongoing, Progressing     Problem: Adult Inpatient Plan of Care  Goal: Optimal Comfort and Wellbeing  Outcome: Ongoing, Progressing     Problem: Fall Injury Risk  Goal: Absence of Fall and Fall-Related Injury  Outcome: Ongoing, Progressing

## 2019-11-01 NOTE — PLAN OF CARE
PT SLOWLY PROGRESSING TOWARDS MORE INDEPENDENCE/PT CALLED FOR COLOSTOMY DRAINAGE BAG TO BE EMPTIED/PT DID NOT WANT TO PARTICIPATE.

## 2019-11-01 NOTE — PHYSICIAN QUERY
"PT Name: Miguel Angel Apple Sr.  MR #: [id number]    Physician Query Form - Nutrition Clarification     Keerthi Gasca RN, CCDS  Desk # [phone]; [facility] # [phone] [email or web address]      This form is a permanent document in the medical record.     Query Date: November 1, 2019    By submitting this query, we are merely seeking further clarification of documentation.. Please utilize your independent clinical judgment when addressing the question(s) below.    The Medical record contains the following:   Indicators  Supporting Clinical Findings Location in Medical Record   x % of Estimated Energy Intake over a time frame from p.o., TF, or TPN activity change,?appetite change?and unexpected weight change.    % Intake of Estimated Energy Needs: 0 - 25 %  % Meal Intake: NPO   HOsp PN 10/28    RD CN 10/21   x Weight Status over a time frame Ideal Body Weight (IBW), Male: 160 lb  % Ideal Body Weight, Male (lb): 88.13 lb  BMI (Calculated): 20.9  BMI Grade: 18.5-24.9 - normal   RD CN 10/21   x Subcutaneous Fat and/or Muscle Loss some facial wasting; Muscle mass decreased.? HOsp PN 10/28    Fluid Accumulation or Edema      Reduced  Strength     x Wt / BMI / Usual Body Weight Ht: 5' 9"      Wt: 64.3 kg        BMI: 20.9 RD CN 10/21    Delayed Wound Healing / Failure to Thrive     x Acute or Chronic Illness Colitis    10/30 Op Note  Open abdominal wound.  SURGICAL PROCEDURE PERFORMED:  Delayed primary closure of open abdominal Wound.    -normochromic normocytic anemia. ?  Stable mild thrombocytosis. Mild hypernatremia. Mild hypokalemia. ?  Mild hypocalcemia hyperglycemia.  Mild acute blood loss anemia, expected and stable.    1) Exploratory laparotomy  2) Sigmoid colectomy with end descending colostomy creation and mucous fistula formation  3) Right hemicolectomy and distal ilectomy with side to side functional end to end anastomosis  4) Open cholecystectomy  5) Omental pedical flap creation for placement over " anastomosis  6) Takedown splenic flexure of colon  Description of the findings of the procedure:??  Large bowel obstruction.   Pelvic, colon mass involving the bladder anteriorly and at/below the peritoneal reflection. ?  Perforated cecum from pressure necrosis related to large bowel obstruction with intra-abdominal contamination. ?  Chronic cholecystitis necessitating cholecystectomy. H&P      Op Notes        Gen Sx PN 10/26              Op Note 10/21    Medication     x Treatment Recommendation/Intervention:   1. Bowel rest w/ minimal liquids   2. TPN recommendation of Clinimix 5/15 @ 85ml/24 giving 206 grams CHO, 102 grams protein, and 1448 kcal.    RD CN 10/21    x Other Severe malnutrition    Moderate malnutrition  Malnutrition Type: acute illness or injury  Energy Intake: moderate energy intake      Weight Loss (Malnutrition): Pt reports 30 lb wt loss over 3 months.)  Energy Intake (Malnutrition):   less than 75% for greater than or equal to 3 months  Subcutaneous Fat (Malnutrition): mild depletion      Moderate Weight Loss (Malnutrition): 7.5% in 3 months Hosp PN 10/28-31    RD CN 10/21 & 28  RD CN 10/21     AND / ASPEN Clinical Characteristics (October 2011)  A minimum of two characteristics is recommended for diagnosing either moderate or severe malnutrition   Mild Malnutrition Moderate Malnutrition Severe Malnutrition   Energy Intake from p.o., TF or TPN. < 75% intake of estimated energy needs for less than 7 days < 75% intake of estimated energy needs for greater than 7 days < 50% intake of estimated energy needs for > 5 days   Weight Loss 1-2% in 1 month  5% in 3 months  7.5% in 6 months  10% in 1 year 1-2 % in 1 week  5% in 1 month  7.5% in 3 months  10% in 6 months  20% in 1 year > 2% in 1 week  > 5% in 1 month  > 7.5% in 3 months  > 10% in 6 months  > 20% in 1 year   Physical Findings     None *Mild subcutaneous fat and/or muscle loss  *Mild fluid accumulation  *Stage II decubitus  *Surgical wound or  non-healing wound *Mod/severe subcutaneous fat and/or muscle loss  *Mod/severe fluid accumulation  *Stage III or IV decubitus  *Non-healing surgical wound     Provider, please specify diagnosis or diagnoses associated with above clinical findings.         Please clarify malnutrition degree.     [ x ] Moderate Protein-Calorie Malnutrition   [  ] Severe Protein-Calorie Malnutrition   [  ] Other Nutritional Diagnosis (please specify): _____________   [  ] Other:  ________________   [  ] Clinically Undetermined       Please document in your progress notes daily for the duration of treatment until resolved and include in your discharge summary.

## 2019-11-02 LAB
ALBUMIN SERPL BCP-MCNC: 1.4 G/DL (ref 3.5–5.2)
ALP SERPL-CCNC: 55 U/L (ref 55–135)
ALT SERPL W/O P-5'-P-CCNC: 9 U/L (ref 10–44)
ANION GAP SERPL CALC-SCNC: 12 MMOL/L (ref 8–16)
AST SERPL-CCNC: 14 U/L (ref 10–40)
BACTERIA SPEC AEROBE CULT: ABNORMAL
BASOPHILS # BLD AUTO: 0.03 K/UL (ref 0–0.2)
BASOPHILS NFR BLD: 0.2 % (ref 0–1.9)
BILIRUB SERPL-MCNC: 0.6 MG/DL (ref 0.1–1)
BUN SERPL-MCNC: <5 MG/DL (ref 8–23)
CALCIUM SERPL-MCNC: 7.6 MG/DL (ref 8.7–10.5)
CHLORIDE SERPL-SCNC: 100 MMOL/L (ref 95–110)
CO2 SERPL-SCNC: 23 MMOL/L (ref 23–29)
CREAT SERPL-MCNC: 0.5 MG/DL (ref 0.5–1.4)
DIFFERENTIAL METHOD: ABNORMAL
EOSINOPHIL # BLD AUTO: 0.1 K/UL (ref 0–0.5)
EOSINOPHIL NFR BLD: 0.6 % (ref 0–8)
ERYTHROCYTE [DISTWIDTH] IN BLOOD BY AUTOMATED COUNT: 17.9 % (ref 11.5–14.5)
EST. GFR  (AFRICAN AMERICAN): >60 ML/MIN/1.73 M^2
EST. GFR  (NON AFRICAN AMERICAN): >60 ML/MIN/1.73 M^2
GLUCOSE SERPL-MCNC: 93 MG/DL (ref 70–110)
HCT VFR BLD AUTO: 26.8 % (ref 40–54)
HGB BLD-MCNC: 8.3 G/DL (ref 14–18)
IMM GRANULOCYTES # BLD AUTO: 0.12 K/UL (ref 0–0.04)
IMM GRANULOCYTES NFR BLD AUTO: 0.9 % (ref 0–0.5)
LYMPHOCYTES # BLD AUTO: 2.4 K/UL (ref 1–4.8)
LYMPHOCYTES NFR BLD: 16.8 % (ref 18–48)
MCH RBC QN AUTO: 28.1 PG (ref 27–31)
MCHC RBC AUTO-ENTMCNC: 31 G/DL (ref 32–36)
MCV RBC AUTO: 91 FL (ref 82–98)
MONOCYTES # BLD AUTO: 1.2 K/UL (ref 0.3–1)
MONOCYTES NFR BLD: 8.8 % (ref 4–15)
NEUTROPHILS # BLD AUTO: 10.2 K/UL (ref 1.8–7.7)
NEUTROPHILS NFR BLD: 72.7 % (ref 38–73)
NRBC BLD-RTO: 0 /100 WBC
PLATELET # BLD AUTO: 518 K/UL (ref 150–350)
PMV BLD AUTO: 9.5 FL (ref 9.2–12.9)
POTASSIUM SERPL-SCNC: 4 MMOL/L (ref 3.5–5.1)
PROT SERPL-MCNC: 4.9 G/DL (ref 6–8.4)
RBC # BLD AUTO: 2.95 M/UL (ref 4.6–6.2)
SODIUM SERPL-SCNC: 135 MMOL/L (ref 136–145)
WBC # BLD AUTO: 14.06 K/UL (ref 3.9–12.7)

## 2019-11-02 PROCEDURE — S0030 INJECTION, METRONIDAZOLE: HCPCS | Performed by: INTERNAL MEDICINE

## 2019-11-02 PROCEDURE — 94761 N-INVAS EAR/PLS OXIMETRY MLT: CPT

## 2019-11-02 PROCEDURE — 11000001 HC ACUTE MED/SURG PRIVATE ROOM

## 2019-11-02 PROCEDURE — 99233 SBSQ HOSP IP/OBS HIGH 50: CPT | Mod: ,,, | Performed by: INTERNAL MEDICINE

## 2019-11-02 PROCEDURE — 36415 COLL VENOUS BLD VENIPUNCTURE: CPT

## 2019-11-02 PROCEDURE — 25000003 PHARM REV CODE 250: Performed by: INTERNAL MEDICINE

## 2019-11-02 PROCEDURE — 94799 UNLISTED PULMONARY SVC/PX: CPT

## 2019-11-02 PROCEDURE — 99233 PR SUBSEQUENT HOSPITAL CARE,LEVL III: ICD-10-PCS | Mod: ,,, | Performed by: INTERNAL MEDICINE

## 2019-11-02 PROCEDURE — 25000242 PHARM REV CODE 250 ALT 637 W/ HCPCS: Performed by: INTERNAL MEDICINE

## 2019-11-02 PROCEDURE — 85025 COMPLETE CBC W/AUTO DIFF WBC: CPT

## 2019-11-02 PROCEDURE — 80053 COMPREHEN METABOLIC PANEL: CPT

## 2019-11-02 PROCEDURE — 94640 AIRWAY INHALATION TREATMENT: CPT

## 2019-11-02 PROCEDURE — 63600175 PHARM REV CODE 636 W HCPCS: Performed by: SURGERY

## 2019-11-02 PROCEDURE — 63600175 PHARM REV CODE 636 W HCPCS: Performed by: INTERNAL MEDICINE

## 2019-11-02 RX ORDER — METRONIDAZOLE 500 MG/100ML
INJECTION, SOLUTION INTRAVENOUS
Status: DISPENSED
Start: 2019-11-02 | End: 2019-11-02

## 2019-11-02 RX ADMIN — METRONIDAZOLE 500 MG: 500 INJECTION, SOLUTION INTRAVENOUS at 03:11

## 2019-11-02 RX ADMIN — IPRATROPIUM BROMIDE 0.5 MG: 0.5 SOLUTION RESPIRATORY (INHALATION) at 07:11

## 2019-11-02 RX ADMIN — LEVALBUTEROL HYDROCHLORIDE 1.25 MG: 1.25 SOLUTION, CONCENTRATE RESPIRATORY (INHALATION) at 07:11

## 2019-11-02 RX ADMIN — HYDROMORPHONE HYDROCHLORIDE: 2 INJECTION, SOLUTION INTRAMUSCULAR; INTRAVENOUS; SUBCUTANEOUS at 10:11

## 2019-11-02 RX ADMIN — IPRATROPIUM BROMIDE 0.5 MG: 0.5 SOLUTION RESPIRATORY (INHALATION) at 11:11

## 2019-11-02 RX ADMIN — LEVALBUTEROL HYDROCHLORIDE 1.25 MG: 1.25 SOLUTION, CONCENTRATE RESPIRATORY (INHALATION) at 11:11

## 2019-11-02 RX ADMIN — HYDROMORPHONE HYDROCHLORIDE 1 MG: 2 INJECTION, SOLUTION INTRAMUSCULAR; INTRAVENOUS; SUBCUTANEOUS at 06:11

## 2019-11-02 RX ADMIN — CITALOPRAM HYDROBROMIDE 10 MG: 10 TABLET ORAL at 09:11

## 2019-11-02 RX ADMIN — HYDROMORPHONE HYDROCHLORIDE 1 MG: 2 INJECTION, SOLUTION INTRAMUSCULAR; INTRAVENOUS; SUBCUTANEOUS at 01:11

## 2019-11-02 RX ADMIN — HYDROMORPHONE HYDROCHLORIDE 1 MG: 2 INJECTION, SOLUTION INTRAMUSCULAR; INTRAVENOUS; SUBCUTANEOUS at 10:11

## 2019-11-02 RX ADMIN — HEPARIN SODIUM 5000 UNITS: 5000 INJECTION, SOLUTION INTRAVENOUS; SUBCUTANEOUS at 10:11

## 2019-11-02 RX ADMIN — HEPARIN SODIUM 5000 UNITS: 5000 INJECTION, SOLUTION INTRAVENOUS; SUBCUTANEOUS at 06:11

## 2019-11-02 RX ADMIN — PANTOPRAZOLE SODIUM 40 MG: 40 TABLET, DELAYED RELEASE ORAL at 09:11

## 2019-11-02 RX ADMIN — IPRATROPIUM BROMIDE 0.5 MG: 0.5 SOLUTION RESPIRATORY (INHALATION) at 03:11

## 2019-11-02 RX ADMIN — LEVALBUTEROL HYDROCHLORIDE 1.25 MG: 1.25 SOLUTION, CONCENTRATE RESPIRATORY (INHALATION) at 03:11

## 2019-11-02 RX ADMIN — ERTAPENEM SODIUM 1 G: 1 INJECTION, POWDER, LYOPHILIZED, FOR SOLUTION INTRAMUSCULAR; INTRAVENOUS at 09:11

## 2019-11-02 RX ADMIN — FLUCONAZOLE 100 MG: 2 INJECTION, SOLUTION INTRAVENOUS at 11:11

## 2019-11-02 NOTE — SUBJECTIVE & OBJECTIVE
Interval History:     Review of Systems   Constitutional: Positive for fatigue. Negative for activity change, appetite change and fever.   HENT: Negative for congestion, ear discharge, mouth sores, nosebleeds, rhinorrhea, sinus pressure, sinus pain and tinnitus.    Eyes: Negative.  Negative for pain, redness and itching.   Respiratory: Negative for apnea, cough, choking, chest tightness, shortness of breath, wheezing and stridor.    Cardiovascular: Negative for chest pain, palpitations and leg swelling.   Gastrointestinal: Positive for abdominal pain and diarrhea. Negative for abdominal distention, anal bleeding, blood in stool and constipation.   Endocrine: Negative.    Genitourinary: Negative for difficulty urinating, flank pain, frequency and urgency.   Musculoskeletal: Positive for arthralgias and gait problem. Negative for back pain and myalgias.   Skin: Negative for color change and pallor.   Allergic/Immunologic: Negative.    Neurological: Positive for dizziness, weakness and light-headedness. Negative for facial asymmetry and headaches.   Hematological: Negative for adenopathy. Does not bruise/bleed easily.     Objective:     Vital Signs (Most Recent):  Temp: 96.9 °F (36.1 °C) (11/02/19 0815)  Pulse: 105 (11/02/19 0815)  Resp: 17 (11/02/19 0815)  BP: (!) 114/58 (11/02/19 0815)  SpO2: (!) 92 % (11/02/19 0815) Vital Signs (24h Range):  Temp:  [96.9 °F (36.1 °C)-98.4 °F (36.9 °C)] 96.9 °F (36.1 °C)  Pulse:  [] 105  Resp:  [14-18] 17  SpO2:  [92 %-97 %] 92 %  BP: (108-140)/(57-68) 114/58     Weight: 69.7 kg (153 lb 10.6 oz)  Body mass index is 22.69 kg/m².    Intake/Output Summary (Last 24 hours) at 11/2/2019 1401  Last data filed at 11/2/2019 1107  Gross per 24 hour   Intake 1906.67 ml   Output 3600 ml   Net -1693.33 ml      Physical Exam   Constitutional: He is oriented to person, place, and time. He appears well-developed and well-nourished.   HENT:   Head: Normocephalic and atraumatic.   Eyes: Pupils  are equal, round, and reactive to light. EOM are normal.   Neck: Normal range of motion. Neck supple. No tracheal deviation present. No thyromegaly present.   Cardiovascular: Normal rate, regular rhythm, normal heart sounds and intact distal pulses.   Pulmonary/Chest: Effort normal. He has rales.   Abdominal: Soft. Bowel sounds are normal. He exhibits no distension. There is no tenderness. There is no rebound and no guarding.   Musculoskeletal: Normal range of motion.   Lymphadenopathy:     He has no cervical adenopathy.   Neurological: He is alert and oriented to person, place, and time.   Skin: Skin is warm and dry. Capillary refill takes less than 2 seconds.   Psychiatric: He has a normal mood and affect. His behavior is normal. Judgment and thought content normal.   Nursing note and vitals reviewed.      Significant Labs:   Recent Lab Results       11/02/19  0543        Albumin 1.4     Alkaline Phosphatase 55     ALT 9     Anion Gap 12     AST 14     Baso # 0.03     Basophil% 0.2     BILIRUBIN TOTAL 0.6  Comment:  For infants and newborns, interpretation of results should be based  on gestational age, weight and in agreement with clinical  observations.  Premature Infant recommended reference ranges:  Up to 24 hours.............<8.0 mg/dL  Up to 48 hours............<12.0 mg/dL  3-5 days..................<15.0 mg/dL  6-29 days.................<15.0 mg/dL       BUN, Bld <5     Calcium 7.6     Chloride 100     CO2 23     Creatinine 0.5     Differential Method Automated     eGFR if African American >60.0     eGFR if non  >60.0  Comment:  Calculation used to obtain the estimated glomerular filtration  rate (eGFR) is the CKD-EPI equation.        Eos # 0.1     Eosinophil% 0.6     Glucose 93     Gran # (ANC) 10.2     Gran% 72.7     Hematocrit 26.8     Hemoglobin 8.3     Immature Grans (Abs) 0.12  Comment:  Mild elevation in immature granulocytes is non specific and   can be seen in a variety of  conditions including stress response,   acute inflammation, trauma and pregnancy. Correlation with other   laboratory and clinical findings is essential.       Immature Granulocytes 0.9     Lymph # 2.4     Lymph% 16.8     MCH 28.1     MCHC 31.0     MCV 91     Mono # 1.2     Mono% 8.8     MPV 9.5     nRBC 0     Platelets 518     Potassium 4.0     PROTEIN TOTAL 4.9     RBC 2.95     RDW 17.9     Sodium 135     WBC 14.06         All pertinent labs within the past 24 hours have been reviewed.    Significant Imaging: I have reviewed and interpreted all pertinent imaging results/findings within the past 24 hours.

## 2019-11-02 NOTE — NURSING
"PT ASSISTED UP TO CHAIR AT BEDSIDE/BED LINEN CHANGE/COLOSTOMY BAG EMPTIES. PT GOT BACK INTO BED WHILE RN WENT TO PUT HIS SUPPER TRAY ON FOOD CART/PT GOT ONTO BED BUT WAS IN AN AGITATED STATE AND RN INSTRUCTED PT TO NOT GET UP WITHOUT ASSIST UNTIL HIS STRENGTH IS BETTER/PT STATED "I AM JUST UPSET ABOUT ALL THAT HAS HAPPENED, I GET AGGRAVATED BECAUSE I SHIT ON MYSELF AND MY BED, I GET EMBARRASSED THAT YOU SEE THIS". RN TOLD PT "YOUR MODESTY IS APPRECIATED, YOUR SAFETY IS VERY IMPORTANT, THAT IS WHAT MY JOB IS. TO HELP YOU IN YOUR RECOVERY, I WILL KEEP YOUR MODESTY AND ASSIST YOU IN WHATEVER YOU NEED TO MAKE THIS WORK". PT SETTLED DOWN IN BED AND SAID HE WOULD CALL FOR STAFF ASSIST WHEN HE WANTS TO GET OUT OF BED. BED ALARM SET.  "

## 2019-11-02 NOTE — PROGRESS NOTES
[facility] - ICU  General Surgery  Daily Note    Patient Name: Miguel Angel Apple Sr.  MRN: [id number]  Admission Date: 10/19/2019  Attending Physician: Lencho Kumar MD   Consult Physician: Jose Lazo MD  Primary Care Provider: Primary Doctor No    Subjective:     Principle Problem: Large bowel obstruction    Last 24 hour history:  10/28/19  Afebrile.  Vital signs stable.  No nausea vomiting.  Tolerating clear liquid diet well.  Ostomy functional left lower quadrant.  Some venous congestion of the ostomy however viable.  Midline wound with pink healthy granulation tissue in base.  No signs or symptoms of infection.  No other new complaints or issues today.    10/29/19  Afebrile.  VSS.  No nausea or vomiting.  Ostomy functional in the LLQ.  No new issues or complaints.     10/30/19  Afebrile.  Vital signs stable.  No nausea vomiting.  Ostomy functional.  Preliminary pathology from pathologist obtained.  Rectal adenocarcinoma.  Formal pathology report to follow.  Patient was informed of this this morning.  Appropriately sad.  Discussed with patient next steps and care and treatment.  Patient voiced understanding.  All questions were elicited and answered this morning to the patient's satisfaction.    10/31/19  Afebrile.  Vital signs stable.  Mild leukocytosis to be expected postoperative from wound closure. Ostomy with venous congestion still however functional.  No nausea vomiting.  Hungry.  No new issues or complaints.    11/1/19  Afebrile.  Vital signs stable.  Mild leukocytosis, improving.  Ostomy is venous congestion, continues to function.  No nausea vomiting.  Tolerating full liquids well.  No new issues or complaints.    11/2/19  Afebrile.  Vital signs stable.  Ostomy functional.  Tolerating a diet.  No nausea or vomiting.  No new issues or complaints    Objective:     Vital Signs (Most Recent):  Temp: 96.9 °F (36.1 °C) (11/02/19 0815)  Pulse: 105 (11/02/19 0815)  Resp: 17  (11/02/19 0815)  BP: (!) 114/58 (11/02/19 0815)  SpO2: (!) 92 % (11/02/19 0815) Vital Signs (24h Range):  Temp:  [96.9 °F (36.1 °C)-98.4 °F (36.9 °C)] 96.9 °F (36.1 °C)  Pulse:  [] 105  Resp:  [14-18] 17  SpO2:  [92 %-97 %] 92 %  BP: (108-140)/(57-68) 114/58     Intake/Output last 24 hours:    Intake/Output Summary (Last 24 hours) at 11/2/2019 1034  Last data filed at 11/2/2019 0937  Gross per 24 hour   Intake 2006.67 ml   Output 3200 ml   Net -1193.33 ml       I/O last 3 completed shifts:  In: 3370 [P.O.:660; I.V.:2260; IV Piggyback:450]  Out: 5400 [Urine:4500; Stool:900]  I/O this shift:  In: 460 [P.O.:360; IV Piggyback:100]  Out: -     Weight: 69.7 kg (153 lb 10.6 oz)  Body mass index is 22.69 kg/m².    Gen: Wd Wn male currently in NAD  Heent: Nc/At, MMM  Eyes: Perrl, Eomi  Cv: RRR, no  M/g/r  Lung: Non-labored breathing, clear bilaterally  Abd: Soft, midline wound with reactive erythema around staples.  No signs or symptoms of active infection.  Wound closed., ostomy with minor venous congestion left lower quadrant, viable, with output of stool in bag.  Red rubber catheter within mucous fistula.    Significant Labs:  CBC:   Recent Labs   Lab 11/02/19  0543   WBC 14.06*   RBC 2.95*   HGB 8.3*   HCT 26.8*   *   MCV 91   MCH 28.1   MCHC 31.0*     BMP:   Recent Labs   Lab 10/28/19  0454  11/02/19  0543   *   < > 93      < > 135*   K 3.9   < > 4.0      < > 100   CO2 28   < > 23   BUN <5*   < > <5*   CREATININE 0.6   < > 0.5   CALCIUM 7.3*   < > 7.6*   MG 2.0  --   --     < > = values in this interval not displayed.     CMP:   Recent Labs   Lab 11/02/19  0543   GLU 93   CALCIUM 7.6*   ALBUMIN 1.4*   PROT 4.9*   *   K 4.0   CO2 23      BUN <5*   CREATININE 0.5   ALKPHOS 55   ALT 9*   AST 14   BILITOT 0.6     LFTs:   Recent Labs   Lab 11/02/19  0543   ALT 9*   AST 14   ALKPHOS 55   BILITOT 0.6   PROT 4.9*   ALBUMIN 1.4*     Coagulation: No results for input(s): LABPROT, INR,  APTT in the last 168 hours.  Specimen (12h ago, onward)    None        No results for input(s): COLORU, CLARITYU, SPECGRAV, PHUR, PROTEINUA, GLUCOSEU, BILIRUBINCON, BLOODU, WBCU, RBCU, BACTERIA, MUCUS, NITRITE, LEUKOCYTESUR, UROBILINOGEN, HYALINECASTS in the last 168 hours.    Cultures:    Microbiology Results (last 7 days)     Procedure Component Value Units Date/Time    Aerobic culture [[id number]]  (Abnormal) Collected:  10/30/19 1458    Order Status:  Completed Specimen:  Wound from Abdomen Updated:  11/01/19 1303     Aerobic Bacterial Culture CANDIDA ALBICANS  Few            Assessment:   Miguel Angel Apple Sr. is a 64 y.o. male who presents with Large bowel obstruction.    Active Diagnoses:    Diagnosis Date Noted POA    PRINCIPAL PROBLEM:  Large bowel obstruction [K56.609] 10/19/2019 Yes    Severe malnutrition [E43] 10/26/2019 Yes    Encounter for postoperative care [Z48.89]  Not Applicable    Diarrhea [R19.7] 10/21/2019 Yes    Rectal mass [K62.89] 10/20/2019 Yes    Hypokalemia [E87.6] 10/20/2019 Yes    Intestinal obstruction [K56.609]  Yes    Colitis [K52.9] 10/19/2019 Yes      Problems Resolved During this Admission:     VTE Risk Mitigation (From admission, onward)         Ordered     heparin (porcine) injection 5,000 Units  Every 8 hours      10/21/19 1753     Place sequential compression device  Until discontinued      10/21/19 1753     IP VTE LOW RISK PATIENT  Once      10/19/19 1752     Place MAE hose  Until discontinued      10/19/19 1752                Medical Decision Making/Plan:  Satisfactory postop recovery thus far.  Mild reactive erythema around surgical closed wound which is improving.  Cultures from 10/31 = candida, continue diflucan.  Continue current level of care.  Anticipate d/c next week.    Jose Lazo MD  General Surgery  [facility] - ICU

## 2019-11-02 NOTE — NURSING
PT ASSISTED UP TO THE CHAIR AT BEDSIDE/STANDBY ASSIST PROVIDED/ALL NEEDED SUPPLIES WITHIN REACH FOR PT TO PERFORM ADL'S/RN INSTRUCTED PT TO CALL WHEN HE IS DONE/PT VERBALIZED UNDERSTANDING/CALL LIGHT WITHIN REACH.

## 2019-11-02 NOTE — PROGRESS NOTES
[facility] Medicine  Progress Note    Patient Name: Miguel Angel Apple Sr.  MRN: [id number]  Patient Class: IP- Inpatient   Admission Date: 10/19/2019  Length of Stay: 14 days  Attending Physician: Lencho Kumar MD  Primary Care Provider: Primary Doctor No        Subjective:     Principal Problem:Large bowel obstruction        HPI:  Patient's history began approximately 3 months ago.  He presented to the hospital well over a month ago given a diagnosis of he understands colitis.  Treated with antibiotics IV fluids.  He saw Dr. ONEIDA charles on 10/03 placed on Cipro and Flagyl.  His on once completed that dose.  This past Sunday he started noticing that he was becoming  bloated and stopped eating solids at that time went back to liquids.  His had no pain pills for 2-3 days.  No nausea and vomiting unless he ate something that he knew he should the.  He attempted to make himself vomit yesterday and could not.  He has had small bowel movements to continue since that time.  CT scan shows colonic dilatation with the cecum being 10 cm.  The possible obstruction may be low left-sided.  There is even suggesting possibility have a rectal mass.  WBC remained 16,000 with a left shift.  Patient's potassium is 2.2.    Overview/Hospital Course:  Patient is sitting up in chair today IV fluids IV potassium NPO Surgical consultation repeat KUB and lab in a.m. further clinical decisions depending on clinical course.  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  The patient has had no prior abdominal surgery and no medical history prior to approximately 3 months ago.  10/20/2019.  Long discussion with the patient this morning concerning the findings.  Dr. ONEIDA charles see the patient today since he has been involved in his care on 10/03.  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  Patient continue IV fluids IV antibiotics potassium replacement  today and will have 3 Fleet's enemas today.  CEA is been ordered.    10/21/2019:  Patient is comfortable in room although it does appear quite anxious.  The patient will be having EGD and colonoscopy later today.  Based on the findings there the patient will may need undergo surgery.  Patient denies any pain and states that he was not throwing up and having no fever or chills.  Patient is distended and has some abdominal pain associated.  Labs showed [place] 549[address] hemoglobin hematocrit 9.8 and 31 sodium 145 potassium 2.4 chloride 108 bicarb 21 and alk-phos was 49    10/22/2019:  Patient is stable postop day 1.  Patient states pain is well controlled.  Patient mildly tachycardic blood pressure 115/61 979 O2 sats 100%.  Labs show white blood cell count 21.5 hemoglobin 9.2 hematocrit 29.3 platelets 407 and BUN creatinine were 8 and 0.6 calcium 7.5 magnesium 1.4.  Patient is in good spirits and bowel sounds are present.    10/23/2019:  Postop day 2.:  Patient is having much more abdominal pain with waves of pain that appeared to be related to air movement in the bowel versus bowel regaining function.  Patient is alert and oriented and is mildly tachycardic with pulse 123, blood pressure 130/65 O2 sat 95%.  Labs show a improved white count of today of 19,000 hemoglobin 8.9 hematocrit 28 platelets 396 and sodium 141 potassium 3.1 albumin 1.6 and liver functions within normal ranges.  GFR greater than 60    10/24/2019:  Patient is stable and improving postop day 2 .  Colostomy bag has some gas and patient bowel sounds are very active.  Intake was 02/30/2027 output was 1745 and a net gain of 1.4 L. labs showed a white blood cell count 15.9 hemoglobin 8.7 hematocrit 27.7 and platelets 373.  Normal differential noted. Labs:  Sodium 146 potassium 3.5 chloride 110 bicarb 24 glucose 66 and BUN creatinine were 9 and 0.6 otherwise GFR greater than 60.  Magnesium 1.9 phosphorus 4.0    10/25/2019:  Patient is awake  alert and states his pain is under better control today.  Patient is having gas in to his colostomy bag it had to be relieved on 2 occasions overnight.  Patient otherwise has been stable and heart rate is much improved on beta-blocker.  Labs showed sodium 148 potassium 3.2 chloride 111 bicarb 23 calcium is 7.7 and GFR greater than 60.  White blood cell count 10.5 hemoglobin 8.2 hematocrit 26 platelets 334 and differential was normal.  Patient otherwise is stable but will continue gastric decompression until flatus is obvious.  10/26/2019.  NG tube has been removed.  The patient's colostomy is working.  Aggressive respiratory therapy.  Incentive spirometry the patient best he can do this morning was a 1000.  Lungs decreased breath sounds in the bases secondary to atelectasis.  Discussed expectations respiratory therapy this morning.  Patient will become more mobile this a.m..  Discussed mobility with nursing staff.  Patient has been up in the chair twice last p.m..  Lab and x-rays have been reviewed.  Potassium is 3.2 with supplement.  Sodium is 147 this morning patient is receiving lactated Ringer's.  Patient's protein albumin are low with an albumin of 1.4.  Dietary consult for severe malnutrition.  Continue current level of care unless clinical course changes.  Lab has been ordered for in a.m..  10/27/2019.  Patient's vital signs are acceptable.  Patient's incentive spirometry at best is a 1000.  Patient's colostomy looks as expected.  Patient does have testicular swelling will elevate today.  Dr. Dawkins general surgery's reviewed the lab and orders lab in a.m..  Patient's mood is down considering his overall findings is expected have will start Celexa on the patient today.  Patient's sodium still 147 patient is on Ringer's lactate.  Continue current level of care unless clinical course changes.  Will order PT today the patient is very weak.  Patient's weakness is expected.  But he will have a rehab.  May have to  consider whether the patient needs home health care or rehab care after this admission.    10/28/2019:  Patient is sitting up in chair today.  Patient appears weak and has some facial wasting as well as some abdominal distension.  Colostomy site looks intact and there is maceration around that from the adhesive a of the colostomy bags.  Patient is tolerating some clear liquids but has not had much of an appetite and has not been up walking very much.  Otherwise patient is stable postop.  However I feel this patient needs to progress a little faster back to his movement and hopefully bowel function.  Intake and output:  Intake 3854.2 and output 1650 for total net positive fluid in of 2204 cc  Labs:  Phosphorus 3.1 sodium 144 potassium 3.9 chloride 108 bicarb 28 glucose 116 BUN creatinine were less than 5 and 0.6 calcium 7.3 and GFR greater than 60  CBC:  White blood cell count 9.94 hemoglobin 8.3 hematocrit 27.3 platelets 436 and differential was normal  Magnesium:  1.5 phosphorus 3.4    10/29/2019:  This postop day 7.  Patient is looking stronger and eating and tolerating clear liquids.  Colostomy site is looking improved but there is continued desquamation around the stoma.  This is undoubtedly from the adhesive of the bags.  Otherwise no complaints and we will increase progressive mobility starting today.  Physical therapy will be seeing this patient as well.  Labs:  Sodium 139 potassium 4.0 chlorid 104 bicarb 27 glucose 105 BUN creatinine 5 and 0.5 total bilirubin 0.3 albumin 1.4 total protein 4.4 calcium 7.0  White blood cell count 9.8 hemoglobin 8.5 hematocrit 27.3 platelets 401 phosphorus 3.1  Will continue current care will consider starting TPN due to prolonged period without food and repeat labs in the a.m.    10/30/2019:  Patient is stable and feeling much better today.  Patient is able to tolerate clear liquids well.  However patient is NPO now due to planned delayed primary closure today.  Patient be  transferred to the medical-surgical unit post surgery today.  Intake and output:  Intake 3472 output 2775 with a net gain of 697 cc  CMP sodium 139 potassium 4.0 glucose 105 BUN creatinine were 5 and 0.5  CBC unremarkable.    10/31/2019:  Patient is more active today having less pain is in better spirits.  However patient stoma is continuing to be macerated having difficulty with colostomy bag sticking and not leaking.  There is good return from the colostomy site and no significant pain other than the irritation of the skin around the stomal site.  Labs show chemistry which was normal and calcium 7.5 GFR greater than 60 all other electrolytes normal.  White blood cell count 15.8 hemoglobin 9.3 hematocrit 29 platelets 494 and differential showed 74 granulocytes 15 lymphocytes    11/01/2019::  Patient has been ambulatory in the hallway today and is appearing Much stronger.  White blood cell count 15.0 hemoglobin hematocrit 8.827 stable platelets 500.  Vital signs show blood pressure of 101/59 pulse is 96 respirations 16.  Intake is 01/30/1994 output is 04/20/2025 with a net 769 cc gain    11/02/2019:  Patient is more active today showering by himself.  Physical exam is continuing to improve.  Patient otherwise has no new complaints except some irritation around the stoma site.  This has been a problem for the colostomy bags to stick.  Labs:  Sodium 135 potassium 4.0 BUN less than 5 creatinine 0.5 and calcium 7.6 total protein 4.9 and albumin 1.4  CBC shows white blood cell count 40559 improved and H&H was 8.3 and 27 platelets 518 and no other significant findings.    Interval History:     Review of Systems   Constitutional: Positive for fatigue. Negative for activity change, appetite change and fever.   HENT: Negative for congestion, ear discharge, mouth sores, nosebleeds, rhinorrhea, sinus pressure, sinus pain and tinnitus.    Eyes: Negative.  Negative for pain, redness and itching.   Respiratory: Negative for  apnea, cough, choking, chest tightness, shortness of breath, wheezing and stridor.    Cardiovascular: Negative for chest pain, palpitations and leg swelling.   Gastrointestinal: Positive for abdominal pain and diarrhea. Negative for abdominal distention, anal bleeding, blood in stool and constipation.   Endocrine: Negative.    Genitourinary: Negative for difficulty urinating, flank pain, frequency and urgency.   Musculoskeletal: Positive for arthralgias and gait problem. Negative for back pain and myalgias.   Skin: Negative for color change and pallor.   Allergic/Immunologic: Negative.    Neurological: Positive for dizziness, weakness and light-headedness. Negative for facial asymmetry and headaches.   Hematological: Negative for adenopathy. Does not bruise/bleed easily.     Objective:     Vital Signs (Most Recent):  Temp: 96.9 °F (36.1 °C) (11/02/19 0815)  Pulse: 105 (11/02/19 0815)  Resp: 17 (11/02/19 0815)  BP: (!) 114/58 (11/02/19 0815)  SpO2: (!) 92 % (11/02/19 0815) Vital Signs (24h Range):  Temp:  [96.9 °F (36.1 °C)-98.4 °F (36.9 °C)] 96.9 °F (36.1 °C)  Pulse:  [] 105  Resp:  [14-18] 17  SpO2:  [92 %-97 %] 92 %  BP: (108-140)/(57-68) 114/58     Weight: 69.7 kg (153 lb 10.6 oz)  Body mass index is 22.69 kg/m².    Intake/Output Summary (Last 24 hours) at 11/2/2019 1401  Last data filed at 11/2/2019 1107  Gross per 24 hour   Intake 1906.67 ml   Output 3600 ml   Net -1693.33 ml      Physical Exam   Constitutional: He is oriented to person, place, and time. He appears well-developed and well-nourished.   HENT:   Head: Normocephalic and atraumatic.   Eyes: Pupils are equal, round, and reactive to light. EOM are normal.   Neck: Normal range of motion. Neck supple. No tracheal deviation present. No thyromegaly present.   Cardiovascular: Normal rate, regular rhythm, normal heart sounds and intact distal pulses.   Pulmonary/Chest: Effort normal. He has rales.   Abdominal: Soft. Bowel sounds are normal. He  exhibits no distension. There is no tenderness. There is no rebound and no guarding.   Musculoskeletal: Normal range of motion.   Lymphadenopathy:     He has no cervical adenopathy.   Neurological: He is alert and oriented to person, place, and time.   Skin: Skin is warm and dry. Capillary refill takes less than 2 seconds.   Psychiatric: He has a normal mood and affect. His behavior is normal. Judgment and thought content normal.   Nursing note and vitals reviewed.      Significant Labs:   Recent Lab Results       11/02/19  0543        Albumin 1.4     Alkaline Phosphatase 55     ALT 9     Anion Gap 12     AST 14     Baso # 0.03     Basophil% 0.2     BILIRUBIN TOTAL 0.6  Comment:  For infants and newborns, interpretation of results should be based  on gestational age, weight and in agreement with clinical  observations.  Premature Infant recommended reference ranges:  Up to 24 hours.............<8.0 mg/dL  Up to 48 hours............<12.0 mg/dL  3-5 days..................<15.0 mg/dL  6-29 days.................<15.0 mg/dL       BUN, Bld <5     Calcium 7.6     Chloride 100     CO2 23     Creatinine 0.5     Differential Method Automated     eGFR if African American >60.0     eGFR if non  >60.0  Comment:  Calculation used to obtain the estimated glomerular filtration  rate (eGFR) is the CKD-EPI equation.        Eos # 0.1     Eosinophil% 0.6     Glucose 93     Gran # (ANC) 10.2     Gran% 72.7     Hematocrit 26.8     Hemoglobin 8.3     Immature Grans (Abs) 0.12  Comment:  Mild elevation in immature granulocytes is non specific and   can be seen in a variety of conditions including stress response,   acute inflammation, trauma and pregnancy. Correlation with other   laboratory and clinical findings is essential.       Immature Granulocytes 0.9     Lymph # 2.4     Lymph% 16.8     MCH 28.1     MCHC 31.0     MCV 91     Mono # 1.2     Mono% 8.8     MPV 9.5     nRBC 0     Platelets 518     Potassium 4.0      PROTEIN TOTAL 4.9     RBC 2.95     RDW 17.9     Sodium 135     WBC 14.06         All pertinent labs within the past 24 hours have been reviewed.    Significant Imaging: I have reviewed and interpreted all pertinent imaging results/findings within the past 24 hours.      Assessment/Plan:      * Large bowel obstruction  IV fluids IV potassium NPO KUB in lab in a.m. monitor closely.  Surgical consultation will be seen in a.m. by Dr. MOHAMUD General surgery seen the patient in the clinic on 10/03.  10/20/2019.  Extended conversation with Dr. Buck concerning the patient's finding rectal mass bowel obstruction.  Correction of electrolytes IV fluid IV antibiotics NPO fleets enema today x3.  Colonoscope in a.m..  CEA is been ordered.    10/21/2019  1.  Treat today based on findings of colonoscopy or endoscopy.  2.  Continue current antibiotic treatment  3.  Follow surgery recommendations after final diagnosis made.  4.  Treat otherwise as needed based on clinical course  5.  Repeat labs in the a.m. and replete potassium as needed    10/22/2019:  1.  Stable postop day 1 with positive bowel sounds.  2.  We will replace calcium  3.  Repeat labs in the a.m. to include CBC and CMP magnesium  4.  Follow-up otherwise as clinically indicated based clinical course.    10/23/2019:  1.  Continue current antibiotic therapy.  2.  Give will blood bolus of 500 cc x1  3.  Change pain medication to Dilaudid 1 mg every 2 hr as needed for pain  4.  A feel tachycardia may be related to pain versus is mildly fluid depleted.  5.  Replace electrolytes as needed.  6.  Intake and output revealed a positive 2500 cc sore +overnight    10/24/2019:  1.  Continue current medications.  2.  Repeat labs in the a.m. to monitor white blood cell count and  3.  Begin metoprolol 12.5 mg IV q.12 hours or heart rate and  4.  Continue other medications as ordered and await bowel function.    10/25/2019:  1.  Postop day 3.  Patient having no fever chills nausea or  vomiting and states pain is well controlled this morning.  2.  We will replace potassium and calcium IV  3.  Repeat labs in the a.m. to include CBC and CMP.  Patient otherwise has been afebrile and postop course have been normal  10/26/2019.  Postop day 4.  Patient is doing well.  NG tube has been removed.  Colostomy is working well.  Replace potassium today.  All labs have been reviewed.  Albumin 1.4 the patient has severe malnutrition he will soon be starting a diet of consult dietary.  Lab in a.m..  Encouraged incentive spirometry and mobility.  Patient is concerned about his overall condition.  Probably at this point has mild depression secondary to all the current medical problems. Patient did ask about pathology report I told him that I would look for more than likely would be back to the 1st of the week.  Continue current level of care unless clinical course changes.  10/27/2019.  See encounter for postop care note.    10/28/2019:  Postop day 6.:  Patient is still feeling weak and has had flat affect over the last several days.  Over the weekend and antidepressant citalopram was started.  This was started 10 mg p.o. daily.  Patient has colostomy side is intact.  1.  Reconsult PT for this patient  2.  Continue progressive mobility to increase his activity  3.  Advance diet when better bowel function occurs  4.  Replace calcium and magnesium  5.  CBC, CMP in the a.m..  With magnesium    10/29/2019 postop day 7:  1.  Continue PT consult and increased progressive mobility.  Would like to get the patient up in a chair today.  2.  Patient tolerating clear liquid diet  3.  Repeat labs in the a.m. include CBC, CMP, magnesium, phosphate  4.  Consider TPN to begin tomorrow if patient does not advance his diet well.    10/30/2019:  Postop day 8  1.  Continue current treatment.  Patient NPO for delayed primary closure today  2.  Transfer to medical-surgical unit post surgery  3.  Continue current diet after surgery.  4.   Repeat labs in the a.m. to include CBC CMP    10/30/2019 postop day 9:  1.  Patient with leukocytosis of 15,000, will monitor fever curve, check lactic acid, and increase IV fluids due to fluid deficit overnight as well as low blood pressure.  2.  Recheck labs in the morning to include CBC, CMP and repeat chest x-ray  3.  Continue current antibiotics and follow otherwise as clinically indicated based on course  4.  Patient with possible discharge in the next 24-48 hours if stable    10/31/2019:  White blood cell count is mildly improved but will continue current antibiotics  2.  Repeat labs in the a.m.  3.  Will continue physical therapy for strengthening and possible discharge soon    11/01/2019:  1.  Patient in more active and participating with physical therapy.  Continue current antibiotics  2.  Begin Diflucan 200 mg IV daily  3.  Follow-up otherwise as needed  4.  Repeat labs in the a.m.    11/02/2019  1.  Continue physical therapy and progressive mobility.  2.  Repeat labs in the a.m.  3.  Patient should be ready for discharge within 24-48 hours from now.  4.  Will discuss with patient the need for placement.    Encounter for postoperative care  10/27/2019.  Day 5 postop care.  Patient's mood is somewhat down will start Celexa.  His mood is expected to be low considering the current circumstances but feel that he does need help.  Potassium 3.1 has been supplemented today.  Magnesium is 1.5 has been supplemented today.  Sodium is 147 continue to monitor.  Patient can do about a 1000 on incentive spirometry at best.  Will order PT for the patient today he needs some increased movement.  Patient is very weak considering the illness time frame.  Patient's testicle swollen today will elevate.  Lab values in a.m..  Pathology is pending.  Continue current level of care.      Intestinal obstruction  Patient now postop day 1 from bowel resection.  Stable postop day 1 doing well and postoperative course at this  time      Hypokalemia  10/25/2019:  Will replace IV for this morning.  10/26/2019 will replace potassium p.o. Today.  10/27/2019.  Patient potassium 3.1 magnesium is slightly low will supplement potassium and magnesium.  Dr. Dawkins general surgery is already ordered potassium.      VTE Risk Mitigation (From admission, onward)         Ordered     heparin (porcine) injection 5,000 Units  Every 8 hours      10/21/19 1753     Place sequential compression device  Until discontinued      10/21/19 1753     IP VTE LOW RISK PATIENT  Once      10/19/19 1752     Place MAE hose  Until discontinued      10/19/19 1752                      Lencho Kumar MD  Department of Hospital Medicine   [facility]

## 2019-11-02 NOTE — NURSING
PT SITTING UP IN BED EATING BREAKFAST. RN DISCUSSED POC FOR TODAY/GETTING OUT OF BED/CHANGING IV SITE. PT VERBALIZED THAT HE WANTS TO GET UP IN THE BATHROOM TODAY TO WASH UP/RN INSTRUCTED PT TO CALL FOR ANY PROBLEMS/NEEDS AND WHEN HE IS READY TO GET OUT OF BED INTO THE BATHROOM.

## 2019-11-02 NOTE — PLAN OF CARE
Mr Apple has had several mucus clear looking with fatty globules drainage from rectum. Changed pt and placed diaper under bottom. Emptied colostomy bag several times. Pt is very knowledgble of ostomy care. C/O being very weak. Does not attempt to get up. IV antibiotics administered per eMAR.

## 2019-11-02 NOTE — ASSESSMENT & PLAN NOTE
IV fluids IV potassium NPO KUB in lab in a.m. monitor closely.  Surgical consultation will be seen in a.m. by Dr. MOHAMUD General surgery seen the patient in the clinic on 10/03.  10/20/2019.  Extended conversation with Dr. Buck concerning the patient's finding rectal mass bowel obstruction.  Correction of electrolytes IV fluid IV antibiotics NPO fleets enema today x3.  Colonoscope in a.m..  CEA is been ordered.    10/21/2019  1.  Treat today based on findings of colonoscopy or endoscopy.  2.  Continue current antibiotic treatment  3.  Follow surgery recommendations after final diagnosis made.  4.  Treat otherwise as needed based on clinical course  5.  Repeat labs in the a.m. and replete potassium as needed    10/22/2019:  1.  Stable postop day 1 with positive bowel sounds.  2.  We will replace calcium  3.  Repeat labs in the a.m. to include CBC and CMP magnesium  4.  Follow-up otherwise as clinically indicated based clinical course.    10/23/2019:  1.  Continue current antibiotic therapy.  2.  Give will blood bolus of 500 cc x1  3.  Change pain medication to Dilaudid 1 mg every 2 hr as needed for pain  4.  A feel tachycardia may be related to pain versus is mildly fluid depleted.  5.  Replace electrolytes as needed.  6.  Intake and output revealed a positive 2500 cc sore +overnight    10/24/2019:  1.  Continue current medications.  2.  Repeat labs in the a.m. to monitor white blood cell count and  3.  Begin metoprolol 12.5 mg IV q.12 hours or heart rate and  4.  Continue other medications as ordered and await bowel function.    10/25/2019:  1.  Postop day 3.  Patient having no fever chills nausea or vomiting and states pain is well controlled this morning.  2.  We will replace potassium and calcium IV  3.  Repeat labs in the a.m. to include CBC and CMP.  Patient otherwise has been afebrile and postop course have been normal  10/26/2019.  Postop day 4.  Patient is doing well.  NG tube has been removed.  Colostomy is  working well.  Replace potassium today.  All labs have been reviewed.  Albumin 1.4 the patient has severe malnutrition he will soon be starting a diet of consult dietary.  Lab in a.m..  Encouraged incentive spirometry and mobility.  Patient is concerned about his overall condition.  Probably at this point has mild depression secondary to all the current medical problems. Patient did ask about pathology report I told him that I would look for more than likely would be back to the 1st of the week.  Continue current level of care unless clinical course changes.  10/27/2019.  See encounter for postop care note.    10/28/2019:  Postop day 6.:  Patient is still feeling weak and has had flat affect over the last several days.  Over the weekend and antidepressant citalopram was started.  This was started 10 mg p.o. daily.  Patient has colostomy side is intact.  1.  Reconsult PT for this patient  2.  Continue progressive mobility to increase his activity  3.  Advance diet when better bowel function occurs  4.  Replace calcium and magnesium  5.  CBC, CMP in the a.m..  With magnesium    10/29/2019 postop day 7:  1.  Continue PT consult and increased progressive mobility.  Would like to get the patient up in a chair today.  2.  Patient tolerating clear liquid diet  3.  Repeat labs in the a.m. include CBC, CMP, magnesium, phosphate  4.  Consider TPN to begin tomorrow if patient does not advance his diet well.    10/30/2019:  Postop day 8  1.  Continue current treatment.  Patient NPO for delayed primary closure today  2.  Transfer to medical-surgical unit post surgery  3.  Continue current diet after surgery.  4.  Repeat labs in the a.m. to include CBC CMP    10/30/2019 postop day 9:  1.  Patient with leukocytosis of 15,000, will monitor fever curve, check lactic acid, and increase IV fluids due to fluid deficit overnight as well as low blood pressure.  2.  Recheck labs in the morning to include CBC, CMP and repeat chest  x-ray  3.  Continue current antibiotics and follow otherwise as clinically indicated based on course  4.  Patient with possible discharge in the next 24-48 hours if stable    10/31/2019:  White blood cell count is mildly improved but will continue current antibiotics  2.  Repeat labs in the a.m.  3.  Will continue physical therapy for strengthening and possible discharge soon    11/01/2019:  1.  Patient in more active and participating with physical therapy.  Continue current antibiotics  2.  Begin Diflucan 200 mg IV daily  3.  Follow-up otherwise as needed  4.  Repeat labs in the a.m.    11/02/2019  1.  Continue physical therapy and progressive mobility.  2.  Repeat labs in the a.m.  3.  Patient should be ready for discharge within 24-48 hours from now.  4.  Will discuss with patient the need for placement.

## 2019-11-03 LAB
ALBUMIN SERPL BCP-MCNC: 1.6 G/DL (ref 3.5–5.2)
ALP SERPL-CCNC: 56 U/L (ref 55–135)
ALT SERPL W/O P-5'-P-CCNC: 10 U/L (ref 10–44)
ANION GAP SERPL CALC-SCNC: 9 MMOL/L (ref 8–16)
AST SERPL-CCNC: 15 U/L (ref 10–40)
BASOPHILS # BLD AUTO: 0.03 K/UL (ref 0–0.2)
BASOPHILS NFR BLD: 0.2 % (ref 0–1.9)
BILIRUB SERPL-MCNC: 0.6 MG/DL (ref 0.1–1)
BUN SERPL-MCNC: <5 MG/DL (ref 8–23)
CALCIUM SERPL-MCNC: 7.7 MG/DL (ref 8.7–10.5)
CHLORIDE SERPL-SCNC: 99 MMOL/L (ref 95–110)
CO2 SERPL-SCNC: 26 MMOL/L (ref 23–29)
CREAT SERPL-MCNC: 0.5 MG/DL (ref 0.5–1.4)
DIFFERENTIAL METHOD: ABNORMAL
EOSINOPHIL # BLD AUTO: 0.1 K/UL (ref 0–0.5)
EOSINOPHIL NFR BLD: 0.7 % (ref 0–8)
ERYTHROCYTE [DISTWIDTH] IN BLOOD BY AUTOMATED COUNT: 18.1 % (ref 11.5–14.5)
EST. GFR  (AFRICAN AMERICAN): >60 ML/MIN/1.73 M^2
EST. GFR  (NON AFRICAN AMERICAN): >60 ML/MIN/1.73 M^2
GLUCOSE SERPL-MCNC: 85 MG/DL (ref 70–110)
HCT VFR BLD AUTO: 27.5 % (ref 40–54)
HGB BLD-MCNC: 8.5 G/DL (ref 14–18)
IMM GRANULOCYTES # BLD AUTO: 0.11 K/UL (ref 0–0.04)
IMM GRANULOCYTES NFR BLD AUTO: 0.8 % (ref 0–0.5)
LYMPHOCYTES # BLD AUTO: 2.1 K/UL (ref 1–4.8)
LYMPHOCYTES NFR BLD: 15.7 % (ref 18–48)
MCH RBC QN AUTO: 28.2 PG (ref 27–31)
MCHC RBC AUTO-ENTMCNC: 30.9 G/DL (ref 32–36)
MCV RBC AUTO: 91 FL (ref 82–98)
MONOCYTES # BLD AUTO: 1.1 K/UL (ref 0.3–1)
MONOCYTES NFR BLD: 8.1 % (ref 4–15)
NEUTROPHILS # BLD AUTO: 9.7 K/UL (ref 1.8–7.7)
NEUTROPHILS NFR BLD: 74.5 % (ref 38–73)
NRBC BLD-RTO: 0 /100 WBC
PLATELET # BLD AUTO: 506 K/UL (ref 150–350)
PMV BLD AUTO: 9.1 FL (ref 9.2–12.9)
POTASSIUM SERPL-SCNC: 3.9 MMOL/L (ref 3.5–5.1)
PROT SERPL-MCNC: 5.3 G/DL (ref 6–8.4)
RBC # BLD AUTO: 3.01 M/UL (ref 4.6–6.2)
SODIUM SERPL-SCNC: 134 MMOL/L (ref 136–145)
WBC # BLD AUTO: 13.02 K/UL (ref 3.9–12.7)

## 2019-11-03 PROCEDURE — 63600175 PHARM REV CODE 636 W HCPCS: Performed by: INTERNAL MEDICINE

## 2019-11-03 PROCEDURE — 80053 COMPREHEN METABOLIC PANEL: CPT

## 2019-11-03 PROCEDURE — 99232 PR SUBSEQUENT HOSPITAL CARE,LEVL II: ICD-10-PCS | Mod: ,,, | Performed by: INTERNAL MEDICINE

## 2019-11-03 PROCEDURE — 99232 SBSQ HOSP IP/OBS MODERATE 35: CPT | Mod: ,,, | Performed by: INTERNAL MEDICINE

## 2019-11-03 PROCEDURE — 36415 COLL VENOUS BLD VENIPUNCTURE: CPT

## 2019-11-03 PROCEDURE — 63600175 PHARM REV CODE 636 W HCPCS: Performed by: SURGERY

## 2019-11-03 PROCEDURE — 25000003 PHARM REV CODE 250: Performed by: INTERNAL MEDICINE

## 2019-11-03 PROCEDURE — 25000242 PHARM REV CODE 250 ALT 637 W/ HCPCS: Performed by: INTERNAL MEDICINE

## 2019-11-03 PROCEDURE — 11000001 HC ACUTE MED/SURG PRIVATE ROOM

## 2019-11-03 PROCEDURE — 94640 AIRWAY INHALATION TREATMENT: CPT

## 2019-11-03 PROCEDURE — 94761 N-INVAS EAR/PLS OXIMETRY MLT: CPT

## 2019-11-03 PROCEDURE — 85025 COMPLETE CBC W/AUTO DIFF WBC: CPT

## 2019-11-03 PROCEDURE — 94799 UNLISTED PULMONARY SVC/PX: CPT

## 2019-11-03 RX ADMIN — LEVALBUTEROL HYDROCHLORIDE 1.25 MG: 1.25 SOLUTION, CONCENTRATE RESPIRATORY (INHALATION) at 11:11

## 2019-11-03 RX ADMIN — PANTOPRAZOLE SODIUM 40 MG: 40 TABLET, DELAYED RELEASE ORAL at 09:11

## 2019-11-03 RX ADMIN — HEPARIN SODIUM 5000 UNITS: 5000 INJECTION, SOLUTION INTRAVENOUS; SUBCUTANEOUS at 02:11

## 2019-11-03 RX ADMIN — HEPARIN SODIUM 5000 UNITS: 5000 INJECTION, SOLUTION INTRAVENOUS; SUBCUTANEOUS at 06:11

## 2019-11-03 RX ADMIN — HYDROMORPHONE HYDROCHLORIDE 1 MG: 2 INJECTION, SOLUTION INTRAMUSCULAR; INTRAVENOUS; SUBCUTANEOUS at 02:11

## 2019-11-03 RX ADMIN — HEPARIN SODIUM 5000 UNITS: 5000 INJECTION, SOLUTION INTRAVENOUS; SUBCUTANEOUS at 10:11

## 2019-11-03 RX ADMIN — CITALOPRAM HYDROBROMIDE 10 MG: 10 TABLET ORAL at 09:11

## 2019-11-03 RX ADMIN — HYDROMORPHONE HYDROCHLORIDE 1 MG: 2 INJECTION, SOLUTION INTRAMUSCULAR; INTRAVENOUS; SUBCUTANEOUS at 09:11

## 2019-11-03 RX ADMIN — ERTAPENEM SODIUM 1 G: 1 INJECTION, POWDER, LYOPHILIZED, FOR SOLUTION INTRAMUSCULAR; INTRAVENOUS at 09:11

## 2019-11-03 RX ADMIN — HYDROMORPHONE HYDROCHLORIDE 1 MG: 2 INJECTION, SOLUTION INTRAMUSCULAR; INTRAVENOUS; SUBCUTANEOUS at 08:11

## 2019-11-03 RX ADMIN — IPRATROPIUM BROMIDE 0.5 MG: 0.5 SOLUTION RESPIRATORY (INHALATION) at 11:11

## 2019-11-03 RX ADMIN — FLUCONAZOLE 100 MG: 2 INJECTION, SOLUTION INTRAVENOUS at 02:11

## 2019-11-03 RX ADMIN — IPRATROPIUM BROMIDE 0.5 MG: 0.5 SOLUTION RESPIRATORY (INHALATION) at 07:11

## 2019-11-03 RX ADMIN — LEVALBUTEROL HYDROCHLORIDE 1.25 MG: 1.25 SOLUTION, CONCENTRATE RESPIRATORY (INHALATION) at 07:11

## 2019-11-03 NOTE — ASSESSMENT & PLAN NOTE
IV fluids IV potassium NPO KUB in lab in a.m. monitor closely.  Surgical consultation will be seen in a.m. by Dr. MOHAMUD General surgery seen the patient in the clinic on 10/03.  10/20/2019.  Extended conversation with Dr. Buck concerning the patient's finding rectal mass bowel obstruction.  Correction of electrolytes IV fluid IV antibiotics NPO fleets enema today x3.  Colonoscope in a.m..  CEA is been ordered.    10/21/2019  1.  Treat today based on findings of colonoscopy or endoscopy.  2.  Continue current antibiotic treatment  3.  Follow surgery recommendations after final diagnosis made.  4.  Treat otherwise as needed based on clinical course  5.  Repeat labs in the a.m. and replete potassium as needed    10/22/2019:  1.  Stable postop day 1 with positive bowel sounds.  2.  We will replace calcium  3.  Repeat labs in the a.m. to include CBC and CMP magnesium  4.  Follow-up otherwise as clinically indicated based clinical course.    10/23/2019:  1.  Continue current antibiotic therapy.  2.  Give will blood bolus of 500 cc x1  3.  Change pain medication to Dilaudid 1 mg every 2 hr as needed for pain  4.  A feel tachycardia may be related to pain versus is mildly fluid depleted.  5.  Replace electrolytes as needed.  6.  Intake and output revealed a positive 2500 cc sore +overnight    10/24/2019:  1.  Continue current medications.  2.  Repeat labs in the a.m. to monitor white blood cell count and  3.  Begin metoprolol 12.5 mg IV q.12 hours or heart rate and  4.  Continue other medications as ordered and await bowel function.    10/25/2019:  1.  Postop day 3.  Patient having no fever chills nausea or vomiting and states pain is well controlled this morning.  2.  We will replace potassium and calcium IV  3.  Repeat labs in the a.m. to include CBC and CMP.  Patient otherwise has been afebrile and postop course have been normal  10/26/2019.  Postop day 4.  Patient is doing well.  NG tube has been removed.  Colostomy is  working well.  Replace potassium today.  All labs have been reviewed.  Albumin 1.4 the patient has severe malnutrition he will soon be starting a diet of consult dietary.  Lab in a.m..  Encouraged incentive spirometry and mobility.  Patient is concerned about his overall condition.  Probably at this point has mild depression secondary to all the current medical problems. Patient did ask about pathology report I told him that I would look for more than likely would be back to the 1st of the week.  Continue current level of care unless clinical course changes.  10/27/2019.  See encounter for postop care note.    10/28/2019:  Postop day 6.:  Patient is still feeling weak and has had flat affect over the last several days.  Over the weekend and antidepressant citalopram was started.  This was started 10 mg p.o. daily.  Patient has colostomy side is intact.  1.  Reconsult PT for this patient  2.  Continue progressive mobility to increase his activity  3.  Advance diet when better bowel function occurs  4.  Replace calcium and magnesium  5.  CBC, CMP in the a.m..  With magnesium    10/29/2019 postop day 7:  1.  Continue PT consult and increased progressive mobility.  Would like to get the patient up in a chair today.  2.  Patient tolerating clear liquid diet  3.  Repeat labs in the a.m. include CBC, CMP, magnesium, phosphate  4.  Consider TPN to begin tomorrow if patient does not advance his diet well.    10/30/2019:  Postop day 8  1.  Continue current treatment.  Patient NPO for delayed primary closure today  2.  Transfer to medical-surgical unit post surgery  3.  Continue current diet after surgery.  4.  Repeat labs in the a.m. to include CBC CMP    10/30/2019 postop day 9:  1.  Patient with leukocytosis of 15,000, will monitor fever curve, check lactic acid, and increase IV fluids due to fluid deficit overnight as well as low blood pressure.  2.  Recheck labs in the morning to include CBC, CMP and repeat chest  x-ray  3.  Continue current antibiotics and follow otherwise as clinically indicated based on course  4.  Patient with possible discharge in the next 24-48 hours if stable    10/31/2019:  White blood cell count is mildly improved but will continue current antibiotics  2.  Repeat labs in the a.m.  3.  Will continue physical therapy for strengthening and possible discharge soon    11/01/2019:  1.  Patient in more active and participating with physical therapy.  Continue current antibiotics  2.  Begin Diflucan 200 mg IV daily  3.  Follow-up otherwise as needed  4.  Repeat labs in the a.m.    11/02/2019  1.  Continue physical therapy and progressive mobility.  2.  Repeat labs in the a.m.  3.  Patient should be ready for discharge within 24-48 hours from now.  4.  Will discuss with patient the need for placement.    11/03/2019  1.  Continue progressive mobility and incentive spirometry  2.  Ambulate as much as possible  3.  Repeat CBC and BMP in the a.m.  4.  Patient is getting stronger and should be able to be discharged within the next 2-3 days  5.  Patient has had some trouble with his ostomy site with maceration and desquamation with increased pain.

## 2019-11-03 NOTE — PLAN OF CARE
MrReynold Zechariah is progressing. Pain managed with Dilaudid IVSP. Experiencing anal leakage. Ostomy is draining greenish liquid.Frequent rounding.

## 2019-11-03 NOTE — SUBJECTIVE & OBJECTIVE
Interval History:     Review of Systems   Constitutional: Negative for activity change, appetite change, fatigue and fever.   HENT: Negative for congestion, ear discharge, mouth sores, nosebleeds, rhinorrhea, sinus pressure, sinus pain and tinnitus.    Eyes: Negative.  Negative for pain, redness and itching.   Respiratory: Positive for cough, chest tightness and shortness of breath. Negative for apnea, choking, wheezing and stridor.    Cardiovascular: Negative for chest pain, palpitations and leg swelling.   Gastrointestinal: Negative for abdominal distention, abdominal pain, anal bleeding, blood in stool, constipation and diarrhea.   Endocrine: Negative.    Genitourinary: Negative for difficulty urinating, flank pain, frequency and urgency.   Musculoskeletal: Positive for back pain. Negative for arthralgias, gait problem and myalgias.   Skin: Negative for color change and pallor.   Allergic/Immunologic: Negative.    Neurological: Negative for dizziness, facial asymmetry, weakness, light-headedness and headaches.   Hematological: Negative for adenopathy. Does not bruise/bleed easily.   Psychiatric/Behavioral: The patient is nervous/anxious.      Objective:     Vital Signs (Most Recent):  Temp: 97.2 °F (36.2 °C) (11/03/19 0727)  Pulse: 91 (11/03/19 1136)  Resp: 16 (11/03/19 1136)  BP: 110/63 (11/03/19 0727)  SpO2: 96 % (11/03/19 1136) Vital Signs (24h Range):  Temp:  [97.2 °F (36.2 °C)-98.1 °F (36.7 °C)] 97.2 °F (36.2 °C)  Pulse:  [] 91  Resp:  [16-18] 16  SpO2:  [94 %-96 %] 96 %  BP: (104-128)/(56-67) 110/63     Weight: 69.7 kg (153 lb 10.6 oz)  Body mass index is 22.69 kg/m².    Intake/Output Summary (Last 24 hours) at 11/3/2019 1308  Last data filed at 11/3/2019 0945  Gross per 24 hour   Intake 1160 ml   Output 3050 ml   Net -1890 ml      Physical Exam   Constitutional: He is oriented to person, place, and time. He appears well-developed and well-nourished.   HENT:   Head: Normocephalic and atraumatic.    Eyes: Pupils are equal, round, and reactive to light. EOM are normal.   Neck: Normal range of motion. Neck supple. No tracheal deviation present. No thyromegaly present.   Cardiovascular: Normal rate, regular rhythm and normal heart sounds.   Pulmonary/Chest: Effort normal and breath sounds normal.   Abdominal: Soft. Bowel sounds are normal. He exhibits no distension. There is tenderness. There is guarding. There is no rebound.   Musculoskeletal: Normal range of motion.   Lymphadenopathy:     He has no cervical adenopathy.   Neurological: He is alert and oriented to person, place, and time.   Skin: Skin is warm and dry. Capillary refill takes less than 2 seconds.   Psychiatric: He has a normal mood and affect. His behavior is normal. Judgment and thought content normal.   Nursing note and vitals reviewed.      Significant Labs:   Recent Lab Results       11/03/19  0611        Albumin 1.6     Alkaline Phosphatase 56     ALT 10     Anion Gap 9     AST 15     Baso # 0.03     Basophil% 0.2     BILIRUBIN TOTAL 0.6  Comment:  For infants and newborns, interpretation of results should be based  on gestational age, weight and in agreement with clinical  observations.  Premature Infant recommended reference ranges:  Up to 24 hours.............<8.0 mg/dL  Up to 48 hours............<12.0 mg/dL  3-5 days..................<15.0 mg/dL  6-29 days.................<15.0 mg/dL       BUN, Bld <5     Calcium 7.7     Chloride 99     CO2 26     Creatinine 0.5     Differential Method Automated     eGFR if African American >60.0     eGFR if non  >60.0  Comment:  Calculation used to obtain the estimated glomerular filtration  rate (eGFR) is the CKD-EPI equation.        Eos # 0.1     Eosinophil% 0.7     Glucose 85     Gran # (ANC) 9.7     Gran% 74.5     Hematocrit 27.5     Hemoglobin 8.5     Immature Grans (Abs) 0.11  Comment:  Mild elevation in immature granulocytes is non specific and   can be seen in a variety of  conditions including stress response,   acute inflammation, trauma and pregnancy. Correlation with other   laboratory and clinical findings is essential.       Immature Granulocytes 0.8     Lymph # 2.1     Lymph% 15.7     MCH 28.2     MCHC 30.9     MCV 91     Mono # 1.1     Mono% 8.1     MPV 9.1     nRBC 0     Platelets 506     Potassium 3.9     PROTEIN TOTAL 5.3     RBC 3.01     RDW 18.1     Sodium 134     WBC 13.02         All pertinent labs within the past 24 hours have been reviewed.    Significant Imaging: I have reviewed and interpreted all pertinent imaging results/findings within the past 24 hours.

## 2019-11-03 NOTE — PROGRESS NOTES
[facility] - ICU  General Surgery  Daily Note    Patient Name: Miguel Angel Apple Sr.  MRN: [id number]  Admission Date: 10/19/2019  Attending Physician: Lencho Kumar MD   Consult Physician: Jose Lazo MD  Primary Care Provider: Primary Doctor No    Subjective:     Principle Problem: Large bowel obstruction    Last 24 hour history:  10/28/19  Afebrile.  Vital signs stable.  No nausea vomiting.  Tolerating clear liquid diet well.  Ostomy functional left lower quadrant.  Some venous congestion of the ostomy however viable.  Midline wound with pink healthy granulation tissue in base.  No signs or symptoms of infection.  No other new complaints or issues today.    10/29/19  Afebrile.  VSS.  No nausea or vomiting.  Ostomy functional in the LLQ.  No new issues or complaints.     10/30/19  Afebrile.  Vital signs stable.  No nausea vomiting.  Ostomy functional.  Preliminary pathology from pathologist obtained.  Rectal adenocarcinoma.  Formal pathology report to follow.  Patient was informed of this this morning.  Appropriately sad.  Discussed with patient next steps and care and treatment.  Patient voiced understanding.  All questions were elicited and answered this morning to the patient's satisfaction.    10/31/19  Afebrile.  Vital signs stable.  Mild leukocytosis to be expected postoperative from wound closure. Ostomy with venous congestion still however functional.  No nausea vomiting.  Hungry.  No new issues or complaints.    11/1/19  Afebrile.  Vital signs stable.  Mild leukocytosis, improving.  Ostomy is venous congestion, continues to function.  No nausea vomiting.  Tolerating full liquids well.  No new issues or complaints.    11/2/19  Afebrile.  Vital signs stable.  Ostomy functional.  Tolerating a diet.  No nausea or vomiting.  No new issues or complaints    11/3/19  Afebrile.  Vital signs stable.  Ostomy functional.  Tolerating a diet.  No nausea vomiting.  No new complaints or  issues.    Objective:     Vital Signs (Most Recent):  Temp: 97.2 °F (36.2 °C) (11/03/19 0727)  Pulse: 99 (11/03/19 0737)  Resp: 16 (11/03/19 0737)  BP: 110/63 (11/03/19 0727)  SpO2: 96 % (11/03/19 0737) Vital Signs (24h Range):  Temp:  [97.2 °F (36.2 °C)-98.1 °F (36.7 °C)] 97.2 °F (36.2 °C)  Pulse:  [] 99  Resp:  [16-18] 16  SpO2:  [94 %-96 %] 96 %  BP: (104-128)/(56-67) 110/63     Intake/Output last 24 hours:    Intake/Output Summary (Last 24 hours) at 11/3/2019 1016  Last data filed at 11/3/2019 0945  Gross per 24 hour   Intake 1450 ml   Output 3750 ml   Net -2300 ml       I/O last 3 completed shifts:  In: 1390 [P.O.:1240; IV Piggyback:150]  Out: 6450 [Urine:5050; Stool:1400]  I/O this shift:  In: 520 [P.O.:420; IV Piggyback:100]  Out: -     Weight: 69.7 kg (153 lb 10.6 oz)  Body mass index is 22.69 kg/m².    Gen: Wd Wn male currently in NAD  Heent: Nc/At, MMM  Eyes: Perrl, Eomi  Cv: RRR, no  M/g/r  Lung: Non-labored breathing, clear bilaterally  Abd: Soft, midline wound with reactive erythema around staples.  No signs or symptoms of active infection.  Wound closed., ostomy with minor venous congestion left lower quadrant, viable, with output of stool in bag.  Red rubber catheter within mucous fistula.    Significant Labs:  CBC:   Recent Labs   Lab 11/03/19 0611   WBC 13.02*   RBC 3.01*   HGB 8.5*   HCT 27.5*   *   MCV 91   MCH 28.2   MCHC 30.9*     BMP:   Recent Labs   Lab 10/28/19  0454  11/03/19  0611   *   < > 85      < > 134*   K 3.9   < > 3.9      < > 99   CO2 28   < > 26   BUN <5*   < > <5*   CREATININE 0.6   < > 0.5   CALCIUM 7.3*   < > 7.7*   MG 2.0  --   --     < > = values in this interval not displayed.     CMP:   Recent Labs   Lab 11/03/19  0611   GLU 85   CALCIUM 7.7*   ALBUMIN 1.6*   PROT 5.3*   *   K 3.9   CO2 26   CL 99   BUN <5*   CREATININE 0.5   ALKPHOS 56   ALT 10   AST 15   BILITOT 0.6     LFTs:   Recent Labs   Lab 11/03/19  0611   ALT 10   AST 15    ALKPHOS 56   BILITOT 0.6   PROT 5.3*   ALBUMIN 1.6*     Coagulation: No results for input(s): LABPROT, INR, APTT in the last 168 hours.  Specimen (12h ago, onward)    None        No results for input(s): COLORU, CLARITYU, SPECGRAV, PHUR, PROTEINUA, GLUCOSEU, BILIRUBINCON, BLOODU, WBCU, RBCU, BACTERIA, MUCUS, NITRITE, LEUKOCYTESUR, UROBILINOGEN, HYALINECASTS in the last 168 hours.    Cultures:    Microbiology Results (last 7 days)     Procedure Component Value Units Date/Time    Aerobic culture [[id number]]  (Abnormal) Collected:  10/30/19 1458    Order Status:  Completed Specimen:  Wound from Abdomen Updated:  11/02/19 1042     Aerobic Bacterial Culture CANDIDA ALBICANS  Few            Assessment:   Miguel Angel Apple Sr. is a 64 y.o. male who presents with Large bowel obstruction.    Active Diagnoses:    Diagnosis Date Noted POA    PRINCIPAL PROBLEM:  Large bowel obstruction [K56.609] 10/19/2019 Yes    Severe malnutrition [E43] 10/26/2019 Yes    Encounter for postoperative care [Z48.89]  Not Applicable    Diarrhea [R19.7] 10/21/2019 Yes    Rectal mass [K62.89] 10/20/2019 Yes    Hypokalemia [E87.6] 10/20/2019 Yes    Intestinal obstruction [K56.609]  Yes    Colitis [K52.9] 10/19/2019 Yes      Problems Resolved During this Admission:     VTE Risk Mitigation (From admission, onward)         Ordered     heparin (porcine) injection 5,000 Units  Every 8 hours      10/21/19 1753     Place sequential compression device  Until discontinued      10/21/19 1753     IP VTE LOW RISK PATIENT  Once      10/19/19 1752     Place MAE hose  Until discontinued      10/19/19 1752                Medical Decision Making/Plan:  Satisfactory postop recovery thus far.  Mild reactive erythema around surgical closed wound which is improving.  Cultures from 10/31 = candida, continue diflucan.  Continue current level of care.  Anticipate d/c next week.    Jose Lazo MD  General Surgery  [facility] - ICU

## 2019-11-03 NOTE — NURSING
DR KURTZ IN TO SEE PT/DRSG REMOVED AND 4 STAPLES REMOVED PER THE SURGEON/DRY STERILE GAUZE PLACED INTO OPENED AREA PER DR KURTZ/DRY GAUZE APPLIED AND SECURED WITH PAPER TAPE. STEPHANE GARRISON, FROM ICU, ASSISTED IN CLEANING UP STOMA AND PLACING NEW COLOSTOMY APPLIANCE. PT ANGEL WELL.

## 2019-11-03 NOTE — PROGRESS NOTES
[facility] Medicine  Progress Note    Patient Name: Miguel Angel Apple Sr.  MRN: [id number]  Patient Class: IP- Inpatient   Admission Date: 10/19/2019  Length of Stay: 15 days  Attending Physician: Lencho Kumar MD  Primary Care Provider: Primary Doctor No        Subjective:     Principal Problem:Large bowel obstruction        HPI:  Patient's history began approximately 3 months ago.  He presented to the hospital well over a month ago given a diagnosis of he understands colitis.  Treated with antibiotics IV fluids.  He saw Dr. ONEIDA charles on 10/03 placed on Cipro and Flagyl.  His on once completed that dose.  This past Sunday he started noticing that he was becoming  bloated and stopped eating solids at that time went back to liquids.  His had no pain pills for 2-3 days.  No nausea and vomiting unless he ate something that he knew he should the.  He attempted to make himself vomit yesterday and could not.  He has had small bowel movements to continue since that time.  CT scan shows colonic dilatation with the cecum being 10 cm.  The possible obstruction may be low left-sided.  There is even suggesting possibility have a rectal mass.  WBC remained 16,000 with a left shift.  Patient's potassium is 2.2.    Overview/Hospital Course:  Patient is sitting up in chair today IV fluids IV potassium NPO Surgical consultation repeat KUB and lab in a.m. further clinical decisions depending on clinical course.  Evaluation for [place] syndrome.  The patient has had no prior abdominal surgery and no medical history prior to approximately 3 months ago.  10/20/2019.  Long discussion with the patient this morning concerning the findings.  Dr. ONEIDA charles see the patient today since he has been involved in his care on 10/03.  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  Patient continue IV fluids IV antibiotics potassium replacement  today and will have 3 Fleet's enemas today.  CEA is been ordered.    10/21/2019:  Patient is comfortable in room although it does appear quite anxious.  The patient will be having EGD and colonoscopy later today.  Based on the findings there the patient will may need undergo surgery.  Patient denies any pain and states that he was not throwing up and having no fever or chills.  Patient is distended and has some abdominal pain associated.  Labs [place] [address] hemoglobin hematocrit 9.8 and 31 sodium 145 potassium 2.4 chloride 108 bicarb 21 and alk-phos was 49    10/22/2019:  Patient is stable postop day 1.  Patient states pain is well controlled.  Patient mildly tachycardic blood pressure 115/61 979 O2 sats 100%.  Labs show white blood cell count 21.5 hemoglobin 9.2 hematocrit 29.3 platelets 407 and BUN creatinine were 8 and 0.6 calcium 7.5 magnesium 1.4.  Patient is in good spirits and bowel sounds are present.    10/23/2019:  Postop day 2.:  Patient is having much more abdominal pain with waves of pain that appeared to be related to air movement in the bowel versus bowel regaining function.  Patient is alert and oriented and is mildly tachycardic with pulse 123, blood pressure 130/65 O2 sat 95%.  Labs show a improved white count of today of 19,000 hemoglobin 8.9 hematocrit 28 platelets 396 and sodium 141 potassium 3.1 albumin 1.6 and liver functions within normal ranges.  GFR greater than 60    10/24/2019:  Patient is stable and improving postop day 2 .  Colostomy bag has some gas and patient bowel sounds are very active.  Intake was 02/30/2027 output was 1745 and a net gain of 1.4 L. labs showed a white blood cell count 15.9 hemoglobin 8.7 hematocrit 27.7 and platelets 373.  Normal differential noted. Labs:  Sodium 146 potassium 3.5 chloride 110 bicarb 24 glucose 66 and BUN creatinine were 9 and 0.6 otherwise GFR greater than 60.  Magnesium 1.9 phosphorus 4.0    10/25/2019:  Patient is awake  alert and states his pain is under better control today.  Patient is having gas in to his colostomy bag it had to be relieved on 2 occasions overnight.  Patient otherwise has been stable and heart rate is much improved on beta-blocker.  Labs showed sodium 148 potassium 3.2 chloride 111 bicarb 23 calcium is 7.7 and GFR greater than 60.  White blood cell count 10.5 hemoglobin 8.2 hematocrit 26 platelets 334 and differential was normal.  Patient otherwise is stable but will continue gastric decompression until flatus is obvious.  10/26/2019.  NG tube has been removed.  The patient's colostomy is working.  Aggressive respiratory therapy.  Incentive spirometry the patient best he can do this morning was a 1000.  Lungs decreased breath sounds in the bases secondary to atelectasis.  Discussed expectations respiratory therapy this morning.  Patient will become more mobile this a.m..  Discussed mobility with nursing staff.  Patient has been up in the chair twice last p.m..  Lab and x-rays have been reviewed.  Potassium is 3.2 with supplement.  Sodium is 147 this morning patient is receiving lactated Ringer's.  Patient's protein albumin are low with an albumin of 1.4.  Dietary consult for severe malnutrition.  Continue current level of care unless clinical course changes.  Lab has been ordered for in a.m..  10/27/2019.  Patient's vital signs are acceptable.  Patient's incentive spirometry at best is a 1000.  Patient's colostomy looks as expected.  Patient does have testicular swelling will elevate today.  Dr. Dawkins general surgery's reviewed the lab and orders lab in a.m..  Patient's mood is down considering his overall findings is expected have will start Celexa on the patient today.  Patient's sodium still 147 patient is on Ringer's lactate.  Continue current level of care unless clinical course changes.  Will order PT today the patient is very weak.  Patient's weakness is expected.  But he will have a rehab.  May have to  consider whether the patient needs home health care or rehab care after this admission.    10/28/2019:  Patient is sitting up in chair today.  Patient appears weak and has some facial wasting as well as some abdominal distension.  Colostomy site looks intact and there is maceration around that from the adhesive a of the colostomy bags.  Patient is tolerating some clear liquids but has not had much of an appetite and has not been up walking very much.  Otherwise patient is stable postop.  However I feel this patient needs to progress a little faster back to his movement and hopefully bowel function.  Intake and output:  Intake 3854.2 and output 1650 for total net positive fluid in of 2204 cc  Labs:  Phosphorus 3.1 sodium 144 potassium 3.9 chloride 108 bicarb 28 glucose 116 BUN creatinine were less than 5 and 0.6 calcium 7.3 and GFR greater than 60  CBC:  White blood cell count 9.94 hemoglobin 8.3 hematocrit 27.3 platelets 436 and differential was normal  Magnesium:  1.5 phosphorus 3.4    10/29/2019:  This postop day 7.  Patient is looking stronger and eating and tolerating clear liquids.  Colostomy site is looking improved but there is continued desquamation around the stoma.  This is undoubtedly from the adhesive of the bags.  Otherwise no complaints and we will increase progressive mobility starting today.  Physical therapy will be seeing this patient as well.  Labs:  Sodium 139 potassium 4.0 chlorid 104 bicarb 27 glucose 105 BUN creatinine 5 and 0.5 total bilirubin 0.3 albumin 1.4 total protein 4.4 calcium 7.0  White blood cell count 9.8 hemoglobin 8.5 hematocrit 27.3 platelets 401 phosphorus 3.1  Will continue current care will consider starting TPN due to prolonged period without food and repeat labs in the a.m.    10/30/2019:  Patient is stable and feeling much better today.  Patient is able to tolerate clear liquids well.  However patient is NPO now due to planned delayed primary closure today.  Patient be  transferred to the medical-surgical unit post surgery today.  Intake and output:  Intake 3472 output 2775 with a net gain of 697 cc  CMP sodium 139 potassium 4.0 glucose 105 BUN creatinine were 5 and 0.5  CBC unremarkable.    10/31/2019:  Patient is more active today having less pain is in better spirits.  However patient stoma is continuing to be macerated having difficulty with colostomy bag sticking and not leaking.  There is good return from the colostomy site and no significant pain other than the irritation of the skin around the stomal site.  Labs show chemistry which was normal and calcium 7.5 GFR greater than 60 all other electrolytes normal.  White blood cell count 15.8 hemoglobin 9.3 hematocrit 29 platelets 494 and differential showed 74 granulocytes 15 lymphocytes    11/01/2019::  Patient has been ambulatory in the hallway today and is appearing Much stronger.  White blood cell count 15.0 hemoglobin hematocrit 8.827 stable platelets 500.  Vital signs show blood pressure of 101/59 pulse is 96 respirations 16.  Intake is 01/30/1994 output is 04/20/2025 with a net 769 cc gain    11/02/2019:  Patient is more active today showering by himself.  Physical exam is continuing to improve.  Patient otherwise has no new complaints except some irritation around the stoma site.  This has been a problem for the colostomy bags to stick.  Labs:  Sodium 135 potassium 4.0 BUN less than 5 creatinine 0.5 and calcium 7.6 total protein 4.9 and albumin 1.4  CBC shows white blood cell count 96891 improved and H&H was 8.3 and 27 platelets 518 and no other significant findings.    11/03/2019:  Patient is alert and oriented.  Vital signs are stable with blood pressure 110/63 patient has been afebrile and O2 sats running in the range of 94-96%  CMP was normal except for sodium 134 potassium 3.9 and calcium 7.7 GFR greater than 60  CBC white blood cell count 13.0 hemoglobin 8.5 hematocrit 27.5 platelets 506 otherwise no significant  change    Interval History:     Review of Systems   Constitutional: Negative for activity change, appetite change, fatigue and fever.   HENT: Negative for congestion, ear discharge, mouth sores, nosebleeds, rhinorrhea, sinus pressure, sinus pain and tinnitus.    Eyes: Negative.  Negative for pain, redness and itching.   Respiratory: Positive for cough, chest tightness and shortness of breath. Negative for apnea, choking, wheezing and stridor.    Cardiovascular: Negative for chest pain, palpitations and leg swelling.   Gastrointestinal: Negative for abdominal distention, abdominal pain, anal bleeding, blood in stool, constipation and diarrhea.   Endocrine: Negative.    Genitourinary: Negative for difficulty urinating, flank pain, frequency and urgency.   Musculoskeletal: Positive for back pain. Negative for arthralgias, gait problem and myalgias.   Skin: Negative for color change and pallor.   Allergic/Immunologic: Negative.    Neurological: Negative for dizziness, facial asymmetry, weakness, light-headedness and headaches.   Hematological: Negative for adenopathy. Does not bruise/bleed easily.   Psychiatric/Behavioral: The patient is nervous/anxious.      Objective:     Vital Signs (Most Recent):  Temp: 97.2 °F (36.2 °C) (11/03/19 0727)  Pulse: 91 (11/03/19 1136)  Resp: 16 (11/03/19 1136)  BP: 110/63 (11/03/19 0727)  SpO2: 96 % (11/03/19 1136) Vital Signs (24h Range):  Temp:  [97.2 °F (36.2 °C)-98.1 °F (36.7 °C)] 97.2 °F (36.2 °C)  Pulse:  [] 91  Resp:  [16-18] 16  SpO2:  [94 %-96 %] 96 %  BP: (104-128)/(56-67) 110/63     Weight: 69.7 kg (153 lb 10.6 oz)  Body mass index is 22.69 kg/m².    Intake/Output Summary (Last 24 hours) at 11/3/2019 1308  Last data filed at 11/3/2019 0945  Gross per 24 hour   Intake 1160 ml   Output 3050 ml   Net -1890 ml      Physical Exam   Constitutional: He is oriented to person, place, and time. He appears well-developed and well-nourished.   HENT:   Head: Normocephalic and  atraumatic.   Eyes: Pupils are equal, round, and reactive to light. EOM are normal.   Neck: Normal range of motion. Neck supple. No tracheal deviation present. No thyromegaly present.   Cardiovascular: Normal rate, regular rhythm and normal heart sounds.   Pulmonary/Chest: Effort normal and breath sounds normal.   Abdominal: Soft. Bowel sounds are normal. He exhibits no distension. There is tenderness. There is guarding. There is no rebound.   Musculoskeletal: Normal range of motion.   Lymphadenopathy:     He has no cervical adenopathy.   Neurological: He is alert and oriented to person, place, and time.   Skin: Skin is warm and dry. Capillary refill takes less than 2 seconds.   Psychiatric: He has a normal mood and affect. His behavior is normal. Judgment and thought content normal.   Nursing note and vitals reviewed.      Significant Labs:   Recent Lab Results       11/03/19  0611        Albumin 1.6     Alkaline Phosphatase 56     ALT 10     Anion Gap 9     AST 15     Baso # 0.03     Basophil% 0.2     BILIRUBIN TOTAL 0.6  Comment:  For infants and newborns, interpretation of results should be based  on gestational age, weight and in agreement with clinical  observations.  Premature Infant recommended reference ranges:  Up to 24 hours.............<8.0 mg/dL  Up to 48 hours............<12.0 mg/dL  3-5 days..................<15.0 mg/dL  6-29 days.................<15.0 mg/dL       BUN, Bld <5     Calcium 7.7     Chloride 99     CO2 26     Creatinine 0.5     Differential Method Automated     eGFR if African American >60.0     eGFR if non  >60.0  Comment:  Calculation used to obtain the estimated glomerular filtration  rate (eGFR) is the CKD-EPI equation.        Eos # 0.1     Eosinophil% 0.7     Glucose 85     Gran # (ANC) 9.7     Gran% 74.5     Hematocrit 27.5     Hemoglobin 8.5     Immature Grans (Abs) 0.11  Comment:  Mild elevation in immature granulocytes is non specific and   can be seen in a  variety of conditions including stress response,   acute inflammation, trauma and pregnancy. Correlation with other   laboratory and clinical findings is essential.       Immature Granulocytes 0.8     Lymph # 2.1     Lymph% 15.7     MCH 28.2     MCHC 30.9     MCV 91     Mono # 1.1     Mono% 8.1     MPV 9.1     nRBC 0     Platelets 506     Potassium 3.9     PROTEIN TOTAL 5.3     RBC 3.01     RDW 18.1     Sodium 134     WBC 13.02         All pertinent labs within the past 24 hours have been reviewed.    Significant Imaging: I have reviewed and interpreted all pertinent imaging results/findings within the past 24 hours.      Assessment/Plan:      * Large bowel obstruction  IV fluids IV potassium NPO KUB in lab in a.m. monitor closely.  Surgical consultation will be seen in a.m. by Dr. MOHAMUD General surgery seen the patient in the clinic on 10/03.  10/20/2019.  Extended conversation with Dr. Buck concerning the patient's finding rectal mass bowel obstruction.  Correction of electrolytes IV fluid IV antibiotics NPO fleets enema today x3.  Colonoscope in a.m..  CEA is been ordered.    10/21/2019  1.  Treat today based on findings of colonoscopy or endoscopy.  2.  Continue current antibiotic treatment  3.  Follow surgery recommendations after final diagnosis made.  4.  Treat otherwise as needed based on clinical course  5.  Repeat labs in the a.m. and replete potassium as needed    10/22/2019:  1.  Stable postop day 1 with positive bowel sounds.  2.  We will replace calcium  3.  Repeat labs in the a.m. to include CBC and CMP magnesium  4.  Follow-up otherwise as clinically indicated based clinical course.    10/23/2019:  1.  Continue current antibiotic therapy.  2.  Give will blood bolus of 500 cc x1  3.  Change pain medication to Dilaudid 1 mg every 2 hr as needed for pain  4.  A feel tachycardia may be related to pain versus is mildly fluid depleted.  5.  Replace electrolytes as needed.  6.  Intake and output revealed a  positive 2500 cc sore +overnight    10/24/2019:  1.  Continue current medications.  2.  Repeat labs in the a.m. to monitor white blood cell count and  3.  Begin metoprolol 12.5 mg IV q.12 hours or heart rate and  4.  Continue other medications as ordered and await bowel function.    10/25/2019:  1.  Postop day 3.  Patient having no fever chills nausea or vomiting and states pain is well controlled this morning.  2.  We will replace potassium and calcium IV  3.  Repeat labs in the a.m. to include CBC and CMP.  Patient otherwise has been afebrile and postop course have been normal  10/26/2019.  Postop day 4.  Patient is doing well.  NG tube has been removed.  Colostomy is working well.  Replace potassium today.  All labs have been reviewed.  Albumin 1.4 the patient has severe malnutrition he will soon be starting a diet of consult dietary.  Lab in a.m..  Encouraged incentive spirometry and mobility.  Patient is concerned about his overall condition.  Probably at this point has mild depression secondary to all the current medical problems. Patient did ask about pathology report I told him that I would look for more than likely would be back to the 1st of the week.  Continue current level of care unless clinical course changes.  10/27/2019.  See encounter for postop care note.    10/28/2019:  Postop day 6.:  Patient is still feeling weak and has had flat affect over the last several days.  Over the weekend and antidepressant citalopram was started.  This was started 10 mg p.o. daily.  Patient has colostomy side is intact.  1.  Reconsult PT for this patient  2.  Continue progressive mobility to increase his activity  3.  Advance diet when better bowel function occurs  4.  Replace calcium and magnesium  5.  CBC, CMP in the a.m..  With magnesium    10/29/2019 postop day 7:  1.  Continue PT consult and increased progressive mobility.  Would like to get the patient up in a chair today.  2.  Patient tolerating clear liquid  diet  3.  Repeat labs in the a.m. include CBC, CMP, magnesium, phosphate  4.  Consider TPN to begin tomorrow if patient does not advance his diet well.    10/30/2019:  Postop day 8  1.  Continue current treatment.  Patient NPO for delayed primary closure today  2.  Transfer to medical-surgical unit post surgery  3.  Continue current diet after surgery.  4.  Repeat labs in the a.m. to include CBC CMP    10/30/2019 postop day 9:  1.  Patient with leukocytosis of 15,000, will monitor fever curve, check lactic acid, and increase IV fluids due to fluid deficit overnight as well as low blood pressure.  2.  Recheck labs in the morning to include CBC, CMP and repeat chest x-ray  3.  Continue current antibiotics and follow otherwise as clinically indicated based on course  4.  Patient with possible discharge in the next 24-48 hours if stable    10/31/2019:  White blood cell count is mildly improved but will continue current antibiotics  2.  Repeat labs in the a.m.  3.  Will continue physical therapy for strengthening and possible discharge soon    11/01/2019:  1.  Patient in more active and participating with physical therapy.  Continue current antibiotics  2.  Begin Diflucan 200 mg IV daily  3.  Follow-up otherwise as needed  4.  Repeat labs in the a.m.    11/02/2019  1.  Continue physical therapy and progressive mobility.  2.  Repeat labs in the a.m.  3.  Patient should be ready for discharge within 24-48 hours from now.  4.  Will discuss with patient the need for placement.    11/03/2019  1.  Continue progressive mobility and incentive spirometry  2.  Ambulate as much as possible  3.  Repeat CBC and BMP in the a.m.  4.  Patient is getting stronger and should be able to be discharged within the next 2-3 days  5.  Patient has had some trouble with his ostomy site with maceration and desquamation with increased pain.    Encounter for postoperative care  10/27/2019.  Day 5 postop care.  Patient's mood is somewhat down will  start Celexa.  His mood is expected to be low considering the current circumstances but feel that he does need help.  Potassium 3.1 has been supplemented today.  Magnesium is 1.5 has been supplemented today.  Sodium is 147 continue to monitor.  Patient can do about a 1000 on incentive spirometry at best.  Will order PT for the patient today he needs some increased movement.  Patient is very weak considering the illness time frame.  Patient's testicle swollen today will elevate.  Lab values in a.m..  Pathology is pending.  Continue current level of care.      Intestinal obstruction  Patient now postop day 1 from bowel resection.  Stable postop day 1 doing well and postoperative course at this time      Hypokalemia  10/25/2019:  Will replace IV for this morning.  10/26/2019 will replace potassium p.o. Today.  10/27/2019.  Patient potassium 3.1 magnesium is slightly low will supplement potassium and magnesium.  Dr. Dawkins general surgery is already ordered potassium.      VTE Risk Mitigation (From admission, onward)         Ordered     heparin (porcine) injection 5,000 Units  Every 8 hours      10/21/19 1753     Place sequential compression device  Until discontinued      10/21/19 1753     IP VTE LOW RISK PATIENT  Once      10/19/19 1752     Place MAE hose  Until discontinued      10/19/19 1752                      Lencho Kumar MD  Department of Hospital Medicine   [facility]

## 2019-11-04 LAB
ANION GAP SERPL CALC-SCNC: 11 MMOL/L (ref 8–16)
BASOPHILS # BLD AUTO: 0.04 K/UL (ref 0–0.2)
BASOPHILS NFR BLD: 0.3 % (ref 0–1.9)
BUN SERPL-MCNC: <5 MG/DL (ref 8–23)
CALCIUM SERPL-MCNC: 7.8 MG/DL (ref 8.7–10.5)
CHLORIDE SERPL-SCNC: 100 MMOL/L (ref 95–110)
CO2 SERPL-SCNC: 26 MMOL/L (ref 23–29)
CREAT SERPL-MCNC: 0.6 MG/DL (ref 0.5–1.4)
DIFFERENTIAL METHOD: ABNORMAL
EOSINOPHIL # BLD AUTO: 0.1 K/UL (ref 0–0.5)
EOSINOPHIL NFR BLD: 0.6 % (ref 0–8)
ERYTHROCYTE [DISTWIDTH] IN BLOOD BY AUTOMATED COUNT: 18.3 % (ref 11.5–14.5)
EST. GFR  (AFRICAN AMERICAN): >60 ML/MIN/1.73 M^2
EST. GFR  (NON AFRICAN AMERICAN): >60 ML/MIN/1.73 M^2
GLUCOSE SERPL-MCNC: 85 MG/DL (ref 70–110)
HCT VFR BLD AUTO: 28.4 % (ref 40–54)
HGB BLD-MCNC: 8.8 G/DL (ref 14–18)
IMM GRANULOCYTES # BLD AUTO: 0.1 K/UL (ref 0–0.04)
IMM GRANULOCYTES NFR BLD AUTO: 0.9 % (ref 0–0.5)
LYMPHOCYTES # BLD AUTO: 2.2 K/UL (ref 1–4.8)
LYMPHOCYTES NFR BLD: 19 % (ref 18–48)
MCH RBC QN AUTO: 28.6 PG (ref 27–31)
MCHC RBC AUTO-ENTMCNC: 31 G/DL (ref 32–36)
MCV RBC AUTO: 92 FL (ref 82–98)
MONOCYTES # BLD AUTO: 0.9 K/UL (ref 0.3–1)
MONOCYTES NFR BLD: 7.9 % (ref 4–15)
NEUTROPHILS # BLD AUTO: 8.4 K/UL (ref 1.8–7.7)
NEUTROPHILS NFR BLD: 71.3 % (ref 38–73)
NRBC BLD-RTO: 0 /100 WBC
PLATELET # BLD AUTO: 527 K/UL (ref 150–350)
PMV BLD AUTO: 9.5 FL (ref 9.2–12.9)
POTASSIUM SERPL-SCNC: 3.5 MMOL/L (ref 3.5–5.1)
RBC # BLD AUTO: 3.08 M/UL (ref 4.6–6.2)
SODIUM SERPL-SCNC: 137 MMOL/L (ref 136–145)
WBC # BLD AUTO: 11.71 K/UL (ref 3.9–12.7)

## 2019-11-04 PROCEDURE — 99233 PR SUBSEQUENT HOSPITAL CARE,LEVL III: ICD-10-PCS | Mod: ,,, | Performed by: INTERNAL MEDICINE

## 2019-11-04 PROCEDURE — 63600175 PHARM REV CODE 636 W HCPCS: Performed by: INTERNAL MEDICINE

## 2019-11-04 PROCEDURE — 94640 AIRWAY INHALATION TREATMENT: CPT

## 2019-11-04 PROCEDURE — 63600175 PHARM REV CODE 636 W HCPCS: Performed by: SURGERY

## 2019-11-04 PROCEDURE — 85025 COMPLETE CBC W/AUTO DIFF WBC: CPT

## 2019-11-04 PROCEDURE — 99233 SBSQ HOSP IP/OBS HIGH 50: CPT | Mod: ,,, | Performed by: INTERNAL MEDICINE

## 2019-11-04 PROCEDURE — 25000003 PHARM REV CODE 250: Performed by: INTERNAL MEDICINE

## 2019-11-04 PROCEDURE — 97116 GAIT TRAINING THERAPY: CPT

## 2019-11-04 PROCEDURE — 36415 COLL VENOUS BLD VENIPUNCTURE: CPT

## 2019-11-04 PROCEDURE — 25000242 PHARM REV CODE 250 ALT 637 W/ HCPCS: Performed by: INTERNAL MEDICINE

## 2019-11-04 PROCEDURE — 94761 N-INVAS EAR/PLS OXIMETRY MLT: CPT

## 2019-11-04 PROCEDURE — 11000001 HC ACUTE MED/SURG PRIVATE ROOM

## 2019-11-04 PROCEDURE — 99900035 HC TECH TIME PER 15 MIN (STAT)

## 2019-11-04 PROCEDURE — 80048 BASIC METABOLIC PNL TOTAL CA: CPT

## 2019-11-04 RX ADMIN — HEPARIN SODIUM 5000 UNITS: 5000 INJECTION, SOLUTION INTRAVENOUS; SUBCUTANEOUS at 05:11

## 2019-11-04 RX ADMIN — HEPARIN SODIUM 5000 UNITS: 5000 INJECTION, SOLUTION INTRAVENOUS; SUBCUTANEOUS at 02:11

## 2019-11-04 RX ADMIN — HEPARIN SODIUM 5000 UNITS: 5000 INJECTION, SOLUTION INTRAVENOUS; SUBCUTANEOUS at 09:11

## 2019-11-04 RX ADMIN — HYDROMORPHONE HYDROCHLORIDE 1 MG: 2 INJECTION, SOLUTION INTRAMUSCULAR; INTRAVENOUS; SUBCUTANEOUS at 02:11

## 2019-11-04 RX ADMIN — LEVALBUTEROL HYDROCHLORIDE 1.25 MG: 1.25 SOLUTION, CONCENTRATE RESPIRATORY (INHALATION) at 07:11

## 2019-11-04 RX ADMIN — ERTAPENEM SODIUM 1 G: 1 INJECTION, POWDER, LYOPHILIZED, FOR SOLUTION INTRAMUSCULAR; INTRAVENOUS at 09:11

## 2019-11-04 RX ADMIN — IPRATROPIUM BROMIDE 0.5 MG: 0.5 SOLUTION RESPIRATORY (INHALATION) at 07:11

## 2019-11-04 RX ADMIN — CITALOPRAM HYDROBROMIDE 10 MG: 10 TABLET ORAL at 09:11

## 2019-11-04 RX ADMIN — FLUCONAZOLE 100 MG: 2 INJECTION, SOLUTION INTRAVENOUS at 02:11

## 2019-11-04 RX ADMIN — HYDROMORPHONE HYDROCHLORIDE 1 MG: 2 INJECTION, SOLUTION INTRAMUSCULAR; INTRAVENOUS; SUBCUTANEOUS at 11:11

## 2019-11-04 RX ADMIN — HYDROMORPHONE HYDROCHLORIDE 1 MG: 2 INJECTION, SOLUTION INTRAMUSCULAR; INTRAVENOUS; SUBCUTANEOUS at 09:11

## 2019-11-04 RX ADMIN — LEVALBUTEROL HYDROCHLORIDE 1.25 MG: 1.25 SOLUTION, CONCENTRATE RESPIRATORY (INHALATION) at 11:11

## 2019-11-04 RX ADMIN — HYDROMORPHONE HYDROCHLORIDE 1 MG: 2 INJECTION, SOLUTION INTRAMUSCULAR; INTRAVENOUS; SUBCUTANEOUS at 06:11

## 2019-11-04 RX ADMIN — PANTOPRAZOLE SODIUM 40 MG: 40 TABLET, DELAYED RELEASE ORAL at 09:11

## 2019-11-04 RX ADMIN — IPRATROPIUM BROMIDE 0.5 MG: 0.5 SOLUTION RESPIRATORY (INHALATION) at 11:11

## 2019-11-04 NOTE — NURSING
"LOUD NOISE HEARD AND A YELL FROM DOWN MIDDLE HALLWAY/NURSES RAN DOWN HALLWAY CHECKING ROOMS/RN ROGER ENRIQUEZ RN, ENTERED ROOM OF PT FOLLOWED BY ME, PT FOUND ON FLOOR AT FOOT OF BED. PT ASSISTED UP ON SIDE OF BED/PT GOT HIMSELF INTO BED/RN ASK PT IF HE WAS HURT, PT STATED "MY R HIP IS SORE, (AS RN WAS CHECKING PTS HEAD AND SCALP) , MY HEAD IS NOT HURT, I DIDN'T HIT IT. I HIT MY L ARM ON THE CHAIR". THREE SMALL SKIN TEARS NOTED/DRSG TO BE APPLIED.   "

## 2019-11-04 NOTE — PT/OT/SLP PROGRESS
Physical Therapy         Treatment        Miguel Angel Apple Sr. 1955  MRN: [id number]     Date: 11/4/2019  PT Received On: 11/04/19  PT Start Time: 1343     PT Stop Time: 1358    PT Total Time (min): 15 min       Gait Training x 15 min    Treatment Diagnosis: generalized weakness and decreased functional mobility    General Precautions: Standard, fall  Orthopedic Precautions: none   Braces: no      Subjective:  Communicated with patient prior to treatment, he is agreeable to ambulate with physical therapy at this time, he states that the nursing staff won't let him get up and walk.     Chief Complaint: pain at incision site    Pain/Comfort  Pain Rating 1: 9/10  Location 1: abdomen  Pain Addressed 1: Distraction, Reposition(patient on pain protocol)    Treatment:    Bed Mobility :   Supine to sit: Minimal Assistance   Sit to supine: Activity did not occur   Rolling: Contact Guard Assistance   Scooting: Standby Assistance    Transfers:  Patient performed Sit to stand Supervision only today secondary to him not waiting for therapist assist to clear pathway.    Gait:  Patient ambulated 150 feet with RW and SBA on level surface demonstrating flexed trunk with forward lean onto RW secondary to abdominal pain with erect posture, decreased step length and mariah, no LOB noted.      Balance:   Static Sit: GOOD: Takes MODERATE challenges from all directions  Dynamic Sit:  GOOD-: Incosistently Maintains balance through MODERATE excursions of active trunk movement,     Static Stand: FAIR+: Takes MINIMAL challenges from all directions  Dynamic stand: FAIR: Needs CONTACT GUARD during gait       Assessment:  Miguel Angel Apple Sr. is a 64 y.o. male admitted with a medical diagnosis of Large bowel obstruction.  He presents with the following impairments/functional limitations:  weakness, impaired endurance, impaired self care skills, impaired functional mobilty, gait instability, pain. Patient demonstrates improved gait tolerance  requiring decreased PT assist to perform gait and transfers. PT encouraged out of bed activity to facilitate recovery and prepare for discharge. Patient is safe to ambulate with nursing staff per .    Cognitive Exam:  Oriented to: Person, Place, Time and Situation  Follows Commands/attention: Follows multistep  commands  Communication: clear/fluent  Safety awareness/insight to disability: intact    Endurance deficit:  Decreased functionalmobility, strength, endurance    Patient left up in chair with all lines intact, call button in reach nursing notified, and visitor at bedside    Rehab potential is excellent.    Activity tolerance: good    Equipment recommendations:   RW to promote a safe and steady gait cycle to decrease fall risk    Education:  Importance of out of bed activity      GOALS:   Patient goals: refer to eval  Family goals: refer to eval    Plan:   continue with current plan      Discharge recommendations:   Home with possible home health      Timoteo Henley, PT

## 2019-11-04 NOTE — NURSING
"DR RICHARDSON CALLED/NO ANSWER/MESSAGE LEFT FOR HIM CONCERNING PTS FALL. DR JIM CALLED/MD SAID "OK, JUST WATCH HIM FOR ANY PROBLEMS"..  "

## 2019-11-04 NOTE — PLAN OF CARE
11/04/19 1346   Discharge Reassessment   Assessment Type Discharge Planning Reassessment   Anticipated Discharge Disposition Home-Health   Do you have any problems affording any of your prescribed medications? TBD   Discharge Plan A Home Health   DME Needed Upon Discharge  none   Patient sitting up in chair. States he does not need to go to rehab. He would like to go home with home health & if he decides he needs more help then will go to rehab. Will continue to follow.

## 2019-11-04 NOTE — PLAN OF CARE
Continues to have small liquid green oozing from ostomy. Also, has mucus drainage from annus. Medicated with Dilaudid 1mg per orders for abd pain. Fall precautions in place. Bed alarm on at all times.

## 2019-11-04 NOTE — PROGRESS NOTES
[facility] - ICU  General Surgery  Daily Note    Patient Name: Miguel Angel Apple Sr.  MRN: [id number]  Admission Date: 10/19/2019  Attending Physician: Lencho Kumar MD   Consult Physician: Joes Lazo MD  Primary Care Provider: Primary Doctor No    Subjective:     Principle Problem: Large bowel obstruction    Last 24 hour history:  10/28/19  Afebrile.  Vital signs stable.  No nausea vomiting.  Tolerating clear liquid diet well.  Ostomy functional left lower quadrant.  Some venous congestion of the ostomy however viable.  Midline wound with pink healthy granulation tissue in base.  No signs or symptoms of infection.  No other new complaints or issues today.    10/29/19  Afebrile.  VSS.  No nausea or vomiting.  Ostomy functional in the LLQ.  No new issues or complaints.     10/30/19  Afebrile.  Vital signs stable.  No nausea vomiting.  Ostomy functional.  Preliminary pathology from pathologist obtained.  Rectal adenocarcinoma.  Formal pathology report to follow.  Patient was informed of this this morning.  Appropriately sad.  Discussed with patient next steps and care and treatment.  Patient voiced understanding.  All questions were elicited and answered this morning to the patient's satisfaction.    10/31/19  Afebrile.  Vital signs stable.  Mild leukocytosis to be expected postoperative from wound closure. Ostomy with venous congestion still however functional.  No nausea vomiting.  Hungry.  No new issues or complaints.    11/1/19  Afebrile.  Vital signs stable.  Mild leukocytosis, improving.  Ostomy is venous congestion, continues to function.  No nausea vomiting.  Tolerating full liquids well.  No new issues or complaints.    11/2/19  Afebrile.  Vital signs stable.  Ostomy functional.  Tolerating a diet.  No nausea or vomiting.  No new issues or complaints    11/3/19  Afebrile.  Vital signs stable.  Ostomy functional.  Tolerating a diet.  No nausea vomiting.  No new complaints or  issues.    11/4/19  Afebrile.  Vital signs stable.  Ostomy function.  Tolerating a diet.  No nausea vomiting.  No new complaints.    Objective:     Vital Signs (Most Recent):  Temp: 97.5 °F (36.4 °C) (11/04/19 0743)  Pulse: 104 (11/04/19 0743)  Resp: 18 (11/04/19 0743)  BP: 118/71 (11/04/19 0743)  SpO2: 95 % (11/04/19 0743) Vital Signs (24h Range):  Temp:  [96.8 °F (36 °C)-98.6 °F (37 °C)] 97.5 °F (36.4 °C)  Pulse:  [] 104  Resp:  [16-20] 18  SpO2:  [93 %-100 %] 95 %  BP: (118-132)/(61-71) 118/71     Intake/Output last 24 hours:    Intake/Output Summary (Last 24 hours) at 11/4/2019 1111  Last data filed at 11/4/2019 0500  Gross per 24 hour   Intake 630 ml   Output 1881 ml   Net -1251 ml       I/O last 3 completed shifts:  In: 1550 [P.O.:1400; IV Piggyback:150]  Out: 4131 [Urine:3106; Stool:1025]  No intake/output data recorded.    Weight: 69.7 kg (153 lb 10.6 oz)  Body mass index is 22.69 kg/m².    Gen: Wd Wn male currently in NAD  Heent: Nc/At, MMM  Eyes: Perrl, Eomi  Cv: RRR, no  M/g/r  Lung: Non-labored breathing, clear bilaterally  Abd: Soft, midline wound with reactive erythema around staples.  No signs or symptoms of active infection.  Wound closed., ostomy with venous congestion left lower quadrant, viable, with output of stool in bag.  Red rubber catheter within mucous fistula.    Significant Labs:  CBC:   Recent Labs   Lab 11/04/19  0532   WBC 11.71   RBC 3.08*   HGB 8.8*   HCT 28.4*   *   MCV 92   MCH 28.6   MCHC 31.0*     BMP:   Recent Labs   Lab 11/04/19  0532   GLU 85      K 3.5      CO2 26   BUN <5*   CREATININE 0.6   CALCIUM 7.8*     CMP:   Recent Labs   Lab 11/03/19  0611 11/04/19  0532   GLU 85 85   CALCIUM 7.7* 7.8*   ALBUMIN 1.6*  --    PROT 5.3*  --    * 137   K 3.9 3.5   CO2 26 26   CL 99 100   BUN <5* <5*   CREATININE 0.5 0.6   ALKPHOS 56  --    ALT 10  --    AST 15  --    BILITOT 0.6  --      LFTs:   Recent Labs   Lab 11/03/19  0611   ALT 10   AST 15   ALKPHOS 56    BILITOT 0.6   PROT 5.3*   ALBUMIN 1.6*     Coagulation: No results for input(s): LABPROT, INR, APTT in the last 168 hours.  Specimen (12h ago, onward)    None        No results for input(s): COLORU, CLARITYU, SPECGRAV, PHUR, PROTEINUA, GLUCOSEU, BILIRUBINCON, BLOODU, WBCU, RBCU, BACTERIA, MUCUS, NITRITE, LEUKOCYTESUR, UROBILINOGEN, HYALINECASTS in the last 168 hours.    Cultures:    Microbiology Results (last 7 days)     Procedure Component Value Units Date/Time    Aerobic culture [[id number]]  (Abnormal) Collected:  10/30/19 1458    Order Status:  Completed Specimen:  Wound from Abdomen Updated:  11/02/19 1042     Aerobic Bacterial Culture CANDIDA ALBICANS  Few            Assessment:   Miguel Angel Apple Sr. is a 64 y.o. male who presents with Large bowel obstruction.    Active Diagnoses:    Diagnosis Date Noted POA    PRINCIPAL PROBLEM:  Large bowel obstruction [K56.609] 10/19/2019 Yes    Severe malnutrition [E43] 10/26/2019 Yes    Encounter for postoperative care [Z48.89]  Not Applicable    Diarrhea [R19.7] 10/21/2019 Yes    Rectal mass [K62.89] 10/20/2019 Yes    Hypokalemia [E87.6] 10/20/2019 Yes    Intestinal obstruction [K56.609]  Yes    Colitis [K52.9] 10/19/2019 Yes      Problems Resolved During this Admission:     VTE Risk Mitigation (From admission, onward)         Ordered     heparin (porcine) injection 5,000 Units  Every 8 hours      10/21/19 1753     Place sequential compression device  Until discontinued      10/21/19 1753     IP VTE LOW RISK PATIENT  Once      10/19/19 1752     Place MAE hose  Until discontinued      10/19/19 1752                Medical Decision Making/Plan:  Satisfactory postop recovery thus far.  Mild reactive erythema around surgical closed wound which is improved.  Cultures from 10/31 = candida, continue diflucan.  Discontinue antibiotics.  Continue antifungals for the next 10 days.  May discharge from surgery standpoint.  Follow-up surgery clinic 2 weeks    Jose BRUMFIELD  MD [place]  General Surgery  [facility]

## 2019-11-05 VITALS
OXYGEN SATURATION: 96 % | DIASTOLIC BLOOD PRESSURE: 55 MMHG | SYSTOLIC BLOOD PRESSURE: 99 MMHG | HEIGHT: 69 IN | RESPIRATION RATE: 16 BRPM | BODY MASS INDEX: 22.76 KG/M2 | HEART RATE: 84 BPM | TEMPERATURE: 98 F | WEIGHT: 153.69 LBS

## 2019-11-05 PROCEDURE — 99239 PR HOSPITAL DISCHARGE DAY,>30 MIN: ICD-10-PCS | Mod: ,,, | Performed by: INTERNAL MEDICINE

## 2019-11-05 PROCEDURE — 25000003 PHARM REV CODE 250: Performed by: ANESTHESIOLOGY

## 2019-11-05 PROCEDURE — 25000242 PHARM REV CODE 250 ALT 637 W/ HCPCS: Performed by: INTERNAL MEDICINE

## 2019-11-05 PROCEDURE — 63600175 PHARM REV CODE 636 W HCPCS: Performed by: INTERNAL MEDICINE

## 2019-11-05 PROCEDURE — 63600175 PHARM REV CODE 636 W HCPCS: Performed by: SURGERY

## 2019-11-05 PROCEDURE — 25000003 PHARM REV CODE 250: Performed by: INTERNAL MEDICINE

## 2019-11-05 PROCEDURE — 94761 N-INVAS EAR/PLS OXIMETRY MLT: CPT

## 2019-11-05 PROCEDURE — 99239 HOSP IP/OBS DSCHRG MGMT >30: CPT | Mod: ,,, | Performed by: INTERNAL MEDICINE

## 2019-11-05 PROCEDURE — 94640 AIRWAY INHALATION TREATMENT: CPT

## 2019-11-05 RX ORDER — PANTOPRAZOLE SODIUM 40 MG/1
40 TABLET, DELAYED RELEASE ORAL DAILY
Qty: 30 TABLET | Refills: 11 | Status: SHIPPED | OUTPATIENT
Start: 2019-11-06 | End: 2021-04-15 | Stop reason: CLARIF

## 2019-11-05 RX ORDER — ONDANSETRON 4 MG/1
4 TABLET, FILM COATED ORAL EVERY 6 HOURS PRN
Qty: 24 TABLET | Refills: 1 | Status: SHIPPED | OUTPATIENT
Start: 2019-11-05 | End: 2020-10-26

## 2019-11-05 RX ORDER — CITALOPRAM 10 MG/1
10 TABLET ORAL DAILY
Qty: 30 TABLET | Refills: 11 | Status: SHIPPED | OUTPATIENT
Start: 2019-11-06 | End: 2021-04-15 | Stop reason: CLARIF

## 2019-11-05 RX ORDER — OXYCODONE AND ACETAMINOPHEN 5; 325 MG/1; MG/1
1 TABLET ORAL EVERY 4 HOURS PRN
Qty: 40 TABLET | Refills: 0 | Status: ON HOLD | OUTPATIENT
Start: 2019-11-05 | End: 2019-12-23 | Stop reason: HOSPADM

## 2019-11-05 RX ORDER — MORPHINE SULFATE 4 MG/ML
4 INJECTION, SOLUTION INTRAMUSCULAR; INTRAVENOUS EVERY 6 HOURS PRN
Status: DISCONTINUED | OUTPATIENT
Start: 2019-11-05 | End: 2019-11-05 | Stop reason: HOSPADM

## 2019-11-05 RX ADMIN — OXYCODONE HYDROCHLORIDE AND ACETAMINOPHEN 1 TABLET: 5; 325 TABLET ORAL at 12:11

## 2019-11-05 RX ADMIN — ERTAPENEM SODIUM 1 G: 1 INJECTION, POWDER, LYOPHILIZED, FOR SOLUTION INTRAMUSCULAR; INTRAVENOUS at 09:11

## 2019-11-05 RX ADMIN — FLUCONAZOLE 100 MG: 2 INJECTION, SOLUTION INTRAVENOUS at 11:11

## 2019-11-05 RX ADMIN — PANTOPRAZOLE SODIUM 40 MG: 40 TABLET, DELAYED RELEASE ORAL at 08:11

## 2019-11-05 RX ADMIN — HYDROMORPHONE HYDROCHLORIDE 1 MG: 2 INJECTION, SOLUTION INTRAMUSCULAR; INTRAVENOUS; SUBCUTANEOUS at 08:11

## 2019-11-05 RX ADMIN — HEPARIN SODIUM 5000 UNITS: 5000 INJECTION, SOLUTION INTRAVENOUS; SUBCUTANEOUS at 05:11

## 2019-11-05 RX ADMIN — LEVALBUTEROL HYDROCHLORIDE 1.25 MG: 1.25 SOLUTION, CONCENTRATE RESPIRATORY (INHALATION) at 07:11

## 2019-11-05 RX ADMIN — CITALOPRAM HYDROBROMIDE 10 MG: 10 TABLET ORAL at 08:11

## 2019-11-05 RX ADMIN — IPRATROPIUM BROMIDE 0.5 MG: 0.5 SOLUTION RESPIRATORY (INHALATION) at 07:11

## 2019-11-05 RX ADMIN — HYDROMORPHONE HYDROCHLORIDE 1 MG: 2 INJECTION, SOLUTION INTRAMUSCULAR; INTRAVENOUS; SUBCUTANEOUS at 04:11

## 2019-11-05 NOTE — NURSING
2971-INSTRUCTIONS PROVIDED WITH PT AND SIGNIFICANT OTHER. BOTH VOICED UNDERSTANDING. SUPPLIES GIVEN TO PT. PT VOICES NO QUESTIONS'/CONCERNS/ MICHELLE,RN  2567-ASSISTED PT TO TRANSPORTATION SIGNIFICANT OTHER AT SIDE. ASSISTED PT IN W/C. PT TOLERATED WELL. RESP EVEN AND UNLABORED. NAD NOTED. NO FURTHER NEEDS NOTED. MICHELLE,RN

## 2019-11-05 NOTE — ASSESSMENT & PLAN NOTE
IV fluids IV potassium NPO KUB in lab in a.m. monitor closely.  Surgical consultation will be seen in a.m. by Dr. MOHAMUD General surgery seen the patient in the clinic on 10/03.  10/20/2019.  Extended conversation with Dr. Buck concerning the patient's finding rectal mass bowel obstruction.  Correction of electrolytes IV fluid IV antibiotics NPO fleets enema today x3.  Colonoscope in a.m..  CEA is been ordered.    10/21/2019  1.  Treat today based on findings of colonoscopy or endoscopy.  2.  Continue current antibiotic treatment  3.  Follow surgery recommendations after final diagnosis made.  4.  Treat otherwise as needed based on clinical course  5.  Repeat labs in the a.m. and replete potassium as needed    10/22/2019:  1.  Stable postop day 1 with positive bowel sounds.  2.  We will replace calcium  3.  Repeat labs in the a.m. to include CBC and CMP magnesium  4.  Follow-up otherwise as clinically indicated based clinical course.    10/23/2019:  1.  Continue current antibiotic therapy.  2.  Give will blood bolus of 500 cc x1  3.  Change pain medication to Dilaudid 1 mg every 2 hr as needed for pain  4.  A feel tachycardia may be related to pain versus is mildly fluid depleted.  5.  Replace electrolytes as needed.  6.  Intake and output revealed a positive 2500 cc sore +overnight    10/24/2019:  1.  Continue current medications.  2.  Repeat labs in the a.m. to monitor white blood cell count and  3.  Begin metoprolol 12.5 mg IV q.12 hours or heart rate and  4.  Continue other medications as ordered and await bowel function.    10/25/2019:  1.  Postop day 3.  Patient having no fever chills nausea or vomiting and states pain is well controlled this morning.  2.  We will replace potassium and calcium IV  3.  Repeat labs in the a.m. to include CBC and CMP.  Patient otherwise has been afebrile and postop course have been normal  10/26/2019.  Postop day 4.  Patient is doing well.  NG tube has been removed.  Colostomy is  working well.  Replace potassium today.  All labs have been reviewed.  Albumin 1.4 the patient has severe malnutrition he will soon be starting a diet of consult dietary.  Lab in a.m..  Encouraged incentive spirometry and mobility.  Patient is concerned about his overall condition.  Probably at this point has mild depression secondary to all the current medical problems. Patient did ask about pathology report I told him that I would look for more than likely would be back to the 1st of the week.  Continue current level of care unless clinical course changes.  10/27/2019.  See encounter for postop care note.    10/28/2019:  Postop day 6.:  Patient is still feeling weak and has had flat affect over the last several days.  Over the weekend and antidepressant citalopram was started.  This was started 10 mg p.o. daily.  Patient has colostomy side is intact.  1.  Reconsult PT for this patient  2.  Continue progressive mobility to increase his activity  3.  Advance diet when better bowel function occurs  4.  Replace calcium and magnesium  5.  CBC, CMP in the a.m..  With magnesium    10/29/2019 postop day 7:  1.  Continue PT consult and increased progressive mobility.  Would like to get the patient up in a chair today.  2.  Patient tolerating clear liquid diet  3.  Repeat labs in the a.m. include CBC, CMP, magnesium, phosphate  4.  Consider TPN to begin tomorrow if patient does not advance his diet well.    10/30/2019:  Postop day 8  1.  Continue current treatment.  Patient NPO for delayed primary closure today  2.  Transfer to medical-surgical unit post surgery  3.  Continue current diet after surgery.  4.  Repeat labs in the a.m. to include CBC CMP    10/30/2019 postop day 9:  1.  Patient with leukocytosis of 15,000, will monitor fever curve, check lactic acid, and increase IV fluids due to fluid deficit overnight as well as low blood pressure.  2.  Recheck labs in the morning to include CBC, CMP and repeat chest  x-ray  3.  Continue current antibiotics and follow otherwise as clinically indicated based on course  4.  Patient with possible discharge in the next 24-48 hours if stable    10/31/2019:  White blood cell count is mildly improved but will continue current antibiotics  2.  Repeat labs in the a.m.  3.  Will continue physical therapy for strengthening and possible discharge soon    11/01/2019:  1.  Patient in more active and participating with physical therapy.  Continue current antibiotics  2.  Begin Diflucan 200 mg IV daily  3.  Follow-up otherwise as needed  4.  Repeat labs in the a.m.    11/02/2019  1.  Continue physical therapy and progressive mobility.  2.  Repeat labs in the a.m.  3.  Patient should be ready for discharge within 24-48 hours from now.  4.  Will discuss with patient the need for placement.    11/03/2019  1.  Continue progressive mobility and incentive spirometry  2.  Ambulate as much as possible  3.  Repeat CBC and BMP in the a.m.  4.  Patient is getting stronger and should be able to be discharged within the next 2-3 days  5.  Patient has had some trouble with his ostomy site with maceration and desquamation with increased pain.    11/04/2019:  1.  Repeat labs in the a.m. to include CBC and BMP  2.  Continue progressive mobility  3.  Discuss with surgery regarding the need for rehabilitation  4.  Follow-up otherwise based on clinical course.    11/05/2019:  1.  Discharge to home  2.  Continue Celexa 10 mg p.o. daily, Percocet 10/325 1 p.o. q.4-6 hours p.r.n. pain  3.  Begin Zofran 4 mg p.o. every 12 hr as needed for nausea  4.  Follow-up with Dr. Lazo in his office in 2 weeks.  5.  Continue other home medications as needed

## 2019-11-05 NOTE — PLAN OF CARE
11/05/19 1054   Medicare Message   Important Message from Medicare regarding Discharge Appeal Rights Given to patient/caregiver;Explained to patient/caregiver;Signed/date by patient/caregiver   Date IMM was signed 11/05/19   Time IMM was signed 0945

## 2019-11-05 NOTE — PLAN OF CARE
11/05/19 0945   Discharge Reassessment   Assessment Type Discharge Planning Reassessment   Anticipated Discharge Disposition Home-Health   Provided patient/caregiver education on the expected discharge date and the discharge plan Yes   Do you have any problems affording any of your prescribed medications? TBD   Discharge Plan A Home Health   DME Needed Upon Discharge  none   Patient choice form signed by patient/caregiver N/A   Post-Acute Status   Post-Acute Authorization Home Health/Hospice   Home Health/Hospice Status Awaiting Orders for HH   Per MD plan is for patient to be discharged on Wednesday. Spoke to patient. States he will have a ride tomorrow. Home health notified of potential discharge date. Denies any other needs at this time.

## 2019-11-05 NOTE — PROGRESS NOTES
[facility] Medicine  Progress Note    Patient Name: Miguel Angel Apple Sr.  MRN: [id number]  Patient Class: IP- Inpatient   Admission Date: 10/19/2019  Length of Stay: 16 days  Attending Physician: Lencho Kumar MD  Primary Care Provider: Primary Doctor No        Subjective:     Principal Problem:Large bowel obstruction        HPI:  Patient's history began approximately 3 months ago.  He presented to the hospital well over a month ago given a diagnosis of he understands colitis.  Treated with antibiotics IV fluids.  He saw Dr. ONEIDA charles on 10/03 placed on Cipro and Flagyl.  His on once completed that dose.  This past Sunday he started noticing that he was becoming  bloated and stopped eating solids at that time went back to liquids.  His had no pain pills for 2-3 days.  No nausea and vomiting unless he ate something that he knew he should the.  He attempted to make himself vomit yesterday and could not.  He has had small bowel movements to continue since that time.  CT scan shows colonic dilatation with the cecum being 10 cm.  The possible obstruction may be low left-sided.  There is even suggesting possibility have a rectal mass.  WBC remained 16,000 with a left shift.  Patient's potassium is 2.2.    Overview/Hospital Course:  Patient is sitting up in chair today IV fluids IV potassium NPO Surgical consultation repeat KUB and lab in a.m. further clinical decisions depending on clinical course.  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  The patient has had no prior abdominal surgery and no medical history prior to approximately 3 months ago.  10/20/2019.  Long discussion with the patient this morning concerning the findings.  Dr. ONEIDA charles see the patient today since he has been involved in his care on 10/03.  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  Patient continue IV fluids IV antibiotics potassium replacement  today and will have 3 Fleet's enemas today.  CEA is been ordered.    10/21/2019:  Patient is comfortable in room although it does appear quite anxious.  The patient will be having EGD and colonoscopy later today.  Based on the findings there the patient will may need undergo surgery.  Patient denies any pain and states that he was not throwing up and having no fever or chills.  Patient is distended and has some abdominal pain associated.  Labs showed white blood cell count of 59000 hemoglobin hematocrit 9.8 and 31 sodium 145 potassium 2.4 chloride 108 bicarb 21 and alk-phos was 49    10/22/2019:  Patient is stable postop day 1.  Patient states pain is well controlled.  Patient mildly tachycardic blood pressure 115/61 979 O2 sats 100%.  Labs show white blood cell count 21.5 hemoglobin 9.2 hematocrit 29.3 platelets 407 and BUN creatinine were 8 and 0.6 calcium 7.5 magnesium 1.4.  Patient is in good spirits and bowel sounds are present.    10/23/2019:  Postop day 2.:  Patient is having much more abdominal pain with waves of pain that appeared to be related to air movement in the bowel versus bowel regaining function.  Patient is alert and oriented and is mildly tachycardic with pulse 123, blood pressure 130/65 O2 sat 95%.  Labs show a improved white count of today of 19,000 hemoglobin 8.9 hematocrit 28 platelets 396 and sodium 141 potassium 3.1 albumin 1.6 and liver functions within normal ranges.  GFR greater than 60    10/24/2019:  Patient is stable and improving postop day 2 .  Colostomy bag has some gas and patient bowel sounds are very active.  Intake was 02/30/2027 output was 1745 and a net gain of 1.4 L. labs showed a white blood cell count 15.9 hemoglobin 8.7 hematocrit 27.7 and platelets 373.  Normal differential noted. Labs:  Sodium 146 potassium 3.5 chloride 110 bicarb 24 glucose 66 and BUN creatinine were 9 and 0.6 otherwise GFR greater than 60.  Magnesium 1.9 phosphorus 4.0    10/25/2019:  Patient is awake  alert and states his pain is under better control today.  Patient is having gas in to his colostomy bag it had to be relieved on 2 occasions overnight.  Patient otherwise has been stable and heart rate is much improved on beta-blocker.  Labs showed sodium 148 potassium 3.2 chloride 111 bicarb 23 calcium is 7.7 and GFR greater than 60.  White blood cell count 10.5 hemoglobin 8.2 hematocrit 26 platelets 334 and differential was normal.  Patient otherwise is stable but will continue gastric decompression until flatus is obvious.  10/26/2019.  NG tube has been removed.  The patient's colostomy is working.  Aggressive respiratory therapy.  Incentive spirometry the patient best he can do this morning was a 1000.  Lungs decreased breath sounds in the bases secondary to atelectasis.  Discussed expectations respiratory therapy this morning.  Patient will become more mobile this a.m..  Discussed mobility with nursing staff.  Patient has been up in the chair twice last p.m..  Lab and x-rays have been reviewed.  Potassium is 3.2 with supplement.  Sodium is 147 this morning patient is receiving lactated Ringer's.  Patient's protein albumin are low with an albumin of 1.4.  Dietary consult for severe malnutrition.  Continue current level of care unless clinical course changes.  Lab has been ordered for in a.m..  10/27/2019.  Patient's vital signs are acceptable.  Patient's incentive spirometry at best is a 1000.  Patient's colostomy looks as expected.  Patient does have testicular swelling will elevate today.  Dr. Dawkins general surgery's reviewed the lab and orders lab in a.m..  Patient's mood is down considering his overall findings is expected have will start Celexa on the patient today.  Patient's sodium still 147 patient is on Ringer's lactate.  Continue current level of care unless clinical course changes.  Will order PT today the patient is very weak.  Patient's weakness is expected.  But he will have a rehab.  May have to  consider whether the patient needs home health care or rehab care after this admission.    10/28/2019:  Patient is sitting up in chair today.  Patient appears weak and has some facial wasting as well as some abdominal distension.  Colostomy site looks intact and there is maceration around that from the adhesive a of the colostomy bags.  Patient is tolerating some clear liquids but has not had much of an appetite and has not been up walking very much.  Otherwise patient is stable postop.  However I feel this patient needs to progress a little faster back to his movement and hopefully bowel function.  Intake and output:  Intake 3854.2 and output 1650 for total net positive fluid in of 2204 cc  Labs:  Phosphorus 3.1 sodium 144 potassium 3.9 chloride 108 bicarb 28 glucose 116 BUN creatinine were less than 5 and 0.6 calcium 7.3 and GFR greater than 60  CBC:  White blood cell count 9.94 hemoglobin 8.3 hematocrit 27.3 platelets 436 and differential was normal  Magnesium:  1.5 phosphorus 3.4    10/29/2019:  This postop day 7.  Patient is looking stronger and eating and tolerating clear liquids.  Colostomy site is looking improved but there is continued desquamation around the stoma.  This is undoubtedly from the adhesive of the bags.  Otherwise no complaints and we will increase progressive mobility starting today.  Physical therapy will be seeing this patient as well.  Labs:  Sodium 139 potassium 4.0 chlorid 104 bicarb 27 glucose 105 BUN creatinine 5 and 0.5 total bilirubin 0.3 albumin 1.4 total protein 4.4 calcium 7.0  White blood cell count 9.8 hemoglobin 8.5 hematocrit 27.3 platelets 401 phosphorus 3.1  Will continue current care will consider starting TPN due to prolonged period without food and repeat labs in the a.m.    10/30/2019:  Patient is stable and feeling much better today.  Patient is able to tolerate clear liquids well.  However patient is NPO now due to planned delayed primary closure today.  Patient be  transferred to the medical-surgical unit post surgery today.  Intake and output:  Intake 3472 output 2775 with a net gain of 697 cc  CMP sodium 139 potassium 4.0 glucose 105 BUN creatinine were 5 and 0.5  CBC unremarkable.    10/31/2019:  Patient is more active today having less pain is in better spirits.  However patient stoma is continuing to be macerated having difficulty with colostomy bag sticking and not leaking.  There is good return from the colostomy site and no significant pain other than the irritation of the skin around the stomal site.  Labs show chemistry which was normal and calcium 7.5 GFR greater than 60 all other electrolytes normal.  White blood cell count 15.8 hemoglobin 9.3 hematocrit 29 platelets 494 and differential showed 74 granulocytes 15 lymphocytes    11/01/2019::  Patient has been ambulatory in the hallway today and is appearing Much stronger.  White blood cell count 15.0 hemoglobin hematocrit 8.827 stable platelets 500.  Vital signs show blood pressure of 101/59 pulse is 96 respirations 16.  Intake is 01/30/1994 output is 04/20/2025 with a net 769 cc gain    11/02/2019:  Patient is more active today showering by himself.  Physical exam is continuing to improve.  Patient otherwise has no new complaints except some irritation around the stoma site.  This has been a problem for the colostomy bags to stick.  Labs:  Sodium 135 potassium 4.0 BUN less than 5 creatinine 0.5 and calcium 7.6 total protein 4.9 and albumin 1.4  CBC shows white blood cell count 96504 improved and H&H was 8.3 and 27 platelets 518 and no other significant findings.    11/03/2019:  Patient is alert and oriented.  Vital signs are stable with blood pressure 110/63 patient has been afebrile and O2 sats running in the range of 94-96%  CMP was normal except for sodium 134 potassium 3.9 and calcium 7.7 GFR greater than 60  CBC white blood cell count 13.0 hemoglobin 8.5 hematocrit 27.5 platelets 506 otherwise no significant  change    11/04/2019:  Patient is sitting up in no acute distress. Events of last evening were noted patient slipped and slid down beside his bed.  He denies any injury and denies any other pain. Vital signs today revealed blood pressure 132/71 pulse 87 and respirations 16-20 O2 sat 100%  CMP chemistry normal BUN less than 5 creatinine 0.6 GFR greater than 60  CBC normal white blood cell count of 11.7 hemoglobin H&H was 8.8 and 28 normal differential.  Patient states doing well but still feeling some weakness.  I discussed with them approximately 10 min regarding placement and the need for rehab to become stronger before going home he stated that he would think about it.    Interval History:     Review of Systems   Constitutional: Positive for fatigue. Negative for activity change, appetite change and fever.   HENT: Negative for congestion, ear discharge, mouth sores, nosebleeds, rhinorrhea, sinus pressure, sinus pain and tinnitus.    Eyes: Negative.  Negative for pain, redness and itching.   Respiratory: Positive for cough and shortness of breath. Negative for apnea, choking, chest tightness, wheezing and stridor.    Cardiovascular: Negative for chest pain, palpitations and leg swelling.   Gastrointestinal: Positive for abdominal pain. Negative for abdominal distention, anal bleeding, blood in stool, constipation and diarrhea.   Endocrine: Negative.    Genitourinary: Negative for difficulty urinating, flank pain, frequency and urgency.   Musculoskeletal: Positive for back pain. Negative for arthralgias, gait problem and myalgias.   Skin: Negative for color change and pallor.   Allergic/Immunologic: Negative.    Neurological: Positive for weakness. Negative for dizziness, facial asymmetry, light-headedness and headaches.   Hematological: Negative for adenopathy. Does not bruise/bleed easily.   Psychiatric/Behavioral: The patient is nervous/anxious.      Objective:     Vital Signs (Most Recent):  Temp: 97 °F (36.1  °C) (11/04/19 1530)  Pulse: 95 (11/04/19 1530)  Resp: 18 (11/04/19 1530)  BP: 102/65 (11/04/19 1530)  SpO2: 96 % (11/04/19 1530) Vital Signs (24h Range):  Temp:  [96.7 °F (35.9 °C)-98.3 °F (36.8 °C)] 97 °F (36.1 °C)  Pulse:  [] 95  Resp:  [16-20] 18  SpO2:  [93 %-100 %] 96 %  BP: ()/(51-71) 102/65     Weight: 69.7 kg (153 lb 10.6 oz)  Body mass index is 22.69 kg/m².    Intake/Output Summary (Last 24 hours) at 11/4/2019 1840  Last data filed at 11/4/2019 1800  Gross per 24 hour   Intake 370 ml   Output 2400 ml   Net -2030 ml      Physical Exam   Constitutional: He is oriented to person, place, and time. He appears well-developed and well-nourished.   HENT:   Head: Normocephalic and atraumatic.   Eyes: Pupils are equal, round, and reactive to light. EOM are normal.   Neck: Normal range of motion. Neck supple. No tracheal deviation present. No thyromegaly present.   Cardiovascular: Normal rate, regular rhythm and normal heart sounds.   Pulmonary/Chest: Effort normal and breath sounds normal.   Abdominal: Soft. Bowel sounds are normal. He exhibits no distension. There is tenderness. There is guarding. There is no rebound.   Musculoskeletal: Normal range of motion.   Lymphadenopathy:     He has no cervical adenopathy.   Neurological: He is alert and oriented to person, place, and time.   Skin: Skin is warm and dry. Capillary refill takes less than 2 seconds.   Psychiatric: He has a normal mood and affect. His behavior is normal. Judgment and thought content normal.   Nursing note and vitals reviewed.      Significant Labs:   Recent Lab Results       11/04/19  0532        Anion Gap 11     Baso # 0.04     Basophil% 0.3     BUN, Bld <5     Calcium 7.8     Chloride 100     CO2 26     Creatinine 0.6     Differential Method Automated     eGFR if African American >60.0     eGFR if non  >60.0  Comment:  Calculation used to obtain the estimated glomerular filtration  rate (eGFR) is the CKD-EPI  equation.        Eos # 0.1     Eosinophil% 0.6     Glucose 85     Gran # (ANC) 8.4     Gran% 71.3     Hematocrit 28.4     Hemoglobin 8.8     Immature Grans (Abs) 0.10  Comment:  Mild elevation in immature granulocytes is non specific and   can be seen in a variety of conditions including stress response,   acute inflammation, trauma and pregnancy. Correlation with other   laboratory and clinical findings is essential.       Immature Granulocytes 0.9     Lymph # 2.2     Lymph% 19.0     MCH 28.6     MCHC 31.0     MCV 92     Mono # 0.9     Mono% 7.9     MPV 9.5     nRBC 0     Platelets 527     Potassium 3.5     RBC 3.08     RDW 18.3     Sodium 137     WBC 11.71         All pertinent labs within the past 24 hours have been reviewed.    Significant Imaging: I have reviewed and interpreted all pertinent imaging results/findings within the past 24 hours.      Assessment/Plan:      * Large bowel obstruction  IV fluids IV potassium NPO KUB in lab in a.m. monitor closely.  Surgical consultation will be seen in a.m. by Dr. MOHAMUD General surgery seen the patient in the clinic on 10/03.  10/20/2019.  Extended conversation with Dr. Buck concerning the patient's finding rectal mass bowel obstruction.  Correction of electrolytes IV fluid IV antibiotics NPO fleets enema today x3.  Colonoscope in a.m..  CEA is been ordered.    10/21/2019  1.  Treat today based on findings of colonoscopy or endoscopy.  2.  Continue current antibiotic treatment  3.  Follow surgery recommendations after final diagnosis made.  4.  Treat otherwise as needed based on clinical course  5.  Repeat labs in the a.m. and replete potassium as needed    10/22/2019:  1.  Stable postop day 1 with positive bowel sounds.  2.  We will replace calcium  3.  Repeat labs in the a.m. to include CBC and CMP magnesium  4.  Follow-up otherwise as clinically indicated based clinical course.    10/23/2019:  1.  Continue current antibiotic therapy.  2.  Give will blood bolus of  500 cc x1  3.  Change pain medication to Dilaudid 1 mg every 2 hr as needed for pain  4.  A feel tachycardia may be related to pain versus is mildly fluid depleted.  5.  Replace electrolytes as needed.  6.  Intake and output revealed a positive 2500 cc sore +overnight    10/24/2019:  1.  Continue current medications.  2.  Repeat labs in the a.m. to monitor white blood cell count and  3.  Begin metoprolol 12.5 mg IV q.12 hours or heart rate and  4.  Continue other medications as ordered and await bowel function.    10/25/2019:  1.  Postop day 3.  Patient having no fever chills nausea or vomiting and states pain is well controlled this morning.  2.  We will replace potassium and calcium IV  3.  Repeat labs in the a.m. to include CBC and CMP.  Patient otherwise has been afebrile and postop course have been normal  10/26/2019.  Postop day 4.  Patient is doing well.  NG tube has been removed.  Colostomy is working well.  Replace potassium today.  All labs have been reviewed.  Albumin 1.4 the patient has severe malnutrition he will soon be starting a diet of consult dietary.  Lab in a.m..  Encouraged incentive spirometry and mobility.  Patient is concerned about his overall condition.  Probably at this point has mild depression secondary to all the current medical problems. Patient did ask about pathology report I told him that I would look for more than likely would be back to the 1st of the week.  Continue current level of care unless clinical course changes.  10/27/2019.  See encounter for postop care note.    10/28/2019:  Postop day 6.:  Patient is still feeling weak and has had flat affect over the last several days.  Over the weekend and antidepressant citalopram was started.  This was started 10 mg p.o. daily.  Patient has colostomy side is intact.  1.  Reconsult PT for this patient  2.  Continue progressive mobility to increase his activity  3.  Advance diet when better bowel function occurs  4.  Replace calcium  and magnesium  5.  CBC, CMP in the a.m..  With magnesium    10/29/2019 postop day 7:  1.  Continue PT consult and increased progressive mobility.  Would like to get the patient up in a chair today.  2.  Patient tolerating clear liquid diet  3.  Repeat labs in the a.m. include CBC, CMP, magnesium, phosphate  4.  Consider TPN to begin tomorrow if patient does not advance his diet well.    10/30/2019:  Postop day 8  1.  Continue current treatment.  Patient NPO for delayed primary closure today  2.  Transfer to medical-surgical unit post surgery  3.  Continue current diet after surgery.  4.  Repeat labs in the a.m. to include CBC CMP    10/30/2019 postop day 9:  1.  Patient with leukocytosis of 15,000, will monitor fever curve, check lactic acid, and increase IV fluids due to fluid deficit overnight as well as low blood pressure.  2.  Recheck labs in the morning to include CBC, CMP and repeat chest x-ray  3.  Continue current antibiotics and follow otherwise as clinically indicated based on course  4.  Patient with possible discharge in the next 24-48 hours if stable    10/31/2019:  White blood cell count is mildly improved but will continue current antibiotics  2.  Repeat labs in the a.m.  3.  Will continue physical therapy for strengthening and possible discharge soon    11/01/2019:  1.  Patient in more active and participating with physical therapy.  Continue current antibiotics  2.  Begin Diflucan 200 mg IV daily  3.  Follow-up otherwise as needed  4.  Repeat labs in the a.m.    11/02/2019  1.  Continue physical therapy and progressive mobility.  2.  Repeat labs in the a.m.  3.  Patient should be ready for discharge within 24-48 hours from now.  4.  Will discuss with patient the need for placement.    11/03/2019  1.  Continue progressive mobility and incentive spirometry  2.  Ambulate as much as possible  3.  Repeat CBC and BMP in the a.m.  4.  Patient is getting stronger and should be able to be discharged within  the next 2-3 days  5.  Patient has had some trouble with his ostomy site with maceration and desquamation with increased pain.    11/04/2019:  1.  Repeat labs in the a.m. to include CBC and BMP  2.  Continue progressive mobility  3.  Discuss with surgery regarding the need for rehabilitation  4.  Follow-up otherwise based on clinical course.    Encounter for postoperative care  10/27/2019.  Day 5 postop care.  Patient's mood is somewhat down will start Celexa.  His mood is expected to be low considering the current circumstances but feel that he does need help.  Potassium 3.1 has been supplemented today.  Magnesium is 1.5 has been supplemented today.  Sodium is 147 continue to monitor.  Patient can do about a 1000 on incentive spirometry at best.  Will order PT for the patient today he needs some increased movement.  Patient is very weak considering the illness time frame.  Patient's testicle swollen today will elevate.  Lab values in a.m..  Pathology is pending.  Continue current level of care.      Intestinal obstruction  Patient now postop day 1 from bowel resection.  Stable postop day 1 doing well and postoperative course at this time      Hypokalemia  10/25/2019:  Will replace IV for this morning.  10/26/2019 will replace potassium p.o. Today.  10/27/2019.  Patient potassium 3.1 magnesium is slightly low will supplement potassium and magnesium.  Dr. Dawkins general surgery is already ordered potassium.      VTE Risk Mitigation (From admission, onward)         Ordered     heparin (porcine) injection 5,000 Units  Every 8 hours      10/21/19 1753     Place sequential compression device  Until discontinued      10/21/19 1753     IP VTE LOW RISK PATIENT  Once      10/19/19 1752     Place MAE hose  Until discontinued      10/19/19 1752                      Lencho Kumar MD  Department of Hospital Medicine   [facility]

## 2019-11-05 NOTE — ASSESSMENT & PLAN NOTE
IV fluids IV potassium NPO KUB in lab in a.m. monitor closely.  Surgical consultation will be seen in a.m. by Dr. MOHAMUD General surgery seen the patient in the clinic on 10/03.  10/20/2019.  Extended conversation with Dr. Buck concerning the patient's finding rectal mass bowel obstruction.  Correction of electrolytes IV fluid IV antibiotics NPO fleets enema today x3.  Colonoscope in a.m..  CEA is been ordered.    10/21/2019  1.  Treat today based on findings of colonoscopy or endoscopy.  2.  Continue current antibiotic treatment  3.  Follow surgery recommendations after final diagnosis made.  4.  Treat otherwise as needed based on clinical course  5.  Repeat labs in the a.m. and replete potassium as needed    10/22/2019:  1.  Stable postop day 1 with positive bowel sounds.  2.  We will replace calcium  3.  Repeat labs in the a.m. to include CBC and CMP magnesium  4.  Follow-up otherwise as clinically indicated based clinical course.    10/23/2019:  1.  Continue current antibiotic therapy.  2.  Give will blood bolus of 500 cc x1  3.  Change pain medication to Dilaudid 1 mg every 2 hr as needed for pain  4.  A feel tachycardia may be related to pain versus is mildly fluid depleted.  5.  Replace electrolytes as needed.  6.  Intake and output revealed a positive 2500 cc sore +overnight    10/24/2019:  1.  Continue current medications.  2.  Repeat labs in the a.m. to monitor white blood cell count and  3.  Begin metoprolol 12.5 mg IV q.12 hours or heart rate and  4.  Continue other medications as ordered and await bowel function.    10/25/2019:  1.  Postop day 3.  Patient having no fever chills nausea or vomiting and states pain is well controlled this morning.  2.  We will replace potassium and calcium IV  3.  Repeat labs in the a.m. to include CBC and CMP.  Patient otherwise has been afebrile and postop course have been normal  10/26/2019.  Postop day 4.  Patient is doing well.  NG tube has been removed.  Colostomy is  working well.  Replace potassium today.  All labs have been reviewed.  Albumin 1.4 the patient has severe malnutrition he will soon be starting a diet of consult dietary.  Lab in a.m..  Encouraged incentive spirometry and mobility.  Patient is concerned about his overall condition.  Probably at this point has mild depression secondary to all the current medical problems. Patient did ask about pathology report I told him that I would look for more than likely would be back to the 1st of the week.  Continue current level of care unless clinical course changes.  10/27/2019.  See encounter for postop care note.    10/28/2019:  Postop day 6.:  Patient is still feeling weak and has had flat affect over the last several days.  Over the weekend and antidepressant citalopram was started.  This was started 10 mg p.o. daily.  Patient has colostomy side is intact.  1.  Reconsult PT for this patient  2.  Continue progressive mobility to increase his activity  3.  Advance diet when better bowel function occurs  4.  Replace calcium and magnesium  5.  CBC, CMP in the a.m..  With magnesium    10/29/2019 postop day 7:  1.  Continue PT consult and increased progressive mobility.  Would like to get the patient up in a chair today.  2.  Patient tolerating clear liquid diet  3.  Repeat labs in the a.m. include CBC, CMP, magnesium, phosphate  4.  Consider TPN to begin tomorrow if patient does not advance his diet well.    10/30/2019:  Postop day 8  1.  Continue current treatment.  Patient NPO for delayed primary closure today  2.  Transfer to medical-surgical unit post surgery  3.  Continue current diet after surgery.  4.  Repeat labs in the a.m. to include CBC CMP    10/30/2019 postop day 9:  1.  Patient with leukocytosis of 15,000, will monitor fever curve, check lactic acid, and increase IV fluids due to fluid deficit overnight as well as low blood pressure.  2.  Recheck labs in the morning to include CBC, CMP and repeat chest  x-ray  3.  Continue current antibiotics and follow otherwise as clinically indicated based on course  4.  Patient with possible discharge in the next 24-48 hours if stable    10/31/2019:  White blood cell count is mildly improved but will continue current antibiotics  2.  Repeat labs in the a.m.  3.  Will continue physical therapy for strengthening and possible discharge soon    11/01/2019:  1.  Patient in more active and participating with physical therapy.  Continue current antibiotics  2.  Begin Diflucan 200 mg IV daily  3.  Follow-up otherwise as needed  4.  Repeat labs in the a.m.    11/02/2019  1.  Continue physical therapy and progressive mobility.  2.  Repeat labs in the a.m.  3.  Patient should be ready for discharge within 24-48 hours from now.  4.  Will discuss with patient the need for placement.    11/03/2019  1.  Continue progressive mobility and incentive spirometry  2.  Ambulate as much as possible  3.  Repeat CBC and BMP in the a.m.  4.  Patient is getting stronger and should be able to be discharged within the next 2-3 days  5.  Patient has had some trouble with his ostomy site with maceration and desquamation with increased pain.    11/04/2019:  1.  Repeat labs in the a.m. to include CBC and BMP  2.  Continue progressive mobility  3.  Discuss with surgery regarding the need for rehabilitation  4.  Follow-up otherwise based on clinical course.

## 2019-11-05 NOTE — PLAN OF CARE
Patient pleasant today. Was able to get ostomy back to stay in place for this shift. Patient seems to be less anxious and embarrassed with ostomy now that it is working well for him. He has had his pain controlled with ordered medication and ambulated in the dale today. Patient condition improving

## 2019-11-05 NOTE — PLAN OF CARE
11/04/19 1346   Medicare Message   Important Message from Medicare regarding Discharge Appeal Rights Given to patient/caregiver;Explained to patient/caregiver;Signed/date by patient/caregiver   Date IMM was signed 11/04/19   Time IMM was signed [id number]

## 2019-11-05 NOTE — PLAN OF CARE
Problem: Adult Inpatient Plan of Care  Goal: Plan of Care Review  Description  Aerosol TX given, PT resting in no distress, see flow sheet for details.   Outcome: Ongoing, Progressing  Goal: Patient-Specific Goal (Individualization)  Outcome: Ongoing, Progressing  Goal: Absence of Hospital-Acquired Illness or Injury  Outcome: Ongoing, Progressing  Goal: Optimal Comfort and Wellbeing  Outcome: Ongoing, Progressing  Goal: Readiness for Transition of Care  Outcome: Ongoing, Progressing  Goal: Rounds/Family Conference  Outcome: Ongoing, Progressing     Problem: Fluid Deficit (Intestinal Obstruction)  Goal: Fluid Balance  Outcome: Ongoing, Progressing     Problem: Infection (Intestinal Obstruction)  Goal: Absence of Infection Signs/Symptoms  Outcome: Ongoing, Progressing     Problem: Pain (Intestinal Obstruction)  Goal: Acceptable Pain Control  Outcome: Ongoing, Progressing     Problem: Oral Intake Inadequate  Goal: Improved Oral Intake  Outcome: Ongoing, Progressing     Problem: Infection  Goal: Infection Symptom Resolution  Outcome: Ongoing, Progressing     Problem: Fall Injury Risk  Goal: Absence of Fall and Fall-Related Injury  Outcome: Ongoing, Progressing     Problem: Skin Injury Risk Increased  Goal: Skin Health and Integrity  Outcome: Ongoing, Progressing     Problem: Bowel Elimination Impaired (Surgery Nonspecified)  Goal: Effective Bowel Elimination  Outcome: Ongoing, Progressing     Problem: Infection (Surgery Nonspecified)  Goal: Absence of Infection Signs/Symptoms  Outcome: Ongoing, Progressing     Problem: Ongoing Anesthesia Effects (Surgery Nonspecified)  Goal: Anesthesia/Sedation Recovery  Outcome: Ongoing, Progressing     Problem: Pain (Surgery Nonspecified)  Goal: Acceptable Pain Control  Outcome: Ongoing, Progressing     Problem: Postoperative Nausea and Vomiting (Surgery Nonspecified)  Goal: Nausea and Vomiting Relief  Outcome: Ongoing, Progressing     Problem: Postoperative Urinary Retention  (Surgery Nonspecified)  Goal: Effective Urinary Elimination  Outcome: Ongoing, Progressing     Problem: Pain Acute  Goal: Optimal Pain Control  Outcome: Ongoing, Progressing

## 2019-11-05 NOTE — PLAN OF CARE
Problem: Adult Inpatient Plan of Care  Goal: Plan of Care Review  Description  Aerosol TX given, PT resting in no distress, see flow sheet for details.   Outcome: Ongoing, Progressing  Goal: Patient-Specific Goal (Individualization)  Outcome: Ongoing, Progressing     Problem: Fluid Deficit (Intestinal Obstruction)  Goal: Fluid Balance  Outcome: Ongoing, Progressing     Problem: Pain (Intestinal Obstruction)  Goal: Acceptable Pain Control  Outcome: Ongoing, Progressing  Intervention: Monitor and Manage Pain  Flowsheets (Taken 11/5/2019 0908)  Pain Management Interventions: pain management plan reviewed with patient/caregiver; quiet environment facilitated     Problem: Fall Injury Risk  Goal: Absence of Fall and Fall-Related Injury  Outcome: Ongoing, Progressing  Intervention: Identify and Manage Contributors to Fall Injury Risk  Flowsheets (Taken 11/5/2019 0908)  Self-Care Promotion: BADL personal objects within reach  Medication Review/Management: medications reviewed  Intervention: Promote Injury-Free Environment  Flowsheets (Taken 11/5/2019 0908)  Safety Promotion/Fall Prevention: Fall Risk reviewed with patient/family; Fall Risk signage in place; family to remain at bedside; side rails raised x 2  Environmental Safety Modification: assistive device/personal items within reach; clutter free environment maintained; room near unit station

## 2019-11-05 NOTE — SUBJECTIVE & OBJECTIVE
Interval History:     Review of Systems   Constitutional: Positive for fatigue. Negative for activity change, appetite change and fever.   HENT: Negative for congestion, ear discharge, mouth sores, nosebleeds, rhinorrhea, sinus pressure, sinus pain and tinnitus.    Eyes: Negative.  Negative for pain, redness and itching.   Respiratory: Positive for cough and shortness of breath. Negative for apnea, choking, chest tightness, wheezing and stridor.    Cardiovascular: Negative for chest pain, palpitations and leg swelling.   Gastrointestinal: Positive for abdominal pain. Negative for abdominal distention, anal bleeding, blood in stool, constipation and diarrhea.   Endocrine: Negative.    Genitourinary: Negative for difficulty urinating, flank pain, frequency and urgency.   Musculoskeletal: Positive for back pain. Negative for arthralgias, gait problem and myalgias.   Skin: Negative for color change and pallor.   Allergic/Immunologic: Negative.    Neurological: Positive for weakness. Negative for dizziness, facial asymmetry, light-headedness and headaches.   Hematological: Negative for adenopathy. Does not bruise/bleed easily.   Psychiatric/Behavioral: The patient is nervous/anxious.      Objective:     Vital Signs (Most Recent):  Temp: 97 °F (36.1 °C) (11/04/19 1530)  Pulse: 95 (11/04/19 1530)  Resp: 18 (11/04/19 1530)  BP: 102/65 (11/04/19 1530)  SpO2: 96 % (11/04/19 1530) Vital Signs (24h Range):  Temp:  [96.7 °F (35.9 °C)-98.3 °F (36.8 °C)] 97 °F (36.1 °C)  Pulse:  [] 95  Resp:  [16-20] 18  SpO2:  [93 %-100 %] 96 %  BP: ()/(51-71) 102/65     Weight: 69.7 kg (153 lb 10.6 oz)  Body mass index is 22.69 kg/m².    Intake/Output Summary (Last 24 hours) at 11/4/2019 1840  Last data filed at 11/4/2019 1800  Gross per 24 hour   Intake 370 ml   Output 2400 ml   Net -2030 ml      Physical Exam   Constitutional: He is oriented to person, place, and time. He appears well-developed and well-nourished.   HENT:   Head:  Normocephalic and atraumatic.   Eyes: Pupils are equal, round, and reactive to light. EOM are normal.   Neck: Normal range of motion. Neck supple. No tracheal deviation present. No thyromegaly present.   Cardiovascular: Normal rate, regular rhythm and normal heart sounds.   Pulmonary/Chest: Effort normal and breath sounds normal.   Abdominal: Soft. Bowel sounds are normal. He exhibits no distension. There is tenderness. There is guarding. There is no rebound.   Musculoskeletal: Normal range of motion.   Lymphadenopathy:     He has no cervical adenopathy.   Neurological: He is alert and oriented to person, place, and time.   Skin: Skin is warm and dry. Capillary refill takes less than 2 seconds.   Psychiatric: He has a normal mood and affect. His behavior is normal. Judgment and thought content normal.   Nursing note and vitals reviewed.      Significant Labs:   Recent Lab Results       11/04/19  0532        Anion Gap 11     Baso # 0.04     Basophil% 0.3     BUN, Bld <5     Calcium 7.8     Chloride 100     CO2 26     Creatinine 0.6     Differential Method Automated     eGFR if African American >60.0     eGFR if non  >60.0  Comment:  Calculation used to obtain the estimated glomerular filtration  rate (eGFR) is the CKD-EPI equation.        Eos # 0.1     Eosinophil% 0.6     Glucose 85     Gran # (ANC) 8.4     Gran% 71.3     Hematocrit 28.4     Hemoglobin 8.8     Immature Grans (Abs) 0.10  Comment:  Mild elevation in immature granulocytes is non specific and   can be seen in a variety of conditions including stress response,   acute inflammation, trauma and pregnancy. Correlation with other   laboratory and clinical findings is essential.       Immature Granulocytes 0.9     Lymph # 2.2     Lymph% 19.0     MCH 28.6     MCHC 31.0     MCV 92     Mono # 0.9     Mono% 7.9     MPV 9.5     nRBC 0     Platelets 527     Potassium 3.5     RBC 3.08     RDW 18.3     Sodium 137     WBC 11.71         All pertinent  labs within the past 24 hours have been reviewed.    Significant Imaging: I have reviewed and interpreted all pertinent imaging results/findings within the past 24 hours.

## 2019-11-05 NOTE — DISCHARGE SUMMARY
[facility] Hospital Medicine  Discharge Summary      Patient Name: Miguel Angel Apple Sr.  MRN: [id number]  Admission Date: 10/19/2019  Hospital Length of Stay: 17 days  Discharge Date and Time:  11/05/2019 1:35 PM  Attending Physician: Lencho Kumar MD   Discharging Provider: Lencho Kumar MD  Primary Care Provider: Primary Doctor No      HPI:   Patient's history began approximately 3 months ago.  He presented to the hospital well over a month ago given a diagnosis of he understands colitis.  Treated with antibiotics IV fluids.  He saw Dr. ONEIDA charles on 10/03 placed on Cipro and Flagyl.  His on once completed that dose.  This past Sunday he started noticing that he was becoming  bloated and stopped eating solids at that time went back to liquids.  His had no pain pills for 2-3 days.  No nausea and vomiting unless he ate something that he knew he should the.  He attempted to make himself vomit yesterday and could not.  He has had small bowel movements to continue since that time.  CT scan shows colonic dilatation with the cecum being 10 cm.  The possible obstruction may be low left-sided.  There is even suggesting possibility have a rectal mass.  WBC remained 16,000 with a left shift.  Patient's potassium is 2.2.    Procedure(s) (LRB):  REPAIR, ABDOMINAL WALL (N/A)  CLOSURE, WOUND (N/A)      Hospital Course:   Patient is sitting up in chair today IV fluids IV potassium NPO Surgical consultation repeat KUB and lab in a.m. further clinical decisions depending on clinical course.  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  The patient has had no prior abdominal surgery and no medical history prior to approximately 3 months ago.  10/20/2019.  Long discussion with the patient this morning concerning the findings.  Dr. ONEIDA Pemberton surgery see the patient today since he has been involved in his care on 10/03.  Patient will most likely need a surgical  procedure and a colostomy secondary to  rectal mass.  Patient continue IV fluids IV antibiotics potassium replacement today and will have 3 Fleet's enemas today.  CEA is been ordered.    10/21/2019:  Patient is comfortable in room although it does appear quite anxious.  The patient will be having EGD and colonoscopy later today.  Based on the findings there the patient will may need undergo surgery.  Patient denies any pain and states that he was not throwing up and having no fever or chills.  Patient is distended and has some abdominal pain associated.  Labs showed [place] [address] hemoglobin hematocrit 9.8 and 31 sodium 145 potassium 2.4 chloride 108 bicarb 21 and alk-phos was 49    10/22/2019:  Patient is stable postop day 1.  Patient states pain is well controlled.  Patient mildly tachycardic blood pressure 115/61 979 O2 sats 100%.  Labs show white blood cell count 21.5 hemoglobin 9.2 hematocrit 29.3 platelets 407 and BUN creatinine were 8 and 0.6 calcium 7.5 magnesium 1.4.  Patient is in good spirits and bowel sounds are present.    10/23/2019:  Postop day 2.:  Patient is having much more abdominal pain with waves of pain that appeared to be related to air movement in the bowel versus bowel regaining function.  Patient is alert and oriented and is mildly tachycardic with pulse 123, blood pressure 130/65 O2 sat 95%.  Labs show a improved white count of today of 19,000 hemoglobin 8.9 hematocrit 28 platelets 396 and sodium 141 potassium 3.1 albumin 1.6 and liver functions within normal ranges.  GFR greater than 60    10/24/2019:  Patient is stable and improving postop day 2 .  Colostomy bag has some gas and patient bowel sounds are very active.  Intake was 02/30/2027 output was 1745 and a net gain of 1.4 L. labs showed a white blood cell count 15.9 hemoglobin 8.7 hematocrit 27.7 and platelets 373.  Normal differential noted. Labs:  Sodium 146 potassium 3.5 chloride 110 bicarb 24 glucose 66 and BUN creatinine were 9 and 0.6 otherwise GFR  greater than 60.  Magnesium 1.9 phosphorus 4.0    10/25/2019:  Patient is awake alert and states his pain is under better control today.  Patient is having gas in to his colostomy bag it had to be relieved on 2 occasions overnight.  Patient otherwise has been stable and heart rate is much improved on beta-blocker.  Labs showed sodium 148 potassium 3.2 chloride 111 bicarb 23 calcium is 7.7 and GFR greater than 60.  White blood cell count 10.5 hemoglobin 8.2 hematocrit 26 platelets 334 and differential was normal.  Patient otherwise is stable but will continue gastric decompression until flatus is obvious.  10/26/2019.  NG tube has been removed.  The patient's colostomy is working.  Aggressive respiratory therapy.  Incentive spirometry the patient best he can do this morning was a 1000.  Lungs decreased breath sounds in the bases secondary to atelectasis.  Discussed expectations respiratory therapy this morning.  Patient will become more mobile this a.m..  Discussed mobility with nursing staff.  Patient has been up in the chair twice last p.m..  Lab and x-rays have been reviewed.  Potassium is 3.2 with supplement.  Sodium is 147 this morning patient is receiving lactated Ringer's.  Patient's protein albumin are low with an albumin of 1.4.  Dietary consult for severe malnutrition.  Continue current level of care unless clinical course changes.  Lab has been ordered for in a.m..  10/27/2019.  Patient's vital signs are acceptable.  Patient's incentive spirometry at best is a 1000.  Patient's colostomy looks as expected.  Patient does have testicular swelling will elevate today.  Dr. Dawkins general surgery's reviewed the lab and orders lab in a.m..  Patient's mood is down considering his overall findings is expected have will start Celexa on the patient today.  Patient's sodium still 147 patient is on Ringer's lactate.  Continue current level of care unless clinical course changes.  Will order PT today the patient is  very weak.  Patient's weakness is expected.  But he will have a rehab.  May have to consider whether the patient needs home health care or rehab care after this admission.    10/28/2019:  Patient is sitting up in chair today.  Patient appears weak and has some facial wasting as well as some abdominal distension.  Colostomy site looks intact and there is maceration around that from the adhesive a of the colostomy bags.  Patient is tolerating some clear liquids but has not had much of an appetite and has not been up walking very much.  Otherwise patient is stable postop.  However I feel this patient needs to progress a little faster back to his movement and hopefully bowel function.  Intake and output:  Intake 3854.2 and output 1650 for total net positive fluid in of 2204 cc  Labs:  Phosphorus 3.1 sodium 144 potassium 3.9 chloride 108 bicarb 28 glucose 116 BUN creatinine were less than 5 and 0.6 calcium 7.3 and GFR greater than 60  CBC:  White blood cell count 9.94 hemoglobin 8.3 hematocrit 27.3 platelets 436 and differential was normal  Magnesium:  1.5 phosphorus 3.4    10/29/2019:  This postop day 7.  Patient is looking stronger and eating and tolerating clear liquids.  Colostomy site is looking improved but there is continued desquamation around the stoma.  This is undoubtedly from the adhesive of the bags.  Otherwise no complaints and we will increase progressive mobility starting today.  Physical therapy will be seeing this patient as well.  Labs:  Sodium 139 potassium 4.0 chlorid 104 bicarb 27 glucose 105 BUN creatinine 5 and 0.5 total bilirubin 0.3 albumin 1.4 total protein 4.4 calcium 7.0  White blood cell count 9.8 hemoglobin 8.5 hematocrit 27.3 platelets 401 phosphorus 3.1  Will continue current care will consider starting TPN due to prolonged period without food and repeat labs in the a.m.    10/30/2019:  Patient is stable and feeling much better today.  Patient is able to tolerate clear liquids well.   However patient is NPO now due to planned delayed primary closure today.  Patient be transferred to the medical-surgical unit post surgery today.  Intake and output:  Intake 3472 output 2775 with a net gain of 697 cc  CMP sodium 139 potassium 4.0 glucose 105 BUN creatinine were 5 and 0.5  CBC unremarkable.    10/31/2019:  Patient is more active today having less pain is in better spirits.  However patient stoma is continuing to be macerated having difficulty with colostomy bag sticking and not leaking.  There is good return from the colostomy site and no significant pain other than the irritation of the skin around the stomal site.  Labs show chemistry which was normal and calcium 7.5 GFR greater than 60 all other electrolytes normal.  White blood cell count 15.8 hemoglobin 9.3 hematocrit 29 platelets 494 and differential showed 74 granulocytes 15 lymphocytes    11/01/2019::  Patient has been ambulatory in the hallway today and is appearing Much stronger.  White blood cell count 15.0 hemoglobin hematocrit 8.827 stable platelets 500.  Vital signs show blood pressure of 101/59 pulse is 96 respirations 16.  Intake is 01/30/1994 output is 04/20/2025 with a net 769 cc gain    11/02/2019:  Patient is more active today showering by himself.  Physical exam is continuing to improve.  Patient otherwise has no new complaints except some irritation around the stoma site.  This has been a problem for the colostomy bags to stick.  Labs:  Sodium 135 potassium 4.0 BUN less than 5 creatinine 0.5 and calcium 7.6 total protein 4.9 and albumin 1.4  CBC shows white blood cell count 64084 improved and H&H was 8.3 and 27 platelets 518 and no other significant findings.    11/03/2019:  Patient is alert and oriented.  Vital signs are stable with blood pressure 110/63 patient has been afebrile and O2 sats running in the range of 94-96%  CMP was normal except for sodium 134 potassium 3.9 and calcium 7.7 GFR greater than 60  CBC white blood  cell count 13.0 hemoglobin 8.5 hematocrit 27.5 platelets 506 otherwise no significant change    11/04/2019:  Patient is sitting up in no acute distress. Events of last evening were noted patient slipped and slid down beside his bed.  He denies any injury and denies any other pain. Vital signs today revealed blood pressure 132/71 pulse 87 and respirations 16-20 O2 sat 100%  CMP chemistry normal BUN less than 5 creatinine 0.6 GFR greater than 60  CBC normal white blood cell count of 11.7 hemoglobin H&H was 8.8 and 28 normal differential.  Patient states doing well but still feeling some weakness.  I discussed with them approximately 10 min regarding placement and the need for rehab to become stronger before going home he stated that he would think about it.    11/05/2019:  Patient is stable today and surgery has okayed his discharge and signed off.  He has no other complaints and states still weak but getting better every day.  Vital signs have been stable patient has been afebrile.  All labs were normal or unremarkable.  Hemoglobin hematocrit are stable at 8.8 and 28.4.  His determine this patient is ready for discharge. He has adequate help at home and has decided to be discharged to home instead of rehabilitation.  He has all equipment at home and is therefore ready for discharge.     Consults:   Consults (From admission, onward)        Status Ordering Provider     Inpatient consult to Dietitian  Once     Provider:  (Not yet assigned)    Completed EBKA KHAN     Inpatient consult to General Surgery  Once     Provider:  Jose Lazo MD    Completed BEKA KHAN     Inpatient consult to Registered Dietitian/Nutritionist  Once     Provider:  (Not yet assigned)    BEKA Hickey     Inpatient consult to Social Work/Case Management  Once     Provider:  (Not yet assigned)    TAYLOR Rowan     Inpatient consult to   Once     Provider:  (Not yet  assigned)    Acknowledged TAYLOR JIM          * Large bowel obstruction  IV fluids IV potassium NPO KUB in lab in a.m. monitor closely.  Surgical consultation will be seen in a.m. by Dr. MOHAMUD General surgery seen the patient in the clinic on 10/03.  10/20/2019.  Extended conversation with Dr. Buck concerning the patient's finding rectal mass bowel obstruction.  Correction of electrolytes IV fluid IV antibiotics NPO fleets enema today x3.  Colonoscope in a.m..  CEA is been ordered.    10/21/2019  1.  Treat today based on findings of colonoscopy or endoscopy.  2.  Continue current antibiotic treatment  3.  Follow surgery recommendations after final diagnosis made.  4.  Treat otherwise as needed based on clinical course  5.  Repeat labs in the a.m. and replete potassium as needed    10/22/2019:  1.  Stable postop day 1 with positive bowel sounds.  2.  We will replace calcium  3.  Repeat labs in the a.m. to include CBC and CMP magnesium  4.  Follow-up otherwise as clinically indicated based clinical course.    10/23/2019:  1.  Continue current antibiotic therapy.  2.  Give will blood bolus of 500 cc x1  3.  Change pain medication to Dilaudid 1 mg every 2 hr as needed for pain  4.  A feel tachycardia may be related to pain versus is mildly fluid depleted.  5.  Replace electrolytes as needed.  6.  Intake and output revealed a positive 2500 cc sore +overnight    10/24/2019:  1.  Continue current medications.  2.  Repeat labs in the a.m. to monitor white blood cell count and  3.  Begin metoprolol 12.5 mg IV q.12 hours or heart rate and  4.  Continue other medications as ordered and await bowel function.    10/25/2019:  1.  Postop day 3.  Patient having no fever chills nausea or vomiting and states pain is well controlled this morning.  2.  We will replace potassium and calcium IV  3.  Repeat labs in the a.m. to include CBC and CMP.  Patient otherwise has been afebrile and postop course have been  normal  10/26/2019.  Postop day 4.  Patient is doing well.  NG tube has been removed.  Colostomy is working well.  Replace potassium today.  All labs have been reviewed.  Albumin 1.4 the patient has severe malnutrition he will soon be starting a diet of consult dietary.  Lab in a.m..  Encouraged incentive spirometry and mobility.  Patient is concerned about his overall condition.  Probably at this point has mild depression secondary to all the current medical problems. Patient did ask about pathology report I told him that I would look for more than likely would be back to the 1st of the week.  Continue current level of care unless clinical course changes.  10/27/2019.  See encounter for postop care note.    10/28/2019:  Postop day 6.:  Patient is still feeling weak and has had flat affect over the last several days.  Over the weekend and antidepressant citalopram was started.  This was started 10 mg p.o. daily.  Patient has colostomy side is intact.  1.  Reconsult PT for this patient  2.  Continue progressive mobility to increase his activity  3.  Advance diet when better bowel function occurs  4.  Replace calcium and magnesium  5.  CBC, CMP in the a.m..  With magnesium    10/29/2019 postop day 7:  1.  Continue PT consult and increased progressive mobility.  Would like to get the patient up in a chair today.  2.  Patient tolerating clear liquid diet  3.  Repeat labs in the a.m. include CBC, CMP, magnesium, phosphate  4.  Consider TPN to begin tomorrow if patient does not advance his diet well.    10/30/2019:  Postop day 8  1.  Continue current treatment.  Patient NPO for delayed primary closure today  2.  Transfer to medical-surgical unit post surgery  3.  Continue current diet after surgery.  4.  Repeat labs in the a.m. to include CBC CMP    10/30/2019 postop day 9:  1.  Patient with leukocytosis of 15,000, will monitor fever curve, check lactic acid, and increase IV fluids due to fluid deficit overnight as well  as low blood pressure.  2.  Recheck labs in the morning to include CBC, CMP and repeat chest x-ray  3.  Continue current antibiotics and follow otherwise as clinically indicated based on course  4.  Patient with possible discharge in the next 24-48 hours if stable    10/31/2019:  White blood cell count is mildly improved but will continue current antibiotics  2.  Repeat labs in the a.m.  3.  Will continue physical therapy for strengthening and possible discharge soon    11/01/2019:  1.  Patient in more active and participating with physical therapy.  Continue current antibiotics  2.  Begin Diflucan 200 mg IV daily  3.  Follow-up otherwise as needed  4.  Repeat labs in the a.m.    11/02/2019  1.  Continue physical therapy and progressive mobility.  2.  Repeat labs in the a.m.  3.  Patient should be ready for discharge within 24-48 hours from now.  4.  Will discuss with patient the need for placement.    11/03/2019  1.  Continue progressive mobility and incentive spirometry  2.  Ambulate as much as possible  3.  Repeat CBC and BMP in the a.m.  4.  Patient is getting stronger and should be able to be discharged within the next 2-3 days  5.  Patient has had some trouble with his ostomy site with maceration and desquamation with increased pain.    11/04/2019:  1.  Repeat labs in the a.m. to include CBC and BMP  2.  Continue progressive mobility  3.  Discuss with surgery regarding the need for rehabilitation  4.  Follow-up otherwise based on clinical course.    11/05/2019:  1.  Discharge to home  2.  Continue Celexa 10 mg p.o. daily, Percocet 10/325 1 p.o. q.4-6 hours p.r.n. pain  3.  Begin Zofran 4 mg p.o. every 12 hr as needed for nausea  4.  Follow-up with Dr. Lazo in his office in 2 weeks.  5.  Continue other home medications as needed      Final Active Diagnoses:    Diagnosis Date Noted POA    PRINCIPAL PROBLEM:  Large bowel obstruction [K56.609] 10/19/2019 Yes    Severe malnutrition [E43] 10/26/2019 Yes     Encounter for postoperative care [Z48.89]  Not Applicable    Diarrhea [R19.7] 10/21/2019 Yes    Rectal mass [K62.89] 10/20/2019 Yes    Hypokalemia [E87.6] 10/20/2019 Yes    Intestinal obstruction [K56.609]  Yes    Colitis [K52.9] 10/19/2019 Yes      Problems Resolved During this Admission:       Discharged Condition: good    Disposition:     Follow Up:  Follow-up Information     Jose Lazo MD. Go on 11/14/2019.    Specialty:  General Surgery  Why:  appointment time: 10:45am for surgery follow up  Contact information:  [address] [place] [address]  [phone]                 Patient Instructions:   No discharge procedures on file.    Significant Diagnostic Studies: Labs: All labs within the past 24 hours have been reviewed    Pending Diagnostic Studies:     None         Medications:  Reconciled Home Medications:      Medication List      START taking these medications    citalopram 10 MG tablet  Commonly known as:  CELEXA  Take 1 tablet (10 mg total) by mouth once daily.  Start taking on:  November 6, 2019     ondansetron 4 MG tablet  Commonly known as:  ZOFRAN  Take 1 tablet (4 mg total) by mouth every 6 (six) hours as needed for Nausea.     oxyCODONE-acetaminophen 5-325 mg per tablet  Commonly known as:  PERCOCET  Take 1 tablet by mouth every 4 (four) hours as needed (moderate pain 2-5/10 pain scale).     pantoprazole 40 MG tablet  Commonly known as:  PROTONIX  Take 1 tablet (40 mg total) by mouth once daily.  Start taking on:  November 6, 2019            Indwelling Lines/Drains at time of discharge:   Lines/Drains/Airways     Drain                 Colostomy 10/21/19 1100 LLQ 15 days                Time spent on the discharge of patient: 50 minutes  Patient was seen and examined on the date of discharge and determined to be suitable for discharge.         Lencho Kumar MD  Department of Hospital Medicine  [facility]

## 2019-11-05 NOTE — PLAN OF CARE
11/05/19 1345   Final Note   Assessment Type Final Discharge Note   Anticipated Discharge Disposition Home-Health   What phone number can be called within the next 1-3 days to see how you are doing after discharge? [phone]   Hospital Follow Up  Appt(s) scheduled? Yes   Discharge plans and expectations educations in teach back method with documentation complete? Yes   Verbal & written follow up appointment provided to patient & his significant other. Also instructed on handout with signs & symptoms to watch out for & when to call the doctor. Phone number for home health also provided to them. Voucher for prescriptions provided to patient & instructed on what it covers & where he can use it. Demonstrated understanding by verbal feedback. Denies any other needs at this time.

## 2019-11-06 NOTE — PHYSICIAN QUERY
PT Name: Miguel Angel Apple Sr.  MR #: [id number]  Physician Query Form - Pathology Findings Clarification   Keerthi Gasca RN, CCDS  Desk # [phone]; [facility] # [phone] [email or web address]    This form is a permanent document in the medical record.     Query Date: November 6, 2019    By submitting this query, we are merely seeking further clarification of documentation.  Please utilize your independent clinical judgment when addressing the question(s) below.  The medical record contains the following:     Findings Supporting Clinical Information Location in Medical Record   FINAL PATHOLOGIC DIAGNOSIS  1. 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    2. RIGHT COLON AND DISTAL ILEUM, RIGHT HEMICOLECTOMY:  Colon with areas of marked thinning and focal changes compatible with cecal perforation.  Associated serosal adhesions and serositis.  Histologically viable and unremarkable proximal and distal margins.  No dysplasia or malignancy.    3. GALLBLADDER, CHOLECYSTECTOMY:  Chronic cholecystitis with patchy mucosal cholesterolosis.  No cholelithiasis.  No intestinal metaplasia or dysplasia.  One benign lymph node with reactive changes.    4. COLON, SIGMOID, SIGMOID COLECTOMY:  Sigmoid colon with areas of transmural acute and chronic inflammation, serosal adhesions, and associated serositis.  Inflammatory changes are noted at the opened resection margin (non-stapled).  No dysplasia or malignancy.    NOTE: In the setting of a mass like lesion as noted in specimen 1, the possibility of an under sampled possibly more aggressive lesion cannot be entirely excluded. Correlation with clinical findings is recommended.    Diagnosed by: Soila Brody (Electronically Signed: 2019-10-29 15:29:37)   Operation:  1) Exploratory laparotomy    2) Sigmoid colectomy with end descending colostomy creation and mucous fistula formation    3) Right hemicolectomy and distal ilectomy  with side to side functional end to end anastomosis    4) Open cholecystectomy    5) Omental pedical flap creation for placement over anastomosis    6) Takedown splenic flexure of colon     -- OP Note 12/21  Clifton CHAUDHRY     Please document the clinical significance of the Pathologists findings of:    1. 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  NOTE: In the setting of a mass like lesion as noted in specimen 1, the possibility of an under sampled possibly more aggressive lesion cannot be entirely excluded. Correlation with clinical findings is recommended.    [   ] I agree with the Pathology Findings   [   ] I do not agree with the Pathology Findings   [   ] Other/Clarification of Findings: __________________   [   ] Clinically Insignificant   [X Clinically Undetermined at this point       Please document in your progress notes daily for the duration of treatment until resolved and include in your discharge summary.

## 2019-11-06 NOTE — PLAN OF CARE
11/05/19 1400   Post-Acute Status   Post-Acute Authorization Home Health/Hospice   Home Health/Hospice Status Set-up Complete   Patient will be seen by [facility] either today or Wednesday.

## 2019-11-14 ENCOUNTER — OFFICE VISIT (OUTPATIENT)
Dept: SURGERY | Facility: CLINIC | Age: 64
End: 2019-11-14
Payer: MEDICARE

## 2019-11-14 VITALS
BODY MASS INDEX: 17.94 KG/M2 | RESPIRATION RATE: 14 BRPM | DIASTOLIC BLOOD PRESSURE: 69 MMHG | HEART RATE: 102 BPM | SYSTOLIC BLOOD PRESSURE: 100 MMHG | WEIGHT: 125.31 LBS | HEIGHT: 70 IN | TEMPERATURE: 98 F

## 2019-11-14 DIAGNOSIS — C20 RECTAL CANCER: Primary | ICD-10-CM

## 2019-11-14 PROCEDURE — 99214 OFFICE O/P EST MOD 30 MIN: CPT | Mod: 24,S$GLB,, | Performed by: SURGERY

## 2019-11-14 PROCEDURE — 99214 PR OFFICE/OUTPT VISIT, EST, LEVL IV, 30-39 MIN: ICD-10-PCS | Mod: 24,S$GLB,, | Performed by: SURGERY

## 2019-11-14 NOTE — PROGRESS NOTES
"[facility] General Surgery  Follow-up    Subjective:       Patient ID: Miguel Angel Apple Sr. is a 64 y.o. male.    Chief Complaint: Follow-up (2 wk f/u - colostomy (10-21-19)/ wound closure (10-30-19))      HPI:  Miguel Angel Apple Sr. is a 64 y.o. male presents today for follow-up examination after laparotomy for obstructed rectal mass with right hemicolectomy for perforated cecum and end descending colostomy with mucous fistula creation.  Patient since surgery has done well. No nausea vomiting.  No fevers.  Ostomy pink and functional.  Tolerating a regular diet having bowel function.  Patient presents today for pathology review and postoperative evaluation.    Review of Systems   Constitutional: Negative for appetite change, chills and fever.   HENT: Negative for congestion, dental problem and drooling.    Eyes: Negative for photophobia, discharge and itching.   Respiratory: Negative for apnea and chest tightness.    Cardiovascular: Negative for chest pain, palpitations and leg swelling.   Gastrointestinal: Negative for abdominal distention and abdominal pain.   Endocrine: Negative for cold intolerance and heat intolerance.   Genitourinary: Negative for difficulty urinating and dysuria.   Musculoskeletal: Negative for arthralgias and back pain.   Skin: Negative for color change and pallor.   Neurological: Negative for dizziness, facial asymmetry and headaches.   Hematological: Negative for adenopathy. Does not bruise/bleed easily.   Psychiatric/Behavioral: Negative for agitation, behavioral problems and confusion.       Objective:      Vitals:    11/14/19 1058   BP: 100/69   BP Location: Left arm   Patient Position: Sitting   BP Method: Large (Automatic)   Pulse: 102   Resp: 14   Temp: 97.6 °F (36.4 °C)   TempSrc: Oral   Weight: 56.8 kg (125 lb 5.3 oz)   Height: 5' 10" (1.778 m)     Physical Exam   Constitutional: He is oriented to person, place, and time. He appears well-developed and well-nourished.   HENT:   Head: " Normocephalic and atraumatic.   Eyes: Pupils are equal, round, and reactive to light. EOM are normal.   Neck: Normal range of motion. Neck supple. No thyromegaly present.   Cardiovascular: Normal rate and regular rhythm.   No murmur heard.  Pulmonary/Chest: Effort normal and breath sounds normal. No respiratory distress.   Abdominal: Soft. Bowel sounds are normal. He exhibits no distension. There is no tenderness.       Musculoskeletal: Normal range of motion. He exhibits no edema.   Neurological: He is alert and oriented to person, place, and time. No cranial nerve deficit.   Skin: Skin is warm. Capillary refill takes less than 2 seconds. No rash noted. He is not diaphoretic. No erythema.   Psychiatric: He has a normal mood and affect.        Assessment:       1. Rectal cancer        Plan:   Rectal cancer  -     Ambulatory Referral to Hematology / Oncology  -     MRI Pelvis W WO Contrast; Future; Expected date: 11/14/2019  -     CT Chest Abdomen Pelvis With Contrast; Future; Expected date: 11/14/2019  -     Creatinine, serum; Future; Expected date: 11/14/2019  -     BUN; Future; Expected date: 11/14/2019        Medical Decision Making/Counseling:  New diagnosis.  Rectal adenocarcinoma.  This was the obstructive mass on colonoscopy as well as the obstructive mass requiring obstruction perforation of the cecum and necessitating surgery. Recommendation this point will be for continued postoperative healing.  MRI pelvis with and without contrast for rectal staging, CT scan chest abdomen pelvis with IV contrast for staging purposes, Medical Oncology referral, Dr. Stringer.  Patient will need neoadjuvant chemotherapy and radiation therapy.  He was subsequently need rectal resection.  Patient voiced understanding, and agreement with plan.    Follow up:  3 weeks with studies as mentioned above.    Patient instructed that best way to communicate with my office staff is for patient to get on the [facility] epic patient portal to  expedite communication and communication issues that may occur.  Patient was given instructions on how to get on the portal.  I encouraged patient to obtain portal access as well.  Ultimately it is up to the patient to obtain access.  Patient voiced understanding.

## 2019-11-20 ENCOUNTER — HOSPITAL ENCOUNTER (OUTPATIENT)
Dept: RADIOLOGY | Facility: HOSPITAL | Age: 64
Discharge: HOME | End: 2019-11-20
Attending: SURGERY
Payer: MEDICARE

## 2019-11-20 DIAGNOSIS — C20 RECTAL CANCER: ICD-10-CM

## 2019-11-20 PROCEDURE — 72197 MRI PELVIS W/O & W/DYE: CPT | Mod: 26,,, | Performed by: RADIOLOGY

## 2019-11-20 PROCEDURE — 72197 MRI PELVIS W WO CONTRAST: ICD-10-PCS | Mod: 26,,, | Performed by: RADIOLOGY

## 2019-11-20 PROCEDURE — 25500020 PHARM REV CODE 255: Performed by: SURGERY

## 2019-11-20 PROCEDURE — A9585 GADOBUTROL INJECTION: HCPCS | Performed by: SURGERY

## 2019-11-20 PROCEDURE — 72197 MRI PELVIS W/O & W/DYE: CPT | Mod: TC

## 2019-11-20 RX ORDER — GADOBUTROL 604.72 MG/ML
5 INJECTION INTRAVENOUS
Status: COMPLETED | OUTPATIENT
Start: 2019-11-20 | End: 2019-11-20

## 2019-11-20 RX ADMIN — GADOBUTROL 5 ML: 604.72 INJECTION INTRAVENOUS at 11:11

## 2019-12-02 ENCOUNTER — TELEPHONE (OUTPATIENT)
Dept: SURGERY | Facility: CLINIC | Age: 64
End: 2019-12-02

## 2019-12-02 NOTE — TELEPHONE ENCOUNTER
----- Message from Karuna Diaz sent at 12/2/2019  1:57 PM CST -----  Contact: Rudy WILSON  Type: Needs Medical Advice    Who Called:  emily  Pedro Call Back Number: [phone]  Additional Information:Running a low grade fever drainage from the incision site as well

## 2019-12-02 NOTE — TELEPHONE ENCOUNTER
Writer spoke to Minal , Minal stated that patient has been running a low grade fever x 2 days and that was patients only complaint. Patient is scheduled for f/u Thursday 12/05/19. M for patient to call clinic and we can rescheduled him to Wed 12/04/19 if needed.

## 2019-12-03 ENCOUNTER — TELEPHONE (OUTPATIENT)
Dept: SURGERY | Facility: CLINIC | Age: 64
End: 2019-12-03

## 2019-12-03 NOTE — TELEPHONE ENCOUNTER
----- Message from Michelle Sebastian sent at 12/3/2019  8:14 AM CST -----  Contact: Miguel Angel pt  Type:  Same Day Appointment Request    Caller is requesting a same day appointment.  Caller declined first available appointment listed below.      Name of Caller:  Miguel Angel  When is the first available appointment?  n/a  Symptoms:  Pt is running fever, recent surgery, 2 spots at surgery site  Best Call Back Number:  [phone]  Additional Information:   Pls call pt to adv if he can be seen today

## 2019-12-03 NOTE — TELEPHONE ENCOUNTER
Writer spoke to patient and scheduled him to come in on Wednesday 12/04/19 @ 9:00. Patient expressed verbal understanding.

## 2019-12-04 ENCOUNTER — HOSPITAL ENCOUNTER (OUTPATIENT)
Dept: RADIOLOGY | Facility: HOSPITAL | Age: 64
Discharge: HOME | End: 2019-12-04
Attending: SURGERY
Payer: MEDICARE

## 2019-12-04 ENCOUNTER — OFFICE VISIT (OUTPATIENT)
Dept: SURGERY | Facility: CLINIC | Age: 64
End: 2019-12-04
Payer: MEDICARE

## 2019-12-04 ENCOUNTER — INITIAL CONSULT (OUTPATIENT)
Dept: HEMATOLOGY/ONCOLOGY | Facility: CLINIC | Age: 64
End: 2019-12-04
Payer: MEDICARE

## 2019-12-04 VITALS
BODY MASS INDEX: 19.36 KG/M2 | HEART RATE: 94 BPM | TEMPERATURE: 98 F | HEIGHT: 70 IN | SYSTOLIC BLOOD PRESSURE: 108 MMHG | OXYGEN SATURATION: 97 % | WEIGHT: 135.25 LBS | RESPIRATION RATE: 12 BRPM | DIASTOLIC BLOOD PRESSURE: 64 MMHG

## 2019-12-04 DIAGNOSIS — C20 RECTAL ADENOCARCINOMA: Primary | ICD-10-CM

## 2019-12-04 DIAGNOSIS — D64.9 ANEMIA, UNSPECIFIED TYPE: ICD-10-CM

## 2019-12-04 DIAGNOSIS — C20 RECTAL CANCER: ICD-10-CM

## 2019-12-04 DIAGNOSIS — C20 RECTAL CANCER: Primary | ICD-10-CM

## 2019-12-04 DIAGNOSIS — E88.09 HYPOALBUMINEMIA: ICD-10-CM

## 2019-12-04 DIAGNOSIS — D75.838 REACTIVE THROMBOCYTOSIS: ICD-10-CM

## 2019-12-04 PROCEDURE — G0444 DEPRESSION SCREEN ANNUAL: HCPCS | Mod: PBBFAC | Performed by: INTERNAL MEDICINE

## 2019-12-04 PROCEDURE — 99999 PR PBB SHADOW E&M-EST. PATIENT-LVL IV: CPT | Mod: PBBFAC,,, | Performed by: INTERNAL MEDICINE

## 2019-12-04 PROCEDURE — 99205 PR OFFICE/OUTPT VISIT, NEW, LEVL V, 60-74 MIN: ICD-10-PCS | Mod: S$PBB,,, | Performed by: INTERNAL MEDICINE

## 2019-12-04 PROCEDURE — 71260 CT THORAX DX C+: CPT | Mod: 26,,, | Performed by: RADIOLOGY

## 2019-12-04 PROCEDURE — 74177 CT CHEST ABDOMEN PELVIS WITH CONTRAST (XPD): ICD-10-PCS | Mod: 26,,, | Performed by: RADIOLOGY

## 2019-12-04 PROCEDURE — 99205 OFFICE O/P NEW HI 60 MIN: CPT | Mod: S$PBB,,, | Performed by: INTERNAL MEDICINE

## 2019-12-04 PROCEDURE — 99999 PR PBB SHADOW E&M-EST. PATIENT-LVL IV: ICD-10-PCS | Mod: PBBFAC,,, | Performed by: INTERNAL MEDICINE

## 2019-12-04 PROCEDURE — 71260 CT CHEST ABDOMEN PELVIS WITH CONTRAST (XPD): ICD-10-PCS | Mod: 26,,, | Performed by: RADIOLOGY

## 2019-12-04 PROCEDURE — 99214 OFFICE O/P EST MOD 30 MIN: CPT | Mod: PBBFAC,25 | Performed by: INTERNAL MEDICINE

## 2019-12-04 PROCEDURE — 74177 CT ABD & PELVIS W/CONTRAST: CPT | Mod: TC

## 2019-12-04 PROCEDURE — 99024 POSTOP FOLLOW-UP VISIT: CPT | Mod: S$GLB,POP,, | Performed by: SURGERY

## 2019-12-04 PROCEDURE — 74177 CT ABD & PELVIS W/CONTRAST: CPT | Mod: 26,,, | Performed by: RADIOLOGY

## 2019-12-04 PROCEDURE — 99024 PR POST-OP FOLLOW-UP VISIT: ICD-10-PCS | Mod: S$GLB,POP,, | Performed by: SURGERY

## 2019-12-04 PROCEDURE — 25500020 PHARM REV CODE 255: Performed by: SURGERY

## 2019-12-04 RX ADMIN — IOHEXOL 15 ML: 300 INJECTION, SOLUTION INTRAVENOUS at 10:12

## 2019-12-04 RX ADMIN — IOHEXOL 75 ML: 350 INJECTION, SOLUTION INTRAVENOUS at 02:12

## 2019-12-04 NOTE — PROGRESS NOTES
"[facility] General Surgery  Follow-up    Subjective:       Patient ID: Miguel Angel Apple Sr. is a 64 y.o. male.    Chief Complaint: Follow-up (Rectal Cancer)      HPI:  Miguel Angel Apple Sr. is a 64 y.o. male presents today for follow-up examination after laparotomy with right hemicolectomy for perforated colon and in descending colostomy with mucous fistula creation for obstructive rectal cancer.  Biopsy proven rectal cancer.  Locally advanced.  Patient over the last couple days has had a fever, up to 102.  No recent fevers in the last 24 hr.  No nausea vomiting.  No abdominal pain. No erythema of surgical sites. No fluctuance.  No abscesses externally.  Ostomy still pink and functional.  No other new complaints or issues per the patient's account besides the fever.    Review of Systems   Constitutional: Positive for fever. Negative for appetite change and chills.   HENT: Negative for congestion, dental problem and drooling.    Eyes: Negative for photophobia, discharge and itching.   Respiratory: Negative for apnea and chest tightness.    Cardiovascular: Negative for chest pain, palpitations and leg swelling.   Gastrointestinal: Negative for abdominal distention and abdominal pain.   Endocrine: Negative for cold intolerance and heat intolerance.   Genitourinary: Negative for difficulty urinating and dysuria.   Musculoskeletal: Negative for arthralgias and back pain.   Skin: Negative for color change and pallor.   Neurological: Negative for dizziness, facial asymmetry and headaches.   Hematological: Negative for adenopathy. Does not bruise/bleed easily.   Psychiatric/Behavioral: Negative for agitation, behavioral problems and confusion.       Objective:      Vitals:    12/04/19 0909   BP: 108/64   BP Location: Left arm   Patient Position: Sitting   BP Method: Large (Automatic)   Pulse: 94   Resp: 12   Temp: 98.3 °F (36.8 °C)   TempSrc: Oral   SpO2: 97%   Weight: 61.4 kg (135 lb 4 oz)   Height: 5' 10" (1.778 m)     Physical " Exam   Constitutional: He is oriented to person, place, and time. He appears well-developed and well-nourished.   HENT:   Head: Normocephalic and atraumatic.   Eyes: Pupils are equal, round, and reactive to light. EOM are normal.   Neck: Normal range of motion. Neck supple. No thyromegaly present.   Cardiovascular: Normal rate and regular rhythm.   No murmur heard.  Pulmonary/Chest: Effort normal and breath sounds normal. No respiratory distress.   Abdominal: Soft. Bowel sounds are normal. He exhibits no distension. There is no tenderness.       Musculoskeletal: Normal range of motion. He exhibits no edema.   Neurological: He is alert and oriented to person, place, and time. No cranial nerve deficit.   Skin: Skin is warm. Capillary refill takes less than 2 seconds. No rash noted. He is not diaphoretic. No erythema.   Psychiatric: He has a normal mood and affect.        Assessment:       1. Rectal cancer        Plan:   Rectal cancer        Medical Decision Making/Counseling:  Established patient.  New issues.    Rectal cancer.  Discussed with Dr. Stringer of Oncology today, I appreciate her assistance.  Patient will see her today at noon for next stage evaluation.  With fever, will plan for the patient undergo CT scan today as previously ordered however patient did not complete.  This is a staging CT scan as well as to ensure there is nothing intra-abdominal to cause the patient's fever.  Patient voiced understanding.  Will have patient follow up in surgery Clinic in 1 week.    Follow up:  1 week.    Patient instructed that best way to communicate with my office staff is for patient to get on the [facility] patient portal to expedite communication and communication issues that may occur.  Patient was given instructions on how to get on the portal.  I encouraged patient to obtain portal access as well.  Ultimately it is up to the patient to obtain access.  Patient voiced understanding.

## 2019-12-11 ENCOUNTER — TELEPHONE (OUTPATIENT)
Dept: INFUSION THERAPY | Facility: HOSPITAL | Age: 64
End: 2019-12-11

## 2019-12-11 ENCOUNTER — OFFICE VISIT (OUTPATIENT)
Dept: SURGERY | Facility: CLINIC | Age: 64
End: 2019-12-11
Payer: MEDICARE

## 2019-12-11 VITALS
DIASTOLIC BLOOD PRESSURE: 60 MMHG | HEIGHT: 70 IN | HEART RATE: 108 BPM | OXYGEN SATURATION: 99 % | WEIGHT: 138.69 LBS | TEMPERATURE: 98 F | SYSTOLIC BLOOD PRESSURE: 105 MMHG | BODY MASS INDEX: 19.86 KG/M2 | RESPIRATION RATE: 18 BRPM

## 2019-12-11 DIAGNOSIS — C20 RECTAL CANCER: Primary | ICD-10-CM

## 2019-12-11 PROBLEM — D64.9 ANEMIA: Status: ACTIVE | Noted: 2019-12-11

## 2019-12-11 PROBLEM — D75.838 REACTIVE THROMBOCYTOSIS: Status: ACTIVE | Noted: 2019-12-11

## 2019-12-11 PROCEDURE — 99214 PR OFFICE/OUTPT VISIT, EST, LEVL IV, 30-39 MIN: ICD-10-PCS | Mod: 24,S$GLB,, | Performed by: SURGERY

## 2019-12-11 PROCEDURE — 99214 OFFICE O/P EST MOD 30 MIN: CPT | Mod: 24,S$GLB,, | Performed by: SURGERY

## 2019-12-11 RX ORDER — SODIUM CHLORIDE 9 MG/ML
INJECTION, SOLUTION INTRAVENOUS CONTINUOUS
Status: CANCELLED | OUTPATIENT
Start: 2019-12-11

## 2019-12-11 RX ORDER — LIDOCAINE HYDROCHLORIDE 10 MG/ML
1 INJECTION, SOLUTION EPIDURAL; INFILTRATION; INTRACAUDAL; PERINEURAL ONCE
Status: DISCONTINUED | OUTPATIENT
Start: 2019-12-11 | End: 2019-12-23 | Stop reason: HOSPADM

## 2019-12-11 NOTE — H&P
[facility] Surgery H&P Note    Subjective:       Patient ID: Miguel Angel Apple Sr. is a 64 y.o. male.    Chief Complaint: Follow-up ( CT Abd (12-4-19))    HPI:  Miguel Angel Apple Sr. is a 64 y.o. male with a history of gastroesophageal reflux disease, depression, recent history of diagnosis of rectal cancer, obstructive, locally advanced requiring diversion with right hemicolectomy for perforation of the right colon presents today as established patient for follow-up examination.  Patient since last visit has undergone staging CT scan.  MRI pelvis for staging.  Both MRI and CT scan showed no evidence metastatic disease outside of the colon.  He does have locally advanced rectal cancer as it is 11 cm in size.  Lymph nodes not significantly enlarged in the local vicinity.  Locally however the patient's rectal cancers at least T3-T4.  This is consistent with in need for preoperative (oncologic operative) chemotherapy and radiation therapy.  I have discussed with patient's oncologist Dr. Stringer the need for preoperative neoadjuvant chemo and radiation.  She stated that she would get working on this in the next couple of weeks.  Patient will need [id number] prior to chemotherapy induction.    Past Medical History:   Diagnosis Date    Colon cancer     Depression     GERD (gastroesophageal reflux disease)     Medical history reviewed with no changes      Past Surgical History:   Procedure Laterality Date    CHOLECYSTECTOMY  10/21/2019    Procedure: CHOLECYSTECTOMY;  Surgeon: Jose Lazo MD;  Location: [facility] OR;  Service: General;;    CLOSURE OF WOUND N/A 10/30/2019    Procedure: CLOSURE, WOUND;  Surgeon: Torres Dawkins MD;  Location: [facility] OR;  Service: General;  Laterality: N/A;    COLOSTOMY  10/21/2019    Procedure: CREATION, COLOSTOMY;  Surgeon: Jose Lazo MD;  Location: [facility] OR;  Service: General;;  END DESCENDING COLOSTOMY    FLEXIBLE SIGMOIDOSCOPY N/A 10/21/2019    Procedure: SIGMOIDOSCOPY,  FLEXIBLE;  Surgeon: Jose Lazo MD;  Location: [facility] OR;  Service: General;  Laterality: N/A;    no surgical history      REPAIR OF ABDOMINAL WALL N/A 10/30/2019    Procedure: REPAIR, ABDOMINAL WALL;  Surgeon: Torres Dawkins MD;  Location: [facility] OR;  Service: General;  Laterality: N/A;    RIGHT HEMICOLECTOMY Right 10/21/2019    Procedure: HEMICOLECTOMY, RIGHT;  Surgeon: Jose Lazo MD;  Location: [facility] OR;  Service: General;  Laterality: Right;     Family History   Problem Relation Age of Onset    Diabetes Mother     Heart disease Father      Social History     Socioeconomic History    Marital status:      Spouse name: Not on file    Number of children: Not on file    Years of education: Not on file    Highest education level: Not on file   Occupational History    Not on file   Social Needs    Financial resource strain: Not on file    Food insecurity:     Worry: Not on file     Inability: Not on file    Transportation needs:     Medical: Not on file     Non-medical: Not on file   Tobacco Use    Smoking status: Former Smoker    Smokeless tobacco: Never Used   Substance and Sexual Activity    Alcohol use: Not Currently    Drug use: Never    Sexual activity: Not Currently   Lifestyle    Physical activity:     Days per week: Not on file     Minutes per session: Not on file    Stress: Not on file   Relationships    Social connections:     Talks on phone: Not on file     Gets together: Not on file     Attends Holiness service: Not on file     Active member of club or organization: Not on file     Attends meetings of clubs or organizations: Not on file     Relationship status: Not on file   Other Topics Concern    Not on file   Social History Narrative    Not on file       Current Outpatient Medications   Medication Sig Dispense Refill    citalopram (CELEXA) 10 MG tablet Take 1 tablet (10 mg total) by mouth once daily. 30 tablet 11    pantoprazole (PROTONIX) 40 MG  "tablet Take 1 tablet (40 mg total) by mouth once daily. 30 tablet 11    ondansetron (ZOFRAN) 4 MG tablet Take 1 tablet (4 mg total) by mouth every 6 (six) hours as needed for Nausea. (Patient not taking: Reported on 12/4/2019) 24 tablet 1    oxyCODONE-acetaminophen (PERCOCET) 5-325 mg per tablet Take 1 tablet by mouth every 4 (four) hours as needed (moderate pain 2-5/10 pain scale). (Patient not taking: Reported on 12/4/2019) 40 tablet 0     Current Facility-Administered Medications   Medication Dose Route Frequency Provider Last Rate Last Dose    lidocaine (PF) 10 mg/ml (1%) injection 10 mg  1 mL Intradermal Once Jose Lazo MD         Review of patient's allergies indicates:  No Known Allergies    Review of Systems   Constitutional: Negative for appetite change, chills and fever.   HENT: Negative for congestion, dental problem and drooling.    Eyes: Negative for photophobia, discharge and itching.   Respiratory: Negative for apnea and chest tightness.    Cardiovascular: Negative for chest pain, palpitations and leg swelling.   Gastrointestinal: Negative for abdominal distention and abdominal pain.   Endocrine: Negative for cold intolerance and heat intolerance.   Genitourinary: Negative for difficulty urinating and dysuria.   Musculoskeletal: Negative for arthralgias and back pain.   Skin: Negative for color change and pallor.   Neurological: Negative for dizziness, facial asymmetry and headaches.   Hematological: Negative for adenopathy. Does not bruise/bleed easily.   Psychiatric/Behavioral: Negative for agitation, behavioral problems and confusion.       Objective:      Vitals:    12/11/19 0821   BP: 105/60   BP Location: Right arm   Patient Position: Sitting   Pulse: 108   Resp: 18   Temp: 98.1 °F (36.7 °C)   TempSrc: Oral   SpO2: 99%   Weight: 62.9 kg (138 lb 10.7 oz)   Height: 5' 10" (1.778 m)     Physical Exam   Constitutional: He is oriented to person, place, and time. He appears " well-developed and well-nourished.   HENT:   Head: Normocephalic and atraumatic.   Eyes: Pupils are equal, round, and reactive to light. EOM are normal.   Neck: Normal range of motion. Neck supple. No thyromegaly present.   Cardiovascular: Normal rate and regular rhythm.   No murmur heard.  Pulmonary/Chest: Effort normal and breath sounds normal. No respiratory distress.   Abdominal: Soft. Bowel sounds are normal. He exhibits no distension. There is no tenderness.       Musculoskeletal: Normal range of motion. He exhibits no edema.   Neurological: He is alert and oriented to person, place, and time. No cranial nerve deficit.   Skin: Skin is warm. Capillary refill takes less than 2 seconds. No rash noted. He is not diaphoretic. No erythema.   Psychiatric: He has a normal mood and affect.       Lab Review:   CBC:   Lab Results   Component Value Date    WBC 11.71 11/04/2019    RBC 3.08 (L) 11/04/2019    HGB 8.8 (L) 11/04/2019    HCT 28.4 (L) 11/04/2019     (H) 11/04/2019     BMP:   Lab Results   Component Value Date    GLU 85 11/04/2019     11/04/2019    K 3.5 11/04/2019     11/04/2019    CO2 26 11/04/2019    BUN 10 12/04/2019    CREATININE 0.7 12/04/2019    CALCIUM 7.8 (L) 11/04/2019     Diagnostics Review: CT: Reviewed locally advanced rectal cancer.  Negative metastatic disease.     Assessment:       1. Rectal cancer        Plan:   Rectal cancer  -     Case Request Operating Room: Insertion,central venous access device    Other orders  -     Progressive Mobility Protocol (mobilize patient to their highest level of functioning at least twice daily); Standing  -     Insert peripheral IV; Standing  -     lidocaine (PF) 10 mg/ml (1%) injection 10 mg  -     Full code; Standing        Medical Decision Making/Counseling:  Rectal cancer.  Locally advanced.    Patient needs preoperative neoadjuvant chemotherapy with radiation therapy.  I discussed with the patient's oncologist.  She agrees.  Recommendation  will be for placement of chemo port per the oncologist.  Risk and benefits of chemo port placement were discussed in detail in clinic today.  Risk of port infection was discussed to include the need to remove the port and replace at a separate time if infection were to occur.  For the risk of pneumothorax was discussed with the patient to occur in 1 case every 200 cases of chemo port placements.  This could require a chest tube, drain tube in the chest and hospital admission.  Further the risk of valvular heart issues related to the catheter were discussed.  After risk and benefits were discussed, patient voiced understanding of the risk benefits wished to proceed the near future.  All questions were answered to the patient's satisfaction.    Patient instructed that best way to communicate with my office staff is for patient to get on the [facility] epic patient portal to expedite communication and communication issues that may occur.  Patient was given instructions on how to get on the portal.  I encouraged patient to obtain portal access as well.  Ultimately it is up to the patient to obtain access.  Patient voiced understanding.

## 2019-12-11 NOTE — TELEPHONE ENCOUNTER
----- Message from Shayla Stringer MD sent at 12/11/2019  6:02 AM CST -----  Morning can help me get him in for a stat CBC and iron level and make sure I call him and let him know what I want him to do after pretty please

## 2019-12-11 NOTE — H&P (VIEW-ONLY)
[facility] Surgery H&P Note    Subjective:       Patient ID: Miguel Angel Apple Sr. is a 64 y.o. male.    Chief Complaint: Follow-up ( CT Abd (12-4-19))    HPI:  Miguel Angel Apple Sr. is a 64 y.o. male with a history of gastroesophageal reflux disease, depression, recent history of diagnosis of rectal cancer, obstructive, locally advanced requiring diversion with right hemicolectomy for perforation of the right colon presents today as established patient for follow-up examination.  Patient since last visit has undergone staging CT scan.  MRI pelvis for staging.  Both MRI and CT scan showed no evidence metastatic disease outside of the colon.  He does have locally advanced rectal cancer as it is 11 cm in size.  Lymph nodes not significantly enlarged in the local vicinity.  Locally however the patient's rectal cancers at least T3-T4.  This is consistent with in need for preoperative (oncologic operative) chemotherapy and radiation therapy.  I have discussed with patient's oncologist Dr. Stringer the need for preoperative neoadjuvant chemo and radiation.  She stated that she would get working on this in the next couple of weeks.  Patient will need [place] prior to chemotherapy induction.    Past Medical History:   Diagnosis Date    Colon cancer     Depression     GERD (gastroesophageal reflux disease)     Medical history reviewed with no changes      Past Surgical History:   Procedure Laterality Date    CHOLECYSTECTOMY  10/21/2019    Procedure: CHOLECYSTECTOMY;  Surgeon: Jose Lazo MD;  Location: [facility] OR;  Service: General;;    CLOSURE OF WOUND N/A 10/30/2019    Procedure: CLOSURE, WOUND;  Surgeon: Torres Dawkins MD;  Location: [facility] OR;  Service: General;  Laterality: N/A;    COLOSTOMY  10/21/2019    Procedure: CREATION, COLOSTOMY;  Surgeon: Jose Lazo MD;  Location: [facility] OR;  Service: General;;  END DESCENDING COLOSTOMY    FLEXIBLE SIGMOIDOSCOPY N/A 10/21/2019    Procedure: SIGMOIDOSCOPY,  FLEXIBLE;  Surgeon: Jose Lazo MD;  Location: [facility] OR;  Service: General;  Laterality: N/A;    no surgical history      REPAIR OF ABDOMINAL WALL N/A 10/30/2019    Procedure: REPAIR, ABDOMINAL WALL;  Surgeon: Torres Dawkins MD;  Location: [facility] OR;  Service: General;  Laterality: N/A;    RIGHT HEMICOLECTOMY Right 10/21/2019    Procedure: HEMICOLECTOMY, RIGHT;  Surgeon: Jose Lazo MD;  Location: [facility] OR;  Service: General;  Laterality: Right;     Family History   Problem Relation Age of Onset    Diabetes Mother     Heart disease Father      Social History     Socioeconomic History    Marital status:      Spouse name: Not on file    Number of children: Not on file    Years of education: Not on file    Highest education level: Not on file   Occupational History    Not on file   Social Needs    Financial resource strain: Not on file    Food insecurity:     Worry: Not on file     Inability: Not on file    Transportation needs:     Medical: Not on file     Non-medical: Not on file   Tobacco Use    Smoking status: Former Smoker    Smokeless tobacco: Never Used   Substance and Sexual Activity    Alcohol use: Not Currently    Drug use: Never    Sexual activity: Not Currently   Lifestyle    Physical activity:     Days per week: Not on file     Minutes per session: Not on file    Stress: Not on file   Relationships    Social connections:     Talks on phone: Not on file     Gets together: Not on file     Attends [employer] service: Not on file     Active member of club or organization: Not on file     Attends meetings of clubs or organizations: Not on file     Relationship status: Not on file   Other Topics Concern    Not on file   Social History Narrative    Not on file       Current Outpatient Medications   Medication Sig Dispense Refill    citalopram (CELEXA) 10 MG tablet Take 1 tablet (10 mg total) by mouth once daily. 30 tablet 11    pantoprazole (PROTONIX) 40 MG  "tablet Take 1 tablet (40 mg total) by mouth once daily. 30 tablet 11    ondansetron (ZOFRAN) 4 MG tablet Take 1 tablet (4 mg total) by mouth every 6 (six) hours as needed for Nausea. (Patient not taking: Reported on 12/4/2019) 24 tablet 1    oxyCODONE-acetaminophen (PERCOCET) 5-325 mg per tablet Take 1 tablet by mouth every 4 (four) hours as needed (moderate pain 2-5/10 pain scale). (Patient not taking: Reported on 12/4/2019) 40 tablet 0     Current Facility-Administered Medications   Medication Dose Route Frequency Provider Last Rate Last Dose    lidocaine (PF) 10 mg/ml (1%) injection 10 mg  1 mL Intradermal Once Jose Lazo MD         Review of patient's allergies indicates:  No Known Allergies    Review of Systems   Constitutional: Negative for appetite change, chills and fever.   HENT: Negative for congestion, dental problem and drooling.    Eyes: Negative for photophobia, discharge and itching.   Respiratory: Negative for apnea and chest tightness.    Cardiovascular: Negative for chest pain, palpitations and leg swelling.   Gastrointestinal: Negative for abdominal distention and abdominal pain.   Endocrine: Negative for cold intolerance and heat intolerance.   Genitourinary: Negative for difficulty urinating and dysuria.   Musculoskeletal: Negative for arthralgias and back pain.   Skin: Negative for color change and pallor.   Neurological: Negative for dizziness, facial asymmetry and headaches.   Hematological: Negative for adenopathy. Does not bruise/bleed easily.   Psychiatric/Behavioral: Negative for agitation, behavioral problems and confusion.       Objective:      Vitals:    12/11/19 0821   BP: 105/60   BP Location: Right arm   Patient Position: Sitting   Pulse: 108   Resp: 18   Temp: 98.1 °F (36.7 °C)   TempSrc: Oral   SpO2: 99%   Weight: 62.9 kg (138 lb 10.7 oz)   Height: 5' 10" (1.778 m)     Physical Exam   Constitutional: He is oriented to person, place, and time. He appears " well-developed and well-nourished.   HENT:   Head: Normocephalic and atraumatic.   Eyes: Pupils are equal, round, and reactive to light. EOM are normal.   Neck: Normal range of motion. Neck supple. No thyromegaly present.   Cardiovascular: Normal rate and regular rhythm.   No murmur heard.  Pulmonary/Chest: Effort normal and breath sounds normal. No respiratory distress.   Abdominal: Soft. Bowel sounds are normal. He exhibits no distension. There is no tenderness.       Musculoskeletal: Normal range of motion. He exhibits no edema.   Neurological: He is alert and oriented to person, place, and time. No cranial nerve deficit.   Skin: Skin is warm. Capillary refill takes less than 2 seconds. No rash noted. He is not diaphoretic. No erythema.   Psychiatric: He has a normal mood and affect.       Lab Review:   CBC:   Lab Results   Component Value Date    WBC 11.71 11/04/2019    RBC 3.08 (L) 11/04/2019    HGB 8.8 (L) 11/04/2019    HCT 28.4 (L) 11/04/2019     (H) 11/04/2019     BMP:   Lab Results   Component Value Date    GLU 85 11/04/2019     11/04/2019    K 3.5 11/04/2019     11/04/2019    CO2 26 11/04/2019    BUN 10 12/04/2019    CREATININE 0.7 12/04/2019    CALCIUM 7.8 (L) 11/04/2019     Diagnostics Review: CT: Reviewed locally advanced rectal cancer.  Negative metastatic disease.     Assessment:       1. Rectal cancer        Plan:   Rectal cancer  -     Case Request Operating Room: Insertion,central venous access device    Other orders  -     Progressive Mobility Protocol (mobilize patient to their highest level of functioning at least twice daily); Standing  -     Insert peripheral IV; Standing  -     lidocaine (PF) 10 mg/ml (1%) injection 10 mg  -     Full code; Standing        Medical Decision Making/Counseling:  Rectal cancer.  Locally advanced.    Patient needs preoperative neoadjuvant chemotherapy with radiation therapy.  I discussed with the patient's oncologist.  She agrees.  Recommendation  will be for placement of chemo port per the oncologist.  Risk and benefits of chemo port placement were discussed in detail in clinic today.  Risk of port infection was discussed to include the need to remove the port and replace at a separate time if infection were to occur.  For the risk of pneumothorax was discussed with the patient to occur in 1 case every 200 cases of chemo port placements.  This could require a chest tube, drain tube in the chest and hospital admission.  Further the risk of valvular heart issues related to the catheter were discussed.  After risk and benefits were discussed, patient voiced understanding of the risk benefits wished to proceed the near future.  All questions were answered to the patient's satisfaction.    Patient instructed that best way to communicate with my office staff is for patient to get on the [facility] epic patient portal to expedite communication and communication issues that may occur.  Patient was given instructions on how to get on the portal.  I encouraged patient to obtain portal access as well.  Ultimately it is up to the patient to obtain access.  Patient voiced understanding.

## 2019-12-11 NOTE — PROGRESS NOTES
Rectal Cancer      Miguel Angel Summers Sr. is a 64 y.o.    On October 21  a laparotomy  Was performed for perforated colon and patient underwent a right hemicolectomy with a rectal biopsy revealing rectal adenocarcinoma. He is healing from surgery but referred here for new diagnosis of rectal cancer and would like to discuss treatment options. He had been having fever up to 102 which subsided approx 48 hours ago. He is scheduled for a CT of chest abdomen and pelvis for staging        PreOperative Diagnosis  SPECIMEN  1) Rectal mass bx  2) Right colon and distal ileum  3) Gallbladder  4) Sigmoid colon  FINAL PATHOLOGIC DIAGNOSIS  1. 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  2. RIGHT COLON AND DISTAL ILEUM, RIGHT HEMICOLECTOMY:  Colon with areas of marked thinning and focal changes compatible with cecal perforation.  Associated serosal adhesions and serositis.  Histologically viable and unremarkable proximal and distal margins.  No dysplasia or malignancy.  3. GALLBLADDER, CHOLECYSTECTOMY:  Chronic cholecystitis with patchy mucosal cholesterolosis.  No cholelithiasis.  No intestinal metaplasia or dysplasia.  One benign lymph node with reactive changes.  4. COLON, SIGMOID, SIGMOID COLECTOMY:  Sigmoid colon with areas of transmural acute and chronic inflammation, serosal adhesions, and associated  serositis.  Inflammatory changes are noted at the opened resection margin (non-stapled).  No dysplasia or malignancy.  NOTE: In the setting of a mass like lesion as noted in specimen 1, the possibility of an under sampled possibly  more aggressive lesion cannot be entirely excluded. Correlation with clinical findings is recommended.  Diagnosed by: Soila Brody  (Electronically Signed: 2019-10-29 15:29:37)  Report continued on next page Page 1 of 3  Patient Name MIGUEL ANGEL SUMMERS Accession # [id number]  (63Y M)  Location  Date of Birth  [id number]  1955  Billing  #  Collection Date  Received  Reported  Medical Record #  10/21/2019  10/22/2019  10/29/2019  [facility]  [id number]  Gross Description  The specimen is received in 4 containers.  1. Hospital ID [id number], Pathology ID [id number]  Received in formalin labeled with patient's name and designated rectal mass biopsy. The specimen consists of  multiple tan tissue fragments aggregating 1 x 0.5 x 0.1 and submitted entirely in 1 cassette labeled 1507-1.  2. Hospital ID [id number], Pathology ID [id number]  Received in formalin, labeled with patient's name and is designated right colon and distal ileum. The specimen  consists of the distal segment of small bowel (10.2 cm in length and 2.5 cm in diameter), appendix (4.6 cm in  length and 0.6 cm in diameter) with dilated cecum and ascending colon (46.5 cm in length and ranges in diameter  from 10.5 cm at the cecum to 5.5 cm at the distal margin) with attached minimal amount of fibrofatty tissue,  stapled at both ends. The small bowel is grossly unremarkable. The proximal and mid aspect of the appendix is  attached to the cecum with adhesions. The serosa of the appendix is tan-brown, smooth and intact. The  appendiceal lumen is dilated and is filled with brown, soft fecal material. The appendiceal wall measures 3 cm in  thickness. Serosa of the cecum and large intestine is tan-brown, smooth and intact. At the cecum the serosa  reveals a linear, closed with stitches defect, measuring 5.5 cm in length and is located 13.2 cm from the closest  proximal surgical margin. The lumen of the large intestine is filled with green, soft fecal matter and air. The  colonic mucosa is brown-yellow, distended and flat. No polyps or masses are grossly identified. The colonic wall  ranges from 0.1 to 0.3 cm in thickness. Representative sections are submitted in 5 cassettes labeled 1507 as  follows:  2A: Proximal surgical margin  2B: Distal surgical margin  2C: Appendix  2D: Closed  defect  2E: Random        Past Medical History:   Diagnosis Date    Colon cancer     Depression     GERD (gastroesophageal reflux disease)     Medical history reviewed with no changes      Past Surgical History:   Procedure Laterality Date    CHOLECYSTECTOMY  10/21/2019    Procedure: CHOLECYSTECTOMY;  Surgeon: Jose Lazo MD;  Location: [facility] OR;  Service: General;;    CLOSURE OF WOUND N/A 10/30/2019    Procedure: CLOSURE, WOUND;  Surgeon: Torres Dawkins MD;  Location: [facility] OR;  Service: General;  Laterality: N/A;    COLOSTOMY  10/21/2019    Procedure: CREATION, COLOSTOMY;  Surgeon: Jose Lazo MD;  Location: [facility] OR;  Service: General;;  END DESCENDING COLOSTOMY    FLEXIBLE SIGMOIDOSCOPY N/A 10/21/2019    Procedure: SIGMOIDOSCOPY, FLEXIBLE;  Surgeon: Jose Lazo MD;  Location: [facility] OR;  Service: General;  Laterality: N/A;    no surgical history      REPAIR OF ABDOMINAL WALL N/A 10/30/2019    Procedure: REPAIR, ABDOMINAL WALL;  Surgeon: Torres Dawkins MD;  Location: [facility] OR;  Service: General;  Laterality: N/A;    RIGHT HEMICOLECTOMY Right 10/21/2019    Procedure: HEMICOLECTOMY, RIGHT;  Surgeon: Jose Lazo MD;  Location: [facility] OR;  Service: General;  Laterality: Right;       Current Outpatient Medications:     citalopram (CELEXA) 10 MG tablet, Take 1 tablet (10 mg total) by mouth once daily., Disp: 30 tablet, Rfl: 11    pantoprazole (PROTONIX) 40 MG tablet, Take 1 tablet (40 mg total) by mouth once daily., Disp: 30 tablet, Rfl: 11    ondansetron (ZOFRAN) 4 MG tablet, Take 1 tablet (4 mg total) by mouth every 6 (six) hours as needed for Nausea. (Patient not taking: Reported on 12/4/2019), Disp: 24 tablet, Rfl: 1    oxyCODONE-acetaminophen (PERCOCET) 5-325 mg per tablet, Take 1 tablet by mouth every 4 (four) hours as needed (moderate pain 2-5/10 pain scale). (Patient not taking: Reported on 12/4/2019), Disp: 40 tablet, Rfl: 0  Review of patient's allergies  "indicates:  No Known Allergies  Social History     Tobacco Use    Smoking status: Former Smoker    Smokeless tobacco: Never Used   Substance Use Topics    Alcohol use: Not Currently    Drug use: Never     Family History   Problem Relation Age of Onset    Diabetes Mother     Heart disease Father        CONSTITUTIONAL: No fevers, chills, night sweats, wt. loss, appetite changes  SKIN: no rashes or itching  ENT: No headaches, head trauma, vision changes, or eye pain  LYMPH NODES: None noticed   CV: No chest pain, palpitations.   RESP: No dyspnea on exertion, cough, wheezing, or hemoptysis  GI: No nausea, emesis, diarrhea, constipation, melena, hematochezia, pain.   : No dysuria, hematuria, urgency, or frequency   HEME: No easy bruising, bleeding problems  PSYCHIATRIC: No depression, anxiety, psychosis, hallucinations.  NEURO: No seizures, memory loss, dizziness or difficulty sleeping  MSK: No arthralgias or joint swelling         BP (P) 117/64 (BP Location: Left arm, Patient Position: Sitting, BP Method: Medium (Automatic))   Pulse (P) 96   Temp (P) 98.3 °F (36.8 °C) (Oral)   Resp (P) 18   Ht (P) 5' 10" (1.778 m)   Wt (P) 61.4 kg (135 lb 6.4 oz)   SpO2 (P) 95%   BMI (P) 19.43 kg/m²   Gen: NAD, A and O x3,   Psych: pleasant affect, normal thought process  Eyes: Pupils round and non dilated, EOM intact  Nose: Nares patent  OP clear, mucosa patent  Neck: suppple, no JVD, trachea midline, no palpable mass, no adenopathy  Lungs: CTAB, no wheezes, no use of accessory muscles  CV: S1S2 with RRR, No mrg  Abd: soft, NTND, + BS, No HSM, no ascites  Extr: No CCE, MEHREEN, strength normal, good capillary refill  Neuro: steady gait, CNs grossly intact  Skin: intact, no lesions noted  Rheum: No joint swelling    Lab Results   Component Value Date    WBC 11.71 11/04/2019    HGB 8.8 (L) 11/04/2019    HCT 28.4 (L) 11/04/2019    MCV 92 11/04/2019     (H) 11/04/2019         CMP  Sodium   Date Value Ref Range Status "   11/04/2019 137 136 - 145 mmol/L Final     Potassium   Date Value Ref Range Status   11/04/2019 3.5 3.5 - 5.1 mmol/L Final     Chloride   Date Value Ref Range Status   11/04/2019 100 95 - 110 mmol/L Final     CO2   Date Value Ref Range Status   11/04/2019 26 23 - 29 mmol/L Final     Glucose   Date Value Ref Range Status   11/04/2019 85 70 - 110 mg/dL Final     BUN, Bld   Date Value Ref Range Status   12/04/2019 10 8 - 23 mg/dL Final     Creatinine   Date Value Ref Range Status   12/04/2019 0.7 0.5 - 1.4 mg/dL Final     Calcium   Date Value Ref Range Status   11/04/2019 7.8 (L) 8.7 - 10.5 mg/dL Final     Total Protein   Date Value Ref Range Status   11/03/2019 5.3 (L) 6.0 - 8.4 g/dL Final     Albumin   Date Value Ref Range Status   11/03/2019 1.6 (L) 3.5 - 5.2 g/dL Final     Total Bilirubin   Date Value Ref Range Status   11/03/2019 0.6 0.1 - 1.0 mg/dL Final     Comment:     For infants and newborns, interpretation of results should be based  on gestational age, weight and in agreement with clinical  observations.  Premature Infant recommended reference ranges:  Up to 24 hours.............<8.0 mg/dL  Up to 48 hours............<12.0 mg/dL  3-5 days..................<15.0 mg/dL  6-29 days.................<15.0 mg/dL       Alkaline Phosphatase   Date Value Ref Range Status   11/03/2019 56 55 - 135 U/L Final     AST   Date Value Ref Range Status   11/03/2019 15 10 - 40 U/L Final     ALT   Date Value Ref Range Status   11/03/2019 10 10 - 44 U/L Final     Anion Gap   Date Value Ref Range Status   11/04/2019 11 8 - 16 mmol/L Final     eGFR if    Date Value Ref Range Status   12/04/2019 >60.0 >60 mL/min/1.73 m^2 Final     eGFR if non    Date Value Ref Range Status   12/04/2019 >60.0 >60 mL/min/1.73 m^2 Final     Comment:     Calculation used to obtain the estimated glomerular filtration  rate (eGFR) is the CKD-EPI equation.          Rectal adenocarcinoma  -     CBC auto differential; Future;  Expected date: 12/11/2019  -     Iron and TIBC; Future; Expected date: 12/11/2019    Anemia, unspecified type  -     CBC auto differential; Future; Expected date: 12/11/2019  -     Iron and TIBC; Future; Expected date: 12/11/2019    Reactive thrombocytosis    Hypoalbuminemia        Awaiting completion of staging  Explained treatment options per NCCN guidelines.  Patient may need neoadjuvant chemo radiation.  Patient may be deemed a surgical candidate depending on his stage.  Once he has completed his scan notes cusp the case further with Dr Lazo and make definitive plans for treatment.  Patient states if radiation is necessary he is closer to [facility] than [place].  Patient must increase his protein intake to help with hypoalbuminemia.  Low albumin at this level can be associated with early mortality early mortality    Reassess cbc , if pt remains anemia with evidence of iron deficiency I will offer him IV iron    Thank you for allowing me to evaluate and participate in the care of this pleasant patient. Please fell free to call me with any questions or concerns.    Warmly,  Shayla Stringer MD    This note was dictated with Dragon and briefly proofread. Please excuse any sentences that may be unclear or words misspelled

## 2019-12-12 DIAGNOSIS — C20 RECTAL ADENOCARCINOMA: Primary | ICD-10-CM

## 2019-12-12 NOTE — TELEPHONE ENCOUNTER
Pt has Medicare insurance.  Dr. Lazo would like him to be seen in [place]?? I'm trying to remind you of what you told me to remind you of.

## 2019-12-23 ENCOUNTER — ANESTHESIA EVENT (OUTPATIENT)
Dept: SURGERY | Facility: HOSPITAL | Age: 64
End: 2019-12-23
Payer: MEDICARE

## 2019-12-23 ENCOUNTER — HOSPITAL ENCOUNTER (OUTPATIENT)
Facility: HOSPITAL | Age: 64
Discharge: HOME | End: 2019-12-23
Attending: SURGERY | Admitting: SURGERY
Payer: MEDICARE

## 2019-12-23 ENCOUNTER — ANESTHESIA (OUTPATIENT)
Dept: SURGERY | Facility: HOSPITAL | Age: 64
End: 2019-12-23
Payer: MEDICARE

## 2019-12-23 DIAGNOSIS — C20 RECTAL CANCER: ICD-10-CM

## 2019-12-23 PROCEDURE — 36561 PR INSERT TUNNELED CV CATH WITH PORT: ICD-10-PCS | Mod: 58,RT,, | Performed by: SURGERY

## 2019-12-23 PROCEDURE — 63600175 PHARM REV CODE 636 W HCPCS: Performed by: SURGERY

## 2019-12-23 PROCEDURE — C1788 PORT, INDWELLING, IMP: HCPCS | Performed by: SURGERY

## 2019-12-23 PROCEDURE — 37000008 HC ANESTHESIA 1ST 15 MINUTES: Performed by: SURGERY

## 2019-12-23 PROCEDURE — 63600175 PHARM REV CODE 636 W HCPCS: Performed by: NURSE ANESTHETIST, CERTIFIED REGISTERED

## 2019-12-23 PROCEDURE — 37000009 HC ANESTHESIA EA ADD 15 MINS: Performed by: SURGERY

## 2019-12-23 PROCEDURE — 36000707: Performed by: SURGERY

## 2019-12-23 PROCEDURE — 36000706: Performed by: SURGERY

## 2019-12-23 PROCEDURE — 25000003 PHARM REV CODE 250: Performed by: NURSE ANESTHETIST, CERTIFIED REGISTERED

## 2019-12-23 PROCEDURE — D9220A PRA ANESTHESIA: Mod: CRNA,,, | Performed by: NURSE ANESTHETIST, CERTIFIED REGISTERED

## 2019-12-23 PROCEDURE — 71000015 HC POSTOP RECOV 1ST HR: Performed by: SURGERY

## 2019-12-23 PROCEDURE — D9220A PRA ANESTHESIA: Mod: ANES,,, | Performed by: ANESTHESIOLOGY

## 2019-12-23 PROCEDURE — 36561 INSERT TUNNELED CV CATH: CPT | Mod: 58,RT,, | Performed by: SURGERY

## 2019-12-23 PROCEDURE — 71000033 HC RECOVERY, INTIAL HOUR: Performed by: SURGERY

## 2019-12-23 PROCEDURE — 77001 FLUOROGUIDE FOR VEIN DEVICE: CPT | Mod: 26,,, | Performed by: SURGERY

## 2019-12-23 PROCEDURE — D9220A PRA ANESTHESIA: ICD-10-PCS | Mod: ANES,,, | Performed by: ANESTHESIOLOGY

## 2019-12-23 PROCEDURE — 77001 CHG FLUOROGUIDE CNTRL VEN ACCESS,PLACE,REPLACE,REMOVE: ICD-10-PCS | Mod: 26,,, | Performed by: SURGERY

## 2019-12-23 PROCEDURE — D9220A PRA ANESTHESIA: ICD-10-PCS | Mod: CRNA,,, | Performed by: NURSE ANESTHETIST, CERTIFIED REGISTERED

## 2019-12-23 PROCEDURE — 25000003 PHARM REV CODE 250: Performed by: SURGERY

## 2019-12-23 DEVICE — PORT VORTEX VX DETACH 8F 7.2F: Type: IMPLANTABLE DEVICE | Site: CHEST | Status: FUNCTIONAL

## 2019-12-23 RX ORDER — SODIUM CHLORIDE, SODIUM LACTATE, POTASSIUM CHLORIDE, CALCIUM CHLORIDE 600; 310; 30; 20 MG/100ML; MG/100ML; MG/100ML; MG/100ML
125 INJECTION, SOLUTION INTRAVENOUS CONTINUOUS
Status: CANCELLED | OUTPATIENT
Start: 2019-12-23

## 2019-12-23 RX ORDER — MORPHINE SULFATE 4 MG/ML
2 INJECTION, SOLUTION INTRAMUSCULAR; INTRAVENOUS EVERY 5 MIN PRN
Status: CANCELLED | OUTPATIENT
Start: 2019-12-23

## 2019-12-23 RX ORDER — OXYCODONE AND ACETAMINOPHEN 5; 325 MG/1; MG/1
1 TABLET ORAL EVERY 4 HOURS PRN
Qty: 30 TABLET | Refills: 0 | Status: SHIPPED | OUTPATIENT
Start: 2019-12-23 | End: 2019-12-23 | Stop reason: SDUPTHER

## 2019-12-23 RX ORDER — OXYCODONE AND ACETAMINOPHEN 5; 325 MG/1; MG/1
1 TABLET ORAL EVERY 4 HOURS PRN
Qty: 30 TABLET | Refills: 0 | Status: SHIPPED | OUTPATIENT
Start: 2019-12-23 | End: 2020-01-02

## 2019-12-23 RX ORDER — MIDAZOLAM HYDROCHLORIDE 1 MG/ML
INJECTION, SOLUTION INTRAMUSCULAR; INTRAVENOUS
Status: DISCONTINUED | OUTPATIENT
Start: 2019-12-23 | End: 2019-12-23

## 2019-12-23 RX ORDER — SUCCINYLCHOLINE CHLORIDE 20 MG/ML
INJECTION INTRAMUSCULAR; INTRAVENOUS
Status: DISCONTINUED | OUTPATIENT
Start: 2019-12-23 | End: 2019-12-23

## 2019-12-23 RX ORDER — CEFAZOLIN SODIUM 2 G/50ML
2 SOLUTION INTRAVENOUS
Status: COMPLETED | OUTPATIENT
Start: 2019-12-23 | End: 2019-12-23

## 2019-12-23 RX ORDER — EPHEDRINE SULFATE 50 MG/ML
INJECTION, SOLUTION INTRAVENOUS
Status: DISCONTINUED | OUTPATIENT
Start: 2019-12-23 | End: 2019-12-23

## 2019-12-23 RX ORDER — LIDOCAINE HYDROCHLORIDE 10 MG/ML
1 INJECTION, SOLUTION EPIDURAL; INFILTRATION; INTRACAUDAL; PERINEURAL ONCE
Status: DISCONTINUED | OUTPATIENT
Start: 2019-12-23 | End: 2019-12-23 | Stop reason: HOSPADM

## 2019-12-23 RX ORDER — SODIUM CHLORIDE, SODIUM LACTATE, POTASSIUM CHLORIDE, CALCIUM CHLORIDE 600; 310; 30; 20 MG/100ML; MG/100ML; MG/100ML; MG/100ML
INJECTION, SOLUTION INTRAVENOUS CONTINUOUS
Status: DISCONTINUED | OUTPATIENT
Start: 2019-12-23 | End: 2019-12-23 | Stop reason: HOSPADM

## 2019-12-23 RX ORDER — HEPARIN SODIUM 5000 [USP'U]/ML
INJECTION, SOLUTION INTRAVENOUS; SUBCUTANEOUS
Status: DISCONTINUED | OUTPATIENT
Start: 2019-12-23 | End: 2019-12-23 | Stop reason: HOSPADM

## 2019-12-23 RX ORDER — OXYCODONE AND ACETAMINOPHEN 5; 325 MG/1; MG/1
1 TABLET ORAL
Status: CANCELLED | OUTPATIENT
Start: 2019-12-23

## 2019-12-23 RX ORDER — BUPIVACAINE HYDROCHLORIDE AND EPINEPHRINE 2.5; 5 MG/ML; UG/ML
INJECTION, SOLUTION EPIDURAL; INFILTRATION; INTRACAUDAL; PERINEURAL
Status: DISCONTINUED | OUTPATIENT
Start: 2019-12-23 | End: 2019-12-23 | Stop reason: HOSPADM

## 2019-12-23 RX ORDER — SODIUM CHLORIDE 9 MG/ML
INJECTION, SOLUTION INTRAVENOUS CONTINUOUS
Status: DISCONTINUED | OUTPATIENT
Start: 2019-12-23 | End: 2019-12-23 | Stop reason: HOSPADM

## 2019-12-23 RX ORDER — KETOROLAC TROMETHAMINE 30 MG/ML
15 INJECTION, SOLUTION INTRAMUSCULAR; INTRAVENOUS ONCE
Status: CANCELLED | OUTPATIENT
Start: 2019-12-23 | End: 2019-12-23

## 2019-12-23 RX ORDER — ONDANSETRON 2 MG/ML
4 INJECTION INTRAMUSCULAR; INTRAVENOUS DAILY PRN
Status: CANCELLED | OUTPATIENT
Start: 2019-12-23

## 2019-12-23 RX ORDER — PROPOFOL 10 MG/ML
VIAL (ML) INTRAVENOUS
Status: DISCONTINUED | OUTPATIENT
Start: 2019-12-23 | End: 2019-12-23

## 2019-12-23 RX ORDER — MEPERIDINE HYDROCHLORIDE 50 MG/ML
INJECTION INTRAMUSCULAR; INTRAVENOUS; SUBCUTANEOUS
Status: DISCONTINUED | OUTPATIENT
Start: 2019-12-23 | End: 2019-12-23

## 2019-12-23 RX ADMIN — EPHEDRINE SULFATE 10 MG: 50 INJECTION INTRAMUSCULAR; INTRAVENOUS; SUBCUTANEOUS at 08:12

## 2019-12-23 RX ADMIN — MEPERIDINE HYDROCHLORIDE 50 MG: 50 INJECTION INTRAMUSCULAR; INTRAVENOUS; SUBCUTANEOUS at 07:12

## 2019-12-23 RX ADMIN — PROPOFOL 200 MG: 10 INJECTION, EMULSION INTRAVENOUS at 07:12

## 2019-12-23 RX ADMIN — CEFAZOLIN SODIUM 2 G: 2 SOLUTION INTRAVENOUS at 07:12

## 2019-12-23 RX ADMIN — EPHEDRINE SULFATE 5 MG: 50 INJECTION INTRAMUSCULAR; INTRAVENOUS; SUBCUTANEOUS at 08:12

## 2019-12-23 RX ADMIN — SODIUM CHLORIDE: 0.9 INJECTION, SOLUTION INTRAVENOUS at 07:12

## 2019-12-23 RX ADMIN — SUCCINYLCHOLINE CHLORIDE 100 MG: 20 INJECTION, SOLUTION INTRAMUSCULAR; INTRAVENOUS at 07:12

## 2019-12-23 RX ADMIN — MIDAZOLAM HYDROCHLORIDE 2 MG: 1 INJECTION, SOLUTION INTRAMUSCULAR; INTRAVENOUS at 07:12

## 2019-12-23 NOTE — ANESTHESIA PREPROCEDURE EVALUATION
12/23/2019  Miguel Angel Apple Sr. is a 64 y.o., male.    Pre-op Assessment    I have reviewed the Patient Summary Reports.    I have reviewed the Nursing Notes.   I have reviewed the Medications.     Review of Systems  Anesthesia Hx:  No problems with previous Anesthesia  Neg history of prior surgery. Denies Family Hx of Anesthesia complications.   Denies Personal Hx of Anesthesia complications.   Social:  Smoker, Former Smoker    Hematology/Oncology:  Hematology Normal   Oncology Normal   Oncology Comments: Colon CA     EENT/Dental:EENT/Dental Normal   Cardiovascular:  Cardiovascular Normal     Pulmonary:  Pulmonary Normal    Renal/:  Renal/ Normal     Hepatic/GI:   GERD    Musculoskeletal:  Musculoskeletal Normal    Neurological:  Neurology Normal    Endocrine:  Endocrine Normal    Dermatological:  Skin Normal    Psych:   Psychiatric History          Physical Exam  General:  Well nourished    Airway/Jaw/Neck:  Airway Findings: Mouth Opening: Normal Tongue: Normal  General Airway Assessment: Adult  Mallampati: II  TM Distance: 4 - 6 cm        Eyes/Ears/Nose:  EYES/EARS/NOSE FINDINGS: Normal   Dental:  DENTAL FINDINGS: Normal   Chest/Lungs:  Chest/Lungs Clear    Heart/Vascular:  Heart Findings: Normal Heart murmur: negative Vascular Findings: Normal    Abdomen:  Abdomen Findings: Normal    Musculoskeletal:  Musculoskeletal Findings: Normal   Skin:  Skin Findings: Normal    Mental Status:  Mental Status Findings: Normal        Anesthesia Plan  Type of Anesthesia, risks & benefits discussed:  Anesthesia Type:  general  Patient's Preference:   Intra-op Monitoring Plan: standard ASA monitors  Intra-op Monitoring Plan Comments:   Post Op Pain Control Plan:   Post Op Pain Control Plan Comments:   Induction:   IV  Beta Blocker:  Patient is not currently on a Beta-Blocker (No further documentation required).        Informed Consent: Patient understands risks and agrees with Anesthesia plan.  Questions answered. Anesthesia consent signed with patient.  ASA Score: 2     Day of Surgery Review of History & Physical:    H&P update referred to the provider.

## 2019-12-23 NOTE — DISCHARGE SUMMARY
Discharge Note        SUMMARY     Admit Date: 12/23/2019    Attending Physician: Jose Lazo MD     Discharge Physician: Jose Lazo MD    Discharge Date: 12/23/2019 8:54 AM      Hospital Course: Patient tolerated procedure well.     Disposition: Home or Self Care    Patient Instructions:   Current Discharge Medication List      CONTINUE these medications which have CHANGED    Details   oxyCODONE-acetaminophen (PERCOCET) 5-325 mg per tablet Take 1 tablet by mouth every 4 (four) hours as needed for Pain.  Qty: 30 tablet, Refills: 0         CONTINUE these medications which have NOT CHANGED    Details   citalopram (CELEXA) 10 MG tablet Take 1 tablet (10 mg total) by mouth once daily.  Qty: 30 tablet, Refills: 11      ondansetron (ZOFRAN) 4 MG tablet Take 1 tablet (4 mg total) by mouth every 6 (six) hours as needed for Nausea.  Qty: 24 tablet, Refills: 1      pantoprazole (PROTONIX) 40 MG tablet Take 1 tablet (40 mg total) by mouth once daily.  Qty: 30 tablet, Refills: 11             Discharge Procedure Orders (must include Diet, Follow-up, Activity):   Discharge Procedure Orders (must include Diet, Follow-up, Activity)   Diet general     Call MD for:  temperature >100.4     Call MD for:  persistent nausea and vomiting     Call MD for:  severe uncontrolled pain     Call MD for:  difficulty breathing, headache or visual disturbances     Call MD for:  redness, tenderness, or signs of infection (pain, swelling, redness, odor or green/yellow discharge around incision site)     Call MD for:  persistent dizziness or light-headedness        Follow Up:  Follow up as scheduled.  Resume routine diet.  Activity as tolerated.    No driving day of procedure.

## 2019-12-23 NOTE — DISCHARGE INSTRUCTIONS
Receiving IV Chemotherapy     A PICC is one kind of central venous catheter.     You may have a short-term IV that is removed after each treatment. Or you may have a central venous catheter. This is a thin tube that is inserted into a large vein with access to your central blood supply. It is left in place as long as needed.  Short-term IV  A short-term IV may be placed in the hand or in the arm between the hand and elbow. You may feel a coolness when the IV is started. Treatment usually takes from 30 minutes to 8 hours. The time it takes depends on the number and type of medicines, and whether fluids are also being given. The needle is removed when the course of therapy is complete. If inserting the short-term IV becomes difficult, a central venous catheter can be used.  Central venous catheters  There are three types of central venous catheters: PICC (peripherally inserted central catheter), tunneled line (also called Central Line), and implantable port access. They can be left in place for weeks or months. The benefits of having a central catheter are that it:  · Allows blood to be drawn more easily  · Limits repeated needle sticks  · May allow more than one medicine to be given at a time  · Reduces the likelihood that medicine will leak   The risks include:  · Infection  · Clots forming in or around the catheter  · Problems clearing (flushing) the catheter  · Leaks or breaks in the catheter  Discuss these risks and benefits with your healthcare provider.  When to call the healthcare provider  No matter which type of IV access you have, call your healthcare provider right away if you have any of the following:  · Itching, rash, hives, wheezing, trouble breathing, or chest pain after receiving chemotherapy  · Fever of 100.4°F (38°C) or higher, taken by mouth, or as advised  · Redness, pain, or swelling at or near the catheter site  · Drainage or bleeding from the skin around the catheter  · The catheter comes  out or breaks   Date Last Reviewed: 5/1/2016 © 2000-2017 [employer]. [address] [place] [address], [place] [address]. All rights reserved. This information is not intended as a substitute for professional medical care. Always follow your healthcare professional's instructions.        Anesthesia: General Anesthesia     You are watched continuously during your procedure by your anesthesia provider.     Youre due to have surgery. During surgery, youll be given medicine called anesthesia or anesthetic. This will keep you comfortable and pain-free. Your anesthesia provider will use general anesthesia.  What is general anesthesia?  General anesthesia puts you into a state like deep sleep. It goes into the bloodstream (IV anesthetics), into the lungs (gas anesthetics), or both. You feel nothing during the procedure. You will not remember it. During the procedure, the anesthesia provider monitors you continuously. He or she checks your heart rate and rhythm, blood pressure, breathing, and blood oxygen.  · IV anesthetics. IV anesthetics are given through an IV line in your arm. Theyre often given first. This is so you are asleep before a gas anesthetic is started. Some kinds of IV anesthetics relieve pain. Others relax you. Your doctor will decide which kind is best in your case.  · Gas anesthetics. Gas anesthetics are breathed into the lungs. They are often used to keep you asleep. They can be given through a facemask or a tube placed in your larynx or trachea (breathing tube).  ¨ If you have a facemask, your anesthesia provider will most likely place it over your nose and mouth while youre still awake. Youll breathe oxygen through the mask as your IV anesthetic is started. Gas anesthetic may be added through the mask.  ¨ If you have a tube in the larynx or trachea, it will be inserted into your throat after youre asleep.  Anesthesia tools and medicines  You will likely have:  · IV anesthetics. These are put  into an IV line into your bloodstream.  · Gas anesthetics. You breathe these anesthetics into your lungs, where they pass into your bloodstream.  · Pulse oximeter. This is a small clip that is attached to the end of your finger. This measures your blood oxygen level.  · Electrocardiography leads (electrodes). These are small sticky pads that are placed on your chest. They record your heart rate and rhythm.  · Blood pressure cuff. This reads your blood pressure.  Risks and possible complications  General anesthesia has some risks. These include:  · Breathing problems  · Nausea and vomiting  · Sore throat or hoarseness (usually temporary)  · Allergic reaction to the anesthetic  · Irregular heartbeat (rare)  · Cardiac arrest (rare)   Anesthesia safety  · Follow all instructions you are given for how long not to eat or drink before your procedure.  · Be sure your doctor knows what medicines and drugs you take. This includes over-the-counter medicines, herbs, supplements, alcohol or other drugs. You will be asked when those were last taken.  · Have an adult family member or friend drive you home after the procedure.  · For the first 24 hours after your surgery:  ¨ Do not drive or use heavy equipment.  ¨ Do not make important decisions or sign legal documents. If important decisions or signing legal documents is necessary during the first 24 hours after surgery, have a trusted family member or spouse act on your behalf.  ¨ Avoid alcohol.  ¨ Have a responsible adult stay with you. He or she can watch for problems and help keep you safe.  Date Last Reviewed: 12/1/2016  © 7913-8767 [employer]. [address] [place] [address], [place] [address]. All rights reserved. This information is not intended as a substitute for professional medical care. Always follow your healthcare professional's instructions.

## 2019-12-23 NOTE — ANESTHESIA POSTPROCEDURE EVALUATION
Anesthesia Post Evaluation    Patient: Miguel Angel Apple     Procedure(s) Performed: Procedure(s) (LRB):  INSERTION, PORT-A-CATH (Right)    Final Anesthesia Type: general    Patient location during evaluation: PACU  Patient participation: Yes- Able to Participate  Level of consciousness: awake and awake and alert  Post-procedure vital signs: reviewed and stable  Pain management: adequate  Airway patency: patent    PONV status at discharge: No PONV  Anesthetic complications: no      Cardiovascular status: blood pressure returned to baseline  Respiratory status: unassisted and spontaneous ventilation  Hydration status: euvolemic  Follow-up not needed.          Vitals Value Taken Time   /77 12/23/2019  9:55 AM   Temp 36.9 °C (98.5 °F) 12/23/2019  6:49 AM   Pulse 112 12/23/2019  9:55 AM   Resp 16 12/23/2019  9:55 AM   SpO2 96 % 12/23/2019  9:55 AM   Vitals shown include unvalidated device data.      Event Time     Out of Recovery 10:06:00          Pain/Martin Score: Martin Score: 10 (12/23/2019  9:55 AM)

## 2019-12-23 NOTE — BRIEF OP NOTE
[facility] - Periop Services  Brief Operative Note     SUMMARY     Surgery Date: 12/23/2019     Surgeon(s) and Role:     * Jose Lazo MD - Primary    Assistant: Valeri    Pre-op Diagnosis:  Rectal cancer [C20]    Post-op Diagnosis:  Post-Op Diagnosis Codes:     * Rectal cancer [C20]    Operation:  Implantation of right internal jugular vein central venous catheter, tunneled, with subcutaneous port on right chest under ultrasound and fluoroscopic guidance.    Description of the findings of the procedure:  Right chest subcutaneous port easily aspirates to dark venous blood return and flushes.  Catheter tip at SVC atrial junction.    Estimated Blood Loss: 5cc         Specimens:   None.    Anesthesia:  General.    Complications:  None apparent in the OR.    Implants:  Right internal jugular vein central venous catheter, tunnel, with subcutaneous port, low profile.

## 2019-12-23 NOTE — INTERVAL H&P NOTE
The patient has been examined and the H&P has been reviewed:    I concur with the findings and no changes have occurred since H&P was written.    Surgery risks, benefits and alternative options discussed and understood by patient/family.          Active Hospital Problems    Diagnosis  POA    Rectal cancer [C20]  Yes      Resolved Hospital Problems   No resolved problems to display.

## 2019-12-23 NOTE — OP NOTE
[facility] - Periop Services  Brief Operative Note     SUMMARY     Surgery Date: 12/23/2019     Surgeon(s) and Role:     * Jose Lazo MD - Primary    Assistant: Valeri    Pre-op Diagnosis:  Rectal cancer [C20]    Post-op Diagnosis:  Post-Op Diagnosis Codes:     * Rectal cancer [C20]    Operation:  Implantation of right internal jugular vein central venous catheter, tunneled, with subcutaneous port on right chest under ultrasound and fluoroscopic guidance.    Description of the findings of the procedure:  Right chest subcutaneous port easily aspirates to dark venous blood return and flushes.  Catheter tip at SVC atrial junction.    Estimated Blood Loss: 5cc         Specimens:   None.    Anesthesia:  General.    Complications:  None apparent in the OR.    Implants:  Right internal jugular vein central venous catheter, tunnel, with subcutaneous port, low profile.    DATE OF PROCEDURE:  12/23/2019    INDICATIONS FOR PROCEDURE:  Mr. Apple is a 64-year-old male who is an  established patient of mine diagnosed with rectal cancer, locally advanced.   He underwent laparotomy with proximal diversion and right hemicolectomy  for perforated right colon related to large bowel obstruction related to  the mass within the patient's rectum.  He has seen Medical Oncology.  They  recommend neoadjuvant chemoradiation therapy.  They recommended placement  of subcutaneous port for chemotherapy purposes.  The patient was offered  Infusaport/chemo port.  Risks and benefits were discussed in detail in  clinic.  The patient voiced understanding of the risks and benefits and  wished to proceed today by signing informed consent.    PROCEDURE IN DETAIL:  The patient was seen in preoperative holding.  He was  identified.  Informed consent was signed after risks and benefits were  discussed once again.  He was brought back into the Operative Room and  placed on the table in supine position.  The patient's  bilateral neck and  bilateral chest were prepped and draped in the standard sterile surgical  fashion.  Anesthesia was given per Anesthesia, general was used, please see  separate dictated Anesthesia note for more details.  A timeout was  conducted with all in the Operating Room in agreement to correct patient,  correct procedure, correct allergies and correct antibiotics.  The patient  was given 2 g of IV Ancef prior to surgical incision.    I then used the sterile ultrasound to identify the patient's right internal  jugular vein.  This was the dark venous compressible structure, lateral to  the carotid.  Then, 2 mL of 0.25% Marcaine with epinephrine was used as  local anesthesia for this area.  One stick was performed with the needle  provided by the port kit and syringe.  Aspiration of dark venous blood  returned.  A wire was placed over the needle.  Needle and syringe were  removed in their entirety.  Ultrasound was used to confirm the wire was in  the dark venous compressible structure, which was in the patient's right  neck.  Hemostat was placed with the drapes and the wire.  Fluoro was  brought in the operative field.  Fluoro confirmed the wire to be completely  on the right side of the patient's spine.  At this point, the patient was  placed in a slightly head up position.  Then, 20 mL of 0.25% Marcaine with  epinephrine was then used for local anesthesia of the patient's planned  tunneled and subcutaneous port sites.  A #15 blade was made over the  patient's right chest site skin and soft tissue.  The incision was carried  down through soft tissue with Bovie electrocautery.  Inferiorly, a pocket  was created for the port.  Superiorly and medially, the catheter provided  by the kit and tunneler provided by the kit was used to tunnel the catheter  to the internal jugular vein stick site.  This was performed.  Fluoro was  then brought back in the operative field.  A dilator and sheath provided by  the kit was  placed over the wire and advanced easily over the wire into the  patient's internal jugular vein into the superior vena cava.  The wire at  all times was easily mobile in relation to the dilator.  The wire and  dilator were removed.  Catheter was placed over the dilator sheath.  The  dilator sheath was then removed.  The catheter was then pulled back to  approximately 16 cm at the right neck stick site.  This was at the junction  of the SVC-atrial junction.  The catheter was then pulled taut to the chemo  port site.  At this point, the chemo port was placed on the catheter after  the catheter was trimmed back.  Blue locking device was placed over top of  the catheter port junction.  The port was placed in the subcutaneous  pocket.  At this point, a Maldonado needle and hep saline was used to place  into the subcutaneous port.  It easily aspirated back till dark venous  blood returned and flushed easily.  At this point, all surgical sites were  closed with 3-0 Vicryl suture in a simple subdermal fashion and 4-0  Monocryl suture in a running epidermal fashion.  Dermabond was placed over  top of the surgical sites as a dressing.  Additional shot was performed by  fluoro to confirm right chest port in place with no catheter kink within  the subcutaneous tissues and in the SVC-atrial junction for which it was.   The patient tolerated the procedure well.  He was turned back over to  Anesthesia for extubation in the OR and transferred back to the  Postoperative Care Unit in stable condition.  All counts were correct at  the end of the procedure including lap pads, instruments as well as  needles.  Plan will be for postoperative chest x-ray in the PACU.   Postoperative chest x-ray without cardiopulmonary issues, the patient will  be discharged home safely today with adequate pain medications.  Follow up  in Surgical Clinic in 2 weeks.  Catheter, subcutaneous port, all ready to  use.                SDR/IN dd: 12/23/2019 09:03:38  (CST)   td: 12/23/2019 09:33:13 (CST)  Doc ID #[id number]   Job ID #[id number]    CC:            [id number]

## 2019-12-23 NOTE — TRANSFER OF CARE
Anesthesia Transfer of Care Note    Patient: Miguel Angel Apple SrReynold    Procedure(s) Performed: Procedure(s) (LRB):  INSERTION, PORT-A-CATH (Right)    Patient location: PACU    Anesthesia Type: general    Transport from OR: Transported from OR on room air with adequate spontaneous ventilation    Post pain: adequate analgesia    Post assessment: no apparent anesthetic complications and tolerated procedure well    Post vital signs: stable    Level of consciousness: awake, alert and oriented    Nausea/Vomiting: no nausea/vomiting    Complications: none    Transfer of care protocol was followed      Last vitals:   Visit Vitals  /77 (BP Location: Right arm, Patient Position: Sitting)   Pulse 84   Temp 36.9 °C (98.5 °F) (Oral)   Resp 20   Wt 60.8 kg (134 lb)   SpO2 97%   BMI 19.23 kg/m²

## 2019-12-23 NOTE — PLAN OF CARE
Pt awake and oriented, resting comfortably.Vital signs stable. No distress noted. Bed in lowest position with wheels locked

## 2019-12-26 ENCOUNTER — TELEPHONE (OUTPATIENT)
Dept: HEMATOLOGY/ONCOLOGY | Facility: CLINIC | Age: 64
End: 2019-12-26

## 2019-12-26 ENCOUNTER — SOCIAL WORK (OUTPATIENT)
Dept: HEMATOLOGY/ONCOLOGY | Facility: CLINIC | Age: 64
End: 2019-12-26

## 2019-12-26 ENCOUNTER — DOCUMENTATION ONLY (OUTPATIENT)
Dept: RADIATION ONCOLOGY | Facility: CLINIC | Age: 64
End: 2019-12-26

## 2019-12-26 ENCOUNTER — OFFICE VISIT (OUTPATIENT)
Dept: RADIATION ONCOLOGY | Facility: CLINIC | Age: 64
End: 2019-12-26
Payer: MEDICARE

## 2019-12-26 VITALS
WEIGHT: 137.81 LBS | BODY MASS INDEX: 19.77 KG/M2 | RESPIRATION RATE: 18 BRPM | HEART RATE: 80 BPM | DIASTOLIC BLOOD PRESSURE: 73 MMHG | OXYGEN SATURATION: 98 % | TEMPERATURE: 98 F | SYSTOLIC BLOOD PRESSURE: 111 MMHG

## 2019-12-26 DIAGNOSIS — C20 MALIGNANT NEOPLASM OF RECTUM: ICD-10-CM

## 2019-12-26 DIAGNOSIS — D49.0 COLORECTAL NEOPLASM: ICD-10-CM

## 2019-12-26 DIAGNOSIS — K62.89 RECTAL MASS: Primary | ICD-10-CM

## 2019-12-26 PROCEDURE — 99205 OFFICE O/P NEW HI 60 MIN: CPT | Mod: S$GLB,,, | Performed by: RADIOLOGY

## 2019-12-26 PROCEDURE — 99205 PR OFFICE/OUTPT VISIT, NEW, LEVL V, 60-74 MIN: ICD-10-PCS | Mod: S$GLB,,, | Performed by: RADIOLOGY

## 2019-12-26 NOTE — PROGRESS NOTES
[facility]  [id number]  1955  12/26/2019  Shayla Stringer Md  [address] [address]    REASON FOR CONSULTATION:  Rectal cancer  TREATMENT GOAL: neoadjuvant    HISTORY OF PRESENT ILLNESS:   64-year-old gentleman presented with changes in bowel habits over the past several months. .  He lost 50 lb.  He noticed increasing diarrhea and was treated for what sounds like colitis.  He received antibiotics for this.  He stated initially was having only liquid and blood mixed in with the stool.  This eventually did diminish and he eventually became completely obstructed.    He underwent CT and MRI imaging of the pelvis.  The MRI results were as follows:  1. 11 cm colorectal mass which is locally invasive with miriam extension into the adjacent mesenteric fat along the presacral space.  2. At least one indeterminate mesorectal lymph node is noted.  3.  Diffuse urinary bladder wall thickening with differential to include cystitis and chronic outlet obstruction    Because of the at obstruction he underwent a diverting colostomy.  In addition he was found to have a cecal perforation with serosal adhesions.  There is no evidence of malignancy in the distal ileum and right colon.  However the rectal mass identified imaging was biopsy showing adenocarcinoma arising in an adenoma.  This is consistent with the 11 cm mass with extension into the mesenteric fat.    Postoperatively has done well.  He states the colostomy is working very well.  He still has some minor drainage from the anus with decreasing amounts of blood.  He states that he does not have any pain in the abdominal pelvic area.  He has no difficulty with urination.  He states his appetite has begun to return somewhat.    Review of Systems   Constitutional: Positive for unexpected weight change. Negative for fever.   HENT:   Negative for sore throat and trouble swallowing.    Respiratory: Negative for cough and shortness of breath.     Gastrointestinal: Positive for blood in stool and constipation. Negative for abdominal distention, abdominal pain and vomiting.   Genitourinary: Negative for difficulty urinating and dysuria.    Skin: Negative for itching and rash.   Neurological: Negative for extremity weakness and seizures.   Psychiatric/Behavioral: Negative for confusion. The patient is not nervous/anxious.      Past Medical History:   Diagnosis Date    Colon cancer     Depression     GERD (gastroesophageal reflux disease)     Medical history reviewed with no changes      Past Surgical History:   Procedure Laterality Date    CHOLECYSTECTOMY  10/21/2019    Procedure: CHOLECYSTECTOMY;  Surgeon: Jose Lazo MD;  Location: [facility] OR;  Service: General;;    CLOSURE OF WOUND N/A 10/30/2019    Procedure: CLOSURE, WOUND;  Surgeon: Torres Dawkins MD;  Location: [facility] OR;  Service: General;  Laterality: N/A;    COLOSTOMY  10/21/2019    Procedure: CREATION, COLOSTOMY;  Surgeon: Jose Lazo MD;  Location: [facility] OR;  Service: General;;  END DESCENDING COLOSTOMY    FLEXIBLE SIGMOIDOSCOPY N/A 10/21/2019    Procedure: SIGMOIDOSCOPY, FLEXIBLE;  Surgeon: Jose Lazo MD;  Location: [facility] OR;  Service: General;  Laterality: N/A;    no surgical history      REPAIR OF ABDOMINAL WALL N/A 10/30/2019    Procedure: REPAIR, ABDOMINAL WALL;  Surgeon: Torres Dawkins MD;  Location: [facility] OR;  Service: General;  Laterality: N/A;    RIGHT HEMICOLECTOMY Right 10/21/2019    Procedure: HEMICOLECTOMY, RIGHT;  Surgeon: Jose Lazo MD;  Location: [facility];  Service: General;  Laterality: Right;     Social History     Socioeconomic History    Marital status:      Spouse name: Not on file    Number of children: Not on file    Years of education: Not on file    Highest education level: Not on file   Occupational History    Not on file   Social Needs    Financial resource strain: Not on file    Food insecurity:      Worry: Not on file     Inability: Not on file    Transportation needs:     Medical: Not on file     Non-medical: Not on file   Tobacco Use    Smoking status: Former Smoker    Smokeless tobacco: Never Used   Substance and Sexual Activity    Alcohol use: Not Currently    Drug use: Never    Sexual activity: Not Currently   Lifestyle    Physical activity:     Days per week: Not on file     Minutes per session: Not on file    Stress: Not on file   Relationships    Social connections:     Talks on phone: Not on file     Gets together: Not on file     Attends Confucianist service: Not on file     Active member of club or organization: Not on file     Attends meetings of clubs or organizations: Not on file     Relationship status: Not on file   Other Topics Concern    Not on file   Social History Narrative    Not on file     Family History   Problem Relation Age of Onset    Diabetes Mother     Heart disease Father        PRIOR HISTORY OF CHEMOTHERAPY OR RADIOTHERAPY: Please see HPI for patients prior oncologic history.    Medication List with Changes/Refills   Current Medications    CITALOPRAM (CELEXA) 10 MG TABLET    Take 1 tablet (10 mg total) by mouth once daily.    ONDANSETRON (ZOFRAN) 4 MG TABLET    Take 1 tablet (4 mg total) by mouth every 6 (six) hours as needed for Nausea.    OXYCODONE-ACETAMINOPHEN (PERCOCET) 5-325 MG PER TABLET    Take 1 tablet by mouth every 4 (four) hours as needed for Pain.    PANTOPRAZOLE (PROTONIX) 40 MG TABLET    Take 1 tablet (40 mg total) by mouth once daily.     Review of patient's allergies indicates:  No Known Allergies    QUALITY OF LIFE: 70%- Cares for Self: Unable to Carry on Normal Activity or Active Work    Vitals:    12/26/19 0910   BP: 111/73   Pulse: 80   Resp: 18   Temp: 98 °F (36.7 °C)   SpO2: 98%   Weight: 62.5 kg (137 lb 12.8 oz)   PainSc:   3   PainLoc: Neck       PHYSICAL EXAM:  Alert oriented no distress answers questions well  GENERAL: alert; in no apparent  distress.   HEAD: normocephalic, atraumatic.  EYES: pupils are equal, round, reactive to light and accommodation. Sclera anicteric. Conjunctiva not injected.   NOSE/THROAT: no nasal erythema or rhinorrhea. Oropharynx pink, without erythema, ulcerations or thrush.   NECK: no cervical motion rigidity; supple with no masses.  CHEST: clear to auscultation bilaterally; no wheezes, crackles or rubs. Patient is speaking comfortably on room air with normal work of breathing without using accessory muscles of respiration.  CARDIOVASCULAR: regular rate and rhythm; no murmurs, rubs or gallops.  ABDOMEN: soft, nontender, nondistended. Bowel sounds present.  Colostomy site clean, no evidence of pain on palpation  MUSCULOSKELETAL: no tenderness to palpation along the spine or scapulae. Normal range of motion.  NEUROLOGIC: cranial nerves II-XII intact bilaterally. Strength 5/5 in bilateral upper and lower extremities. No sensory deficits appreciated. Reflexes globally intact. No cerebellar signs. Normal gait.  LYMPHATIC: no cervical, supraclavicular or axillary adenopathy appreciated bilaterally.   EXTREMITIES: no clubbing, cyanosis, edema.  SKIN: no erythema, rashes, ulcerations noted.     REVIEW OF IMAGING/PATHOLOGY/LABS: Please see HPI. All images reviewed personally by dictating physician.     ASSESSMENT:  64-year-old gentleman with rectal adenocarcinoma, 11 cm mass with penetration into the subserosal fat probable stage radiographically cT3/T4 Nx Mx, KPS 70-80 significant weight loss current lead diverted.  PLAN:  I reviewed for him the current staging information and appropriate treatment options.  Since he is a T3 or T4 lesion neoadjuvant chemo and radiation therapy is indicated.  I drew him diagrams showing him the location of the lesion and the goals of treatment to be done prior to surgery which is to down stage the tumor hopefully making it more resectable to achieve negative margins.  There was some question about the  lymph nodes that were somewhat enlarged on MRI imaging.  I would like to have a PET scan to clarify the status of these lymph nodes which may be included in the high-dose radiation portion of his treatment..    He has been counseled with respect to systemic therapy and I believe he will be treated with concomitant chemo and radiation therapy.    In terms of radiation therapy I  recommend 45 Gy going to the mass and pelvic lymph nodes with a cone-down boost to the large obstructing mass and PET positive lymph nodes to 50.4 Gy.    I explained to him this would be followed by a period of recovery and hopefully down staging identified by further imaging prior to surgery.    Discussed the side effects of possible complications of radiation therapy.  All questions were addressed.  We will have him return to us for simulation after he undergoes his PET scan.      We discussed the risks and benefits of the above treatment and have gone over in detail the acute and late toxicities of radiation therapy to the PELVIS. The patient expressed  understanding and has signed a consent form which is included in the patients chart. The patient has our contact information and understands that they are free to contact us at any time with questions or concerns regarding radiation therapy.    DISPOSITION: RTC FOR CT SIM    TIME SPENT WITH PATIENT: I have personally seen and evaluated this patient. Greater than 50% of this time was spent discussing coordination of care and/or counseling.     PHYSICIAN: Edwin Black MD

## 2019-12-26 NOTE — PROGRESS NOTES
Met with patient to complete new patient orientation and the [facility] Distress Screening; he indicated a rating of 7.  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  He stated that he just needs some time to process all of the information he has been given; he feels somewhat better after meeting with Dr. Black.  I provided him with my contact information in the event he would like to pursue counseling.  He indicated transportation as a possible barrier, I provided him with a $50 Lyft voucher from the [employer].  I also completed a [employer] application for gas card assistance and forwarded it to that agency.  He stated that he can stay at his sister house here in [place] if he has a reaction from radiation which renders him unable to drive.

## 2019-12-26 NOTE — PROGRESS NOTES
Miguel Angel Apple .  [id number]  1955  12/26/2019  No referring provider defined for this encounter.    DIAGNOSIS:  Rectal cancer  TREATMENT SITE(S):  Pelvis    INTENT: CURATIVE     HPI  PET/CT  1. Large intensely hypermetabolic mass in the sigmoid colon and upper rectum, compatible with known colorectal carcinoma.  2. Multifocal hypermetabolic soft tissue densities along the pelvic peritoneal reflections and in the left lower quadrant of the abdomen, suspicious for peritoneal metastatic disease.  3. No additional evidence for metastatic disease.  4. A hypermetabolic nodule in the left lobe of the thyroid gland is nonspecific, and correlation with thyroid ultrasound is recommended.    D/w Dr. Duong Will proceed with CRT. RT fields will cover all PET avid disease.    TREATMENT SETTING: NEOADJUVANT     MODALITY: PHOTON    TECHNIQUE:  INTENSITY MODULATED RADIOTHERAPY (IMRT)    IMRT MEDICAL NECESSITY:IMRT MEDICAL NECESSITY: Target volume irregular shape/proximate to critical structures, Narrow margins needed to protect adjacent structures and High precision necessary     HPI:  64-year-old with 11 cm high rectal adenocarcinoma stage T3/T4 NX MX, KPS 70-80    I have personally performed treatment planning for the patient, reviewing relevant history/physical and imaging. I have defined GTV, CTV, PTV and organs at risk.     In order to accomplish this plan, I am ordering:  SIMULATION: CT SIMULATION FOR PLACEMENT OF TREATMENT FIELDS    CONTRAST: IV    TO ACCOMPLISH REPRODUCIBLE POSITION: VACLOC and FULL BLADDER    DEVICES FOR BEAM SHAPING: CUSTOMIZED MLC    CUSTOMIZED BOLUS: none    IMAGING: CBCT DAILY    I have ordered a weekly physics check.  SPECIAL PHYSICS CONSULT: NO  REASON: N/A    SPECIAL TREATMENT CIRCUMSTANCE: YES   Concurrent or recent administration of chemotherapeutic agents which are  known potent radiosensitizers and thus will require vigilant monitoring for  exaggerated radiation toxicities.    LABS:  NONE    ANTICIPATED PRESCRIPTION:  45 Gy to the pelvis (nodes and rectal mass) with 5.4 Gy boost to the rectal mass and PET positive nodes, total dose 50.4 Gy.    TREATMENT: DAILY    PHYSICIAN: Edwin Black MD

## 2019-12-27 ENCOUNTER — TELEPHONE (OUTPATIENT)
Dept: RADIATION ONCOLOGY | Facility: CLINIC | Age: 64
End: 2019-12-27

## 2019-12-27 NOTE — TELEPHONE ENCOUNTER
Left message on patient's voice mail requesting he contact our imaging center to schedule his PET/CT.

## 2019-12-30 ENCOUNTER — PATIENT MESSAGE (OUTPATIENT)
Dept: RADIATION ONCOLOGY | Facility: CLINIC | Age: 64
End: 2019-12-30

## 2019-12-30 ENCOUNTER — HOSPITAL ENCOUNTER (OUTPATIENT)
Dept: RADIOLOGY | Facility: HOSPITAL | Age: 64
Discharge: HOME | End: 2019-12-30
Attending: RADIOLOGY
Payer: MEDICARE

## 2019-12-30 VITALS — BODY MASS INDEX: 20.14 KG/M2 | HEIGHT: 69 IN | WEIGHT: 136 LBS

## 2019-12-30 DIAGNOSIS — C20 MALIGNANT NEOPLASM OF RECTUM: ICD-10-CM

## 2019-12-30 DIAGNOSIS — D49.0 COLORECTAL NEOPLASM: ICD-10-CM

## 2019-12-30 DIAGNOSIS — K62.89 RECTAL MASS: ICD-10-CM

## 2019-12-30 LAB — GLUCOSE SERPL-MCNC: 88 MG/DL (ref 70–110)

## 2019-12-30 PROCEDURE — 78815 PET IMAGE W/CT SKULL-THIGH: CPT | Mod: TC,PO

## 2019-12-30 PROCEDURE — A9552 F18 FDG: HCPCS | Mod: PO

## 2019-12-31 NOTE — PATIENT INSTRUCTIONS
Radiation to the male pelvis and skin care instruction sheet given along with [employer] Booklet.  Patient verbalized understanding.

## 2020-01-02 VITALS
BODY MASS INDEX: 19.23 KG/M2 | WEIGHT: 134 LBS | DIASTOLIC BLOOD PRESSURE: 77 MMHG | SYSTOLIC BLOOD PRESSURE: 121 MMHG | HEART RATE: 109 BPM | RESPIRATION RATE: 16 BRPM | OXYGEN SATURATION: 96 % | TEMPERATURE: 99 F

## 2020-01-03 ENCOUNTER — LAB VISIT (OUTPATIENT)
Dept: LAB | Facility: HOSPITAL | Age: 65
End: 2020-01-03
Attending: INTERNAL MEDICINE
Payer: MEDICARE

## 2020-01-03 DIAGNOSIS — C20 RECTAL ADENOCARCINOMA: ICD-10-CM

## 2020-01-03 DIAGNOSIS — D64.9 ANEMIA, UNSPECIFIED TYPE: ICD-10-CM

## 2020-01-03 LAB
BASOPHILS # BLD AUTO: 0.05 K/UL (ref 0–0.2)
BASOPHILS NFR BLD: 0.5 % (ref 0–1.9)
DIFFERENTIAL METHOD: ABNORMAL
EOSINOPHIL # BLD AUTO: 0.3 K/UL (ref 0–0.5)
EOSINOPHIL NFR BLD: 2.3 % (ref 0–8)
ERYTHROCYTE [DISTWIDTH] IN BLOOD BY AUTOMATED COUNT: 14.1 % (ref 11.5–14.5)
HCT VFR BLD AUTO: 31.7 % (ref 40–54)
HGB BLD-MCNC: 9.6 G/DL (ref 14–18)
IMM GRANULOCYTES # BLD AUTO: 0.04 K/UL (ref 0–0.04)
IMM GRANULOCYTES NFR BLD AUTO: 0.4 % (ref 0–0.5)
IRON SERPL-MCNC: 11 UG/DL (ref 45–160)
LYMPHOCYTES # BLD AUTO: 2 K/UL (ref 1–4.8)
LYMPHOCYTES NFR BLD: 18.1 % (ref 18–48)
MCH RBC QN AUTO: 29.2 PG (ref 27–31)
MCHC RBC AUTO-ENTMCNC: 30.3 G/DL (ref 32–36)
MCV RBC AUTO: 96 FL (ref 82–98)
MONOCYTES # BLD AUTO: 0.8 K/UL (ref 0.3–1)
MONOCYTES NFR BLD: 7.6 % (ref 4–15)
NEUTROPHILS # BLD AUTO: 7.7 K/UL (ref 1.8–7.7)
NEUTROPHILS NFR BLD: 71.1 % (ref 38–73)
NRBC BLD-RTO: 0 /100 WBC
PLATELET # BLD AUTO: 310 K/UL (ref 150–350)
PMV BLD AUTO: 8.4 FL (ref 9.2–12.9)
RBC # BLD AUTO: 3.29 M/UL (ref 4.6–6.2)
SATURATED IRON: 5 % (ref 20–50)
TOTAL IRON BINDING CAPACITY: 219 UG/DL (ref 250–450)
TRANSFERRIN SERPL-MCNC: 148 MG/DL (ref 200–375)
WBC # BLD AUTO: 10.86 K/UL (ref 3.9–12.7)

## 2020-01-03 PROCEDURE — 83540 ASSAY OF IRON: CPT

## 2020-01-03 PROCEDURE — 85025 COMPLETE CBC W/AUTO DIFF WBC: CPT

## 2020-01-03 PROCEDURE — 36415 COLL VENOUS BLD VENIPUNCTURE: CPT

## 2020-01-07 ENCOUNTER — OFFICE VISIT (OUTPATIENT)
Dept: HEMATOLOGY/ONCOLOGY | Facility: CLINIC | Age: 65
End: 2020-01-07
Payer: MEDICARE

## 2020-01-07 VITALS
WEIGHT: 140.63 LBS | OXYGEN SATURATION: 100 % | SYSTOLIC BLOOD PRESSURE: 131 MMHG | BODY MASS INDEX: 20.13 KG/M2 | HEART RATE: 96 BPM | DIASTOLIC BLOOD PRESSURE: 61 MMHG | TEMPERATURE: 98 F | RESPIRATION RATE: 16 BRPM | HEIGHT: 70 IN

## 2020-01-07 DIAGNOSIS — C20 MALIGNANT NEOPLASM OF RECTUM: Primary | ICD-10-CM

## 2020-01-07 DIAGNOSIS — D50.8 OTHER IRON DEFICIENCY ANEMIA: ICD-10-CM

## 2020-01-07 DIAGNOSIS — Z51.11 ENCOUNTER FOR CHEMOTHERAPY MANAGEMENT: ICD-10-CM

## 2020-01-07 DIAGNOSIS — C20 RECTAL ADENOCARCINOMA: ICD-10-CM

## 2020-01-07 PROBLEM — D50.9 IRON DEFICIENCY ANEMIA: Status: ACTIVE | Noted: 2020-01-07

## 2020-01-07 PROCEDURE — 1125F AMNT PAIN NOTED PAIN PRSNT: CPT | Mod: ,,, | Performed by: INTERNAL MEDICINE

## 2020-01-07 PROCEDURE — 99215 OFFICE O/P EST HI 40 MIN: CPT | Mod: S$PBB,,, | Performed by: INTERNAL MEDICINE

## 2020-01-07 PROCEDURE — 1125F PR PAIN SEVERITY QUANTIFIED, PAIN PRESENT: ICD-10-PCS | Mod: ,,, | Performed by: INTERNAL MEDICINE

## 2020-01-07 PROCEDURE — 99214 OFFICE O/P EST MOD 30 MIN: CPT | Mod: PBBFAC,PO,25 | Performed by: INTERNAL MEDICINE

## 2020-01-07 PROCEDURE — G0444 DEPRESSION SCREEN ANNUAL: HCPCS | Mod: PBBFAC,PO | Performed by: INTERNAL MEDICINE

## 2020-01-07 PROCEDURE — 99215 PR OFFICE/OUTPT VISIT, EST, LEVL V, 40-54 MIN: ICD-10-PCS | Mod: S$PBB,,, | Performed by: INTERNAL MEDICINE

## 2020-01-07 PROCEDURE — 99999 PR PBB SHADOW E&M-EST. PATIENT-LVL IV: ICD-10-PCS | Mod: PBBFAC,,, | Performed by: INTERNAL MEDICINE

## 2020-01-07 PROCEDURE — 99999 PR PBB SHADOW E&M-EST. PATIENT-LVL IV: CPT | Mod: PBBFAC,,, | Performed by: INTERNAL MEDICINE

## 2020-01-07 RX ORDER — HEPARIN 100 UNIT/ML
500 SYRINGE INTRAVENOUS
Status: CANCELLED | OUTPATIENT
Start: 2020-01-21

## 2020-01-07 RX ORDER — HEPARIN 100 UNIT/ML
500 SYRINGE INTRAVENOUS
Status: CANCELLED | OUTPATIENT
Start: 2020-01-07

## 2020-01-07 RX ORDER — OXYCODONE AND ACETAMINOPHEN 5; 325 MG/1; MG/1
1 TABLET ORAL EVERY 12 HOURS PRN
Qty: 40 TABLET | Refills: 0 | Status: SHIPPED | OUTPATIENT
Start: 2020-01-07 | End: 2020-01-28 | Stop reason: SDUPTHER

## 2020-01-07 RX ORDER — SODIUM CHLORIDE 0.9 % (FLUSH) 0.9 %
10 SYRINGE (ML) INJECTION
Status: CANCELLED | OUTPATIENT
Start: 2020-01-07

## 2020-01-07 RX ORDER — CAPECITABINE 500 MG/1
TABLET, FILM COATED ORAL
Qty: 90 TABLET | Refills: 1 | Status: SHIPPED | OUTPATIENT
Start: 2020-01-07 | End: 2020-01-28 | Stop reason: SDUPTHER

## 2020-01-07 RX ORDER — SODIUM CHLORIDE 0.9 % (FLUSH) 0.9 %
10 SYRINGE (ML) INJECTION
Status: CANCELLED | OUTPATIENT
Start: 2020-01-21

## 2020-01-07 NOTE — PROGRESS NOTES
Rectal Cancer  hre to discuss pet ct     Miguel Angel Summers Sr. is a 64 y.o.  Patient here for evaluation of a T3-T4 N1 M a 1 see rectal adenocarcinoma.  On October 21  a laparotomy  Was performed for perforated colon and patient underwent a right hemicolectomy with a rectal biopsy revealing rectal adenocarcinoma. He is healing from surgery but referred here for new diagnosis of rectal cancer and would like to discuss treatment options.   Case discussed with Dr Holliday     PreOperative Diagnosis  SPECIMEN  1) Rectal mass bx  2) Right colon and distal ileum  3) Gallbladder  4) Sigmoid colon  FINAL PATHOLOGIC DIAGNOSIS  1. 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  2. RIGHT COLON AND DISTAL ILEUM, RIGHT HEMICOLECTOMY:  Colon with areas of marked thinning and focal changes compatible with cecal perforation.  Associated serosal adhesions and serositis.  Histologically viable and unremarkable proximal and distal margins.  No dysplasia or malignancy.  3. GALLBLADDER, CHOLECYSTECTOMY:  Chronic cholecystitis with patchy mucosal cholesterolosis.  No cholelithiasis.  No intestinal metaplasia or dysplasia.  One benign lymph node with reactive changes.  4. COLON, SIGMOID, SIGMOID COLECTOMY:  Sigmoid colon with areas of transmural acute and chronic inflammation, serosal adhesions, and associated  serositis.  Inflammatory changes are noted at the opened resection margin (non-stapled).  No dysplasia or malignancy.  NOTE: In the setting of a mass like lesion as noted in specimen 1, the possibility of an under sampled possibly  more aggressive lesion cannot be entirely excluded. Correlation with clinical findings is recommended.  Diagnosed by: Soila Broyd  (Electronically Signed: 2019-10-29 15:29:37)  Report continued on next page Page 1 of 3  Patient Name MIGUEL ANGEL SUMMERS Accession # [id number]  (63Y M)  Location  Date of Birth  [id number]  1955  Billing #  Collection  Date  Received  Reported  Medical Record #  10/21/2019  10/22/2019  10/29/2019  [facility]  [id number]  Gross Description  The specimen is received in 4 containers.  1. Hospital ID [id number], Pathology ID [id number]  Received in formalin labeled with patient's name and designated rectal mass biopsy. The specimen consists of  multiple tan tissue fragments aggregating 1 x 0.5 x 0.1 and submitted entirely in 1 cassette labeled 1507-1.  2. Hospital ID [id number], Pathology ID [id number]  Received in formalin, labeled with patient's name and is designated right colon and distal ileum. The specimen  consists of the distal segment of small bowel (10.2 cm in length and 2.5 cm in diameter), appendix (4.6 cm in  length and 0.6 cm in diameter) with dilated cecum and ascending colon (46.5 cm in length and ranges in diameter  from 10.5 cm at the cecum to 5.5 cm at the distal margin) with attached minimal amount of fibrofatty tissue,  stapled at both ends. The small bowel is grossly unremarkable. The proximal and mid aspect of the appendix is  attached to the cecum with adhesions. The serosa of the appendix is tan-brown, smooth and intact. The  appendiceal lumen is dilated and is filled with brown, soft fecal material. The appendiceal wall measures 3 cm in  thickness. Serosa of the cecum and large intestine is tan-brown, smooth and intact. At the cecum the serosa  reveals a linear, closed with stitches defect, measuring 5.5 cm in length and is located 13.2 cm from the closest  proximal surgical margin. The lumen of the large intestine is filled with green, soft fecal matter and air. The  colonic mucosa is brown-yellow, distended and flat. No polyps or masses are grossly identified. The colonic wall  ranges from 0.1 to 0.3 cm in thickness. Representative sections are submitted in 5 cassettes labeled 1507 as  follows:  2A: Proximal surgical margin  2B: Distal surgical margin  2C: Appendix  2D: Closed defect  2E:  Random        Past Medical History:   Diagnosis Date    Colon cancer     Depression     GERD (gastroesophageal reflux disease)     Medical history reviewed with no changes      Past Surgical History:   Procedure Laterality Date    CHOLECYSTECTOMY  10/21/2019    Procedure: CHOLECYSTECTOMY;  Surgeon: Jose Lazo MD;  Location: [facility] OR;  Service: General;;    CLOSURE OF WOUND N/A 10/30/2019    Procedure: CLOSURE, WOUND;  Surgeon: Torres Dawkins MD;  Location: [facility] OR;  Service: General;  Laterality: N/A;    COLOSTOMY  10/21/2019    Procedure: CREATION, COLOSTOMY;  Surgeon: Jose Lazo MD;  Location: [facility] OR;  Service: General;;  END DESCENDING COLOSTOMY    FLEXIBLE SIGMOIDOSCOPY N/A 10/21/2019    Procedure: SIGMOIDOSCOPY, FLEXIBLE;  Surgeon: Jose Lazo MD;  Location: [facility] OR;  Service: General;  Laterality: N/A;    INSERTION OF TUNNELED CENTRAL VENOUS CATHETER (CVC) WITH SUBCUTANEOUS PORT Right 12/23/2019    Procedure: INSERTION, PORT-A-CATH;  Surgeon: Jose Lazo MD;  Location: [facility] OR;  Service: General;  Laterality: Right;    no surgical history      REPAIR OF ABDOMINAL WALL N/A 10/30/2019    Procedure: REPAIR, ABDOMINAL WALL;  Surgeon: Torres Dawkins MD;  Location: [facility] OR;  Service: General;  Laterality: N/A;    RIGHT HEMICOLECTOMY Right 10/21/2019    Procedure: HEMICOLECTOMY, RIGHT;  Surgeon: Jose Lazo MD;  Location: [facility];  Service: General;  Laterality: Right;       Current Outpatient Medications:     citalopram (CELEXA) 10 MG tablet, Take 1 tablet (10 mg total) by mouth once daily., Disp: 30 tablet, Rfl: 11    ondansetron (ZOFRAN) 4 MG tablet, Take 1 tablet (4 mg total) by mouth every 6 (six) hours as needed for Nausea., Disp: 24 tablet, Rfl: 1    pantoprazole (PROTONIX) 40 MG tablet, Take 1 tablet (40 mg total) by mouth once daily., Disp: 30 tablet, Rfl: 11  Review of patient's allergies indicates:  No Known Allergies  Social  "History     Tobacco Use    Smoking status: Former Smoker    Smokeless tobacco: Never Used   Substance Use Topics    Alcohol use: Not Currently    Drug use: Never     Family History   Problem Relation Age of Onset    Diabetes Mother     Heart disease Father        CONSTITUTIONAL: No fevers, chills, night sweats, wt. loss, appetite changes  SKIN: no rashes or itching  ENT: No headaches, head trauma, vision changes, or eye pain  LYMPH NODES: None noticed   CV: No chest pain, palpitations.   RESP: No dyspnea on exertion, cough, wheezing, or hemoptysis  GI: No nausea, emesis, diarrhea, constipation, melena, hematochezia, pain.   : No dysuria, hematuria, urgency, or frequency   HEME: No easy bruising, bleeding problems  PSYCHIATRIC: No depression, anxiety, psychosis, hallucinations.  NEURO: No seizures, memory loss, dizziness or difficulty sleeping  MSK: No arthralgias or joint swelling         /61   Pulse 96   Temp 98.4 °F (36.9 °C) (Oral)   Resp 16   Ht 5' 10" (1.778 m)   Wt 63.8 kg (140 lb 10.5 oz)   SpO2 100%   BMI 20.18 kg/m²   Gen: NAD, A and O x3, conversant   Psych: pleasant affect, normal thought process  Eyes: Pupils round and non dilated, EOM intact  Nose: Nares patent  OP clear, mucosa patent  Neck: suppple, no JVD, trachea midline, no palpable mass, no adenopathy  Lungs: CTAB, no wheezes, no use of accessory muscles  CV: S1S2 with RRR, No mrg  Abd: soft, NTND, + BS, No HSM, no ascites  Extr: No CCE, MEHREEN, strength normal, good capillary refill  Neuro: steady gait, CNs grossly intact  Skin: intact, no lesions noted  Rheum: No joint swelling    Lab Results   Component Value Date    WBC 11.71 11/04/2019    HGB 8.8 (L) 11/04/2019    HCT 28.4 (L) 11/04/2019    MCV 92 11/04/2019     (H) 11/04/2019     CMP  Sodium   Date Value Ref Range Status   11/04/2019 137 136 - 145 mmol/L Final     Potassium   Date Value Ref Range Status   11/04/2019 3.5 3.5 - 5.1 mmol/L Final     Chloride   Date " Value Ref Range Status   11/04/2019 100 95 - 110 mmol/L Final     CO2   Date Value Ref Range Status   11/04/2019 26 23 - 29 mmol/L Final     Glucose   Date Value Ref Range Status   11/04/2019 85 70 - 110 mg/dL Final     BUN, Bld   Date Value Ref Range Status   12/04/2019 10 8 - 23 mg/dL Final     Creatinine   Date Value Ref Range Status   12/04/2019 0.7 0.5 - 1.4 mg/dL Final     Calcium   Date Value Ref Range Status   11/04/2019 7.8 (L) 8.7 - 10.5 mg/dL Final     Total Protein   Date Value Ref Range Status   11/03/2019 5.3 (L) 6.0 - 8.4 g/dL Final     Albumin   Date Value Ref Range Status   11/03/2019 1.6 (L) 3.5 - 5.2 g/dL Final     Total Bilirubin   Date Value Ref Range Status   11/03/2019 0.6 0.1 - 1.0 mg/dL Final     Comment:     For infants and newborns, interpretation of results should be based  on gestational age, weight and in agreement with clinical  observations.  Premature Infant recommended reference ranges:  Up to 24 hours.............<8.0 mg/dL  Up to 48 hours............<12.0 mg/dL  3-5 days..................<15.0 mg/dL  6-29 days.................<15.0 mg/dL       Alkaline Phosphatase   Date Value Ref Range Status   11/03/2019 56 55 - 135 U/L Final     AST   Date Value Ref Range Status   11/03/2019 15 10 - 40 U/L Final     ALT   Date Value Ref Range Status   11/03/2019 10 10 - 44 U/L Final     Anion Gap   Date Value Ref Range Status   11/04/2019 11 8 - 16 mmol/L Final     eGFR if    Date Value Ref Range Status   12/04/2019 >60.0 >60 mL/min/1.73 m^2 Final     eGFR if non    Date Value Ref Range Status   12/04/2019 >60.0 >60 mL/min/1.73 m^2 Final     Comment:     Calculation used to obtain the estimated glomerular filtration  rate (eGFR) is the CKD-EPI equation.        NM PET CT Routine Skull to Mid Thigh   Order: [id number]   Status:  Final result   Visible to patient:  Yes (Patient Portal) Next appt:  01/09/2020 at 09:00 AM in General Surgery (Jose Lazo MD)  Dx:  Malignant neoplasm of rectum; Rectal ...   Details     Reading Physician Reading Date Result Priority   Darrion Guido MD 12/30/2019 Routine      Narrative     EXAMINATION:  NM PET CT ROUTINE    CLINICAL HISTORY:  Malignant neoplasm of rectum initial staging, initial treatment strategy of colorectal carcinoma, C 20, D 49.0, K 62.89.    TECHNIQUE:  Following the injection of 12.2 mCi of F-18 labeled FDG into a right antecubital vein, PET CT was performed from the vertex of the skull through the proximal thighs with an integrated full ring PET CT scanner with image fusion. The patient's serum glucose at the time of the exam was 88 mg/dL.    COMPARISON:  Multiple prior exams including MRI of 11/20/2019.    FINDINGS:  Known large sigmoid colon and upper rectal mass is intensely hypermetabolic, max SUV of 32.2.  There are multiple soft tissue nodules and irregular densities along the pelvic peritoneal reflections posteriorly, and to the left and right of midline, including on the left measuring up to 24 mm with max SUV of 10.3.  Hypermetabolic soft tissue density along the left anterolateral aspect of the peritoneum in the upper pelvis just distal to the ostomy has max SUV of 6.3.  There are no enlarged or intensely hypermetabolic mesenteric or retroperitoneal lymph nodes.    There is multifocal FDG hypermetabolism in the midline ventral abdominal wall superior to and at the level of the umbilicus, presumably a postoperative etiology, with FDG hypermetabolism associated with colostomy in the left anterior abdominal wall.  There is no abnormal focal FDG hypermetabolism elsewhere in the abdomen or pelvis to suggest metastatic disease.  No abnormal focal FDG hypermetabolism in the head, neck, chest, or the skeletal system. Hypermetabolic 10 mm low-density nodule in the left lobe of the thyroid gland is nonspecific, max SUV of 4.8.    CT images of the brain show no intra-axial mass or abnormal extra-axial fluid.  The  calvarium is intact, with no suspicious sclerotic or lytic osseous abnormality.    CT images of the chest show no suspicious pulmonary nodules or masses, with no pleural or pericardial effusion.    CT images of the abdomen and pelvis show cholecystectomy clips, with scattered aortoiliac vascular calcifications, and proximal colon suture line.  There is no ascites.  There are no suspicious sclerotic or lytic osseous abnormalities by CT.      Impression       1. Large intensely hypermetabolic mass in the sigmoid colon and upper rectum, compatible with known colorectal carcinoma.  2. Multifocal hypermetabolic soft tissue densities along the pelvic peritoneal reflections and in the left lower quadrant of the abdomen, suspicious for peritoneal metastatic disease.  3. No additional evidence for metastatic disease.  4. A hypermetabolic nodule in the left lobe of the thyroid gland is nonspecific, and correlation with thyroid ultrasound is recommended.      Electronically signed by: Darrion Guido MD  Date: 12/30/2019           Next appt:  01/09/2020 at 09:00 AM in General Surgery (Jose Lazo MD) Dx:  Rectal adenocarcinoma; Anemia, unspec...    Ref Range & Units 4d ago   Iron 45 - 160 ug/dL 11Low     Transferrin 200 - 375 mg/dL 148Low     TIBC 250 - 450 ug/dL 219Low     Saturated Iron 20 - 50 % 5Low              Malignant neoplasm of rectum    Rectal adenocarcinoma    Encounter for chemotherapy management    Other iron deficiency anemia    Other orders  -     ferric carboxymaltose (INJECTAFER) 750 mg in sodium chloride 0.9% 265 mL infusion  -     sodium chloride 0.9% 100 mL flush bag  -     sodium chloride 0.9% flush 10 mL  -     heparin, porcine (PF) 100 unit/mL injection flush 500 Units  -     alteplase injection 2 mg  -     ferric carboxymaltose (INJECTAFER) 750 mg in sodium chloride 0.9% 265 mL infusion  -     sodium chloride 0.9% 100 mL flush bag  -     sodium chloride 0.9% flush 10 mL  -     heparin, porcine  (PF) 100 unit/mL injection flush 500 Units  -     alteplase injection 2 mg      Oral xeloda 850 mg/m2 Monday through Friday with concomitant radiation to be followed 6 months of folfox through chest port in right chest for colorectal adenocarcinoma with peritoneal seeding   Patient must increase his protein intake to help with hypoalbuminemia.  Proceed with IV iron may use chest port weekly times two   rtc 3 weeks with labs   Start folfox after neoadjuvant therapy and radiation      Thank you for allowing me to evaluate and participate in the care of this pleasant patient. Please fell free to call me with any questions or concerns.    Shayla Sorenson MD    This note was dictated with Dragon and briefly proofread. Please excuse any sentences that may be unclear or words misspelled

## 2020-01-07 NOTE — PLAN OF CARE
START OFF PATHWAY REGIMEN - Colorectal            Oxaliplatin (Eloxatin(R))       Leucovorin       5-Fluorouracil       5-Fluorouracil           Additional Orders: Schedule next Chemotherapy    **Always confirm dose/schedule in your pharmacy ordering system**    Patient Characteristics:  Preoperative or Nonsurgical Candidate (Clinical Staging), Distant Metastasis  Therapeutic Status: Preoperative or Nonsurgical Candidate (Clinical Staging)  Tumor Location: Rectal  AJCC T Category: cTX  AJCC N Category: cN1  AJCC M Category: cM1c  AJCC 8 Stage Grouping: IVC  Intent of Therapy:  Curative Intent, Discussed with Patient

## 2020-01-08 ENCOUNTER — TELEPHONE (OUTPATIENT)
Dept: PHARMACY | Facility: CLINIC | Age: 65
End: 2020-01-08

## 2020-01-08 NOTE — TELEPHONE ENCOUNTER
Capecitabine is approved by the patient's insurance with $0.00 copay. We will reach out to the patient to notify of the approval, offer consultation, and schedule a delivery of the medication. Sending a staff message to Dr. Stringer regarding Capecitabine approval.

## 2020-01-08 NOTE — TELEPHONE ENCOUNTER
FOR DOCUMENTATION ONLY:  Financial Assistance for Capecitabine is approved from 10/10/19 to 1/7/21  Source: [employer]  BIN: [id number]  MCKAYLA: BHARGAVI  Id: [id number]  GRP: [id number]  Assistance Amount: $2700

## 2020-01-08 NOTE — TELEPHONE ENCOUNTER
DOCUMENTATION ONLY:  Capecitabine 500 mg Tablet #90/21 does not require a prior authorization through [employer].    Co-pay: $251.92    Patient Assistance IS required. Sending to the financial assistance team to investigate assistance options. Per CALLAHAN

## 2020-01-08 NOTE — TELEPHONE ENCOUNTER
Notified the patient we received the prescription for Capecitabine. He confirmed he does NOT have insurance right now. We will look into assistance for him. Patient voiced understanding.

## 2020-01-09 ENCOUNTER — OFFICE VISIT (OUTPATIENT)
Dept: SURGERY | Facility: CLINIC | Age: 65
End: 2020-01-09
Payer: MEDICARE

## 2020-01-09 VITALS
HEIGHT: 69 IN | TEMPERATURE: 97 F | WEIGHT: 140.44 LBS | RESPIRATION RATE: 11 BRPM | SYSTOLIC BLOOD PRESSURE: 132 MMHG | HEART RATE: 70 BPM | BODY MASS INDEX: 20.8 KG/M2 | DIASTOLIC BLOOD PRESSURE: 70 MMHG | OXYGEN SATURATION: 99 %

## 2020-01-09 DIAGNOSIS — Z09 POSTOP CHECK: Primary | ICD-10-CM

## 2020-01-09 PROCEDURE — 99024 POSTOP FOLLOW-UP VISIT: CPT | Mod: S$GLB,POP,, | Performed by: SURGERY

## 2020-01-09 PROCEDURE — 99024 PR POST-OP FOLLOW-UP VISIT: ICD-10-PCS | Mod: S$GLB,POP,, | Performed by: SURGERY

## 2020-01-09 NOTE — PROGRESS NOTES
"General Surgery  [facility]  Follow-up    HPI/Follow-up exam:  Miguel Angel Apple Sr. is a 64 y.o. male presents today for follow-up examination after laparotomy for large bowel obstruction with in descending colostomy formation for obstructing rectal lesion as well as right hemicolectomy for perforated colon.  Patient also with mucous fistula created.  Patient since surgeries done well. He recently had chemo port placed for induction of chemotherapeutic agents as well as for radiation therapy.  Patient since last visit has done well. No apparent postsurgical issues.  Tolerating a diet having bowel function be ostomy.    PHYSICAL EXAM:  /70 (BP Location: Left arm, Patient Position: Sitting, BP Method: Large (Automatic))   Pulse 70   Temp 97.4 °F (36.3 °C) (Oral)   Resp 11   Ht 5' 9" (1.753 m)   Wt 63.7 kg (140 lb 6.9 oz)   SpO2 99%   BMI 20.74 kg/m²   Gen: Wd Wn male in NAD  Heent: Nc/At, MMM  Cv: RRR  Lung: Non-labored breathing, clear bilaterally, chemo port present right chest.  No evidence of infection.  Abd: Soft, non-tender, non-distended, midline surgical wound clean dry intact scar in place. Ostomy pink present in the left lower quadrant.  Patent.  Ext: No cyanosis clubbing or edema    Assessment:  Miguel Angel Apple Sr. is a 64 y.o. male s/p chemo port placement for rectal cancer.    Plan/Medical Decision Making:  Induction of neoadjuvant chemo radiation therapy to start Monday per the patient. Patient will need post therapy rectal resection however likely will need tertiary care referral for treatment.    Followup:  4-6 weeks for repeat evaluation.    Patient instructed that best way to communicate with my office staff is for patient to get on the [facility] epic patient portal to expedite communication and communication issues that may occur.  Patient was given instructions on how to get on the portal.  I encouraged patient to obtain portal access as well.  Ultimately it is up to the patient to obtain " access.  Patient voiced understanding.

## 2020-01-09 NOTE — TELEPHONE ENCOUNTER
"Initial capecitabine consult completed on  . Capecitabine will be shipped on 1/10 to arrive at patient's home on 1/10 via [employer]. $ 0.00 copay. Patient plans to start capecitabine on . Address confirmed, CC on file. Confirmed 2 patient identifiers - name and . Therapy Appropriate.     Patient was counseled on the administration directions:  -Take 3 tablets (1500mg) by mouth twice daily within 30 minutes of a meal.    -Take Monday-Friday during radiation therapy.  -Do not chew, crush, or break the tablets.   If possible, patient was instructed tip the tablets from the RX bottle to the cap, and take directly from the cap to the mouth.  Patient may handle the medication with their hands if they wear with a latex or nitrile glove and wash their hands before and after handling the tablets.    Patient was counseled on the following possible side effects, which include, but are not limited to:  swelling, fatigue, weakness/dizziness, hand-foot syndrome, skin irritation, diarrhea/constipation, nausea, vomiting, loss of appetite, mouth sores, hair loss (6%), insomnia, changes in taste, indigestion, increased risk for infection, shortness of breath, and may bleed more easily. Patient was given Eucerin cream for Hand-Foot Syndrome, and hydrocortisone cream for dermatitis.     DDIs:  Medication list reviewed - no changes.  - Cat C: PPI with Xeloda - PPIs may diminish the therapeutic effect of Capecitabine. Advised alternative antacid such as H2Ra (Pepcid) or TUMS. Patient will stop Protonix and switch to Pepcid if needed while on Xeloda therapy.    No changes in allergies or regina conditions.    Energy: Still recovering from surgery ~8 weeks ago. Patient lost over 60 lbs, but reports feeling " a lot better than I was".  Appetite: Reports having a "good appetite" - eats 5-6 small meals/day  Pain: 7/10 stomach pain, described as a shooting pain - not constant. Take PRN Percocet - helps.     Patient was given 2 patient " education handouts on how to handle oral chemotherapy and specific recommendations- do's and don'ts. Instructed the patient that if they have any remaining oral chemotherapy, not to flush down the toilet or throw away in the trash; The patient or caregiver should return the unused oral chemotherapy to either the clinic or to myself in the Pharmacy where the oral chemotherapy can be disposed of properly.     Patient confirmed understanding. Patient did not have additional questions.   Patient plans to start capecitabine on 1/13. Consultation included: indication; goals of treatment; administration; storage and handling; side effects; how to handle side effects; the importance of compliance; how to handle missed doses; the importance of laboratory monitoring; the importance of keeping all follow up appointments.  Patient understands to report any medication changes to [facility] and provider. All questions answered and addressed to patients satisfaction. I will f/u with patient in 1 week from start, [facility] to contact patient in 3 weeks for refills.

## 2020-01-10 ENCOUNTER — DOCUMENTATION ONLY (OUTPATIENT)
Dept: HEMATOLOGY/ONCOLOGY | Facility: CLINIC | Age: 65
End: 2020-01-10

## 2020-01-10 NOTE — PROGRESS NOTES
"Re : Xeloda  Per phone call with patient, He is due to receive Xeloda delivery today. Patient states that he has received education on Xeloda from MD and pharmacy. Patient agreed to "refresher" on medication. Information on Xeloda given including mechanism of action, side effects and how to take given to patient. Patient verbalized understanding to all.   Patient and nurse navigator to meet 1/14/2020 before pt radiation appt. For follow up  "

## 2020-01-16 ENCOUNTER — DOCUMENTATION ONLY (OUTPATIENT)
Dept: HEMATOLOGY/ONCOLOGY | Facility: CLINIC | Age: 65
End: 2020-01-16

## 2020-01-16 DIAGNOSIS — C20 MALIGNANT NEOPLASM OF RECTUM: Primary | ICD-10-CM

## 2020-01-16 RX ORDER — ONDANSETRON 8 MG/1
8 TABLET, ORALLY DISINTEGRATING ORAL EVERY 8 HOURS PRN
Qty: 90 TABLET | Refills: 3 | Status: SHIPPED | OUTPATIENT
Start: 2020-01-16 | End: 2020-12-29

## 2020-01-16 NOTE — PROGRESS NOTES
Medical Nutrition Therapy Oncology Progress Note      Patient's PCP:Primary Doctor No  Referring Provider: No ref. provider found  Subjective:        Patient ID: Miguel Angel Apple Sr. is a 64 y.o. male.    Chief Complaint: Unintentional wt loss    Past Medical History:   Diagnosis Date    Colon cancer     Depression     GERD (gastroesophageal reflux disease)     Medical history reviewed with no changes        Past Surgical History:   Procedure Laterality Date    CHOLECYSTECTOMY  10/21/2019    Procedure: CHOLECYSTECTOMY;  Surgeon: Jose Lazo MD;  Location: [facility] OR;  Service: General;;    CLOSURE OF WOUND N/A 10/30/2019    Procedure: CLOSURE, WOUND;  Surgeon: Torres Dawkins MD;  Location: [facility] OR;  Service: General;  Laterality: N/A;    COLOSTOMY  10/21/2019    Procedure: CREATION, COLOSTOMY;  Surgeon: Jose Lazo MD;  Location: [facility] OR;  Service: General;;  END DESCENDING COLOSTOMY    FLEXIBLE SIGMOIDOSCOPY N/A 10/21/2019    Procedure: SIGMOIDOSCOPY, FLEXIBLE;  Surgeon: Jose Lazo MD;  Location: [facility] OR;  Service: General;  Laterality: N/A;    INSERTION OF TUNNELED CENTRAL VENOUS CATHETER (CVC) WITH SUBCUTANEOUS PORT Right 12/23/2019    Procedure: INSERTION, PORT-A-CATH;  Surgeon: Jose Lazo MD;  Location: [facility] OR;  Service: General;  Laterality: Right;    no surgical history      REPAIR OF ABDOMINAL WALL N/A 10/30/2019    Procedure: REPAIR, ABDOMINAL WALL;  Surgeon: Torres Dawkins MD;  Location: [facility] OR;  Service: General;  Laterality: N/A;    RIGHT HEMICOLECTOMY Right 10/21/2019    Procedure: HEMICOLECTOMY, RIGHT;  Surgeon: Jose Lazo MD;  Location: [facility] OR;  Service: General;  Laterality: Right;       Social History     Socioeconomic History    Marital status:      Spouse name: Not on file    Number of children: Not on file    Years of education: Not on file    Highest education level: Not on file    Occupational History    Not on file   Social Needs    Financial resource strain: Not on file    Food insecurity:     Worry: Not on file     Inability: Not on file    Transportation needs:     Medical: Not on file     Non-medical: Not on file   Tobacco Use    Smoking status: Former Smoker    Smokeless tobacco: Never Used   Substance and Sexual Activity    Alcohol use: Not Currently    Drug use: Never    Sexual activity: Not Currently   Lifestyle    Physical activity:     Days per week: Not on file     Minutes per session: Not on file    Stress: Not on file   Relationships    Social connections:     Talks on phone: Not on file     Gets together: Not on file     Attends [employer]ism service: Not on file     Active member of club or organization: Not on file     Attends meetings of clubs or organizations: Not on file     Relationship status: Not on file   Other Topics Concern    Not on file   Social History Narrative    Not on file       Family History   Problem Relation Age of Onset    Diabetes Mother     Heart disease Father        Review of patient's allergies indicates:  No Known Allergies    Current Outpatient Medications:     capecitabine (XELODA) 500 MG Tab, Take 3 tablets by mouth twice daily Monday through Friday during radiation., Disp: 90 tablet, Rfl: 1    citalopram (CELEXA) 10 MG tablet, Take 1 tablet (10 mg total) by mouth once daily., Disp: 30 tablet, Rfl: 11    ondansetron (ZOFRAN) 4 MG tablet, Take 1 tablet (4 mg total) by mouth every 6 (six) hours as needed for Nausea., Disp: 24 tablet, Rfl: 1    ondansetron (ZOFRAN-ODT) 8 MG TbDL, Take 1 tablet (8 mg total) by mouth every 8 (eight) hours as needed., Disp: 90 tablet, Rfl: 3    oxyCODONE-acetaminophen (PERCOCET) 5-325 mg per tablet, Take 1 tablet by mouth every 12 (twelve) hours as needed (moderate pain 2-5/10 pain scale). (Patient taking differently: Take 1 tablet by mouth every 4 (four) hours as needed (moderate pain 2-5/10 pain  scale). ), Disp: 40 tablet, Rfl: 0    pantoprazole (PROTONIX) 40 MG tablet, Take 1 tablet (40 mg total) by mouth once daily. (Patient not taking: Reported on 1/9/2020), Disp: 30 tablet, Rfl: 11    All medications and past history have been reviewed.    OP FOLFOX 6 Q2W      Treatment Goal:   Control      Status:   Active      Start Date:   3/3/2020 (Planned)      End Date:   8/6/2020 (Planned)      Provider:   Shayla Stringer MD      Chemotherapy:   fluorouracil injection 710 mg, 400 mg/m2, Intravenous, Clinic/HOD 1 time, 0 of 12 cycles        fluorouracil (ADRUCIL) 2,400 mg/m2 = 4,270 mg in sodium chloride 0.9% 185.4 mL chemo infusion, 2,400 mg/m2, Intravenous, Over 46 hours, 0 of 12 cycles        leucovorin calcium 400 mg/m2 = 710 mg in dextrose 5 % 250 mL infusion, 400 mg/m2, Intravenous, Clinic/HOD 1 time, 0 of 12 cycles        oxaliplatin (ELOXATIN) 85 mg/m2 = 151 mg in dextrose 5 % 500 mL chemo infusion, 85 mg/m2, Intravenous, Clinic/HOD 1 time, 0 of 12 cycles      Objective:        Wt Readings from Last 1 Encounters:   01/09/20 0855 63.7 kg (140 lb 6.9 oz)       Last Labs:  Glucose   Date Value Ref Range Status   11/04/2019 85 70 - 110 mg/dL Final   11/03/2019 85 70 - 110 mg/dL Final     BUN, Bld   Date Value Ref Range Status   12/04/2019 10 8 - 23 mg/dL Final   11/04/2019 <5 (L) 8 - 23 mg/dL Final     Creatinine   Date Value Ref Range Status   12/04/2019 0.7 0.5 - 1.4 mg/dL Final   11/04/2019 0.6 0.5 - 1.4 mg/dL Final     Sodium   Date Value Ref Range Status   11/04/2019 137 136 - 145 mmol/L Final   11/03/2019 134 (L) 136 - 145 mmol/L Final     Potassium   Date Value Ref Range Status   11/04/2019 3.5 3.5 - 5.1 mmol/L Final   11/03/2019 3.9 3.5 - 5.1 mmol/L Final     Phosphorus   Date Value Ref Range Status   10/28/2019 3.1 2.7 - 4.5 mg/dL Final   10/27/2019 3.4 2.7 - 4.5 mg/dL Final     Calcium   Date Value Ref Range Status   11/04/2019 7.8 (L) 8.7 - 10.5 mg/dL Final   11/03/2019 7.7 (L) 8.7 - 10.5 mg/dL  "Final     No results found for: PREALBUMIN  Total Protein   Date Value Ref Range Status   11/03/2019 5.3 (L) 6.0 - 8.4 g/dL Final   11/02/2019 4.9 (L) 6.0 - 8.4 g/dL Final     No results found for: CHOL  No results found for: HGBA1C  Hemoglobin   Date Value Ref Range Status   01/03/2020 9.6 (L) 14.0 - 18.0 g/dL Final   11/04/2019 8.8 (L) 14.0 - 18.0 g/dL Final     Hematocrit   Date Value Ref Range Status   01/03/2020 31.7 (L) 40.0 - 54.0 % Final   11/04/2019 28.4 (L) 40.0 - 54.0 % Final     Iron   Date Value Ref Range Status   01/03/2020 11 (L) 45 - 160 ug/dL Final     No components found for: FROLATE  No results found for: [id number]  WBC   Date Value Ref Range Status   01/03/2020 10.86 3.90 - 12.70 K/uL Final   11/04/2019 11.71 3.90 - 12.70 K/uL Final       Assessment:     Nutrition/Diet History     Patient Reported Diet/Restrictions/Preferences: small, frequent meals  Food Allergies: NKFA  Factors Affecting Nutritional Intake: decreased appetite r/t rectal cancer    Estimated/Assessed Needs     Weight Used For Calorie Calculations: 63.6 kg (140 lb)  Energy Calorie Requirements (kcal): 7462-0986 kcal/day   Energy Need Method: 30-32 Kcal/kg  Protein Requirements: 76-95 g/day   Protein Need Method: 1.2-1.5 g/kg  Fluid Requirements: 2000 ml/day  Estimated Fluid Requirement Method: 1ml/kcal      Nutrition Support  N/A    Evaluation of Received Nutrient/Fluid Intake     Calorie Intake: not meeting needs  Protein Intake: not meeting needs  Fluid Intake: meeting needs  Tolerance: tolerating  % Intake of Estimated Energy Needs: 50 %      Nutrition Diagnosis Related to (Etiology) As Evidenced By (Signs/Symptoms)   Inadequate energy intake Decreased appetite  Unintentional wt loss of 40# within the past 6 months.        RD Notes  Mr. Apple began his first week of chemoradiation treatment on 1/13. Taking xeloda PO + daily radiation therapy for rectal cancer. Colostomy in place- no issues. Appetite is "OK"- tolerating " small, frequent meals 5-6x/day. Supplementing with Ensure Original nutrition supplement 1x/day. Pt reported that he would like to work towards getting back to his UBW of 175-180#.   He reported one episode of vomiting yesterday, which he believes is unrelated to xeloda- thinks he ate a bad can of tuna. Otherwise, no n/v reported thus far.     CW: 140#    Nutrition Intervention:      Nutrition Intervention Fiber-modified diet   Goals/Expected Outcomes Pt to consume low-residue diet as needed to aid in regular BMs.    Progress Initial       Plan  1. Educated pt on bland, low-residue diet. Provided printed handouts of diet discussed.   2. Ensure supplement BID to assist with calorie + protein intake. Provided Ensure coupons.   3. Continue with small, frequent meals q2-3hrs. Provided examples of high calorie, high protein foods to include in his diet to promote wt gain.   4. Increase fluids + electrolytes.   5. Provided RD contact info & encouraged pt to call with any questions/concerns.     Monitoring/Evaluation:     Monitor: weight, PO intake    Next Visit: Will f/u in 1 week.       I have explained and the patient understands all of  the current recommendation(s). I have answered all of their questions to the best of my ability and to their complete satisfaction.   The patient is to continue with the current management plan.    Electronically signed by: Yulia Hansen MS, RDN, LDN

## 2020-01-20 ENCOUNTER — INFUSION (OUTPATIENT)
Dept: INFUSION THERAPY | Facility: HOSPITAL | Age: 65
End: 2020-01-20
Attending: INTERNAL MEDICINE
Payer: MEDICARE

## 2020-01-20 VITALS
HEART RATE: 67 BPM | WEIGHT: 140 LBS | TEMPERATURE: 98 F | HEIGHT: 70 IN | DIASTOLIC BLOOD PRESSURE: 70 MMHG | RESPIRATION RATE: 18 BRPM | SYSTOLIC BLOOD PRESSURE: 132 MMHG | BODY MASS INDEX: 20.04 KG/M2 | OXYGEN SATURATION: 99 %

## 2020-01-20 DIAGNOSIS — D50.8 OTHER IRON DEFICIENCY ANEMIA: Primary | ICD-10-CM

## 2020-01-20 PROCEDURE — 63600175 PHARM REV CODE 636 W HCPCS: Performed by: INTERNAL MEDICINE

## 2020-01-20 PROCEDURE — A4216 STERILE WATER/SALINE, 10 ML: HCPCS | Performed by: INTERNAL MEDICINE

## 2020-01-20 PROCEDURE — 96365 THER/PROPH/DIAG IV INF INIT: CPT

## 2020-01-20 PROCEDURE — 25000003 PHARM REV CODE 250: Performed by: INTERNAL MEDICINE

## 2020-01-20 RX ORDER — SODIUM CHLORIDE 0.9 % (FLUSH) 0.9 %
10 SYRINGE (ML) INJECTION
Status: DISCONTINUED | OUTPATIENT
Start: 2020-01-20 | End: 2020-01-20 | Stop reason: HOSPADM

## 2020-01-20 RX ORDER — HEPARIN 100 UNIT/ML
500 SYRINGE INTRAVENOUS
Status: DISCONTINUED | OUTPATIENT
Start: 2020-01-20 | End: 2020-01-20 | Stop reason: HOSPADM

## 2020-01-20 RX ADMIN — FERRIC CARBOXYMALTOSE INJECTION: 50 INJECTION, SOLUTION INTRAVENOUS at 11:01

## 2020-01-20 RX ADMIN — SODIUM CHLORIDE, PRESERVATIVE FREE 10 ML: 5 INJECTION INTRAVENOUS at 12:01

## 2020-01-20 RX ADMIN — HEPARIN 500 UNITS: 100 SYRINGE at 12:01

## 2020-01-20 NOTE — TELEPHONE ENCOUNTER
Started on 1/13  Dosing, how taking: Xeloda 500mg: Take 3 tablets by mouth twice daily Monday through Friday during radiation. Denies any missed doses.   Storage: room temperature in cool, dry area. Out of reach of others.   Handling: pours into dosing cup and does not touch tablets directly.   Side effects: Patient reports that he threw up 1 day, although he was not certain it was due to the Xeloda. It occurred about 30 min after taking his Xeloda. He had tuna fish the night before and said, he even thought it was questionable. He did experience some heartburn and upset stomach after he threw up and took some antacids (TUMS) and this has helped. Notes no other serious side effects at this time.   Medications reviewed. No new allergies or health conditions.

## 2020-01-27 ENCOUNTER — INFUSION (OUTPATIENT)
Dept: INFUSION THERAPY | Facility: HOSPITAL | Age: 65
End: 2020-01-27
Attending: INTERNAL MEDICINE
Payer: MEDICARE

## 2020-01-27 ENCOUNTER — TELEPHONE (OUTPATIENT)
Dept: HEMATOLOGY/ONCOLOGY | Facility: CLINIC | Age: 65
End: 2020-01-27

## 2020-01-27 ENCOUNTER — LAB VISIT (OUTPATIENT)
Dept: LAB | Facility: HOSPITAL | Age: 65
End: 2020-01-27
Attending: INTERNAL MEDICINE
Payer: MEDICARE

## 2020-01-27 VITALS
DIASTOLIC BLOOD PRESSURE: 63 MMHG | OXYGEN SATURATION: 100 % | HEART RATE: 79 BPM | SYSTOLIC BLOOD PRESSURE: 112 MMHG | BODY MASS INDEX: 19.94 KG/M2 | HEIGHT: 70 IN | TEMPERATURE: 98 F | RESPIRATION RATE: 18 BRPM | WEIGHT: 139.31 LBS

## 2020-01-27 DIAGNOSIS — C20 MALIGNANT NEOPLASM OF RECTUM: ICD-10-CM

## 2020-01-27 DIAGNOSIS — C20 MALIGNANT NEOPLASM OF RECTUM: Primary | ICD-10-CM

## 2020-01-27 DIAGNOSIS — D50.8 OTHER IRON DEFICIENCY ANEMIA: Primary | ICD-10-CM

## 2020-01-27 LAB
ALBUMIN SERPL BCP-MCNC: 3.3 G/DL (ref 3.5–5.2)
ALP SERPL-CCNC: 64 U/L (ref 55–135)
ALT SERPL W/O P-5'-P-CCNC: 8 U/L (ref 10–44)
ANION GAP SERPL CALC-SCNC: 8 MMOL/L (ref 8–16)
AST SERPL-CCNC: 11 U/L (ref 10–40)
BASOPHILS # BLD AUTO: 0.02 K/UL (ref 0–0.2)
BASOPHILS NFR BLD: 0.3 % (ref 0–1.9)
BILIRUB SERPL-MCNC: 0.2 MG/DL (ref 0.1–1)
BUN SERPL-MCNC: 11 MG/DL (ref 8–23)
CALCIUM SERPL-MCNC: 9.7 MG/DL (ref 8.7–10.5)
CHLORIDE SERPL-SCNC: 102 MMOL/L (ref 95–110)
CO2 SERPL-SCNC: 27 MMOL/L (ref 23–29)
CREAT SERPL-MCNC: 0.9 MG/DL (ref 0.5–1.4)
DIFFERENTIAL METHOD: ABNORMAL
EOSINOPHIL # BLD AUTO: 0.4 K/UL (ref 0–0.5)
EOSINOPHIL NFR BLD: 6.1 % (ref 0–8)
ERYTHROCYTE [DISTWIDTH] IN BLOOD BY AUTOMATED COUNT: 15.9 % (ref 11.5–14.5)
EST. GFR  (AFRICAN AMERICAN): >60 ML/MIN/1.73 M^2
EST. GFR  (NON AFRICAN AMERICAN): >60 ML/MIN/1.73 M^2
GLUCOSE SERPL-MCNC: 86 MG/DL (ref 70–110)
HCT VFR BLD AUTO: 36.1 % (ref 40–54)
HGB BLD-MCNC: 11.1 G/DL (ref 14–18)
IMM GRANULOCYTES # BLD AUTO: 0.04 K/UL (ref 0–0.04)
LYMPHOCYTES # BLD AUTO: 1.1 K/UL (ref 1–4.8)
LYMPHOCYTES NFR BLD: 15.2 % (ref 18–48)
MCH RBC QN AUTO: 29.7 PG (ref 27–31)
MCHC RBC AUTO-ENTMCNC: 30.7 G/DL (ref 32–36)
MCV RBC AUTO: 97 FL (ref 82–98)
MONOCYTES # BLD AUTO: 0.7 K/UL (ref 0.3–1)
MONOCYTES NFR BLD: 10.7 % (ref 4–15)
NEUTROPHILS # BLD AUTO: 4.6 K/UL (ref 1.8–7.7)
NEUTROPHILS NFR BLD: 67.1 % (ref 38–73)
NRBC BLD-RTO: 0 /100 WBC
PLATELET # BLD AUTO: 234 K/UL (ref 150–350)
PMV BLD AUTO: 7.8 FL (ref 9.2–12.9)
POTASSIUM SERPL-SCNC: 4.8 MMOL/L (ref 3.5–5.1)
PROT SERPL-MCNC: 8.3 G/DL (ref 6–8.4)
RBC # BLD AUTO: 3.74 M/UL (ref 4.6–6.2)
SODIUM SERPL-SCNC: 137 MMOL/L (ref 136–145)
WBC # BLD AUTO: 6.91 K/UL (ref 3.9–12.7)

## 2020-01-27 PROCEDURE — 63600175 PHARM REV CODE 636 W HCPCS: Performed by: INTERNAL MEDICINE

## 2020-01-27 PROCEDURE — A4216 STERILE WATER/SALINE, 10 ML: HCPCS | Performed by: INTERNAL MEDICINE

## 2020-01-27 PROCEDURE — 96365 THER/PROPH/DIAG IV INF INIT: CPT

## 2020-01-27 PROCEDURE — 80053 COMPREHEN METABOLIC PANEL: CPT

## 2020-01-27 PROCEDURE — 25000003 PHARM REV CODE 250: Performed by: INTERNAL MEDICINE

## 2020-01-27 PROCEDURE — 85025 COMPLETE CBC W/AUTO DIFF WBC: CPT

## 2020-01-27 PROCEDURE — 36415 COLL VENOUS BLD VENIPUNCTURE: CPT

## 2020-01-27 RX ORDER — HEPARIN 100 UNIT/ML
500 SYRINGE INTRAVENOUS
Status: DISCONTINUED | OUTPATIENT
Start: 2020-01-27 | End: 2020-01-27 | Stop reason: HOSPADM

## 2020-01-27 RX ORDER — SODIUM CHLORIDE 0.9 % (FLUSH) 0.9 %
10 SYRINGE (ML) INJECTION
Status: DISCONTINUED | OUTPATIENT
Start: 2020-01-27 | End: 2020-01-27 | Stop reason: HOSPADM

## 2020-01-27 RX ADMIN — HEPARIN 500 UNITS: 100 SYRINGE at 12:01

## 2020-01-27 RX ADMIN — SODIUM CHLORIDE, PRESERVATIVE FREE 10 ML: 5 INJECTION INTRAVENOUS at 12:01

## 2020-01-27 RX ADMIN — SODIUM CHLORIDE: 0.9 INJECTION, SOLUTION INTRAVENOUS at 11:01

## 2020-01-27 RX ADMIN — FERRIC CARBOXYMALTOSE INJECTION 750 MG: 50 INJECTION, SOLUTION INTRAVENOUS at 11:01

## 2020-01-28 ENCOUNTER — OFFICE VISIT (OUTPATIENT)
Dept: HEMATOLOGY/ONCOLOGY | Facility: CLINIC | Age: 65
End: 2020-01-28
Payer: MEDICARE

## 2020-01-28 VITALS
HEIGHT: 69 IN | DIASTOLIC BLOOD PRESSURE: 56 MMHG | TEMPERATURE: 98 F | RESPIRATION RATE: 12 BRPM | HEART RATE: 84 BPM | OXYGEN SATURATION: 100 % | SYSTOLIC BLOOD PRESSURE: 116 MMHG | BODY MASS INDEX: 20.44 KG/M2 | WEIGHT: 138 LBS

## 2020-01-28 DIAGNOSIS — R93.89 ABNORMAL FINDINGS ON DIAGNOSTIC IMAGING OF OTHER SPECIFIED BODY STRUCTURES: ICD-10-CM

## 2020-01-28 DIAGNOSIS — D50.9 IRON DEFICIENCY ANEMIA, UNSPECIFIED IRON DEFICIENCY ANEMIA TYPE: Primary | ICD-10-CM

## 2020-01-28 DIAGNOSIS — C20 MALIGNANT NEOPLASM OF RECTUM: ICD-10-CM

## 2020-01-28 DIAGNOSIS — Z51.11 ENCOUNTER FOR CHEMOTHERAPY MANAGEMENT: ICD-10-CM

## 2020-01-28 DIAGNOSIS — R12 HEARTBURN: ICD-10-CM

## 2020-01-28 DIAGNOSIS — D75.838 REACTIVE THROMBOCYTOSIS: ICD-10-CM

## 2020-01-28 DIAGNOSIS — C20 RECTAL ADENOCARCINOMA: ICD-10-CM

## 2020-01-28 PROCEDURE — 99215 OFFICE O/P EST HI 40 MIN: CPT | Mod: S$PBB,,, | Performed by: INTERNAL MEDICINE

## 2020-01-28 PROCEDURE — 99999 PR PBB SHADOW E&M-EST. PATIENT-LVL IV: CPT | Mod: PBBFAC,,, | Performed by: INTERNAL MEDICINE

## 2020-01-28 PROCEDURE — 99214 OFFICE O/P EST MOD 30 MIN: CPT | Mod: PBBFAC,PO | Performed by: INTERNAL MEDICINE

## 2020-01-28 PROCEDURE — 99999 PR PBB SHADOW E&M-EST. PATIENT-LVL IV: ICD-10-PCS | Mod: PBBFAC,,, | Performed by: INTERNAL MEDICINE

## 2020-01-28 PROCEDURE — G0444 DEPRESSION SCREEN ANNUAL: HCPCS | Mod: PBBFAC,PO | Performed by: INTERNAL MEDICINE

## 2020-01-28 PROCEDURE — 99215 PR OFFICE/OUTPT VISIT, EST, LEVL V, 40-54 MIN: ICD-10-PCS | Mod: S$PBB,,, | Performed by: INTERNAL MEDICINE

## 2020-01-28 RX ORDER — CAPECITABINE 500 MG/1
TABLET, FILM COATED ORAL
Qty: 90 TABLET | Refills: 1 | Status: SHIPPED | OUTPATIENT
Start: 2020-01-28 | End: 2020-02-27

## 2020-01-28 RX ORDER — OXYCODONE AND ACETAMINOPHEN 5; 325 MG/1; MG/1
1 TABLET ORAL EVERY 12 HOURS PRN
Qty: 60 TABLET | Refills: 0 | Status: SHIPPED | OUTPATIENT
Start: 2020-01-28 | End: 2020-02-27

## 2020-01-28 NOTE — PROGRESS NOTES
CC I have good days and bad days    Miguel Angel Apple Sr. is a 64 y.o.  Patient here for evaluation of a T3-T4 N1 M a 1 see rectal adenocarcinoma.  On October 21, 2019   a laparotomy was performed for perforated colon and patient underwent a right hemicolectomy with a rectal biopsy revealing rectal adenocarcinoma. He is healing from surgery but referred here to discuss treatment options.   He started xeloda with radiation and is receiving week 3 of radiation  He has completed two doses of IV iron at [facility] without complications : port was accessed without any problem     Tomorrow is the assisted point of 6 weeks   He reports decreased appetite and a mild weight loss , slight heartburn     Past Medical History:   Diagnosis Date    Colon cancer     Depression     GERD (gastroesophageal reflux disease)     Medical history reviewed with no changes      Past Surgical History:   Procedure Laterality Date    CHOLECYSTECTOMY  10/21/2019    Procedure: CHOLECYSTECTOMY;  Surgeon: Jose Lazo MD;  Location: [facility] OR;  Service: General;;    CLOSURE OF WOUND N/A 10/30/2019    Procedure: CLOSURE, WOUND;  Surgeon: Torres Dawkins MD;  Location: [facility] OR;  Service: General;  Laterality: N/A;    COLOSTOMY  10/21/2019    Procedure: CREATION, COLOSTOMY;  Surgeon: Jose Lazo MD;  Location: [facility] OR;  Service: General;;  END DESCENDING COLOSTOMY    FLEXIBLE SIGMOIDOSCOPY N/A 10/21/2019    Procedure: SIGMOIDOSCOPY, FLEXIBLE;  Surgeon: Jose Lazo MD;  Location: [facility] OR;  Service: General;  Laterality: N/A;    INSERTION OF TUNNELED CENTRAL VENOUS CATHETER (CVC) WITH SUBCUTANEOUS PORT Right 12/23/2019    Procedure: INSERTION, PORT-A-CATH;  Surgeon: Jose Lazo MD;  Location: [facility] OR;  Service: General;  Laterality: Right;    no surgical history      REPAIR OF ABDOMINAL WALL N/A 10/30/2019    Procedure: REPAIR, ABDOMINAL WALL;  Surgeon: Torres Dawkins MD;  Location: [facility] OR;  Service:  General;  Laterality: N/A;    RIGHT HEMICOLECTOMY Right 10/21/2019    Procedure: HEMICOLECTOMY, RIGHT;  Surgeon: Jose Lazo MD;  Location: [facility];  Service: General;  Laterality: Right;       Current Outpatient Medications:     capecitabine (XELODA) 500 MG Tab, Take 3 tablets by mouth twice daily Monday through Friday during radiation., Disp: 90 tablet, Rfl: 1    citalopram (CELEXA) 10 MG tablet, Take 1 tablet (10 mg total) by mouth once daily., Disp: 30 tablet, Rfl: 11    ondansetron (ZOFRAN) 4 MG tablet, Take 1 tablet (4 mg total) by mouth every 6 (six) hours as needed for Nausea., Disp: 24 tablet, Rfl: 1    ondansetron (ZOFRAN-ODT) 8 MG TbDL, Take 1 tablet (8 mg total) by mouth every 8 (eight) hours as needed., Disp: 90 tablet, Rfl: 3    oxyCODONE-acetaminophen (PERCOCET) 5-325 mg per tablet, Take 1 tablet by mouth every 12 (twelve) hours as needed (moderate pain 2-5/10 pain scale). (Patient taking differently: Take 1 tablet by mouth every 4 (four) hours as needed (moderate pain 2-5/10 pain scale). ), Disp: 40 tablet, Rfl: 0    pantoprazole (PROTONIX) 40 MG tablet, Take 1 tablet (40 mg total) by mouth once daily. (Patient not taking: Reported on 1/9/2020), Disp: 30 tablet, Rfl: 11  No current facility-administered medications for this visit.   Review of patient's allergies indicates:  No Known Allergies  Social History     Tobacco Use    Smoking status: Former Smoker    Smokeless tobacco: Never Used   Substance Use Topics    Alcohol use: Not Currently    Drug use: Never     Family History   Problem Relation Age of Onset    Diabetes Mother     Heart disease Father        CONSTITUTIONAL: No fevers, chills, night sweats,mild  wt. loss,  + appetite changes  SKIN: no rashes or itching  ENT: No headaches, head trauma, vision changes, or eye pain  LYMPH NODES: None noticed   CV: No chest pain, palpitations.   RESP: No dyspnea on exertion, cough, wheezing, or hemoptysis  GI: No nausea, emesis,  "diarrhea, constipation, melena, hematochezia, pain.   : No dysuria, hematuria, urgency, or frequency   HEME: No easy bruising, bleeding problems  PSYCHIATRIC: No depression, anxiety, psychosis, hallucinations.  NEURO: No seizures, memory loss, dizziness or difficulty sleeping  MSK: No arthralgias or joint swelling         BP (!) 116/56   Pulse 84   Temp 98.1 °F (36.7 °C) (Oral)   Resp 12   Ht 5' 9" (1.753 m)   Wt 62.6 kg (138 lb 0.1 oz)   SpO2 100%   BMI 20.38 kg/m²   Gen: NAD, A and O x3, conversant  Well groomed   Psych: pleasant affect, normal thought process  Eyes: Pupils round and non dilated, EOM intact conj pale   Nose: Nares patent  OP clear, mucosa patent  Neck: suppple, no JVD, trachea midline, no palpable mass, no adenopathy  Lungs: CTAB, no wheezes, no use of accessory muscles  CV: S1S2 with RRR, No mrg  Abd: soft, NTND, + BS, No HSM, no ascites  Extr: No CCE, MEHREEN, strength normal, good capillary refill  Neuro: steady gait, CNs grossly intact  Skin: intact, no lesions noted  Rheum: No joint swelling    Lab Results   Component Value Date    WBC 11.71 11/04/2019    HGB 8.8 (L) 11/04/2019    HCT 28.4 (L) 11/04/2019    MCV 92 11/04/2019     (H) 11/04/2019     Lab Results   Component Value Date    WBC 6.91 01/27/2020    RBC 3.74 (L) 01/27/2020    HGB 11.1 (L) 01/27/2020    HCT 36.1 (L) 01/27/2020    MCV 97 01/27/2020    MCH 29.7 01/27/2020    MCHC 30.7 (L) 01/27/2020    RDW 15.9 (H) 01/27/2020     01/27/2020    MPV 7.8 (L) 01/27/2020    GRAN 4.6 01/27/2020    GRAN 67.1 01/27/2020    LYMPH 1.1 01/27/2020    LYMPH 15.2 (L) 01/27/2020    MONO 0.7 01/27/2020    MONO 10.7 01/27/2020    EOS 0.4 01/27/2020    BASO 0.02 01/27/2020    EOSINOPHIL 6.1 01/27/2020    BASOPHIL 0.3 01/27/2020       CMP  Sodium   Date Value Ref Range Status   01/27/2020 137 136 - 145 mmol/L Final     Potassium   Date Value Ref Range Status   01/27/2020 4.8 3.5 - 5.1 mmol/L Final     Chloride   Date Value Ref Range " Status   01/27/2020 102 95 - 110 mmol/L Final     CO2   Date Value Ref Range Status   01/27/2020 27 23 - 29 mmol/L Final     Glucose   Date Value Ref Range Status   01/27/2020 86 70 - 110 mg/dL Final     BUN, Bld   Date Value Ref Range Status   01/27/2020 11 8 - 23 mg/dL Final     Creatinine   Date Value Ref Range Status   01/27/2020 0.9 0.5 - 1.4 mg/dL Final     Calcium   Date Value Ref Range Status   01/27/2020 9.7 8.7 - 10.5 mg/dL Final     Total Protein   Date Value Ref Range Status   01/27/2020 8.3 6.0 - 8.4 g/dL Final     Albumin   Date Value Ref Range Status   01/27/2020 3.3 (L) 3.5 - 5.2 g/dL Final     Total Bilirubin   Date Value Ref Range Status   01/27/2020 0.2 0.1 - 1.0 mg/dL Final     Comment:     For infants and newborns, interpretation of results should be based  on gestational age, weight and in agreement with clinical  observations.  Premature Infant recommended reference ranges:  Up to 24 hours.............<8.0 mg/dL  Up to 48 hours............<12.0 mg/dL  3-5 days..................<15.0 mg/dL  6-29 days.................<15.0 mg/dL       Alkaline Phosphatase   Date Value Ref Range Status   01/27/2020 64 55 - 135 U/L Final     AST   Date Value Ref Range Status   01/27/2020 11 10 - 40 U/L Final     ALT   Date Value Ref Range Status   01/27/2020 8 (L) 10 - 44 U/L Final     Anion Gap   Date Value Ref Range Status   01/27/2020 8 8 - 16 mmol/L Final     eGFR if    Date Value Ref Range Status   01/27/2020 >60 >60 mL/min/1.73 m^2 Final     eGFR if non    Date Value Ref Range Status   01/27/2020 >60 >60 mL/min/1.73 m^2 Final     Comment:     Calculation used to obtain the estimated glomerular filtration  rate (eGFR) is the CKD-EPI equation.        NM PET CT Routine Skull to Mid Thigh   Order: [id number]   Status:  Final result   Visible to patient:  Yes (Patient Portal) Next appt:  01/09/2020 at 09:00 AM in General Surgery (Jose Lazo MD) Dx:  Malignant neoplasm of  rectum; Rectal ...   Details     Reading Physician Reading Date Result Priority   Darrion Guido MD 12/30/2019 Routine      Narrative     EXAMINATION:  NM PET CT ROUTINE    CLINICAL HISTORY:  Malignant neoplasm of rectum initial staging, initial treatment strategy of colorectal carcinoma, C 20, D 49.0, K 62.89.    TECHNIQUE:  Following the injection of 12.2 mCi of F-18 labeled FDG into a right antecubital vein, PET CT was performed from the vertex of the skull through the proximal thighs with an integrated full ring PET CT scanner with image fusion. The patient's serum glucose at the time of the exam was 88 mg/dL.    COMPARISON:  Multiple prior exams including MRI of 11/20/2019.    FINDINGS:  Known large sigmoid colon and upper rectal mass is intensely hypermetabolic, max SUV of 32.2.  There are multiple soft tissue nodules and irregular densities along the pelvic peritoneal reflections posteriorly, and to the left and right of midline, including on the left measuring up to 24 mm with max SUV of 10.3.  Hypermetabolic soft tissue density along the left anterolateral aspect of the peritoneum in the upper pelvis just distal to the ostomy has max SUV of 6.3.  There are no enlarged or intensely hypermetabolic mesenteric or retroperitoneal lymph nodes.    There is multifocal FDG hypermetabolism in the midline ventral abdominal wall superior to and at the level of the umbilicus, presumably a postoperative etiology, with FDG hypermetabolism associated with colostomy in the left anterior abdominal wall.  There is no abnormal focal FDG hypermetabolism elsewhere in the abdomen or pelvis to suggest metastatic disease.  No abnormal focal FDG hypermetabolism in the head, neck, chest, or the skeletal system. Hypermetabolic 10 mm low-density nodule in the left lobe of the thyroid gland is nonspecific, max SUV of 4.8.    CT images of the brain show no intra-axial mass or abnormal extra-axial fluid.  The calvarium is intact, with no  suspicious sclerotic or lytic osseous abnormality.    CT images of the chest show no suspicious pulmonary nodules or masses, with no pleural or pericardial effusion.    CT images of the abdomen and pelvis show cholecystectomy clips, with scattered aortoiliac vascular calcifications, and proximal colon suture line.  There is no ascites.  There are no suspicious sclerotic or lytic osseous abnormalities by CT.      Impression       1. Large intensely hypermetabolic mass in the sigmoid colon and upper rectum, compatible with known colorectal carcinoma.  2. Multifocal hypermetabolic soft tissue densities along the pelvic peritoneal reflections and in the left lower quadrant of the abdomen, suspicious for peritoneal metastatic disease.  3. No additional evidence for metastatic disease.  4. A hypermetabolic nodule in the left lobe of the thyroid gland is nonspecific, and correlation with thyroid ultrasound is recommended.      Electronically signed by: Darrion Guido MD  Date: 12/30/2019           Next appt:  01/09/2020 at 09:00 AM in General Surgery (Jose Lazo MD) Dx:  Rectal adenocarcinoma; Anemia, unspec...    Ref Range & Units 4d ago   Iron 45 - 160 ug/dL 11Low     Transferrin 200 - 375 mg/dL 148Low     TIBC 250 - 450 ug/dL 219Low     Saturated Iron 20 - 50 % 5Low             1d ago  (1/27/20) 3wk ago  (1/3/20) 2mo ago  (11/4/19) 2mo ago  (11/3/19) 2mo ago  (11/2/19) 2mo ago  (11/1/19) 2mo ago  (10/31/19)    WBC 3.90 - 12.70 K/uL 6.91  10.86  11.71  13.02High   14.06High   15.05High   15.82High     RBC 4.60 - 6.20 M/uL 3.74Low   3.29Low   3.08Low   3.01Low   2.95Low   3.10Low   3.32Low     Hemoglobin 14.0 - 18.0 g/dL 11.1Low   9.6Low   8.8Low   8.5Low   8.3Low   8.8Low   9.3Low     Hematocrit 40.0 - 54.0 % 36.1Low   31.7Low   28.4Low   27.5Low   26.8Low   27.8Low   29.9Low     Mean Corpuscular Volume 82 - 98 fL 97  96  92  91  91  90  90    Mean Corpuscular Hemoglobin 27.0 - 31.0 pg 29.7  29.2  28.6  28.2   28.1  28.4  28.0    Mean Corpuscular Hemoglobin Conc 32.0 - 36.0 g/dL 30.7Low   30.3Low   31.0Low   30.9Low   31.0Low   31.7Low   31.1Low     RDW 11.5 - 14.5 % 15.9High   14.1  18.3High   18.1High   17.9High   18.1High   18.2High     Platelets 150 - 350 K/uL 234  310  527High   506High   518High   500High   494High     MPV 9.2 - 12.9 fL 7.8Low   8.4Low   9.5  9.1Low   9.5  9.4  9.4    Gran # (ANC) 1.8 - 7.7 K/uL 4.6  7.7  8.4High   9.7High   10.2High   11.3High   11.7High     Immature Grans (Abs) 0.00 - 0.04 K/uL 0.04  0.04 CM 0.10High  CM 0.11High  CM 0.12High  CM 0.24High  CM 0.17High  CM   Comment: Mild elevation in immature granulocytes is non specific and   can be seen in a variety of conditions including stress response,   acute inflammation, trauma and pregnancy. Correlation with other   laboratory and clinical findings is essential.    Lymph # 1.0 - 4.8 K/uL 1.1  2.0             Iron deficiency anemia, unspecified iron deficiency anemia type  -     TSH; Future; Expected date: 01/28/2020  -     Vitamin B1; Future; Expected date: 01/28/2020  -     Vitamin B6; Future; Expected date: 01/28/2020  -     MMA; Future; Expected date: 01/28/2020  -     CBC auto differential; Standing  -     CMP; Future; Expected date: 01/28/2020    Rectal adenocarcinoma  -     capecitabine (XELODA) 500 MG Tab; Take 3 tablets by mouth twice daily Monday through Friday during radiation.  Dispense: 90 tablet; Refill: 1    Encounter for chemotherapy management  -     capecitabine (XELODA) 500 MG Tab; Take 3 tablets by mouth twice daily Monday through Friday during radiation.  Dispense: 90 tablet; Refill: 1  -     TSH; Future; Expected date: 01/28/2020  -     Vitamin B1; Future; Expected date: 01/28/2020  -     Vitamin B6; Future; Expected date: 01/28/2020  -     MMA; Future; Expected date: 01/28/2020  -     CBC auto differential; Standing  -     CMP; Future; Expected date: 01/28/2020    Malignant neoplasm of rectum  -      oxyCODONE-acetaminophen (PERCOCET) 5-325 mg per tablet; Take 1 tablet by mouth every 12 (twelve) hours as needed (moderate pain 2-5/10 pain scale).  Dispense: 60 tablet; Refill: 0  -     TSH; Future; Expected date: 01/28/2020  -     Vitamin B1; Future; Expected date: 01/28/2020  -     Vitamin B6; Future; Expected date: 01/28/2020  -     MMA; Future; Expected date: 01/28/2020  -     CBC auto differential; Standing  -     CMP; Future; Expected date: 01/28/2020    Reactive thrombocytosis    Heartburn  -     TSH; Future; Expected date: 01/28/2020  -     Vitamin B1; Future; Expected date: 01/28/2020  -     Vitamin B6; Future; Expected date: 01/28/2020  -     MMA; Future; Expected date: 01/28/2020  -     CBC auto differential; Standing  -     CMP; Future; Expected date: 01/28/2020    Abnormal findings on diagnostic imaging of other specified body structures   -     TSH; Future; Expected date: 01/28/2020      Oral xeloda 850 mg/m2 Monday through Friday with concomitant radiation to be followed 6 months of folfox through chest port in right chest for colorectal adenocarcinoma with peritoneal seeding   Cont xeloda   Patient must increase his protein intake to help with hypoalbuminemia.  Cont to monitor counts closely for further IV iron if there is a need   rtc 3 weeks with labs with nutritional levels  Explained he will likely have another scan 6-8 weeks after radiation   Then he will meet with surgeon to discuss whether resection at that time is appropriate   Refilling pain meds  He is aware to watch for constipation   Increase caloric intake     Start folfox after neoadjuvant therapy and radiation      Thank you for allowing me to evaluate and participate in the care of this pleasant patient. Please fell free to call me with any questions or concerns.    Shayla Sorenson MD    This note was dictated with Dragon and briefly proofread. Please excuse any sentences that may be unclear or words misspelled

## 2020-01-29 ENCOUNTER — TELEPHONE (OUTPATIENT)
Dept: PHARMACY | Facility: CLINIC | Age: 65
End: 2020-01-29

## 2020-01-30 NOTE — TELEPHONE ENCOUNTER
Patient returned call & confirmed shipment of Xeloda refill. Verified 2 patient identifiers. Patient approved $52.32 copay and asked that we send bill. No new medications, allergies, or health conditions. Confirmed that dosage has not changed. No missed doses. About 2 doses remaining. Next dose due on Mon 2/3. Shipping address confirmed. Signature requirement declined. Medication will ship Thurs 1/30 for Fri 1/31 delivery. Patient voiced understanding & has no questions or concerns at this time. CHARLES

## 2020-02-13 ENCOUNTER — LAB VISIT (OUTPATIENT)
Dept: LAB | Facility: HOSPITAL | Age: 65
End: 2020-02-13
Attending: INTERNAL MEDICINE
Payer: MEDICARE

## 2020-02-13 DIAGNOSIS — D50.9 IRON DEFICIENCY ANEMIA, UNSPECIFIED IRON DEFICIENCY ANEMIA TYPE: ICD-10-CM

## 2020-02-13 DIAGNOSIS — Z51.11 ENCOUNTER FOR CHEMOTHERAPY MANAGEMENT: ICD-10-CM

## 2020-02-13 DIAGNOSIS — R93.89 ABNORMAL FINDINGS ON DIAGNOSTIC IMAGING OF OTHER SPECIFIED BODY STRUCTURES: ICD-10-CM

## 2020-02-13 DIAGNOSIS — R12 HEARTBURN: ICD-10-CM

## 2020-02-13 DIAGNOSIS — C20 MALIGNANT NEOPLASM OF RECTUM: ICD-10-CM

## 2020-02-13 LAB
ALBUMIN SERPL BCP-MCNC: 2.6 G/DL (ref 3.5–5.2)
ALP SERPL-CCNC: 38 U/L (ref 55–135)
ALT SERPL W/O P-5'-P-CCNC: 9 U/L (ref 10–44)
ANION GAP SERPL CALC-SCNC: 7 MMOL/L (ref 8–16)
AST SERPL-CCNC: 12 U/L (ref 10–40)
BASOPHILS # BLD AUTO: 0.01 K/UL (ref 0–0.2)
BASOPHILS NFR BLD: 0.3 % (ref 0–1.9)
BILIRUB SERPL-MCNC: 0.2 MG/DL (ref 0.1–1)
BUN SERPL-MCNC: 8 MG/DL (ref 8–23)
CALCIUM SERPL-MCNC: 8.5 MG/DL (ref 8.7–10.5)
CHLORIDE SERPL-SCNC: 106 MMOL/L (ref 95–110)
CO2 SERPL-SCNC: 27 MMOL/L (ref 23–29)
CREAT SERPL-MCNC: 0.8 MG/DL (ref 0.5–1.4)
DIFFERENTIAL METHOD: ABNORMAL
EOSINOPHIL # BLD AUTO: 0.1 K/UL (ref 0–0.5)
EOSINOPHIL NFR BLD: 1.9 % (ref 0–8)
ERYTHROCYTE [DISTWIDTH] IN BLOOD BY AUTOMATED COUNT: 21 % (ref 11.5–14.5)
EST. GFR  (AFRICAN AMERICAN): >60 ML/MIN/1.73 M^2
EST. GFR  (NON AFRICAN AMERICAN): >60 ML/MIN/1.73 M^2
GLUCOSE SERPL-MCNC: 94 MG/DL (ref 70–110)
HCT VFR BLD AUTO: 34.6 % (ref 40–54)
HGB BLD-MCNC: 10.6 G/DL (ref 14–18)
IMM GRANULOCYTES # BLD AUTO: 0.01 K/UL (ref 0–0.04)
LYMPHOCYTES # BLD AUTO: 0.4 K/UL (ref 1–4.8)
LYMPHOCYTES NFR BLD: 9.8 % (ref 18–48)
MCH RBC QN AUTO: 30.2 PG (ref 27–31)
MCHC RBC AUTO-ENTMCNC: 30.6 G/DL (ref 32–36)
MCV RBC AUTO: 99 FL (ref 82–98)
MONOCYTES # BLD AUTO: 0.6 K/UL (ref 0.3–1)
MONOCYTES NFR BLD: 15 % (ref 4–15)
NEUTROPHILS # BLD AUTO: 2.7 K/UL (ref 1.8–7.7)
NEUTROPHILS NFR BLD: 72.7 % (ref 38–73)
NRBC BLD-RTO: 0 /100 WBC
PLATELET # BLD AUTO: 208 K/UL (ref 150–350)
PMV BLD AUTO: 8.1 FL (ref 9.2–12.9)
POTASSIUM SERPL-SCNC: 4.4 MMOL/L (ref 3.5–5.1)
PROT SERPL-MCNC: 5.6 G/DL (ref 6–8.4)
RBC # BLD AUTO: 3.51 M/UL (ref 4.6–6.2)
SODIUM SERPL-SCNC: 140 MMOL/L (ref 136–145)
TSH SERPL DL<=0.005 MIU/L-ACNC: 1.25 UIU/ML (ref 0.4–4)
WBC # BLD AUTO: 3.67 K/UL (ref 3.9–12.7)

## 2020-02-13 PROCEDURE — 84425 ASSAY OF VITAMIN B-1: CPT

## 2020-02-13 PROCEDURE — 84443 ASSAY THYROID STIM HORMONE: CPT

## 2020-02-13 PROCEDURE — 85025 COMPLETE CBC W/AUTO DIFF WBC: CPT

## 2020-02-13 PROCEDURE — 83921 ORGANIC ACID SINGLE QUANT: CPT

## 2020-02-13 PROCEDURE — 80053 COMPREHEN METABOLIC PANEL: CPT

## 2020-02-13 PROCEDURE — 84207 ASSAY OF VITAMIN B-6: CPT

## 2020-02-17 ENCOUNTER — TELEPHONE (OUTPATIENT)
Dept: PHARMACY | Facility: CLINIC | Age: 65
End: 2020-02-17

## 2020-02-18 ENCOUNTER — OFFICE VISIT (OUTPATIENT)
Dept: HEMATOLOGY/ONCOLOGY | Facility: CLINIC | Age: 65
End: 2020-02-18
Payer: MEDICARE

## 2020-02-18 VITALS
TEMPERATURE: 98 F | DIASTOLIC BLOOD PRESSURE: 51 MMHG | HEART RATE: 75 BPM | RESPIRATION RATE: 20 BRPM | HEIGHT: 69 IN | SYSTOLIC BLOOD PRESSURE: 102 MMHG | BODY MASS INDEX: 20.67 KG/M2 | WEIGHT: 139.56 LBS | OXYGEN SATURATION: 100 %

## 2020-02-18 DIAGNOSIS — E53.1 ANEMIA DUE TO VITAMIN B6 DEFICIENCY: ICD-10-CM

## 2020-02-18 DIAGNOSIS — C20 RECTAL ADENOCARCINOMA: ICD-10-CM

## 2020-02-18 DIAGNOSIS — R06.09 DOE (DYSPNEA ON EXERTION): ICD-10-CM

## 2020-02-18 DIAGNOSIS — E53.8 B12 DEFICIENCY: ICD-10-CM

## 2020-02-18 DIAGNOSIS — Z51.11 ENCOUNTER FOR CHEMOTHERAPY MANAGEMENT: ICD-10-CM

## 2020-02-18 DIAGNOSIS — R63.4 WEIGHT LOSS: ICD-10-CM

## 2020-02-18 DIAGNOSIS — D64.9 ANEMIA, UNSPECIFIED TYPE: ICD-10-CM

## 2020-02-18 DIAGNOSIS — D64.9 SYMPTOMATIC ANEMIA: ICD-10-CM

## 2020-02-18 DIAGNOSIS — D53.8 ANEMIA DUE TO VITAMIN B6 DEFICIENCY: ICD-10-CM

## 2020-02-18 DIAGNOSIS — C20 MALIGNANT NEOPLASM OF RECTUM: Primary | ICD-10-CM

## 2020-02-18 LAB
METHYLMALONATE SERPL-SCNC: 1.46 UMOL/L
PYRIDOXAL SERPL-MCNC: 4 UG/L (ref 5–50)
VIT B1 BLD-MCNC: 53 UG/L (ref 38–122)

## 2020-02-18 PROCEDURE — 94760 N-INVAS EAR/PLS OXIMETRY 1: CPT | Mod: PBBFAC,PO | Performed by: INTERNAL MEDICINE

## 2020-02-18 PROCEDURE — 99999 PR PBB SHADOW E&M-EST. PATIENT-LVL III: ICD-10-PCS | Mod: PBBFAC,,, | Performed by: INTERNAL MEDICINE

## 2020-02-18 PROCEDURE — 99215 OFFICE O/P EST HI 40 MIN: CPT | Mod: S$PBB,,, | Performed by: INTERNAL MEDICINE

## 2020-02-18 PROCEDURE — 99999 PR PBB SHADOW E&M-EST. PATIENT-LVL III: CPT | Mod: PBBFAC,,, | Performed by: INTERNAL MEDICINE

## 2020-02-18 PROCEDURE — 99215 PR OFFICE/OUTPT VISIT, EST, LEVL V, 40-54 MIN: ICD-10-PCS | Mod: S$PBB,,, | Performed by: INTERNAL MEDICINE

## 2020-02-18 PROCEDURE — G0444 DEPRESSION SCREEN ANNUAL: HCPCS | Mod: PBBFAC,PO | Performed by: INTERNAL MEDICINE

## 2020-02-18 PROCEDURE — 99213 OFFICE O/P EST LOW 20 MIN: CPT | Mod: PBBFAC,PO | Performed by: INTERNAL MEDICINE

## 2020-02-18 RX ORDER — SODIUM CHLORIDE 0.9 % (FLUSH) 0.9 %
10 SYRINGE (ML) INJECTION
Status: CANCELLED | OUTPATIENT
Start: 2020-02-20

## 2020-02-18 RX ORDER — SODIUM CHLORIDE 0.9 % (FLUSH) 0.9 %
10 SYRINGE (ML) INJECTION
Status: CANCELLED | OUTPATIENT
Start: 2020-03-05

## 2020-02-18 RX ORDER — HEPARIN 100 UNIT/ML
500 SYRINGE INTRAVENOUS
Status: CANCELLED | OUTPATIENT
Start: 2020-03-05

## 2020-02-18 RX ORDER — HEPARIN 100 UNIT/ML
500 SYRINGE INTRAVENOUS
Status: CANCELLED | OUTPATIENT
Start: 2020-02-20

## 2020-02-18 RX ORDER — PYRIDOXINE HCL (VITAMIN B6) 100 MG
100 TABLET ORAL DAILY
Qty: 30 TABLET | Refills: 0 | Status: SHIPPED | OUTPATIENT
Start: 2020-02-18 | End: 2021-02-17

## 2020-02-18 RX ORDER — UBIDECARENONE 75 MG
1000 CAPSULE ORAL DAILY
Qty: 60 TABLET | Refills: 0 | Status: SHIPPED | OUTPATIENT
Start: 2020-02-18 | End: 2022-12-09

## 2020-02-18 NOTE — PROGRESS NOTES
CC I have good days and bad days    Miguel Angel Apple Sr. is a 64 y.o.  Patient here for evaluation of a T3-T4 N1 M a 1 see rectal adenocarcinoma.  On October 21, 2019   a laparotomy was performed for perforated colon and patient underwent a right hemicolectomy with a rectal biopsy revealing rectal adenocarcinoma. He is healing from surgery but referred here to discuss treatment options.   He started xeloda with radiation and is receiving week 3 of radiation  He has completed two doses of IV iron at [facility] without complications : port was accessed without any problem     Tomorrow is the shelter point of 6 weeks   He reports decreased appetite and a mild weight loss , slight heartburn     Past Medical History:   Diagnosis Date    Colon cancer     Depression     GERD (gastroesophageal reflux disease)     Medical history reviewed with no changes      Past Surgical History:   Procedure Laterality Date    CHOLECYSTECTOMY  10/21/2019    Procedure: CHOLECYSTECTOMY;  Surgeon: Jose Lazo MD;  Location: [facility] OR;  Service: General;;    CLOSURE OF WOUND N/A 10/30/2019    Procedure: CLOSURE, WOUND;  Surgeon: Torres Dawkins MD;  Location: [facility] OR;  Service: General;  Laterality: N/A;    COLOSTOMY  10/21/2019    Procedure: CREATION, COLOSTOMY;  Surgeon: Jose Lazo MD;  Location: [facility] OR;  Service: General;;  END DESCENDING COLOSTOMY    FLEXIBLE SIGMOIDOSCOPY N/A 10/21/2019    Procedure: SIGMOIDOSCOPY, FLEXIBLE;  Surgeon: Jose Lazo MD;  Location: [facility] OR;  Service: General;  Laterality: N/A;    INSERTION OF TUNNELED CENTRAL VENOUS CATHETER (CVC) WITH SUBCUTANEOUS PORT Right 12/23/2019    Procedure: INSERTION, PORT-A-CATH;  Surgeon: Jose Lazo MD;  Location: [facility] OR;  Service: General;  Laterality: Right;    no surgical history      REPAIR OF ABDOMINAL WALL N/A 10/30/2019    Procedure: REPAIR, ABDOMINAL WALL;  Surgeon: Torres Dawkins MD;  Location: [facility] OR;  Service:  General;  Laterality: N/A;    RIGHT HEMICOLECTOMY Right 10/21/2019    Procedure: HEMICOLECTOMY, RIGHT;  Surgeon: Jose Lazo MD;  Location[facility];  Service: General;  Laterality: Right;       Current Outpatient Medications:     capecitabine (XELODA) 500 MG Tab, Take 3 tablets by mouth twice daily Monday through Friday during radiation., Disp: 90 tablet, Rfl: 1    citalopram (CELEXA) 10 MG tablet, Take 1 tablet (10 mg total) by mouth once daily., Disp: 30 tablet, Rfl: 11    ondansetron (ZOFRAN) 4 MG tablet, Take 1 tablet (4 mg total) by mouth every 6 (six) hours as needed for Nausea., Disp: 24 tablet, Rfl: 1    ondansetron (ZOFRAN-ODT) 8 MG TbDL, Take 1 tablet (8 mg total) by mouth every 8 (eight) hours as needed., Disp: 90 tablet, Rfl: 3    oxyCODONE-acetaminophen (PERCOCET) 5-325 mg per tablet, Take 1 tablet by mouth every 12 (twelve) hours as needed (moderate pain 2-5/10 pain scale)., Disp: 60 tablet, Rfl: 0    pantoprazole (PROTONIX) 40 MG tablet, Take 1 tablet (40 mg total) by mouth once daily., Disp: 30 tablet, Rfl: 11  Review of patient's allergies indicates:  No Known Allergies  Social History     Tobacco Use    Smoking status: Former Smoker    Smokeless tobacco: Never Used   Substance Use Topics    Alcohol use: Not Currently    Drug use: Never     Family History   Problem Relation Age of Onset    Diabetes Mother     Heart disease Father        CONSTITUTIONAL: No fevers, chills, night sweats,mild  wt. loss,  + appetite changes  SKIN: no rashes or itching  ENT: No headaches, head trauma, vision changes, or eye pain  LYMPH NODES: None noticed   CV: No chest pain, palpitations.   RESP: No dyspnea on exertion, cough, wheezing, or hemoptysis  GI: No nausea, emesis, diarrhea, constipation, melena, hematochezia, pain.   : No dysuria, hematuria, urgency, or frequency   HEME: No easy bruising, bleeding problems  PSYCHIATRIC: No depression, anxiety, psychosis, hallucinations.  NEURO: No  seizures, memory loss, dizziness or difficulty sleeping  MSK: No arthralgias or joint swelling         There were no vitals taken for this visit.  Gen: NAD, A and O x3, conversant  Well groomed   Psych: pleasant affect, normal thought process  Eyes: Pupils round and non dilated, EOM intact conj pale   Nose: Nares patent  OP clear, mucosa patent  Neck: suppple, no JVD, trachea midline, no palpable mass, no adenopathy  Lungs: CTAB, no wheezes, no use of accessory muscles  CV: S1S2 with RRR, No mrg  Abd: soft, NTND, + BS, No HSM, no ascites  Extr: No CCE, MEHREEN, strength normal, good capillary refill  Neuro: steady gait, CNs grossly intact  Skin: intact, no lesions noted  Rheum: No joint swelling    Lab Results   Component Value Date    WBC 11.71 11/04/2019    HGB 8.8 (L) 11/04/2019    HCT 28.4 (L) 11/04/2019    MCV 92 11/04/2019     (H) 11/04/2019     Lab Results   Component Value Date    WBC 3.67 (L) 02/13/2020    RBC 3.51 (L) 02/13/2020    HGB 10.6 (L) 02/13/2020    HCT 34.6 (L) 02/13/2020    MCV 99 (H) 02/13/2020    MCH 30.2 02/13/2020    MCHC 30.6 (L) 02/13/2020    RDW 21.0 (H) 02/13/2020     02/13/2020    MPV 8.1 (L) 02/13/2020    GRAN 2.7 02/13/2020    GRAN 72.7 02/13/2020    LYMPH 0.4 (L) 02/13/2020    LYMPH 9.8 (L) 02/13/2020    MONO 0.6 02/13/2020    MONO 15.0 02/13/2020    EOS 0.1 02/13/2020    BASO 0.01 02/13/2020    EOSINOPHIL 1.9 02/13/2020    BASOPHIL 0.3 02/13/2020       CMP  Sodium   Date Value Ref Range Status   02/13/2020 140 136 - 145 mmol/L Final     Potassium   Date Value Ref Range Status   02/13/2020 4.4 3.5 - 5.1 mmol/L Final     Chloride   Date Value Ref Range Status   02/13/2020 106 95 - 110 mmol/L Final     CO2   Date Value Ref Range Status   02/13/2020 27 23 - 29 mmol/L Final     Glucose   Date Value Ref Range Status   02/13/2020 94 70 - 110 mg/dL Final     BUN, Bld   Date Value Ref Range Status   02/13/2020 8 8 - 23 mg/dL Final     Creatinine   Date Value Ref Range Status    02/13/2020 0.8 0.5 - 1.4 mg/dL Final     Calcium   Date Value Ref Range Status   02/13/2020 8.5 (L) 8.7 - 10.5 mg/dL Final     Total Protein   Date Value Ref Range Status   02/13/2020 5.6 (L) 6.0 - 8.4 g/dL Final     Albumin   Date Value Ref Range Status   02/13/2020 2.6 (L) 3.5 - 5.2 g/dL Final     Total Bilirubin   Date Value Ref Range Status   02/13/2020 0.2 0.1 - 1.0 mg/dL Final     Comment:     For infants and newborns, interpretation of results should be based  on gestational age, weight and in agreement with clinical  observations.  Premature Infant recommended reference ranges:  Up to 24 hours.............<8.0 mg/dL  Up to 48 hours............<12.0 mg/dL  3-5 days..................<15.0 mg/dL  6-29 days.................<15.0 mg/dL       Alkaline Phosphatase   Date Value Ref Range Status   02/13/2020 38 (L) 55 - 135 U/L Final     AST   Date Value Ref Range Status   02/13/2020 12 10 - 40 U/L Final     ALT   Date Value Ref Range Status   02/13/2020 9 (L) 10 - 44 U/L Final     Anion Gap   Date Value Ref Range Status   02/13/2020 7 (L) 8 - 16 mmol/L Final     eGFR if    Date Value Ref Range Status   02/13/2020 >60 >60 mL/min/1.73 m^2 Final     eGFR if non    Date Value Ref Range Status   02/13/2020 >60 >60 mL/min/1.73 m^2 Final     Comment:     Calculation used to obtain the estimated glomerular filtration  rate (eGFR) is the CKD-EPI equation.        NM PET CT Routine Skull to Mid Thigh   Order: [id number]   Status:  Final result   Visible to patient:  Yes (Patient Portal) Next appt:  01/09/2020 at 09:00 AM in General Surgery (Jose Lazo MD) Dx:  Malignant neoplasm of rectum; Rectal ...   Details     Reading Physician Reading Date Result Priority   Darrion Guido MD 12/30/2019 Routine      Narrative     EXAMINATION:  NM PET CT ROUTINE    CLINICAL HISTORY:  Malignant neoplasm of rectum initial staging, initial treatment strategy of colorectal carcinoma, C 20, D 49.0, K  62.89.    TECHNIQUE:  Following the injection of 12.2 mCi of F-18 labeled FDG into a right antecubital vein, PET CT was performed from the vertex of the skull through the proximal thighs with an integrated full ring PET CT scanner with image fusion. The patient's serum glucose at the time of the exam was 88 mg/dL.    COMPARISON:  Multiple prior exams including MRI of 11/20/2019.    FINDINGS:  Known large sigmoid colon and upper rectal mass is intensely hypermetabolic, max SUV of 32.2.  There are multiple soft tissue nodules and irregular densities along the pelvic peritoneal reflections posteriorly, and to the left and right of midline, including on the left measuring up to 24 mm with max SUV of 10.3.  Hypermetabolic soft tissue density along the left anterolateral aspect of the peritoneum in the upper pelvis just distal to the ostomy has max SUV of 6.3.  There are no enlarged or intensely hypermetabolic mesenteric or retroperitoneal lymph nodes.    There is multifocal FDG hypermetabolism in the midline ventral abdominal wall superior to and at the level of the umbilicus, presumably a postoperative etiology, with FDG hypermetabolism associated with colostomy in the left anterior abdominal wall.  There is no abnormal focal FDG hypermetabolism elsewhere in the abdomen or pelvis to suggest metastatic disease.  No abnormal focal FDG hypermetabolism in the head, neck, chest, or the skeletal system. Hypermetabolic 10 mm low-density nodule in the left lobe of the thyroid gland is nonspecific, max SUV of 4.8.    CT images of the brain show no intra-axial mass or abnormal extra-axial fluid.  The calvarium is intact, with no suspicious sclerotic or lytic osseous abnormality.    CT images of the chest show no suspicious pulmonary nodules or masses, with no pleural or pericardial effusion.    CT images of the abdomen and pelvis show cholecystectomy clips, with scattered aortoiliac vascular calcifications, and proximal colon  suture line.  There is no ascites.  There are no suspicious sclerotic or lytic osseous abnormalities by CT.      Impression       1. Large intensely hypermetabolic mass in the sigmoid colon and upper rectum, compatible with known colorectal carcinoma.  2. Multifocal hypermetabolic soft tissue densities along the pelvic peritoneal reflections and in the left lower quadrant of the abdomen, suspicious for peritoneal metastatic disease.  3. No additional evidence for metastatic disease.  4. A hypermetabolic nodule in the left lobe of the thyroid gland is nonspecific, and correlation with thyroid ultrasound is recommended.      Electronically signed by: Darrion Guido MD  Date: 12/30/2019           Next appt:  01/09/2020 at 09:00 AM in General Surgery (Jose Lazo MD) Dx:  Rectal adenocarcinoma; Anemia, unspec...    Ref Range & Units 4d ago   Iron 45 - 160 ug/dL 11Low     Transferrin 200 - 375 mg/dL 148Low     TIBC 250 - 450 ug/dL 219Low     Saturated Iron 20 - 50 % 5Low                Malignant neoplasm of rectum  -     CBC auto differential; Standing  -     CEA; Future; Expected date: 02/18/2020  -     CMP; Future; Expected date: 02/18/2020    Encounter for chemotherapy management  -     CBC auto differential; Standing  -     CEA; Future; Expected date: 02/18/2020  -     CMP; Future; Expected date: 02/18/2020    Anemia, unspecified type    Rectal adenocarcinoma    Weight loss    MCWILLIAMS (dyspnea on exertion)    Symptomatic anemia    Anemia due to vitamin B6 deficiency  -     pyridoxine, vitamin B6, (VITAMIN B-6) 100 MG Tab; Take 1 tablet (100 mg total) by mouth once daily.  Dispense: 30 tablet; Refill: 0  -     cyanocobalamin (B-12 DOTS) 500 MCG tablet; Take 2 tablets (1,000 mcg total) by mouth once daily.  Dispense: 60 tablet; Refill: 0    B12 deficiency  -     pyridoxine, vitamin B6, (VITAMIN B-6) 100 MG Tab; Take 1 tablet (100 mg total) by mouth once daily.  Dispense: 30 tablet; Refill: 0  -     cyanocobalamin  (B-12 DOTS) 500 MCG tablet; Take 2 tablets (1,000 mcg total) by mouth once daily.  Dispense: 60 tablet; Refill: 0    Other orders  -     ferric carboxymaltose 750 mg in sodium chloride 0.9% 250 mL  -     ferric carboxymaltose (INJECTAFER) 750 mg in sodium chloride 0.9% 265 mL infusion  -     sodium chloride 0.9% 100 mL flush bag  -     sodium chloride 0.9% flush 10 mL  -     heparin, porcine (PF) 100 unit/mL injection flush 500 Units  -     alteplase injection 2 mg  -     ferric carboxymaltose 750 mg in sodium chloride 0.9% 250 mL  -     ferric carboxymaltose (INJECTAFER) 750 mg in sodium chloride 0.9% 265 mL infusion  -     sodium chloride 0.9% 100 mL flush bag  -     sodium chloride 0.9% flush 10 mL  -     heparin, porcine (PF) 100 unit/mL injection flush 500 Units  -     alteplase injection 2 mg      Oral xeloda 850 mg/m2 Monday through Friday with concomitant radiation to be followed 6 months of folfox through chest port in right chest for colorectal adenocarcinoma with peritoneal seeding   Finishing  xeloda   Last radiation tomorrow February 19 2020   Due for scan in 6-8 weeks \  He celi see Dr Lazo as well to plan for hopeful resection before starting folfox adjuvantly   Proceed with B6 replacement . B 12 replacemnet and IV iron times two in [facility] 5 weeks with labs   Refilling pain meds  He is aware to watch for constipation   Increase caloric intake   Start folfox after neoadjuvant therapy and radiation      Thank you for allowing me to evaluate and participate in the care of this pleasant patient. Please fell free to call me with any questions or concerns.    Warmly,  Shayla Stringer MD    This note was dictated with Dragon and briefly proofread. Please excuse any sentences that may be unclear or words misspelled

## 2020-02-24 ENCOUNTER — INFUSION (OUTPATIENT)
Dept: INFUSION THERAPY | Facility: HOSPITAL | Age: 65
End: 2020-02-24
Attending: INTERNAL MEDICINE
Payer: MEDICARE

## 2020-02-24 VITALS
SYSTOLIC BLOOD PRESSURE: 100 MMHG | TEMPERATURE: 98 F | HEART RATE: 88 BPM | DIASTOLIC BLOOD PRESSURE: 64 MMHG | RESPIRATION RATE: 18 BRPM

## 2020-02-24 DIAGNOSIS — D50.8 OTHER IRON DEFICIENCY ANEMIA: Primary | ICD-10-CM

## 2020-02-24 PROCEDURE — 63600175 PHARM REV CODE 636 W HCPCS: Performed by: INTERNAL MEDICINE

## 2020-02-24 PROCEDURE — 25000003 PHARM REV CODE 250: Performed by: INTERNAL MEDICINE

## 2020-02-24 PROCEDURE — 96365 THER/PROPH/DIAG IV INF INIT: CPT

## 2020-02-24 PROCEDURE — A4216 STERILE WATER/SALINE, 10 ML: HCPCS | Performed by: INTERNAL MEDICINE

## 2020-02-24 RX ORDER — SODIUM CHLORIDE 0.9 % (FLUSH) 0.9 %
10 SYRINGE (ML) INJECTION
Status: DISCONTINUED | OUTPATIENT
Start: 2020-02-24 | End: 2020-02-24 | Stop reason: HOSPADM

## 2020-02-24 RX ORDER — HEPARIN 100 UNIT/ML
500 SYRINGE INTRAVENOUS
Status: DISCONTINUED | OUTPATIENT
Start: 2020-02-24 | End: 2020-02-24 | Stop reason: HOSPADM

## 2020-02-24 RX ADMIN — Medication 10 ML: at 11:02

## 2020-02-24 RX ADMIN — FERRIC CARBOXYMALTOSE INJECTION: 50 INJECTION, SOLUTION INTRAVENOUS at 10:02

## 2020-02-24 RX ADMIN — HEPARIN 500 UNITS: 100 SYRINGE at 11:02

## 2020-02-27 ENCOUNTER — OFFICE VISIT (OUTPATIENT)
Dept: SURGERY | Facility: CLINIC | Age: 65
End: 2020-02-27
Payer: MEDICARE

## 2020-02-27 VITALS
HEIGHT: 69 IN | BODY MASS INDEX: 20.93 KG/M2 | HEART RATE: 79 BPM | RESPIRATION RATE: 17 BRPM | SYSTOLIC BLOOD PRESSURE: 107 MMHG | TEMPERATURE: 98 F | OXYGEN SATURATION: 98 % | WEIGHT: 141.31 LBS | DIASTOLIC BLOOD PRESSURE: 62 MMHG

## 2020-02-27 DIAGNOSIS — C20 MALIGNANT NEOPLASM OF RECTUM: ICD-10-CM

## 2020-02-27 DIAGNOSIS — C20 RECTAL CANCER: Primary | ICD-10-CM

## 2020-02-27 PROCEDURE — 99213 OFFICE O/P EST LOW 20 MIN: CPT | Mod: S$GLB,,, | Performed by: SURGERY

## 2020-02-27 PROCEDURE — 99213 PR OFFICE/OUTPT VISIT, EST, LEVL III, 20-29 MIN: ICD-10-PCS | Mod: S$GLB,,, | Performed by: SURGERY

## 2020-02-27 RX ORDER — OXYCODONE AND ACETAMINOPHEN 5; 325 MG/1; MG/1
1 TABLET ORAL EVERY 12 HOURS PRN
Qty: 60 TABLET | Refills: 0 | Status: SHIPPED | OUTPATIENT
Start: 2020-02-27 | End: 2020-03-25 | Stop reason: SDUPTHER

## 2020-02-27 NOTE — PROGRESS NOTES
"[facility] Surgery  Follow-up    Subjective:       Patient ID: Miguel Angel Apple Sr. is a 64 y.o. male.    Chief Complaint: Follow-up (Six week f/u)      HPI:  Miguel Angel Apple Sr. is a 64 y.o. male presents today for follow-up examination after completion of radiation therapy and is a load of for neoadjuvant therapy for locally advanced rectal cancer.  Patient previously underwent right hemicolectomy for perforated colon related to large bowel obstruction as well as descending colostomy and mucous fistula creation for locally advanced rectal cancer.  Patient states that he is now completed neoadjuvant therapy is now ready for discussions of surgical intervention.    Review of Systems   Constitutional: Negative for appetite change, chills and fever.   HENT: Negative for congestion, dental problem and drooling.    Eyes: Negative for photophobia, discharge and itching.   Respiratory: Negative for apnea and chest tightness.    Cardiovascular: Negative for chest pain, palpitations and leg swelling.   Gastrointestinal: Negative for abdominal distention and abdominal pain.   Endocrine: Negative for cold intolerance and heat intolerance.   Genitourinary: Negative for difficulty urinating and dysuria.   Musculoskeletal: Negative for arthralgias and back pain.   Skin: Negative for color change and pallor.   Neurological: Negative for dizziness, facial asymmetry and headaches.   Hematological: Negative for adenopathy. Does not bruise/bleed easily.   Psychiatric/Behavioral: Negative for agitation, behavioral problems and confusion.       Objective:      Vitals:    02/27/20 1103   BP: 107/62   BP Location: Right arm   Patient Position: Sitting   BP Method: Medium (Automatic)   Pulse: 79   Resp: 17   Temp: 98.2 °F (36.8 °C)   TempSrc: Oral   SpO2: 98%   Weight: 64.1 kg (141 lb 4.8 oz)   Height: 5' 9" (1.753 m)     Physical Exam   Constitutional: He is oriented to person, place, and time. He appears well-developed and " well-nourished.   HENT:   Head: Normocephalic and atraumatic.   Eyes: Pupils are equal, round, and reactive to light. EOM are normal.   Neck: Normal range of motion. Neck supple. No thyromegaly present.   Cardiovascular: Normal rate and regular rhythm.   No murmur heard.  Pulmonary/Chest: Effort normal and breath sounds normal. No respiratory distress.   Abdominal: Soft. Bowel sounds are normal. He exhibits no distension. There is no tenderness.       Musculoskeletal: Normal range of motion. He exhibits no edema.   Neurological: He is alert and oriented to person, place, and time. No cranial nerve deficit.   Skin: Skin is warm. Capillary refill takes less than 2 seconds. No rash noted. He is not diaphoretic. No erythema.   Psychiatric: He has a normal mood and affect.        Assessment:       1. Rectal cancer    2. Malignant neoplasm of rectum        Plan:   Rectal cancer  -     Ambulatory referral/consult to Surgical Oncology; Future; Expected date: 03/05/2020  -     oxyCODONE-acetaminophen (PERCOCET) 5-325 mg per tablet; Take 1 tablet by mouth every 12 (twelve) hours as needed (moderate pain 2-5/10 pain scale).  Dispense: 60 tablet; Refill: 0    Malignant neoplasm of rectum  -     oxyCODONE-acetaminophen (PERCOCET) 5-325 mg per tablet; Take 1 tablet by mouth every 12 (twelve) hours as needed (moderate pain 2-5/10 pain scale).  Dispense: 60 tablet; Refill: 0        Medical Decision Making/Counseling:  Patient with locally advanced T4 rectal cancer.  Under went neoadjuvant chemo radiation therapy per Medical Oncology and Radiation Oncology.  Given his locally advanced status, patient would likely best be served by surgical oncologist.  In this light I have placed referral to Surgical Oncology at [facility].  Pain medicines reordered per patient desire as patient has rectal cancer and is appropriate.    Follow up:  Follow-up surgery clinic in [place] as needed.    Patient instructed that best way to communicate with  my office staff is for patient to get on the [facility] epic patient portal to expedite communication and communication issues that may occur.  Patient was given instructions on how to get on the portal.  I encouraged patient to obtain portal access as well.  Ultimately it is up to the patient to obtain access.  Patient voiced understanding.

## 2020-03-02 ENCOUNTER — INFUSION (OUTPATIENT)
Dept: INFUSION THERAPY | Facility: HOSPITAL | Age: 65
End: 2020-03-02
Attending: INTERNAL MEDICINE
Payer: MEDICARE

## 2020-03-02 VITALS
DIASTOLIC BLOOD PRESSURE: 52 MMHG | RESPIRATION RATE: 18 BRPM | TEMPERATURE: 98 F | SYSTOLIC BLOOD PRESSURE: 99 MMHG | HEART RATE: 66 BPM

## 2020-03-02 DIAGNOSIS — D50.8 OTHER IRON DEFICIENCY ANEMIA: Primary | ICD-10-CM

## 2020-03-02 PROCEDURE — 63600175 PHARM REV CODE 636 W HCPCS: Mod: JG | Performed by: INTERNAL MEDICINE

## 2020-03-02 PROCEDURE — 96365 THER/PROPH/DIAG IV INF INIT: CPT

## 2020-03-02 RX ORDER — SODIUM CHLORIDE 0.9 % (FLUSH) 0.9 %
10 SYRINGE (ML) INJECTION
Status: DISCONTINUED | OUTPATIENT
Start: 2020-03-02 | End: 2020-03-02 | Stop reason: HOSPADM

## 2020-03-02 RX ORDER — HEPARIN 100 UNIT/ML
500 SYRINGE INTRAVENOUS
Status: DISCONTINUED | OUTPATIENT
Start: 2020-03-02 | End: 2020-03-02 | Stop reason: HOSPADM

## 2020-03-02 RX ADMIN — FERRIC CARBOXYMALTOSE INJECTION: 50 INJECTION, SOLUTION INTRAVENOUS at 08:03

## 2020-03-02 RX ADMIN — SODIUM CHLORIDE: 0.9 INJECTION, SOLUTION INTRAVENOUS at 08:03

## 2020-03-02 RX ADMIN — HEPARIN 500 UNITS: 100 SYRINGE at 09:03

## 2020-03-02 NOTE — PLAN OF CARE
Problem: Adult Inpatient Plan of Care  Goal: Plan of Care Review  Outcome: Ongoing, Progressing     BP (!) 99/52   Pulse 66   Temp 98.1 °F (36.7 °C) (Oral)   Resp 18   Patient tolerated treatment well. VSS. Port flushed, heparin locked, and deaccessed. Pt to follow up with MD prior to any more infusions. Ambulates per self. Significant other accompanied to personal vehicle. AVS refused.

## 2020-03-02 NOTE — PLAN OF CARE
Problem: Fatigue  Goal: Improved Activity Tolerance  Outcome: Ongoing, Progressing    BP (!) 109/57 (BP Location: Left arm, Patient Position: Sitting)   Pulse 83   Temp 98.1 °F (36.7 °C) (Oral)   Resp 18   Patient here today for injectafer infusion. VSS. Port accessed to right chest without difficulty. Patent with blood return. Refreshments and blanket offered.

## 2020-03-03 ENCOUNTER — TELEPHONE (OUTPATIENT)
Dept: INFUSION THERAPY | Facility: HOSPITAL | Age: 65
End: 2020-03-03

## 2020-03-03 NOTE — TELEPHONE ENCOUNTER
Patient cancelled chemo for today, cycle 1. Patient called the front office and stated he wanted to discuss his treatment with Dr Stringer further.    I kept his next scheduled cycle for 3/17 reserved on the schedule, please let us know if needing to reschedule.

## 2020-03-05 ENCOUNTER — TELEPHONE (OUTPATIENT)
Dept: HEMATOLOGY/ONCOLOGY | Facility: CLINIC | Age: 65
End: 2020-03-05

## 2020-03-05 NOTE — TELEPHONE ENCOUNTER
Pt wants to continue chemo here in [place]. Sent message to infusion schedulers.     ----- Message from Forest Lee sent at 3/5/2020 10:59 AM CST -----  Contact: pt  Pt called and would like to change his appts    Pt can be reached at [phone]

## 2020-03-06 ENCOUNTER — TELEPHONE (OUTPATIENT)
Dept: INFUSION THERAPY | Facility: HOSPITAL | Age: 65
End: 2020-03-06

## 2020-03-10 ENCOUNTER — TELEPHONE (OUTPATIENT)
Dept: SURGERY | Facility: CLINIC | Age: 65
End: 2020-03-10

## 2020-03-10 NOTE — TELEPHONE ENCOUNTER
----- Message from Sharri [place] RN sent at 3/9/2020 10:00 AM CDT -----  Jennifer,  See the attached message for the patient.  He is a rectal cancer finishing neoadjuv tx.  Can you schedule this week with one of the [facility] surgical oncologists here?    Addy Quezada is Upper GI.  We have a Colon and Rectal division that will handle this rectal cancer patient.    Also, we can't see referrals placed in outside facilities.  You would need to message us in order for an appointment.    Ruthie  ----- Message -----  From: Cathie Armenta MA  Sent: 3/9/2020   9:24 AM CDT  To: Sharri [place] RN     Can you please assist he is a new pt. Thanks  ----- Message -----  From: Shawnee Armenta LPN  Sent: 3/9/2020   8:23 AM CDT  To: Bebo Daly S Staff    Good morning    Dr Jose Lazo put in a referral to Dr Lagunas for patient on 02/27/20 and would like to know if patient could be scheduled within the week. Please advise.    Thank you  Pilar Armenta LPN

## 2020-03-11 ENCOUNTER — TELEPHONE (OUTPATIENT)
Dept: SURGERY | Facility: CLINIC | Age: 65
End: 2020-03-11

## 2020-03-11 ENCOUNTER — TELEPHONE (OUTPATIENT)
Dept: HEMATOLOGY/ONCOLOGY | Facility: CLINIC | Age: 65
End: 2020-03-11

## 2020-03-11 NOTE — TELEPHONE ENCOUNTER
Left message to return call regarding appt time. Asking patient to come for 1pm. He requested an earlier time than 3pm.

## 2020-03-11 NOTE — TELEPHONE ENCOUNTER
----- Message from Kandis Varghese sent at 3/11/2020 12:38 PM CDT -----  Contact: [phone]  Calling to reschedule appointment on 3/16/2020 to an earlier time that day. Please call

## 2020-03-11 NOTE — TELEPHONE ENCOUNTER
This patient is not to start any type of chemo until he has been seen at [facility] for surgical resection . The folfox regimen is an adjuvant regimen to be given after surgery. Roro make sure he sees Dr Lagunas to discuss resection. Dr Lazo wants him at [facility] please

## 2020-03-12 ENCOUNTER — TELEPHONE (OUTPATIENT)
Dept: SURGERY | Facility: CLINIC | Age: 65
End: 2020-03-12

## 2020-03-12 NOTE — TELEPHONE ENCOUNTER
----- Message from Kandis Varghese sent at 3/12/2020  8:44 AM CDT -----  Contact: [phone]  Calling in regards to missed call from Franky. Please call

## 2020-03-12 NOTE — TELEPHONE ENCOUNTER
Clarified pt tx schedule, confirmed pt is not to have any chemo or additional Iron infusions until after he sees Sandrine at . Pt verbalized understanding.

## 2020-03-15 ENCOUNTER — TELEPHONE (OUTPATIENT)
Dept: SURGERY | Facility: CLINIC | Age: 65
End: 2020-03-15

## 2020-03-15 NOTE — TELEPHONE ENCOUNTER
Spoke with patient. [facility] will postpone clinic appointment tomorrow due to Covid 19 concern and FU with DR. Holliday ([phone])is scheduled 3/18. [facility] is due for PET scan and feels visit with Dr. Baldwin should be after that is complete.

## 2020-03-17 ENCOUNTER — PATIENT MESSAGE (OUTPATIENT)
Dept: SURGERY | Facility: CLINIC | Age: 65
End: 2020-03-17

## 2020-03-17 ENCOUNTER — DOCUMENTATION ONLY (OUTPATIENT)
Dept: RADIATION ONCOLOGY | Facility: CLINIC | Age: 65
End: 2020-03-17

## 2020-03-17 NOTE — TELEPHONE ENCOUNTER
Writer spoke to patient and he stated that he is not in any pain, he is just sore from doing little things around the house. Writer let him know that Dr Lazo is out of the clinic due to medical leave but I would get him scheduled for new patient establishment with Mary Kate Waldron NP. Patient declined at this time due to the corona virus, he does not want to get out/around anyone. Patient did express that he will report to the ER if pain occurred. Writer expressed verbal understanding.

## 2020-03-17 NOTE — PROGRESS NOTES
Patient completed neoadjuvant chemoradiation therapy to 5040 cGy with 5 FU sensitization per Dr. Stringer on February 19, 2020.  Treatment was well tolerated with significant reduction in abdominal pain and bowel irregularity.  At the end of treatment he was not seeing any bright red blood in his stool.    I called the patient today discusses 3 week follow-up and he reports his energy is returning abdominal pain is improved and he is not seeing any blood in his stool.     I spoke with his surgeon at [facility], Dr. Baldwin and inquired about his preferred preoperative evaluation.  Patient missed consultation secondary to COVID-19.  Dr. Baldwin's office will be ordering preoperative MRI and rescheduled consult with plan for surgical resection 10-12 weeks post chemoradiation.  Patient will likely be placed on adjuvant systemic therapy.    I explained the above to the patient by phone today and contacted Dr. Baldwin to notify him of our discussion.  Patient to follow-up with radiation oncology as needed.

## 2020-03-19 ENCOUNTER — TELEPHONE (OUTPATIENT)
Dept: SURGERY | Facility: CLINIC | Age: 65
End: 2020-03-19

## 2020-03-19 DIAGNOSIS — C20 RECTAL CANCER: Primary | ICD-10-CM

## 2020-03-19 NOTE — TELEPHONE ENCOUNTER
Spoke with patient. Clinic appointment scheduled. MRI appointment pending discussion with [facility]. Patient utilizes portal for appointments.

## 2020-03-19 NOTE — TELEPHONE ENCOUNTER
----- Message from Torres Baldwin MD sent at 3/19/2020  2:38 PM CDT -----  MRI order is in.  Please try to schedule it for week of 4/6 and I will see him in clinic 4/13.  He will need a flex sig in clinic when he comes.  Thx.

## 2020-03-23 ENCOUNTER — TELEPHONE (OUTPATIENT)
Dept: SURGERY | Facility: CLINIC | Age: 65
End: 2020-03-23

## 2020-03-24 ENCOUNTER — LAB VISIT (OUTPATIENT)
Dept: LAB | Facility: HOSPITAL | Age: 65
End: 2020-03-24
Attending: INTERNAL MEDICINE
Payer: MEDICARE

## 2020-03-24 ENCOUNTER — TELEPHONE (OUTPATIENT)
Dept: HEMATOLOGY/ONCOLOGY | Facility: CLINIC | Age: 65
End: 2020-03-24

## 2020-03-24 DIAGNOSIS — Z51.11 ENCOUNTER FOR CHEMOTHERAPY MANAGEMENT: ICD-10-CM

## 2020-03-24 DIAGNOSIS — C20 MALIGNANT NEOPLASM OF RECTUM: ICD-10-CM

## 2020-03-24 LAB
ALBUMIN SERPL BCP-MCNC: 3.5 G/DL (ref 3.5–5.2)
ALP SERPL-CCNC: 63 U/L (ref 55–135)
ALT SERPL W/O P-5'-P-CCNC: 19 U/L (ref 10–44)
ANION GAP SERPL CALC-SCNC: 8 MMOL/L (ref 8–16)
AST SERPL-CCNC: 20 U/L (ref 10–40)
BASOPHILS # BLD AUTO: 0.05 K/UL (ref 0–0.2)
BASOPHILS NFR BLD: 0.7 % (ref 0–1.9)
BILIRUB SERPL-MCNC: 0.5 MG/DL (ref 0.1–1)
BUN SERPL-MCNC: 8 MG/DL (ref 8–23)
CALCIUM SERPL-MCNC: 8.8 MG/DL (ref 8.7–10.5)
CEA SERPL-MCNC: 2.9 NG/ML (ref 0–5)
CHLORIDE SERPL-SCNC: 103 MMOL/L (ref 95–110)
CO2 SERPL-SCNC: 28 MMOL/L (ref 23–29)
CREAT SERPL-MCNC: 0.7 MG/DL (ref 0.5–1.4)
DIFFERENTIAL METHOD: ABNORMAL
EOSINOPHIL # BLD AUTO: 0.1 K/UL (ref 0–0.5)
EOSINOPHIL NFR BLD: 1.8 % (ref 0–8)
ERYTHROCYTE [DISTWIDTH] IN BLOOD BY AUTOMATED COUNT: 14.8 % (ref 11.5–14.5)
EST. GFR  (AFRICAN AMERICAN): >60 ML/MIN/1.73 M^2
EST. GFR  (NON AFRICAN AMERICAN): >60 ML/MIN/1.73 M^2
GLUCOSE SERPL-MCNC: 106 MG/DL (ref 70–110)
HCT VFR BLD AUTO: 37.3 % (ref 40–54)
HGB BLD-MCNC: 12.6 G/DL (ref 14–18)
IMM GRANULOCYTES # BLD AUTO: 0.02 K/UL (ref 0–0.04)
IMM GRANULOCYTES NFR BLD AUTO: 0.3 % (ref 0–0.5)
LYMPHOCYTES # BLD AUTO: 1.2 K/UL (ref 1–4.8)
LYMPHOCYTES NFR BLD: 17 % (ref 18–48)
MCH RBC QN AUTO: 33.8 PG (ref 27–31)
MCHC RBC AUTO-ENTMCNC: 33.8 G/DL (ref 32–36)
MCV RBC AUTO: 100 FL (ref 82–98)
MONOCYTES # BLD AUTO: 0.7 K/UL (ref 0.3–1)
MONOCYTES NFR BLD: 9.8 % (ref 4–15)
NEUTROPHILS # BLD AUTO: 4.8 K/UL (ref 1.8–7.7)
NEUTROPHILS NFR BLD: 70.4 % (ref 38–73)
NRBC BLD-RTO: 0 /100 WBC
PLATELET # BLD AUTO: 253 K/UL (ref 150–350)
PMV BLD AUTO: 8.4 FL (ref 9.2–12.9)
POTASSIUM SERPL-SCNC: 3.6 MMOL/L (ref 3.5–5.1)
PROT SERPL-MCNC: 7 G/DL (ref 6–8.4)
RBC # BLD AUTO: 3.73 M/UL (ref 4.6–6.2)
SODIUM SERPL-SCNC: 139 MMOL/L (ref 136–145)
WBC # BLD AUTO: 6.81 K/UL (ref 3.9–12.7)

## 2020-03-24 PROCEDURE — 85025 COMPLETE CBC W/AUTO DIFF WBC: CPT

## 2020-03-24 PROCEDURE — 80053 COMPREHEN METABOLIC PANEL: CPT

## 2020-03-24 PROCEDURE — 82378 CARCINOEMBRYONIC ANTIGEN: CPT

## 2020-03-24 PROCEDURE — 36415 COLL VENOUS BLD VENIPUNCTURE: CPT

## 2020-03-24 NOTE — TELEPHONE ENCOUNTER
Charlesimity is not working, so I called patient using *[phone] and was forwarded to [facility]. Left message asking the patient to call the office back so that we can set up a virtual visit.

## 2020-03-25 ENCOUNTER — OFFICE VISIT (OUTPATIENT)
Dept: HEMATOLOGY/ONCOLOGY | Facility: CLINIC | Age: 65
End: 2020-03-25
Payer: MEDICARE

## 2020-03-25 DIAGNOSIS — C20 RECTAL CANCER: ICD-10-CM

## 2020-03-25 DIAGNOSIS — C20 MALIGNANT NEOPLASM OF RECTUM: ICD-10-CM

## 2020-03-25 DIAGNOSIS — Z09 CHEMOTHERAPY FOLLOW-UP EXAMINATION: ICD-10-CM

## 2020-03-25 DIAGNOSIS — D64.9 ANEMIA, UNSPECIFIED TYPE: ICD-10-CM

## 2020-03-25 DIAGNOSIS — K62.89 RECTAL MASS: Primary | ICD-10-CM

## 2020-03-25 PROCEDURE — 99215 OFFICE O/P EST HI 40 MIN: CPT | Mod: 95,,, | Performed by: INTERNAL MEDICINE

## 2020-03-25 PROCEDURE — 99215 PR OFFICE/OUTPT VISIT, EST, LEVL V, 40-54 MIN: ICD-10-PCS | Mod: 95,,, | Performed by: INTERNAL MEDICINE

## 2020-03-25 RX ORDER — OXYCODONE AND ACETAMINOPHEN 5; 325 MG/1; MG/1
1 TABLET ORAL EVERY 6 HOURS PRN
Qty: 60 TABLET | Refills: 0 | Status: SHIPPED | OUTPATIENT
Start: 2020-03-25 | End: 2020-05-01 | Stop reason: SDUPTHER

## 2020-03-25 RX ORDER — OXYCODONE AND ACETAMINOPHEN 5; 325 MG/1; MG/1
1 TABLET ORAL EVERY 12 HOURS PRN
Qty: 60 TABLET | Refills: 0 | Status: SHIPPED | OUTPATIENT
Start: 2020-03-25 | End: 2020-03-25 | Stop reason: SDUPTHER

## 2020-03-25 NOTE — PROGRESS NOTES
The patient location is:  At home  The chief complaint leading to consultation is:  How are my blood counts  Visit type: Virtual visit with synchronous audio and video plus interrupted audio   Total time spent with patient  Each patient to whom he or  she provides medical services by telemedicine is:  (1) informed of the relationship between the physician and patient and the respective role of any other health care provider with respect to management of the patient; and (2) notified that he or she may decline to receive medical services by telemedicine and may withdraw from such care at any time.  Medical decision complexity : very high      CC I feeling good     Miguel Angel Apple Sr. is a 64 y.o.  Patient here for evaluation of a T3-T4 N1 M a 1 see rectal adenocarcinoma.  On October 21, 2019   a laparotomy was performed for perforated colon and patient underwent a right hemicolectomy with a rectal biopsy revealing rectal adenocarcinoma. He is healing from surgery but referred here to discuss treatment options.   He started xeloda with radiation and is receiving week 3 of radiation  He has completed two doses of IV iron at [facility] without complications : port was accessed without any problem     Tomorrow is the [facility] point of 6 weeks   He reports decreased appetite and a mild weight loss , slight heartburn     March 25 2020  Completed concomitant chemo radiation February 19, 2020 Oral xeloda 850 mg/m2 Monday through Friday with concomitant radiation to be followed 6 months of folfox through chest port in right chest for colorectal adenocarcinoma with peritoneal seeding   Surgery delayed  due to pandemic  POst IV iron     Past Medical History:   Diagnosis Date    Colon cancer     Depression     GERD (gastroesophageal reflux disease)     Medical history reviewed with no changes      Past Surgical History:   Procedure Laterality Date    CHOLECYSTECTOMY  10/21/2019    Procedure: CHOLECYSTECTOMY;  Surgeon: Jose KEATING  MD [place];  Location: [facility] OR;  Service: General;;    CLOSURE OF WOUND N/A 10/30/2019    Procedure: CLOSURE, WOUND;  Surgeon: Torres Dawkins MD;  Location: [facility] OR;  Service: General;  Laterality: N/A;    COLOSTOMY  10/21/2019    Procedure: CREATION, COLOSTOMY;  Surgeon: Jose Lazo MD;  Location: [facility] OR;  Service: General;;  END DESCENDING COLOSTOMY    FLEXIBLE SIGMOIDOSCOPY N/A 10/21/2019    Procedure: SIGMOIDOSCOPY, FLEXIBLE;  Surgeon: Jose Lazo MD;  Location: [facility] OR;  Service: General;  Laterality: N/A;    INSERTION OF TUNNELED CENTRAL VENOUS CATHETER (CVC) WITH SUBCUTANEOUS PORT Right 12/23/2019    Procedure: INSERTION, PORT-A-CATH;  Surgeon: Jose Lazo MD;  Location: [facility] OR;  Service: General;  Laterality: Right;    no surgical history      REPAIR OF ABDOMINAL WALL N/A 10/30/2019    Procedure: REPAIR, ABDOMINAL WALL;  Surgeon: Torres Dawkins MD;  Location: [facility] OR;  Service: General;  Laterality: N/A;    RIGHT HEMICOLECTOMY Right 10/21/2019    Procedure: HEMICOLECTOMY, RIGHT;  Surgeon: Jose Lazo MD;  Location: [facility];  Service: General;  Laterality: Right;       Current Outpatient Medications:     citalopram (CELEXA) 10 MG tablet, Take 1 tablet (10 mg total) by mouth once daily., Disp: 30 tablet, Rfl: 11    cyanocobalamin (B-12 DOTS) 500 MCG tablet, Take 2 tablets (1,000 mcg total) by mouth once daily., Disp: 60 tablet, Rfl: 0    ondansetron (ZOFRAN) 4 MG tablet, Take 1 tablet (4 mg total) by mouth every 6 (six) hours as needed for Nausea., Disp: 24 tablet, Rfl: 1    ondansetron (ZOFRAN-ODT) 8 MG TbDL, Take 1 tablet (8 mg total) by mouth every 8 (eight) hours as needed., Disp: 90 tablet, Rfl: 3    oxyCODONE-acetaminophen (PERCOCET) 5-325 mg per tablet, Take 1 tablet by mouth every 12 (twelve) hours as needed (moderate pain 2-5/10 pain scale)., Disp: 60 tablet, Rfl: 0    pantoprazole (PROTONIX) 40 MG tablet, Take 1 tablet (40 mg total)  by mouth once daily., Disp: 30 tablet, Rfl: 11    pyridoxine, vitamin B6, (VITAMIN B-6) 100 MG Tab, Take 1 tablet (100 mg total) by mouth once daily., Disp: 30 tablet, Rfl: 0  Review of patient's allergies indicates:  No Known Allergies  Social History     Tobacco Use    Smoking status: Former Smoker    Smokeless tobacco: Never Used   Substance Use Topics    Alcohol use: Not Currently    Drug use: Never     Family History   Problem Relation Age of Onset    Diabetes Mother     Heart disease Father        CONSTITUTIONAL: No fevers, chills, night sweats,mild  wt. loss,  + appetite changes  SKIN: no rashes or itching  ENT: No headaches, head trauma, vision changes, or eye pain  LYMPH NODES: None noticed   CV: No chest pain, palpitations.   RESP: No dyspnea on exertion, cough, wheezing, or hemoptysis  GI: No nausea, emesis, diarrhea, constipation, melena, hematochezia, pain.   : No dysuria, hematuria, urgency, or frequency   HEME: No easy bruising, bleeding problems  PSYCHIATRIC: No depression, anxiety, psychosis, hallucinations.  NEURO: No seizures, memory loss, dizziness or difficulty sleeping  MSK: No arthralgias or joint swelling      General patient is in no distress well developed well nourished  Psych pleasant affect no evidence of depression no evidence of anxiety  Head normocephalic atraumatic, no hoarseness,  well-spoken speech intact  Eyes noninjected sclera conjunctiva appear pink  Face is symmetric  Skin intact no lesions noted no ecchymosis no rash  Neck with no limited range of motion no JVD evident  Chest with no use of accessory muscles no labored breathing no evidence of tachypnea  No respiratory distress, effort normal   Abdomen appears to be nondistended  Extremities with no cyanosis no evidence of edema  Neuro muscular cranial nerves appear grossly intact  Gait appears steady  No joint effusions  Behavior normal judgment normal  Rheum: No joint swelling    Lab Results   Component Value Date     WBC 11.71 11/04/2019    HGB 8.8 (L) 11/04/2019    HCT 28.4 (L) 11/04/2019    MCV 92 11/04/2019     (H) 11/04/2019     Lab Results   Component Value Date    WBC 6.81 03/24/2020    RBC 3.73 (L) 03/24/2020    HGB 12.6 (L) 03/24/2020    HCT 37.3 (L) 03/24/2020     (H) 03/24/2020    MCH 33.8 (H) 03/24/2020    MCHC 33.8 03/24/2020    RDW 14.8 (H) 03/24/2020     03/24/2020    MPV 8.4 (L) 03/24/2020    GRAN 4.8 03/24/2020    GRAN 70.4 03/24/2020    LYMPH 1.2 03/24/2020    LYMPH 17.0 (L) 03/24/2020    MONO 0.7 03/24/2020    MONO 9.8 03/24/2020    EOS 0.1 03/24/2020    BASO 0.05 03/24/2020    EOSINOPHIL 1.8 03/24/2020    BASOPHIL 0.7 03/24/2020       CMP  Sodium   Date Value Ref Range Status   03/24/2020 139 136 - 145 mmol/L Final     Potassium   Date Value Ref Range Status   03/24/2020 3.6 3.5 - 5.1 mmol/L Final     Chloride   Date Value Ref Range Status   03/24/2020 103 95 - 110 mmol/L Final     CO2   Date Value Ref Range Status   03/24/2020 28 23 - 29 mmol/L Final     Glucose   Date Value Ref Range Status   03/24/2020 106 70 - 110 mg/dL Final     BUN, Bld   Date Value Ref Range Status   03/24/2020 8 8 - 23 mg/dL Final     Creatinine   Date Value Ref Range Status   03/24/2020 0.7 0.5 - 1.4 mg/dL Final     Calcium   Date Value Ref Range Status   03/24/2020 8.8 8.7 - 10.5 mg/dL Final     Total Protein   Date Value Ref Range Status   03/24/2020 7.0 6.0 - 8.4 g/dL Final     Albumin   Date Value Ref Range Status   03/24/2020 3.5 3.5 - 5.2 g/dL Final     Total Bilirubin   Date Value Ref Range Status   03/24/2020 0.5 0.1 - 1.0 mg/dL Final     Comment:     For infants and newborns, interpretation of results should be based  on gestational age, weight and in agreement with clinical  observations.  Premature Infant recommended reference ranges:  Up to 24 hours.............<8.0 mg/dL  Up to 48 hours............<12.0 mg/dL  3-5 days..................<15.0 mg/dL  6-29 days.................<15.0 mg/dL        Alkaline Phosphatase   Date Value Ref Range Status   03/24/2020 63 55 - 135 U/L Final     AST   Date Value Ref Range Status   03/24/2020 20 10 - 40 U/L Final     ALT   Date Value Ref Range Status   03/24/2020 19 10 - 44 U/L Final     Anion Gap   Date Value Ref Range Status   03/24/2020 8 8 - 16 mmol/L Final     eGFR if    Date Value Ref Range Status   03/24/2020 >60.0 >60 mL/min/1.73 m^2 Final     eGFR if non    Date Value Ref Range Status   03/24/2020 >60.0 >60 mL/min/1.73 m^2 Final     Comment:     Calculation used to obtain the estimated glomerular filtration  rate (eGFR) is the CKD-EPI equation.        NM PET CT Routine Skull to Mid Thigh   Order: [id number]   Status:  Final result   Visible to patient:  Yes (Patient Portal) Next appt:  01/09/2020 at 09:00 AM in General Surgery (Jose Lazo MD) Dx:  Malignant neoplasm of rectum; Rectal ...            Rectal mass    Malignant neoplasm of rectum  -     Discontinue: oxyCODONE-acetaminophen (PERCOCET) 5-325 mg per tablet; Take 1 tablet by mouth every 12 (twelve) hours as needed (moderate pain 2-5/10 pain scale).  Dispense: 60 tablet; Refill: 0  -     oxyCODONE-acetaminophen (PERCOCET) 5-325 mg per tablet; Take 1 tablet by mouth every 6 (six) hours as needed for Pain (moderate pain 2-5/10 pain scale).  Dispense: 60 tablet; Refill: 0  -     CBC auto differential; Future; Expected date: 03/25/2020  -     CMP; Future; Expected date: 03/25/2020  -     CEA; Future; Expected date: 03/25/2020  -     Iron and TIBC; Future; Expected date: 03/25/2020    Rectal cancer  -     Discontinue: oxyCODONE-acetaminophen (PERCOCET) 5-325 mg per tablet; Take 1 tablet by mouth every 12 (twelve) hours as needed (moderate pain 2-5/10 pain scale).  Dispense: 60 tablet; Refill: 0  -     oxyCODONE-acetaminophen (PERCOCET) 5-325 mg per tablet; Take 1 tablet by mouth every 6 (six) hours as needed for Pain (moderate pain 2-5/10 pain scale).  Dispense:  60 tablet; Refill: 0  -     CBC auto differential; Future; Expected date: 03/25/2020  -     CMP; Future; Expected date: 03/25/2020  -     CEA; Future; Expected date: 03/25/2020  -     Iron and TIBC; Future; Expected date: 03/25/2020    Chemotherapy follow-up examination    Anemia, unspecified type      Patient is aware once he undergoes resection will return to finish FOLFOX for 6 months      rtc 6 weeks with labs   Refilling pain meds  He is aware to watch for constipation   Increase caloric intake   Start folfox after neoadjuvant therapy and radiation      Thank you for allowing me to evaluate and participate in the care of this pleasant patient. Please fell free to call me with any questions or concerns.    Shayla Sorenson MD    This note was dictated with Dragon and briefly proofread. Please excuse any sentences that may be unclear or words misspelled

## 2020-04-06 ENCOUNTER — TELEPHONE (OUTPATIENT)
Dept: SURGERY | Facility: CLINIC | Age: 65
End: 2020-04-06

## 2020-04-06 ENCOUNTER — HOSPITAL ENCOUNTER (OUTPATIENT)
Dept: RADIOLOGY | Facility: HOSPITAL | Age: 65
Discharge: HOME | End: 2020-04-06
Attending: COLON & RECTAL SURGERY
Payer: MEDICARE

## 2020-04-06 DIAGNOSIS — C20 RECTAL CANCER: ICD-10-CM

## 2020-04-06 PROCEDURE — A9585 GADOBUTROL INJECTION: HCPCS | Performed by: COLON & RECTAL SURGERY

## 2020-04-06 PROCEDURE — 72197 MRI PELVIS W/O & W/DYE: CPT | Mod: 26,,, | Performed by: RADIOLOGY

## 2020-04-06 PROCEDURE — 72197 MRI PELVIS W/O & W/DYE: CPT | Mod: TC

## 2020-04-06 PROCEDURE — 25500020 PHARM REV CODE 255: Performed by: COLON & RECTAL SURGERY

## 2020-04-06 PROCEDURE — 72197 MRI PELVIS W WO CONTRAST: ICD-10-PCS | Mod: 26,,, | Performed by: RADIOLOGY

## 2020-04-06 RX ORDER — GADOBUTROL 604.72 MG/ML
7 INJECTION INTRAVENOUS
Status: COMPLETED | OUTPATIENT
Start: 2020-04-06 | End: 2020-04-06

## 2020-04-06 RX ADMIN — GADOBUTROL 7 ML: 604.72 INJECTION INTRAVENOUS at 11:04

## 2020-04-13 ENCOUNTER — OFFICE VISIT (OUTPATIENT)
Dept: SURGERY | Facility: CLINIC | Age: 65
End: 2020-04-13
Payer: MEDICARE

## 2020-04-13 ENCOUNTER — OFFICE VISIT (OUTPATIENT)
Dept: WOUND CARE | Facility: CLINIC | Age: 65
End: 2020-04-13
Payer: MEDICARE

## 2020-04-13 VITALS
WEIGHT: 148 LBS | DIASTOLIC BLOOD PRESSURE: 76 MMHG | BODY MASS INDEX: 21.19 KG/M2 | OXYGEN SATURATION: 96 % | HEIGHT: 70 IN | TEMPERATURE: 98 F | HEART RATE: 73 BPM | SYSTOLIC BLOOD PRESSURE: 124 MMHG

## 2020-04-13 DIAGNOSIS — C20 RECTAL CANCER: Primary | ICD-10-CM

## 2020-04-13 DIAGNOSIS — Z43.3 ATTENTION TO COLOSTOMY: Primary | ICD-10-CM

## 2020-04-13 DIAGNOSIS — K94.19 IRRITANT CONTACT DERMATITIS DUE TO ILEOSTOMY: ICD-10-CM

## 2020-04-13 DIAGNOSIS — Z93.3 COLOSTOMY IN PLACE: ICD-10-CM

## 2020-04-13 DIAGNOSIS — Z71.89 ENCOUNTER FOR OSTOMY CARE EDUCATION: ICD-10-CM

## 2020-04-13 DIAGNOSIS — L24.9 IRRITANT CONTACT DERMATITIS DUE TO ILEOSTOMY: ICD-10-CM

## 2020-04-13 PROCEDURE — 99213 OFFICE O/P EST LOW 20 MIN: CPT | Mod: PBBFAC | Performed by: COLON & RECTAL SURGERY

## 2020-04-13 PROCEDURE — 45330 DIAGNOSTIC SIGMOIDOSCOPY: CPT | Mod: S$PBB,,, | Performed by: COLON & RECTAL SURGERY

## 2020-04-13 PROCEDURE — 99024 POSTOP FOLLOW-UP VISIT: CPT | Mod: POP,,, | Performed by: CLINICAL NURSE SPECIALIST

## 2020-04-13 PROCEDURE — 99999 PR PBB SHADOW E&M-EST. PATIENT-LVL III: ICD-10-PCS | Mod: PBBFAC,,, | Performed by: COLON & RECTAL SURGERY

## 2020-04-13 PROCEDURE — 99024 PR POST-OP FOLLOW-UP VISIT: ICD-10-PCS | Mod: POP,,, | Performed by: CLINICAL NURSE SPECIALIST

## 2020-04-13 PROCEDURE — 45330 PR SIGMOIDOSCOPY,DIAG2STIC: ICD-10-PCS | Mod: S$PBB,,, | Performed by: COLON & RECTAL SURGERY

## 2020-04-13 PROCEDURE — 99203 PR OFFICE/OUTPT VISIT, NEW, LEVL III, 30-44 MIN: ICD-10-PCS | Mod: 25,S$PBB,, | Performed by: COLON & RECTAL SURGERY

## 2020-04-13 PROCEDURE — 99999 PR PBB SHADOW E&M-EST. PATIENT-LVL III: CPT | Mod: PBBFAC,,, | Performed by: COLON & RECTAL SURGERY

## 2020-04-13 PROCEDURE — 99203 OFFICE O/P NEW LOW 30 MIN: CPT | Mod: 25,S$PBB,, | Performed by: COLON & RECTAL SURGERY

## 2020-04-13 PROCEDURE — 99999 PR PBB SHADOW E&M-EST. PATIENT-LVL II: CPT | Mod: PBBFAC,,, | Performed by: CLINICAL NURSE SPECIALIST

## 2020-04-13 PROCEDURE — 99999 PR PBB SHADOW E&M-EST. PATIENT-LVL II: ICD-10-PCS | Mod: PBBFAC,,, | Performed by: CLINICAL NURSE SPECIALIST

## 2020-04-13 PROCEDURE — 45330 DIAGNOSTIC SIGMOIDOSCOPY: CPT | Mod: PBBFAC | Performed by: COLON & RECTAL SURGERY

## 2020-04-13 PROCEDURE — 99212 OFFICE O/P EST SF 10 MIN: CPT | Mod: PBBFAC,27,25 | Performed by: CLINICAL NURSE SPECIALIST

## 2020-04-13 NOTE — PROGRESS NOTES
Subjective:       Patient ID: Miguel Angel Apple Sr. is a 64 y.o. male.    Chief Complaint: Rectal Cancer    HPI  65 yo M, presented to [facility] in [place], [place] 10/19/2019 with a 3+ month history of abdominal pain, change in bowel habits, weight loss (>30 lbs), and constipation that progressed to obstipation.    CT 10/19/19: (Images personally reviewed.)  Diffuse distention of fluid-filled colon and rectum differential includes severe ileus, Robert's, low distal left rectal obstruction with the colon and dilated down to the distal rectum.  Cecum measures 10 cm transversely.  Questionable rectal mass.    Flex sig on 10/21/2019 showed a fungating, completely obstructing large mass in the rectum.    He was taken to the OR on 10/21/2019 by Dr. Lazo at [facility].    Operation:  1) Exploratory laparotomy  2) Sigmoid colectomy with end descending colostomy creation and mucous fistula formation  3) Right hemicolectomy and distal ilectomy with side to side functional end to end anastomosis  4) Open cholecystectomy  5) Omental pedical flap creation for placement over anastomosis  6) Takedown splenic flexure of colon     Description of the findings of the procedure:  Large bowel obstruction. Pelvic, colon mass involving the bladder anteriorly and at/below the peritoneal reflection.  Perforated cecum from pressure necrosis related to large bowel obstruction with intra-abdominal contamination.  Chronic cholecystitis necessitating cholecystectomy.    PATHOLOGY:  1. 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  2. RIGHT COLON AND DISTAL ILEUM, RIGHT HEMICOLECTOMY:  Colon with areas of marked thinning and focal changes compatible with cecal perforation.  Associated serosal adhesions and serositis.  Histologically viable and unremarkable proximal and distal margins.  No dysplasia or malignancy.  3. GALLBLADDER, CHOLECYSTECTOMY:  Chronic cholecystitis with  patchy mucosal cholesterolosis.  No cholelithiasis.  No intestinal metaplasia or dysplasia.  One benign lymph node with reactive changes.  4. COLON, SIGMOID, SIGMOID COLECTOMY:  Sigmoid colon with areas of transmural acute and chronic inflammation, serosal adhesions, and associated serositis.  Inflammatory changes are noted at the opened resection margin (non-stapled).  No dysplasia or malignancy.    MRI Pelvis 11/20/19:  (Images personally reviewed.)  1. 11 cm colorectal mass which is locally invasive with miriam extension into the adjacent mesenteric fat along the presacral space.  2. At least one indeterminate mesorectal lymph node is noted.  3.  Diffuse urinary bladder wall thickening with differential to include cystitis and chronic outlet obstruction.    CT C/A/P 12/4/19:  (Images personally reviewed.)  -Status post double barrel diverting colostomy to the left lower quadrant.  Bowel obstruction is not presently seen.  There is an 11 cm long lobular tumor of the distal sigmoid and proximal rectum consistent with neoplasm.  This has a rather shaggy outer surface consistent with penetration into the surrounding fat.  Adenopathy is not identified and bone metastases are not noted.  -Other metastatic disease is not identified on this study in the chest abdomen and pelvis.  Prior cholecystectomy.    PET/CT 12/30/19:  (Images personally reviewed.)  1. Large intensely hypermetabolic mass in the sigmoid colon and upper rectum, compatible with known colorectal carcinoma.  2. Multifocal hypermetabolic soft tissue densities along the pelvic peritoneal reflections and in the left lower quadrant of the abdomen, suspicious for peritoneal metastatic disease.  3. No additional evidence for metastatic disease.  4. A hypermetabolic nodule in the left lobe of the thyroid gland is nonspecific, and correlation with thyroid ultrasound is recommended.    He was then treated with neoadjuvant chemoradiation therapy (5040 cGy with 5 FU  sensitization) - completed February 19, 2020.  Treatment was well tolerated with significant reduction in abdominal pain and bowel irregularity.  Right chest wall port placed as well.    Rad Onc - Jc @ [facility]  Med Onc - Myke @ [facility]    MRI Pelvis 4/6/20: (Images personally reviewed.)  1. Significantly decreased size and extent of the circumferential high rectal/colorectal mass and adjacent mesorectal/mesentery masslike areas noting persistent irregular mesorectal/pericolonic spiculation.  2. No suspicious lymphadenopathy based on MR appearance.    He comes in today for initial evaluation by me.  This is the first time I have met him or seen him.  He reports some problems pouching his stoma with frequent leakage.  Eating well. No N/V.  Having regular ostomy output.  Minimal abdominal pain.  Prior to surgery in October he weighed 153 lbs (was 185 lbs 3 months prior), post-op he got as low as 125 lbs, today he weighs 148 lbs.     Last colonoscopy - never  No family hx of CRC or IBD.    Review of patient's allergies indicates:  No Known Allergies    Past Medical History:   Diagnosis Date    Colon cancer     Depression     GERD (gastroesophageal reflux disease)     Medical history reviewed with no changes        Past Surgical History:   Procedure Laterality Date    CHOLECYSTECTOMY  10/21/2019    Procedure: CHOLECYSTECTOMY;  Surgeon: Jose Lazo MD;  Location: [facility] OR;  Service: General;;    CLOSURE OF WOUND N/A 10/30/2019    Procedure: CLOSURE, WOUND;  Surgeon: Torres Dawkins MD;  Location: [facility] OR;  Service: General;  Laterality: N/A;    COLOSTOMY  10/21/2019    Procedure: CREATION, COLOSTOMY;  Surgeon: Jose Lazo MD;  Location: [facility] OR;  Service: General;;  END DESCENDING COLOSTOMY    FLEXIBLE SIGMOIDOSCOPY N/A 10/21/2019    Procedure: SIGMOIDOSCOPY, FLEXIBLE;  Surgeon: Jose Lzao MD;  Location: [facility] OR;  Service: General;  Laterality: N/A;     INSERTION OF TUNNELED CENTRAL VENOUS CATHETER (CVC) WITH SUBCUTANEOUS PORT Right 12/23/2019    Procedure: INSERTION, PORT-A-CATH;  Surgeon: Jose Lazo MD;  Location: [facility] OR;  Service: General;  Laterality: Right;    no surgical history      REPAIR OF ABDOMINAL WALL N/A 10/30/2019    Procedure: REPAIR, ABDOMINAL WALL;  Surgeon: Torres Dawkins MD;  Location: [facility] OR;  Service: General;  Laterality: N/A;    RIGHT HEMICOLECTOMY Right 10/21/2019    Procedure: HEMICOLECTOMY, RIGHT;  Surgeon: Jose Lazo MD;  Location: [facility] OR;  Service: General;  Laterality: Right;       Current Outpatient Medications   Medication Sig Dispense Refill    citalopram (CELEXA) 10 MG tablet Take 1 tablet (10 mg total) by mouth once daily. 30 tablet 11    cyanocobalamin (B-12 DOTS) 500 MCG tablet Take 2 tablets (1,000 mcg total) by mouth once daily. 60 tablet 0    ondansetron (ZOFRAN) 4 MG tablet Take 1 tablet (4 mg total) by mouth every 6 (six) hours as needed for Nausea. 24 tablet 1    ondansetron (ZOFRAN-ODT) 8 MG TbDL Take 1 tablet (8 mg total) by mouth every 8 (eight) hours as needed. 90 tablet 3    oxyCODONE-acetaminophen (PERCOCET) 5-325 mg per tablet Take 1 tablet by mouth every 6 (six) hours as needed for Pain (moderate pain 2-5/10 pain scale). 60 tablet 0    pantoprazole (PROTONIX) 40 MG tablet Take 1 tablet (40 mg total) by mouth once daily. 30 tablet 11    pyridoxine, vitamin B6, (VITAMIN B-6) 100 MG Tab Take 1 tablet (100 mg total) by mouth once daily. 30 tablet 0     No current facility-administered medications for this visit.        Family History   Problem Relation Age of Onset    Diabetes Mother     Heart disease Father        Social History     Socioeconomic History    Marital status:      Spouse name: Not on file    Number of children: Not on file    Years of education: Not on file    Highest education level: Not on file   Occupational History    Not on file   Social Needs     Financial resource strain: Not on file    Food insecurity:     Worry: Not on file     Inability: Not on file    Transportation needs:     Medical: Not on file     Non-medical: Not on file   Tobacco Use    Smoking status: Former Smoker    Smokeless tobacco: Never Used   Substance and Sexual Activity    Alcohol use: Not Currently    Drug use: Never    Sexual activity: Not Currently   Lifestyle    Physical activity:     Days per week: Not on file     Minutes per session: Not on file    Stress: Not on file   Relationships    Social connections:     Talks on phone: Not on file     Gets together: Not on file     Attends Shinto service: Not on file     Active member of club or organization: Not on file     Attends meetings of clubs or organizations: Not on file     Relationship status: Not on file   Other Topics Concern    Not on file   Social History Narrative    Not on file       Review of Systems   Constitutional: Negative for chills and fever.   HENT: Negative for congestion and sinus pressure.    Eyes: Negative for visual disturbance.   Respiratory: Negative for cough and shortness of breath.    Cardiovascular: Negative for chest pain and palpitations.   Gastrointestinal: Negative for abdominal distention, abdominal pain, anal bleeding, blood in stool, constipation, diarrhea, nausea, rectal pain and vomiting.   Endocrine: Negative for cold intolerance and heat intolerance.   Genitourinary: Negative for dysuria and frequency.   Musculoskeletal: Negative for arthralgias and back pain.   Skin: Negative for rash.   Allergic/Immunologic: Negative for immunocompromised state.   Neurological: Negative for dizziness, light-headedness and headaches.   Psychiatric/Behavioral: Negative for confusion. The patient is not nervous/anxious.        Objective:      Physical Exam   Constitutional: He is oriented to person, place, and time. He appears well-developed and well-nourished.   HENT:   Head: Normocephalic and  atraumatic.   Eyes: Conjunctivae are normal.   Pulmonary/Chest: Effort normal. No respiratory distress.   Abdominal: Soft. He exhibits no distension and no mass. There is no tenderness. There is no rebound and no guarding. No hernia.   Midline scar, LLQ colostomy with significant surrounding skin excoriation.   Genitourinary:   Genitourinary Comments: VIVIENNE - good tone, no mass   Neurological: He is alert and oriented to person, place, and time.   Skin: Skin is warm and dry. No erythema.         PROCEDURE: FLEXIBLE SIGMOIDOSCOPY:    After explaining the procedure, indications, risks (including but not limited to bleeding and perforation), and benefits, verbal informed consent was obtained. Using the instrument # [id number], flexible sigmoidoscopy was carried out.  Proceeded to 18 cm. Further exam limited by: partially obstructing tumor.  Findings: circumferential, partially obstructing tumor at 18 cm from anal verge, bowel prep was adequate, procedure well tolerated without complications.        No complications were encountered;  the procedure was well tolerated.  The patient is asked to call if any unusual pains or bleeding occur.  May resume normal diet and activities at this time.      Lab Results   Component Value Date    WBC 6.81 03/24/2020    HGB 12.6 (L) 03/24/2020    HCT 37.3 (L) 03/24/2020     (H) 03/24/2020     03/24/2020     BMP  Lab Results   Component Value Date     03/24/2020    K 3.6 03/24/2020     03/24/2020    CO2 28 03/24/2020    BUN 8 03/24/2020    CREATININE 0.7 03/24/2020    CALCIUM 8.8 03/24/2020    ANIONGAP 8 03/24/2020    ESTGFRAFRICA >60.0 03/24/2020    EGFRNONAA >60.0 03/24/2020     CMP  Sodium   Date Value Ref Range Status   03/24/2020 139 136 - 145 mmol/L Final     Potassium   Date Value Ref Range Status   03/24/2020 3.6 3.5 - 5.1 mmol/L Final     Chloride   Date Value Ref Range Status   03/24/2020 103 95 - 110 mmol/L Final     CO2   Date Value Ref Range Status    03/24/2020 28 23 - 29 mmol/L Final     Glucose   Date Value Ref Range Status   03/24/2020 106 70 - 110 mg/dL Final     BUN, Bld   Date Value Ref Range Status   03/24/2020 8 8 - 23 mg/dL Final     Creatinine   Date Value Ref Range Status   03/24/2020 0.7 0.5 - 1.4 mg/dL Final     Calcium   Date Value Ref Range Status   03/24/2020 8.8 8.7 - 10.5 mg/dL Final     Total Protein   Date Value Ref Range Status   03/24/2020 7.0 6.0 - 8.4 g/dL Final     Albumin   Date Value Ref Range Status   03/24/2020 3.5 3.5 - 5.2 g/dL Final     Total Bilirubin   Date Value Ref Range Status   03/24/2020 0.5 0.1 - 1.0 mg/dL Final     Comment:     For infants and newborns, interpretation of results should be based  on gestational age, weight and in agreement with clinical  observations.  Premature Infant recommended reference ranges:  Up to 24 hours.............<8.0 mg/dL  Up to 48 hours............<12.0 mg/dL  3-5 days..................<15.0 mg/dL  6-29 days.................<15.0 mg/dL       Alkaline Phosphatase   Date Value Ref Range Status   03/24/2020 63 55 - 135 U/L Final     AST   Date Value Ref Range Status   03/24/2020 20 10 - 40 U/L Final     ALT   Date Value Ref Range Status   03/24/2020 19 10 - 44 U/L Final     Anion Gap   Date Value Ref Range Status   03/24/2020 8 8 - 16 mmol/L Final     eGFR if    Date Value Ref Range Status   03/24/2020 >60.0 >60 mL/min/1.73 m^2 Final     eGFR if non    Date Value Ref Range Status   03/24/2020 >60.0 >60 mL/min/1.73 m^2 Final     Comment:     Calculation used to obtain the estimated glomerular filtration  rate (eGFR) is the CKD-EPI equation.        Lab Results   Component Value Date    CEA 2.9 03/24/2020           Assessment:       1. Rectal cancer    2. Colostomy in place        Plan:   -Repeat CT C/A/P to be sure there is no metastatic disease present following radiation therapy.  -Discuss case at [facility]  04/15/2020.  -In the absence of any metastatic  disease, the options would be to proceed with surgery (ideally approximately 12 weeks following radiation) vs   completing neoadjuvant chemotherapy prior to surgery.  -Will discuss with him further after MDC discussion.  He will also need a colonoscopy via his colostomy prior to surgery to clear the proximal colon - can plan on doing this the day prior to surgery so that he would only have to be 1 bowel prep      Torres Baldwin MD, FACS, FASCRS  Senior Staff Surgeon  Department of Colon & Rectal Surgery     This note was created using voice recognition software, and may contain some unrecognized transcriptional errors.

## 2020-04-13 NOTE — PROGRESS NOTES
This patient is unknown to me and is here in clinic today for first post op evaluation related to colostomy. Surgery done in [facility] by  ? But here today for consult with Dr Baldwin for any FU surgical needs. The patient reports some problems with  new ostomy. The patient is receiving home health care with ? But they have not gotten hi any supplies that work well yet.  He says he has had to learn everything on his own..   Pain level today is reported as  1.    The colostomy is 7/8 shai low budded stoma.but it is in a recess of abdomen with crease on 9 and 3 oclock   The patient is currently  wearing a 1piece FLAT pouching system by  [employer].   Average wear time is 1 days.   Peristomal skin is scalded and moist     There is no  mucocutaneous separation.  Pt is coping well with the new ostomy.  SUPPLIES/DME: SENT TO [facility] today but explained he cannot order until [facility] care is finished.  He must toll  to get him correct supplies   Patient enrolled in [employer] Care Program. Pt gives verbal permission and understanding a representative will call them regarding samples sent and follow up needs.  Samples requested today based on needs or patient requests today.    Pouching concerns include:      He has been doing an extensive routine with many products  I have advised he try DEEP CONVEX 95189 cut to 23 mm    Patient instructions for pouching:  Cleanse skin with water, dry well.  Apply no sting skin barrier film . Allow to dry REC CAVILON  Apply bead of , paste, around edge (optional)  Apply  pouch sized appropriately for stoma. Reviewed sizing of new stoma  BELT in place   SPECIAL NEEDS:  Pt counseled on skin care, nutrition, hydration as well as  how to order ostomy supplies.  I spent greater than 50% of this 45 minute visit in face to face counseling.

## 2020-04-14 ENCOUNTER — HOSPITAL ENCOUNTER (OUTPATIENT)
Dept: RADIOLOGY | Facility: HOSPITAL | Age: 65
Discharge: HOME | End: 2020-04-14
Attending: COLON & RECTAL SURGERY
Payer: MEDICARE

## 2020-04-14 DIAGNOSIS — C20 RECTAL CANCER: ICD-10-CM

## 2020-04-14 PROCEDURE — 25500020 PHARM REV CODE 255: Performed by: COLON & RECTAL SURGERY

## 2020-04-14 PROCEDURE — 74177 CT ABD & PELVIS W/CONTRAST: CPT | Mod: TC

## 2020-04-14 RX ADMIN — IOHEXOL 100 ML: 350 INJECTION, SOLUTION INTRAVENOUS at 11:04

## 2020-04-15 ENCOUNTER — PATIENT MESSAGE (OUTPATIENT)
Dept: HEMATOLOGY/ONCOLOGY | Facility: CLINIC | Age: 65
End: 2020-04-15

## 2020-04-15 ENCOUNTER — DOCUMENTATION ONLY (OUTPATIENT)
Dept: SURGERY | Facility: HOSPITAL | Age: 65
End: 2020-04-15

## 2020-04-15 NOTE — PROGRESS NOTES
Multidisciplinary Rectal Cancer Conference - Evaluation and Recommendation Summary    4/15/2020  Miguel Angel Apple Sr.  [id number]  64 y.o. male    1. Evaluation    65 yo M who presented with abdominal pain, change in bowel habits, weight loss and constipation who progressed to obstipation.     CT 10/19/19: distension of colon and rectum. Large Bowel obstruction.     Flex sig 10/21: fungating obstructing large mass in the rectum. 11 cm from the verge.     10/21/2019: Dr. Lazo at [facility]; LBO with cecal perf, chronic cholecystitis. Ex Lap with sigmoid colectomy with end descending colostomy and mucous fistula, right colectomy and side to side functional end to end anastomosis, open adama and splenic flex mobilization.     Path: inflammation, no Carcinoma.     MRI 11/19: locally adcanced, CT c/a/p 12/19, PET 12/19: T3-4,     Neoadjuvant CRT: 5040 cGy + 5 FU completed 2/20.       MRI date: 3/20    Tumor Location in Rectum: upper third    Indication of Sphincter Involvement:  Uninvolved    Pretreatment Circumferential Resection Margin (CRM) status:  Not Threatened    Pretreatment (clinical) AJCC Stage: IIA  Stage I  [] I: T1N0M0  [] I: T2N0M0  Stage II  [x] IIA: T3N0M0  [] IIB [id number]  [] IIC: A9fG8M6  Stage III  [] IIIA: T1-2N1M0  [] IIIA: J3F4uB6  [] IIIB: T3-N8jA0I2  [] IIIB: T2-3N2aM0  [] IIIB: T1-2N2bM0  [] IIIC: N2aN2kT8  [] IIIC: T3-3nK9jG6  [] IIIC: B6cB9-9T3   Stage IV  [] IV: [id number]    CEA level:   Lab Results   Component Value Date    CEA 3.2 04/13/2020       Flex sig 8 weeks after treatment: still some tumor.     2. Treatment Recommendation    Complete Chemotherapy now as patient is already diverted and tolerating the ostomy and given COVID pandemic he would likely need to way at least 3-4 weeks until he gets surgery.

## 2020-04-16 ENCOUNTER — TELEPHONE (OUTPATIENT)
Dept: HEMATOLOGY/ONCOLOGY | Facility: CLINIC | Age: 65
End: 2020-04-16

## 2020-04-20 ENCOUNTER — OFFICE VISIT (OUTPATIENT)
Dept: HEMATOLOGY/ONCOLOGY | Facility: CLINIC | Age: 65
End: 2020-04-20
Payer: MEDICARE

## 2020-04-20 DIAGNOSIS — Z51.11 ENCOUNTER FOR CHEMOTHERAPY MANAGEMENT: ICD-10-CM

## 2020-04-20 DIAGNOSIS — Z48.89 ENCOUNTER FOR POSTOPERATIVE CARE: ICD-10-CM

## 2020-04-20 DIAGNOSIS — C20 MALIGNANT NEOPLASM OF RECTUM: Primary | ICD-10-CM

## 2020-04-20 PROCEDURE — 99215 PR OFFICE/OUTPT VISIT, EST, LEVL V, 40-54 MIN: ICD-10-PCS | Mod: 95,,, | Performed by: INTERNAL MEDICINE

## 2020-04-20 PROCEDURE — 99215 OFFICE O/P EST HI 40 MIN: CPT | Mod: 95,,, | Performed by: INTERNAL MEDICINE

## 2020-04-20 RX ORDER — SODIUM CHLORIDE 0.9 % (FLUSH) 0.9 %
10 SYRINGE (ML) INJECTION
Status: CANCELLED | OUTPATIENT
Start: 2020-04-29

## 2020-04-20 RX ORDER — SODIUM CHLORIDE 0.9 % (FLUSH) 0.9 %
10 SYRINGE (ML) INJECTION
Status: CANCELLED | OUTPATIENT
Start: 2020-04-27

## 2020-04-20 RX ORDER — DIPHENHYDRAMINE HYDROCHLORIDE 50 MG/ML
50 INJECTION INTRAMUSCULAR; INTRAVENOUS ONCE AS NEEDED
Status: CANCELLED | OUTPATIENT
Start: 2020-04-27

## 2020-04-20 RX ORDER — HEPARIN 100 UNIT/ML
500 SYRINGE INTRAVENOUS
Status: CANCELLED | OUTPATIENT
Start: 2020-04-27

## 2020-04-20 RX ORDER — HEPARIN 100 UNIT/ML
500 SYRINGE INTRAVENOUS
Status: CANCELLED | OUTPATIENT
Start: 2020-04-29

## 2020-04-20 RX ORDER — FLUOROURACIL 50 MG/ML
400 INJECTION, SOLUTION INTRAVENOUS
Status: CANCELLED | OUTPATIENT
Start: 2020-04-27

## 2020-04-20 RX ORDER — EPINEPHRINE 0.3 MG/.3ML
0.3 INJECTION SUBCUTANEOUS ONCE AS NEEDED
Status: CANCELLED | OUTPATIENT
Start: 2020-04-27

## 2020-04-23 ENCOUNTER — PATIENT MESSAGE (OUTPATIENT)
Dept: INFUSION THERAPY | Facility: HOSPITAL | Age: 65
End: 2020-04-23

## 2020-04-23 ENCOUNTER — TELEPHONE (OUTPATIENT)
Dept: INFUSION THERAPY | Facility: HOSPITAL | Age: 65
End: 2020-04-23

## 2020-04-23 NOTE — TELEPHONE ENCOUNTER
Attempted to call patient. No answer. LVM for patient to return call to [phone] for scheduling of chemo class by Dr Stringer in order . Will await return phone call prior to scheduling.

## 2020-04-24 ENCOUNTER — INFUSION (OUTPATIENT)
Dept: INFUSION THERAPY | Facility: HOSPITAL | Age: 65
End: 2020-04-24
Attending: INTERNAL MEDICINE
Payer: MEDICARE

## 2020-04-24 DIAGNOSIS — C20 MALIGNANT NEOPLASM OF RECTUM: ICD-10-CM

## 2020-04-24 DIAGNOSIS — D50.8 OTHER IRON DEFICIENCY ANEMIA: Primary | ICD-10-CM

## 2020-04-24 PROCEDURE — 99211 OFF/OP EST MAY X REQ PHY/QHP: CPT

## 2020-04-24 NOTE — NURSING
I met with the patient and family for chemotherapy education. The patient will be starting treatment with as prescribed by the oncologist. We discussed the mechanism of action, potential side effects of this treatment as well as ways to manage them at home. Some of these side effects include but not limited to fever, nausea, vomiting, decreased appetite, fatigue, weakness, cytopenias, myalgia/arthralgia, constipation, diarrhea, bleeding, headache, nail changes, taste change, hair thinning/loss, peripheral neuropathy, kidney toxicity, or ototoxicity. We also discussed dietary modifications that will be discussed in more detail with the dietician. A copy was provided with all the information we discussed today as well as our contact information. The patient will follow-up with the physician for toxicity monitoring throughout treatment. Scripts were provided and explained for home use. The patient and family verbalized understanding. All questions were answered, and an informed consent was obtained.

## 2020-04-27 ENCOUNTER — INFUSION (OUTPATIENT)
Dept: INFUSION THERAPY | Facility: HOSPITAL | Age: 65
End: 2020-04-27
Attending: INTERNAL MEDICINE
Payer: MEDICARE

## 2020-04-27 VITALS
SYSTOLIC BLOOD PRESSURE: 156 MMHG | DIASTOLIC BLOOD PRESSURE: 82 MMHG | HEART RATE: 72 BPM | RESPIRATION RATE: 16 BRPM | WEIGHT: 153 LBS | BODY MASS INDEX: 21.9 KG/M2 | TEMPERATURE: 99 F | HEIGHT: 70 IN | OXYGEN SATURATION: 100 %

## 2020-04-27 DIAGNOSIS — C20 RECTAL CANCER: Primary | ICD-10-CM

## 2020-04-27 PROCEDURE — 96368 THER/DIAG CONCURRENT INF: CPT

## 2020-04-27 PROCEDURE — 63600175 PHARM REV CODE 636 W HCPCS: Performed by: INTERNAL MEDICINE

## 2020-04-27 PROCEDURE — 96416 CHEMO PROLONG INFUSE W/PUMP: CPT

## 2020-04-27 PROCEDURE — 25000003 PHARM REV CODE 250: Performed by: INTERNAL MEDICINE

## 2020-04-27 PROCEDURE — 96367 TX/PROPH/DG ADDL SEQ IV INF: CPT

## 2020-04-27 PROCEDURE — 96411 CHEMO IV PUSH ADDL DRUG: CPT

## 2020-04-27 PROCEDURE — 96415 CHEMO IV INFUSION ADDL HR: CPT

## 2020-04-27 PROCEDURE — 96413 CHEMO IV INFUSION 1 HR: CPT

## 2020-04-27 RX ORDER — EPINEPHRINE 0.3 MG/.3ML
0.3 INJECTION SUBCUTANEOUS ONCE AS NEEDED
Status: DISCONTINUED | OUTPATIENT
Start: 2020-04-27 | End: 2020-04-27 | Stop reason: HOSPADM

## 2020-04-27 RX ORDER — HEPARIN 100 UNIT/ML
500 SYRINGE INTRAVENOUS
Status: DISCONTINUED | OUTPATIENT
Start: 2020-04-27 | End: 2020-04-27 | Stop reason: HOSPADM

## 2020-04-27 RX ORDER — SODIUM CHLORIDE 0.9 % (FLUSH) 0.9 %
10 SYRINGE (ML) INJECTION
Status: DISCONTINUED | OUTPATIENT
Start: 2020-04-27 | End: 2020-04-27 | Stop reason: HOSPADM

## 2020-04-27 RX ORDER — FLUOROURACIL 50 MG/ML
400 INJECTION, SOLUTION INTRAVENOUS
Status: DISCONTINUED | OUTPATIENT
Start: 2020-04-27 | End: 2020-04-27 | Stop reason: HOSPADM

## 2020-04-27 RX ORDER — DIPHENHYDRAMINE HYDROCHLORIDE 50 MG/ML
50 INJECTION INTRAMUSCULAR; INTRAVENOUS ONCE AS NEEDED
Status: DISCONTINUED | OUTPATIENT
Start: 2020-04-27 | End: 2020-04-27 | Stop reason: HOSPADM

## 2020-04-27 RX ADMIN — FLUOROURACIL 4270 MG: 50 INJECTION, SOLUTION INTRAVENOUS at 12:04

## 2020-04-27 RX ADMIN — DEXTROSE MONOHYDRATE: 50 INJECTION, SOLUTION INTRAVENOUS at 09:04

## 2020-04-27 RX ADMIN — OXALIPLATIN 151 MG: 5 INJECTION, SOLUTION, CONCENTRATE INTRAVENOUS at 10:04

## 2020-04-27 RX ADMIN — LEUCOVORIN CALCIUM 710 MG: 350 INJECTION, POWDER, LYOPHILIZED, FOR SOLUTION INTRAMUSCULAR; INTRAVENOUS at 10:04

## 2020-04-27 RX ADMIN — PALONOSETRON HYDROCHLORIDE: 0.25 INJECTION, SOLUTION INTRAVENOUS at 09:04

## 2020-04-27 NOTE — NURSING
1300 Pt provided in depth education regarding at home infusion regarding safety and home care. Pt instructed to return weds around 1130 for pump d/c. Pt provided direct dept number for any issues and encouraged to call pump company for after hour problems. Pt v/u.

## 2020-04-27 NOTE — PROGRESS NOTES
Called dr. Stringer to clarify need for labs prior to start of cycle 1 today. Dr. Stringer denies need as he had labs on the 13th. Orders to start treatment.

## 2020-04-27 NOTE — PLAN OF CARE
Pt tolerated oxalaplatin,lecuovorin,and 5fu w/o difficulty. Vital signs stable. Pt in nad. PT refuses avs noting he uses his cell phone. Pt encouraged to call for any occurring issues or questions. Pt v/u. Pt provided education of home management of pump and port needle. Pt instructed to return in 46 hrs around 11 for d/c/ PT v/u with no further questions. Pt ambulatory for discharge at this time.

## 2020-04-29 ENCOUNTER — INFUSION (OUTPATIENT)
Dept: INFUSION THERAPY | Facility: HOSPITAL | Age: 65
End: 2020-04-29
Attending: INTERNAL MEDICINE
Payer: MEDICARE

## 2020-04-29 VITALS — SYSTOLIC BLOOD PRESSURE: 112 MMHG | DIASTOLIC BLOOD PRESSURE: 68 MMHG | HEART RATE: 78 BPM | TEMPERATURE: 97 F

## 2020-04-29 DIAGNOSIS — C20 RECTAL CANCER: Primary | ICD-10-CM

## 2020-04-29 PROCEDURE — 63600175 PHARM REV CODE 636 W HCPCS: Performed by: INTERNAL MEDICINE

## 2020-04-29 PROCEDURE — 25000003 PHARM REV CODE 250: Performed by: INTERNAL MEDICINE

## 2020-04-29 PROCEDURE — A4216 STERILE WATER/SALINE, 10 ML: HCPCS | Performed by: INTERNAL MEDICINE

## 2020-04-29 PROCEDURE — 96523 IRRIG DRUG DELIVERY DEVICE: CPT

## 2020-04-29 RX ORDER — SODIUM CHLORIDE 0.9 % (FLUSH) 0.9 %
10 SYRINGE (ML) INJECTION
Status: DISCONTINUED | OUTPATIENT
Start: 2020-04-29 | End: 2020-04-29 | Stop reason: HOSPADM

## 2020-04-29 RX ORDER — HEPARIN 100 UNIT/ML
500 SYRINGE INTRAVENOUS
Status: DISCONTINUED | OUTPATIENT
Start: 2020-04-29 | End: 2020-04-29 | Stop reason: HOSPADM

## 2020-04-29 RX ADMIN — Medication 10 ML: at 11:04

## 2020-04-29 RX ADMIN — HEPARIN 500 UNITS: 100 SYRINGE at 11:04

## 2020-05-01 DIAGNOSIS — C20 MALIGNANT NEOPLASM OF RECTUM: ICD-10-CM

## 2020-05-01 DIAGNOSIS — C20 RECTAL CANCER: ICD-10-CM

## 2020-05-01 RX ORDER — OXYCODONE AND ACETAMINOPHEN 5; 325 MG/1; MG/1
1 TABLET ORAL EVERY 6 HOURS PRN
Qty: 60 TABLET | Refills: 0 | Status: SHIPPED | OUTPATIENT
Start: 2020-05-01 | End: 2020-05-26 | Stop reason: SDUPTHER

## 2020-05-01 NOTE — TELEPHONE ENCOUNTER
----- Message from Michelle Sebastian sent at 5/1/2020 10:46 AM CDT -----  Contact: Miguel Angel alvarenga  Type:  RX Refill Request    Who Called:  Miguel Angel  Refill or New Rx:  refill  RX Name and Strength:   oxyCODONE-acetaminophen (PERCOCET) 5-325 mg per tablet   How is the patient currently taking it? (ex. 1XDay):  As needed  Is this a 30 day or 90 day RX:  30  Preferred Pharmacy with phone number:    [facility], [place] - [address]  [address]  [place] [address]  Phone: [phone] Fax: [phone]      Local or Mail Order:  local  Ordering Provider:  Myke Vasquez Call Back Number:  [phone]  Additional Information:  Pls call pt if any issues for refill

## 2020-05-12 ENCOUNTER — LAB VISIT (OUTPATIENT)
Dept: LAB | Facility: HOSPITAL | Age: 65
End: 2020-05-12
Attending: INTERNAL MEDICINE
Payer: MEDICARE

## 2020-05-12 DIAGNOSIS — C20 MALIGNANT NEOPLASM OF RECTUM: ICD-10-CM

## 2020-05-12 DIAGNOSIS — C20 RECTAL CANCER: ICD-10-CM

## 2020-05-12 LAB
ALBUMIN SERPL BCP-MCNC: 3.4 G/DL (ref 3.5–5.2)
ALP SERPL-CCNC: 64 U/L (ref 55–135)
ALT SERPL W/O P-5'-P-CCNC: 22 U/L (ref 10–44)
ANION GAP SERPL CALC-SCNC: 8 MMOL/L (ref 8–16)
AST SERPL-CCNC: 21 U/L (ref 10–40)
BASOPHILS # BLD AUTO: 0.02 K/UL (ref 0–0.2)
BASOPHILS NFR BLD: 0.3 % (ref 0–1.9)
BILIRUB SERPL-MCNC: 0.2 MG/DL (ref 0.1–1)
BUN SERPL-MCNC: 10 MG/DL (ref 8–23)
CALCIUM SERPL-MCNC: 8.6 MG/DL (ref 8.7–10.5)
CEA SERPL-MCNC: 3.1 NG/ML (ref 0–5)
CHLORIDE SERPL-SCNC: 101 MMOL/L (ref 95–110)
CO2 SERPL-SCNC: 26 MMOL/L (ref 23–29)
CREAT SERPL-MCNC: 0.9 MG/DL (ref 0.5–1.4)
DIFFERENTIAL METHOD: ABNORMAL
EOSINOPHIL # BLD AUTO: 0.1 K/UL (ref 0–0.5)
EOSINOPHIL NFR BLD: 2.2 % (ref 0–8)
ERYTHROCYTE [DISTWIDTH] IN BLOOD BY AUTOMATED COUNT: 12.2 % (ref 11.5–14.5)
EST. GFR  (AFRICAN AMERICAN): >60 ML/MIN/1.73 M^2
EST. GFR  (NON AFRICAN AMERICAN): >60 ML/MIN/1.73 M^2
GLUCOSE SERPL-MCNC: 118 MG/DL (ref 70–110)
HCT VFR BLD AUTO: 39.9 % (ref 40–54)
HGB BLD-MCNC: 14.1 G/DL (ref 14–18)
IMM GRANULOCYTES # BLD AUTO: 0.02 K/UL (ref 0–0.04)
IMM GRANULOCYTES NFR BLD AUTO: 0.3 % (ref 0–0.5)
IRON SERPL-MCNC: 106 UG/DL (ref 45–160)
LYMPHOCYTES # BLD AUTO: 1 K/UL (ref 1–4.8)
LYMPHOCYTES NFR BLD: 16.2 % (ref 18–48)
MCH RBC QN AUTO: 34.6 PG (ref 27–31)
MCHC RBC AUTO-ENTMCNC: 35.3 G/DL (ref 32–36)
MCV RBC AUTO: 98 FL (ref 82–98)
MONOCYTES # BLD AUTO: 0.8 K/UL (ref 0.3–1)
MONOCYTES NFR BLD: 12.9 % (ref 4–15)
NEUTROPHILS # BLD AUTO: 4 K/UL (ref 1.8–7.7)
NEUTROPHILS NFR BLD: 68.1 % (ref 38–73)
NRBC BLD-RTO: 0 /100 WBC
PLATELET # BLD AUTO: 199 K/UL (ref 150–350)
PMV BLD AUTO: 8.3 FL (ref 9.2–12.9)
POTASSIUM SERPL-SCNC: 3.7 MMOL/L (ref 3.5–5.1)
PROT SERPL-MCNC: 7 G/DL (ref 6–8.4)
RBC # BLD AUTO: 4.08 M/UL (ref 4.6–6.2)
SATURATED IRON: 39 % (ref 20–50)
SODIUM SERPL-SCNC: 135 MMOL/L (ref 136–145)
TOTAL IRON BINDING CAPACITY: 274 UG/DL (ref 250–450)
TRANSFERRIN SERPL-MCNC: 185 MG/DL (ref 200–375)
WBC # BLD AUTO: 5.87 K/UL (ref 3.9–12.7)

## 2020-05-12 PROCEDURE — 36415 COLL VENOUS BLD VENIPUNCTURE: CPT

## 2020-05-12 PROCEDURE — 82378 CARCINOEMBRYONIC ANTIGEN: CPT

## 2020-05-12 PROCEDURE — 83540 ASSAY OF IRON: CPT

## 2020-05-12 PROCEDURE — 80053 COMPREHEN METABOLIC PANEL: CPT

## 2020-05-12 PROCEDURE — 85025 COMPLETE CBC W/AUTO DIFF WBC: CPT

## 2020-05-13 ENCOUNTER — INFUSION (OUTPATIENT)
Dept: INFUSION THERAPY | Facility: HOSPITAL | Age: 65
End: 2020-05-13
Attending: INTERNAL MEDICINE
Payer: MEDICARE

## 2020-05-13 ENCOUNTER — OFFICE VISIT (OUTPATIENT)
Dept: HEMATOLOGY/ONCOLOGY | Facility: CLINIC | Age: 65
End: 2020-05-13
Payer: MEDICARE

## 2020-05-13 VITALS
SYSTOLIC BLOOD PRESSURE: 146 MMHG | BODY MASS INDEX: 21.62 KG/M2 | DIASTOLIC BLOOD PRESSURE: 73 MMHG | RESPIRATION RATE: 18 BRPM | HEIGHT: 70 IN | TEMPERATURE: 97 F | OXYGEN SATURATION: 99 % | HEART RATE: 60 BPM | WEIGHT: 151 LBS

## 2020-05-13 DIAGNOSIS — E83.51 HYPOCALCEMIA: Primary | ICD-10-CM

## 2020-05-13 DIAGNOSIS — D53.8 ANEMIA DUE TO VITAMIN B6 DEFICIENCY: ICD-10-CM

## 2020-05-13 DIAGNOSIS — C20 RECTAL ADENOCARCINOMA: ICD-10-CM

## 2020-05-13 DIAGNOSIS — Z09 ONCOLOGY FOLLOW-UP ENCOUNTER: ICD-10-CM

## 2020-05-13 DIAGNOSIS — C20 RECTAL CANCER: Primary | ICD-10-CM

## 2020-05-13 DIAGNOSIS — E88.09 HYPOALBUMINEMIA: ICD-10-CM

## 2020-05-13 DIAGNOSIS — E53.1 ANEMIA DUE TO VITAMIN B6 DEFICIENCY: ICD-10-CM

## 2020-05-13 DIAGNOSIS — Z51.11 ENCOUNTER FOR CHEMOTHERAPY MANAGEMENT: ICD-10-CM

## 2020-05-13 LAB — SARS-COV-2 RDRP RESP QL NAA+PROBE: NEGATIVE

## 2020-05-13 PROCEDURE — 96416 CHEMO PROLONG INFUSE W/PUMP: CPT

## 2020-05-13 PROCEDURE — 63600175 PHARM REV CODE 636 W HCPCS: Performed by: INTERNAL MEDICINE

## 2020-05-13 PROCEDURE — 99215 OFFICE O/P EST HI 40 MIN: CPT | Mod: 95,,, | Performed by: INTERNAL MEDICINE

## 2020-05-13 PROCEDURE — 96367 TX/PROPH/DG ADDL SEQ IV INF: CPT

## 2020-05-13 PROCEDURE — 96411 CHEMO IV PUSH ADDL DRUG: CPT

## 2020-05-13 PROCEDURE — 25000003 PHARM REV CODE 250: Performed by: INTERNAL MEDICINE

## 2020-05-13 PROCEDURE — 96413 CHEMO IV INFUSION 1 HR: CPT

## 2020-05-13 PROCEDURE — 96368 THER/DIAG CONCURRENT INF: CPT

## 2020-05-13 PROCEDURE — 99215 PR OFFICE/OUTPT VISIT, EST, LEVL V, 40-54 MIN: ICD-10-PCS | Mod: 95,,, | Performed by: INTERNAL MEDICINE

## 2020-05-13 PROCEDURE — U0002 COVID-19 LAB TEST NON-CDC: HCPCS

## 2020-05-13 PROCEDURE — 96415 CHEMO IV INFUSION ADDL HR: CPT

## 2020-05-13 RX ORDER — FLUOROURACIL 50 MG/ML
400 INJECTION, SOLUTION INTRAVENOUS
Status: CANCELLED | OUTPATIENT
Start: 2020-05-13

## 2020-05-13 RX ORDER — DIPHENHYDRAMINE HYDROCHLORIDE 50 MG/ML
50 INJECTION INTRAMUSCULAR; INTRAVENOUS ONCE AS NEEDED
Status: CANCELLED | OUTPATIENT
Start: 2020-05-13

## 2020-05-13 RX ORDER — EPINEPHRINE 0.3 MG/.3ML
0.3 INJECTION SUBCUTANEOUS ONCE AS NEEDED
Status: DISCONTINUED | OUTPATIENT
Start: 2020-05-13 | End: 2020-05-13 | Stop reason: HOSPADM

## 2020-05-13 RX ORDER — FLUOROURACIL 50 MG/ML
400 INJECTION, SOLUTION INTRAVENOUS
Status: COMPLETED | OUTPATIENT
Start: 2020-05-13 | End: 2020-05-13

## 2020-05-13 RX ORDER — SODIUM CHLORIDE 0.9 % (FLUSH) 0.9 %
10 SYRINGE (ML) INJECTION
Status: CANCELLED | OUTPATIENT
Start: 2020-05-13

## 2020-05-13 RX ORDER — SODIUM CHLORIDE 0.9 % (FLUSH) 0.9 %
10 SYRINGE (ML) INJECTION
Status: DISCONTINUED | OUTPATIENT
Start: 2020-05-13 | End: 2020-05-13 | Stop reason: HOSPADM

## 2020-05-13 RX ORDER — HEPARIN 100 UNIT/ML
500 SYRINGE INTRAVENOUS
Status: CANCELLED | OUTPATIENT
Start: 2020-05-13

## 2020-05-13 RX ORDER — SODIUM CHLORIDE 0.9 % (FLUSH) 0.9 %
10 SYRINGE (ML) INJECTION
Status: CANCELLED | OUTPATIENT
Start: 2020-05-14

## 2020-05-13 RX ORDER — EPINEPHRINE 0.3 MG/.3ML
0.3 INJECTION SUBCUTANEOUS ONCE AS NEEDED
Status: CANCELLED | OUTPATIENT
Start: 2020-05-13

## 2020-05-13 RX ORDER — HEPARIN 100 UNIT/ML
500 SYRINGE INTRAVENOUS
Status: CANCELLED | OUTPATIENT
Start: 2020-05-14

## 2020-05-13 RX ORDER — DIPHENHYDRAMINE HYDROCHLORIDE 50 MG/ML
50 INJECTION INTRAMUSCULAR; INTRAVENOUS ONCE AS NEEDED
Status: DISCONTINUED | OUTPATIENT
Start: 2020-05-13 | End: 2020-05-13 | Stop reason: HOSPADM

## 2020-05-13 RX ORDER — HEPARIN 100 UNIT/ML
500 SYRINGE INTRAVENOUS
Status: DISCONTINUED | OUTPATIENT
Start: 2020-05-13 | End: 2020-05-13 | Stop reason: HOSPADM

## 2020-05-13 RX ADMIN — FLUOROURACIL 710 MG: 50 INJECTION, SOLUTION INTRAVENOUS at 03:05

## 2020-05-13 RX ADMIN — OXALIPLATIN 151 MG: 5 INJECTION, SOLUTION, CONCENTRATE INTRAVENOUS at 01:05

## 2020-05-13 RX ADMIN — FLUOROURACIL 4270 MG: 50 INJECTION, SOLUTION INTRAVENOUS at 03:05

## 2020-05-13 RX ADMIN — PALONOSETRON HYDROCHLORIDE: 0.25 INJECTION, SOLUTION INTRAVENOUS at 01:05

## 2020-05-13 RX ADMIN — DEXTROSE: 5 SOLUTION INTRAVENOUS at 01:05

## 2020-05-13 RX ADMIN — SODIUM CHLORIDE: 9 INJECTION, SOLUTION INTRAVENOUS at 01:05

## 2020-05-13 RX ADMIN — LEUCOVORIN CALCIUM 710 MG: 350 INJECTION, POWDER, LYOPHILIZED, FOR SOLUTION INTRAMUSCULAR; INTRAVENOUS at 01:05

## 2020-05-13 NOTE — PLAN OF CARE
"Problem: Nausea and Vomiting (Chemotherapy Effects)  Goal: Fluid and Electrolyte Balance  Outcome: Ongoing, Progressing    /61   Pulse 74   Temp 96.8 °F (36 °C)   Ht 5' 10" (1.778 m)   Wt 68.5 kg (151 lb)   SpO2 (!) 94%   BMI 21.67 kg/m²   Patient here today for folfox. VSS. Port accessed to right side without difficulty; patent with blood return. Orders released. Pt oriented to unit. Symptoms reviewed. Questions and concerns addressed.     "

## 2020-05-13 NOTE — PLAN OF CARE
"Problem: Adult Inpatient Plan of Care  Goal: Plan of Care Review  Outcome: Ongoing, Progressing  Flowsheets (Taken 5/13/2020 1603)  Plan of Care Reviewed With: patient    BP (!) 146/73   Pulse 60   Temp 96.8 °F (36 °C)   Resp 18   Ht 5' 10" (1.778 m)   Wt 68.5 kg (151 lb)   SpO2 99%   BMI 21.67 kg/m²   Patient tolerated treatment well. VSS. Port flushed with positive blood return. 5FU pump initiated @ 4ml/hr. Pt to return at 2pm on Friday 5/15/20. Ambulates per self. AVS refused. Patient uses patient portal.       "

## 2020-05-13 NOTE — PROGRESS NOTES
The patient location is:  At home  The chief complaint leading to consultation is:  How are my blood counts  Patient has consented to this visit via televisit  Visit type: Virtual visit with synchronous audio and video plus interrupted audio  Haiku and carlyle  Total time spent with patient: over 50 min with more than 90% on medical discussion, counseling and coordination of care.  Each patient to whom he or she provides medical services by telemedicine is:  (1) informed of the relationship between the physician and patient and the respective role of any other health care provider with respect to management of the patient; and (2) notified that he or she may decline to receive medical services by telemedicine and may withdraw from such care at any time.    This visit is to also Involve counseling, patient education, informed consent for ordered diagnostic and laboratory tests and motivational interviewing    This document has been created using Syntaxin dictation software and free typing.  It has been checked for errors but some errors may still exist.        CC I am due for chemo  Miguel Angel Apple . is a 64 y.o.  Patient here for evaluation of a T3-T4 N1 M a 1 see rectal adenocarcinoma.  On October 21, 2019   a laparotomy was performed for perforated colon and patient underwent a right hemicolectomy with a rectal biopsy revealing rectal adenocarcinoma. He is healing from surgery but referred here to discuss treatment options.   He started xeloda with radiation and is receiving week 3 of radiation  He has completed two doses of IV iron at [facility] without complications : port was accessed without any problem     Tomorrow is the senior living point of 6 weeks   He reports decreased appetite and a mild weight loss , slight heartburn     March 25 2020  Completed concomitant chemo radiation February 19, 2020 Oral xeloda 850 mg/m2 Monday through Friday with concomitant radiation to be followed 6 months of folfox through chest port  in right chest for colorectal adenocarcinoma with peritoneal seeding   Surgery delayed  due to pandemic  POst IV iron     April 20, 2026 citrix keeps cutting out   Records in tumor Board been reviewed surgical oncology notes reviewed patient is aware that chemotherapy is indicated per standard of care whether neoadjuvant her adjuvant.  Today patient will discussed with me the chemo regimen and the side effects that could possibly occur.  He is not having any evidence of bleeding he is eating well no change in his weight  Past Medical History:   Diagnosis Date    Colon cancer     Depression     GERD (gastroesophageal reflux disease)     Medical history reviewed with no changes      Past Surgical History:   Procedure Laterality Date    CHOLECYSTECTOMY  10/21/2019    Procedure: CHOLECYSTECTOMY;  Surgeon: Jose Lazo MD;  Location: [facility] OR;  Service: General;;    CLOSURE OF WOUND N/A 10/30/2019    Procedure: CLOSURE, WOUND;  Surgeon: Torres Dawkins MD;  Location: [facility] OR;  Service: General;  Laterality: N/A;    COLOSTOMY  10/21/2019    Procedure: CREATION, COLOSTOMY;  Surgeon: Jose Lazo MD;  Location: [facility] OR;  Service: General;;  END DESCENDING COLOSTOMY    FLEXIBLE SIGMOIDOSCOPY N/A 10/21/2019    Procedure: SIGMOIDOSCOPY, FLEXIBLE;  Surgeon: Jose Lazo MD;  Location: [facility] OR;  Service: General;  Laterality: N/A;    INSERTION OF TUNNELED CENTRAL VENOUS CATHETER (CVC) WITH SUBCUTANEOUS PORT Right 12/23/2019    Procedure: INSERTION, PORT-A-CATH;  Surgeon: Jose Lazo MD;  Location: [facility] OR;  Service: General;  Laterality: Right;    no surgical history      REPAIR OF ABDOMINAL WALL N/A 10/30/2019    Procedure: REPAIR, ABDOMINAL WALL;  Surgeon: Torres Dawkins MD;  Location: [facility] OR;  Service: General;  Laterality: N/A;    RIGHT HEMICOLECTOMY Right 10/21/2019    Procedure: HEMICOLECTOMY, RIGHT;  Surgeon: Jose Lazo MD;  Location: [facility] OR;  Service:  General;  Laterality: Right;       Current Outpatient Medications:     citalopram (CELEXA) 10 MG tablet, Take 1 tablet (10 mg total) by mouth once daily., Disp: 30 tablet, Rfl: 11    cyanocobalamin (B-12 DOTS) 500 MCG tablet, Take 2 tablets (1,000 mcg total) by mouth once daily., Disp: 60 tablet, Rfl: 0    ondansetron (ZOFRAN) 4 MG tablet, Take 1 tablet (4 mg total) by mouth every 6 (six) hours as needed for Nausea., Disp: 24 tablet, Rfl: 1    ondansetron (ZOFRAN-ODT) 8 MG TbDL, Take 1 tablet (8 mg total) by mouth every 8 (eight) hours as needed., Disp: 90 tablet, Rfl: 3    oxyCODONE-acetaminophen (PERCOCET) 5-325 mg per tablet, Take 1 tablet by mouth every 6 (six) hours as needed for Pain (moderate pain 2-5/10 pain scale)., Disp: 60 tablet, Rfl: 0    pantoprazole (PROTONIX) 40 MG tablet, Take 1 tablet (40 mg total) by mouth once daily., Disp: 30 tablet, Rfl: 11    pyridoxine, vitamin B6, (VITAMIN B-6) 100 MG Tab, Take 1 tablet (100 mg total) by mouth once daily., Disp: 30 tablet, Rfl: 0  Review of patient's allergies indicates:  No Known Allergies  Social History     Tobacco Use    Smoking status: Former Smoker    Smokeless tobacco: Never Used   Substance Use Topics    Alcohol use: Not Currently    Drug use: Never     Family History   Problem Relation Age of Onset    Diabetes Mother     Heart disease Father        CONSTITUTIONAL: No fevers, chills, night sweats,+  wt. loss,  + appetite changes  SKIN: no rashes or itching  ENT: No headaches, head trauma, vision changes, or eye pain  LYMPH NODES: None noticed   CV: No chest pain, palpitations.   RESP: No dyspnea on exertion, cough, wheezing, or hemoptysis  GI: No nausea, emesis, diarrhea, constipation, melena, hematochezia, pain.   : No dysuria, hematuria, urgency, or frequency   HEME: No easy bruising, bleeding problems  PSYCHIATRIC: No depression, anxiety, psychosis, hallucinations.  NEURO: No seizures, memory loss, dizziness or difficulty  sleeping  MSK: No arthralgias or joint swelling      General patient is in no distress well developed well nourished  Psych pleasant affect no evidence of depression no evidence of anxiety  Head normocephalic atraumatic, no hoarseness,  well-spoken speech intact  Eyes noninjected sclera conjunctiva appear pink  Face is symmetric  Skin intact no lesions noted no ecchymosis no rash  Neck with no limited range of motion no JVD evident  Chest with no use of accessory muscles no labored breathing no evidence of tachypnea  No respiratory distress, effort normal   Abdomen appears to be nondistended  Extremities with no cyanosis no evidence of edema  Neuro muscular cranial nerves appear grossly intact  Gait appears steady  No joint effusions  Behavior normal judgment normal  Rheum: No joint swelling    Lab Results   Component Value Date    WBC 11.71 11/04/2019    HGB 8.8 (L) 11/04/2019    HCT 28.4 (L) 11/04/2019    MCV 92 11/04/2019     (H) 11/04/2019     Lab Results   Component Value Date    WBC 5.87 05/12/2020    RBC 4.08 (L) 05/12/2020    HGB 14.1 05/12/2020    HCT 39.9 (L) 05/12/2020    MCV 98 05/12/2020    MCH 34.6 (H) 05/12/2020    MCHC 35.3 05/12/2020    RDW 12.2 05/12/2020     05/12/2020    MPV 8.3 (L) 05/12/2020    GRAN 4.0 05/12/2020    GRAN 68.1 05/12/2020    LYMPH 1.0 05/12/2020    LYMPH 16.2 (L) 05/12/2020    MONO 0.8 05/12/2020    MONO 12.9 05/12/2020    EOS 0.1 05/12/2020    BASO 0.02 05/12/2020    EOSINOPHIL 2.2 05/12/2020    BASOPHIL 0.3 05/12/2020       CMP  Sodium   Date Value Ref Range Status   05/12/2020 135 (L) 136 - 145 mmol/L Final     Potassium   Date Value Ref Range Status   05/12/2020 3.7 3.5 - 5.1 mmol/L Final     Chloride   Date Value Ref Range Status   05/12/2020 101 95 - 110 mmol/L Final     CO2   Date Value Ref Range Status   05/12/2020 26 23 - 29 mmol/L Final     Glucose   Date Value Ref Range Status   05/12/2020 118 (H) 70 - 110 mg/dL Final     BUN, Bld   Date Value Ref Range  Status   05/12/2020 10 8 - 23 mg/dL Final     Creatinine   Date Value Ref Range Status   05/12/2020 0.9 0.5 - 1.4 mg/dL Final     Calcium   Date Value Ref Range Status   05/12/2020 8.6 (L) 8.7 - 10.5 mg/dL Final     Total Protein   Date Value Ref Range Status   05/12/2020 7.0 6.0 - 8.4 g/dL Final     Albumin   Date Value Ref Range Status   05/12/2020 3.4 (L) 3.5 - 5.2 g/dL Final     Total Bilirubin   Date Value Ref Range Status   05/12/2020 0.2 0.1 - 1.0 mg/dL Final     Comment:     For infants and newborns, interpretation of results should be based  on gestational age, weight and in agreement with clinical  observations.  Premature Infant recommended reference ranges:  Up to 24 hours.............<8.0 mg/dL  Up to 48 hours............<12.0 mg/dL  3-5 days..................<15.0 mg/dL  6-29 days.................<15.0 mg/dL       Alkaline Phosphatase   Date Value Ref Range Status   05/12/2020 64 55 - 135 U/L Final     AST   Date Value Ref Range Status   05/12/2020 21 10 - 40 U/L Final     ALT   Date Value Ref Range Status   05/12/2020 22 10 - 44 U/L Final     Anion Gap   Date Value Ref Range Status   05/12/2020 8 8 - 16 mmol/L Final     eGFR if    Date Value Ref Range Status   05/12/2020 >60.0 >60 mL/min/1.73 m^2 Final     eGFR if non    Date Value Ref Range Status   05/12/2020 >60.0 >60 mL/min/1.73 m^2 Final     Comment:     Calculation used to obtain the estimated glomerular filtration  rate (eGFR) is the CKD-EPI equation.        NM PET CT Routine Skull to Mid Thigh   Order: [id number]   Status:  Final result   Visible to patient:  Yes (Patient Portal) Next appt:  01/09/2020 at 09:00 AM in General Surgery (Jose Lazo MD) Dx:  Malignant neoplasm of rectum; Rectal ...            Hypocalcemia    Hypoalbuminemia    Rectal adenocarcinoma    Anemia due to vitamin B6 deficiency    Encounter for chemotherapy management    Oncology follow-up encounter    Other orders  -      palonosetron (ALOXI) 0.25 mg, dexamethasone (DECADRON) 12 mg in sodium chloride 0.9% 50 mL IVPB  -     EPINEPHrine (EPIPEN) 0.3 mg/0.3 mL pen injection 0.3 mg  -     diphenhydrAMINE injection 50 mg  -     hydrocortisone sodium succinate injection 100 mg  -     oxaliplatin (ELOXATIN) 85 mg/m2 = 151 mg in dextrose 5 % 500 mL chemo infusion  -     leucovorin calcium 400 mg/m2 = 710 mg in dextrose 5 % 250 mL infusion  -     fluorouraciL injection 710 mg  -     fluorouracil (ADRUCIL) 2,400 mg/m2 = 4,270 mg in sodium chloride 0.9% 185.4 mL chemo infusion  -     dextrose 5 % 250 mL flush bag  -     sodium chloride 0.9% 100 mL flush bag  -     sodium chloride 0.9% flush 10 mL  -     heparin, porcine (PF) 100 unit/mL injection flush 500 Units  -     alteplase injection 2 mg  -     sodium chloride 0.9% flush 10 mL  -     heparin, porcine (PF) 100 unit/mL injection flush 500 Units  -     alteplase injection 2 mg        Cleared to continue  FOLFOX neoadjuvantly patient is aware the benefits and the possible side effects and agrees to such.  Chest port in place a receive chemotherapy every 2 weeks for a total of possibly 6 months depending on when he may qualify for surgical intervention.  Refilling pain meds  prn  He is aware to watch for constipation   DIET DISCUSSED HER 15 MIN TO WHERE HE MUST INCREASE HIS CALORIC INTAKE TO HELP PREVENT FURTHER WEIGHT LOSS INCREASE HIS INTAKE OF MORE FREQUENT SMALLER [facility] FOCUS ON IRON AND HE COULD BENEFIT FROM CALCIUM REPLACEMENT NATURALLY IS TO INCREASE HIS INTAKE OF DAIRY PRODUCTS  He is to continue Celexa for depression and Zofran p.r.n. Nausea  Up taking a PPI to help with GERD will not interfere with his chemotherapy at this time  He will have  Labs  in 2 weeks with CBC and CMP as well      Current Outpatient Medications:     citalopram (CELEXA) 10 MG tablet, Take 1 tablet (10 mg total) by mouth once daily., Disp: 30 tablet, Rfl: 11    cyanocobalamin (B-12 DOTS) 500 MCG tablet, Take  2 tablets (1,000 mcg total) by mouth once daily., Disp: 60 tablet, Rfl: 0    ondansetron (ZOFRAN) 4 MG tablet, Take 1 tablet (4 mg total) by mouth every 6 (six) hours as needed for Nausea., Disp: 24 tablet, Rfl: 1    ondansetron (ZOFRAN-ODT) 8 MG TbDL, Take 1 tablet (8 mg total) by mouth every 8 (eight) hours as needed., Disp: 90 tablet, Rfl: 3    oxyCODONE-acetaminophen (PERCOCET) 5-325 mg per tablet, Take 1 tablet by mouth every 6 (six) hours as needed for Pain (moderate pain 2-5/10 pain scale)., Disp: 60 tablet, Rfl: 0    pantoprazole (PROTONIX) 40 MG tablet, Take 1 tablet (40 mg total) by mouth once daily., Disp: 30 tablet, Rfl: 11    pyridoxine, vitamin B6, (VITAMIN B-6) 100 MG Tab, Take 1 tablet (100 mg total) by mouth once daily., Disp: 30 tablet, Rfl: 0      Thank you for allowing me to evaluate and participate in the care of this pleasant patient. Please fell free to call me with any questions or concerns.    Warmly,  Shayla Stringer MD    This note was dictated with Dragon and briefly proofread. Please excuse any sentences that may be unclear or words misspelled    I discussed the available treatment option(s) in accordance with the latest NCCN Guidelines, their overall age and their co-morbidities. I went over the risks and benefits of the chemotherapy with regard to their particular cancer type, their cancer stage, their age, and their co-morbidities. I provided literature on the chemotherapy regimen and discussed the chemotherapy side-effect profiles of the drug(s). I discussed the importance of compliance with obtaining and monitoring weekly lab work, and went over the potential hematopathology issues and risks with anemia, leucopenia and thrombocytopenia that can occur with chemotherapy. I discussed the potential risks of liver and kidney damage, which could be permanent and could necessitate dialysis long-term if kidney failure developed. I discussed the emetic and/or diarrheal potential of the  regimen and the potential need for use of antiemetic and anti-diarrheal medications. I discussed the risk for development of anaphylactic shock, bronchospasm, dysrhythmia, and respiratory/cardiovascular failure. I discussed the potential risks for development of alopecia, cold sensory issues, ringing in ears, vertigo and neuropathy, all of which could end up being chronic and life-long. I discussed the risks of hand-foot syndrome and rashes, and development of other autoimmune mediated processes such as pneumonitis and colitis which could be life threatening. I discussed the risks of the potential development of leukemia and/or lymphoma from use of certain chemotherapy agents. I discussed the need for neutropenic precautions, basic hygiene/sanitation behaviors and dietary restrictions.     The patient's consent has been obtained to proceed with the chemotherapy.The patient will be referred to Chemotherapy School /[facility] for training and education on chemotherapy, use of antiemetics and/or anti-diarrheals, use of NSAID's, potential chemotherapy side-effects, and any specific recommendations and precautions with the particular chemotherapy agents.       I answered all of the patient's (and family's, if applicable) questions to the best of my ability and to their complete satisfaction. The patient acknowledged full understanding of the risks, recommendations and plan(s).      Sincerely,  Shayla Stringer MD [facility]

## 2020-05-14 ENCOUNTER — TELEPHONE (OUTPATIENT)
Dept: HEMATOLOGY/ONCOLOGY | Facility: CLINIC | Age: 65
End: 2020-05-14

## 2020-05-14 ENCOUNTER — PATIENT MESSAGE (OUTPATIENT)
Dept: HEMATOLOGY/ONCOLOGY | Facility: CLINIC | Age: 65
End: 2020-05-14

## 2020-05-14 DIAGNOSIS — C20 RECTAL ADENOCARCINOMA: Primary | ICD-10-CM

## 2020-05-14 NOTE — TELEPHONE ENCOUNTER
Portal message sent informing pt of f/up appts.     ----- Message from Shayla Stringer MD sent at 5/13/2020 11:43 AM CDT -----   Cleared for chemo return to clinic virtual is okay with CBC CMP every 2 weeks

## 2020-05-15 ENCOUNTER — INFUSION (OUTPATIENT)
Dept: INFUSION THERAPY | Facility: HOSPITAL | Age: 65
End: 2020-05-15
Attending: INTERNAL MEDICINE
Payer: MEDICARE

## 2020-05-15 VITALS
OXYGEN SATURATION: 97 % | SYSTOLIC BLOOD PRESSURE: 107 MMHG | HEART RATE: 67 BPM | TEMPERATURE: 98 F | RESPIRATION RATE: 18 BRPM | DIASTOLIC BLOOD PRESSURE: 55 MMHG

## 2020-05-15 DIAGNOSIS — C20 RECTAL CANCER: Primary | ICD-10-CM

## 2020-05-15 PROCEDURE — 63600175 PHARM REV CODE 636 W HCPCS: Performed by: INTERNAL MEDICINE

## 2020-05-15 PROCEDURE — 25000003 PHARM REV CODE 250: Performed by: INTERNAL MEDICINE

## 2020-05-15 PROCEDURE — A4216 STERILE WATER/SALINE, 10 ML: HCPCS | Performed by: INTERNAL MEDICINE

## 2020-05-15 RX ORDER — SODIUM CHLORIDE 0.9 % (FLUSH) 0.9 %
10 SYRINGE (ML) INJECTION
Status: DISCONTINUED | OUTPATIENT
Start: 2020-05-15 | End: 2020-05-15 | Stop reason: HOSPADM

## 2020-05-15 RX ORDER — HEPARIN 100 UNIT/ML
500 SYRINGE INTRAVENOUS
Status: DISCONTINUED | OUTPATIENT
Start: 2020-05-15 | End: 2020-05-15 | Stop reason: HOSPADM

## 2020-05-15 RX ADMIN — Medication 10 ML: at 02:05

## 2020-05-15 RX ADMIN — Medication 500 UNITS: at 02:05

## 2020-05-15 NOTE — PLAN OF CARE
Problem: Adult Inpatient Plan of Care  Goal: Plan of Care Review  Outcome: Ongoing, Progressing  Flowsheets (Taken 5/15/2020 1414)  Plan of Care Reviewed With: patient    BP (!) 107/55 (BP Location: Right arm, Patient Position: Sitting)   Pulse 67   Temp 98 °F (36.7 °C) (Oral)   Resp 18   SpO2 97%   Patient arrived to [facility] today for discontinuation of chemotherapy pump. Pump d/c. Port was flushed with positive blood return and heparin locked. Port was then deaccessed with no blood noted and covered with bandaid. AVS provided and future appt discussed. Pt acknowledged understanding.

## 2020-05-18 ENCOUNTER — TELEPHONE (OUTPATIENT)
Dept: HEMATOLOGY/ONCOLOGY | Facility: CLINIC | Age: 65
End: 2020-05-18

## 2020-05-18 ENCOUNTER — PATIENT MESSAGE (OUTPATIENT)
Dept: HEMATOLOGY/ONCOLOGY | Facility: CLINIC | Age: 65
End: 2020-05-18

## 2020-05-18 DIAGNOSIS — C20 RECTAL ADENOCARCINOMA: Primary | ICD-10-CM

## 2020-05-18 NOTE — TELEPHONE ENCOUNTER
Portal message sent informing pt of f/up appts.     ----- Message from Shayla Stringer MD sent at 5/14/2020  7:41 AM CDT -----  CLEARED FOR CHEMO NEEDS OFFICE VISIT EVERY 2 WEEKS WITH CBC CMP AND MAGNESIUM

## 2020-05-21 DIAGNOSIS — C20 RECTAL CANCER: Primary | ICD-10-CM

## 2020-05-25 ENCOUNTER — LAB VISIT (OUTPATIENT)
Dept: LAB | Facility: HOSPITAL | Age: 65
End: 2020-05-25
Attending: INTERNAL MEDICINE
Payer: MEDICARE

## 2020-05-25 DIAGNOSIS — C20 RECTAL ADENOCARCINOMA: ICD-10-CM

## 2020-05-25 DIAGNOSIS — C20 MALIGNANT NEOPLASM OF RECTUM: ICD-10-CM

## 2020-05-25 LAB
ALBUMIN SERPL BCP-MCNC: 3.4 G/DL (ref 3.5–5.2)
ALP SERPL-CCNC: 80 U/L (ref 55–135)
ALT SERPL W/O P-5'-P-CCNC: 25 U/L (ref 10–44)
ANION GAP SERPL CALC-SCNC: 8 MMOL/L (ref 8–16)
AST SERPL-CCNC: 21 U/L (ref 10–40)
BASOPHILS # BLD AUTO: 0.03 K/UL (ref 0–0.2)
BASOPHILS NFR BLD: 0.5 % (ref 0–1.9)
BILIRUB SERPL-MCNC: 0.2 MG/DL (ref 0.1–1)
BUN SERPL-MCNC: 10 MG/DL (ref 8–23)
CALCIUM SERPL-MCNC: 8.6 MG/DL (ref 8.7–10.5)
CHLORIDE SERPL-SCNC: 104 MMOL/L (ref 95–110)
CO2 SERPL-SCNC: 26 MMOL/L (ref 23–29)
CREAT SERPL-MCNC: 0.8 MG/DL (ref 0.5–1.4)
DIFFERENTIAL METHOD: ABNORMAL
EOSINOPHIL # BLD AUTO: 0.1 K/UL (ref 0–0.5)
EOSINOPHIL NFR BLD: 1.7 % (ref 0–8)
ERYTHROCYTE [DISTWIDTH] IN BLOOD BY AUTOMATED COUNT: 12.2 % (ref 11.5–14.5)
EST. GFR  (AFRICAN AMERICAN): >60 ML/MIN/1.73 M^2
EST. GFR  (NON AFRICAN AMERICAN): >60 ML/MIN/1.73 M^2
GLUCOSE SERPL-MCNC: 116 MG/DL (ref 70–110)
HCT VFR BLD AUTO: 38 % (ref 40–54)
HGB BLD-MCNC: 13.2 G/DL (ref 14–18)
IMM GRANULOCYTES # BLD AUTO: 0.02 K/UL (ref 0–0.04)
IMM GRANULOCYTES NFR BLD AUTO: 0.3 % (ref 0–0.5)
LYMPHOCYTES # BLD AUTO: 0.9 K/UL (ref 1–4.8)
LYMPHOCYTES NFR BLD: 14.6 % (ref 18–48)
MAGNESIUM SERPL-MCNC: 1.8 MG/DL (ref 1.6–2.6)
MCH RBC QN AUTO: 33.8 PG (ref 27–31)
MCHC RBC AUTO-ENTMCNC: 34.7 G/DL (ref 32–36)
MCV RBC AUTO: 97 FL (ref 82–98)
MONOCYTES # BLD AUTO: 0.9 K/UL (ref 0.3–1)
MONOCYTES NFR BLD: 13.2 % (ref 4–15)
NEUTROPHILS # BLD AUTO: 4.5 K/UL (ref 1.8–7.7)
NEUTROPHILS NFR BLD: 69.7 % (ref 38–73)
NRBC BLD-RTO: 0 /100 WBC
PLATELET # BLD AUTO: 159 K/UL (ref 150–350)
PMV BLD AUTO: 8.6 FL (ref 9.2–12.9)
POTASSIUM SERPL-SCNC: 3.4 MMOL/L (ref 3.5–5.1)
PROT SERPL-MCNC: 6.9 G/DL (ref 6–8.4)
RBC # BLD AUTO: 3.91 M/UL (ref 4.6–6.2)
SODIUM SERPL-SCNC: 138 MMOL/L (ref 136–145)
WBC # BLD AUTO: 6.45 K/UL (ref 3.9–12.7)

## 2020-05-25 PROCEDURE — 85025 COMPLETE CBC W/AUTO DIFF WBC: CPT

## 2020-05-25 PROCEDURE — 80053 COMPREHEN METABOLIC PANEL: CPT

## 2020-05-25 PROCEDURE — 83735 ASSAY OF MAGNESIUM: CPT

## 2020-05-25 PROCEDURE — 36415 COLL VENOUS BLD VENIPUNCTURE: CPT

## 2020-05-25 RX ORDER — DIPHENHYDRAMINE HYDROCHLORIDE 50 MG/ML
50 INJECTION INTRAMUSCULAR; INTRAVENOUS ONCE AS NEEDED
Status: CANCELLED | OUTPATIENT
Start: 2020-05-26

## 2020-05-25 RX ORDER — FLUOROURACIL 50 MG/ML
400 INJECTION, SOLUTION INTRAVENOUS
Status: CANCELLED | OUTPATIENT
Start: 2020-05-26

## 2020-05-25 RX ORDER — EPINEPHRINE 0.3 MG/.3ML
0.3 INJECTION SUBCUTANEOUS ONCE AS NEEDED
Status: CANCELLED | OUTPATIENT
Start: 2020-05-26

## 2020-05-25 RX ORDER — SODIUM CHLORIDE 0.9 % (FLUSH) 0.9 %
10 SYRINGE (ML) INJECTION
Status: CANCELLED | OUTPATIENT
Start: 2020-05-26

## 2020-05-25 RX ORDER — HEPARIN 100 UNIT/ML
500 SYRINGE INTRAVENOUS
Status: CANCELLED | OUTPATIENT
Start: 2020-05-26

## 2020-05-26 ENCOUNTER — PATIENT OUTREACH (OUTPATIENT)
Dept: ADMINISTRATIVE | Facility: HOSPITAL | Age: 65
End: 2020-05-26

## 2020-05-26 ENCOUNTER — OFFICE VISIT (OUTPATIENT)
Dept: HEMATOLOGY/ONCOLOGY | Facility: CLINIC | Age: 65
End: 2020-05-26
Payer: MEDICARE

## 2020-05-26 VITALS
BODY MASS INDEX: 21.74 KG/M2 | RESPIRATION RATE: 15 BRPM | TEMPERATURE: 99 F | DIASTOLIC BLOOD PRESSURE: 70 MMHG | WEIGHT: 151.88 LBS | SYSTOLIC BLOOD PRESSURE: 128 MMHG | HEART RATE: 69 BPM | HEIGHT: 70 IN

## 2020-05-26 DIAGNOSIS — D53.8 ANEMIA DUE TO VITAMIN B6 DEFICIENCY: ICD-10-CM

## 2020-05-26 DIAGNOSIS — C20 MALIGNANT NEOPLASM OF RECTUM: Primary | ICD-10-CM

## 2020-05-26 DIAGNOSIS — Z11.59 NEED FOR HEPATITIS C SCREENING TEST: Primary | ICD-10-CM

## 2020-05-26 DIAGNOSIS — Z09 ONCOLOGY FOLLOW-UP ENCOUNTER: ICD-10-CM

## 2020-05-26 DIAGNOSIS — R93.89 ABNORMAL FINDINGS ON DIAGNOSTIC IMAGING OF OTHER SPECIFIED BODY STRUCTURES: ICD-10-CM

## 2020-05-26 DIAGNOSIS — Z51.11 ENCOUNTER FOR CHEMOTHERAPY MANAGEMENT: ICD-10-CM

## 2020-05-26 DIAGNOSIS — E53.1 ANEMIA DUE TO VITAMIN B6 DEFICIENCY: ICD-10-CM

## 2020-05-26 DIAGNOSIS — C20 RECTAL ADENOCARCINOMA: ICD-10-CM

## 2020-05-26 DIAGNOSIS — C20 RECTAL CANCER: ICD-10-CM

## 2020-05-26 DIAGNOSIS — Z11.4 SCREENING FOR HIV (HUMAN IMMUNODEFICIENCY VIRUS): ICD-10-CM

## 2020-05-26 DIAGNOSIS — Z13.6 ENCOUNTER FOR LIPID SCREENING FOR CARDIOVASCULAR DISEASE: ICD-10-CM

## 2020-05-26 DIAGNOSIS — Z13.220 ENCOUNTER FOR LIPID SCREENING FOR CARDIOVASCULAR DISEASE: ICD-10-CM

## 2020-05-26 PROCEDURE — 99215 PR OFFICE/OUTPT VISIT, EST, LEVL V, 40-54 MIN: ICD-10-PCS | Mod: S$PBB,,, | Performed by: INTERNAL MEDICINE

## 2020-05-26 PROCEDURE — G0444 DEPRESSION SCREEN ANNUAL: HCPCS | Mod: PBBFAC,PO | Performed by: INTERNAL MEDICINE

## 2020-05-26 PROCEDURE — 99213 OFFICE O/P EST LOW 20 MIN: CPT | Mod: PBBFAC,PO | Performed by: INTERNAL MEDICINE

## 2020-05-26 PROCEDURE — 99999 PR PBB SHADOW E&M-EST. PATIENT-LVL III: CPT | Mod: PBBFAC,,, | Performed by: INTERNAL MEDICINE

## 2020-05-26 PROCEDURE — 99999 PR PBB SHADOW E&M-EST. PATIENT-LVL III: ICD-10-PCS | Mod: PBBFAC,,, | Performed by: INTERNAL MEDICINE

## 2020-05-26 PROCEDURE — 99215 OFFICE O/P EST HI 40 MIN: CPT | Mod: S$PBB,,, | Performed by: INTERNAL MEDICINE

## 2020-05-26 RX ORDER — OXYCODONE AND ACETAMINOPHEN 5; 325 MG/1; MG/1
1 TABLET ORAL EVERY 6 HOURS PRN
Qty: 60 TABLET | Refills: 0 | Status: SHIPPED | OUTPATIENT
Start: 2020-05-26 | End: 2020-06-05

## 2020-05-26 RX ORDER — OXYCODONE AND ACETAMINOPHEN 5; 325 MG/1; MG/1
1 TABLET ORAL EVERY 6 HOURS PRN
Qty: 60 TABLET | Refills: 0 | Status: SHIPPED | OUTPATIENT
Start: 2020-05-26 | End: 2020-05-26 | Stop reason: SDUPTHER

## 2020-05-26 NOTE — PROGRESS NOTES
In office       CC I have been getting a lot of heartburn     Miguel Angel Apple Sr. is a 64 y.o.  Patient here for evaluation of a T3-T4 N1 M a 1 see rectal adenocarcinoma.  On October 21, 2019   a laparotomy was performed for perforated colon and patient underwent a right hemicolectomy with a rectal biopsy revealing rectal adenocarcinoma. He is healing from surgery but referred here to discuss treatment options.   He started xeloda with radiation and is receiving week 3 of radiation  He has completed two doses of IV iron at [facility] without complications : port was accessed without any problem     Tomorrow is the care home point of 6 weeks   He reports decreased appetite and a mild weight loss , slight heartburn     March 25 2020  Completed concomitant chemo radiation February 19, 2020 Oral xeloda 850 mg/m2 Monday through Friday with concomitant radiation to be followed 6 months of folfox through chest port in right chest for colorectal adenocarcinoma with peritoneal seeding   Surgery delayed  due to pandemic  POst IV iron     April 20, 2026 tamiko keeps cutting out   Records in tumor Board been reviewed surgical oncology notes reviewed patient is aware that chemotherapy is indicated per standard of care whether neoadjuvant her adjuvant.  Today patient will discussed with me the chemo regimen and the side effects that could possibly occur.  He is not having any evidence of bleeding he is eating well no change in his weight    May 26 2020   Past Medical History:   Diagnosis Date    Colon cancer     Depression     GERD (gastroesophageal reflux disease)     Medical history reviewed with no changes      Past Surgical History:   Procedure Laterality Date    CHOLECYSTECTOMY  10/21/2019    Procedure: CHOLECYSTECTOMY;  Surgeon: Jose Lazo MD;  Location: [facility] OR;  Service: General;;    CLOSURE OF WOUND N/A 10/30/2019    Procedure: CLOSURE, WOUND;  Surgeon: Torres Dawkins MD;  Location: [facility] OR;  Service:  General;  Laterality: N/A;    COLOSTOMY  10/21/2019    Procedure: CREATION, COLOSTOMY;  Surgeon: Jose Lazo MD;  Location: [facility] OR;  Service: General;;  END DESCENDING COLOSTOMY    FLEXIBLE SIGMOIDOSCOPY N/A 10/21/2019    Procedure: SIGMOIDOSCOPY, FLEXIBLE;  Surgeon: Jose Lazo MD;  Location: [facility] OR;  Service: General;  Laterality: N/A;    INSERTION OF TUNNELED CENTRAL VENOUS CATHETER (CVC) WITH SUBCUTANEOUS PORT Right 12/23/2019    Procedure: INSERTION, PORT-A-CATH;  Surgeon: Jose Lazo MD;  Location: [facility] OR;  Service: General;  Laterality: Right;    no surgical history      REPAIR OF ABDOMINAL WALL N/A 10/30/2019    Procedure: REPAIR, ABDOMINAL WALL;  Surgeon: Torres Dwakins MD;  Location: [facility] OR;  Service: General;  Laterality: N/A;    RIGHT HEMICOLECTOMY Right 10/21/2019    Procedure: HEMICOLECTOMY, RIGHT;  Surgeon: Jose Lazo MD;  Location: [facility] OR;  Service: General;  Laterality: Right;       Current Outpatient Medications:     citalopram (CELEXA) 10 MG tablet, Take 1 tablet (10 mg total) by mouth once daily., Disp: 30 tablet, Rfl: 11    cyanocobalamin (B-12 DOTS) 500 MCG tablet, Take 2 tablets (1,000 mcg total) by mouth once daily., Disp: 60 tablet, Rfl: 0    ondansetron (ZOFRAN) 4 MG tablet, Take 1 tablet (4 mg total) by mouth every 6 (six) hours as needed for Nausea., Disp: 24 tablet, Rfl: 1    ondansetron (ZOFRAN-ODT) 8 MG TbDL, Take 1 tablet (8 mg total) by mouth every 8 (eight) hours as needed., Disp: 90 tablet, Rfl: 3    oxyCODONE-acetaminophen (PERCOCET) 5-325 mg per tablet, Take 1 tablet by mouth every 6 (six) hours as needed for Pain (moderate pain 2-5/10 pain scale)., Disp: 60 tablet, Rfl: 0    pantoprazole (PROTONIX) 40 MG tablet, Take 1 tablet (40 mg total) by mouth once daily., Disp: 30 tablet, Rfl: 11    pyridoxine, vitamin B6, (VITAMIN B-6) 100 MG Tab, Take 1 tablet (100 mg total) by mouth once daily., Disp: 30 tablet, Rfl:  0  Review of patient's allergies indicates:  No Known Allergies  Social History     Tobacco Use    Smoking status: Former Smoker    Smokeless tobacco: Never Used   Substance Use Topics    Alcohol use: Not Currently    Drug use: Never     Family History   Problem Relation Age of Onset    Diabetes Mother     Heart disease Father        CONSTITUTIONAL: No fevers, chills, night sweats,+  wt. loss,  + appetite changes  SKIN: no rashes or itching  ENT: No headaches, head trauma, vision changes, or eye pain  LYMPH NODES: None noticed   CV: No chest pain, palpitations.   RESP: No dyspnea on exertion, cough, wheezing, or hemoptysis  GI: No nausea, emesis, diarrhea, constipation, melena, hematochezia, pain.   : No dysuria, hematuria, urgency, or frequency   HEME: No easy bruising, bleeding problems  PSYCHIATRIC: No depression, anxiety, psychosis, hallucinations.  NEURO: No seizures, memory loss, dizziness or difficulty sleeping  MSK: No arthralgias or joint swelling      General patient is in no distress well developed well nourished  Psych pleasant affect no evidence of depression no evidence of anxiety  Head normocephalic atraumatic, no hoarseness,  well-spoken speech intact  Eyes noninjected sclera conjunctiva appear pink  Face is symmetric  Skin intact no lesions noted no ecchymosis no rash  Neck with no limited range of motion no JVD evident  Chest with no use of accessory muscles no labored breathing no evidence of tachypnea  No respiratory distress, effort normal   Abdomen appears to be nondistended  Extremities with no cyanosis no evidence of edema  Neuro muscular cranial nerves appear grossly intact  Gait appears steady  No joint effusions  Behavior normal judgment normal  Rheum: No joint swelling    Lab Results   Component Value Date    WBC 6.45 05/25/2020    RBC 3.91 (L) 05/25/2020    HGB 13.2 (L) 05/25/2020    HCT 38.0 (L) 05/25/2020    MCV 97 05/25/2020    MCH 33.8 (H) 05/25/2020    MCHC 34.7 05/25/2020     RDW 12.2 05/25/2020     05/25/2020    MPV 8.6 (L) 05/25/2020    GRAN 4.5 05/25/2020    GRAN 69.7 05/25/2020    LYMPH 0.9 (L) 05/25/2020    LYMPH 14.6 (L) 05/25/2020    MONO 0.9 05/25/2020    MONO 13.2 05/25/2020    EOS 0.1 05/25/2020    BASO 0.03 05/25/2020    EOSINOPHIL 1.7 05/25/2020    BASOPHIL 0.5 05/25/2020       CMP  Sodium   Date Value Ref Range Status   05/25/2020 138 136 - 145 mmol/L Final     Potassium   Date Value Ref Range Status   05/25/2020 3.4 (L) 3.5 - 5.1 mmol/L Final     Chloride   Date Value Ref Range Status   05/25/2020 104 95 - 110 mmol/L Final     CO2   Date Value Ref Range Status   05/25/2020 26 23 - 29 mmol/L Final     Glucose   Date Value Ref Range Status   05/25/2020 116 (H) 70 - 110 mg/dL Final     BUN, Bld   Date Value Ref Range Status   05/25/2020 10 8 - 23 mg/dL Final     Creatinine   Date Value Ref Range Status   05/25/2020 0.8 0.5 - 1.4 mg/dL Final     Calcium   Date Value Ref Range Status   05/25/2020 8.6 (L) 8.7 - 10.5 mg/dL Final     Total Protein   Date Value Ref Range Status   05/25/2020 6.9 6.0 - 8.4 g/dL Final     Albumin   Date Value Ref Range Status   05/25/2020 3.4 (L) 3.5 - 5.2 g/dL Final     Total Bilirubin   Date Value Ref Range Status   05/25/2020 0.2 0.1 - 1.0 mg/dL Final     Comment:     For infants and newborns, interpretation of results should be based  on gestational age, weight and in agreement with clinical  observations.  Premature Infant recommended reference ranges:  Up to 24 hours.............<8.0 mg/dL  Up to 48 hours............<12.0 mg/dL  3-5 days..................<15.0 mg/dL  6-29 days.................<15.0 mg/dL       Alkaline Phosphatase   Date Value Ref Range Status   05/25/2020 80 55 - 135 U/L Final     AST   Date Value Ref Range Status   05/25/2020 21 10 - 40 U/L Final     ALT   Date Value Ref Range Status   05/25/2020 25 10 - 44 U/L Final     Anion Gap   Date Value Ref Range Status   05/25/2020 8 8 - 16 mmol/L Final     eGFR if African  American   Date Value Ref Range Status   05/25/2020 >60.0 >60 mL/min/1.73 m^2 Final     eGFR if non    Date Value Ref Range Status   05/25/2020 >60.0 >60 mL/min/1.73 m^2 Final     Comment:     Calculation used to obtain the estimated glomerular filtration  rate (eGFR) is the CKD-EPI equation.        NM PET CT Routine Skull to Mid Thigh   Order: [id number]   Status:  Final result   Visible to patient:  Yes (Patient Portal) Next appt:  01/09/2020 at 09:00 AM in General Surgery (Jose Lazo MD) Dx:  Malignant neoplasm of rectum; Rectal ...            Malignant neoplasm of rectum    Rectal adenocarcinoma  -     CBC auto differential; Future; Expected date: 05/26/2020  -     TSH; Future; Expected date: 05/26/2020  -     CMP; Future; Expected date: 05/26/2020  -     Magnesium; Future; Expected date: 05/26/2020    Anemia due to vitamin B6 deficiency    Encounter for chemotherapy management    Oncology follow-up encounter    Abnormal findings on diagnostic imaging of other specified body structures   -     TSH; Future; Expected date: 05/26/2020    Other orders  -     palonosetron (ALOXI) 0.25 mg, dexamethasone (DECADRON) 12 mg in sodium chloride 0.9% 50 mL IVPB  -     EPINEPHrine (EPIPEN) 0.3 mg/0.3 mL pen injection 0.3 mg  -     diphenhydrAMINE injection 50 mg  -     hydrocortisone sodium succinate injection 100 mg  -     oxaliplatin (ELOXATIN) 85 mg/m2 = 151 mg in dextrose 5 % 500 mL chemo infusion  -     leucovorin calcium 400 mg/m2 = 710 mg in dextrose 5 % 250 mL infusion  -     fluorouraciL injection 710 mg  -     fluorouracil (ADRUCIL) 2,400 mg/m2 = 4,270 mg in sodium chloride 0.9% 185.4 mL chemo infusion  -     dextrose 5 % 250 mL flush bag  -     sodium chloride 0.9% 100 mL flush bag  -     sodium chloride 0.9% flush 10 mL  -     heparin, porcine (PF) 100 unit/mL injection flush 500 Units  -     alteplase injection 2 mg      24 hours after chemo he is getting cramping in his stomach and he  takes nausea meds and sleeps . I offered carafate and he wants to wait  Increase or double the antinausea meds when needed   Watch for constipation   Cleared to continue  FOLFOX neoadjuvantly patient is aware the benefits and the possible side effects and agrees to such.  Chest port in place a receive chemotherapy every 2 weeks for a total of possibly 6 months depending on when he may qualify for surgical intervention.  Refilling pain meds  prn    He is to continue Celexa for depression   Cont PPI to help with GERD    He will have  Labs  in 2 weeks with CBC and CMP as well magnesium     RTC 2 weeks in [place] OV in person for chemo       Current Outpatient Medications:     citalopram (CELEXA) 10 MG tablet, Take 1 tablet (10 mg total) by mouth once daily., Disp: 30 tablet, Rfl: 11    cyanocobalamin (B-12 DOTS) 500 MCG tablet, Take 2 tablets (1,000 mcg total) by mouth once daily., Disp: 60 tablet, Rfl: 0    ondansetron (ZOFRAN) 4 MG tablet, Take 1 tablet (4 mg total) by mouth every 6 (six) hours as needed for Nausea., Disp: 24 tablet, Rfl: 1    ondansetron (ZOFRAN-ODT) 8 MG TbDL, Take 1 tablet (8 mg total) by mouth every 8 (eight) hours as needed., Disp: 90 tablet, Rfl: 3    oxyCODONE-acetaminophen (PERCOCET) 5-325 mg per tablet, Take 1 tablet by mouth every 6 (six) hours as needed for Pain (moderate pain 2-5/10 pain scale)., Disp: 60 tablet, Rfl: 0    pantoprazole (PROTONIX) 40 MG tablet, Take 1 tablet (40 mg total) by mouth once daily., Disp: 30 tablet, Rfl: 11    pyridoxine, vitamin B6, (VITAMIN B-6) 100 MG Tab, Take 1 tablet (100 mg total) by mouth once daily., Disp: 30 tablet, Rfl: 0

## 2020-05-26 NOTE — LETTER
June 1, 2020    Miguel Angel Apple Sr.  [address]  [place] [address]             [facility]  [address]  [facility] [address]  Phone: [phone] Dear David [facility] is committed to your overall health and would like to ensure that you are up to date on your recommended test and/or procedures.   Primary Doctor No  has found that your chart shows you may be due for the following:     Hepatitis C Screening due on 1955   Lipid Panel due on 1955   HIV Screening due on 10/27/1969   TETANUS VACCINE due on 10/27/1972   Shingles Vaccine(1 of 2) due on 10/27/2004   Colonoscopy due on 10/27/2004   Pneumococcal Vaccine (65+ High/Highest Risk)(1 of 2 - PCV13) due on 10/27/2019       If you have had any of the above done at another facility, please let us know so that we may obtain copies from that facility.  If you have a copy of these records, please provide a copy for us to scan into your chart.  You are welcome to request that the report be faxed to us at  ([phone]).     Otherwise, please schedule these appointments at your earliest convenience by calling [phone] or going to [email or web address].     If you have an upcoming scheduled appointment for the item above, please disregard this letter.     Sincerely,   Your [facility]   Primary Doctor No   Chelsea Ag L.P.N. Clinical Care Coordinator   [address] [place] [address], [place] [address] [phone]

## 2020-05-27 ENCOUNTER — PATIENT MESSAGE (OUTPATIENT)
Dept: HEMATOLOGY/ONCOLOGY | Facility: CLINIC | Age: 65
End: 2020-05-27

## 2020-05-27 ENCOUNTER — TELEPHONE (OUTPATIENT)
Dept: HEMATOLOGY/ONCOLOGY | Facility: CLINIC | Age: 65
End: 2020-05-27

## 2020-05-27 ENCOUNTER — INFUSION (OUTPATIENT)
Dept: INFUSION THERAPY | Facility: HOSPITAL | Age: 65
End: 2020-05-27
Attending: INTERNAL MEDICINE
Payer: MEDICARE

## 2020-05-27 VITALS
SYSTOLIC BLOOD PRESSURE: 159 MMHG | WEIGHT: 153 LBS | DIASTOLIC BLOOD PRESSURE: 74 MMHG | OXYGEN SATURATION: 100 % | HEART RATE: 62 BPM | BODY MASS INDEX: 21.9 KG/M2 | TEMPERATURE: 99 F | HEIGHT: 70 IN | RESPIRATION RATE: 18 BRPM

## 2020-05-27 DIAGNOSIS — C20 RECTAL CANCER: Primary | ICD-10-CM

## 2020-05-27 PROCEDURE — 96416 CHEMO PROLONG INFUSE W/PUMP: CPT

## 2020-05-27 PROCEDURE — 96413 CHEMO IV INFUSION 1 HR: CPT

## 2020-05-27 PROCEDURE — 96367 TX/PROPH/DG ADDL SEQ IV INF: CPT

## 2020-05-27 PROCEDURE — 96411 CHEMO IV PUSH ADDL DRUG: CPT

## 2020-05-27 PROCEDURE — 96415 CHEMO IV INFUSION ADDL HR: CPT

## 2020-05-27 PROCEDURE — 96368 THER/DIAG CONCURRENT INF: CPT

## 2020-05-27 PROCEDURE — 25000003 PHARM REV CODE 250: Performed by: INTERNAL MEDICINE

## 2020-05-27 PROCEDURE — 63600175 PHARM REV CODE 636 W HCPCS: Performed by: INTERNAL MEDICINE

## 2020-05-27 RX ORDER — FLUOROURACIL 50 MG/ML
400 INJECTION, SOLUTION INTRAVENOUS
Status: COMPLETED | OUTPATIENT
Start: 2020-05-27 | End: 2020-05-27

## 2020-05-27 RX ORDER — SODIUM CHLORIDE 0.9 % (FLUSH) 0.9 %
10 SYRINGE (ML) INJECTION
Status: DISCONTINUED | OUTPATIENT
Start: 2020-05-27 | End: 2020-05-27 | Stop reason: HOSPADM

## 2020-05-27 RX ORDER — EPINEPHRINE 0.3 MG/.3ML
0.3 INJECTION SUBCUTANEOUS ONCE AS NEEDED
Status: DISCONTINUED | OUTPATIENT
Start: 2020-05-27 | End: 2020-05-27 | Stop reason: HOSPADM

## 2020-05-27 RX ORDER — HEPARIN 100 UNIT/ML
500 SYRINGE INTRAVENOUS
Status: DISCONTINUED | OUTPATIENT
Start: 2020-05-27 | End: 2020-05-27 | Stop reason: HOSPADM

## 2020-05-27 RX ORDER — DIPHENHYDRAMINE HYDROCHLORIDE 50 MG/ML
50 INJECTION INTRAMUSCULAR; INTRAVENOUS ONCE AS NEEDED
Status: DISCONTINUED | OUTPATIENT
Start: 2020-05-27 | End: 2020-05-27 | Stop reason: HOSPADM

## 2020-05-27 RX ADMIN — LEUCOVORIN CALCIUM 710 MG: 100 INJECTION, POWDER, LYOPHILIZED, FOR SOLUTION INTRAMUSCULAR; INTRAVENOUS at 11:05

## 2020-05-27 RX ADMIN — FLUOROURACIL 710 MG: 50 INJECTION, SOLUTION INTRAVENOUS at 01:05

## 2020-05-27 RX ADMIN — FLUOROURACIL 4270 MG: 50 INJECTION, SOLUTION INTRAVENOUS at 01:05

## 2020-05-27 RX ADMIN — PALONOSETRON HYDROCHLORIDE: 0.25 INJECTION, SOLUTION INTRAVENOUS at 10:05

## 2020-05-27 RX ADMIN — OXALIPLATIN 151 MG: 5 INJECTION, SOLUTION INTRAVENOUS at 11:05

## 2020-05-27 NOTE — TELEPHONE ENCOUNTER
Portal message sent informing pt of f/up appts.     ----- Message from Shayla Stringer MD sent at 5/26/2020 10:51 AM CDT -----  jose WARD?FRANCIS in [place] two weeks on WEd with labs in epic cleraed for chedmo

## 2020-05-29 ENCOUNTER — INFUSION (OUTPATIENT)
Dept: INFUSION THERAPY | Facility: HOSPITAL | Age: 65
End: 2020-05-29
Attending: INTERNAL MEDICINE
Payer: MEDICARE

## 2020-05-29 VITALS
RESPIRATION RATE: 16 BRPM | OXYGEN SATURATION: 97 % | TEMPERATURE: 97 F | HEART RATE: 71 BPM | SYSTOLIC BLOOD PRESSURE: 118 MMHG | DIASTOLIC BLOOD PRESSURE: 59 MMHG

## 2020-05-29 DIAGNOSIS — C20 RECTAL CANCER: Primary | ICD-10-CM

## 2020-05-29 PROCEDURE — 63600175 PHARM REV CODE 636 W HCPCS: Performed by: INTERNAL MEDICINE

## 2020-05-29 PROCEDURE — 25000003 PHARM REV CODE 250: Performed by: INTERNAL MEDICINE

## 2020-05-29 PROCEDURE — A4216 STERILE WATER/SALINE, 10 ML: HCPCS | Performed by: INTERNAL MEDICINE

## 2020-05-29 RX ORDER — SODIUM CHLORIDE 0.9 % (FLUSH) 0.9 %
10 SYRINGE (ML) INJECTION
Status: DISCONTINUED | OUTPATIENT
Start: 2020-05-29 | End: 2020-05-29 | Stop reason: HOSPADM

## 2020-05-29 RX ORDER — HEPARIN 100 UNIT/ML
500 SYRINGE INTRAVENOUS
Status: DISCONTINUED | OUTPATIENT
Start: 2020-05-29 | End: 2020-05-29 | Stop reason: HOSPADM

## 2020-05-29 RX ADMIN — HEPARIN 500 UNITS: 100 SYRINGE at 12:05

## 2020-05-29 RX ADMIN — Medication 10 ML: at 12:05

## 2020-06-01 NOTE — PROGRESS NOTES
"Attempted to outreach patient for pre-visit via "Yoopay", no answer after a week. Sending outreach via Mail Out Letter now.      "

## 2020-06-05 ENCOUNTER — OFFICE VISIT (OUTPATIENT)
Dept: FAMILY MEDICINE | Facility: CLINIC | Age: 65
End: 2020-06-05
Payer: MEDICARE

## 2020-06-05 VITALS
HEIGHT: 70 IN | OXYGEN SATURATION: 97 % | RESPIRATION RATE: 18 BRPM | BODY MASS INDEX: 21.28 KG/M2 | WEIGHT: 148.63 LBS | HEART RATE: 78 BPM | DIASTOLIC BLOOD PRESSURE: 69 MMHG | TEMPERATURE: 98 F | SYSTOLIC BLOOD PRESSURE: 120 MMHG

## 2020-06-05 DIAGNOSIS — Z11.59 NEED FOR HEPATITIS C SCREENING TEST: ICD-10-CM

## 2020-06-05 DIAGNOSIS — Z76.89 ENCOUNTER TO ESTABLISH CARE: Primary | ICD-10-CM

## 2020-06-05 DIAGNOSIS — Z13.220 ENCOUNTER FOR LIPID SCREENING FOR CARDIOVASCULAR DISEASE: ICD-10-CM

## 2020-06-05 DIAGNOSIS — Z11.4 SCREENING FOR HIV (HUMAN IMMUNODEFICIENCY VIRUS): ICD-10-CM

## 2020-06-05 DIAGNOSIS — Z12.5 SCREENING FOR MALIGNANT NEOPLASM OF PROSTATE: ICD-10-CM

## 2020-06-05 DIAGNOSIS — C20 RECTAL ADENOCARCINOMA: ICD-10-CM

## 2020-06-05 DIAGNOSIS — Z00.00 ROUTINE HEALTH MAINTENANCE: ICD-10-CM

## 2020-06-05 DIAGNOSIS — R73.01 ELEVATED FASTING GLUCOSE: ICD-10-CM

## 2020-06-05 DIAGNOSIS — Z13.6 ENCOUNTER FOR LIPID SCREENING FOR CARDIOVASCULAR DISEASE: ICD-10-CM

## 2020-06-05 PROCEDURE — 99204 PR OFFICE/OUTPT VISIT, NEW, LEVL IV, 45-59 MIN: ICD-10-PCS | Mod: S$GLB,,, | Performed by: FAMILY MEDICINE

## 2020-06-05 PROCEDURE — 99204 OFFICE O/P NEW MOD 45 MIN: CPT | Mod: S$GLB,,, | Performed by: FAMILY MEDICINE

## 2020-06-05 RX ORDER — OXYCODONE AND ACETAMINOPHEN 10; 325 MG/1; MG/1
TABLET ORAL
COMMUNITY
Start: 2020-05-28 | End: 2020-06-23 | Stop reason: SDUPTHER

## 2020-06-05 NOTE — PROGRESS NOTES
[facility] - Clinic Note    Subjective      Mr. Apple is a 64 y.o. male who presents to clinic to establish care    Patient has past medical history significant for rectal adenocarcinoma.  He is status post right hemicolectomy for perforated colon at which time rectal cancer was found.  He received Xeloda with radiation.  He is currently on FOLFOX neoadjuvantly. He usually has nausea, loss of appetite and generalized malaise after receiving chemotherapy.  These symptoms usually last about a week but then he will return to feeling normal the next week.  Additionally has depression.  He takes Celexa for this but is only using this medicine off and on.  Taking Protonix for GERD symptoms  Blood pressure controlled  No other reported medical problems      Review of Systems   Constitutional: Negative for fatigue.   Respiratory: Negative for shortness of breath.    Cardiovascular: Negative for chest pain.   Gastrointestinal:        Acid reflux   Psychiatric/Behavioral:        Depression     ROS otherwise negative    [facility] Miguel Angel has a past medical history of Colon cancer, Depression, GERD (gastroesophageal reflux disease), and Medical history reviewed with no changes.   PSXH Miguel Angel has a past surgical history that includes no surgical history; Flexible sigmoidoscopy (N/A, 10/21/2019); Right hemicolectomy (Right, 10/21/2019); Cholecystectomy (10/21/2019); Colostomy (10/21/2019); Repair of abdominal wall (N/A, 10/30/2019); Closure of wound (N/A, 10/30/2019); and Insertion of tunneled central venous catheter (CVC) with subcutaneous port (Right, 12/23/2019).    Miguel Angel's family history includes Diabetes in his mother; Heart disease in his father.    Miguel Angel reports that he has quit smoking. He has never used smokeless tobacco. He reports that he drank alcohol. He reports that he does not use drugs.   ALVAREZ Miguel Angel has No Known Allergies.   MED Miguel Angel has a current medication list which includes the following prescription(s):  "citalopram, cyanocobalamin, ondansetron, ondansetron, oxycodone-acetaminophen, pantoprazole, and pyridoxine (vitamin b6).       Objective     /69 (BP Location: Right arm, Patient Position: Sitting)   Pulse 78   Temp 97.6 °F (36.4 °C)   Resp 18   Ht 5' 10" (1.778 m)   Wt 67.4 kg (148 lb 9.6 oz)   SpO2 97%   BMI 21.32 kg/m²     Physical Exam   Constitutional: He is oriented to person, place, and time. He appears well-developed and well-nourished. No distress.   HENT:   Head: Normocephalic and atraumatic.   Right Ear: External ear normal.   Left Ear: External ear normal.   Nose: Nose normal.   Mouth/Throat: Oropharynx is clear and moist.   Eyes: Conjunctivae are normal.   Cardiovascular: Normal rate, regular rhythm, normal heart sounds and intact distal pulses.   No murmur heard.  Pulmonary/Chest: Effort normal and breath sounds normal. No respiratory distress. He has no wheezes. He has no rales.   Abdominal: Bowel sounds are normal.   Musculoskeletal: He exhibits no edema or deformity.   Neurological: He is alert and oriented to person, place, and time.   Skin: Skin is warm and dry. He is not diaphoretic.   Psychiatric: He has a normal mood and affect.   Vitals reviewed.     Assessment/Plan     1. Encounter to establish care     2. Need for hepatitis C screening test  Hepatitis C Antibody   3. Screening for HIV (human immunodeficiency virus)  HIV 1/2 Ag/Ab (4th Gen)   4. Routine health maintenance     5. Encounter for lipid screening for cardiovascular disease  Lipid Panel   6. Elevated fasting glucose  Hemoglobin A1C   7. Screening for malignant neoplasm of prostate  PSA, Screening   8. Rectal adenocarcinoma       Recommend taking Celexa every day to receive full benefit  Reviewed and managed chronic conditions  Routine screenings  Follow-up lab work  Follow-up in 3-6 months or sooner p.r.n.    Future Appointments   Date Time Provider Department Center   6/9/2020 12:40 PM [facility], LABORATORY [facility] LAB " [facility]   6/9/2020  2:40 PM Shayla Stringer MD [facility] HEM ON O at [facility]   6/10/2020 10:00 AM [facility]   6/12/2020 11:00 AM [facility]   6/22/2020 10:10 AM [facility], LABORATORY [facility] LAB [facility]   6/23/2020 11:40 AM Shayla Stringer MD [facility] HEM ON O at [facility]   7/7/2020 11:20 AM [facility], LABORATORY [facility] LAB [facility]   7/8/2020 10:00 AM Shayla Stringer MD [facility] HEMON1 [facility]       Flores Pal MD  Family Medicine  [facility]  [phone]

## 2020-06-09 ENCOUNTER — OFFICE VISIT (OUTPATIENT)
Dept: HEMATOLOGY/ONCOLOGY | Facility: CLINIC | Age: 65
End: 2020-06-09
Payer: MEDICARE

## 2020-06-09 ENCOUNTER — LAB VISIT (OUTPATIENT)
Dept: LAB | Facility: HOSPITAL | Age: 65
End: 2020-06-09
Attending: INTERNAL MEDICINE
Payer: MEDICARE

## 2020-06-09 VITALS
RESPIRATION RATE: 16 BRPM | BODY MASS INDEX: 21.37 KG/M2 | SYSTOLIC BLOOD PRESSURE: 114 MMHG | WEIGHT: 149.25 LBS | OXYGEN SATURATION: 99 % | TEMPERATURE: 98 F | DIASTOLIC BLOOD PRESSURE: 60 MMHG | HEART RATE: 70 BPM | HEIGHT: 70 IN

## 2020-06-09 DIAGNOSIS — Z09 ONCOLOGY FOLLOW-UP ENCOUNTER: ICD-10-CM

## 2020-06-09 DIAGNOSIS — C20 RECTAL ADENOCARCINOMA: ICD-10-CM

## 2020-06-09 DIAGNOSIS — C20 RECTAL CANCER: ICD-10-CM

## 2020-06-09 DIAGNOSIS — C20 RECTAL ADENOCARCINOMA: Primary | ICD-10-CM

## 2020-06-09 DIAGNOSIS — K62.89 RECTAL MASS: ICD-10-CM

## 2020-06-09 DIAGNOSIS — Z51.11 ENCOUNTER FOR CHEMOTHERAPY MANAGEMENT: ICD-10-CM

## 2020-06-09 DIAGNOSIS — E53.1 ANEMIA DUE TO VITAMIN B6 DEFICIENCY: ICD-10-CM

## 2020-06-09 DIAGNOSIS — D53.8 ANEMIA DUE TO VITAMIN B6 DEFICIENCY: ICD-10-CM

## 2020-06-09 LAB
ALBUMIN SERPL BCP-MCNC: 3.5 G/DL (ref 3.5–5.2)
ALP SERPL-CCNC: 75 U/L (ref 55–135)
ALT SERPL W/O P-5'-P-CCNC: 20 U/L (ref 10–44)
ANION GAP SERPL CALC-SCNC: 7 MMOL/L (ref 8–16)
AST SERPL-CCNC: 19 U/L (ref 10–40)
BASOPHILS # BLD AUTO: 0.03 K/UL (ref 0–0.2)
BASOPHILS NFR BLD: 0.4 % (ref 0–1.9)
BILIRUB SERPL-MCNC: 0.6 MG/DL (ref 0.1–1)
BUN SERPL-MCNC: 10 MG/DL (ref 8–23)
CALCIUM SERPL-MCNC: 8.6 MG/DL (ref 8.7–10.5)
CHLORIDE SERPL-SCNC: 102 MMOL/L (ref 95–110)
CO2 SERPL-SCNC: 27 MMOL/L (ref 23–29)
CREAT SERPL-MCNC: 0.9 MG/DL (ref 0.5–1.4)
DIFFERENTIAL METHOD: ABNORMAL
EOSINOPHIL # BLD AUTO: 0.1 K/UL (ref 0–0.5)
EOSINOPHIL NFR BLD: 1.9 % (ref 0–8)
ERYTHROCYTE [DISTWIDTH] IN BLOOD BY AUTOMATED COUNT: 12.5 % (ref 11.5–14.5)
EST. GFR  (AFRICAN AMERICAN): >60 ML/MIN/1.73 M^2
EST. GFR  (NON AFRICAN AMERICAN): >60 ML/MIN/1.73 M^2
GLUCOSE SERPL-MCNC: 117 MG/DL (ref 70–110)
HCT VFR BLD AUTO: 37.8 % (ref 40–54)
HGB BLD-MCNC: 13.1 G/DL (ref 14–18)
IMM GRANULOCYTES # BLD AUTO: 0.03 K/UL (ref 0–0.04)
IMM GRANULOCYTES NFR BLD AUTO: 0.4 % (ref 0–0.5)
LYMPHOCYTES # BLD AUTO: 0.8 K/UL (ref 1–4.8)
LYMPHOCYTES NFR BLD: 12.1 % (ref 18–48)
MAGNESIUM SERPL-MCNC: 1.8 MG/DL (ref 1.6–2.6)
MCH RBC QN AUTO: 33 PG (ref 27–31)
MCHC RBC AUTO-ENTMCNC: 34.7 G/DL (ref 32–36)
MCV RBC AUTO: 95 FL (ref 82–98)
MONOCYTES # BLD AUTO: 0.8 K/UL (ref 0.3–1)
MONOCYTES NFR BLD: 11 % (ref 4–15)
NEUTROPHILS # BLD AUTO: 5.2 K/UL (ref 1.8–7.7)
NEUTROPHILS NFR BLD: 74.2 % (ref 38–73)
NRBC BLD-RTO: 0 /100 WBC
PLATELET # BLD AUTO: 170 K/UL (ref 150–350)
PMV BLD AUTO: 9 FL (ref 9.2–12.9)
POTASSIUM SERPL-SCNC: 3.8 MMOL/L (ref 3.5–5.1)
PROT SERPL-MCNC: 6.7 G/DL (ref 6–8.4)
RBC # BLD AUTO: 3.97 M/UL (ref 4.6–6.2)
SODIUM SERPL-SCNC: 136 MMOL/L (ref 136–145)
WBC # BLD AUTO: 6.97 K/UL (ref 3.9–12.7)

## 2020-06-09 PROCEDURE — 99999 PR PBB SHADOW E&M-EST. PATIENT-LVL III: CPT | Mod: PBBFAC,,, | Performed by: INTERNAL MEDICINE

## 2020-06-09 PROCEDURE — 83735 ASSAY OF MAGNESIUM: CPT

## 2020-06-09 PROCEDURE — 99213 OFFICE O/P EST LOW 20 MIN: CPT | Mod: PBBFAC,PO,25 | Performed by: INTERNAL MEDICINE

## 2020-06-09 PROCEDURE — 99215 OFFICE O/P EST HI 40 MIN: CPT | Mod: S$PBB,,, | Performed by: INTERNAL MEDICINE

## 2020-06-09 PROCEDURE — 85025 COMPLETE CBC W/AUTO DIFF WBC: CPT

## 2020-06-09 PROCEDURE — 36415 COLL VENOUS BLD VENIPUNCTURE: CPT

## 2020-06-09 PROCEDURE — G0444 DEPRESSION SCREEN ANNUAL: HCPCS | Mod: PBBFAC,PO | Performed by: INTERNAL MEDICINE

## 2020-06-09 PROCEDURE — 80053 COMPREHEN METABOLIC PANEL: CPT

## 2020-06-09 PROCEDURE — 99999 PR PBB SHADOW E&M-EST. PATIENT-LVL III: ICD-10-PCS | Mod: PBBFAC,,, | Performed by: INTERNAL MEDICINE

## 2020-06-09 PROCEDURE — 99215 PR OFFICE/OUTPT VISIT, EST, LEVL V, 40-54 MIN: ICD-10-PCS | Mod: S$PBB,,, | Performed by: INTERNAL MEDICINE

## 2020-06-09 RX ORDER — EPINEPHRINE 0.3 MG/.3ML
0.3 INJECTION SUBCUTANEOUS ONCE AS NEEDED
Status: CANCELLED | OUTPATIENT
Start: 2020-06-09

## 2020-06-09 RX ORDER — DIPHENHYDRAMINE HYDROCHLORIDE 50 MG/ML
50 INJECTION INTRAMUSCULAR; INTRAVENOUS ONCE AS NEEDED
Status: CANCELLED | OUTPATIENT
Start: 2020-06-09

## 2020-06-09 RX ORDER — SODIUM CHLORIDE 0.9 % (FLUSH) 0.9 %
10 SYRINGE (ML) INJECTION
Status: CANCELLED | OUTPATIENT
Start: 2020-06-09

## 2020-06-09 RX ORDER — HEPARIN 100 UNIT/ML
500 SYRINGE INTRAVENOUS
Status: CANCELLED | OUTPATIENT
Start: 2020-06-09

## 2020-06-09 RX ORDER — FLUOROURACIL 50 MG/ML
400 INJECTION, SOLUTION INTRAVENOUS
Status: CANCELLED | OUTPATIENT
Start: 2020-06-09

## 2020-06-09 NOTE — PROGRESS NOTES
In office       CC  my  fingertips get numb     Miguel Angel Apple Sr. is a 64 y.o.  Patient here for evaluation of a T3-T4 N1 M a 1 see rectal adenocarcinoma.  On October 21, 2019   a laparotomy was performed for perforated colon and patient underwent a right hemicolectomy with a rectal biopsy revealing rectal adenocarcinoma. He is healing from surgery but referred here to discuss treatment options.   He started xeloda with radiation and is receiving week 3 of radiation  He has completed two doses of IV iron at [place] without complications : port was accessed without any problem     Tomorrow is the [facility] point of 6 weeks   He reports decreased appetite and a mild weight loss , slight heartburn     March 25 2020  Completed concomitant chemo radiation February 19, 2020 Oral xeloda 850 mg/m2 Monday through Friday with concomitant radiation to be followed 6 months of folfox through chest port in right chest for colorectal adenocarcinoma with peritoneal seeding   Surgery delayed  due to pandemic  POst IV iron     April 20, 2026 tamiko keeps cutting out   Records in tumor Board been reviewed surgical oncology notes reviewed patient is aware that chemotherapy is indicated per standard of care whether neoadjuvant her adjuvant.  Today patient will discussed with me the chemo regimen and the side effects that could possibly occur.  He is not having any evidence of bleeding he is eating well no change in his weight    June 2020   Due for chemo   Positive paresthesias , heartburn, loose stool in bag no explosive diarrhea   Past Medical History:   Diagnosis Date    Colon cancer     Depression     GERD (gastroesophageal reflux disease)     Medical history reviewed with no changes      Past Surgical History:   Procedure Laterality Date    CHOLECYSTECTOMY  10/21/2019    Procedure: CHOLECYSTECTOMY;  Surgeon: Jose Lazo MD;  Location: [facility] OR;  Service: General;;    CLOSURE OF WOUND N/A 10/30/2019    Procedure:  CLOSURE, WOUND;  Surgeon: Torres Dawkins MD;  Location: [facility] OR;  Service: General;  Laterality: N/A;    COLOSTOMY  10/21/2019    Procedure: CREATION, COLOSTOMY;  Surgeon: Jose Lazo MD;  Location: [facility] OR;  Service: General;;  END DESCENDING COLOSTOMY    FLEXIBLE SIGMOIDOSCOPY N/A 10/21/2019    Procedure: SIGMOIDOSCOPY, FLEXIBLE;  Surgeon: Jose Lazo MD;  Location: [facility] OR;  Service: General;  Laterality: N/A;    INSERTION OF TUNNELED CENTRAL VENOUS CATHETER (CVC) WITH SUBCUTANEOUS PORT Right 12/23/2019    Procedure: INSERTION, PORT-A-CATH;  Surgeon: Jose Lazo MD;  Location: [facility] OR;  Service: General;  Laterality: Right;    no surgical history      REPAIR OF ABDOMINAL WALL N/A 10/30/2019    Procedure: REPAIR, ABDOMINAL WALL;  Surgeon: Torres Dawkins MD;  Location: [facility] OR;  Service: General;  Laterality: N/A;    RIGHT HEMICOLECTOMY Right 10/21/2019    Procedure: HEMICOLECTOMY, RIGHT;  Surgeon: Jose Lazo MD;  Location: [facility] OR;  Service: General;  Laterality: Right;       Current Outpatient Medications:     citalopram (CELEXA) 10 MG tablet, Take 1 tablet (10 mg total) by mouth once daily., Disp: 30 tablet, Rfl: 11    cyanocobalamin (B-12 DOTS) 500 MCG tablet, Take 2 tablets (1,000 mcg total) by mouth once daily., Disp: 60 tablet, Rfl: 0    ondansetron (ZOFRAN) 4 MG tablet, Take 1 tablet (4 mg total) by mouth every 6 (six) hours as needed for Nausea., Disp: 24 tablet, Rfl: 1    ondansetron (ZOFRAN-ODT) 8 MG TbDL, Take 1 tablet (8 mg total) by mouth every 8 (eight) hours as needed., Disp: 90 tablet, Rfl: 3    oxyCODONE-acetaminophen (PERCOCET)  mg per tablet, TAKE ONE TABLET BY MOUTH EVERY 12 HOURS AS NEEDED FOR PAIN, Disp: , Rfl:     pantoprazole (PROTONIX) 40 MG tablet, Take 1 tablet (40 mg total) by mouth once daily., Disp: 30 tablet, Rfl: 11    pyridoxine, vitamin B6, (VITAMIN B-6) 100 MG Tab, Take 1 tablet (100 mg total) by mouth once  "daily., Disp: 30 tablet, Rfl: 0  Review of patient's allergies indicates:  No Known Allergies  Social History     Tobacco Use    Smoking status: Former Smoker    Smokeless tobacco: Never Used   Substance Use Topics    Alcohol use: Not Currently    Drug use: Never     Family History   Problem Relation Age of Onset    Diabetes Mother     Heart disease Father        CONSTITUTIONAL: No fevers, chills, night sweats,+  wt. loss,  + appetite changes  SKIN: no rashes or itching  ENT: No headaches, head trauma, vision changes, or eye pain  LYMPH NODES: None noticed   CV: No chest pain, palpitations.   RESP: No dyspnea on exertion, cough, wheezing, or hemoptysis  GI: No nausea, emesis, diarrhea, constipation, melena, hematochezia, pain.   : No dysuria, hematuria, urgency, or frequency   HEME: No easy bruising, bleeding problems  PSYCHIATRIC: No depression, anxiety, psychosis, hallucinations.  NEURO: No seizures, memory loss, dizziness or difficulty sleeping  MSK: No arthralgias or joint swelling    /60   Pulse 70   Temp 98.1 °F (36.7 °C) (Oral)   Resp 16   Ht 5' 10" (1.778 m)   Wt 67.7 kg (149 lb 4 oz)   SpO2 99%   BMI 21.42 kg/m²       General patient is in no distress well developed well nourished  Psych pleasant affect no evidence of depression no evidence of anxiety  Head normocephalic atraumatic, no hoarseness,  well-spoken speech intact   Eyes noninjected sclera conjunctiva appear pink  Face is symmetric  Skin intact no lesions noted no ecchymosis no rash  Neck with no limited range of motion no JVD evident  Chest with no use of accessory muscles no labored breathing no evidence of tachypnea  No respiratory distress, effort normal   Abdomen appears to be nondistended  Extremities with no cyanosis no evidence of edema  Neuro muscular cranial nerves appear grossly intact  Gait appears steady  No joint effusions  Behavior normal judgment normal  Rheum: No joint swelling    Lab Results   Component Value " Date    WBC 6.97 06/09/2020    RBC 3.97 (L) 06/09/2020    HGB 13.1 (L) 06/09/2020    HCT 37.8 (L) 06/09/2020    MCV 95 06/09/2020    MCH 33.0 (H) 06/09/2020    MCHC 34.7 06/09/2020    RDW 12.5 06/09/2020     06/09/2020    MPV 9.0 (L) 06/09/2020    GRAN 5.2 06/09/2020    GRAN 74.2 (H) 06/09/2020    LYMPH 0.8 (L) 06/09/2020    LYMPH 12.1 (L) 06/09/2020    MONO 0.8 06/09/2020    MONO 11.0 06/09/2020    EOS 0.1 06/09/2020    BASO 0.03 06/09/2020    EOSINOPHIL 1.9 06/09/2020    BASOPHIL 0.4 06/09/2020       CMP  Sodium   Date Value Ref Range Status   06/09/2020 136 136 - 145 mmol/L Final     Potassium   Date Value Ref Range Status   06/09/2020 3.8 3.5 - 5.1 mmol/L Final     Chloride   Date Value Ref Range Status   06/09/2020 102 95 - 110 mmol/L Final     CO2   Date Value Ref Range Status   06/09/2020 27 23 - 29 mmol/L Final     Glucose   Date Value Ref Range Status   06/09/2020 117 (H) 70 - 110 mg/dL Final     BUN, Bld   Date Value Ref Range Status   06/09/2020 10 8 - 23 mg/dL Final     Creatinine   Date Value Ref Range Status   06/09/2020 0.9 0.5 - 1.4 mg/dL Final     Calcium   Date Value Ref Range Status   06/09/2020 8.6 (L) 8.7 - 10.5 mg/dL Final     Total Protein   Date Value Ref Range Status   06/09/2020 6.7 6.0 - 8.4 g/dL Final     Albumin   Date Value Ref Range Status   06/09/2020 3.5 3.5 - 5.2 g/dL Final     Total Bilirubin   Date Value Ref Range Status   06/09/2020 0.6 0.1 - 1.0 mg/dL Final     Comment:     For infants and newborns, interpretation of results should be based  on gestational age, weight and in agreement with clinical  observations.  Premature Infant recommended reference ranges:  Up to 24 hours.............<8.0 mg/dL  Up to 48 hours............<12.0 mg/dL  3-5 days..................<15.0 mg/dL  6-29 days.................<15.0 mg/dL       Alkaline Phosphatase   Date Value Ref Range Status   06/09/2020 75 55 - 135 U/L Final     AST   Date Value Ref Range Status   06/09/2020 19 10 - 40 U/L  Final     ALT   Date Value Ref Range Status   06/09/2020 20 10 - 44 U/L Final     Anion Gap   Date Value Ref Range Status   06/09/2020 7 (L) 8 - 16 mmol/L Final     eGFR if    Date Value Ref Range Status   06/09/2020 >60.0 >60 mL/min/1.73 m^2 Final     eGFR if non    Date Value Ref Range Status   06/09/2020 >60.0 >60 mL/min/1.73 m^2 Final     Comment:     Calculation used to obtain the estimated glomerular filtration  rate (eGFR) is the CKD-EPI equation.        NM PET CT Routine Skull to Mid Thigh   Order: [id number]   Status:  Final result   Visible to patient:  Yes (Patient Portal) Next appt:  01/09/2020 at 09:00 AM in General Surgery (Jose Lazo MD) Dx:  Malignant neoplasm of rectum; Rectal ...            Rectal adenocarcinoma    Rectal cancer    Oncology follow-up encounter    Rectal mass    Encounter for chemotherapy management    Anemia due to vitamin B6 deficiency    Other orders  -     palonosetron (ALOXI) 0.25 mg, dexamethasone (DECADRON) 12 mg in sodium chloride 0.9% 50 mL IVPB  -     EPINEPHrine (EPIPEN) 0.3 mg/0.3 mL pen injection 0.3 mg  -     diphenhydrAMINE injection 50 mg  -     hydrocortisone sodium succinate injection 100 mg  -     oxaliplatin (ELOXATIN) 85 mg/m2 = 151 mg in dextrose 5 % 500 mL chemo infusion  -     leucovorin calcium 400 mg/m2 = 710 mg in dextrose 5 % 250 mL infusion  -     fluorouraciL injection 710 mg  -     fluorouracil (ADRUCIL) 2,400 mg/m2 = 4,270 mg in sodium chloride 0.9% 185.4 mL chemo infusion  -     dextrose 5 % 250 mL flush bag  -     sodium chloride 0.9% 100 mL flush bag  -     sodium chloride 0.9% flush 10 mL  -     heparin, porcine (PF) 100 unit/mL injection flush 500 Units  -     alteplase injection 2 mg         Increase milk intake with cereal   Increase or double the antinausea meds when needed   Watch for constipation using antinausea meds   he does not want to try the oral steroids for delayed nausea just yet as he has  heartbuirn  Avoid acidic foods and juices  Cleared to continue  FOLFOX neoadjuvantly patient is aware the benefits and the possible side effects and agrees to such.  Chest port in place a receive chemotherapy every 2 weeks for a total of possibly 6 months depending on when he may qualify for surgical intervention.  Refilling pain meds  prn  He is to continue Celexa for depression   Cont PPI to help with GERD    He will have  Labs  in 2 weeks with CBC and CMP as well magnesium     RTC 2 weeks in [place] [facility] in person for chemo       Current Outpatient Medications:     citalopram (CELEXA) 10 MG tablet, Take 1 tablet (10 mg total) by mouth once daily., Disp: 30 tablet, Rfl: 11    cyanocobalamin (B-12 DOTS) 500 MCG tablet, Take 2 tablets (1,000 mcg total) by mouth once daily., Disp: 60 tablet, Rfl: 0    ondansetron (ZOFRAN) 4 MG tablet, Take 1 tablet (4 mg total) by mouth every 6 (six) hours as needed for Nausea., Disp: 24 tablet, Rfl: 1    ondansetron (ZOFRAN-ODT) 8 MG TbDL, Take 1 tablet (8 mg total) by mouth every 8 (eight) hours as needed., Disp: 90 tablet, Rfl: 3    oxyCODONE-acetaminophen (PERCOCET)  mg per tablet, TAKE ONE TABLET BY MOUTH EVERY 12 HOURS AS NEEDED FOR PAIN, Disp: , Rfl:     pantoprazole (PROTONIX) 40 MG tablet, Take 1 tablet (40 mg total) by mouth once daily., Disp: 30 tablet, Rfl: 11    pyridoxine, vitamin B6, (VITAMIN B-6) 100 MG Tab, Take 1 tablet (100 mg total) by mouth once daily., Disp: 30 tablet, Rfl: 0

## 2020-06-10 ENCOUNTER — INFUSION (OUTPATIENT)
Dept: INFUSION THERAPY | Facility: HOSPITAL | Age: 65
End: 2020-06-10
Attending: INTERNAL MEDICINE
Payer: MEDICARE

## 2020-06-10 VITALS
SYSTOLIC BLOOD PRESSURE: 169 MMHG | HEART RATE: 65 BPM | RESPIRATION RATE: 18 BRPM | OXYGEN SATURATION: 97 % | TEMPERATURE: 98 F | DIASTOLIC BLOOD PRESSURE: 74 MMHG

## 2020-06-10 DIAGNOSIS — C20 RECTAL CANCER: Primary | ICD-10-CM

## 2020-06-10 PROCEDURE — 96416 CHEMO PROLONG INFUSE W/PUMP: CPT

## 2020-06-10 PROCEDURE — 96413 CHEMO IV INFUSION 1 HR: CPT

## 2020-06-10 PROCEDURE — 63600175 PHARM REV CODE 636 W HCPCS: Performed by: INTERNAL MEDICINE

## 2020-06-10 PROCEDURE — 96368 THER/DIAG CONCURRENT INF: CPT

## 2020-06-10 PROCEDURE — 25000003 PHARM REV CODE 250: Performed by: INTERNAL MEDICINE

## 2020-06-10 PROCEDURE — 96411 CHEMO IV PUSH ADDL DRUG: CPT

## 2020-06-10 PROCEDURE — 96367 TX/PROPH/DG ADDL SEQ IV INF: CPT

## 2020-06-10 PROCEDURE — 96415 CHEMO IV INFUSION ADDL HR: CPT

## 2020-06-10 RX ORDER — EPINEPHRINE 0.3 MG/.3ML
0.3 INJECTION SUBCUTANEOUS ONCE AS NEEDED
Status: DISCONTINUED | OUTPATIENT
Start: 2020-06-10 | End: 2020-06-10 | Stop reason: HOSPADM

## 2020-06-10 RX ORDER — DIPHENHYDRAMINE HYDROCHLORIDE 50 MG/ML
50 INJECTION INTRAMUSCULAR; INTRAVENOUS ONCE AS NEEDED
Status: DISCONTINUED | OUTPATIENT
Start: 2020-06-10 | End: 2020-06-10 | Stop reason: HOSPADM

## 2020-06-10 RX ORDER — FLUOROURACIL 50 MG/ML
400 INJECTION, SOLUTION INTRAVENOUS
Status: COMPLETED | OUTPATIENT
Start: 2020-06-10 | End: 2020-06-10

## 2020-06-10 RX ORDER — HEPARIN 100 UNIT/ML
500 SYRINGE INTRAVENOUS
Status: DISCONTINUED | OUTPATIENT
Start: 2020-06-10 | End: 2020-06-10 | Stop reason: HOSPADM

## 2020-06-10 RX ORDER — SODIUM CHLORIDE 0.9 % (FLUSH) 0.9 %
10 SYRINGE (ML) INJECTION
Status: DISCONTINUED | OUTPATIENT
Start: 2020-06-10 | End: 2020-06-10 | Stop reason: HOSPADM

## 2020-06-10 RX ADMIN — LEUCOVORIN CALCIUM 712 MG: 100 INJECTION, POWDER, LYOPHILIZED, FOR SOLUTION INTRAMUSCULAR; INTRAVENOUS at 11:06

## 2020-06-10 RX ADMIN — OXALIPLATIN 151 MG: 5 INJECTION, SOLUTION INTRAVENOUS at 11:06

## 2020-06-10 RX ADMIN — FLUOROURACIL 710 MG: 50 INJECTION, SOLUTION INTRAVENOUS at 01:06

## 2020-06-10 RX ADMIN — DEXAMETHASONE SODIUM PHOSPHATE: 4 INJECTION, SOLUTION INTRA-ARTICULAR; INTRALESIONAL; INTRAMUSCULAR; INTRAVENOUS; SOFT TISSUE at 10:06

## 2020-06-10 RX ADMIN — FLUOROURACIL 4270 MG: 50 INJECTION, SOLUTION INTRAVENOUS at 01:06

## 2020-06-12 ENCOUNTER — INFUSION (OUTPATIENT)
Dept: INFUSION THERAPY | Facility: HOSPITAL | Age: 65
End: 2020-06-12
Attending: INTERNAL MEDICINE
Payer: MEDICARE

## 2020-06-12 VITALS
TEMPERATURE: 97 F | OXYGEN SATURATION: 97 % | DIASTOLIC BLOOD PRESSURE: 53 MMHG | RESPIRATION RATE: 18 BRPM | SYSTOLIC BLOOD PRESSURE: 102 MMHG | HEART RATE: 70 BPM

## 2020-06-12 DIAGNOSIS — C20 RECTAL CANCER: Primary | ICD-10-CM

## 2020-06-12 PROCEDURE — 25000003 PHARM REV CODE 250: Performed by: INTERNAL MEDICINE

## 2020-06-12 PROCEDURE — A4216 STERILE WATER/SALINE, 10 ML: HCPCS | Performed by: INTERNAL MEDICINE

## 2020-06-12 PROCEDURE — 63600175 PHARM REV CODE 636 W HCPCS: Performed by: INTERNAL MEDICINE

## 2020-06-12 RX ORDER — HEPARIN 100 UNIT/ML
500 SYRINGE INTRAVENOUS
Status: CANCELLED | OUTPATIENT
Start: 2020-06-12

## 2020-06-12 RX ORDER — SODIUM CHLORIDE 0.9 % (FLUSH) 0.9 %
10 SYRINGE (ML) INJECTION
Status: DISCONTINUED | OUTPATIENT
Start: 2020-06-12 | End: 2020-06-12 | Stop reason: HOSPADM

## 2020-06-12 RX ORDER — SODIUM CHLORIDE 0.9 % (FLUSH) 0.9 %
10 SYRINGE (ML) INJECTION
Status: CANCELLED | OUTPATIENT
Start: 2020-06-12

## 2020-06-12 RX ORDER — HEPARIN 100 UNIT/ML
500 SYRINGE INTRAVENOUS
Status: DISCONTINUED | OUTPATIENT
Start: 2020-06-12 | End: 2020-06-12 | Stop reason: HOSPADM

## 2020-06-12 RX ADMIN — Medication 10 ML: at 11:06

## 2020-06-12 RX ADMIN — HEPARIN 500 UNITS: 100 SYRINGE at 11:06

## 2020-06-22 ENCOUNTER — LAB VISIT (OUTPATIENT)
Dept: LAB | Facility: HOSPITAL | Age: 65
End: 2020-06-22
Attending: INTERNAL MEDICINE
Payer: MEDICARE

## 2020-06-22 DIAGNOSIS — C20 RECTAL ADENOCARCINOMA: ICD-10-CM

## 2020-06-22 LAB
ALBUMIN SERPL BCP-MCNC: 3.8 G/DL (ref 3.5–5.2)
ALP SERPL-CCNC: 77 U/L (ref 55–135)
ALT SERPL W/O P-5'-P-CCNC: 22 U/L (ref 10–44)
ANION GAP SERPL CALC-SCNC: 7 MMOL/L (ref 8–16)
AST SERPL-CCNC: 21 U/L (ref 10–40)
BASOPHILS # BLD AUTO: 0.02 K/UL (ref 0–0.2)
BASOPHILS NFR BLD: 0.4 % (ref 0–1.9)
BILIRUB SERPL-MCNC: 0.2 MG/DL (ref 0.1–1)
BUN SERPL-MCNC: 12 MG/DL (ref 8–23)
CALCIUM SERPL-MCNC: 8.8 MG/DL (ref 8.7–10.5)
CHLORIDE SERPL-SCNC: 106 MMOL/L (ref 95–110)
CO2 SERPL-SCNC: 26 MMOL/L (ref 23–29)
CREAT SERPL-MCNC: 0.8 MG/DL (ref 0.5–1.4)
DIFFERENTIAL METHOD: ABNORMAL
EOSINOPHIL # BLD AUTO: 0.1 K/UL (ref 0–0.5)
EOSINOPHIL NFR BLD: 1.7 % (ref 0–8)
ERYTHROCYTE [DISTWIDTH] IN BLOOD BY AUTOMATED COUNT: 12.9 % (ref 11.5–14.5)
EST. GFR  (AFRICAN AMERICAN): >60 ML/MIN/1.73 M^2
EST. GFR  (NON AFRICAN AMERICAN): >60 ML/MIN/1.73 M^2
GLUCOSE SERPL-MCNC: 118 MG/DL (ref 70–110)
HCT VFR BLD AUTO: 37.5 % (ref 40–54)
HGB BLD-MCNC: 13.2 G/DL (ref 14–18)
IMM GRANULOCYTES # BLD AUTO: 0.01 K/UL (ref 0–0.04)
IMM GRANULOCYTES NFR BLD AUTO: 0.2 % (ref 0–0.5)
LYMPHOCYTES # BLD AUTO: 0.9 K/UL (ref 1–4.8)
LYMPHOCYTES NFR BLD: 17.4 % (ref 18–48)
MAGNESIUM SERPL-MCNC: 1.9 MG/DL (ref 1.6–2.6)
MCH RBC QN AUTO: 33.5 PG (ref 27–31)
MCHC RBC AUTO-ENTMCNC: 35.2 G/DL (ref 32–36)
MCV RBC AUTO: 95 FL (ref 82–98)
MONOCYTES # BLD AUTO: 0.8 K/UL (ref 0.3–1)
MONOCYTES NFR BLD: 14.3 % (ref 4–15)
NEUTROPHILS # BLD AUTO: 3.5 K/UL (ref 1.8–7.7)
NEUTROPHILS NFR BLD: 66 % (ref 38–73)
NRBC BLD-RTO: 0 /100 WBC
PLATELET # BLD AUTO: 177 K/UL (ref 150–350)
PMV BLD AUTO: 8.7 FL (ref 9.2–12.9)
POTASSIUM SERPL-SCNC: 3.5 MMOL/L (ref 3.5–5.1)
PROT SERPL-MCNC: 7.3 G/DL (ref 6–8.4)
RBC # BLD AUTO: 3.94 M/UL (ref 4.6–6.2)
SODIUM SERPL-SCNC: 139 MMOL/L (ref 136–145)
WBC # BLD AUTO: 5.23 K/UL (ref 3.9–12.7)

## 2020-06-22 PROCEDURE — 85025 COMPLETE CBC W/AUTO DIFF WBC: CPT

## 2020-06-22 PROCEDURE — 83735 ASSAY OF MAGNESIUM: CPT

## 2020-06-22 PROCEDURE — 36415 COLL VENOUS BLD VENIPUNCTURE: CPT

## 2020-06-22 PROCEDURE — 80053 COMPREHEN METABOLIC PANEL: CPT

## 2020-06-23 ENCOUNTER — OFFICE VISIT (OUTPATIENT)
Dept: HEMATOLOGY/ONCOLOGY | Facility: CLINIC | Age: 65
End: 2020-06-23
Payer: MEDICARE

## 2020-06-23 VITALS
HEIGHT: 70 IN | BODY MASS INDEX: 21.43 KG/M2 | DIASTOLIC BLOOD PRESSURE: 59 MMHG | HEART RATE: 71 BPM | TEMPERATURE: 98 F | WEIGHT: 149.69 LBS | RESPIRATION RATE: 16 BRPM | SYSTOLIC BLOOD PRESSURE: 119 MMHG

## 2020-06-23 DIAGNOSIS — Z09 CHEMOTHERAPY FOLLOW-UP EXAMINATION: ICD-10-CM

## 2020-06-23 DIAGNOSIS — G89.3 NEOPLASM RELATED PAIN: Primary | ICD-10-CM

## 2020-06-23 DIAGNOSIS — C20 RECTAL ADENOCARCINOMA: ICD-10-CM

## 2020-06-23 DIAGNOSIS — R63.4 WEIGHT LOSS: ICD-10-CM

## 2020-06-23 DIAGNOSIS — Z51.11 ENCOUNTER FOR CHEMOTHERAPY MANAGEMENT: ICD-10-CM

## 2020-06-23 PROCEDURE — 99215 OFFICE O/P EST HI 40 MIN: CPT | Mod: S$PBB,,, | Performed by: INTERNAL MEDICINE

## 2020-06-23 PROCEDURE — 99215 PR OFFICE/OUTPT VISIT, EST, LEVL V, 40-54 MIN: ICD-10-PCS | Mod: S$PBB,,, | Performed by: INTERNAL MEDICINE

## 2020-06-23 PROCEDURE — 99213 OFFICE O/P EST LOW 20 MIN: CPT | Mod: PBBFAC,PO | Performed by: INTERNAL MEDICINE

## 2020-06-23 PROCEDURE — 99999 PR PBB SHADOW E&M-EST. PATIENT-LVL III: CPT | Mod: PBBFAC,,, | Performed by: INTERNAL MEDICINE

## 2020-06-23 PROCEDURE — 99999 PR PBB SHADOW E&M-EST. PATIENT-LVL III: ICD-10-PCS | Mod: PBBFAC,,, | Performed by: INTERNAL MEDICINE

## 2020-06-23 RX ORDER — HEPARIN 100 UNIT/ML
500 SYRINGE INTRAVENOUS
Status: CANCELLED | OUTPATIENT
Start: 2020-06-25

## 2020-06-23 RX ORDER — SODIUM CHLORIDE 0.9 % (FLUSH) 0.9 %
10 SYRINGE (ML) INJECTION
Status: CANCELLED | OUTPATIENT
Start: 2020-06-23

## 2020-06-23 RX ORDER — FLUOROURACIL 50 MG/ML
300 INJECTION, SOLUTION INTRAVENOUS
Status: CANCELLED | OUTPATIENT
Start: 2020-06-23

## 2020-06-23 RX ORDER — OXYCODONE AND ACETAMINOPHEN 10; 325 MG/1; MG/1
1 TABLET ORAL EVERY 8 HOURS PRN
Qty: 60 TABLET | Refills: 0 | Status: SHIPPED | OUTPATIENT
Start: 2020-06-23 | End: 2020-08-05 | Stop reason: SDUPTHER

## 2020-06-23 RX ORDER — SODIUM CHLORIDE 0.9 % (FLUSH) 0.9 %
10 SYRINGE (ML) INJECTION
Status: CANCELLED | OUTPATIENT
Start: 2020-06-25

## 2020-06-23 RX ORDER — EPINEPHRINE 0.3 MG/.3ML
0.3 INJECTION SUBCUTANEOUS ONCE AS NEEDED
Status: CANCELLED | OUTPATIENT
Start: 2020-06-23

## 2020-06-23 RX ORDER — DIPHENHYDRAMINE HYDROCHLORIDE 50 MG/ML
50 INJECTION INTRAMUSCULAR; INTRAVENOUS ONCE AS NEEDED
Status: CANCELLED | OUTPATIENT
Start: 2020-06-23

## 2020-06-23 RX ORDER — HEPARIN 100 UNIT/ML
500 SYRINGE INTRAVENOUS
Status: CANCELLED | OUTPATIENT
Start: 2020-06-23

## 2020-06-23 NOTE — PROGRESS NOTES
CC  my mouth was killing me     Miguel Angel Apple Sr. is a 64 y.o.  Patient here for evaluation of a T3-T4 N1 M a 1 see rectal adenocarcinoma.  On October 21, 2019   a laparotomy was performed for perforated colon and patient underwent a right hemicolectomy with a rectal biopsy revealing rectal adenocarcinoma. He is healing from surgery but referred here to discuss treatment options.   He started xeloda with radiation and is receiving week 3 of radiation  He has completed two doses of IV iron at [place] without complications : port was accessed without any problem     Tom[facility] is the [facility] weeks   He reports decreased appetite and a mild weight loss , slight heartburn     March 25 2020  Completed concomitant chemo radiation February 19, 2020 Oral xeloda 850 mg/m2 Monday through Friday with concomitant radiation to be followed 6 months of folfox through chest port in right chest for colorectal adenocarcinoma with peritoneal seeding   Surgery delayed  due to pandemic  POst IV iron     April 20, 2026 tmaiko keeps cutting out   Records in tumor Board been reviewed surgical oncology notes reviewed patient is aware that chemotherapy is indicated per standard of care whether neoadjuvant her adjuvant.  Today patient will discussed with me the chemo regimen and the side effects that could possibly occur.  He is not having any evidence of bleeding he is eating well no change in his weight    June 2020   Due for chemo   Positive paresthesias , heartburn, loose stool in bag no explosive diarrhea     June 23 2020  ight after chemo pt developed oral lesions and pain, severe fatigue increasing fatigues, weakness , positive weight loss   Past Medical History:   Diagnosis Date    Colon cancer     Depression     GERD (gastroesophageal reflux disease)     Medical history reviewed with no changes      Past Surgical History:   Procedure Laterality Date    CHOLECYSTECTOMY  10/21/2019    Procedure: CHOLECYSTECTOMY;   Surgeon: Jose Lazo MD;  Location: [facility] OR;  Service: General;;    CLOSURE OF WOUND N/A 10/30/2019    Procedure: CLOSURE, WOUND;  Surgeon: Torres Dawkins MD;  Location: [facility] OR;  Service: General;  Laterality: N/A;    COLOSTOMY  10/21/2019    Procedure: CREATION, COLOSTOMY;  Surgeon: Jose Lazo MD;  Location: [facility] OR;  Service: General;;  END DESCENDING COLOSTOMY    FLEXIBLE SIGMOIDOSCOPY N/A 10/21/2019    Procedure: SIGMOIDOSCOPY, FLEXIBLE;  Surgeon: Jose Lazo MD;  Location: [facility] OR;  Service: General;  Laterality: N/A;    INSERTION OF TUNNELED CENTRAL VENOUS CATHETER (CVC) WITH SUBCUTANEOUS PORT Right 12/23/2019    Procedure: INSERTION, PORT-A-CATH;  Surgeon: Jose Lazo MD;  Location: [facility] OR;  Service: General;  Laterality: Right;    no surgical history      REPAIR OF ABDOMINAL WALL N/A 10/30/2019    Procedure: REPAIR, ABDOMINAL WALL;  Surgeon: Torres Dawkins MD;  Location: [facility] OR;  Service: General;  Laterality: N/A;    RIGHT HEMICOLECTOMY Right 10/21/2019    Procedure: HEMICOLECTOMY, RIGHT;  Surgeon: Jose Lazo MD;  Location: [facility];  Service: General;  Laterality: Right;       Current Outpatient Medications:     citalopram (CELEXA) 10 MG tablet, Take 1 tablet (10 mg total) by mouth once daily., Disp: 30 tablet, Rfl: 11    cyanocobalamin (B-12 DOTS) 500 MCG tablet, Take 2 tablets (1,000 mcg total) by mouth once daily., Disp: 60 tablet, Rfl: 0    ondansetron (ZOFRAN) 4 MG tablet, Take 1 tablet (4 mg total) by mouth every 6 (six) hours as needed for Nausea., Disp: 24 tablet, Rfl: 1    ondansetron (ZOFRAN-ODT) 8 MG TbDL, Take 1 tablet (8 mg total) by mouth every 8 (eight) hours as needed., Disp: 90 tablet, Rfl: 3    oxyCODONE-acetaminophen (PERCOCET)  mg per tablet, TAKE ONE TABLET BY MOUTH EVERY 12 HOURS AS NEEDED FOR PAIN, Disp: , Rfl:     pantoprazole (PROTONIX) 40 MG tablet, Take 1 tablet (40 mg total) by mouth once daily.,  "Disp: 30 tablet, Rfl: 11    pyridoxine, vitamin B6, (VITAMIN B-6) 100 MG Tab, Take 1 tablet (100 mg total) by mouth once daily., Disp: 30 tablet, Rfl: 0  Review of patient's allergies indicates:  No Known Allergies  Social History     Tobacco Use    Smoking status: Former Smoker    Smokeless tobacco: Never Used   Substance Use Topics    Alcohol use: Not Currently    Drug use: Never     Family History   Problem Relation Age of Onset    Diabetes Mother     Heart disease Father        CONSTITUTIONAL: No fevers, chills, night sweats,+  wt. loss,  + appetite changes  SKIN: no rashes or itching  ENT: No headaches, head trauma, vision changes, or eye pain  LYMPH NODES: None noticed   CV: No chest pain, palpitations.   RESP: No dyspnea on exertion, cough, wheezing, or hemoptysis  GI: No nausea, emesis, diarrhea, constipation, melena, hematochezia, pain.   : No dysuria, hematuria, urgency, or frequency   HEME: No easy bruising, bleeding problems  PSYCHIATRIC: No depression, anxiety, psychosis, hallucinations.  NEURO: No seizures, memory loss, dizziness or difficulty sleeping  MSK: No arthralgias or joint swelling    BP (!) 119/59   Pulse 71   Temp 98 °F (36.7 °C)   Resp 16   Ht 5' 10" (1.778 m)   Wt 67.9 kg (149 lb 11.1 oz)   BMI 21.48 kg/m²          Height / weight / VS reviewed  GENERAL.: Well-developed, well-nourished, in no acute distress  PSYCH: pleasant affect, no anxiety, no depression  HEENT: Normocephalic, lids intact, conjunctiva pink, sinuses nontender to palpation   Normal auricula, nasal septum, dentition, gums and mucosa ,OP clear,no palatal pallor  NECK: Supple,trachea midline, no palpable abnormalities  LYMPHATICS: No cervical or axillary adenopathy  RESPIRATORY: CTAB, no wheezes, rubs or rhonchi  Good respiratory effort without any retractions or diaphragmatic movement  CARDIOVASCULAR: no JVD, S1 / S2 with RRR, no murmur, no rub,2+ capillary refill  ABDOMEN: NTND, normal bowel sounds, no " palpable HSM or mass  EXTREMITIES: No cyanosis, no clubbing, no edema, no joint effusion  NEUROLOGICAL: alert and oriented x 3, cranial nerves grossly intact  SKIN: Warm, dry, no rash or lesions noted, no tenting, no petechiae or ecchymosis    Lab Results   Component Value Date    WBC 5.23 06/22/2020    RBC 3.94 (L) 06/22/2020    HGB 13.2 (L) 06/22/2020    HCT 37.5 (L) 06/22/2020    MCV 95 06/22/2020    MCH 33.5 (H) 06/22/2020    MCHC 35.2 06/22/2020    RDW 12.9 06/22/2020     06/22/2020    MPV 8.7 (L) 06/22/2020    GRAN 3.5 06/22/2020    GRAN 66.0 06/22/2020    LYMPH 0.9 (L) 06/22/2020    LYMPH 17.4 (L) 06/22/2020    MONO 0.8 06/22/2020    MONO 14.3 06/22/2020    EOS 0.1 06/22/2020    BASO 0.02 06/22/2020    EOSINOPHIL 1.7 06/22/2020    BASOPHIL 0.4 06/22/2020       CMP  Sodium   Date Value Ref Range Status   06/22/2020 139 136 - 145 mmol/L Final     Potassium   Date Value Ref Range Status   06/22/2020 3.5 3.5 - 5.1 mmol/L Final     Chloride   Date Value Ref Range Status   06/22/2020 106 95 - 110 mmol/L Final     CO2   Date Value Ref Range Status   06/22/2020 26 23 - 29 mmol/L Final     Glucose   Date Value Ref Range Status   06/22/2020 118 (H) 70 - 110 mg/dL Final     BUN, Bld   Date Value Ref Range Status   06/22/2020 12 8 - 23 mg/dL Final     Creatinine   Date Value Ref Range Status   06/22/2020 0.8 0.5 - 1.4 mg/dL Final     Calcium   Date Value Ref Range Status   06/22/2020 8.8 8.7 - 10.5 mg/dL Final     Total Protein   Date Value Ref Range Status   06/22/2020 7.3 6.0 - 8.4 g/dL Final     Albumin   Date Value Ref Range Status   06/22/2020 3.8 3.5 - 5.2 g/dL Final     Total Bilirubin   Date Value Ref Range Status   06/22/2020 0.2 0.1 - 1.0 mg/dL Final     Comment:     For infants and newborns, interpretation of results should be based  on gestational age, weight and in agreement with clinical  observations.  Premature Infant recommended reference ranges:  Up to 24 hours.............<8.0 mg/dL  Up to 48  hours............<12.0 mg/dL  3-5 days..................<15.0 mg/dL  6-29 days.................<15.0 mg/dL       Alkaline Phosphatase   Date Value Ref Range Status   06/22/2020 77 55 - 135 U/L Final     AST   Date Value Ref Range Status   06/22/2020 21 10 - 40 U/L Final     ALT   Date Value Ref Range Status   06/22/2020 22 10 - 44 U/L Final     Anion Gap   Date Value Ref Range Status   06/22/2020 7 (L) 8 - 16 mmol/L Final     eGFR if    Date Value Ref Range Status   06/22/2020 >60.0 >60 mL/min/1.73 m^2 Final     eGFR if non    Date Value Ref Range Status   06/22/2020 >60.0 >60 mL/min/1.73 m^2 Final     Comment:     Calculation used to obtain the estimated glomerular filtration  rate (eGFR) is the CKD-EPI equation.        NM PET CT Routine Skull to Mid Thigh   Order: [id number]   Status:  Final result   Visible to patient:  Yes (Patient Portal) Next appt:  01/09/2020 at 09:00 AM in General Surgery (Jose Lazo MD) Dx:  Malignant neoplasm of rectum; Rectal ...            Neoplasm related pain  -     oxyCODONE-acetaminophen (PERCOCET)  mg per tablet; Take 1 tablet by mouth every 8 (eight) hours as needed for Pain.  Dispense: 60 tablet; Refill: 0    Other orders  -     magnesium sulfate 1 g in dextrose 5 % 250 mL IVPB  -     palonosetron (ALOXI) 0.25 mg, dexamethasone (DECADRON) 12 mg in sodium chloride 0.9% 50 mL IVPB  -     EPINEPHrine (EPIPEN) 0.3 mg/0.3 mL pen injection 0.3 mg  -     diphenhydrAMINE injection 50 mg  -     hydrocortisone sodium succinate injection 100 mg  -     oxaliplatin (ELOXATIN) 60 mg/m2 = 107 mg in dextrose 5 % 500 mL chemo infusion  -     leucovorin calcium 300 mg/m2 = 535 mg in dextrose 5 % 250 mL infusion  -     fluorouraciL injection 535 mg  -     fluorouracil (ADRUCIL) 2,000 mg/m2 = 3,560 mg in sodium chloride 0.9% 171.2 mL chemo infusion  -     dextrose 5 % 250 mL flush bag  -     sodium chloride 0.9% 100 mL flush bag  -     sodium chloride  0.9% flush 10 mL  -     heparin, porcine (PF) 100 unit/mL injection flush 500 Units  -     alteplase injection 2 mg  -     sodium chloride 0.9% flush 10 mL  -     heparin, porcine (PF) 100 unit/mL injection flush 500 Units  -     alteplase injection 2 mg       rtc 2 weeks with cbc cmp and magnesium levels  Decrease dose of chemo   Too toxic  Add magnesium to regime for oxali induced paresthesias  Watch for constipation using antinausea meds   he does not want to try the oral steroids for delayed nausea just yet as he has heartburn   Cleared to continue  FOLFOX neoadjuvantly patient is aware the benefits and the possible side effects and agrees to such.  Chest port in place a receive chemotherapy every 2 weeks for a total of possibly 6 months depending on when he may qualify for surgical intervention.  Refilling pain meds  prn  He is to continue Celexa for depression   Cont PPI to help with GERD    He will have  Labs  in 2 weeks with CBC and CMP as well magnesium     RTC 2 weeks in [facility] in person for chemo       Current Outpatient Medications:     citalopram (CELEXA) 10 MG tablet, Take 1 tablet (10 mg total) by mouth once daily., Disp: 30 tablet, Rfl: 11    cyanocobalamin (B-12 DOTS) 500 MCG tablet, Take 2 tablets (1,000 mcg total) by mouth once daily., Disp: 60 tablet, Rfl: 0    ondansetron (ZOFRAN) 4 MG tablet, Take 1 tablet (4 mg total) by mouth every 6 (six) hours as needed for Nausea., Disp: 24 tablet, Rfl: 1    ondansetron (ZOFRAN-ODT) 8 MG TbDL, Take 1 tablet (8 mg total) by mouth every 8 (eight) hours as needed., Disp: 90 tablet, Rfl: 3    oxyCODONE-acetaminophen (PERCOCET)  mg per tablet, TAKE ONE TABLET BY MOUTH EVERY 12 HOURS AS NEEDED FOR PAIN, Disp: , Rfl:     pantoprazole (PROTONIX) 40 MG tablet, Take 1 tablet (40 mg total) by mouth once daily., Disp: 30 tablet, Rfl: 11    pyridoxine, vitamin B6, (VITAMIN B-6) 100 MG Tab, Take 1 tablet (100 mg total) by mouth once daily., Disp: 30 tablet,  Rfl: 0

## 2020-06-24 ENCOUNTER — INFUSION (OUTPATIENT)
Dept: INFUSION THERAPY | Facility: HOSPITAL | Age: 65
End: 2020-06-24
Attending: INTERNAL MEDICINE
Payer: MEDICARE

## 2020-06-24 VITALS
RESPIRATION RATE: 18 BRPM | SYSTOLIC BLOOD PRESSURE: 154 MMHG | HEART RATE: 69 BPM | TEMPERATURE: 98 F | DIASTOLIC BLOOD PRESSURE: 74 MMHG | OXYGEN SATURATION: 99 %

## 2020-06-24 DIAGNOSIS — C20 RECTAL CANCER: Primary | ICD-10-CM

## 2020-06-24 PROCEDURE — 63600175 PHARM REV CODE 636 W HCPCS: Performed by: INTERNAL MEDICINE

## 2020-06-24 PROCEDURE — 96413 CHEMO IV INFUSION 1 HR: CPT

## 2020-06-24 PROCEDURE — 96367 TX/PROPH/DG ADDL SEQ IV INF: CPT

## 2020-06-24 PROCEDURE — 96416 CHEMO PROLONG INFUSE W/PUMP: CPT

## 2020-06-24 PROCEDURE — 96368 THER/DIAG CONCURRENT INF: CPT

## 2020-06-24 PROCEDURE — 96415 CHEMO IV INFUSION ADDL HR: CPT

## 2020-06-24 PROCEDURE — 96411 CHEMO IV PUSH ADDL DRUG: CPT

## 2020-06-24 PROCEDURE — 25000003 PHARM REV CODE 250: Performed by: INTERNAL MEDICINE

## 2020-06-24 RX ORDER — SODIUM CHLORIDE 0.9 % (FLUSH) 0.9 %
10 SYRINGE (ML) INJECTION
Status: DISCONTINUED | OUTPATIENT
Start: 2020-06-24 | End: 2020-06-24 | Stop reason: HOSPADM

## 2020-06-24 RX ORDER — EPINEPHRINE 0.3 MG/.3ML
0.3 INJECTION SUBCUTANEOUS ONCE AS NEEDED
Status: DISCONTINUED | OUTPATIENT
Start: 2020-06-24 | End: 2020-06-24 | Stop reason: HOSPADM

## 2020-06-24 RX ORDER — HEPARIN 100 UNIT/ML
500 SYRINGE INTRAVENOUS
Status: DISCONTINUED | OUTPATIENT
Start: 2020-06-24 | End: 2020-06-24 | Stop reason: HOSPADM

## 2020-06-24 RX ORDER — DIPHENHYDRAMINE HYDROCHLORIDE 50 MG/ML
50 INJECTION INTRAMUSCULAR; INTRAVENOUS ONCE AS NEEDED
Status: DISCONTINUED | OUTPATIENT
Start: 2020-06-24 | End: 2020-06-24 | Stop reason: HOSPADM

## 2020-06-24 RX ORDER — MAGNESIUM SULFATE 1 G/100ML
1 INJECTION INTRAVENOUS
Status: COMPLETED | OUTPATIENT
Start: 2020-06-24 | End: 2020-06-24

## 2020-06-24 RX ORDER — FLUOROURACIL 50 MG/ML
300 INJECTION, SOLUTION INTRAVENOUS
Status: COMPLETED | OUTPATIENT
Start: 2020-06-24 | End: 2020-06-24

## 2020-06-24 RX ADMIN — MAGNESIUM SULFATE 1 G: 1 INJECTION INTRAVENOUS at 09:06

## 2020-06-24 RX ADMIN — DEXTROSE MONOHYDRATE 534 MG: 50 INJECTION, SOLUTION INTRAVENOUS at 10:06

## 2020-06-24 RX ADMIN — FLUOROURACIL 535 MG: 50 INJECTION, SOLUTION INTRAVENOUS at 12:06

## 2020-06-24 RX ADMIN — FLUOROURACIL 3560 MG: 50 INJECTION, SOLUTION INTRAVENOUS at 12:06

## 2020-06-24 RX ADMIN — OXALIPLATIN 107 MG: 5 INJECTION, SOLUTION, CONCENTRATE INTRAVENOUS at 10:06

## 2020-06-24 RX ADMIN — DEXAMETHASONE SODIUM PHOSPHATE: 4 INJECTION, SOLUTION INTRA-ARTICULAR; INTRALESIONAL; INTRAMUSCULAR; INTRAVENOUS; SOFT TISSUE at 09:06

## 2020-06-26 ENCOUNTER — INFUSION (OUTPATIENT)
Dept: INFUSION THERAPY | Facility: HOSPITAL | Age: 65
End: 2020-06-26
Attending: INTERNAL MEDICINE
Payer: MEDICARE

## 2020-06-26 VITALS
DIASTOLIC BLOOD PRESSURE: 64 MMHG | OXYGEN SATURATION: 98 % | TEMPERATURE: 98 F | HEART RATE: 75 BPM | RESPIRATION RATE: 18 BRPM | SYSTOLIC BLOOD PRESSURE: 110 MMHG

## 2020-06-26 DIAGNOSIS — C20 RECTAL CANCER: Primary | ICD-10-CM

## 2020-06-26 RX ORDER — SODIUM CHLORIDE 0.9 % (FLUSH) 0.9 %
10 SYRINGE (ML) INJECTION
Status: DISCONTINUED | OUTPATIENT
Start: 2020-06-26 | End: 2020-06-26 | Stop reason: HOSPADM

## 2020-06-26 RX ORDER — HEPARIN 100 UNIT/ML
500 SYRINGE INTRAVENOUS
Status: DISCONTINUED | OUTPATIENT
Start: 2020-06-26 | End: 2020-06-26 | Stop reason: HOSPADM

## 2020-06-26 NOTE — PLAN OF CARE
Pt disconnected from 5fu pump. Pt reports feeling much better this cycle with minimal nausea. Port flushed with 10 ml/500 units heparing and needle removed. Site unremarkable. Pt updated on future appt with no further questions. Pt ambulatory for discharge at this time.

## 2020-06-30 ENCOUNTER — TELEPHONE (OUTPATIENT)
Dept: HEMATOLOGY/ONCOLOGY | Facility: CLINIC | Age: 65
End: 2020-06-30

## 2020-06-30 NOTE — TELEPHONE ENCOUNTER
This nurse passed message along to Dr. Stringer's nurse who returned with orders from Dr. Stringer that this nurse called into patient's pharmacy:  zofran 8 mg  Si PO Q 8 hours PRN nausea  Disp: 3# 45  Refills: 6      This nurse additionally contacted pt to inform that RX has been called in to [facility] Vo[facility].  Patient verbalized understanding.  No further questions/concerns noted at this time.    ----- Message from Jake Foreman sent at 2020 11:43 AM CDT -----  Regarding: New RX  Contact: Patient  Patient want to know if Doctor can call something in for nausea if so please send to [facility], any questions please call back at [phone] (home)       [facility] [address] [place] [id number]  Phone: [phone] Fax: [phone]    Case number [id number]

## 2020-07-06 ENCOUNTER — TELEPHONE (OUTPATIENT)
Dept: HEMATOLOGY/ONCOLOGY | Facility: CLINIC | Age: 65
End: 2020-07-06

## 2020-07-06 ENCOUNTER — LAB VISIT (OUTPATIENT)
Dept: LAB | Facility: HOSPITAL | Age: 65
End: 2020-07-06
Attending: INTERNAL MEDICINE
Payer: MEDICARE

## 2020-07-06 DIAGNOSIS — C20 RECTAL ADENOCARCINOMA: ICD-10-CM

## 2020-07-06 LAB
ALBUMIN SERPL BCP-MCNC: 3.7 G/DL (ref 3.5–5.2)
ALP SERPL-CCNC: 68 U/L (ref 55–135)
ALT SERPL W/O P-5'-P-CCNC: 22 U/L (ref 10–44)
ANION GAP SERPL CALC-SCNC: 8 MMOL/L (ref 8–16)
AST SERPL-CCNC: 33 U/L (ref 10–40)
BASOPHILS # BLD AUTO: 0.03 K/UL (ref 0–0.2)
BASOPHILS NFR BLD: 0.5 % (ref 0–1.9)
BILIRUB SERPL-MCNC: 0.7 MG/DL (ref 0.1–1)
BUN SERPL-MCNC: 8 MG/DL (ref 8–23)
CALCIUM SERPL-MCNC: 8.9 MG/DL (ref 8.7–10.5)
CHLORIDE SERPL-SCNC: 105 MMOL/L (ref 95–110)
CO2 SERPL-SCNC: 26 MMOL/L (ref 23–29)
CREAT SERPL-MCNC: 1 MG/DL (ref 0.5–1.4)
DIFFERENTIAL METHOD: ABNORMAL
EOSINOPHIL # BLD AUTO: 0.1 K/UL (ref 0–0.5)
EOSINOPHIL NFR BLD: 1.7 % (ref 0–8)
ERYTHROCYTE [DISTWIDTH] IN BLOOD BY AUTOMATED COUNT: 14.1 % (ref 11.5–14.5)
EST. GFR  (AFRICAN AMERICAN): >60 ML/MIN/1.73 M^2
EST. GFR  (NON AFRICAN AMERICAN): >60 ML/MIN/1.73 M^2
GLUCOSE SERPL-MCNC: 124 MG/DL (ref 70–110)
HCT VFR BLD AUTO: 37.5 % (ref 40–54)
HGB BLD-MCNC: 13 G/DL (ref 14–18)
IMM GRANULOCYTES # BLD AUTO: 0.02 K/UL (ref 0–0.04)
IMM GRANULOCYTES NFR BLD AUTO: 0.3 % (ref 0–0.5)
LYMPHOCYTES # BLD AUTO: 0.8 K/UL (ref 1–4.8)
LYMPHOCYTES NFR BLD: 12.8 % (ref 18–48)
MAGNESIUM SERPL-MCNC: 2 MG/DL (ref 1.6–2.6)
MCH RBC QN AUTO: 33.2 PG (ref 27–31)
MCHC RBC AUTO-ENTMCNC: 34.7 G/DL (ref 32–36)
MCV RBC AUTO: 96 FL (ref 82–98)
MONOCYTES # BLD AUTO: 0.9 K/UL (ref 0.3–1)
MONOCYTES NFR BLD: 15.2 % (ref 4–15)
NEUTROPHILS # BLD AUTO: 4.2 K/UL (ref 1.8–7.7)
NEUTROPHILS NFR BLD: 69.5 % (ref 38–73)
NRBC BLD-RTO: 0 /100 WBC
PLATELET # BLD AUTO: 191 K/UL (ref 150–350)
PMV BLD AUTO: 8.7 FL (ref 9.2–12.9)
POTASSIUM SERPL-SCNC: 4 MMOL/L (ref 3.5–5.1)
PROT SERPL-MCNC: 7.1 G/DL (ref 6–8.4)
RBC # BLD AUTO: 3.91 M/UL (ref 4.6–6.2)
SODIUM SERPL-SCNC: 139 MMOL/L (ref 136–145)
WBC # BLD AUTO: 6 K/UL (ref 3.9–12.7)

## 2020-07-06 PROCEDURE — 83735 ASSAY OF MAGNESIUM: CPT

## 2020-07-06 PROCEDURE — 85025 COMPLETE CBC W/AUTO DIFF WBC: CPT

## 2020-07-06 PROCEDURE — 80053 COMPREHEN METABOLIC PANEL: CPT

## 2020-07-06 PROCEDURE — 36415 COLL VENOUS BLD VENIPUNCTURE: CPT

## 2020-07-06 NOTE — PROGRESS NOTES
[facility]     Subjective:      PATIENT:   Miguel Angel Apple Sr.  :    1955  MR#:    [id number]  DATE OF VISIT:  2020    Chief Complaint: rectal adenocarcinoma    Patient ID: Miguel Angel Apple Sr. is a 64 y.o. male with a PMHx of a T3-T4 N1 rectal adenocarcinoma who is present in clinic for C6D1 clearance for FOLFOX q2w for treatment of rectal adenocarcinoma.    On 2019  a laparotomy was performed for perforated colon and patient underwent a right hemicolectomy with a rectal biopsy revealing rectal adenocarcinoma. He is healing from surgery but referred here to discuss treatment options.     2020  Completed concomitant chemo radiation 2020 Oral xeloda 850 mg/m2 Monday through Friday with concomitant radiation to be followed 6 months of folfox through chest port in right chest for colorectal adenocarcinoma with peritoneal seeding   Surgery delayed  due to pandemic  POst IV iron      2026 tamiko keeps cutting out   Records in tumor Board been reviewed surgical oncology notes reviewed patient is aware that chemotherapy is indicated per standard of care whether neoadjuvant her adjuvant.  Today patient will discussed with me the chemo regimen and the side effects that could possibly occur.  He is not having any evidence of bleeding he is eating well no change in his weight     2020   Due for chemo   Positive paresthesias , heartburn, loose stool in bag no explosive diarrhea      2020  right after chemo pt developed oral lesions and pain, severe fatigue increasing fatigues, weakness , positive weight loss     2020  Been doing great since lowering lowering dose. Pt seen in clinic for C6D1 clearance FOLFOX q2w. Pt states that he no longer has mouth pain or paresthesias and stated he bounced back better with lowered dose. Colostomy bag stool has gotten from thinner to thicker and darker in color and a more normal appearance since lowering  dose. No N/V, mouth sores.    Review of Systems   Constitutional: Negative for activity change, appetite change, chills, fatigue, fever and unexpected weight change.   HENT: Negative for mouth sores and trouble swallowing.    Eyes: Negative for photophobia and visual disturbance.   Respiratory: Negative for cough, chest tightness, shortness of breath, wheezing and stridor.    Cardiovascular: Negative for chest pain and leg swelling.   Gastrointestinal: Negative for abdominal pain, constipation, diarrhea, nausea and vomiting.   Musculoskeletal: Negative for arthralgias, back pain and myalgias.   Skin: Negative for color change, pallor, rash and wound.   Neurological: Negative for syncope, speech difficulty and weakness.   Hematological: Negative for adenopathy. Does not bruise/bleed easily.   Psychiatric/Behavioral: Negative for agitation, behavioral problems, confusion, decreased concentration and dysphoric mood.        Oncology History   Rectal cancer   12/23/2019 Initial Diagnosis    Rectal cancer     4/15/2020 Tumor Conference    Multidisciplinary Rectal Cancer Conference - Evaluation and Recommendation Summary    4/15/2020  [facility].  [id number]  64 y.o. male    1. Evaluation    65 yo M who presented with abdominal pain, change in bowel habits, weight loss and constipation who progressed to obstipation.     CT 10/19/19: distension of colon and rectum. Large Bowel obstruction.     Flex sig 10/21: fungating obstructing large mass in the rectum. 11 cm from the verge.     10/21/2019: Dr. Lazo at [facility]; LBO with cecal perf, chronic cholecystitis. Ex Lap with sigmoid colectomy with end descending colostomy and mucous fistula, right colectomy and side to side functional end to end anastomosis, open adama and splenic flex mobilization.     Path: inflammation, no Carcinoma.     MRI 11/19: locally adcanced, CT c/a/p 12/19, PET 12/19: T3-4,     Neoadjuvant CRT: 5040 cGy + 5 FU completed 2/20.       MRI  date: 3/20    Tumor Location in Rectum: upper third    Indication of Sphincter Involvement:  Uninvolved    Pretreatment Circumferential Resection Margin (CRM) status:  Not Threatened    Pretreatment (clinical) AJCC Stage: IIA  Stage I  [] I: T1N0M0  [] I: T2N0M0  Stage II  [x] IIA: T3N0M0  [] IIB V9rY8P3  [] IIC: J2qT4N8  Stage III  [] IIIA: T1-2N1M0  [] IIIA: W4K0mT4  [] IIIB: T3-P3mM4J4  [] IIIB: T2-3N2aM0  [] IIIB: T1-2N2bM0  [] IIIC: P5dC2bA5  [] IIIC: T3-3eG0bA6  [] IIIC: V8nZ4-8R4   Stage IV  [] IV: J3-2J3-4P5a-b    CEA level:   Lab Results   Component Value Date    CEA 3.2 04/13/2020       Flex sig 8 weeks after treatment: still some tumor.     2. Treatment Recommendation    Complete Chemotherapy now as patient is already diverted and tolerating the ostomy and given COVID pandemic he would likely need to way at least 3-4 weeks until her gets surgery.      4/27/2020 -  Chemotherapy    Treatment Summary   Plan Name: OP FOLFOX 6 Q2W  Treatment Goal: Control  Status: Active  Start Date: 4/27/2020  End Date: 10/1/2020 (Planned)  Provider: Shayla Stringer MD  Chemotherapy: fluorouraciL injection 710 mg, 400 mg/m2 = 710 mg, Intravenous, Clinic/[facility] 1 time, 5 of 12 cycles  Dose modification: 300 mg/m2 (original dose 400 mg/m2, Cycle 5)  Administration: 710 mg (5/13/2020), 710 mg (5/27/2020), 710 mg (6/10/2020), 535 mg (6/24/2020)  fluorouraciL 2,400 mg/m2 = 4,270 mg in sodium chloride 0.9% 185.4 mL chemo infusion, 2,400 mg/m2 = 4,270 mg, Intravenous, Over 46 hours, 5 of 12 cycles  Dose modification: 2,000 mg/m2 (original dose 2,400 mg/m2, Cycle 5)  Administration: 4,270 mg (4/27/2020), 4,270 mg (5/13/2020), 4,270 mg (5/27/2020), 4,270 mg (6/10/2020), 3,560 mg (6/24/2020)  leucovorin calcium 400 mg/m2 = 710 mg in dextrose 5 % 250 mL infusion, 400 mg/m2 = 710 mg, Intravenous, [facility] 1 time, 5 of 12 cycles  Dose modification: 300 mg/m2 (original dose 400 mg/m2, Cycle 5)  Administration: 710 mg (4/27/2020), 710 mg  (5/13/2020), 710 mg (5/27/2020), 712 mg (6/10/2020), 534 mg (6/24/2020)  oxaliplatin (ELOXATIN) 85 mg/m2 = 151 mg in dextrose 5 % 500 mL chemo infusion, 85 mg/m2 = 151 mg, Intravenous, Clinic[facility]\"" 1 time, 5 of 12 cycles  Dose modification: 60 mg/m2 (original dose 85 mg/m2, Cycle 5)  Administration: 151 mg (4/27/2020), 151 mg (5/13/2020), 151 mg (5/27/2020), 151 mg (6/10/2020), 107 mg (6/24/2020)       Lifetime Dose Tracking   No doses have been documented on this patient for the following tracked chemicals: doxorubicin, epirubicin, idarubicin, daunorubicin, mitoxantrone, bleomycin, mitomycin, busulfan     Past Medical History:   Diagnosis Date    Colon cancer     Depression     GERD (gastroesophageal reflux disease)     Medical history reviewed with no changes        Family History   Problem Relation Age of Onset    Diabetes Mother     Heart disease Father        Past Surgical History:   Procedure Laterality Date    CHOLECYSTECTOMY  10/21/2019    Procedure: CHOLECYSTECTOMY;  Surgeon: Jose Lazo MD;  Location: [facility] OR;  Service: General;;    CLOSURE OF WOUND N/A 10/30/2019    Procedure: CLOSURE, WOUND;  Surgeon: Torres Dawkins MD;  Location: [facility] OR;  Service: General;  Laterality: N/A;    COLOSTOMY  10/21/2019    Procedure: CREATION, COLOSTOMY;  Surgeon: Jose Lazo MD;  Location: [facility] OR;  Service: General;;  END DESCENDING COLOSTOMY    FLEXIBLE SIGMOIDOSCOPY N/A 10/21/2019    Procedure: SIGMOIDOSCOPY, FLEXIBLE;  Surgeon: Jose Lazo MD;  Location: [facility] OR;  Service: General;  Laterality: N/A;    INSERTION OF TUNNELED CENTRAL VENOUS CATHETER (CVC) WITH SUBCUTANEOUS PORT Right 12/23/2019    Procedure: INSERTION, PORT-A-CATH;  Surgeon: Jose Lazo MD;  Location: [facility] OR;  Service: General;  Laterality: Right;    no surgical history      REPAIR OF ABDOMINAL WALL N/A 10/30/2019    Procedure: REPAIR, ABDOMINAL WALL;  Surgeon: Torres Dawkins MD;  Location: [facility]  OR;  Service: General;  Laterality: N/A;    RIGHT HEMICOLECTOMY Right 10/21/2019    Procedure: HEMICOLECTOMY, RIGHT;  Surgeon: Jose Lazo MD;  Location: [facility] OR;  Service: General;  Laterality: Right;       Social History     Socioeconomic History    Marital status:      Spouse name: Not on file    Number of children: Not on file    Years of education: Not on file    Highest education level: Not on file   Occupational History    Not on file   Social Needs    Financial resource strain: Not on file    Food insecurity     Worry: Never true     Inability: Never true    Transportation needs     Medical: No     Non-medical: No   Tobacco Use    Smoking status: Former Smoker    Smokeless tobacco: Never Used   Substance and Sexual Activity    Alcohol use: Not Currently    Drug use: Never    Sexual activity: Not Currently   Lifestyle    Physical activity     Days per week: 3 days     Minutes per session: 30 min    Stress: Rather much   Relationships    Social connections     Talks on phone: More than three times a week     Gets together: Patient refused     Attends [employer] service: Not on file     Active member of club or organization: No     Attends meetings of clubs or organizations: Never     Relationship status: Living with partner   Other Topics Concern    Not on file   Social History Narrative    Not on file         Current Outpatient Medications:     citalopram (CELEXA) 10 MG tablet, Take 1 tablet (10 mg total) by mouth once daily., Disp: 30 tablet, Rfl: 11    cyanocobalamin (B-12 DOTS) 500 MCG tablet, Take 2 tablets (1,000 mcg total) by mouth once daily., Disp: 60 tablet, Rfl: 0    ondansetron (ZOFRAN-ODT) 8 MG TbDL, Take 1 tablet (8 mg total) by mouth every 8 (eight) hours as needed., Disp: 90 tablet, Rfl: 3    oxyCODONE-acetaminophen (PERCOCET)  mg per tablet, Take 1 tablet by mouth every 8 (eight) hours as needed for Pain., Disp: 60 tablet, Rfl: 0    pantoprazole  "(PROTONIX) 40 MG tablet, Take 1 tablet (40 mg total) by mouth once daily., Disp: 30 tablet, Rfl: 11    pyridoxine, vitamin B6, (VITAMIN B-6) 100 MG Tab, Take 1 tablet (100 mg total) by mouth once daily., Disp: 30 tablet, Rfl: 0    ondansetron (ZOFRAN) 4 MG tablet, Take 1 tablet (4 mg total) by mouth every 6 (six) hours as needed for Nausea. (Patient not taking: Reported on 7/7/2020), Disp: 24 tablet, Rfl: 1    ondansetron (ZOFRAN) 8 MG tablet, , Disp: , Rfl:     Review of patient's allergies indicates:  No Known Allergies  All medications and past history have been reviewed.    Objective:      Vitals:  BP (!) 112/57 (BP Location: Right arm, Patient Position: Sitting, BP Method: Medium (Automatic))   Pulse 71   Temp 98.2 °F (36.8 °C) (Temporal)   Resp 18   Ht 5' 10" (1.778 m)   Wt 68.7 kg (151 lb 7.3 oz)   SpO2 98%   BMI 21.73 kg/m²    Body surface area is 1.84 meters squared.    Weight Readings:  Wt Readings from Last 5 Encounters:   07/07/20 68.7 kg (151 lb 7.3 oz)   06/23/20 67.9 kg (149 lb 11.1 oz)   06/09/20 67.7 kg (149 lb 4 oz)   06/05/20 67.4 kg (148 lb 9.6 oz)   05/27/20 69.4 kg (153 lb)      Blood Type:  B NEG     Physical Exam  Constitutional:       General: He is not in acute distress.     Appearance: Normal appearance. He is not ill-appearing.   HENT:      Head: Normocephalic and atraumatic.      Right Ear: External ear normal.      Left Ear: External ear normal.      Nose: Nose normal. No congestion or rhinorrhea.   Eyes:      General:         Right eye: No discharge.         Left eye: No discharge.      Extraocular Movements: Extraocular movements intact.      Conjunctiva/sclera: Conjunctivae normal.      Pupils: Pupils are equal, round, and reactive to light.   Neck:      Musculoskeletal: Normal range of motion and neck supple. No neck rigidity.   Cardiovascular:      Rate and Rhythm: Normal rate and regular rhythm.      Heart sounds: Normal heart sounds. No murmur.   Pulmonary:      Effort: " Pulmonary effort is normal. No respiratory distress.      Breath sounds: Normal breath sounds. No stridor. No wheezing, rhonchi or rales.   Abdominal:      General: Bowel sounds are normal. There is no distension.      Palpations: Abdomen is soft.      Tenderness: There is no abdominal tenderness. There is no guarding.   Musculoskeletal: Normal range of motion.         General: No deformity.      Right lower leg: No edema.      Left lower leg: No edema.   Lymphadenopathy:      Cervical: No cervical adenopathy.   Skin:     General: Skin is warm and dry.      Coloration: Skin is not pale.      Findings: No erythema or rash.   Neurological:      General: No focal deficit present.      Mental Status: He is alert and oriented to person, place, and time. Mental status is at baseline.      Cranial Nerves: No cranial nerve deficit.   Psychiatric:         Mood and Affect: Mood normal.         Behavior: Behavior normal.         Thought Content: Thought content normal.         Judgment: Judgment normal.       Labs:  Lab Results   Component Value Date    WBC 6.00 07/06/2020    WBC 5.23 06/22/2020    WBC 6.97 06/09/2020    RBC 3.91 (L) 07/06/2020    RBC 3.94 (L) 06/22/2020    RBC 3.97 (L) 06/09/2020    HGB 13.0 (L) 07/06/2020    HGB 13.2 (L) 06/22/2020    HGB 13.1 (L) 06/09/2020    HCT 37.5 (L) 07/06/2020    HCT 37.5 (L) 06/22/2020    HCT 37.8 (L) 06/09/2020    MCV 96 07/06/2020    MCV 95 06/22/2020    MCV 95 06/09/2020     07/06/2020     06/22/2020     06/09/2020    MCH 33.2 (H) 07/06/2020    MCH 33.5 (H) 06/22/2020    MCH 33.0 (H) 06/09/2020    MCHC 34.7 07/06/2020    MCHC 35.2 06/22/2020    MCHC 34.7 06/09/2020    RDW 14.1 07/06/2020    RDW 12.9 06/22/2020    RDW 12.5 06/09/2020     Lab Results   Component Value Date     07/06/2020     06/22/2020     06/09/2020    K 4.0 07/06/2020    K 3.5 06/22/2020    K 3.8 06/09/2020     07/06/2020     06/22/2020     06/09/2020     CO2 26 07/06/2020    CO2 26 06/22/2020    CO2 27 06/09/2020    BUN 8 07/06/2020    BUN 12 06/22/2020    BUN 10 06/09/2020    CREATININE 1.0 07/06/2020    CREATININE 0.8 06/22/2020    CREATININE 0.9 06/09/2020     (H) 07/06/2020     (H) 06/22/2020     (H) 06/09/2020    CALCIUM 8.9 07/06/2020    CALCIUM 8.8 06/22/2020    CALCIUM 8.6 (L) 06/09/2020    MG 2.0 07/06/2020    MG 1.9 06/22/2020    MG 1.8 06/09/2020    PHOS 3.1 10/28/2019    PHOS 3.4 10/27/2019    PHOS 4.0 10/24/2019    ALKPHOS 68 07/06/2020    ALKPHOS 77 06/22/2020    ALKPHOS 75 06/09/2020    PROT 7.1 07/06/2020    PROT 7.3 06/22/2020    PROT 6.7 06/09/2020    ALBUMIN 3.7 07/06/2020    ALBUMIN 3.8 06/22/2020    ALBUMIN 3.5 06/09/2020    BILITOT 0.7 07/06/2020    BILITOT 0.2 06/22/2020    BILITOT 0.6 06/09/2020    AST 33 07/06/2020    AST 21 06/22/2020    AST 19 06/09/2020    ALT 22 07/06/2020    ALT 22 06/22/2020    ALT 20 06/09/2020     Lab Results   Component Value Date    CEA 3.1 05/12/2020    CEA 3.2 04/13/2020    CEA 2.9 03/24/2020      All lab results and imaging results have been reviewed and discussed with the patient.     Assessment and Plan:        ICD-10-CM ICD-9-CM   1. Encounter for chemotherapy management   -Pt is cleared for Cycle 6 FOLFOX q2w.    Z51.11 V58.11   2. Rectal adenocarcinoma  -F/U with Dr. Stringer in 2 weeks in clinic with a CBC, CMP, CEA and magnesium for Cycle 7 FOLFOX clearance    C20 154.1   3. Anemia due to antineoplastic chemotherapy   -Anemia due to chemotherapy is stable. We will continue to follow through his course of treatment with CBCs prior to each chemotherapy clearance.   D64.81 285.3    T45.1X5A E933.1     Sincerely,  Aman Wilson PA-C    Note is available for collaborating MD; Dr. Stringer for review.    Electronically signed by: Aman Wilson PA-C

## 2020-07-06 NOTE — TELEPHONE ENCOUNTER
S/w patient to confirm appt on 7/7 with Jp JANSEN--voiced understanding--no further questions/concerns

## 2020-07-07 ENCOUNTER — OFFICE VISIT (OUTPATIENT)
Dept: HEMATOLOGY/ONCOLOGY | Facility: CLINIC | Age: 65
End: 2020-07-07
Payer: MEDICARE

## 2020-07-07 ENCOUNTER — TELEPHONE (OUTPATIENT)
Dept: HEMATOLOGY/ONCOLOGY | Facility: CLINIC | Age: 65
End: 2020-07-07

## 2020-07-07 VITALS
RESPIRATION RATE: 18 BRPM | HEART RATE: 71 BPM | DIASTOLIC BLOOD PRESSURE: 57 MMHG | WEIGHT: 151.44 LBS | SYSTOLIC BLOOD PRESSURE: 112 MMHG | TEMPERATURE: 98 F | HEIGHT: 70 IN | OXYGEN SATURATION: 98 % | BODY MASS INDEX: 21.68 KG/M2

## 2020-07-07 DIAGNOSIS — C20 RECTAL CANCER: Primary | ICD-10-CM

## 2020-07-07 DIAGNOSIS — Z51.11 ENCOUNTER FOR CHEMOTHERAPY MANAGEMENT: Primary | ICD-10-CM

## 2020-07-07 DIAGNOSIS — D64.81 ANEMIA DUE TO ANTINEOPLASTIC CHEMOTHERAPY: ICD-10-CM

## 2020-07-07 DIAGNOSIS — C20 RECTAL ADENOCARCINOMA: ICD-10-CM

## 2020-07-07 DIAGNOSIS — T45.1X5A ANEMIA DUE TO ANTINEOPLASTIC CHEMOTHERAPY: ICD-10-CM

## 2020-07-07 PROCEDURE — 3008F BODY MASS INDEX DOCD: CPT | Mod: CPTII,S$GLB,, | Performed by: PHYSICIAN ASSISTANT

## 2020-07-07 PROCEDURE — 99999 PR PBB SHADOW E&M-EST. PATIENT-LVL IV: CPT | Mod: PBBFAC,,, | Performed by: PHYSICIAN ASSISTANT

## 2020-07-07 PROCEDURE — 3008F PR BODY MASS INDEX (BMI) DOCUMENTED: ICD-10-PCS | Mod: CPTII,S$GLB,, | Performed by: PHYSICIAN ASSISTANT

## 2020-07-07 PROCEDURE — 1101F PT FALLS ASSESS-DOCD LE1/YR: CPT | Mod: CPTII,S$GLB,, | Performed by: PHYSICIAN ASSISTANT

## 2020-07-07 PROCEDURE — 1101F PR PT FALLS ASSESS DOC 0-1 FALLS W/OUT INJ PAST YR: ICD-10-PCS | Mod: CPTII,S$GLB,, | Performed by: PHYSICIAN ASSISTANT

## 2020-07-07 PROCEDURE — 99214 PR OFFICE/OUTPT VISIT, EST, LEVL IV, 30-39 MIN: ICD-10-PCS | Mod: S$GLB,,, | Performed by: PHYSICIAN ASSISTANT

## 2020-07-07 PROCEDURE — 99214 OFFICE O/P EST MOD 30 MIN: CPT | Mod: S$GLB,,, | Performed by: PHYSICIAN ASSISTANT

## 2020-07-07 PROCEDURE — 99999 PR PBB SHADOW E&M-EST. PATIENT-LVL IV: ICD-10-PCS | Mod: PBBFAC,,, | Performed by: PHYSICIAN ASSISTANT

## 2020-07-07 RX ORDER — EPINEPHRINE 0.3 MG/.3ML
0.3 INJECTION SUBCUTANEOUS ONCE AS NEEDED
Status: CANCELLED | OUTPATIENT
Start: 2020-07-07

## 2020-07-07 RX ORDER — HEPARIN 100 UNIT/ML
500 SYRINGE INTRAVENOUS
Status: CANCELLED | OUTPATIENT
Start: 2020-07-07

## 2020-07-07 RX ORDER — SODIUM CHLORIDE 0.9 % (FLUSH) 0.9 %
10 SYRINGE (ML) INJECTION
Status: CANCELLED | OUTPATIENT
Start: 2020-07-07

## 2020-07-07 RX ORDER — DIPHENHYDRAMINE HYDROCHLORIDE 50 MG/ML
50 INJECTION INTRAMUSCULAR; INTRAVENOUS ONCE AS NEEDED
Status: CANCELLED | OUTPATIENT
Start: 2020-07-07

## 2020-07-07 RX ORDER — ONDANSETRON HYDROCHLORIDE 8 MG/1
TABLET, FILM COATED ORAL
COMMUNITY
Start: 2020-07-01 | End: 2020-12-29

## 2020-07-07 NOTE — Clinical Note
-F/U with Dr. Stringer in 2 weeks in clinic with a CBC, CMP, CEA and magnesium for Cycle 7 FOLFOX clearance

## 2020-07-07 NOTE — TELEPHONE ENCOUNTER
Portal message sent informing pt of f/up appts.     ----- Message from Aman Wilson PA-C sent at 7/7/2020  8:56 AM CDT -----  -F/U with Dr. Stringer in 2 weeks in clinic with a CBC, CMP, CEA and magnesium for Cycle 7 FOLFOX clearance

## 2020-07-08 ENCOUNTER — INFUSION (OUTPATIENT)
Dept: INFUSION THERAPY | Facility: HOSPITAL | Age: 65
End: 2020-07-08
Attending: INTERNAL MEDICINE
Payer: MEDICARE

## 2020-07-08 VITALS
HEART RATE: 59 BPM | SYSTOLIC BLOOD PRESSURE: 168 MMHG | RESPIRATION RATE: 18 BRPM | TEMPERATURE: 97 F | OXYGEN SATURATION: 98 % | DIASTOLIC BLOOD PRESSURE: 76 MMHG

## 2020-07-08 DIAGNOSIS — C20 RECTAL CANCER: Primary | ICD-10-CM

## 2020-07-08 PROCEDURE — 96413 CHEMO IV INFUSION 1 HR: CPT

## 2020-07-08 PROCEDURE — 96415 CHEMO IV INFUSION ADDL HR: CPT

## 2020-07-08 PROCEDURE — 96416 CHEMO PROLONG INFUSE W/PUMP: CPT

## 2020-07-08 PROCEDURE — 96367 TX/PROPH/DG ADDL SEQ IV INF: CPT

## 2020-07-08 PROCEDURE — 96368 THER/DIAG CONCURRENT INF: CPT

## 2020-07-08 PROCEDURE — 25000003 PHARM REV CODE 250: Performed by: PHYSICIAN ASSISTANT

## 2020-07-08 PROCEDURE — 96411 CHEMO IV PUSH ADDL DRUG: CPT

## 2020-07-08 PROCEDURE — 96417 CHEMO IV INFUS EACH ADDL SEQ: CPT

## 2020-07-08 PROCEDURE — 63600175 PHARM REV CODE 636 W HCPCS: Performed by: PHYSICIAN ASSISTANT

## 2020-07-08 RX ORDER — EPINEPHRINE 0.3 MG/.3ML
0.3 INJECTION SUBCUTANEOUS ONCE AS NEEDED
Status: DISCONTINUED | OUTPATIENT
Start: 2020-07-08 | End: 2020-07-08 | Stop reason: HOSPADM

## 2020-07-08 RX ORDER — FLUOROURACIL 50 MG/ML
300 INJECTION, SOLUTION INTRAVENOUS
Status: COMPLETED | OUTPATIENT
Start: 2020-07-08 | End: 2020-07-08

## 2020-07-08 RX ORDER — SODIUM CHLORIDE 0.9 % (FLUSH) 0.9 %
10 SYRINGE (ML) INJECTION
Status: DISCONTINUED | OUTPATIENT
Start: 2020-07-08 | End: 2020-07-08 | Stop reason: HOSPADM

## 2020-07-08 RX ORDER — DIPHENHYDRAMINE HYDROCHLORIDE 50 MG/ML
50 INJECTION INTRAMUSCULAR; INTRAVENOUS ONCE AS NEEDED
Status: DISCONTINUED | OUTPATIENT
Start: 2020-07-08 | End: 2020-07-08 | Stop reason: HOSPADM

## 2020-07-08 RX ORDER — HEPARIN 100 UNIT/ML
500 SYRINGE INTRAVENOUS
Status: DISCONTINUED | OUTPATIENT
Start: 2020-07-08 | End: 2020-07-08 | Stop reason: HOSPADM

## 2020-07-08 RX ADMIN — FLUOROURACIL 535 MG: 50 INJECTION, SOLUTION INTRAVENOUS at 02:07

## 2020-07-08 RX ADMIN — DEXTROSE MONOHYDRATE 535 MG: 50 INJECTION, SOLUTION INTRAVENOUS at 11:07

## 2020-07-08 RX ADMIN — OXALIPLATIN 107 MG: 5 INJECTION, SOLUTION INTRAVENOUS at 11:07

## 2020-07-08 RX ADMIN — FLUOROURACIL 3560 MG: 50 INJECTION, SOLUTION INTRAVENOUS at 02:07

## 2020-07-08 RX ADMIN — DEXAMETHASONE SODIUM PHOSPHATE 0.25 MG: 4 INJECTION, SOLUTION INTRA-ARTICULAR; INTRALESIONAL; INTRAMUSCULAR; INTRAVENOUS; SOFT TISSUE at 11:07

## 2020-07-08 NOTE — PLAN OF CARE
Pt tolerated chemo infusion w/o difficulty. RCW port unremarkable. VSS. Pt sent home on infusion pump. Pt has no further questions at this time. Pt refuses avs at this time and is ambulatory for discharge.

## 2020-07-08 NOTE — PLAN OF CARE
Problem: Adult Inpatient Plan of Care  Goal: Plan of Care Review  Outcome: Ongoing, Progressing  Flowsheets (Taken 7/8/2020 1105)  Plan of Care Reviewed With: patient    BP (!) 118/53   Pulse 80   Temp 97.2 °F (36.2 °C)   Resp 18   SpO2 98%   Patient here today for folfox. VSS. Port accessed to right side without difficulty; patent with blood return. Orders released. Pt oriented to unit. Symptoms reviewed. Questions and concerns addressed.

## 2020-07-10 ENCOUNTER — INFUSION (OUTPATIENT)
Dept: INFUSION THERAPY | Facility: HOSPITAL | Age: 65
End: 2020-07-10
Attending: INTERNAL MEDICINE
Payer: MEDICARE

## 2020-07-10 VITALS
RESPIRATION RATE: 18 BRPM | HEART RATE: 65 BPM | DIASTOLIC BLOOD PRESSURE: 54 MMHG | SYSTOLIC BLOOD PRESSURE: 111 MMHG | TEMPERATURE: 97 F

## 2020-07-10 DIAGNOSIS — C20 RECTAL CANCER: Primary | ICD-10-CM

## 2020-07-10 PROCEDURE — A4216 STERILE WATER/SALINE, 10 ML: HCPCS | Performed by: INTERNAL MEDICINE

## 2020-07-10 PROCEDURE — 63600175 PHARM REV CODE 636 W HCPCS: Performed by: INTERNAL MEDICINE

## 2020-07-10 PROCEDURE — 25000003 PHARM REV CODE 250: Performed by: INTERNAL MEDICINE

## 2020-07-10 RX ORDER — HEPARIN 100 UNIT/ML
500 SYRINGE INTRAVENOUS
Status: DISCONTINUED | OUTPATIENT
Start: 2020-07-10 | End: 2020-07-10 | Stop reason: HOSPADM

## 2020-07-10 RX ORDER — SODIUM CHLORIDE 0.9 % (FLUSH) 0.9 %
10 SYRINGE (ML) INJECTION
Status: DISCONTINUED | OUTPATIENT
Start: 2020-07-10 | End: 2020-07-10 | Stop reason: HOSPADM

## 2020-07-10 RX ADMIN — Medication 10 ML: at 12:07

## 2020-07-10 RX ADMIN — HEPARIN 500 UNITS: 100 SYRINGE at 12:07

## 2020-07-10 NOTE — PLAN OF CARE
Problem: Nausea and Vomiting (Chemotherapy Effects)  Goal: Fluid and Electrolyte Balance  Outcome: Ongoing, Progressing    BP (!) 111/54   Pulse 65   Temp 97 °F (36.1 °C)   Resp 18   Patient arrived to OPT today for discontinuation of chemotherapy pump. Pump d/c. Port was flushed with positive blood return and heparin locked. Port was then deaccessed with no blood noted and covered with bandaid. AVS provided and future appt discussed. Pt acknowledged understanding.

## 2020-07-14 ENCOUNTER — OFFICE VISIT (OUTPATIENT)
Dept: SURGERY | Facility: CLINIC | Age: 65
End: 2020-07-14
Payer: MEDICARE

## 2020-07-14 VITALS
SYSTOLIC BLOOD PRESSURE: 113 MMHG | RESPIRATION RATE: 16 BRPM | OXYGEN SATURATION: 97 % | DIASTOLIC BLOOD PRESSURE: 62 MMHG | TEMPERATURE: 98 F | HEART RATE: 79 BPM | WEIGHT: 146.63 LBS | BODY MASS INDEX: 20.99 KG/M2 | HEIGHT: 70 IN

## 2020-07-14 DIAGNOSIS — C20 RECTAL CANCER: Primary | ICD-10-CM

## 2020-07-14 PROCEDURE — 99213 OFFICE O/P EST LOW 20 MIN: CPT | Mod: S$GLB,,, | Performed by: SURGERY

## 2020-07-14 PROCEDURE — 3008F BODY MASS INDEX DOCD: CPT | Mod: CPTII,S$GLB,, | Performed by: SURGERY

## 2020-07-14 PROCEDURE — 1101F PR PT FALLS ASSESS DOC 0-1 FALLS W/OUT INJ PAST YR: ICD-10-PCS | Mod: CPTII,S$GLB,, | Performed by: SURGERY

## 2020-07-14 PROCEDURE — 3008F PR BODY MASS INDEX (BMI) DOCUMENTED: ICD-10-PCS | Mod: CPTII,S$GLB,, | Performed by: SURGERY

## 2020-07-14 PROCEDURE — 1101F PT FALLS ASSESS-DOCD LE1/YR: CPT | Mod: CPTII,S$GLB,, | Performed by: SURGERY

## 2020-07-14 PROCEDURE — 99213 PR OFFICE/OUTPT VISIT, EST, LEVL III, 20-29 MIN: ICD-10-PCS | Mod: S$GLB,,, | Performed by: SURGERY

## 2020-07-14 NOTE — PROGRESS NOTES
"  [facility] General Surgery  Follow-up    Subjective:       Patient ID: Miguel Angel Apple Sr. is a 64 y.o. male.    Chief Complaint: Follow-up (Requesting stoma to be widened)      HPI:  Miguel Angel Apple Sr. is a 64 y.o. male presents today for examination cancer.  Patient previous colon resection perforated colon and end colostomy mucous fistula creation.  He has chemotherapy and currently undergoing done radiation seen surgical [place] who plans low anterior resection.  Patient with colostomy issues.  He had colostomy necrosis surgery which resolved left patient somewhat shrunk colostomy he has had dilations.  He presents today for dilation.    Review of Systems   Constitutional: Negative for appetite change, chills and fever.   HENT: Negative for congestion, dental problem and drooling.    Eyes: Negative for photophobia, discharge and itching.   Respiratory: Negative for apnea and chest tightness.    Cardiovascular: Negative for chest pain, palpitations and leg swelling.   Gastrointestinal: Negative for abdominal distention and abdominal pain.   Endocrine: Negative for cold intolerance and heat intolerance.   Genitourinary: Negative for difficulty urinating and dysuria.   Musculoskeletal: Negative for arthralgias and back pain.   Skin: Negative for color change and pallor.   Neurological: Negative for dizziness, facial asymmetry and headaches.   Hematological: Negative for adenopathy. Does not bruise/bleed easily.   Psychiatric/Behavioral: Negative for agitation, behavioral problems and confusion.       Objective:      Vitals:    07/14/20 0822   BP: 113/62   Pulse: 79   Resp: 16   Temp: 98.3 °F (36.8 °C)   TempSrc: Skin   SpO2: 97%   Weight: 66.5 kg (146 lb 9.6 oz)   Height: 5' 10" (1.778 m)     Physical Exam  Constitutional:       Appearance: He is well-developed. He is not diaphoretic.   HENT:      Head: Normocephalic and atraumatic.   Eyes:      Pupils: Pupils are equal, round, and reactive to light.   Neck:      " Musculoskeletal: Normal range of motion and neck supple.      Thyroid: No thyromegaly.   Cardiovascular:      Rate and Rhythm: Normal rate and regular rhythm.      Heart sounds: No murmur.   Pulmonary:      Effort: Pulmonary effort is normal. No respiratory distress.      Breath sounds: Normal breath sounds.   Abdominal:      General: Bowel sounds are normal. There is no distension.      Palpations: Abdomen is soft.      Tenderness: There is no abdominal tenderness.       Musculoskeletal: Normal range of motion.   Skin:     General: Skin is warm.      Capillary Refill: Capillary refill takes less than 2 seconds.      Findings: No erythema or rash.   Neurological:      Mental Status: He is alert and oriented to person, place, and time.      Cranial Nerves: No cranial nerve deficit.          Assessment:       1. Rectal cancer        Plan:   Rectal cancer        Medical Decision Making/Counseling:  Patient rectal cancer.  Undergoing chemotherapy.  Dilation performed with test tubes at bedside.  Much improvement in patient's mild stenosis distally.  Otherwise patient doing well today.  Will recommend patient follow-up surgery clinic in 2 months for repeat dilation.    Follow up:  2 months.    Patient instructed that best way to communicate with my office staff is for patient to get on the [facility] epic patient portal to expedite communication and communication issues that may occur.  Patient was given instructions on how to get on the portal.  I encouraged patient to obtain portal access as well.  Ultimately it is up to the patient to obtain access.  Patient voiced understanding.

## 2020-07-20 ENCOUNTER — LAB VISIT (OUTPATIENT)
Dept: LAB | Facility: HOSPITAL | Age: 65
End: 2020-07-20
Attending: INTERNAL MEDICINE
Payer: MEDICARE

## 2020-07-20 DIAGNOSIS — C20 RECTAL ADENOCARCINOMA: ICD-10-CM

## 2020-07-20 DIAGNOSIS — Z51.11 ENCOUNTER FOR CHEMOTHERAPY MANAGEMENT: ICD-10-CM

## 2020-07-20 LAB
ALBUMIN SERPL BCP-MCNC: 3.7 G/DL (ref 3.5–5.2)
ALP SERPL-CCNC: 79 U/L (ref 55–135)
ALT SERPL W/O P-5'-P-CCNC: 26 U/L (ref 10–44)
ANION GAP SERPL CALC-SCNC: 8 MMOL/L (ref 8–16)
AST SERPL-CCNC: 21 U/L (ref 10–40)
BASOPHILS # BLD AUTO: 0.02 K/UL (ref 0–0.2)
BASOPHILS NFR BLD: 0.4 % (ref 0–1.9)
BILIRUB SERPL-MCNC: 0.5 MG/DL (ref 0.1–1)
BUN SERPL-MCNC: 12 MG/DL (ref 8–23)
CALCIUM SERPL-MCNC: 8.5 MG/DL (ref 8.7–10.5)
CEA SERPL-MCNC: 4 NG/ML (ref 0–5)
CHLORIDE SERPL-SCNC: 107 MMOL/L (ref 95–110)
CO2 SERPL-SCNC: 23 MMOL/L (ref 23–29)
CREAT SERPL-MCNC: 0.9 MG/DL (ref 0.5–1.4)
DIFFERENTIAL METHOD: ABNORMAL
EOSINOPHIL # BLD AUTO: 0.1 K/UL (ref 0–0.5)
EOSINOPHIL NFR BLD: 2.1 % (ref 0–8)
ERYTHROCYTE [DISTWIDTH] IN BLOOD BY AUTOMATED COUNT: 14.5 % (ref 11.5–14.5)
EST. GFR  (AFRICAN AMERICAN): >60 ML/MIN/1.73 M^2
EST. GFR  (NON AFRICAN AMERICAN): >60 ML/MIN/1.73 M^2
GLUCOSE SERPL-MCNC: 102 MG/DL (ref 70–110)
HCT VFR BLD AUTO: 33 % (ref 40–54)
HGB BLD-MCNC: 11.4 G/DL (ref 14–18)
IMM GRANULOCYTES # BLD AUTO: 0.03 K/UL (ref 0–0.04)
IMM GRANULOCYTES NFR BLD AUTO: 0.5 % (ref 0–0.5)
LYMPHOCYTES # BLD AUTO: 0.8 K/UL (ref 1–4.8)
LYMPHOCYTES NFR BLD: 13.4 % (ref 18–48)
MAGNESIUM SERPL-MCNC: 1.9 MG/DL (ref 1.6–2.6)
MCH RBC QN AUTO: 33.4 PG (ref 27–31)
MCHC RBC AUTO-ENTMCNC: 34.5 G/DL (ref 32–36)
MCV RBC AUTO: 97 FL (ref 82–98)
MONOCYTES # BLD AUTO: 1.1 K/UL (ref 0.3–1)
MONOCYTES NFR BLD: 18.5 % (ref 4–15)
NEUTROPHILS # BLD AUTO: 3.7 K/UL (ref 1.8–7.7)
NEUTROPHILS NFR BLD: 65.1 % (ref 38–73)
NRBC BLD-RTO: 0 /100 WBC
PLATELET # BLD AUTO: 185 K/UL (ref 150–350)
PMV BLD AUTO: 8.4 FL (ref 9.2–12.9)
POTASSIUM SERPL-SCNC: 3.5 MMOL/L (ref 3.5–5.1)
PROT SERPL-MCNC: 6.9 G/DL (ref 6–8.4)
RBC # BLD AUTO: 3.41 M/UL (ref 4.6–6.2)
SODIUM SERPL-SCNC: 138 MMOL/L (ref 136–145)
WBC # BLD AUTO: 5.67 K/UL (ref 3.9–12.7)

## 2020-07-20 PROCEDURE — 36415 COLL VENOUS BLD VENIPUNCTURE: CPT

## 2020-07-20 PROCEDURE — 83735 ASSAY OF MAGNESIUM: CPT

## 2020-07-20 PROCEDURE — 85025 COMPLETE CBC W/AUTO DIFF WBC: CPT

## 2020-07-20 PROCEDURE — 82378 CARCINOEMBRYONIC ANTIGEN: CPT

## 2020-07-20 PROCEDURE — 80053 COMPREHEN METABOLIC PANEL: CPT

## 2020-07-21 ENCOUNTER — TELEPHONE (OUTPATIENT)
Dept: HEMATOLOGY/ONCOLOGY | Facility: CLINIC | Age: 65
End: 2020-07-21

## 2020-07-22 ENCOUNTER — INFUSION (OUTPATIENT)
Dept: INFUSION THERAPY | Facility: HOSPITAL | Age: 65
End: 2020-07-22
Attending: INTERNAL MEDICINE
Payer: MEDICARE

## 2020-07-22 ENCOUNTER — OFFICE VISIT (OUTPATIENT)
Dept: HEMATOLOGY/ONCOLOGY | Facility: CLINIC | Age: 65
End: 2020-07-22
Payer: MEDICARE

## 2020-07-22 VITALS
HEART RATE: 63 BPM | RESPIRATION RATE: 17 BRPM | OXYGEN SATURATION: 99 % | DIASTOLIC BLOOD PRESSURE: 67 MMHG | SYSTOLIC BLOOD PRESSURE: 138 MMHG

## 2020-07-22 VITALS
TEMPERATURE: 98 F | HEIGHT: 70 IN | DIASTOLIC BLOOD PRESSURE: 64 MMHG | OXYGEN SATURATION: 97 % | BODY MASS INDEX: 21.9 KG/M2 | SYSTOLIC BLOOD PRESSURE: 111 MMHG | WEIGHT: 153 LBS | HEART RATE: 74 BPM

## 2020-07-22 DIAGNOSIS — Z51.11 ENCOUNTER FOR CHEMOTHERAPY MANAGEMENT: ICD-10-CM

## 2020-07-22 DIAGNOSIS — C20 MALIGNANT NEOPLASM OF RECTUM: Primary | ICD-10-CM

## 2020-07-22 DIAGNOSIS — Z09 ONCOLOGY FOLLOW-UP ENCOUNTER: ICD-10-CM

## 2020-07-22 DIAGNOSIS — D64.81 ANEMIA FOLLOWING USE OF CHEMOTHERAPEUTIC DRUG: ICD-10-CM

## 2020-07-22 DIAGNOSIS — C20 RECTAL CANCER: Primary | ICD-10-CM

## 2020-07-22 PROCEDURE — 1101F PR PT FALLS ASSESS DOC 0-1 FALLS W/OUT INJ PAST YR: ICD-10-PCS | Mod: CPTII,S$GLB,, | Performed by: INTERNAL MEDICINE

## 2020-07-22 PROCEDURE — 96367 TX/PROPH/DG ADDL SEQ IV INF: CPT

## 2020-07-22 PROCEDURE — 1101F PT FALLS ASSESS-DOCD LE1/YR: CPT | Mod: CPTII,S$GLB,, | Performed by: INTERNAL MEDICINE

## 2020-07-22 PROCEDURE — 96413 CHEMO IV INFUSION 1 HR: CPT

## 2020-07-22 PROCEDURE — 99215 OFFICE O/P EST HI 40 MIN: CPT | Mod: S$GLB,,, | Performed by: INTERNAL MEDICINE

## 2020-07-22 PROCEDURE — 99215 PR OFFICE/OUTPT VISIT, EST, LEVL V, 40-54 MIN: ICD-10-PCS | Mod: S$GLB,,, | Performed by: INTERNAL MEDICINE

## 2020-07-22 PROCEDURE — 96416 CHEMO PROLONG INFUSE W/PUMP: CPT

## 2020-07-22 PROCEDURE — 99999 PR PBB SHADOW E&M-EST. PATIENT-LVL III: CPT | Mod: PBBFAC,,, | Performed by: INTERNAL MEDICINE

## 2020-07-22 PROCEDURE — 96415 CHEMO IV INFUSION ADDL HR: CPT

## 2020-07-22 PROCEDURE — 25000003 PHARM REV CODE 250: Performed by: INTERNAL MEDICINE

## 2020-07-22 PROCEDURE — 3008F PR BODY MASS INDEX (BMI) DOCUMENTED: ICD-10-PCS | Mod: CPTII,S$GLB,, | Performed by: INTERNAL MEDICINE

## 2020-07-22 PROCEDURE — 3008F BODY MASS INDEX DOCD: CPT | Mod: CPTII,S$GLB,, | Performed by: INTERNAL MEDICINE

## 2020-07-22 PROCEDURE — 96368 THER/DIAG CONCURRENT INF: CPT

## 2020-07-22 PROCEDURE — 63600175 PHARM REV CODE 636 W HCPCS: Performed by: INTERNAL MEDICINE

## 2020-07-22 PROCEDURE — 96411 CHEMO IV PUSH ADDL DRUG: CPT

## 2020-07-22 PROCEDURE — 99999 PR PBB SHADOW E&M-EST. PATIENT-LVL III: ICD-10-PCS | Mod: PBBFAC,,, | Performed by: INTERNAL MEDICINE

## 2020-07-22 RX ORDER — HEPARIN 100 UNIT/ML
500 SYRINGE INTRAVENOUS
Status: DISCONTINUED | OUTPATIENT
Start: 2020-07-22 | End: 2020-07-22 | Stop reason: HOSPADM

## 2020-07-22 RX ORDER — FLUOROURACIL 50 MG/ML
300 INJECTION, SOLUTION INTRAVENOUS
Status: CANCELLED | OUTPATIENT
Start: 2020-07-22

## 2020-07-22 RX ORDER — SODIUM CHLORIDE 0.9 % (FLUSH) 0.9 %
10 SYRINGE (ML) INJECTION
Status: DISCONTINUED | OUTPATIENT
Start: 2020-07-22 | End: 2020-07-22 | Stop reason: HOSPADM

## 2020-07-22 RX ORDER — EPINEPHRINE 0.3 MG/.3ML
0.3 INJECTION SUBCUTANEOUS ONCE AS NEEDED
Status: CANCELLED | OUTPATIENT
Start: 2020-07-22

## 2020-07-22 RX ORDER — HEPARIN 100 UNIT/ML
500 SYRINGE INTRAVENOUS
Status: CANCELLED | OUTPATIENT
Start: 2020-07-22

## 2020-07-22 RX ORDER — MAGNESIUM SULFATE 1 G/100ML
1 INJECTION INTRAVENOUS
Status: COMPLETED | OUTPATIENT
Start: 2020-07-22 | End: 2020-07-22

## 2020-07-22 RX ORDER — DIPHENHYDRAMINE HYDROCHLORIDE 50 MG/ML
50 INJECTION INTRAMUSCULAR; INTRAVENOUS ONCE AS NEEDED
Status: CANCELLED | OUTPATIENT
Start: 2020-07-22

## 2020-07-22 RX ORDER — DIPHENHYDRAMINE HYDROCHLORIDE 50 MG/ML
50 INJECTION INTRAMUSCULAR; INTRAVENOUS ONCE AS NEEDED
Status: DISCONTINUED | OUTPATIENT
Start: 2020-07-22 | End: 2020-07-22 | Stop reason: HOSPADM

## 2020-07-22 RX ORDER — EPINEPHRINE 0.3 MG/.3ML
0.3 INJECTION SUBCUTANEOUS ONCE AS NEEDED
Status: DISCONTINUED | OUTPATIENT
Start: 2020-07-22 | End: 2020-07-22 | Stop reason: HOSPADM

## 2020-07-22 RX ORDER — FLUOROURACIL 50 MG/ML
300 INJECTION, SOLUTION INTRAVENOUS
Status: COMPLETED | OUTPATIENT
Start: 2020-07-22 | End: 2020-07-22

## 2020-07-22 RX ORDER — SODIUM CHLORIDE 0.9 % (FLUSH) 0.9 %
10 SYRINGE (ML) INJECTION
Status: CANCELLED | OUTPATIENT
Start: 2020-07-22

## 2020-07-22 RX ADMIN — FLUOROURACIL 3560 MG: 50 INJECTION, SOLUTION INTRAVENOUS at 02:07

## 2020-07-22 RX ADMIN — DEXTROSE MONOHYDRATE 534 MG: 50 INJECTION, SOLUTION INTRAVENOUS at 12:07

## 2020-07-22 RX ADMIN — OXALIPLATIN 107 MG: 5 INJECTION, SOLUTION, CONCENTRATE INTRAVENOUS at 12:07

## 2020-07-22 RX ADMIN — MAGNESIUM SULFATE IN DEXTROSE 1 G: 10 INJECTION, SOLUTION INTRAVENOUS at 10:07

## 2020-07-22 RX ADMIN — DEXAMETHASONE SODIUM PHOSPHATE: 4 INJECTION, SOLUTION INTRA-ARTICULAR; INTRALESIONAL; INTRAMUSCULAR; INTRAVENOUS; SOFT TISSUE at 11:07

## 2020-07-22 RX ADMIN — FLUOROURACIL 535 MG: 50 INJECTION, SOLUTION INTRAVENOUS at 02:07

## 2020-07-22 NOTE — PROGRESS NOTES
[facility]     Subjective:      PATIENT:   Miguel Angel Apple Sr.  :    1955  MR#:    [id number]  DATE OF VISIT:  2020    Chief Complaint: rectal adenocarcinoma    Patient ID: Miguel Angel Apple Sr. is a 64 y.o. male with a PMHx of a T3-T4 N1 rectal adenocarcinoma who is present in clinic for C6D1 clearance for FOLFOX q2w for treatment of rectal adenocarcinoma.    On 2019  a laparotomy was performed for perforated colon and patient underwent a right hemicolectomy with a rectal biopsy revealing rectal adenocarcinoma. He is healing from surgery but referred here to discuss treatment options.     2020  Completed concomitant chemo radiation 2020 Oral xeloda 850 mg/m2 Monday through Friday with concomitant radiation to be followed 6 months of folfox through chest port in right chest for colorectal adenocarcinoma with peritoneal seeding   Surgery delayed  due to pandemic  POst IV iron      2026 tamiko keeps cutting out   Records in tumor Board been reviewed surgical oncology notes reviewed patient is aware that chemotherapy is indicated per standard of care whether neoadjuvant her adjuvant.  Today patient will discussed with me the chemo regimen and the side effects that could possibly occur.  He is not having any evidence of bleeding he is eating well no change in his weight     2020   Due for chemo   Positive paresthesias , heartburn, loose stool in bag no explosive diarrhea      2020  right after chemo pt developed oral lesions and pain, severe fatigue increasing fatigues, weakness , positive weight loss     2020  Been doing great since lowering lowering dose. Pt seen in clinic for C6D1 clearance FOLFOX q2w. Pt states that he no longer has mouth pain or paresthesias and stated he bounced back better with lowered dose. Colostomy bag stool has gotten from thinner to thicker and darker in color and a more normal appearance since lowering  dose. No N/V, mouth sores.    07/22/2020  Better since dose reduction, Stoma stretched and he has some bleeding, not perfuse    Review of Systems   Constitutional: Negative for activity change, appetite change, chills, fatigue, fever and unexpected weight change.   HENT: Negative for mouth sores and trouble swallowing.    Eyes: Negative for photophobia and visual disturbance.   Respiratory: Negative for cough, chest tightness, shortness of breath, wheezing and stridor.    Cardiovascular: Negative for chest pain and leg swelling.   Gastrointestinal: Negative for abdominal pain, constipation, diarrhea, nausea and vomiting.   Musculoskeletal: Negative for arthralgias, back pain and myalgias.   Skin: Negative for color change, pallor, rash and wound.   Neurological: Negative for syncope, speech difficulty and weakness.   Hematological: Negative for adenopathy. Does not bruise/bleed easily.   Psychiatric/Behavioral: Negative for agitation, behavioral problems, confusion, decreased concentration and dysphoric mood.        Oncology History   Rectal cancer   12/23/2019 Initial Diagnosis    Rectal cancer     4/15/2020 Tumor Conference    Multidisciplinary Rectal Cancer Conference - Evaluation and Recommendation Summary    [facility]15/2020  [facility]  [id number]  64 y.o. male    1. Evaluation    65 yo M who presented with abdominal pain, change in bowel habits, weight loss and constipation who progressed to obstipation.     CT 10/19/19: distension of colon and rectum. Large Bowel obstruction.     Flex sig 10/21: fungating obstructing large mass in the rectum. 11 cm from the verge.     10/21/2019: Dr. Lazo at [facility]; LBO with cecal perf, chronic cholecystitis. Ex Lap with sigmoid colectomy with end descending colostomy and mucous fistula, right colectomy and side to side functional end to end anastomosis, open adama and splenic flex mobilization.     Path: inflammation, no Carcinoma.     MRI 11/19: locally adcanced,  CT c/a/p 12/19, PET 12/19: T3-4,     Neoadjuvant CRT: 5040 cGy + 5 FU completed 2/20.       MRI date: 3/20    Tumor Location in Rectum: upper third    Indication of Sphincter Involvement:  Uninvolved    Pretreatment Circumferential Resection Margin (CRM) status:  Not Threatened    Pretreatment (clinical) AJCC Stage: IIA  Stage I  [] I: T1N0M0  [] I: T2N0M0  Stage II  [x] IIA: T3N0M0  [] IIB C7xL4X7  [] IIC: H7yJ6P9  Stage III  [] IIIA: T1-2N1M0  [] IIIA: N1K9zY0  [] IIIB: T3-U4vC1A3  [] IIIB: T2-3N2aM0  [] IIIB: T1-2N2bM0  [] IIIC: L8iY9lM4  [] IIIC: T3-2zV3aN9  [] IIIC: T4cT7-7C0   Stage IV  [] IV: V4-5V1-5N5l-b    CEA level:   Lab Results   Component Value Date    CEA 3.2 04/13/2020       Flex sig 8 weeks after treatment: still some tumor.     2. Treatment Recommendation    Complete Chemotherapy now as patient is already diverted and tolerating the ostomy and given COVID pandemic he would likely need to way at least 3-4 weeks until her gets surgery.      4/27/2020 -  Chemotherapy    Treatment Summary   Plan Name: OP FOLFOX 6 Q2W  Treatment Goal: Control  Status: Active  Start Date: 4/27/2020  End Date: 10/1/2020 (Planned)  Provider: Shayla Stringer MD  Chemotherapy: fluorouraciL injection 710 mg, 400 mg/m2 = 710 mg, Intravenous, Clinic/HOD 1 time, 6 of 12 cycles  Dose modification: 300 mg/m2 (original dose 400 mg/m2, Cycle 5)  Administration: 710 mg (5/13/2020), 710 mg (5/27/2020), 710 mg (6/10/2020), 535 mg (6/24/2020), 535 mg (7/8/2020)  fluorouraciL 2,400 mg/m2 = 4,270 mg in sodium chloride 0.9% 185.4 mL chemo infusion, 2,400 mg/m2 = 4,270 mg, Intravenous, Over 46 hours, 6 of 12 cycles  Dose modification: 2,000 mg/m2 (original dose 2,400 mg/m2, Cycle 5)  Administration: 4,270 mg (4/27/2020), 4,270 mg (5/13/2020), 4,270 mg (5/27/2020), 4,270 mg (6/10/2020), 3,560 mg (6/24/2020), 3,560 mg (7/8/2020)  leucovorin calcium 400 mg/m2 = 710 mg in dextrose 5 % 250 mL infusion, 400 mg/m2 = 710 mg, Intravenous, [facility]/HOD  1 time, 6 of 12 cycles  Dose modification: 300 mg/m2 (original dose 400 mg/m2, Cycle 5)  Administration: 710 mg (4/27/2020), 710 mg (5/13/2020), 710 mg (5/27/2020), 712 mg (6/10/2020), 534 mg (6/24/2020), 535 mg (7/8/2020)  oxaliplatin (ELOXATIN) 85 mg/m2 = 151 mg in dextrose 5 % 500 mL chemo infusion, 85 mg/m2 = 151 mg, Intravenous, [facility] 1 time, 6 of 12 cycles  Dose modification: 60 mg/m2 (original dose 85 mg/m2, Cycle 5)  Administration: 151 mg (4/27/2020), 151 mg (5/13/2020), 151 mg (5/27/2020), 151 mg (6/10/2020), 107 mg (6/24/2020), 107 mg (7/8/2020)       Lifetime Dose Tracking   No doses have been documented on this patient for the following tracked chemicals: doxorubicin, epirubicin, idarubicin, daunorubicin, mitoxantrone, bleomycin, mitomycin, busulfan     Past Medical History:   Diagnosis Date    Colon cancer     Depression     GERD (gastroesophageal reflux disease)     Medical history reviewed with no changes        Family History   Problem Relation Age of Onset    Diabetes Mother     Heart disease Father        Past Surgical History:   Procedure Laterality Date    CHOLECYSTECTOMY  10/21/2019    Procedure: CHOLECYSTECTOMY;  Surgeon: Jose Lazo MD;  Location: [facility] OR;  Service: General;;    CLOSURE OF WOUND N/A 10/30/2019    Procedure: CLOSURE, WOUND;  Surgeon: Torres Dawkins MD;  Location: [facility] OR;  Service: General;  Laterality: N/A;    COLOSTOMY  10/21/2019    Procedure: CREATION, COLOSTOMY;  Surgeon: Jose Lazo MD;  Location: [facility] OR;  Service: General;;  END DESCENDING COLOSTOMY    FLEXIBLE SIGMOIDOSCOPY N/A 10/21/2019    Procedure: SIGMOIDOSCOPY, FLEXIBLE;  Surgeon: Jose Lazo MD;  Location: [facility] OR;  Service: General;  Laterality: N/A;    INSERTION OF TUNNELED CENTRAL VENOUS CATHETER (CVC) WITH SUBCUTANEOUS PORT Right 12/23/2019    Procedure: INSERTION, PORT-A-CATH;  Surgeon: Jose Lazo MD;  Location: [facility] OR;  Service: General;   Laterality: Right;    no surgical history      REPAIR OF ABDOMINAL WALL N/A 10/30/2019    Procedure: REPAIR, ABDOMINAL WALL;  Surgeon: Torres Dawkins MD;  Location: [facility] OR;  Service: General;  Laterality: N/A;    RIGHT HEMICOLECTOMY Right 10/21/2019    Procedure: HEMICOLECTOMY, RIGHT;  Surgeon: Jose Lazo MD;  Location: [facility] OR;  Service: General;  Laterality: Right;       Social History     Socioeconomic History    Marital status:      Spouse name: Not on file    Number of children: Not on file    Years of education: Not on file    Highest education level: Not on file   Occupational History    Not on file   Social Needs    Financial resource strain: Not on file    Food insecurity     Worry: Never true     Inability: Never true    Transportation needs     Medical: No     Non-medical: No   Tobacco Use    Smoking status: Former Smoker    Smokeless tobacco: Never Used   Substance and Sexual Activity    Alcohol use: Not Currently    Drug use: Never    Sexual activity: Not Currently   Lifestyle    Physical activity     Days per week: 3 days     Minutes per session: 30 min    Stress: Rather much   Relationships    Social connections     Talks on phone: More than three times a week     Gets together: Patient refused     Attends [employer] service: Not on file     Active member of club or organization: No     Attends meetings of clubs or organizations: Never     Relationship status: Living with partner   Other Topics Concern    Not on file   Social History Narrative    Not on file         Current Outpatient Medications:     citalopram (CELEXA) 10 MG tablet, Take 1 tablet (10 mg total) by mouth once daily., Disp: 30 tablet, Rfl: 11    cyanocobalamin (B-12 DOTS) 500 MCG tablet, Take 2 tablets (1,000 mcg total) by mouth once daily., Disp: 60 tablet, Rfl: 0    ondansetron (ZOFRAN-ODT) 8 MG TbDL, Take 1 tablet (8 mg total) by mouth every 8 (eight) hours as needed., Disp: 90 tablet,  "Rfl: 3    oxyCODONE-acetaminophen (PERCOCET)  mg per tablet, Take 1 tablet by mouth every 8 (eight) hours as needed for Pain., Disp: 60 tablet, Rfl: 0    pyridoxine, vitamin B6, (VITAMIN B-6) 100 MG Tab, Take 1 tablet (100 mg total) by mouth once daily., Disp: 30 tablet, Rfl: 0    ondansetron (ZOFRAN) 4 MG tablet, Take 1 tablet (4 mg total) by mouth every 6 (six) hours as needed for Nausea., Disp: 24 tablet, Rfl: 1    ondansetron (ZOFRAN) 8 MG tablet, , Disp: , Rfl:     pantoprazole (PROTONIX) 40 MG tablet, Take 1 tablet (40 mg total) by mouth once daily., Disp: 30 tablet, Rfl: 11    Review of patient's allergies indicates:  No Known Allergies  All medications and past history have been reviewed.    Objective:      Vitals:  /64   Pulse 74   Temp 98.4 °F (36.9 °C) (Tympanic)   Ht 5' 10" (1.778 m)   Wt 69.4 kg (153 lb)   SpO2 97%   BMI 21.95 kg/m²    Body surface area is 1.85 meters squared.    Weight Readings:  Wt Readings from Last 5 Encounters:   07/22/20 69.4 kg (153 lb)   07/14/20 66.5 kg (146 lb 9.6 oz)   07/07/20 68.7 kg (151 lb 7.3 oz)   06/23/20 67.9 kg (149 lb 11.1 oz)   06/09/20 67.7 kg (149 lb 4 oz)      Blood Type:  B NEG     Physical Exam  Constitutional:       General: He is not in acute distress.     Appearance: Normal appearance. He is not ill-appearing.   HENT:      Head: Normocephalic and atraumatic.      Right Ear: External ear normal.      Left Ear: External ear normal.      Nose: Nose normal. No congestion or rhinorrhea.   Eyes:      General:         Right eye: No discharge.         Left eye: No discharge.      Extraocular Movements: Extraocular movements intact.      Conjunctiva/sclera: Conjunctivae normal.      Pupils: Pupils are equal, round, and reactive to light.   Neck:      Musculoskeletal: Normal range of motion and neck supple. No neck rigidity.   Cardiovascular:      Rate and Rhythm: Normal rate and regular rhythm.      Heart sounds: Normal heart sounds. No " murmur.   Pulmonary:      Effort: Pulmonary effort is normal. No respiratory distress.      Breath sounds: Normal breath sounds. No stridor. No wheezing, rhonchi or rales.   Abdominal:      General: Bowel sounds are normal. There is no distension.      Palpations: Abdomen is soft.      Tenderness: There is no abdominal tenderness. There is no guarding.   Musculoskeletal: Normal range of motion.         General: No deformity.      Right lower leg: No edema.      Left lower leg: No edema.   Lymphadenopathy:      Cervical: No cervical adenopathy.   Skin:     General: Skin is warm and dry.      Coloration: Skin is not pale.      Findings: No erythema or rash.   Neurological:      General: No focal deficit present.      Mental Status: He is alert and oriented to person, place, and time. Mental status is at baseline.      Cranial Nerves: No cranial nerve deficit.   Psychiatric:         Mood and Affect: Mood normal.         Behavior: Behavior normal.         Thought Content: Thought content normal.         Judgment: Judgment normal.       Labs:  Lab Results   Component Value Date    WBC 5.67 07/20/2020    WBC 6.00 07/06/2020    WBC 5.23 06/22/2020    RBC 3.41 (L) 07/20/2020    RBC 3.91 (L) 07/06/2020    RBC 3.94 (L) 06/22/2020    HGB 11.4 (L) 07/20/2020    HGB 13.0 (L) 07/06/2020    HGB 13.2 (L) 06/22/2020    HCT 33.0 (L) 07/20/2020    HCT 37.5 (L) 07/06/2020    HCT 37.5 (L) 06/22/2020    MCV 97 07/20/2020    MCV 96 07/06/2020    MCV 95 06/22/2020     07/20/2020     07/06/2020     06/22/2020    MCH 33.4 (H) 07/20/2020    MCH 33.2 (H) 07/06/2020    MCH 33.5 (H) 06/22/2020    MCHC 34.5 07/20/2020    MCHC 34.7 07/06/2020    MCHC 35.2 06/22/2020    RDW 14.5 07/20/2020    RDW 14.1 07/06/2020    RDW 12.9 06/22/2020     Lab Results   Component Value Date     07/20/2020     07/06/2020     06/22/2020    K 3.5 07/20/2020    K 4.0 07/06/2020    K 3.5 06/22/2020     07/20/2020      07/06/2020     06/22/2020    CO2 23 07/20/2020    CO2 26 07/06/2020    CO2 26 06/22/2020    BUN 12 07/20/2020    BUN 8 07/06/2020    BUN 12 06/22/2020    CREATININE 0.9 07/20/2020    CREATININE 1.0 07/06/2020    CREATININE 0.8 06/22/2020     07/20/2020     (H) 07/06/2020     (H) 06/22/2020    CALCIUM 8.5 (L) 07/20/2020    CALCIUM 8.9 07/06/2020    CALCIUM 8.8 06/22/2020    MG 1.9 07/20/2020    MG 2.0 07/06/2020    MG 1.9 06/22/2020    PHOS 3.1 10/28/2019    PHOS 3.4 10/27/2019    PHOS 4.0 10/24/2019    ALKPHOS 79 07/20/2020    ALKPHOS 68 07/06/2020    ALKPHOS 77 06/22/2020    PROT 6.9 07/20/2020    PROT 7.1 07/06/2020    PROT 7.3 06/22/2020    ALBUMIN 3.7 07/20/2020    ALBUMIN 3.7 07/06/2020    ALBUMIN 3.8 06/22/2020    BILITOT 0.5 07/20/2020    BILITOT 0.7 07/06/2020    BILITOT 0.2 06/22/2020    AST 21 07/20/2020    AST 33 07/06/2020    AST 21 06/22/2020    ALT 26 07/20/2020    ALT 22 07/06/2020    ALT 22 06/22/2020     Lab Results   Component Value Date    CEA 4.0 07/20/2020    CEA 3.1 05/12/2020    CEA 3.2 04/13/2020      All lab results and imaging results have been reviewed and discussed with the patient.     Assessment and Plan:        ICD-10-CM ICD-9-CM   1. Encounter for chemotherapy management   -Pt is cleared for Cycle  7 FOLFOX q2w.    Z51.11 V58.11   2. Rectal adenocarcinoma  -F/U   in 2 weeks in clinic with a CBC, CMP, CEA and magnesium for Cycle 8 FOLFOX clearance    C20 154.1   3. Anemia due to antineoplastic chemotherapy   -Anemia due to chemotherapy is stable. We will continue to follow through his course of treatment with CBCs prior to each chemotherapy clearance.   D64.81 285.3   4. Hypocalcemia : replace such and add Magnesium to increase absorption  T45.1X5A E933.1      increase calcium intake and hydration  Continue at dose reduction  If pt I unable to tolerate will change to xeloda

## 2020-07-24 ENCOUNTER — INFUSION (OUTPATIENT)
Dept: INFUSION THERAPY | Facility: HOSPITAL | Age: 65
End: 2020-07-24
Attending: INTERNAL MEDICINE
Payer: MEDICARE

## 2020-07-24 VITALS
SYSTOLIC BLOOD PRESSURE: 120 MMHG | HEART RATE: 61 BPM | DIASTOLIC BLOOD PRESSURE: 58 MMHG | TEMPERATURE: 97 F | OXYGEN SATURATION: 99 % | RESPIRATION RATE: 18 BRPM

## 2020-07-24 DIAGNOSIS — C20 RECTAL CANCER: Primary | ICD-10-CM

## 2020-07-24 PROCEDURE — 63600175 PHARM REV CODE 636 W HCPCS: Performed by: INTERNAL MEDICINE

## 2020-07-24 PROCEDURE — A4216 STERILE WATER/SALINE, 10 ML: HCPCS | Performed by: INTERNAL MEDICINE

## 2020-07-24 PROCEDURE — 25000003 PHARM REV CODE 250: Performed by: INTERNAL MEDICINE

## 2020-07-24 RX ORDER — HEPARIN 100 UNIT/ML
500 SYRINGE INTRAVENOUS
Status: DISCONTINUED | OUTPATIENT
Start: 2020-07-24 | End: 2020-07-24 | Stop reason: HOSPADM

## 2020-07-24 RX ORDER — SODIUM CHLORIDE 0.9 % (FLUSH) 0.9 %
10 SYRINGE (ML) INJECTION
Status: DISCONTINUED | OUTPATIENT
Start: 2020-07-24 | End: 2020-07-24 | Stop reason: HOSPADM

## 2020-07-24 RX ADMIN — Medication 10 ML: at 01:07

## 2020-07-24 RX ADMIN — HEPARIN 500 UNITS: 100 SYRINGE at 01:07

## 2020-08-03 ENCOUNTER — LAB VISIT (OUTPATIENT)
Dept: LAB | Facility: HOSPITAL | Age: 65
End: 2020-08-03
Attending: INTERNAL MEDICINE
Payer: MEDICARE

## 2020-08-03 ENCOUNTER — TELEPHONE (OUTPATIENT)
Dept: INFUSION THERAPY | Facility: HOSPITAL | Age: 65
End: 2020-08-03

## 2020-08-03 DIAGNOSIS — C20 RECTAL ADENOCARCINOMA: ICD-10-CM

## 2020-08-03 LAB
ALBUMIN SERPL BCP-MCNC: 3.5 G/DL (ref 3.5–5.2)
ALP SERPL-CCNC: 66 U/L (ref 55–135)
ALT SERPL W/O P-5'-P-CCNC: 15 U/L (ref 10–44)
ANION GAP SERPL CALC-SCNC: 8 MMOL/L (ref 8–16)
AST SERPL-CCNC: 17 U/L (ref 10–40)
BASOPHILS # BLD AUTO: 0.01 K/UL (ref 0–0.2)
BASOPHILS NFR BLD: 0.2 % (ref 0–1.9)
BILIRUB SERPL-MCNC: 0.5 MG/DL (ref 0.1–1)
BUN SERPL-MCNC: 11 MG/DL (ref 8–23)
CALCIUM SERPL-MCNC: 8.6 MG/DL (ref 8.7–10.5)
CHLORIDE SERPL-SCNC: 107 MMOL/L (ref 95–110)
CO2 SERPL-SCNC: 26 MMOL/L (ref 23–29)
CREAT SERPL-MCNC: 0.9 MG/DL (ref 0.5–1.4)
DIFFERENTIAL METHOD: ABNORMAL
EOSINOPHIL # BLD AUTO: 0.1 K/UL (ref 0–0.5)
EOSINOPHIL NFR BLD: 1.9 % (ref 0–8)
ERYTHROCYTE [DISTWIDTH] IN BLOOD BY AUTOMATED COUNT: 15 % (ref 11.5–14.5)
EST. GFR  (AFRICAN AMERICAN): >60 ML/MIN/1.73 M^2
EST. GFR  (NON AFRICAN AMERICAN): >60 ML/MIN/1.73 M^2
GLUCOSE SERPL-MCNC: 106 MG/DL (ref 70–110)
HCT VFR BLD AUTO: 30.9 % (ref 40–54)
HGB BLD-MCNC: 10.6 G/DL (ref 14–18)
IMM GRANULOCYTES # BLD AUTO: 0.02 K/UL (ref 0–0.04)
IMM GRANULOCYTES NFR BLD AUTO: 0.4 % (ref 0–0.5)
LYMPHOCYTES # BLD AUTO: 0.8 K/UL (ref 1–4.8)
LYMPHOCYTES NFR BLD: 15.6 % (ref 18–48)
MAGNESIUM SERPL-MCNC: 1.9 MG/DL (ref 1.6–2.6)
MCH RBC QN AUTO: 34.2 PG (ref 27–31)
MCHC RBC AUTO-ENTMCNC: 34.3 G/DL (ref 32–36)
MCV RBC AUTO: 100 FL (ref 82–98)
MONOCYTES # BLD AUTO: 0.9 K/UL (ref 0.3–1)
MONOCYTES NFR BLD: 16.7 % (ref 4–15)
NEUTROPHILS # BLD AUTO: 3.4 K/UL (ref 1.8–7.7)
NEUTROPHILS NFR BLD: 65.2 % (ref 38–73)
NRBC BLD-RTO: 0 /100 WBC
PLATELET # BLD AUTO: 141 K/UL (ref 150–350)
PMV BLD AUTO: 8.4 FL (ref 9.2–12.9)
POTASSIUM SERPL-SCNC: 3.5 MMOL/L (ref 3.5–5.1)
PROT SERPL-MCNC: 6.6 G/DL (ref 6–8.4)
RBC # BLD AUTO: 3.1 M/UL (ref 4.6–6.2)
SODIUM SERPL-SCNC: 141 MMOL/L (ref 136–145)
WBC # BLD AUTO: 5.26 K/UL (ref 3.9–12.7)

## 2020-08-03 PROCEDURE — 85025 COMPLETE CBC W/AUTO DIFF WBC: CPT

## 2020-08-03 PROCEDURE — 83735 ASSAY OF MAGNESIUM: CPT

## 2020-08-03 PROCEDURE — 80053 COMPREHEN METABOLIC PANEL: CPT

## 2020-08-03 PROCEDURE — 36415 COLL VENOUS BLD VENIPUNCTURE: CPT

## 2020-08-03 NOTE — TELEPHONE ENCOUNTER
LVM regarding lab/OV appts for this week in preparation for chemotherapy. Instructed patient to call back ([phone]) regarding appts.

## 2020-08-05 ENCOUNTER — INFUSION (OUTPATIENT)
Dept: INFUSION THERAPY | Facility: HOSPITAL | Age: 65
End: 2020-08-05
Attending: INTERNAL MEDICINE
Payer: MEDICARE

## 2020-08-05 ENCOUNTER — OFFICE VISIT (OUTPATIENT)
Dept: HEMATOLOGY/ONCOLOGY | Facility: CLINIC | Age: 65
End: 2020-08-05
Payer: MEDICARE

## 2020-08-05 VITALS
HEIGHT: 70 IN | TEMPERATURE: 98 F | HEART RATE: 67 BPM | SYSTOLIC BLOOD PRESSURE: 99 MMHG | BODY MASS INDEX: 21.19 KG/M2 | OXYGEN SATURATION: 98 % | WEIGHT: 148 LBS | DIASTOLIC BLOOD PRESSURE: 53 MMHG

## 2020-08-05 VITALS
TEMPERATURE: 98 F | OXYGEN SATURATION: 99 % | DIASTOLIC BLOOD PRESSURE: 70 MMHG | HEIGHT: 70 IN | WEIGHT: 148 LBS | HEART RATE: 63 BPM | BODY MASS INDEX: 21.19 KG/M2 | SYSTOLIC BLOOD PRESSURE: 143 MMHG

## 2020-08-05 DIAGNOSIS — E83.51 HYPOCALCEMIA: ICD-10-CM

## 2020-08-05 DIAGNOSIS — C20 MALIGNANT NEOPLASM OF RECTUM: Primary | ICD-10-CM

## 2020-08-05 DIAGNOSIS — G89.3 NEOPLASM RELATED PAIN: ICD-10-CM

## 2020-08-05 DIAGNOSIS — C20 RECTAL CANCER: Primary | ICD-10-CM

## 2020-08-05 DIAGNOSIS — D64.81 ANEMIA FOLLOWING USE OF CHEMOTHERAPEUTIC DRUG: ICD-10-CM

## 2020-08-05 DIAGNOSIS — I95.89 OTHER SPECIFIED HYPOTENSION: ICD-10-CM

## 2020-08-05 PROCEDURE — 96367 TX/PROPH/DG ADDL SEQ IV INF: CPT

## 2020-08-05 PROCEDURE — 1101F PR PT FALLS ASSESS DOC 0-1 FALLS W/OUT INJ PAST YR: ICD-10-PCS | Mod: CPTII,S$GLB,, | Performed by: INTERNAL MEDICINE

## 2020-08-05 PROCEDURE — 99999 PR PBB SHADOW E&M-EST. PATIENT-LVL IV: CPT | Mod: PBBFAC,,, | Performed by: INTERNAL MEDICINE

## 2020-08-05 PROCEDURE — 99497 ADVNCD CARE PLAN 30 MIN: CPT | Mod: S$GLB,,, | Performed by: INTERNAL MEDICINE

## 2020-08-05 PROCEDURE — 3008F BODY MASS INDEX DOCD: CPT | Mod: CPTII,S$GLB,, | Performed by: INTERNAL MEDICINE

## 2020-08-05 PROCEDURE — 99215 PR OFFICE/OUTPT VISIT, EST, LEVL V, 40-54 MIN: ICD-10-PCS | Mod: S$GLB,,, | Performed by: INTERNAL MEDICINE

## 2020-08-05 PROCEDURE — 99497 PR ADVNCD CARE PLAN 30 MIN: ICD-10-PCS | Mod: S$GLB,,, | Performed by: INTERNAL MEDICINE

## 2020-08-05 PROCEDURE — 3288F FALL RISK ASSESSMENT DOCD: CPT | Mod: CPTII,S$GLB,, | Performed by: INTERNAL MEDICINE

## 2020-08-05 PROCEDURE — 99215 OFFICE O/P EST HI 40 MIN: CPT | Mod: S$GLB,,, | Performed by: INTERNAL MEDICINE

## 2020-08-05 PROCEDURE — 99999 PR PBB SHADOW E&M-EST. PATIENT-LVL IV: ICD-10-PCS | Mod: PBBFAC,,, | Performed by: INTERNAL MEDICINE

## 2020-08-05 PROCEDURE — 1101F PT FALLS ASSESS-DOCD LE1/YR: CPT | Mod: CPTII,S$GLB,, | Performed by: INTERNAL MEDICINE

## 2020-08-05 PROCEDURE — 96416 CHEMO PROLONG INFUSE W/PUMP: CPT

## 2020-08-05 PROCEDURE — 96411 CHEMO IV PUSH ADDL DRUG: CPT

## 2020-08-05 PROCEDURE — 25000003 PHARM REV CODE 250: Performed by: INTERNAL MEDICINE

## 2020-08-05 PROCEDURE — 63600175 PHARM REV CODE 636 W HCPCS: Performed by: INTERNAL MEDICINE

## 2020-08-05 PROCEDURE — 96413 CHEMO IV INFUSION 1 HR: CPT

## 2020-08-05 PROCEDURE — 96415 CHEMO IV INFUSION ADDL HR: CPT

## 2020-08-05 PROCEDURE — 3008F PR BODY MASS INDEX (BMI) DOCUMENTED: ICD-10-PCS | Mod: CPTII,S$GLB,, | Performed by: INTERNAL MEDICINE

## 2020-08-05 PROCEDURE — 3288F PR FALLS RISK ASSESSMENT DOCUMENTED: ICD-10-PCS | Mod: CPTII,S$GLB,, | Performed by: INTERNAL MEDICINE

## 2020-08-05 PROCEDURE — 96368 THER/DIAG CONCURRENT INF: CPT

## 2020-08-05 RX ORDER — SODIUM CHLORIDE 0.9 % (FLUSH) 0.9 %
10 SYRINGE (ML) INJECTION
Status: CANCELLED | OUTPATIENT
Start: 2020-08-05

## 2020-08-05 RX ORDER — EPINEPHRINE 0.3 MG/.3ML
0.3 INJECTION SUBCUTANEOUS ONCE AS NEEDED
Status: DISCONTINUED | OUTPATIENT
Start: 2020-08-05 | End: 2020-08-05 | Stop reason: HOSPADM

## 2020-08-05 RX ORDER — FLUOROURACIL 50 MG/ML
300 INJECTION, SOLUTION INTRAVENOUS
Status: CANCELLED | OUTPATIENT
Start: 2020-08-05

## 2020-08-05 RX ORDER — HEPARIN 100 UNIT/ML
500 SYRINGE INTRAVENOUS
Status: CANCELLED | OUTPATIENT
Start: 2020-08-05

## 2020-08-05 RX ORDER — SODIUM CHLORIDE 0.9 % (FLUSH) 0.9 %
10 SYRINGE (ML) INJECTION
Status: DISCONTINUED | OUTPATIENT
Start: 2020-08-05 | End: 2020-08-05 | Stop reason: HOSPADM

## 2020-08-05 RX ORDER — EPINEPHRINE 0.3 MG/.3ML
0.3 INJECTION SUBCUTANEOUS ONCE AS NEEDED
Status: CANCELLED | OUTPATIENT
Start: 2020-08-05

## 2020-08-05 RX ORDER — HEPARIN 100 UNIT/ML
500 SYRINGE INTRAVENOUS
Status: DISCONTINUED | OUTPATIENT
Start: 2020-08-05 | End: 2020-08-05 | Stop reason: HOSPADM

## 2020-08-05 RX ORDER — MAGNESIUM SULFATE 1 G/100ML
1 INJECTION INTRAVENOUS
Status: COMPLETED | OUTPATIENT
Start: 2020-08-05 | End: 2020-08-05

## 2020-08-05 RX ORDER — OXYCODONE AND ACETAMINOPHEN 10; 325 MG/1; MG/1
1 TABLET ORAL EVERY 8 HOURS PRN
Qty: 60 TABLET | Refills: 0 | Status: SHIPPED | OUTPATIENT
Start: 2020-08-05 | End: 2020-09-02 | Stop reason: SDUPTHER

## 2020-08-05 RX ORDER — DIPHENHYDRAMINE HYDROCHLORIDE 50 MG/ML
50 INJECTION INTRAMUSCULAR; INTRAVENOUS ONCE AS NEEDED
Status: DISCONTINUED | OUTPATIENT
Start: 2020-08-05 | End: 2020-08-05 | Stop reason: HOSPADM

## 2020-08-05 RX ORDER — DIPHENHYDRAMINE HYDROCHLORIDE 50 MG/ML
50 INJECTION INTRAMUSCULAR; INTRAVENOUS ONCE AS NEEDED
Status: CANCELLED | OUTPATIENT
Start: 2020-08-05

## 2020-08-05 RX ORDER — FLUOROURACIL 50 MG/ML
300 INJECTION, SOLUTION INTRAVENOUS
Status: COMPLETED | OUTPATIENT
Start: 2020-08-05 | End: 2020-08-05

## 2020-08-05 RX ADMIN — FLUOROURACIL 3560 MG: 50 INJECTION, SOLUTION INTRAVENOUS at 01:08

## 2020-08-05 RX ADMIN — OXALIPLATIN 107 MG: 5 INJECTION, SOLUTION, CONCENTRATE INTRAVENOUS at 11:08

## 2020-08-05 RX ADMIN — DEXTROSE: 5 SOLUTION INTRAVENOUS at 11:08

## 2020-08-05 RX ADMIN — DEXTROSE MONOHYDRATE 535 MG: 50 INJECTION, SOLUTION INTRAVENOUS at 11:08

## 2020-08-05 RX ADMIN — FLUOROURACIL 535 MG: 50 INJECTION, SOLUTION INTRAVENOUS at 01:08

## 2020-08-05 RX ADMIN — SODIUM CHLORIDE: 0.9 INJECTION, SOLUTION INTRAVENOUS at 09:08

## 2020-08-05 RX ADMIN — MAGNESIUM SULFATE IN DEXTROSE 1 G: 10 INJECTION, SOLUTION INTRAVENOUS at 09:08

## 2020-08-05 RX ADMIN — DEXAMETHASONE SODIUM PHOSPHATE: 4 INJECTION, SOLUTION INTRA-ARTICULAR; INTRALESIONAL; INTRAMUSCULAR; INTRAVENOUS; SOFT TISSUE at 09:08

## 2020-08-05 NOTE — LETTER
August 5, 2020      Jose Lazo MD  [address] [facility] [place] [address]           [facility]  [address] [place] [address]  Phone: [phone]          Patient: Miguel Angel Apple Sr.   MR Number: [id number]   YOB: 1955   Date of Visit: 8/5/2020       Dear Dr. Jose Lazo:    Thank you for referring Miguel Angel Apple to me for evaluation. Attached you will find relevant portions of my assessment and plan of care.    If you have questions, please do not hesitate to call me. I look forward to following Miguel Angel Apple along with you.    Sincerely,    Shayla Stringer MD    Enclosure  CC:  No Recipients    If you would like to receive this communication electronically, please contact [email or web address] or [phone] to request more information on [facility] access.    For providers and/or their staff who would like to refer a patient to [facility], please contact us through our one-stop-shop provider referral line, [facility], at [phone].    If you feel you have received this communication in error or would no longer like to receive these types of communications, please e-mail [email or web address]

## 2020-08-05 NOTE — PLAN OF CARE
"Problem: Adult Inpatient Plan of Care  Goal: Plan of Care Review  Outcome: Ongoing, Progressing  Flowsheets (Taken 8/5/2020 1227)  Plan of Care Reviewed With: patient    BP (!) 110/53 (Patient Position: Sitting)   Pulse 76   Temp 97.7 °F (36.5 °C)   Ht 5' 10" (1.778 m)   Wt 67.1 kg (148 lb)   SpO2 99%   BMI 21.24 kg/m²   Patient here today for folfox/mag. VSS. Port accessed to right chest without difficulty; patent with blood return. Orders released. Pt oriented to unit. Symptoms reviewed. Questions and concerns addressed.     MD note not signed, verbally confirmed that patient was cleared for chemo and needed mag. Dr Stringer verified that patient will receive mag every treatment, unless staff is told otherwise, due to parasthesias.    BP (!) 143/70   Pulse 63   Temp 97.7 °F (36.5 °C)   Ht 5' 10" (1.778 m)   Wt 67.1 kg (148 lb)   SpO2 99%   BMI 21.24 kg/m²   Patient tolerated treatment well. VSS. Port flushed with blood return, initiated 5FU CADD pump at 3.7ml/hr to infuse over 46 hours. Next appt scheduled for 8/7/20 for pump d/c. Ambulates per self.          "

## 2020-08-05 NOTE — PROGRESS NOTES
[facility]     Subjective:      PATIENT:   Miguel Angel Apple Sr.  :    1955  MR#:    [id number]  DATE OF VISIT:  2020    Chief Complaint: rectal adenocarcinoma    Patient ID: Miguel Angel Apple Sr. is a 64 y.o. male with a PMHx of a T3-T4 N1 rectal adenocarcinoma who is present in clinic for C6D1 clearance for FOLFOX q2w for treatment of rectal adenocarcinoma.    On 2019  a laparotomy was performed for perforated colon and patient underwent a right hemicolectomy with a rectal biopsy revealing rectal adenocarcinoma. He is healing from surgery but referred here to discuss treatment options.     2020  Completed concomitant chemo radiation 2020 Oral xeloda 850 mg/m2 Monday through Friday with concomitant radiation to be followed 6 months of folfox through chest port in right chest for colorectal adenocarcinoma with peritoneal seeding   Surgery delayed  due to pandemic  POst IV iron      2026 tamiko keeps cutting out   Records in tumor Board been reviewed surgical oncology notes reviewed patient is aware that chemotherapy is indicated per standard of care whether neoadjuvant her adjuvant.  Today patient will discussed with me the chemo regimen and the side effects that could possibly occur.  He is not having any evidence of bleeding he is eating well no change in his weight     2020   Due for chemo   Positive paresthesias , heartburn, loose stool in bag no explosive diarrhea      2020  right after chemo pt developed oral lesions and pain, severe fatigue increasing fatigues, weakness , positive weight loss     2020  Been doing great since lowering lowering dose. Pt seen in clinic for C6D1 clearance FOLFOX q2w. Pt states that he no longer has mouth pain or paresthesias and stated he bounced back better with lowered dose. Colostomy bag stool has gotten from thinner to thicker and darker in color and a more normal appearance since lowering  dose. No N/V, mouth sores.    07/22/2020  Better since dose reduction, Stoma stretched and he has some bleeding, not perfuse    08/5/2020  BP low today, no dizziness , he reports eating cereal this am then he took his pain meds   Hb dropped from 11.4 to 10     Review of Systems   Constitutional: Negative for activity change, appetite change, chills, fatigue, fever and unexpected weight change.   HENT: Negative for mouth sores and trouble swallowing.    Eyes: Negative for photophobia and visual disturbance.   Respiratory: Negative for cough, chest tightness, shortness of breath, wheezing and stridor.    Cardiovascular: Negative for chest pain and leg swelling.   Gastrointestinal: Negative for abdominal pain, constipation, diarrhea, nausea and vomiting.   Musculoskeletal: Negative for arthralgias, back pain and myalgias.   Skin: Negative for color change, pallor, rash and wound.   Neurological: Negative for syncope, speech difficulty and weakness.   Hematological: Negative for adenopathy. Does not bruise/bleed easily.   Psychiatric/Behavioral: Negative for agitation, behavioral problems, confusion, decreased concentration and dysphoric mood.        Oncology History   Rectal cancer   12/23/2019 Initial Diagnosis    Rectal cancer     4/15/2020 Tumor Conference    Multidisciplinary Rectal Cancer Conference - Evaluation and Recommendation Summary    4/15/2020  Miguel Angel [facility].  [id number]  64 y.o. male    1. Evaluation    63 yo M who presented with abdominal pain, change in bowel habits, weight loss and constipation who progressed to obstipation.     CT 10/19/19: distension of colon and rectum. Large Bowel obstruction.     Flex sig 10/21: fungating obstructing large mass in the rectum. 11 cm from the verge.     10/21/2019: Dr. Lazo at [facility]; LBO with cecal perf, chronic cholecystitis. Ex Lap with sigmoid colectomy with end descending colostomy and mucous fistula, right colectomy and side to side functional end  to end anastomosis, open adama and splenic flex mobilization.     Path: inflammation, no Carcinoma.     MRI 11/19: locally adcanced, CT c/a/p 12/19, PET 12/19: T3-4,     Neoadjuvant CRT: 5040 cGy + 5 FU completed 2/20.       MRI date: 3/20    Tumor Location in Rectum: upper third    Indication of Sphincter Involvement:  Uninvolved    Pretreatment Circumferential Resection Margin (CRM) status:  Not Threatened    Pretreatment (clinical) AJCC Stage: IIA  Stage I  [] I: T1N0M0  [] I: T2N0M0  Stage II  [x] IIA: T3N0M0  [] IIB E5xG1Q7  [] IIC: H0yW8E4  Stage III  [] IIIA: T1-2N1M0  [] IIIA: O0M2aW4  [] IIIB: T3-H2cU1F0  [] IIIB: T2-3N2aM0  [] IIIB: T1-2N2bM0  [] IIIC: B2lC5fL3  [] IIIC: T3-8zK9rD0  [] IIIC: G2iC9-5R8   Stage IV  [] IV: F4-6I3-2B0n-b    CEA level:   Lab Results   Component Value Date    CEA 3.2 04/13/2020       Flex sig 8 weeks after treatment: still some tumor.     2. Treatment Recommendation    Complete Chemotherapy now as patient is already diverted and tolerating the ostomy and given COVID pandemic he would likely need to way at least 3-4 weeks until her gets surgery.      4/27/2020 -  Chemotherapy    Treatment Summary   Plan Name: OP FOLFOX 6 Q2W  Treatment Goal: Control  Status: Active  Start Date: 4/27/2020  End Date: 10/1/2020 (Planned)  Provider: Shayla Stringer MD  Chemotherapy: fluorouraciL injection 710 mg, 400 mg/m2 = 710 mg, Intravenous, Clinic/HOD 1 time, 7 of 12 cycles  Dose modification: 300 mg/m2 (original dose 400 mg/m2, Cycle 5)  Administration: 710 mg (5/13/2020), 710 mg (5/27/2020), 710 mg (6/10/2020), 535 mg (6/24/2020), 535 mg (7/8/2020), 535 mg (7/22/2020)  fluorouraciL 2,400 mg/m2 = 4,270 mg in sodium chloride 0.9% 185.4 mL chemo infusion, 2,400 mg/m2 = 4,270 mg, Intravenous, Over 46 hours, 7 of 12 cycles  Dose modification: 2,000 mg/m2 (original dose 2,400 mg/m2, Cycle 5)  Administration: 4,270 mg (4/27/2020), 4,270 mg (5/13/2020), 4,270 mg (5/27/2020), 4,270 mg (6/10/2020),  3,560 mg (6/24/2020), 3,560 mg (7/8/2020), 3,560 mg (7/22/2020)  leucovorin calcium 400 mg/m2 = 710 mg in dextrose 5 % 250 mL infusion, 400 mg/m2 = 710 mg, Intravenous, Clinic/HOD 1 time, 7 of 12 cycles  Dose modification: 300 mg/m2 (original dose 400 mg/m2, Cycle 5)  Administration: 710 mg (4/27/2020), 710 mg (5/13/2020), 710 mg (5/27/2020), 712 mg (6/10/2020), 534 mg (6/24/2020), 535 mg (7/8/2020), 534 mg (7/22/2020)  oxaliplatin (ELOXATIN) 85 mg/m2 = 151 mg in dextrose 5 % 500 mL chemo infusion, 85 mg/m2 = 151 mg, Intravenous, Clinic/HOD 1 time, 7 of 12 cycles  Dose modification: 60 mg/m2 (original dose 85 mg/m2, Cycle 5)  Administration: 151 mg (4/27/2020), 151 mg (5/13/2020), 151 mg (5/27/2020), 151 mg (6/10/2020), 107 mg (6/24/2020), 107 mg (7/8/2020), 107 mg (7/22/2020)       Lifetime Dose Tracking   No doses have been documented on this patient for the following tracked chemicals: doxorubicin, epirubicin, idarubicin, daunorubicin, mitoxantrone, bleomycin, mitomycin, busulfan     Past Medical History:   Diagnosis Date    Colon cancer     Depression     GERD (gastroesophageal reflux disease)     Medical history reviewed with no changes        Family History   Problem Relation Age of Onset    Diabetes Mother     Heart disease Father        Past Surgical History:   Procedure Laterality Date    CHOLECYSTECTOMY  10/21/2019    Procedure: CHOLECYSTECTOMY;  Surgeon: Jose Lazo MD;  Location: [facility] OR;  Service: General;;    CLOSURE OF WOUND N/A 10/30/2019    Procedure: CLOSURE, WOUND;  Surgeon: Torres Dawkins MD;  Location: [facility] OR;  Service: General;  Laterality: N/A;    COLOSTOMY  10/21/2019    Procedure: CREATION, COLOSTOMY;  Surgeon: Jose Lazo MD;  Location: [facility] OR;  Service: General;;  END DESCENDING COLOSTOMY    FLEXIBLE SIGMOIDOSCOPY N/A 10/21/2019    Procedure: SIGMOIDOSCOPY, FLEXIBLE;  Surgeon: Jose Lazo MD;  Location: [facility];  Service: General;  Laterality:  N/A;    INSERTION OF TUNNELED CENTRAL VENOUS CATHETER (CVC) WITH SUBCUTANEOUS PORT Right 12/23/2019    Procedure: INSERTION, PORT-A-CATH;  Surgeon: Jose Lazo MD;  Location: [facility] OR;  Service: General;  Laterality: Right;    no surgical history      REPAIR OF ABDOMINAL WALL N/A 10/30/2019    Procedure: REPAIR, ABDOMINAL WALL;  Surgeon: Torres Dawkins MD;  Location: [facility] OR;  Service: General;  Laterality: N/A;    RIGHT HEMICOLECTOMY Right 10/21/2019    Procedure: HEMICOLECTOMY, RIGHT;  Surgeon: Jose Lazo MD;  Location: [facility] OR;  Service: General;  Laterality: Right;       Social History     Socioeconomic History    Marital status:      Spouse name: Not on file    Number of children: Not on file    Years of education: Not on file    Highest education level: Not on file   Occupational History    Not on file   Social Needs    Financial resource strain: Not on file    Food insecurity     Worry: Never true     Inability: Never true    Transportation needs     Medical: No     Non-medical: No   Tobacco Use    Smoking status: Former Smoker    Smokeless tobacco: Never Used   Substance and Sexual Activity    Alcohol use: Not Currently    Drug use: Never    Sexual activity: Not Currently   Lifestyle    Physical activity     Days per week: 3 days     Minutes per session: 30 min    Stress: Rather much   Relationships    Social connections     Talks on phone: More than three times a week     Gets together: Patient refused     Attends [employer] service: Not on file     Active member of club or organization: No     Attends meetings of clubs or organizations: Never     Relationship status: Living with partner   Other Topics Concern    Not on file   Social History Narrative    Not on file         Current Outpatient Medications:     citalopram (CELEXA) 10 MG tablet, Take 1 tablet (10 mg total) by mouth once daily., Disp: 30 tablet, Rfl: 11    cyanocobalamin (B-12 DOTS) 500 MCG  "tablet, Take 2 tablets (1,000 mcg total) by mouth once daily., Disp: 60 tablet, Rfl: 0    ondansetron (ZOFRAN) 8 MG tablet, , Disp: , Rfl:     ondansetron (ZOFRAN-ODT) 8 MG TbDL, Take 1 tablet (8 mg total) by mouth every 8 (eight) hours as needed., Disp: 90 tablet, Rfl: 3    oxyCODONE-acetaminophen (PERCOCET)  mg per tablet, Take 1 tablet by mouth every 8 (eight) hours as needed for Pain., Disp: 60 tablet, Rfl: 0    pantoprazole (PROTONIX) 40 MG tablet, Take 1 tablet (40 mg total) by mouth once daily., Disp: 30 tablet, Rfl: 11    pyridoxine, vitamin B6, (VITAMIN B-6) 100 MG Tab, Take 1 tablet (100 mg total) by mouth once daily., Disp: 30 tablet, Rfl: 0    ondansetron (ZOFRAN) 4 MG tablet, Take 1 tablet (4 mg total) by mouth every 6 (six) hours as needed for Nausea. (Patient not taking: Reported on 8/5/2020), Disp: 24 tablet, Rfl: 1    Review of patient's allergies indicates:  No Known Allergies  All medications and past history have been reviewed.    Objective:      Vitals:  BP (!) 99/53   Pulse 67   Temp 97.7 °F (36.5 °C) (Tympanic)   Ht 5' 10" (1.778 m)   Wt 67.1 kg (148 lb)   SpO2 98%   BMI 21.24 kg/m²    Body surface area is 1.82 meters squared.    Weight Readings:  Wt Readings from Last 5 Encounters:   08/05/20 67.1 kg (148 lb)   07/22/20 69.4 kg (153 lb)   07/14/20 66.5 kg (146 lb 9.6 oz)   07/07/20 68.7 kg (151 lb 7.3 oz)   06/23/20 67.9 kg (149 lb 11.1 oz)      Blood Type:  B NEG     Physical Exam  Constitutional:       General: He is not in acute distress.     Appearance: Normal appearance. He is not ill-appearing.   HENT:      Head: Normocephalic and atraumatic.      Right Ear: External ear normal.      Left Ear: External ear normal.      Nose: Nose normal. No congestion or rhinorrhea.   Eyes:      General:         Right eye: No discharge.         Left eye: No discharge.      Extraocular Movements: Extraocular movements intact.      Conjunctiva/sclera: Conjunctivae normal.      Pupils: " Pupils are equal, round, and reactive to light.   Neck:      Musculoskeletal: Normal range of motion and neck supple. No neck rigidity.   Cardiovascular:      Rate and Rhythm: Normal rate and regular rhythm.      Heart sounds: Normal heart sounds. No murmur.   Pulmonary:      Effort: Pulmonary effort is normal. No respiratory distress.      Breath sounds: Normal breath sounds. No stridor. No wheezing, rhonchi or rales.   Abdominal:      General: Bowel sounds are normal. There is no distension.      Palpations: Abdomen is soft.      Tenderness: There is no abdominal tenderness. There is no guarding.   Musculoskeletal: Normal range of motion.         General: No deformity.      Right lower leg: No edema.      Left lower leg: No edema.   Lymphadenopathy:      Cervical: No cervical adenopathy.   Skin:     General: Skin is warm and dry.      Coloration: Skin is not pale.      Findings: No erythema or rash.   Neurological:      General: No focal deficit present.      Mental Status: He is alert and oriented to person, place, and time. Mental status is at baseline.      Cranial Nerves: No cranial nerve deficit.   Psychiatric:         Mood and Affect: Mood normal.         Behavior: Behavior normal.         Thought Content: Thought content normal.         Judgment: Judgment normal.       Labs:  Lab Results   Component Value Date    WBC 5.26 08/03/2020    WBC 5.67 07/20/2020    WBC 6.00 07/06/2020    RBC 3.10 (L) 08/03/2020    RBC 3.41 (L) 07/20/2020    RBC 3.91 (L) 07/06/2020    HGB 10.6 (L) 08/03/2020    HGB 11.4 (L) 07/20/2020    HGB 13.0 (L) 07/06/2020    HCT 30.9 (L) 08/03/2020    HCT 33.0 (L) 07/20/2020    HCT 37.5 (L) 07/06/2020     (H) 08/03/2020    MCV 97 07/20/2020    MCV 96 07/06/2020     (L) 08/03/2020     07/20/2020     07/06/2020    MCH 34.2 (H) 08/03/2020    MCH 33.4 (H) 07/20/2020    MCH 33.2 (H) 07/06/2020    MCHC 34.3 08/03/2020    MCHC 34.5 07/20/2020    MCHC 34.7 07/06/2020     RDW 15.0 (H) 08/03/2020    RDW 14.5 07/20/2020    RDW 14.1 07/06/2020     Lab Results   Component Value Date     08/03/2020     07/20/2020     07/06/2020    K 3.5 08/03/2020    K 3.5 07/20/2020    K 4.0 07/06/2020     08/03/2020     07/20/2020     07/06/2020    CO2 26 08/03/2020    CO2 23 07/20/2020    CO2 26 07/06/2020    BUN 11 08/03/2020    BUN 12 07/20/2020    BUN 8 07/06/2020    CREATININE 0.9 08/03/2020    CREATININE 0.9 07/20/2020    CREATININE 1.0 07/06/2020     08/03/2020     07/20/2020     (H) 07/06/2020    CALCIUM 8.6 (L) 08/03/2020    CALCIUM 8.5 (L) 07/20/2020    CALCIUM 8.9 07/06/2020    MG 1.9 08/03/2020    MG 1.9 07/20/2020    MG 2.0 07/06/2020    PHOS 3.1 10/28/2019    PHOS 3.4 10/27/2019    PHOS 4.0 10/24/2019    ALKPHOS 66 08/03/2020    ALKPHOS 79 07/20/2020    ALKPHOS 68 07/06/2020    PROT 6.6 08/03/2020    PROT 6.9 07/20/2020    PROT 7.1 07/06/2020    ALBUMIN 3.5 08/03/2020    ALBUMIN 3.7 07/20/2020    ALBUMIN 3.7 07/06/2020    BILITOT 0.5 08/03/2020    BILITOT 0.5 07/20/2020    BILITOT 0.7 07/06/2020    AST 17 08/03/2020    AST 21 07/20/2020    AST 33 07/06/2020    ALT 15 08/03/2020    ALT 26 07/20/2020    ALT 22 07/06/2020     Lab Results   Component Value Date    CEA 4.0 07/20/2020    CEA 3.1 05/12/2020    CEA 3.2 04/13/2020      All lab results and imaging results have been reviewed and discussed with the patient.     Assessment and Plan:      Problem List Items Addressed This Visit        Oncology    Malignant neoplasm of rectum - Primary    Relevant Orders    Iron and TIBC    CBC auto differential    Comprehensive metabolic panel      Other Visit Diagnoses     Other specified hypotension        Anemia following use of chemotherapeutic drug        Hypocalcemia               increase calcium intake and hydration  Continue at dose reduction   opioid refill uinitiated   Increase exercise and BMI to build muscle mass    Health Maintenance  Summary    PROSTATE-SPECIFIC ANTIGEN Overdue 1955    Hepatitis C Screening Overdue 1955    Lipid Panel Overdue 1955    HIV Screening Overdue 10/27/1969    TETANUS VACCINE Overdue 10/27/1972    Shingles Vaccine Overdue 10/27/2004    Colorectal Cancer Screening Overdue 10/22/2019    Pneumococcal Vaccine (65+ High/Highest Risk) Overdue 10/27/2019    Influenza Vaccine Next Due 9/1/2020          Advance Care Planning     Living Will  During this visit, I engaged the patient  in the advance care planning process.  The patient and I reviewed the role for advance directives and their purpose in directing future healthcare if the patient's unable to speak for him/herself.  At this point in time, the patient does have full decision-making capacity.  We discussed different extreme health states that he could experience, and reviewed what kind of medical care he would want in those situations.  The patient communicated that if he were comatose and had little chance of a meaningful recovery, he would want machines/life-sustaining treatments used.    I spent a total of  30  minutes engaging the patient in this advance care planning discussion.

## 2020-08-07 ENCOUNTER — INFUSION (OUTPATIENT)
Dept: INFUSION THERAPY | Facility: HOSPITAL | Age: 65
End: 2020-08-07
Attending: INTERNAL MEDICINE
Payer: MEDICARE

## 2020-08-07 VITALS
DIASTOLIC BLOOD PRESSURE: 50 MMHG | HEART RATE: 85 BPM | OXYGEN SATURATION: 93 % | TEMPERATURE: 97 F | SYSTOLIC BLOOD PRESSURE: 86 MMHG

## 2020-08-07 DIAGNOSIS — C20 RECTAL CANCER: Primary | ICD-10-CM

## 2020-08-07 PROCEDURE — 25000003 PHARM REV CODE 250: Performed by: INTERNAL MEDICINE

## 2020-08-07 PROCEDURE — A4216 STERILE WATER/SALINE, 10 ML: HCPCS | Performed by: INTERNAL MEDICINE

## 2020-08-07 PROCEDURE — 63600175 PHARM REV CODE 636 W HCPCS: Performed by: INTERNAL MEDICINE

## 2020-08-07 RX ORDER — SODIUM CHLORIDE 0.9 % (FLUSH) 0.9 %
10 SYRINGE (ML) INJECTION
Status: DISCONTINUED | OUTPATIENT
Start: 2020-08-07 | End: 2020-08-07 | Stop reason: HOSPADM

## 2020-08-07 RX ORDER — HEPARIN 100 UNIT/ML
500 SYRINGE INTRAVENOUS
Status: DISCONTINUED | OUTPATIENT
Start: 2020-08-07 | End: 2020-08-07 | Stop reason: HOSPADM

## 2020-08-07 RX ADMIN — HEPARIN 500 UNITS: 100 SYRINGE at 12:08

## 2020-08-07 RX ADMIN — Medication 10 ML: at 12:08

## 2020-08-07 NOTE — PLAN OF CARE
Problem: Adult Inpatient Plan of Care  Goal: Plan of Care Review  Outcome: Ongoing, Progressing  Flowsheets (Taken 8/7/2020 1250)  Plan of Care Reviewed With: patient    BP (!) 86/50   Pulse 85   Temp 97.2 °F (36.2 °C)   SpO2 (!) 93%   Patient arrived to [facility] today for discontinuation of chemotherapy pump. Pump d/c. Port was flushed with positive blood return and heparin locked. Port was then deaccessed with no blood noted and covered with bandaid. AVS refused; uses patient portal. Verbally verified appts.    Advised patient to increase fluids and salt intake today due to low blood pressure. It was low on Wednesday as well.

## 2020-08-17 PROBLEM — Z09 ONCOLOGY FOLLOW-UP ENCOUNTER: Status: RESOLVED | Noted: 2020-05-13 | Resolved: 2020-08-17

## 2020-08-18 ENCOUNTER — LAB VISIT (OUTPATIENT)
Dept: LAB | Facility: HOSPITAL | Age: 65
End: 2020-08-18
Attending: INTERNAL MEDICINE
Payer: MEDICARE

## 2020-08-18 DIAGNOSIS — C20 MALIGNANT NEOPLASM OF RECTUM: ICD-10-CM

## 2020-08-18 LAB
ALBUMIN SERPL BCP-MCNC: 3.8 G/DL (ref 3.5–5.2)
ALP SERPL-CCNC: 68 U/L (ref 55–135)
ALT SERPL W/O P-5'-P-CCNC: 18 U/L (ref 10–44)
ANION GAP SERPL CALC-SCNC: 9 MMOL/L (ref 8–16)
AST SERPL-CCNC: 18 U/L (ref 10–40)
BASOPHILS # BLD AUTO: 0.02 K/UL (ref 0–0.2)
BASOPHILS NFR BLD: 0.3 % (ref 0–1.9)
BILIRUB SERPL-MCNC: 0.3 MG/DL (ref 0.1–1)
BUN SERPL-MCNC: 14 MG/DL (ref 8–23)
CALCIUM SERPL-MCNC: 8.9 MG/DL (ref 8.7–10.5)
CHLORIDE SERPL-SCNC: 106 MMOL/L (ref 95–110)
CO2 SERPL-SCNC: 26 MMOL/L (ref 23–29)
CREAT SERPL-MCNC: 0.8 MG/DL (ref 0.5–1.4)
DIFFERENTIAL METHOD: ABNORMAL
EOSINOPHIL # BLD AUTO: 0.1 K/UL (ref 0–0.5)
EOSINOPHIL NFR BLD: 1 % (ref 0–8)
ERYTHROCYTE [DISTWIDTH] IN BLOOD BY AUTOMATED COUNT: 14.6 % (ref 11.5–14.5)
EST. GFR  (AFRICAN AMERICAN): >60 ML/MIN/1.73 M^2
EST. GFR  (NON AFRICAN AMERICAN): >60 ML/MIN/1.73 M^2
GLUCOSE SERPL-MCNC: 99 MG/DL (ref 70–110)
HCT VFR BLD AUTO: 33.1 % (ref 40–54)
HGB BLD-MCNC: 11.3 G/DL (ref 14–18)
IMM GRANULOCYTES # BLD AUTO: 0.05 K/UL (ref 0–0.04)
IMM GRANULOCYTES NFR BLD AUTO: 0.6 % (ref 0–0.5)
IRON SERPL-MCNC: 100 UG/DL (ref 45–160)
LYMPHOCYTES # BLD AUTO: 1 K/UL (ref 1–4.8)
LYMPHOCYTES NFR BLD: 12.8 % (ref 18–48)
MCH RBC QN AUTO: 34.7 PG (ref 27–31)
MCHC RBC AUTO-ENTMCNC: 34.1 G/DL (ref 32–36)
MCV RBC AUTO: 102 FL (ref 82–98)
MONOCYTES # BLD AUTO: 1 K/UL (ref 0.3–1)
MONOCYTES NFR BLD: 13.3 % (ref 4–15)
NEUTROPHILS # BLD AUTO: 5.6 K/UL (ref 1.8–7.7)
NEUTROPHILS NFR BLD: 72 % (ref 38–73)
NRBC BLD-RTO: 0 /100 WBC
PLATELET # BLD AUTO: 170 K/UL (ref 150–350)
PMV BLD AUTO: 8.2 FL (ref 9.2–12.9)
POTASSIUM SERPL-SCNC: 3.4 MMOL/L (ref 3.5–5.1)
PROT SERPL-MCNC: 7 G/DL (ref 6–8.4)
RBC # BLD AUTO: 3.26 M/UL (ref 4.6–6.2)
SATURATED IRON: 29 % (ref 20–50)
SODIUM SERPL-SCNC: 141 MMOL/L (ref 136–145)
TOTAL IRON BINDING CAPACITY: 349 UG/DL (ref 250–450)
TRANSFERRIN SERPL-MCNC: 236 MG/DL (ref 200–375)
WBC # BLD AUTO: 7.8 K/UL (ref 3.9–12.7)

## 2020-08-18 PROCEDURE — 85025 COMPLETE CBC W/AUTO DIFF WBC: CPT

## 2020-08-18 PROCEDURE — 83540 ASSAY OF IRON: CPT

## 2020-08-18 PROCEDURE — 36415 COLL VENOUS BLD VENIPUNCTURE: CPT

## 2020-08-18 PROCEDURE — 80053 COMPREHEN METABOLIC PANEL: CPT

## 2020-08-19 ENCOUNTER — OFFICE VISIT (OUTPATIENT)
Dept: HEMATOLOGY/ONCOLOGY | Facility: CLINIC | Age: 65
End: 2020-08-19
Payer: MEDICARE

## 2020-08-19 ENCOUNTER — INFUSION (OUTPATIENT)
Dept: INFUSION THERAPY | Facility: HOSPITAL | Age: 65
End: 2020-08-19
Attending: INTERNAL MEDICINE
Payer: MEDICARE

## 2020-08-19 VITALS
BODY MASS INDEX: 20.9 KG/M2 | HEART RATE: 74 BPM | OXYGEN SATURATION: 98 % | WEIGHT: 146 LBS | SYSTOLIC BLOOD PRESSURE: 103 MMHG | TEMPERATURE: 98 F | HEIGHT: 70 IN | DIASTOLIC BLOOD PRESSURE: 61 MMHG

## 2020-08-19 VITALS
RESPIRATION RATE: 18 BRPM | BODY MASS INDEX: 20.9 KG/M2 | WEIGHT: 146 LBS | DIASTOLIC BLOOD PRESSURE: 63 MMHG | OXYGEN SATURATION: 99 % | HEART RATE: 62 BPM | HEIGHT: 70 IN | SYSTOLIC BLOOD PRESSURE: 132 MMHG

## 2020-08-19 DIAGNOSIS — R58 ECCHYMOSIS: ICD-10-CM

## 2020-08-19 DIAGNOSIS — C20 RECTAL CANCER: Primary | ICD-10-CM

## 2020-08-19 DIAGNOSIS — Z09 ONCOLOGY FOLLOW-UP ENCOUNTER: ICD-10-CM

## 2020-08-19 DIAGNOSIS — E53.8 B12 DEFICIENCY: ICD-10-CM

## 2020-08-19 DIAGNOSIS — C20 RECTAL ADENOCARCINOMA: ICD-10-CM

## 2020-08-19 DIAGNOSIS — Z09 CHEMOTHERAPY FOLLOW-UP EXAMINATION: ICD-10-CM

## 2020-08-19 DIAGNOSIS — C20 MALIGNANT NEOPLASM OF RECTUM: Primary | ICD-10-CM

## 2020-08-19 PROCEDURE — 3008F PR BODY MASS INDEX (BMI) DOCUMENTED: ICD-10-PCS | Mod: CPTII,S$GLB,, | Performed by: INTERNAL MEDICINE

## 2020-08-19 PROCEDURE — 96416 CHEMO PROLONG INFUSE W/PUMP: CPT

## 2020-08-19 PROCEDURE — 96411 CHEMO IV PUSH ADDL DRUG: CPT

## 2020-08-19 PROCEDURE — 99999 PR PBB SHADOW E&M-EST. PATIENT-LVL III: ICD-10-PCS | Mod: PBBFAC,,, | Performed by: INTERNAL MEDICINE

## 2020-08-19 PROCEDURE — 96413 CHEMO IV INFUSION 1 HR: CPT

## 2020-08-19 PROCEDURE — 99999 PR PBB SHADOW E&M-EST. PATIENT-LVL III: CPT | Mod: PBBFAC,,, | Performed by: INTERNAL MEDICINE

## 2020-08-19 PROCEDURE — 1101F PR PT FALLS ASSESS DOC 0-1 FALLS W/OUT INJ PAST YR: ICD-10-PCS | Mod: CPTII,S$GLB,, | Performed by: INTERNAL MEDICINE

## 2020-08-19 PROCEDURE — 96368 THER/DIAG CONCURRENT INF: CPT

## 2020-08-19 PROCEDURE — 25000003 PHARM REV CODE 250: Performed by: INTERNAL MEDICINE

## 2020-08-19 PROCEDURE — 99215 PR OFFICE/OUTPT VISIT, EST, LEVL V, 40-54 MIN: ICD-10-PCS | Mod: S$GLB,,, | Performed by: INTERNAL MEDICINE

## 2020-08-19 PROCEDURE — 99215 OFFICE O/P EST HI 40 MIN: CPT | Mod: S$GLB,,, | Performed by: INTERNAL MEDICINE

## 2020-08-19 PROCEDURE — 3288F PR FALLS RISK ASSESSMENT DOCUMENTED: ICD-10-PCS | Mod: CPTII,S$GLB,, | Performed by: INTERNAL MEDICINE

## 2020-08-19 PROCEDURE — 3288F FALL RISK ASSESSMENT DOCD: CPT | Mod: CPTII,S$GLB,, | Performed by: INTERNAL MEDICINE

## 2020-08-19 PROCEDURE — 1101F PT FALLS ASSESS-DOCD LE1/YR: CPT | Mod: CPTII,S$GLB,, | Performed by: INTERNAL MEDICINE

## 2020-08-19 PROCEDURE — 3008F BODY MASS INDEX DOCD: CPT | Mod: CPTII,S$GLB,, | Performed by: INTERNAL MEDICINE

## 2020-08-19 PROCEDURE — 63600175 PHARM REV CODE 636 W HCPCS: Performed by: INTERNAL MEDICINE

## 2020-08-19 PROCEDURE — 96415 CHEMO IV INFUSION ADDL HR: CPT

## 2020-08-19 PROCEDURE — 96367 TX/PROPH/DG ADDL SEQ IV INF: CPT

## 2020-08-19 RX ORDER — SODIUM CHLORIDE 0.9 % (FLUSH) 0.9 %
10 SYRINGE (ML) INJECTION
Status: DISCONTINUED | OUTPATIENT
Start: 2020-08-19 | End: 2020-08-19 | Stop reason: HOSPADM

## 2020-08-19 RX ORDER — HEPARIN 100 UNIT/ML
500 SYRINGE INTRAVENOUS
Status: DISCONTINUED | OUTPATIENT
Start: 2020-08-19 | End: 2020-08-19 | Stop reason: HOSPADM

## 2020-08-19 RX ORDER — FLUOROURACIL 50 MG/ML
300 INJECTION, SOLUTION INTRAVENOUS
Status: COMPLETED | OUTPATIENT
Start: 2020-08-19 | End: 2020-08-19

## 2020-08-19 RX ORDER — SODIUM CHLORIDE 0.9 % (FLUSH) 0.9 %
10 SYRINGE (ML) INJECTION
Status: CANCELLED | OUTPATIENT
Start: 2020-08-19

## 2020-08-19 RX ORDER — DIPHENHYDRAMINE HYDROCHLORIDE 50 MG/ML
50 INJECTION INTRAMUSCULAR; INTRAVENOUS ONCE AS NEEDED
Status: DISCONTINUED | OUTPATIENT
Start: 2020-08-19 | End: 2020-08-19 | Stop reason: HOSPADM

## 2020-08-19 RX ORDER — MAGNESIUM SULFATE 1 G/100ML
1 INJECTION INTRAVENOUS
Status: COMPLETED | OUTPATIENT
Start: 2020-08-19 | End: 2020-08-19

## 2020-08-19 RX ORDER — HEPARIN 100 UNIT/ML
500 SYRINGE INTRAVENOUS
Status: CANCELLED | OUTPATIENT
Start: 2020-08-19

## 2020-08-19 RX ORDER — FLUOROURACIL 50 MG/ML
300 INJECTION, SOLUTION INTRAVENOUS
Status: CANCELLED | OUTPATIENT
Start: 2020-08-19

## 2020-08-19 RX ORDER — DIPHENHYDRAMINE HYDROCHLORIDE 50 MG/ML
50 INJECTION INTRAMUSCULAR; INTRAVENOUS ONCE AS NEEDED
Status: CANCELLED | OUTPATIENT
Start: 2020-08-19

## 2020-08-19 RX ORDER — EPINEPHRINE 0.3 MG/.3ML
0.3 INJECTION SUBCUTANEOUS ONCE AS NEEDED
Status: CANCELLED | OUTPATIENT
Start: 2020-08-19

## 2020-08-19 RX ORDER — EPINEPHRINE 0.3 MG/.3ML
0.3 INJECTION SUBCUTANEOUS ONCE AS NEEDED
Status: DISCONTINUED | OUTPATIENT
Start: 2020-08-19 | End: 2020-08-19 | Stop reason: HOSPADM

## 2020-08-19 RX ADMIN — FLUOROURACIL 535 MG: 50 INJECTION, SOLUTION INTRAVENOUS at 01:08

## 2020-08-19 RX ADMIN — FLUOROURACIL 3560 MG: 50 INJECTION, SOLUTION INTRAVENOUS at 01:08

## 2020-08-19 RX ADMIN — MAGNESIUM SULFATE IN DEXTROSE 1 G: 10 INJECTION, SOLUTION INTRAVENOUS at 10:08

## 2020-08-19 RX ADMIN — DEXTROSE MONOHYDRATE 535 MG: 50 INJECTION, SOLUTION INTRAVENOUS at 11:08

## 2020-08-19 RX ADMIN — OXALIPLATIN 107 MG: 5 INJECTION, SOLUTION INTRAVENOUS at 11:08

## 2020-08-19 RX ADMIN — DEXAMETHASONE SODIUM PHOSPHATE: 4 INJECTION, SOLUTION INTRA-ARTICULAR; INTRALESIONAL; INTRAMUSCULAR; INTRAVENOUS; SOFT TISSUE at 09:08

## 2020-08-19 RX ADMIN — DEXTROSE MONOHYDRATE: 50 INJECTION, SOLUTION INTRAVENOUS at 11:08

## 2020-08-19 NOTE — DISCHARGE INSTRUCTIONS
Increase calcium intake, such as supplements. Some foods that are calcium enriched are milk, yogurt, cheese, soybeans, green leafy vegtables.  Increase hydration/fluids. Hydrating fluids are water, juice, most fruits, electrolyte drinks. Try to avoid caffeine containing products, such as coffee, cokes, energy drinks.

## 2020-08-19 NOTE — PLAN OF CARE
Problem: Adult Inpatient Plan of Care  Goal: Plan of Care Review  Outcome: Ongoing, Progressing  Flowsheets (Taken 8/19/2020 0930)  Plan of Care Reviewed With: patient    Patient here today for folfox/mag bolus. VSS. Port accessed to right chest without difficulty; patent with blood return. Orders released. Pt oriented to unit. Symptoms reviewed. Questions and concerns addressed.     /63   Pulse 62   Resp 18   SpO2 99%   Patient tolerated treatment well. VSS. Port flushed with positive blood return; 5FU CADD pump initiated at 3.7ml/hr over the next 46 hours. Next appt scheduled for 8/21 @ 1200. Ambulates per self.

## 2020-08-21 ENCOUNTER — INFUSION (OUTPATIENT)
Dept: INFUSION THERAPY | Facility: HOSPITAL | Age: 65
End: 2020-08-21
Attending: INTERNAL MEDICINE
Payer: MEDICARE

## 2020-08-21 VITALS — DIASTOLIC BLOOD PRESSURE: 51 MMHG | SYSTOLIC BLOOD PRESSURE: 111 MMHG | HEART RATE: 65 BPM | OXYGEN SATURATION: 98 %

## 2020-08-21 DIAGNOSIS — C20 RECTAL CANCER: Primary | ICD-10-CM

## 2020-08-21 PROCEDURE — 25000003 PHARM REV CODE 250: Performed by: INTERNAL MEDICINE

## 2020-08-21 PROCEDURE — A4216 STERILE WATER/SALINE, 10 ML: HCPCS | Performed by: INTERNAL MEDICINE

## 2020-08-21 PROCEDURE — 63600175 PHARM REV CODE 636 W HCPCS: Performed by: INTERNAL MEDICINE

## 2020-08-21 RX ORDER — HEPARIN 100 UNIT/ML
500 SYRINGE INTRAVENOUS
Status: DISCONTINUED | OUTPATIENT
Start: 2020-08-21 | End: 2020-08-21 | Stop reason: HOSPADM

## 2020-08-21 RX ORDER — SODIUM CHLORIDE 0.9 % (FLUSH) 0.9 %
10 SYRINGE (ML) INJECTION
Status: DISCONTINUED | OUTPATIENT
Start: 2020-08-21 | End: 2020-08-21 | Stop reason: HOSPADM

## 2020-08-21 RX ADMIN — HEPARIN 500 UNITS: 100 SYRINGE at 12:08

## 2020-08-21 RX ADMIN — Medication 10 ML: at 12:08

## 2020-08-31 ENCOUNTER — LAB VISIT (OUTPATIENT)
Dept: LAB | Facility: HOSPITAL | Age: 65
End: 2020-08-31
Attending: INTERNAL MEDICINE
Payer: MEDICARE

## 2020-08-31 DIAGNOSIS — C20 MALIGNANT NEOPLASM OF RECTUM: ICD-10-CM

## 2020-08-31 LAB
ALBUMIN SERPL BCP-MCNC: 3.6 G/DL (ref 3.5–5.2)
ALP SERPL-CCNC: 71 U/L (ref 55–135)
ALT SERPL W/O P-5'-P-CCNC: 18 U/L (ref 10–44)
ANION GAP SERPL CALC-SCNC: 9 MMOL/L (ref 8–16)
AST SERPL-CCNC: 21 U/L (ref 10–40)
BASOPHILS # BLD AUTO: 0.02 K/UL (ref 0–0.2)
BASOPHILS NFR BLD: 0.4 % (ref 0–1.9)
BILIRUB SERPL-MCNC: 0.3 MG/DL (ref 0.1–1)
BUN SERPL-MCNC: 9 MG/DL (ref 8–23)
CALCIUM SERPL-MCNC: 8.6 MG/DL (ref 8.7–10.5)
CHLORIDE SERPL-SCNC: 105 MMOL/L (ref 95–110)
CO2 SERPL-SCNC: 25 MMOL/L (ref 23–29)
CREAT SERPL-MCNC: 0.9 MG/DL (ref 0.5–1.4)
DIFFERENTIAL METHOD: ABNORMAL
EOSINOPHIL # BLD AUTO: 0.1 K/UL (ref 0–0.5)
EOSINOPHIL NFR BLD: 1.5 % (ref 0–8)
ERYTHROCYTE [DISTWIDTH] IN BLOOD BY AUTOMATED COUNT: 13.9 % (ref 11.5–14.5)
EST. GFR  (AFRICAN AMERICAN): >60 ML/MIN/1.73 M^2
EST. GFR  (NON AFRICAN AMERICAN): >60 ML/MIN/1.73 M^2
GLUCOSE SERPL-MCNC: 106 MG/DL (ref 70–110)
HCT VFR BLD AUTO: 33.6 % (ref 40–54)
HGB BLD-MCNC: 11.2 G/DL (ref 14–18)
IMM GRANULOCYTES # BLD AUTO: 0.02 K/UL (ref 0–0.04)
IMM GRANULOCYTES NFR BLD AUTO: 0.4 % (ref 0–0.5)
LYMPHOCYTES # BLD AUTO: 0.7 K/UL (ref 1–4.8)
LYMPHOCYTES NFR BLD: 13 % (ref 18–48)
MCH RBC QN AUTO: 34.7 PG (ref 27–31)
MCHC RBC AUTO-ENTMCNC: 33.3 G/DL (ref 32–36)
MCV RBC AUTO: 104 FL (ref 82–98)
MONOCYTES # BLD AUTO: 0.9 K/UL (ref 0.3–1)
MONOCYTES NFR BLD: 15.8 % (ref 4–15)
NEUTROPHILS # BLD AUTO: 3.7 K/UL (ref 1.8–7.7)
NEUTROPHILS NFR BLD: 68.9 % (ref 38–73)
NRBC BLD-RTO: 0 /100 WBC
PLATELET # BLD AUTO: 139 K/UL (ref 150–350)
PMV BLD AUTO: 8.7 FL (ref 9.2–12.9)
POTASSIUM SERPL-SCNC: 3.6 MMOL/L (ref 3.5–5.1)
PROT SERPL-MCNC: 6.6 G/DL (ref 6–8.4)
RBC # BLD AUTO: 3.23 M/UL (ref 4.6–6.2)
SODIUM SERPL-SCNC: 139 MMOL/L (ref 136–145)
WBC # BLD AUTO: 5.37 K/UL (ref 3.9–12.7)

## 2020-08-31 PROCEDURE — 80053 COMPREHEN METABOLIC PANEL: CPT

## 2020-08-31 PROCEDURE — 36415 COLL VENOUS BLD VENIPUNCTURE: CPT

## 2020-08-31 PROCEDURE — 85025 COMPLETE CBC W/AUTO DIFF WBC: CPT

## 2020-08-31 PROCEDURE — 82378 CARCINOEMBRYONIC ANTIGEN: CPT

## 2020-09-01 LAB — CEA SERPL-MCNC: 4.2 NG/ML (ref 0–5)

## 2020-09-02 ENCOUNTER — INFUSION (OUTPATIENT)
Dept: INFUSION THERAPY | Facility: HOSPITAL | Age: 65
End: 2020-09-02
Attending: INTERNAL MEDICINE
Payer: MEDICARE

## 2020-09-02 ENCOUNTER — OFFICE VISIT (OUTPATIENT)
Dept: HEMATOLOGY/ONCOLOGY | Facility: CLINIC | Age: 65
End: 2020-09-02
Payer: MEDICARE

## 2020-09-02 VITALS
TEMPERATURE: 97 F | OXYGEN SATURATION: 98 % | HEIGHT: 70 IN | BODY MASS INDEX: 21.64 KG/M2 | SYSTOLIC BLOOD PRESSURE: 97 MMHG | WEIGHT: 151.13 LBS | HEART RATE: 75 BPM | DIASTOLIC BLOOD PRESSURE: 56 MMHG

## 2020-09-02 DIAGNOSIS — G89.3 NEOPLASM RELATED PAIN: ICD-10-CM

## 2020-09-02 DIAGNOSIS — C20 RECTAL CANCER: Primary | ICD-10-CM

## 2020-09-02 DIAGNOSIS — I95.9 HYPOTENSION, UNSPECIFIED HYPOTENSION TYPE: ICD-10-CM

## 2020-09-02 DIAGNOSIS — Z09 CHEMOTHERAPY FOLLOW-UP EXAMINATION: ICD-10-CM

## 2020-09-02 DIAGNOSIS — Z51.11 ENCOUNTER FOR CHEMOTHERAPY MANAGEMENT: ICD-10-CM

## 2020-09-02 DIAGNOSIS — E88.09 HYPOALBUMINEMIA: ICD-10-CM

## 2020-09-02 DIAGNOSIS — C20 MALIGNANT NEOPLASM OF RECTUM: Primary | ICD-10-CM

## 2020-09-02 PROCEDURE — 1101F PT FALLS ASSESS-DOCD LE1/YR: CPT | Mod: CPTII,S$GLB,, | Performed by: INTERNAL MEDICINE

## 2020-09-02 PROCEDURE — 1125F PR PAIN SEVERITY QUANTIFIED, PAIN PRESENT: ICD-10-PCS | Mod: S$GLB,,, | Performed by: INTERNAL MEDICINE

## 2020-09-02 PROCEDURE — 96367 TX/PROPH/DG ADDL SEQ IV INF: CPT

## 2020-09-02 PROCEDURE — 96416 CHEMO PROLONG INFUSE W/PUMP: CPT

## 2020-09-02 PROCEDURE — 3008F BODY MASS INDEX DOCD: CPT | Mod: CPTII,S$GLB,, | Performed by: INTERNAL MEDICINE

## 2020-09-02 PROCEDURE — 99999 PR PBB SHADOW E&M-EST. PATIENT-LVL IV: CPT | Mod: PBBFAC,,, | Performed by: INTERNAL MEDICINE

## 2020-09-02 PROCEDURE — 99214 PR OFFICE/OUTPT VISIT, EST, LEVL IV, 30-39 MIN: ICD-10-PCS | Mod: S$GLB,,, | Performed by: INTERNAL MEDICINE

## 2020-09-02 PROCEDURE — 99214 OFFICE O/P EST MOD 30 MIN: CPT | Mod: S$GLB,,, | Performed by: INTERNAL MEDICINE

## 2020-09-02 PROCEDURE — 96413 CHEMO IV INFUSION 1 HR: CPT

## 2020-09-02 PROCEDURE — 96411 CHEMO IV PUSH ADDL DRUG: CPT

## 2020-09-02 PROCEDURE — 96368 THER/DIAG CONCURRENT INF: CPT

## 2020-09-02 PROCEDURE — 25000003 PHARM REV CODE 250: Performed by: INTERNAL MEDICINE

## 2020-09-02 PROCEDURE — 99999 PR PBB SHADOW E&M-EST. PATIENT-LVL IV: ICD-10-PCS | Mod: PBBFAC,,, | Performed by: INTERNAL MEDICINE

## 2020-09-02 PROCEDURE — 63600175 PHARM REV CODE 636 W HCPCS: Performed by: INTERNAL MEDICINE

## 2020-09-02 PROCEDURE — 96415 CHEMO IV INFUSION ADDL HR: CPT

## 2020-09-02 PROCEDURE — 3008F PR BODY MASS INDEX (BMI) DOCUMENTED: ICD-10-PCS | Mod: CPTII,S$GLB,, | Performed by: INTERNAL MEDICINE

## 2020-09-02 PROCEDURE — 1125F AMNT PAIN NOTED PAIN PRSNT: CPT | Mod: S$GLB,,, | Performed by: INTERNAL MEDICINE

## 2020-09-02 PROCEDURE — 1101F PR PT FALLS ASSESS DOC 0-1 FALLS W/OUT INJ PAST YR: ICD-10-PCS | Mod: CPTII,S$GLB,, | Performed by: INTERNAL MEDICINE

## 2020-09-02 RX ORDER — FLUOROURACIL 50 MG/ML
300 INJECTION, SOLUTION INTRAVENOUS
Status: CANCELLED | OUTPATIENT
Start: 2020-09-02

## 2020-09-02 RX ORDER — SODIUM CHLORIDE 0.9 % (FLUSH) 0.9 %
10 SYRINGE (ML) INJECTION
Status: DISCONTINUED | OUTPATIENT
Start: 2020-09-02 | End: 2020-09-02 | Stop reason: HOSPADM

## 2020-09-02 RX ORDER — EPINEPHRINE 0.3 MG/.3ML
0.3 INJECTION SUBCUTANEOUS ONCE AS NEEDED
Status: DISCONTINUED | OUTPATIENT
Start: 2020-09-02 | End: 2020-09-02 | Stop reason: HOSPADM

## 2020-09-02 RX ORDER — SODIUM CHLORIDE 0.9 % (FLUSH) 0.9 %
10 SYRINGE (ML) INJECTION
Status: CANCELLED | OUTPATIENT
Start: 2020-09-02

## 2020-09-02 RX ORDER — OXYCODONE AND ACETAMINOPHEN 10; 325 MG/1; MG/1
1 TABLET ORAL EVERY 8 HOURS PRN
Qty: 60 TABLET | Refills: 0 | Status: SHIPPED | OUTPATIENT
Start: 2020-09-02 | End: 2020-09-30 | Stop reason: SDUPTHER

## 2020-09-02 RX ORDER — DIPHENHYDRAMINE HYDROCHLORIDE 50 MG/ML
50 INJECTION INTRAMUSCULAR; INTRAVENOUS ONCE AS NEEDED
Status: DISCONTINUED | OUTPATIENT
Start: 2020-09-02 | End: 2020-09-02 | Stop reason: HOSPADM

## 2020-09-02 RX ORDER — SODIUM CHLORIDE 0.9 % (FLUSH) 0.9 %
10 SYRINGE (ML) INJECTION
Status: CANCELLED | OUTPATIENT
Start: 2020-09-03

## 2020-09-02 RX ORDER — HEPARIN 100 UNIT/ML
500 SYRINGE INTRAVENOUS
Status: CANCELLED | OUTPATIENT
Start: 2020-09-03

## 2020-09-02 RX ORDER — MAGNESIUM SULFATE 1 G/100ML
1 INJECTION INTRAVENOUS
Status: COMPLETED | OUTPATIENT
Start: 2020-09-02 | End: 2020-09-02

## 2020-09-02 RX ORDER — HEPARIN 100 UNIT/ML
500 SYRINGE INTRAVENOUS
Status: DISCONTINUED | OUTPATIENT
Start: 2020-09-02 | End: 2020-09-02 | Stop reason: HOSPADM

## 2020-09-02 RX ORDER — FLUOROURACIL 50 MG/ML
300 INJECTION, SOLUTION INTRAVENOUS
Status: COMPLETED | OUTPATIENT
Start: 2020-09-02 | End: 2020-09-02

## 2020-09-02 RX ORDER — DIPHENHYDRAMINE HYDROCHLORIDE 50 MG/ML
50 INJECTION INTRAMUSCULAR; INTRAVENOUS ONCE AS NEEDED
Status: CANCELLED | OUTPATIENT
Start: 2020-09-02

## 2020-09-02 RX ORDER — HEPARIN 100 UNIT/ML
500 SYRINGE INTRAVENOUS
Status: CANCELLED | OUTPATIENT
Start: 2020-09-02

## 2020-09-02 RX ORDER — EPINEPHRINE 0.3 MG/.3ML
0.3 INJECTION SUBCUTANEOUS ONCE AS NEEDED
Status: CANCELLED | OUTPATIENT
Start: 2020-09-02

## 2020-09-02 RX ADMIN — DEXAMETHASONE SODIUM PHOSPHATE: 4 INJECTION, SOLUTION INTRA-ARTICULAR; INTRALESIONAL; INTRAMUSCULAR; INTRAVENOUS; SOFT TISSUE at 10:09

## 2020-09-02 RX ADMIN — FLUOROURACIL 535 MG: 50 INJECTION, SOLUTION INTRAVENOUS at 01:09

## 2020-09-02 RX ADMIN — MAGNESIUM SULFATE 1 G: 1 INJECTION INTRAVENOUS at 09:09

## 2020-09-02 RX ADMIN — OXALIPLATIN 107 MG: 5 INJECTION, SOLUTION, CONCENTRATE INTRAVENOUS at 11:09

## 2020-09-02 RX ADMIN — LEUCOVORIN CALCIUM 534 MG: 100 INJECTION, POWDER, LYOPHILIZED, FOR SOLUTION INTRAMUSCULAR; INTRAVENOUS at 11:09

## 2020-09-02 RX ADMIN — FLUOROURACIL 3560 MG: 50 INJECTION, SOLUTION INTRAVENOUS at 01:09

## 2020-09-02 NOTE — PROGRESS NOTES
[facility]     Subjective:      PATIENT:   Miguel Angel Apple Sr.  :    1955  MR#:    [id number]  DATE OF VISIT:  2020    Chief Complaint: I a, having pain and about to run out of medicine     Patient ID: Miguel Angel Apple Sr. is a 64 y.o. male with a PMHx of a  Stage 3 T3-T4 N1 rectal adenocarcinoma who is present in clinic for   clearance for FOLFOX q2w for treatment of rectal adenocarcinoma.    Last infusion was  -    History intestinal obstruction  Port a cath   2019 colostomy port a cath 2019   On celexa for depression and B6 replacement     On 2019  a laparotomy was performed for perforated colon and patient underwent a right hemicolectomy with a rectal biopsy revealing rectal adenocarcinoma. He is healing from surgery but referred here to discuss treatment options.     2020  Completed concomitant chemo radiation 2020 Oral xeloda 850 mg/m2 Monday through Friday with concomitant radiation to be followed 6 months of folfox through chest port in right chest for colorectal adenocarcinoma with peritoneal seeding   Surgery delayed  due to pandemic  POst IV iron      2026 tamiko keeps cutting out   Records in tumor Board been reviewed surgical oncology notes reviewed patient is aware that chemotherapy is indicated per standard of care whether neoadjuvant her adjuvant.  Today patient will discussed with me the chemo regimen and the side effects that could possibly occur.  He is not having any evidence of bleeding he is eating well no change in his weight    Paresthesias  remain   Increased bruising     Review of Systems   Constitutional: Negative for activity change, appetite change, chills, fatigue, fever and unexpected weight change.   HENT: Negative for mouth sores and trouble swallowing.    Eyes: Negative for photophobia and visual disturbance.   Respiratory: Negative for cough, chest tightness, shortness of breath, wheezing  and stridor.    Cardiovascular: Negative for chest pain and leg swelling.   Gastrointestinal: Negative for abdominal pain, constipation, diarrhea, nausea and vomiting.   Endocrine: Negative for cold intolerance, heat intolerance and polyphagia.   Musculoskeletal: Positive for arthralgias. Negative for back pain and myalgias.   Skin: Negative for color change, pallor, rash and wound.   Allergic/Immunologic: Negative for food allergies.   Neurological: Negative for syncope, speech difficulty and weakness.   Hematological: Negative for adenopathy. Bruises/bleeds easily.   Psychiatric/Behavioral: Negative for agitation, behavioral problems, confusion, decreased concentration and dysphoric mood.    pain in back and joints , no erythema, no swelling, paresthesias improving with vitamin replacement and calcium with extra magnesium     Oncology History   Rectal cancer   12/23/2019 Initial Diagnosis    Rectal cancer     4/15/2020 Tumor Conference    Multidisciplinary Rectal Cancer Conference - Evaluation and Recommendation Summary    4/15/2020  Miguel Angel Castorenageovanni Sandoval.  [id number]  64 y.o. male    1. Evaluation    63 yo M who presented with abdominal pain, change in bowel habits, weight loss and constipation who progressed to obstipation.     CT 10/19/19: distension of colon and rectum. Large Bowel obstruction.     Flex sig 10/21: fungating obstructing large mass in the rectum. 11 cm from the verge.     10/21/2019: Dr. Lazo at [facility]; LBO with cecal perf, chronic cholecystitis. Ex Lap with sigmoid colectomy with end descending colostomy and mucous fistula, right colectomy and side to side functional end to end anastomosis, open adama and splenic flex mobilization.     Path: inflammation, no Carcinoma.     MRI 11/19: locally adcanced, CT c/a/p 12/19, PET 12/19: T3-4,     Neoadjuvant CRT: 5040 cGy + 5 FU completed 2/20.       MRI date: 3/20    Tumor Location in Rectum: upper third    Indication of Sphincter Involvement:   Uninvolved    Pretreatment Circumferential Resection Margin (CRM) status:  Not Threatened    Pretreatment (clinical) AJCC Stage: IIA  Stage I  [] I: T1N0M0  [] I: T2N0M0  Stage II  [x] IIA: T3N0M0  [] IIB N7zU2M3  [] IIC: B5hN6M2  Stage III  [] IIIA: T1-2N1M0  [] IIIA: P8S9sP8  [] IIIB: T3-W8lD2H0  [] IIIB: T2-3N2aM0  [] IIIB: T1-2N2bM0  [] IIIC: T1rM3cF0  [] IIIC: T3-1kC0gX4  [] IIIC: B8iP2-1C9   Stage IV  [] IV: Q4-7F0-7A1v-b    CEA level:   Lab Results   Component Value Date    CEA 3.2 04/13/2020       Flex sig 8 weeks after treatment: still some tumor.     2. Treatment Recommendation    Complete Chemotherapy now as patient is already diverted and tolerating the ostomy and given COVID pandemic he would likely need to way at least 3-4 weeks until her gets surgery.      4/27/2020 -  Chemotherapy    Treatment Summary   Plan Name: OP FOLFOX 6 Q2W  Treatment Goal: Control  Status: Active  Start Date: 4/27/2020  End Date: 10/1/2020 (Planned)  Provider: Shayla Stringer MD  Chemotherapy: fluorouraciL injection 710 mg, 400 mg/m2 = 710 mg, Intravenous, Clinic/[facility] 1 time, 9 of 12 cycles  Dose modification: 300 mg/m2 (original dose 400 mg/m2, Cycle 5)  Administration: 710 mg (5/13/2020), 710 mg (5/27/2020), 710 mg (6/10/2020), 535 mg (6/24/2020), 535 mg (7/8/2020), 535 mg (7/22/2020), 535 mg (8/5/2020), 535 mg (8/19/2020)  fluorouraciL 2,400 mg/m2 = 4,270 mg in sodium chloride 0.9% 185.4 mL chemo infusion, 2,400 mg/m2 = 4,270 mg, Intravenous, Over 46 hours, 9 of 12 cycles  Dose modification: 2,000 mg/m2 (original dose 2,400 mg/m2, Cycle 5)  Administration: 4,270 mg (4/27/2020), 4,270 mg (5/13/2020), 4,270 mg (5/27/2020), 4,270 mg (6/10/2020), 3,560 mg (6/24/2020), 3,560 mg (7/8/2020), 3,560 mg (7/22/2020), 3,560 mg (8/5/2020), 3,560 mg (8/19/2020)  leucovorin calcium 400 mg/m2 = 710 mg in dextrose 5 % 250 mL infusion, 400 mg/m2 = 710 mg, Intravenous, [facility] 1 time, 9 of 12 cycles  Dose modification: 300 mg/m2 (original  dose 400 mg/m2, Cycle 5)  Administration: 710 mg (4/27/2020), 710 mg (5/13/2020), 710 mg (5/27/2020), 712 mg (6/10/2020), 534 mg (6/24/2020), 535 mg (7/8/2020), 534 mg (7/22/2020), 535 mg (8/5/2020), 535 mg (8/19/2020)  oxaliplatin (ELOXATIN) 85 mg/m2 = 151 mg in dextrose 5 % 500 mL chemo infusion, 85 mg/m2 = 151 mg, Intravenous, [facility] 1 time, 9 of 12 cycles  Dose modification: 60 mg/m2 (original dose 85 mg/m2, Cycle 5)  Administration: 151 mg (4/27/2020), 151 mg (5/13/2020), 151 mg (5/27/2020), 151 mg (6/10/2020), 107 mg (6/24/2020), 107 mg (7/8/2020), 107 mg (7/22/2020), 107 mg (8/5/2020), 107 mg (8/19/2020)       Lifetime Dose Tracking   No doses have been documented on this patient for the following tracked chemicals: doxorubicin, epirubicin, idarubicin, daunorubicin, mitoxantrone, bleomycin, mitomycin, busulfan     Past Medical History:   Diagnosis Date    Colon cancer     Depression     GERD (gastroesophageal reflux disease)     Medical history reviewed with no changes        Family History   Problem Relation Age of Onset    Diabetes Mother     Heart disease Father        Past Surgical History:   Procedure Laterality Date    CHOLECYSTECTOMY  10/21/2019    Procedure: CHOLECYSTECTOMY;  Surgeon: Jose Lazo MD;  Location: [facility] OR;  Service: General;;    CLOSURE OF WOUND N/A 10/30/2019    Procedure: CLOSURE, WOUND;  Surgeon: Torres Dawkins MD;  Location: [facility] OR;  Service: General;  Laterality: N/A;    COLOSTOMY  10/21/2019    Procedure: CREATION, COLOSTOMY;  Surgeon: Jose Lazo MD;  Location: [facility] OR;  Service: General;;  END DESCENDING COLOSTOMY    FLEXIBLE SIGMOIDOSCOPY N/A 10/21/2019    Procedure: SIGMOIDOSCOPY, FLEXIBLE;  Surgeon: Jose Lazo MD;  Location: [facility] OR;  Service: General;  Laterality: N/A;    INSERTION OF TUNNELED CENTRAL VENOUS CATHETER (CVC) WITH SUBCUTANEOUS PORT Right 12/23/2019    Procedure: INSERTION, PORT-A-CATH;  Surgeon: Jose KEATING  MD [place];  Location: [facility] OR;  Service: General;  Laterality: Right;    no surgical history      REPAIR OF ABDOMINAL WALL N/A 10/30/2019    Procedure: REPAIR, ABDOMINAL WALL;  Surgeon: Torres Dawkins MD;  Location: [facility] OR;  Service: General;  Laterality: N/A;    RIGHT HEMICOLECTOMY Right 10/21/2019    Procedure: HEMICOLECTOMY, RIGHT;  Surgeon: Jose Lazo MD;  Location: [facility] OR;  Service: General;  Laterality: Right;       Social History     Socioeconomic History    Marital status:      Spouse name: Not on file    Number of children: Not on file    Years of education: Not on file    Highest education level: Not on file   Occupational History    Not on file   Social Needs    Financial resource strain: Not on file    Food insecurity     Worry: Never true     Inability: Never true    Transportation needs     Medical: No     Non-medical: No   Tobacco Use    Smoking status: Former Smoker    Smokeless tobacco: Never Used   Substance and Sexual Activity    Alcohol use: Not Currently    Drug use: Never    Sexual activity: Not Currently   Lifestyle    Physical activity     Days per week: 3 days     Minutes per session: 30 min    Stress: Rather much   Relationships    Social connections     Talks on phone: More than three times a week     Gets together: Patient refused     Attends Shinto service: Not on file     Active member of club or organization: No     Attends meetings of clubs or organizations: Never     Relationship status: Living with partner   Other Topics Concern    Not on file   Social History Narrative    Not on file     radha PPI for GERD    Current Outpatient Medications:     citalopram (CELEXA) 10 MG tablet, Take 1 tablet (10 mg total) by mouth once daily., Disp: 30 tablet, Rfl: 11    cyanocobalamin (B-12 DOTS) 500 MCG tablet, Take 2 tablets (1,000 mcg total) by mouth once daily., Disp: 60 tablet, Rfl: 0    ondansetron (ZOFRAN) 4 MG tablet, Take 1 tablet (4 mg  "total) by mouth every 6 (six) hours as needed for Nausea., Disp: 24 tablet, Rfl: 1    ondansetron (ZOFRAN) 8 MG tablet, , Disp: , Rfl:     ondansetron (ZOFRAN-ODT) 8 MG TbDL, Take 1 tablet (8 mg total) by mouth every 8 (eight) hours as needed., Disp: 90 tablet, Rfl: 3    oxyCODONE-acetaminophen (PERCOCET)  mg per tablet, Take 1 tablet by mouth every 8 (eight) hours as needed for Pain., Disp: 60 tablet, Rfl: 0    pantoprazole (PROTONIX) 40 MG tablet, Take 1 tablet (40 mg total) by mouth once daily., Disp: 30 tablet, Rfl: 11    pyridoxine, vitamin B6, (VITAMIN B-6) 100 MG Tab, Take 1 tablet (100 mg total) by mouth once daily., Disp: 30 tablet, Rfl: 0    Review of patient's allergies indicates:  No Known Allergies  All medications and past history have been reviewed.    Objective:      Vitals:  BP (!) 97/56   Pulse 75   Temp 97 °F (36.1 °C) (Tympanic)   Ht 5' 10" (1.778 m)   Wt 68.6 kg (151 lb 2 oz)   SpO2 98%   BMI 21.68 kg/m²    Body surface area is 1.84 meters squared.    Weight Readings:  Wt Readings from Last 5 Encounters:   09/02/20 68.6 kg (151 lb 2 oz)   08/19/20 66.2 kg (146 lb)   08/19/20 66.2 kg (146 lb)   08/05/20 67.1 kg (148 lb)   08/05/20 67.1 kg (148 lb)      Blood Type:  B NEG     Physical Exam  Vitals signs and nursing note reviewed.   Constitutional:       General: He is not in acute distress.     Appearance: Normal appearance. He is not ill-appearing.   HENT:      Head: Normocephalic and atraumatic.      Right Ear: External ear normal.      Left Ear: External ear normal.      Nose: Nose normal. No congestion or rhinorrhea.   Eyes:      General:         Right eye: No discharge.         Left eye: No discharge.      Extraocular Movements: Extraocular movements intact.      Conjunctiva/sclera: Conjunctivae normal.      Pupils: Pupils are equal, round, and reactive to light.   Neck:      Musculoskeletal: Normal range of motion and neck supple. No neck rigidity.   Cardiovascular:      " Rate and Rhythm: Normal rate and regular rhythm.      Heart sounds: Normal heart sounds. No murmur.   Pulmonary:      Effort: Pulmonary effort is normal. No respiratory distress.      Breath sounds: Normal breath sounds. No stridor. No wheezing, rhonchi or rales.   Abdominal:      General: Bowel sounds are normal. There is no distension.      Palpations: Abdomen is soft.      Tenderness: There is no abdominal tenderness. There is no guarding.   Musculoskeletal: Normal range of motion.         General: No deformity.      Right lower leg: No edema.      Left lower leg: No edema.   Lymphadenopathy:      Cervical: No cervical adenopathy.   Skin:     General: Skin is warm and dry.      Coloration: Skin is not pale.      Findings: Bruising present. No erythema or rash.   Neurological:      General: No focal deficit present.      Mental Status: He is alert and oriented to person, place, and time. Mental status is at baseline.      Cranial Nerves: No cranial nerve deficit.   Psychiatric:         Mood and Affect: Mood normal.         Behavior: Behavior normal.         Thought Content: Thought content normal.         Judgment: Judgment normal.       Labs:  Lab Results   Component Value Date    WBC 5.37 08/31/2020    WBC 7.80 08/18/2020    WBC 5.26 08/03/2020    RBC 3.23 (L) 08/31/2020    RBC 3.26 (L) 08/18/2020    RBC 3.10 (L) 08/03/2020    HGB 11.2 (L) 08/31/2020    HGB 11.3 (L) 08/18/2020    HGB 10.6 (L) 08/03/2020    HCT 33.6 (L) 08/31/2020    HCT 33.1 (L) 08/18/2020    HCT 30.9 (L) 08/03/2020     (H) 08/31/2020     (H) 08/18/2020     (H) 08/03/2020     (L) 08/31/2020     08/18/2020     (L) 08/03/2020    MCH 34.7 (H) 08/31/2020    MCH 34.7 (H) 08/18/2020    MCH 34.2 (H) 08/03/2020    MCHC 33.3 08/31/2020    MCHC 34.1 08/18/2020    MCHC 34.3 08/03/2020    RDW 13.9 08/31/2020    RDW 14.6 (H) 08/18/2020    RDW 15.0 (H) 08/03/2020     Lab Results   Component Value Date      08/31/2020     08/18/2020     08/03/2020    K 3.6 08/31/2020    K 3.4 (L) 08/18/2020    K 3.5 08/03/2020     08/31/2020     08/18/2020     08/03/2020    CO2 25 08/31/2020    CO2 26 08/18/2020    CO2 26 08/03/2020    BUN 9 08/31/2020    BUN 14 08/18/2020    BUN 11 08/03/2020    CREATININE 0.9 08/31/2020    CREATININE 0.8 08/18/2020    CREATININE 0.9 08/03/2020     08/31/2020    GLU 99 08/18/2020     08/03/2020    CALCIUM 8.6 (L) 08/31/2020    CALCIUM 8.9 08/18/2020    CALCIUM 8.6 (L) 08/03/2020    MG 1.9 08/03/2020    MG 1.9 07/20/2020    MG 2.0 07/06/2020    PHOS 3.1 10/28/2019    PHOS 3.4 10/27/2019    PHOS 4.0 10/24/2019    ALKPHOS 71 08/31/2020    ALKPHOS 68 08/18/2020    ALKPHOS 66 08/03/2020    PROT 6.6 08/31/2020    PROT 7.0 08/18/2020    PROT 6.6 08/03/2020    ALBUMIN 3.6 08/31/2020    ALBUMIN 3.8 08/18/2020    ALBUMIN 3.5 08/03/2020    BILITOT 0.3 08/31/2020    BILITOT 0.3 08/18/2020    BILITOT 0.5 08/03/2020    AST 21 08/31/2020    AST 18 08/18/2020    AST 17 08/03/2020    ALT 18 08/31/2020    ALT 18 08/18/2020    ALT 15 08/03/2020     Lab Results   Component Value Date    CEA 4.2 08/31/2020    CEA 4.0 07/20/2020    CEA 3.1 05/12/2020      All lab results and imaging results have been reviewed and discussed with the patient.     Assessment and Plan:      Problem List Items Addressed This Visit        Oncology    Encounter for chemotherapy management    Malignant neoplasm of rectum - Primary    Relevant Orders    CBC auto differential    CMP      Other Visit Diagnoses     Chemotherapy follow-up examination        Hypoalbuminemia        Neoplasm related pain        Relevant Medications    oxyCODONE-acetaminophen (PERCOCET)  mg per tablet      orders completely signed   All green checks   RTC  2 weeks for chemo with cbc cmp and cea   Increase in ecchymoses due to marow suppression dropping his platelet count  No bleeding  radha PPI for gERD  radha zofran for chemo  induced nausea int  He is to abstain from  nsaids and alcohol which will suppress his counts further  He is hypotensive  Diet  Discussed for over 15 minutes   He must increase salt and caloric intake especially while on opioids for pain     Surgery to occur aafter he completes neoadjuvant chemo   Platelets are low    Magnesium art each session to protect from worsening neuropathy due to chemo      increase calcium intake and hydration  Continue at dose reduction  Scan likely in 4 weeks    Health Maintenance Summary    PROSTATE-SPECIFIC ANTIGEN Overdue 1955    Hepatitis C Screening Overdue 1955    Lipid Panel Overdue 1955    HIV Screening Overdue 10/27/1969    TETANUS VACCINE Overdue 10/27/1972    Shingles Vaccine Overdue 10/27/2004    Colorectal Cancer Screening Overdue 10/22/2019    Pneumococcal Vaccine (65+ High/Highest Risk) Overdue 10/27/2019    Influenza Vaccine Next Due 9/1/2020      Cont B 6 replacement  Cont celexa for depression : could be adding to bruising    Recommend healthy living: no nicotine,  should avoid alcohol, healthy diet and regular exercise aiming for fitness, and weight control  1. Discussed healthy alcohol daily limit of 0.5 oz of pure alcohol in any 24 hours (roughly one 12-oz beers, 4 oz of wine (8%-12% alcohol), or 1.25 oz (half a shot) of liquor (80 proof)), can not save up.  2.   Discussed good exercise program, 4 key elements: 1. Aerobic (walking, swimming, dancing, jogging, biking, etc, 2. Muscle strengthening / resistance exercise, need to do 2-3 times  weekly, 3. Stretching daily, good stretch takes a whole  total minute. 4. Balance exercise daily.  3.   Discussed healthy diet for over 15 minutes with handouts given to the patient   Discussed COVID-19 and social distancing in great detail, avoid all non-essential visits out of the home if possible and avoid sick contacts.       Advance Care Planning     Living Will  During this visit, I engaged the patient  in the  advance care planning process.  The patient and I reviewed the role for advance directives and their purpose in directing future healthcare if the patient's unable to speak for him/herself.  At this point in time, the patient does have full decision-making capacity.  We discussed different extreme health states that he could experience, and reviewed what kind of medical care he would want in those situations.  The patient communicated that if he were comatose and had little chance of a meaningful recovery, he would want machines/life-sustaining treatments used.    I spent a total of  30  minutes engaging the patient in this advance care planning discussion.

## 2020-09-02 NOTE — PLAN OF CARE
Pt tolerated infusion w/o difficulty. RCW port unremarkable. VSS. Pt placed on home infusion pump at 3.7 ml/hr. Pt has no further questions at this time. Pt ambulatory for discharge.

## 2020-09-04 ENCOUNTER — INFUSION (OUTPATIENT)
Dept: INFUSION THERAPY | Facility: HOSPITAL | Age: 65
End: 2020-09-04
Attending: INTERNAL MEDICINE
Payer: MEDICARE

## 2020-09-04 VITALS
HEART RATE: 68 BPM | SYSTOLIC BLOOD PRESSURE: 115 MMHG | DIASTOLIC BLOOD PRESSURE: 57 MMHG | TEMPERATURE: 97 F | RESPIRATION RATE: 18 BRPM | OXYGEN SATURATION: 99 %

## 2020-09-04 DIAGNOSIS — C20 RECTAL CANCER: Primary | ICD-10-CM

## 2020-09-04 PROCEDURE — 25000003 PHARM REV CODE 250: Performed by: INTERNAL MEDICINE

## 2020-09-04 PROCEDURE — 96523 IRRIG DRUG DELIVERY DEVICE: CPT

## 2020-09-04 PROCEDURE — A4216 STERILE WATER/SALINE, 10 ML: HCPCS | Performed by: INTERNAL MEDICINE

## 2020-09-04 PROCEDURE — 63600175 PHARM REV CODE 636 W HCPCS: Performed by: INTERNAL MEDICINE

## 2020-09-04 RX ORDER — HEPARIN 100 UNIT/ML
500 SYRINGE INTRAVENOUS
Status: DISCONTINUED | OUTPATIENT
Start: 2020-09-04 | End: 2020-09-04 | Stop reason: HOSPADM

## 2020-09-04 RX ORDER — SODIUM CHLORIDE 0.9 % (FLUSH) 0.9 %
10 SYRINGE (ML) INJECTION
Status: DISCONTINUED | OUTPATIENT
Start: 2020-09-04 | End: 2020-09-04 | Stop reason: HOSPADM

## 2020-09-04 RX ADMIN — Medication 10 ML: at 12:09

## 2020-09-04 RX ADMIN — HEPARIN 500 UNITS: 100 SYRINGE at 12:09

## 2020-09-04 NOTE — PLAN OF CARE
Problem: Adult Inpatient Plan of Care  Goal: Plan of Care Review  Outcome: Ongoing, Progressing  Flowsheets (Taken 9/4/2020 1225)  Plan of Care Reviewed With: patient    BP (!) 115/57   Pulse 68   Temp 97.2 °F (36.2 °C)   Resp 18   SpO2 99%   Patient arrived to [facility] today for discontinuation of chemotherapy pump.     Patient reported at approximately 9 am this morning, he noticed a white, powdery spot on his pajama pants. He discovered that CADD pump line was leaking. He reported he retrieved his spill kit, donned gloved, covered CADD pump and pump bag with absorbent padding and placed entire device in Hazardous bag. He then removed his clothing and placed in the washing machine, and cleaned his skin several times with soap and water.     SOS reported. Notified DOLLY Desai, pharmacy director, FLASH Levy, , and RADHIKA Stringer MD.     Pump d/c. Port was flushed with blood return and heparin locked. Port was then deaccessed with no blood noted and covered with bandaid. Future appt discussed. Pt acknowledged understanding.

## 2020-09-14 ENCOUNTER — LAB VISIT (OUTPATIENT)
Dept: LAB | Facility: HOSPITAL | Age: 65
End: 2020-09-14
Attending: INTERNAL MEDICINE
Payer: MEDICARE

## 2020-09-14 DIAGNOSIS — C20 MALIGNANT NEOPLASM OF RECTUM: ICD-10-CM

## 2020-09-14 LAB
ALBUMIN SERPL BCP-MCNC: 3.7 G/DL (ref 3.5–5.2)
ALP SERPL-CCNC: 78 U/L (ref 55–135)
ALT SERPL W/O P-5'-P-CCNC: 22 U/L (ref 10–44)
ANION GAP SERPL CALC-SCNC: 10 MMOL/L (ref 8–16)
AST SERPL-CCNC: 25 U/L (ref 10–40)
BASOPHILS # BLD AUTO: 0.02 K/UL (ref 0–0.2)
BASOPHILS NFR BLD: 0.4 % (ref 0–1.9)
BILIRUB SERPL-MCNC: 0.4 MG/DL (ref 0.1–1)
BUN SERPL-MCNC: 9 MG/DL (ref 8–23)
CALCIUM SERPL-MCNC: 8.9 MG/DL (ref 8.7–10.5)
CHLORIDE SERPL-SCNC: 101 MMOL/L (ref 95–110)
CO2 SERPL-SCNC: 26 MMOL/L (ref 23–29)
CREAT SERPL-MCNC: 0.9 MG/DL (ref 0.5–1.4)
DIFFERENTIAL METHOD: ABNORMAL
EOSINOPHIL # BLD AUTO: 0.1 K/UL (ref 0–0.5)
EOSINOPHIL NFR BLD: 1.6 % (ref 0–8)
ERYTHROCYTE [DISTWIDTH] IN BLOOD BY AUTOMATED COUNT: 13.2 % (ref 11.5–14.5)
EST. GFR  (AFRICAN AMERICAN): >60 ML/MIN/1.73 M^2
EST. GFR  (NON AFRICAN AMERICAN): >60 ML/MIN/1.73 M^2
GLUCOSE SERPL-MCNC: 102 MG/DL (ref 70–110)
HCT VFR BLD AUTO: 34.4 % (ref 40–54)
HGB BLD-MCNC: 11.7 G/DL (ref 14–18)
IMM GRANULOCYTES # BLD AUTO: 0.03 K/UL (ref 0–0.04)
IMM GRANULOCYTES NFR BLD AUTO: 0.6 % (ref 0–0.5)
LYMPHOCYTES # BLD AUTO: 0.7 K/UL (ref 1–4.8)
LYMPHOCYTES NFR BLD: 13.7 % (ref 18–48)
MCH RBC QN AUTO: 35.1 PG (ref 27–31)
MCHC RBC AUTO-ENTMCNC: 34 G/DL (ref 32–36)
MCV RBC AUTO: 103 FL (ref 82–98)
MONOCYTES # BLD AUTO: 0.8 K/UL (ref 0.3–1)
MONOCYTES NFR BLD: 15.9 % (ref 4–15)
NEUTROPHILS # BLD AUTO: 3.3 K/UL (ref 1.8–7.7)
NEUTROPHILS NFR BLD: 67.8 % (ref 38–73)
NRBC BLD-RTO: 0 /100 WBC
PLATELET # BLD AUTO: 181 K/UL (ref 150–350)
PMV BLD AUTO: 8.5 FL (ref 9.2–12.9)
POTASSIUM SERPL-SCNC: 4.3 MMOL/L (ref 3.5–5.1)
PROT SERPL-MCNC: 7.1 G/DL (ref 6–8.4)
RBC # BLD AUTO: 3.33 M/UL (ref 4.6–6.2)
SODIUM SERPL-SCNC: 137 MMOL/L (ref 136–145)
WBC # BLD AUTO: 4.9 K/UL (ref 3.9–12.7)

## 2020-09-14 PROCEDURE — 85025 COMPLETE CBC W/AUTO DIFF WBC: CPT

## 2020-09-14 PROCEDURE — 36415 COLL VENOUS BLD VENIPUNCTURE: CPT

## 2020-09-14 PROCEDURE — 80053 COMPREHEN METABOLIC PANEL: CPT

## 2020-09-16 ENCOUNTER — INFUSION (OUTPATIENT)
Dept: INFUSION THERAPY | Facility: HOSPITAL | Age: 65
End: 2020-09-16
Attending: INTERNAL MEDICINE
Payer: MEDICARE

## 2020-09-16 ENCOUNTER — PATIENT MESSAGE (OUTPATIENT)
Dept: HEMATOLOGY/ONCOLOGY | Facility: CLINIC | Age: 65
End: 2020-09-16

## 2020-09-16 ENCOUNTER — OFFICE VISIT (OUTPATIENT)
Dept: HEMATOLOGY/ONCOLOGY | Facility: CLINIC | Age: 65
End: 2020-09-16
Payer: MEDICARE

## 2020-09-16 VITALS
WEIGHT: 152 LBS | HEIGHT: 70 IN | BODY MASS INDEX: 21.76 KG/M2 | TEMPERATURE: 98 F | DIASTOLIC BLOOD PRESSURE: 76 MMHG | OXYGEN SATURATION: 97 % | RESPIRATION RATE: 18 BRPM | SYSTOLIC BLOOD PRESSURE: 156 MMHG | HEART RATE: 58 BPM

## 2020-09-16 DIAGNOSIS — E53.8 B12 DEFICIENCY: ICD-10-CM

## 2020-09-16 DIAGNOSIS — Z79.899 CURRENT USE OF PROTON PUMP INHIBITOR: ICD-10-CM

## 2020-09-16 DIAGNOSIS — R19.7 DIARRHEA, UNSPECIFIED TYPE: ICD-10-CM

## 2020-09-16 DIAGNOSIS — G89.29 OTHER CHRONIC PAIN: ICD-10-CM

## 2020-09-16 DIAGNOSIS — C20 RECTAL CANCER: Primary | ICD-10-CM

## 2020-09-16 DIAGNOSIS — D53.8 ANEMIA DUE TO VITAMIN B6 DEFICIENCY: ICD-10-CM

## 2020-09-16 DIAGNOSIS — E53.1 ANEMIA DUE TO VITAMIN B6 DEFICIENCY: ICD-10-CM

## 2020-09-16 DIAGNOSIS — Z51.11 ENCOUNTER FOR CHEMOTHERAPY MANAGEMENT: ICD-10-CM

## 2020-09-16 DIAGNOSIS — C20 RECTAL ADENOCARCINOMA: Primary | ICD-10-CM

## 2020-09-16 PROCEDURE — 25000003 PHARM REV CODE 250: Performed by: INTERNAL MEDICINE

## 2020-09-16 PROCEDURE — 99214 OFFICE O/P EST MOD 30 MIN: CPT | Mod: 95,,, | Performed by: INTERNAL MEDICINE

## 2020-09-16 PROCEDURE — 1101F PR PT FALLS ASSESS DOC 0-1 FALLS W/OUT INJ PAST YR: ICD-10-PCS | Mod: CPTII,95,, | Performed by: INTERNAL MEDICINE

## 2020-09-16 PROCEDURE — 3288F FALL RISK ASSESSMENT DOCD: CPT | Mod: CPTII,95,, | Performed by: INTERNAL MEDICINE

## 2020-09-16 PROCEDURE — 3288F PR FALLS RISK ASSESSMENT DOCUMENTED: ICD-10-PCS | Mod: CPTII,95,, | Performed by: INTERNAL MEDICINE

## 2020-09-16 PROCEDURE — 63600175 PHARM REV CODE 636 W HCPCS: Performed by: INTERNAL MEDICINE

## 2020-09-16 PROCEDURE — 96411 CHEMO IV PUSH ADDL DRUG: CPT

## 2020-09-16 PROCEDURE — 96368 THER/DIAG CONCURRENT INF: CPT

## 2020-09-16 PROCEDURE — 96367 TX/PROPH/DG ADDL SEQ IV INF: CPT

## 2020-09-16 PROCEDURE — 1101F PT FALLS ASSESS-DOCD LE1/YR: CPT | Mod: CPTII,95,, | Performed by: INTERNAL MEDICINE

## 2020-09-16 PROCEDURE — 96413 CHEMO IV INFUSION 1 HR: CPT

## 2020-09-16 PROCEDURE — 96415 CHEMO IV INFUSION ADDL HR: CPT

## 2020-09-16 PROCEDURE — 99214 PR OFFICE/OUTPT VISIT, EST, LEVL IV, 30-39 MIN: ICD-10-PCS | Mod: 95,,, | Performed by: INTERNAL MEDICINE

## 2020-09-16 PROCEDURE — 96416 CHEMO PROLONG INFUSE W/PUMP: CPT

## 2020-09-16 RX ORDER — HEPARIN 100 UNIT/ML
500 SYRINGE INTRAVENOUS
Status: DISCONTINUED | OUTPATIENT
Start: 2020-09-16 | End: 2020-09-16 | Stop reason: HOSPADM

## 2020-09-16 RX ORDER — SODIUM CHLORIDE 0.9 % (FLUSH) 0.9 %
10 SYRINGE (ML) INJECTION
Status: CANCELLED | OUTPATIENT
Start: 2020-09-30

## 2020-09-16 RX ORDER — EPINEPHRINE 0.3 MG/.3ML
0.3 INJECTION SUBCUTANEOUS ONCE AS NEEDED
Status: DISCONTINUED | OUTPATIENT
Start: 2020-09-16 | End: 2020-09-16 | Stop reason: HOSPADM

## 2020-09-16 RX ORDER — HEPARIN 100 UNIT/ML
500 SYRINGE INTRAVENOUS
Status: CANCELLED | OUTPATIENT
Start: 2020-09-30

## 2020-09-16 RX ORDER — MAGNESIUM SULFATE 1 G/100ML
1 INJECTION INTRAVENOUS
Status: COMPLETED | OUTPATIENT
Start: 2020-09-16 | End: 2020-09-16

## 2020-09-16 RX ORDER — DIPHENHYDRAMINE HYDROCHLORIDE 50 MG/ML
50 INJECTION INTRAMUSCULAR; INTRAVENOUS ONCE AS NEEDED
Status: CANCELLED | OUTPATIENT
Start: 2020-09-30

## 2020-09-16 RX ORDER — FLUOROURACIL 50 MG/ML
300 INJECTION, SOLUTION INTRAVENOUS
Status: CANCELLED | OUTPATIENT
Start: 2020-09-30

## 2020-09-16 RX ORDER — SODIUM CHLORIDE 0.9 % (FLUSH) 0.9 %
10 SYRINGE (ML) INJECTION
Status: DISCONTINUED | OUTPATIENT
Start: 2020-09-16 | End: 2020-09-16 | Stop reason: HOSPADM

## 2020-09-16 RX ORDER — HEPARIN 100 UNIT/ML
500 SYRINGE INTRAVENOUS
Status: CANCELLED | OUTPATIENT
Start: 2020-09-16

## 2020-09-16 RX ORDER — SODIUM CHLORIDE 0.9 % (FLUSH) 0.9 %
10 SYRINGE (ML) INJECTION
Status: CANCELLED | OUTPATIENT
Start: 2020-10-02

## 2020-09-16 RX ORDER — FLUOROURACIL 50 MG/ML
300 INJECTION, SOLUTION INTRAVENOUS
Status: COMPLETED | OUTPATIENT
Start: 2020-09-16 | End: 2020-09-16

## 2020-09-16 RX ORDER — HEPARIN 100 UNIT/ML
500 SYRINGE INTRAVENOUS
Status: CANCELLED | OUTPATIENT
Start: 2020-10-02

## 2020-09-16 RX ORDER — DIPHENHYDRAMINE HYDROCHLORIDE 50 MG/ML
50 INJECTION INTRAMUSCULAR; INTRAVENOUS ONCE AS NEEDED
Status: CANCELLED | OUTPATIENT
Start: 2020-09-16

## 2020-09-16 RX ORDER — HEPARIN 100 UNIT/ML
500 SYRINGE INTRAVENOUS
Status: CANCELLED | OUTPATIENT
Start: 2020-09-18

## 2020-09-16 RX ORDER — EPINEPHRINE 0.3 MG/.3ML
0.3 INJECTION SUBCUTANEOUS ONCE AS NEEDED
Status: CANCELLED | OUTPATIENT
Start: 2020-09-16

## 2020-09-16 RX ORDER — EPINEPHRINE 0.3 MG/.3ML
0.3 INJECTION SUBCUTANEOUS ONCE AS NEEDED
Status: CANCELLED | OUTPATIENT
Start: 2020-09-30

## 2020-09-16 RX ORDER — SODIUM CHLORIDE 0.9 % (FLUSH) 0.9 %
10 SYRINGE (ML) INJECTION
Status: CANCELLED | OUTPATIENT
Start: 2020-09-18

## 2020-09-16 RX ORDER — SODIUM CHLORIDE 0.9 % (FLUSH) 0.9 %
10 SYRINGE (ML) INJECTION
Status: CANCELLED | OUTPATIENT
Start: 2020-09-16

## 2020-09-16 RX ORDER — DIPHENHYDRAMINE HYDROCHLORIDE 50 MG/ML
50 INJECTION INTRAMUSCULAR; INTRAVENOUS ONCE AS NEEDED
Status: DISCONTINUED | OUTPATIENT
Start: 2020-09-16 | End: 2020-09-16 | Stop reason: HOSPADM

## 2020-09-16 RX ORDER — FLUOROURACIL 50 MG/ML
300 INJECTION, SOLUTION INTRAVENOUS
Status: CANCELLED | OUTPATIENT
Start: 2020-09-16

## 2020-09-16 RX ADMIN — MAGNESIUM SULFATE IN DEXTROSE 1 G: 10 INJECTION, SOLUTION INTRAVENOUS at 10:09

## 2020-09-16 RX ADMIN — FLUOROURACIL 3560 MG: 50 INJECTION, SOLUTION INTRAVENOUS at 02:09

## 2020-09-16 RX ADMIN — LEUCOVORIN CALCIUM 534 MG: 100 INJECTION, POWDER, LYOPHILIZED, FOR SOLUTION INTRAMUSCULAR; INTRAVENOUS at 11:09

## 2020-09-16 RX ADMIN — DEXTROSE: 50 INJECTION, SOLUTION INTRAVENOUS at 11:09

## 2020-09-16 RX ADMIN — DEXAMETHASONE SODIUM PHOSPHATE: 4 INJECTION, SOLUTION INTRA-ARTICULAR; INTRALESIONAL; INTRAMUSCULAR; INTRAVENOUS; SOFT TISSUE at 11:09

## 2020-09-16 RX ADMIN — FLUOROURACIL 535 MG: 50 INJECTION, SOLUTION INTRAVENOUS at 02:09

## 2020-09-16 RX ADMIN — OXALIPLATIN 107 MG: 5 INJECTION, SOLUTION, CONCENTRATE INTRAVENOUS at 11:09

## 2020-09-16 RX ADMIN — SODIUM CHLORIDE: 9 INJECTION, SOLUTION INTRAVENOUS at 10:09

## 2020-09-16 NOTE — PROGRESS NOTES
The patient location is: in [place]  The chief complaint leading to consultation is: rectal cancer     Visit type: synchronous audiovisual doximity and phone  Jose would not work for him      Face to Face time with patient:  15 minutes  Over 25     minutes of total time spent on the encounter, which includes face to face time and non-face to face time preparing to see the patient (eg, review of tests), Obtaining and/or reviewing separately obtained history, Documenting clinical information in the electronic or other health record, Independently interpreting results (not separately reported) and communicating results to the patient/family/caregiver, or Care coordination (not separately reported). More than 90% on medical discussion, counseling and coordination of care.  This visit is to also involve counseling, patient education, informed consent for ordered diagnostic and laboratory tests. Each patient to whom he or she provides medical services by telemedicine is:  (1) informed of the relationship between the physician and patient and the respective role of any other health care provider with respect to management of the patient; and (2) notified that he or she may decline to receive medical services by telemedicine and may withdraw from such care at any time.           CC: Follow up for chemo for rectal cancer    Mr Apple is a 64 yr old male with Stage 3   T3-T4 N1 M 1A rectal adenocarcinoma.    On October 21, 2019   a laparotomy was performed for perforated colon and patient underwent a right hemicolectomy with a rectal biopsy revealing rectal adenocarcinoma.   He completed neoadjuvant  xeloda with radiation  With chest port in place . On April 27 , 2020 he began adjuvant Folfox . He has had severe paresthesias and underwent a dose adjustment > Last adjuvant dose to be given September 30 2020    Interval History: Patient presents for follow up of  Rectal cancer.  The patient has a history of significant  Diarrhea  which is under control . B12 deficiency associated anemia  has been well controlled on B replacement , but the patient has noticed worsening  paresthesias over the past 2 weeks . Gerd has been stable on a PPI  The patient states compliance with all prescribed medications.       Medications reviewed including :    Current Outpatient Medications:     citalopram (CELEXA) 10 MG tablet, Take 1 tablet (10 mg total) by mouth once daily., Disp: 30 tablet, Rfl: 11    cyanocobalamin (B-12 DOTS) 500 MCG tablet, Take 2 tablets (1,000 mcg total) by mouth once daily., Disp: 60 tablet, Rfl: 0    ondansetron (ZOFRAN) 4 MG tablet, Take 1 tablet (4 mg total) by mouth every 6 (six) hours as needed for Nausea., Disp: 24 tablet, Rfl: 1    ondansetron (ZOFRAN) 8 MG tablet, , Disp: , Rfl:     ondansetron (ZOFRAN-ODT) 8 MG TbDL, Take 1 tablet (8 mg total) by mouth every 8 (eight) hours as needed., Disp: 90 tablet, Rfl: 3    oxyCODONE-acetaminophen (PERCOCET)  mg per tablet, Take 1 tablet by mouth every 8 (eight) hours as needed for Pain., Disp: 60 tablet, Rfl: 0    pantoprazole (PROTONIX) 40 MG tablet, Take 1 tablet (40 mg total) by mouth once daily., Disp: 30 tablet, Rfl: 11    pyridoxine, vitamin B6, (VITAMIN B-6) 100 MG Tab, Take 1 tablet (100 mg total) by mouth once daily., Disp: 30 tablet, Rfl: 0    ROS: 14 point complete review of systems reviewed and otherwise negative except for pertinent positives above       PFSH:  Complete PFSH taken during an earlier encounter was re-examined and reviewed with the patient.  For details please refer to the dictated initial consult note.  Since that time the patient reports nothing new     There were no vitals taken for this visit.  PHYSICAL EXAM:  General patient is in no distress well developed well nourished  Psych pleasant affect no evidence of depression no evidence of anxiety  Head normocephalic atraumatic, no hoarseness,  well-spoken speech intact  Eyes noninjected sclera  conjunctiva appear pink  Face is symmetric  Skin intact no lesions noted no ecchymosis no rash  Neck with no limited range of motion no JVD evident  Chest with no use of accessory muscles no labored breathing no evidence of tachypnea  No respiratory distress, effort normal   Abdomen appears to be nondistended  Extremities with no cyanosis no evidence of edema  Neuro muscular cranial nerves appear grossly intact  Gait appears steady  No joint effusions  Behavior normal judgment normal    Labs reviewed: pertinent findings include  Lab Results   Component Value Date    WBC 4.90 09/14/2020    HGB 11.7 (L) 09/14/2020    HCT 34.4 (L) 09/14/2020     (H) 09/14/2020     09/14/2020       CMP  Sodium   Date Value Ref Range Status   09/14/2020 137 136 - 145 mmol/L Final     Potassium   Date Value Ref Range Status   09/14/2020 4.3 3.5 - 5.1 mmol/L Final     Chloride   Date Value Ref Range Status   09/14/2020 101 95 - 110 mmol/L Final     CO2   Date Value Ref Range Status   09/14/2020 26 23 - 29 mmol/L Final     Glucose   Date Value Ref Range Status   09/14/2020 102 70 - 110 mg/dL Final     BUN, Bld   Date Value Ref Range Status   09/14/2020 9 8 - 23 mg/dL Final     Creatinine   Date Value Ref Range Status   09/14/2020 0.9 0.5 - 1.4 mg/dL Final     Calcium   Date Value Ref Range Status   09/14/2020 8.9 8.7 - 10.5 mg/dL Final     Total Protein   Date Value Ref Range Status   09/14/2020 7.1 6.0 - 8.4 g/dL Final     Albumin   Date Value Ref Range Status   09/14/2020 3.7 3.5 - 5.2 g/dL Final     Total Bilirubin   Date Value Ref Range Status   09/14/2020 0.4 0.1 - 1.0 mg/dL Final     Comment:     For infants and newborns, interpretation of results should be based  on gestational age, weight and in agreement with clinical  observations.  Premature Infant recommended reference ranges:  Up to 24 hours.............<8.0 mg/dL  Up to 48 hours............<12.0 mg/dL  3-5 days..................<15.0 mg/dL  6-29  days.................<15.0 mg/dL       Alkaline Phosphatase   Date Value Ref Range Status   09/14/2020 78 55 - 135 U/L Final     AST   Date Value Ref Range Status   09/14/2020 25 10 - 40 U/L Final     ALT   Date Value Ref Range Status   09/14/2020 22 10 - 44 U/L Final     Anion Gap   Date Value Ref Range Status   09/14/2020 10 8 - 16 mmol/L Final     eGFR if    Date Value Ref Range Status   09/14/2020 >60.0 >60 mL/min/1.73 m^2 Final     eGFR if non    Date Value Ref Range Status   09/14/2020 >60.0 >60 mL/min/1.73 m^2 Final     Comment:     Calculation used to obtain the estimated glomerular filtration  rate (eGFR) is the CKD-EPI equation.            Assessment:    1.Exacerbation of paresthesias ; dose reduction is helping  2.weight gain : vitals will be reviewed once he is in infusion  3.Chronic pain   4.History of diarrhea which appears to be controlled   5. GERD stable with PPI  6. Macrocytic anemia: improving    Plan:     1.Cleared for chemo ; next and last dose due September 30   2.Cont PPI for GERD  3.Cont opioid prn pain  4.The importance of diet pertaining to increase protein and iron rich foods  was explained to the patient over 15 minutes  5.Cont celexa for mood stabilization which is stable    6.Return visit in 2 weeks with labs for chemo      Advance directives and care plan have been reviewed , Patient reports no changes to such  Pt is encouraged to use My Chart and ask the staff for assistance in setting up during wrap up.    Recommend healthy living: no nicotine,  should avoid alcohol, healthy diet and regular exercise aiming for fitness, and weight control  1. Discussed healthy alcohol daily limit of 0.5 oz of pure alcohol in any 24 hours (roughly one 12-oz beers, 4 oz of wine (8%-12% alcohol), or 1.25 oz (half a shot) of liquor (80 proof)), can not save up.  2.   Discussed good exercise program, 4 key elements: 1. Aerobic (walking, swimming, dancing, jogging, biking,  etc, 2. Muscle strengthening / resistance exercise, need to do 2-3 times  weekly, 3. Stretching daily, good stretch takes a whole  total minute. 4. Balance exercise daily.  3.   Discussed healthy diet for over 15 minutes with handouts given to the patient     Discussed COVID-19 and social distancing in great detail, avoid all non-essential visits out of the home if possible and avoid sick contacts.

## 2020-09-16 NOTE — PLAN OF CARE
"Problem: Adult Inpatient Plan of Care  Goal: Plan of Care Review  Outcome: Ongoing, Progressing  Flowsheets (Taken 9/16/2020 1032)  Plan of Care Reviewed With: patient    /60   Pulse 71   Temp 97.7 °F (36.5 °C)   Resp 18   Ht 5' 10" (1.778 m)   Wt 68.9 kg (152 lb)   SpO2 97%   BMI 21.81 kg/m²   Patient here today for folfox and magnesium. VSS. Port accessed to right chest without difficulty; patent with blood return. Orders released. Pt oriented to unit. Symptoms reviewed. Questions and concerns addressed.      BP (!) 156/76   Pulse (!) 58   Temp 97.7 °F (36.5 °C)   Resp 18   Ht 5' 10" (1.778 m)   Wt 68.9 kg (152 lb)   SpO2 97%   BMI 21.81 kg/m²   Patient tolerated treatment well. VSS. Port flushed with blood return noted. 5FU pump initiated at 3.7ml/hr over the next 46 hours. Connections tightened and covered with coban.  New spill kit given to patient.  Next appt scheduled for 9/18/20 for pump d/c. Ambulates per self.     "

## 2020-09-16 NOTE — Clinical Note
RTC in    2 weeks with labs and imaging orders entered into Epic to be performed at least 3 days before next OV. Thank you.

## 2020-09-18 ENCOUNTER — INFUSION (OUTPATIENT)
Dept: INFUSION THERAPY | Facility: HOSPITAL | Age: 65
End: 2020-09-18
Attending: INTERNAL MEDICINE
Payer: MEDICARE

## 2020-09-18 VITALS
OXYGEN SATURATION: 99 % | HEART RATE: 66 BPM | RESPIRATION RATE: 17 BRPM | SYSTOLIC BLOOD PRESSURE: 112 MMHG | DIASTOLIC BLOOD PRESSURE: 56 MMHG | TEMPERATURE: 98 F

## 2020-09-18 DIAGNOSIS — C20 RECTAL CANCER: Primary | ICD-10-CM

## 2020-09-18 PROCEDURE — 25000003 PHARM REV CODE 250: Performed by: INTERNAL MEDICINE

## 2020-09-18 PROCEDURE — 63600175 PHARM REV CODE 636 W HCPCS: Performed by: INTERNAL MEDICINE

## 2020-09-18 PROCEDURE — A4216 STERILE WATER/SALINE, 10 ML: HCPCS | Performed by: INTERNAL MEDICINE

## 2020-09-18 RX ORDER — SODIUM CHLORIDE 0.9 % (FLUSH) 0.9 %
10 SYRINGE (ML) INJECTION
Status: DISCONTINUED | OUTPATIENT
Start: 2020-09-18 | End: 2020-09-18 | Stop reason: HOSPADM

## 2020-09-18 RX ORDER — HEPARIN 100 UNIT/ML
500 SYRINGE INTRAVENOUS
Status: DISCONTINUED | OUTPATIENT
Start: 2020-09-18 | End: 2020-09-18 | Stop reason: HOSPADM

## 2020-09-18 RX ADMIN — HEPARIN 500 UNITS: 100 SYRINGE at 01:09

## 2020-09-18 RX ADMIN — Medication 10 ML: at 01:09

## 2020-09-18 NOTE — PLAN OF CARE
Problem: Adult Inpatient Plan of Care  Goal: Plan of Care Review  Outcome: Ongoing, Progressing  Flowsheets (Taken 9/18/2020 1306)  Plan of Care Reviewed With: patient    BP (!) 112/56   Pulse 66   Temp 97.7 °F (36.5 °C)   Resp 17   SpO2 99%   Patient arrived to [facility] today for discontinuation of chemotherapy pump. Pump d/c. Port was flushed with blood return noted and heparin locked. Port was then deaccessed with no blood noted and covered with bandaid. AVS provided and future appt discussed. Pt acknowledged understanding.

## 2020-09-28 ENCOUNTER — LAB VISIT (OUTPATIENT)
Dept: LAB | Facility: HOSPITAL | Age: 65
End: 2020-09-28
Attending: INTERNAL MEDICINE
Payer: MEDICARE

## 2020-09-28 DIAGNOSIS — D53.8 ANEMIA DUE TO VITAMIN B6 DEFICIENCY: ICD-10-CM

## 2020-09-28 DIAGNOSIS — C20 RECTAL ADENOCARCINOMA: ICD-10-CM

## 2020-09-28 DIAGNOSIS — E53.1 ANEMIA DUE TO VITAMIN B6 DEFICIENCY: ICD-10-CM

## 2020-09-28 DIAGNOSIS — E53.8 B12 DEFICIENCY: ICD-10-CM

## 2020-09-28 LAB
ALBUMIN SERPL BCP-MCNC: 3.9 G/DL (ref 3.5–5.2)
ALP SERPL-CCNC: 72 U/L (ref 55–135)
ALT SERPL W/O P-5'-P-CCNC: 21 U/L (ref 10–44)
ANION GAP SERPL CALC-SCNC: 10 MMOL/L (ref 8–16)
AST SERPL-CCNC: 24 U/L (ref 10–40)
BASOPHILS # BLD AUTO: 0.02 K/UL (ref 0–0.2)
BASOPHILS NFR BLD: 0.3 % (ref 0–1.9)
BILIRUB SERPL-MCNC: 0.5 MG/DL (ref 0.1–1)
BUN SERPL-MCNC: 11 MG/DL (ref 8–23)
CALCIUM SERPL-MCNC: 8.9 MG/DL (ref 8.7–10.5)
CHLORIDE SERPL-SCNC: 104 MMOL/L (ref 95–110)
CO2 SERPL-SCNC: 25 MMOL/L (ref 23–29)
CREAT SERPL-MCNC: 0.9 MG/DL (ref 0.5–1.4)
DIFFERENTIAL METHOD: ABNORMAL
EOSINOPHIL # BLD AUTO: 0.1 K/UL (ref 0–0.5)
EOSINOPHIL NFR BLD: 1.9 % (ref 0–8)
ERYTHROCYTE [DISTWIDTH] IN BLOOD BY AUTOMATED COUNT: 13.3 % (ref 11.5–14.5)
EST. GFR  (AFRICAN AMERICAN): >60 ML/MIN/1.73 M^2
EST. GFR  (NON AFRICAN AMERICAN): >60 ML/MIN/1.73 M^2
GLUCOSE SERPL-MCNC: 119 MG/DL (ref 70–110)
HCT VFR BLD AUTO: 34.3 % (ref 40–54)
HGB BLD-MCNC: 11.7 G/DL (ref 14–18)
IMM GRANULOCYTES # BLD AUTO: 0.01 K/UL (ref 0–0.04)
IMM GRANULOCYTES NFR BLD AUTO: 0.2 % (ref 0–0.5)
IRON SERPL-MCNC: 124 UG/DL (ref 45–160)
LYMPHOCYTES # BLD AUTO: 0.8 K/UL (ref 1–4.8)
LYMPHOCYTES NFR BLD: 13.6 % (ref 18–48)
MCH RBC QN AUTO: 34.5 PG (ref 27–31)
MCHC RBC AUTO-ENTMCNC: 34.1 G/DL (ref 32–36)
MCV RBC AUTO: 101 FL (ref 82–98)
MONOCYTES # BLD AUTO: 1.1 K/UL (ref 0.3–1)
MONOCYTES NFR BLD: 18.3 % (ref 4–15)
NEUTROPHILS # BLD AUTO: 3.8 K/UL (ref 1.8–7.7)
NEUTROPHILS NFR BLD: 65.7 % (ref 38–73)
NRBC BLD-RTO: 0 /100 WBC
PLATELET # BLD AUTO: 157 K/UL (ref 150–350)
PMV BLD AUTO: 8.3 FL (ref 9.2–12.9)
POTASSIUM SERPL-SCNC: 3.8 MMOL/L (ref 3.5–5.1)
PROT SERPL-MCNC: 7 G/DL (ref 6–8.4)
RBC # BLD AUTO: 3.39 M/UL (ref 4.6–6.2)
SATURATED IRON: 36 % (ref 20–50)
SODIUM SERPL-SCNC: 139 MMOL/L (ref 136–145)
TOTAL IRON BINDING CAPACITY: 340 UG/DL (ref 250–450)
TRANSFERRIN SERPL-MCNC: 230 MG/DL (ref 200–375)
WBC # BLD AUTO: 5.8 K/UL (ref 3.9–12.7)

## 2020-09-28 PROCEDURE — 83921 ORGANIC ACID SINGLE QUANT: CPT

## 2020-09-28 PROCEDURE — 84425 ASSAY OF VITAMIN B-1: CPT

## 2020-09-28 PROCEDURE — 80053 COMPREHEN METABOLIC PANEL: CPT

## 2020-09-28 PROCEDURE — 83540 ASSAY OF IRON: CPT

## 2020-09-28 PROCEDURE — 36415 COLL VENOUS BLD VENIPUNCTURE: CPT

## 2020-09-28 PROCEDURE — 84207 ASSAY OF VITAMIN B-6: CPT

## 2020-09-28 PROCEDURE — 85025 COMPLETE CBC W/AUTO DIFF WBC: CPT

## 2020-09-30 ENCOUNTER — OFFICE VISIT (OUTPATIENT)
Dept: HEMATOLOGY/ONCOLOGY | Facility: CLINIC | Age: 65
End: 2020-09-30
Payer: MEDICARE

## 2020-09-30 ENCOUNTER — INFUSION (OUTPATIENT)
Dept: INFUSION THERAPY | Facility: HOSPITAL | Age: 65
End: 2020-09-30
Attending: INTERNAL MEDICINE
Payer: MEDICARE

## 2020-09-30 VITALS
SYSTOLIC BLOOD PRESSURE: 166 MMHG | DIASTOLIC BLOOD PRESSURE: 72 MMHG | RESPIRATION RATE: 18 BRPM | OXYGEN SATURATION: 100 % | HEART RATE: 64 BPM

## 2020-09-30 VITALS
BODY MASS INDEX: 21.7 KG/M2 | SYSTOLIC BLOOD PRESSURE: 124 MMHG | DIASTOLIC BLOOD PRESSURE: 65 MMHG | HEIGHT: 70 IN | OXYGEN SATURATION: 95 % | WEIGHT: 151.56 LBS | TEMPERATURE: 98 F | HEART RATE: 80 BPM

## 2020-09-30 DIAGNOSIS — Z28.9 DELAYED VACCINATION: ICD-10-CM

## 2020-09-30 DIAGNOSIS — C20 RECTAL CANCER: Primary | ICD-10-CM

## 2020-09-30 DIAGNOSIS — G89.3 NEOPLASM RELATED PAIN: ICD-10-CM

## 2020-09-30 DIAGNOSIS — Z51.11 ENCOUNTER FOR CHEMOTHERAPY MANAGEMENT: ICD-10-CM

## 2020-09-30 DIAGNOSIS — C20 RECTAL ADENOCARCINOMA: ICD-10-CM

## 2020-09-30 DIAGNOSIS — G89.29 OTHER CHRONIC PAIN: Primary | ICD-10-CM

## 2020-09-30 PROCEDURE — 25000003 PHARM REV CODE 250: Performed by: INTERNAL MEDICINE

## 2020-09-30 PROCEDURE — 96368 THER/DIAG CONCURRENT INF: CPT

## 2020-09-30 PROCEDURE — 3008F BODY MASS INDEX DOCD: CPT | Mod: CPTII,S$GLB,, | Performed by: INTERNAL MEDICINE

## 2020-09-30 PROCEDURE — 3008F PR BODY MASS INDEX (BMI) DOCUMENTED: ICD-10-PCS | Mod: CPTII,S$GLB,, | Performed by: INTERNAL MEDICINE

## 2020-09-30 PROCEDURE — 96415 CHEMO IV INFUSION ADDL HR: CPT

## 2020-09-30 PROCEDURE — 99999 PR PBB SHADOW E&M-EST. PATIENT-LVL IV: CPT | Mod: PBBFAC,,, | Performed by: INTERNAL MEDICINE

## 2020-09-30 PROCEDURE — 99999 PR PBB SHADOW E&M-EST. PATIENT-LVL IV: ICD-10-PCS | Mod: PBBFAC,,, | Performed by: INTERNAL MEDICINE

## 2020-09-30 PROCEDURE — 96413 CHEMO IV INFUSION 1 HR: CPT

## 2020-09-30 PROCEDURE — 96367 TX/PROPH/DG ADDL SEQ IV INF: CPT

## 2020-09-30 PROCEDURE — 96411 CHEMO IV PUSH ADDL DRUG: CPT

## 2020-09-30 PROCEDURE — 1101F PT FALLS ASSESS-DOCD LE1/YR: CPT | Mod: CPTII,S$GLB,, | Performed by: INTERNAL MEDICINE

## 2020-09-30 PROCEDURE — 99215 OFFICE O/P EST HI 40 MIN: CPT | Mod: S$GLB,,, | Performed by: INTERNAL MEDICINE

## 2020-09-30 PROCEDURE — 63600175 PHARM REV CODE 636 W HCPCS: Performed by: INTERNAL MEDICINE

## 2020-09-30 PROCEDURE — 96416 CHEMO PROLONG INFUSE W/PUMP: CPT

## 2020-09-30 PROCEDURE — 1101F PR PT FALLS ASSESS DOC 0-1 FALLS W/OUT INJ PAST YR: ICD-10-PCS | Mod: CPTII,S$GLB,, | Performed by: INTERNAL MEDICINE

## 2020-09-30 PROCEDURE — 99215 PR OFFICE/OUTPT VISIT, EST, LEVL V, 40-54 MIN: ICD-10-PCS | Mod: S$GLB,,, | Performed by: INTERNAL MEDICINE

## 2020-09-30 RX ORDER — OXYCODONE AND ACETAMINOPHEN 10; 325 MG/1; MG/1
1 TABLET ORAL EVERY 8 HOURS PRN
Qty: 60 TABLET | Refills: 0 | Status: SHIPPED | OUTPATIENT
Start: 2020-09-30 | End: 2020-11-02 | Stop reason: SDUPTHER

## 2020-09-30 RX ORDER — EPINEPHRINE 0.3 MG/.3ML
0.3 INJECTION SUBCUTANEOUS ONCE AS NEEDED
Status: DISCONTINUED | OUTPATIENT
Start: 2020-09-30 | End: 2020-09-30 | Stop reason: HOSPADM

## 2020-09-30 RX ORDER — MAGNESIUM SULFATE 1 G/100ML
1 INJECTION INTRAVENOUS
Status: COMPLETED | OUTPATIENT
Start: 2020-09-30 | End: 2020-09-30

## 2020-09-30 RX ORDER — HEPARIN 100 UNIT/ML
500 SYRINGE INTRAVENOUS
Status: DISCONTINUED | OUTPATIENT
Start: 2020-09-30 | End: 2020-09-30 | Stop reason: HOSPADM

## 2020-09-30 RX ORDER — SODIUM CHLORIDE 0.9 % (FLUSH) 0.9 %
10 SYRINGE (ML) INJECTION
Status: DISCONTINUED | OUTPATIENT
Start: 2020-09-30 | End: 2020-09-30 | Stop reason: HOSPADM

## 2020-09-30 RX ORDER — DIPHENHYDRAMINE HYDROCHLORIDE 50 MG/ML
50 INJECTION INTRAMUSCULAR; INTRAVENOUS ONCE AS NEEDED
Status: DISCONTINUED | OUTPATIENT
Start: 2020-09-30 | End: 2020-09-30 | Stop reason: HOSPADM

## 2020-09-30 RX ORDER — FLUOROURACIL 50 MG/ML
300 INJECTION, SOLUTION INTRAVENOUS
Status: COMPLETED | OUTPATIENT
Start: 2020-09-30 | End: 2020-09-30

## 2020-09-30 RX ADMIN — OXALIPLATIN 107 MG: 5 INJECTION, SOLUTION, CONCENTRATE INTRAVENOUS at 12:09

## 2020-09-30 RX ADMIN — DEXAMETHASONE SODIUM PHOSPHATE: 4 INJECTION, SOLUTION INTRA-ARTICULAR; INTRALESIONAL; INTRAMUSCULAR; INTRAVENOUS; SOFT TISSUE at 10:09

## 2020-09-30 RX ADMIN — LEUCOVORIN CALCIUM 535 MG: 100 INJECTION, POWDER, LYOPHILIZED, FOR SOLUTION INTRAMUSCULAR; INTRAVENOUS at 12:09

## 2020-09-30 RX ADMIN — FLUOROURACIL 535 MG: 50 INJECTION, SOLUTION INTRAVENOUS at 02:09

## 2020-09-30 RX ADMIN — MAGNESIUM SULFATE IN DEXTROSE 1 G: 10 INJECTION, SOLUTION INTRAVENOUS at 11:09

## 2020-09-30 RX ADMIN — FLUOROURACIL 3560 MG: 50 INJECTION, SOLUTION INTRAVENOUS at 02:09

## 2020-09-30 NOTE — PROGRESS NOTES
CC: My back is killing me     Mr Apple is a 64 yr old male with Stage 3   T3-T4 N1 M 1A rectal adenocarcinoma.    On October 21, 2019   a laparotomy was performed for perforated colon and patient underwent a right hemicolectomy with a rectal biopsy revealing rectal adenocarcinoma.   He completed neoadjuvant  xeloda with radiation  With chest port in place . On April 27 , 2020 he began adjuvant Folfox . He has had severe paresthesias and underwent a dose adjustment > Last adjuvant dose to be given September 30 2020 today     Interval History: Patient presents for follow up of  Rectal cancer.  The patient has a history of significant pain not under control; but better with opioid use  . B12 deficiency associated anemia  has not been well controlled on B replacement  Gerd has been stable on a PPI  The patient states compliance with all prescribed medications.     Diet has been stable   Medications reviewed including :    Current Outpatient Medications:     citalopram (CELEXA) 10 MG tablet, Take 1 tablet (10 mg total) by mouth once daily., Disp: 30 tablet, Rfl: 11    cyanocobalamin (B-12 DOTS) 500 MCG tablet, Take 2 tablets (1,000 mcg total) by mouth once daily., Disp: 60 tablet, Rfl: 0    ondansetron (ZOFRAN) 4 MG tablet, Take 1 tablet (4 mg total) by mouth every 6 (six) hours as needed for Nausea., Disp: 24 tablet, Rfl: 1    ondansetron (ZOFRAN) 8 MG tablet, , Disp: , Rfl:     ondansetron (ZOFRAN-ODT) 8 MG TbDL, Take 1 tablet (8 mg total) by mouth every 8 (eight) hours as needed., Disp: 90 tablet, Rfl: 3    oxyCODONE-acetaminophen (PERCOCET)  mg per tablet, Take 1 tablet by mouth every 8 (eight) hours as needed for Pain., Disp: 60 tablet, Rfl: 0    pantoprazole (PROTONIX) 40 MG tablet, Take 1 tablet (40 mg total) by mouth once daily., Disp: 30 tablet, Rfl: 11    pyridoxine, vitamin B6, (VITAMIN B-6) 100 MG Tab, Take 1 tablet (100 mg total) by mouth once daily., Disp: 30 tablet, Rfl: 0    ROS: 14  "point complete review of systems reviewed and otherwise negative except for pertinent positives above       PFSH:  Complete PFSH taken during an earlier encounter was re-examined and reviewed with the patient.  For details please refer to the dictated initial consult note.  Since that time the patient reports nothing new     /65   Pulse 80   Temp 98 °F (36.7 °C) (Tympanic)   Ht 5' 10" (1.778 m)   Wt 68.8 kg (151 lb 9.1 oz)   SpO2 95%   BMI 21.75 kg/m²   PHYSICAL EXAM:  General patient is in no distress well developed well nourished  Psych pleasant affect no evidence of depression no evidence of anxiety  Head normocephalic atraumatic, no hoarseness,  well-spoken speech intact  Eyes noninjected sclera conjunctiva appear pink  Face is symmetric  Skin intact no lesions noted no ecchymosis no rash  Neck with no limited range of motion no JVD evident  Chest with no use of accessory muscles no labored breathing no evidence of tachypnea  No respiratory distress, effort normal   Abdomen appears to be nondistended  Extremities with no cyanosis no evidence of edema  Neuro muscular cranial nerves appear grossly intact  Gait appears steady  No joint effusions  Behavior normal judgment normal    Labs reviewed: pertinent findings include  Lab Results   Component Value Date    WBC 5.80 09/28/2020    HGB 11.7 (L) 09/28/2020    HCT 34.3 (L) 09/28/2020     (H) 09/28/2020     09/28/2020       CMP  Sodium   Date Value Ref Range Status   09/28/2020 139 136 - 145 mmol/L Final     Potassium   Date Value Ref Range Status   09/28/2020 3.8 3.5 - 5.1 mmol/L Final     Chloride   Date Value Ref Range Status   09/28/2020 104 95 - 110 mmol/L Final     CO2   Date Value Ref Range Status   09/28/2020 25 23 - 29 mmol/L Final     Glucose   Date Value Ref Range Status   09/28/2020 119 (H) 70 - 110 mg/dL Final     BUN, Bld   Date Value Ref Range Status   09/28/2020 11 8 - 23 mg/dL Final     Creatinine   Date Value Ref Range " Status   09/28/2020 0.9 0.5 - 1.4 mg/dL Final     Calcium   Date Value Ref Range Status   09/28/2020 8.9 8.7 - 10.5 mg/dL Final     Total Protein   Date Value Ref Range Status   09/28/2020 7.0 6.0 - 8.4 g/dL Final     Albumin   Date Value Ref Range Status   09/28/2020 3.9 3.5 - 5.2 g/dL Final     Total Bilirubin   Date Value Ref Range Status   09/28/2020 0.5 0.1 - 1.0 mg/dL Final     Comment:     For infants and newborns, interpretation of results should be based  on gestational age, weight and in agreement with clinical  observations.  Premature Infant recommended reference ranges:  Up to 24 hours.............<8.0 mg/dL  Up to 48 hours............<12.0 mg/dL  3-5 days..................<15.0 mg/dL  6-29 days.................<15.0 mg/dL       Alkaline Phosphatase   Date Value Ref Range Status   09/28/2020 72 55 - 135 U/L Final     AST   Date Value Ref Range Status   09/28/2020 24 10 - 40 U/L Final     ALT   Date Value Ref Range Status   09/28/2020 21 10 - 44 U/L Final     Anion Gap   Date Value Ref Range Status   09/28/2020 10 8 - 16 mmol/L Final     eGFR if    Date Value Ref Range Status   09/28/2020 >60.0 >60 mL/min/1.73 m^2 Final     eGFR if non    Date Value Ref Range Status   09/28/2020 >60.0 >60 mL/min/1.73 m^2 Final     Comment:     Calculation used to obtain the estimated glomerular filtration  rate (eGFR) is the CKD-EPI equation.        Health Maintenance Summary    PROSTATE-SPECIFIC ANTIGEN Overdue 1955    Hepatitis C Screening Overdue 1955    Lipid Panel Overdue 1955    HIV Screening Overdue 10/27/1969    TETANUS VACCINE Overdue 10/27/1972    Shingles Vaccine Overdue 10/27/2004    Pneumococcal Vaccine (65+ High/Highest Risk) Overdue 10/27/2019    Influenza Vaccine Overdue 8/1/2020    Colorectal Cancer Screening Next Due 10/21/2024        Assessment:    1.Exacerbation of pain;   2.anemia   3.Chronic diarrhea  4.rectal cancer   5. GERD stable with PPI  6. Due  for flu shot     Plan:     1.Cleared for chemo scan due in a month  2.Cont PPI for GERD  3.Cont opioid prn pain: last script and I explained he will need to see pain management to discuss further treatment of pain since he has completed chemo   4.The importance of diet pertaining to increase protein and iron rich foods  was explained to the patient over 15 minutes  5.Cont celexa for mood stabilization which is stable    6.Return visit in 4 weeks with scan and labs   7. See pcp for vaccinations    Advance directives and care plan have been reviewed , Patient reports no changes to such  Pt is encouraged to use My Chart and ask the staff for assistance in setting up during wrap up.    Recommend healthy living: no nicotine,  should avoid alcohol, healthy diet and regular exercise aiming for fitness, and weight control  1. Discussed healthy alcohol daily limit of 0.5 oz of pure alcohol in any 24 hours (roughly one 12-oz beers, 4 oz of wine (8%-12% alcohol), or 1.25 oz (half a shot) of liquor (80 proof)), can not save up.  2.   Discussed good exercise program, 4 key elements: 1. Aerobic (walking, swimming, dancing, jogging, biking, etc, 2. Muscle strengthening / resistance exercise, need to do 2-3 times  weekly, 3. Stretching daily, good stretch takes a whole  total minute. 4. Balance exercise daily.  3.   Discussed healthy diet for over 15 minutes with handouts given to the patient     Discussed COVID-19 and social distancing in great detail, avoid all non-essential visits out of the home if possible and avoid sick contacts.

## 2020-10-02 ENCOUNTER — INFUSION (OUTPATIENT)
Dept: INFUSION THERAPY | Facility: HOSPITAL | Age: 65
End: 2020-10-02
Attending: INTERNAL MEDICINE
Payer: MEDICARE

## 2020-10-02 VITALS
TEMPERATURE: 98 F | HEART RATE: 69 BPM | DIASTOLIC BLOOD PRESSURE: 58 MMHG | OXYGEN SATURATION: 97 % | SYSTOLIC BLOOD PRESSURE: 128 MMHG | RESPIRATION RATE: 18 BRPM

## 2020-10-02 DIAGNOSIS — C20 RECTAL CANCER: Primary | ICD-10-CM

## 2020-10-02 LAB
METHYLMALONATE SERPL-SCNC: 0.48 UMOL/L
PYRIDOXAL SERPL-MCNC: 71 UG/L (ref 5–50)
VIT B1 BLD-MCNC: 49 UG/L (ref 38–122)

## 2020-10-02 PROCEDURE — 96523 IRRIG DRUG DELIVERY DEVICE: CPT

## 2020-10-02 PROCEDURE — 63600175 PHARM REV CODE 636 W HCPCS: Performed by: INTERNAL MEDICINE

## 2020-10-02 RX ORDER — HEPARIN 100 UNIT/ML
500 SYRINGE INTRAVENOUS
Status: DISCONTINUED | OUTPATIENT
Start: 2020-10-02 | End: 2020-10-02 | Stop reason: HOSPADM

## 2020-10-02 RX ORDER — SODIUM CHLORIDE 0.9 % (FLUSH) 0.9 %
10 SYRINGE (ML) INJECTION
Status: DISCONTINUED | OUTPATIENT
Start: 2020-10-02 | End: 2020-10-02 | Stop reason: HOSPADM

## 2020-10-02 RX ADMIN — HEPARIN 500 UNITS: 100 SYRINGE at 01:10

## 2020-10-08 ENCOUNTER — TELEPHONE (OUTPATIENT)
Dept: HEMATOLOGY/ONCOLOGY | Facility: CLINIC | Age: 65
End: 2020-10-08

## 2020-10-14 ENCOUNTER — OFFICE VISIT (OUTPATIENT)
Dept: SURGERY | Facility: CLINIC | Age: 65
End: 2020-10-14
Payer: MEDICARE

## 2020-10-14 VITALS
OXYGEN SATURATION: 96 % | DIASTOLIC BLOOD PRESSURE: 68 MMHG | WEIGHT: 153.38 LBS | TEMPERATURE: 98 F | HEART RATE: 76 BPM | RESPIRATION RATE: 16 BRPM | BODY MASS INDEX: 21.96 KG/M2 | HEIGHT: 70 IN | SYSTOLIC BLOOD PRESSURE: 129 MMHG

## 2020-10-14 DIAGNOSIS — C20 RECTAL CANCER: ICD-10-CM

## 2020-10-14 DIAGNOSIS — K94.03 COLOSTOMY STRICTURE: Primary | ICD-10-CM

## 2020-10-14 PROCEDURE — 1101F PR PT FALLS ASSESS DOC 0-1 FALLS W/OUT INJ PAST YR: ICD-10-PCS | Mod: CPTII,S$GLB,, | Performed by: SURGERY

## 2020-10-14 PROCEDURE — 1101F PT FALLS ASSESS-DOCD LE1/YR: CPT | Mod: CPTII,S$GLB,, | Performed by: SURGERY

## 2020-10-14 PROCEDURE — 99213 OFFICE O/P EST LOW 20 MIN: CPT | Mod: S$GLB,,, | Performed by: SURGERY

## 2020-10-14 PROCEDURE — 99213 PR OFFICE/OUTPT VISIT, EST, LEVL III, 20-29 MIN: ICD-10-PCS | Mod: S$GLB,,, | Performed by: SURGERY

## 2020-10-14 PROCEDURE — 3008F BODY MASS INDEX DOCD: CPT | Mod: CPTII,S$GLB,, | Performed by: SURGERY

## 2020-10-14 PROCEDURE — 3008F PR BODY MASS INDEX (BMI) DOCUMENTED: ICD-10-PCS | Mod: CPTII,S$GLB,, | Performed by: SURGERY

## 2020-10-14 NOTE — PROGRESS NOTES
"  [facility] General Surgery  Follow-up    Subjective:     Chief Complaint:  Follow-up stoma    HPI:  Miguel Angel Apple Sr. is a 64 y.o. male presents today for follow-up examination.  Patient with diagnose rectal cancer.  He has undergone chemotherapy and radiation therapy.  He has seen surgical Oncology at [facility].  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  He has had dilations at times.  Patient states today that he feels like he has to be dilated again.  Is been a number of months since dilation.  No other new issues or complaints today.  Medical care currently is pending CT scan and labs into the week and follow-up with Medical Oncology next week.    Review of Systems   Constitutional: Negative for appetite change, chills and fever.   HENT: Negative for congestion, dental problem and drooling.    Eyes: Negative for photophobia, discharge and itching.   Respiratory: Negative for apnea and chest tightness.    Cardiovascular: Negative for chest pain, palpitations and leg swelling.   Gastrointestinal: Negative for abdominal distention and abdominal pain.   Endocrine: Negative for cold intolerance and heat intolerance.   Genitourinary: Negative for difficulty urinating and dysuria.   Musculoskeletal: Negative for arthralgias and back pain.   Skin: Negative for color change and pallor.   Neurological: Negative for dizziness, facial asymmetry and headaches.   Hematological: Negative for adenopathy. Does not bruise/bleed easily.   Psychiatric/Behavioral: Negative for agitation, behavioral problems and confusion.       Objective:      Vitals:    10/14/20 0835   BP: 129/68   Pulse: 76   Resp: 16   Temp: 97.7 °F (36.5 °C)   TempSrc: Temporal   SpO2: 96%   Weight: 69.6 kg (153 lb 6.4 oz)   Height: 5' 10" (1.778 m)     Physical Exam  Constitutional:       Appearance: He is well-developed. He is not " diaphoretic.   HENT:      Head: Normocephalic and atraumatic.   Eyes:      Pupils: Pupils are equal, round, and reactive to light.   Neck:      Musculoskeletal: Normal range of motion and neck supple.      Thyroid: No thyromegaly.   Cardiovascular:      Rate and Rhythm: Normal rate and regular rhythm.      Heart sounds: No murmur.   Pulmonary:      Effort: Pulmonary effort is normal. No respiratory distress.      Breath sounds: Normal breath sounds.   Abdominal:      General: Bowel sounds are normal. There is no distension.      Palpations: Abdomen is soft.      Tenderness: There is no abdominal tenderness.       Musculoskeletal: Normal range of motion.   Skin:     General: Skin is warm.      Capillary Refill: Capillary refill takes less than 2 seconds.      Findings: No erythema or rash.   Neurological:      Mental Status: He is alert and oriented to person, place, and time.      Cranial Nerves: No cranial nerve deficit.          Assessment:       1. Colostomy stricture    2. Rectal cancer        Plan:   Colostomy stricture    Rectal cancer        Medical Decision Making/Counseling:  Patient colostomy stricture.  Dilated test tubes today.  Mild mucosal abrasion to be expected.  No other issues today.  Rectal cancer currently undergoing Medical Oncology therapy and subsequent surgical Oncology therapy to ensue.  Patient follow up in surgery Clinic here at [place] in 6 months.  Anticipate need for post resection colonoscopies.  Additionally patient has loop ileostomy in needs takedown happy to perform that 2 down the road pending what surgical oncology does and desires for us to be involved in.    Follow up:  6 months    Patient instructed that best way to communicate with my office staff is for patient to get on the [facility] epic patient portal to expedite communication and communication issues that may occur.  Patient was given instructions on how to get on the portal.  I encouraged patient to obtain portal access  as well.  Ultimately it is up to the patient to obtain access.  Patient voiced understanding.

## 2020-10-16 ENCOUNTER — HOSPITAL ENCOUNTER (OUTPATIENT)
Dept: RADIOLOGY | Facility: HOSPITAL | Age: 65
Discharge: HOME | End: 2020-10-16
Attending: INTERNAL MEDICINE
Payer: MEDICARE

## 2020-10-16 DIAGNOSIS — G89.3 NEOPLASM RELATED PAIN: ICD-10-CM

## 2020-10-16 DIAGNOSIS — C20 RECTAL ADENOCARCINOMA: ICD-10-CM

## 2020-10-16 PROCEDURE — 71260 CT CHEST ABDOMEN PELVIS WITH CONTRAST (XPD): ICD-10-PCS | Mod: 26,,, | Performed by: RADIOLOGY

## 2020-10-16 PROCEDURE — A9698 NON-RAD CONTRAST MATERIALNOC: HCPCS | Performed by: INTERNAL MEDICINE

## 2020-10-16 PROCEDURE — 74177 CT ABD & PELVIS W/CONTRAST: CPT | Mod: TC

## 2020-10-16 PROCEDURE — 25500020 PHARM REV CODE 255: Performed by: INTERNAL MEDICINE

## 2020-10-16 PROCEDURE — 71260 CT THORAX DX C+: CPT | Mod: 26,,, | Performed by: RADIOLOGY

## 2020-10-16 PROCEDURE — 74177 CT CHEST ABDOMEN PELVIS WITH CONTRAST (XPD): ICD-10-PCS | Mod: 26,,, | Performed by: RADIOLOGY

## 2020-10-16 PROCEDURE — 74177 CT ABD & PELVIS W/CONTRAST: CPT | Mod: 26,,, | Performed by: RADIOLOGY

## 2020-10-16 RX ADMIN — IOHEXOL 75 ML: 350 INJECTION, SOLUTION INTRAVENOUS at 09:10

## 2020-10-16 RX ADMIN — IOHEXOL 500 ML: 9 SOLUTION ORAL at 09:10

## 2020-10-16 RX ADMIN — IOHEXOL 500 ML: 9 SOLUTION ORAL at 08:10

## 2020-10-21 ENCOUNTER — OFFICE VISIT (OUTPATIENT)
Dept: HEMATOLOGY/ONCOLOGY | Facility: CLINIC | Age: 65
End: 2020-10-21
Payer: MEDICARE

## 2020-10-21 VITALS
OXYGEN SATURATION: 98 % | WEIGHT: 154 LBS | HEIGHT: 70 IN | SYSTOLIC BLOOD PRESSURE: 123 MMHG | TEMPERATURE: 97 F | BODY MASS INDEX: 22.05 KG/M2 | HEART RATE: 80 BPM | DIASTOLIC BLOOD PRESSURE: 67 MMHG

## 2020-10-21 DIAGNOSIS — R20.2 PARESTHESIA: Primary | ICD-10-CM

## 2020-10-21 DIAGNOSIS — C20 RECTAL ADENOCARCINOMA: ICD-10-CM

## 2020-10-21 DIAGNOSIS — Z79.899 CURRENT USE OF PROTON PUMP INHIBITOR: ICD-10-CM

## 2020-10-21 PROCEDURE — 3008F PR BODY MASS INDEX (BMI) DOCUMENTED: ICD-10-PCS | Mod: CPTII,S$GLB,, | Performed by: INTERNAL MEDICINE

## 2020-10-21 PROCEDURE — 99215 PR OFFICE/OUTPT VISIT, EST, LEVL V, 40-54 MIN: ICD-10-PCS | Mod: S$GLB,,, | Performed by: INTERNAL MEDICINE

## 2020-10-21 PROCEDURE — 99999 PR PBB SHADOW E&M-EST. PATIENT-LVL III: CPT | Mod: PBBFAC,,, | Performed by: INTERNAL MEDICINE

## 2020-10-21 PROCEDURE — 3008F BODY MASS INDEX DOCD: CPT | Mod: CPTII,S$GLB,, | Performed by: INTERNAL MEDICINE

## 2020-10-21 PROCEDURE — 99215 OFFICE O/P EST HI 40 MIN: CPT | Mod: S$GLB,,, | Performed by: INTERNAL MEDICINE

## 2020-10-21 PROCEDURE — 1101F PT FALLS ASSESS-DOCD LE1/YR: CPT | Mod: CPTII,S$GLB,, | Performed by: INTERNAL MEDICINE

## 2020-10-21 PROCEDURE — 99999 PR PBB SHADOW E&M-EST. PATIENT-LVL III: ICD-10-PCS | Mod: PBBFAC,,, | Performed by: INTERNAL MEDICINE

## 2020-10-21 PROCEDURE — 1101F PR PT FALLS ASSESS DOC 0-1 FALLS W/OUT INJ PAST YR: ICD-10-PCS | Mod: CPTII,S$GLB,, | Performed by: INTERNAL MEDICINE

## 2020-10-21 NOTE — Clinical Note
Rtc 4 months with cbc cmp cea and ct chest abd pelvis, make sure he gets port flushes every 4 weeks please :)

## 2020-10-21 NOTE — PROGRESS NOTES
CC: I want my surgery  :)    Mr Apple is a 64 yr old male with Stage 3  T3 N1 M 0 rectal adenocarcinoma.    On October 21, 2019   a laparotomy was performed for perforated colon and patient underwent a right hemicolectomy with a rectal biopsy revealing rectal adenocarcinoma.   He completed neoadjuvant  xeloda with radiation  With chest port in place . On April 27 , 2020 he began adjuvant Folfox . He has had severe paresthesias and underwent a dose adjustment > Last adjuvant dose September 30 2020  Here to review scans and labs before F/U with Dr Lagunas to discuss surgerical intervention    Interval History: Patient presents for follow up of rectal cancer. The patient has a history of significant pain not under control; but better with opioid use  . B12 deficiency associated anemia  has not been well controlled on B replacement  Gerd has been stable on a PPI  The patient states compliance with all prescribed medications.   Here to review Ct chest abdomen and pelvis revealing no evidence of disease   Diet has been stable   Medications reviewed including :    Current Outpatient Medications:     citalopram (CELEXA) 10 MG tablet, Take 1 tablet (10 mg total) by mouth once daily., Disp: 30 tablet, Rfl: 11    cyanocobalamin (B-12 DOTS) 500 MCG tablet, Take 2 tablets (1,000 mcg total) by mouth once daily., Disp: 60 tablet, Rfl: 0    ondansetron (ZOFRAN) 4 MG tablet, Take 1 tablet (4 mg total) by mouth every 6 (six) hours as needed for Nausea., Disp: 24 tablet, Rfl: 1    ondansetron (ZOFRAN) 8 MG tablet, , Disp: , Rfl:     ondansetron (ZOFRAN-ODT) 8 MG TbDL, Take 1 tablet (8 mg total) by mouth every 8 (eight) hours as needed., Disp: 90 tablet, Rfl: 3    oxyCODONE-acetaminophen (PERCOCET)  mg per tablet, Take 1 tablet by mouth every 8 (eight) hours as needed for Pain., Disp: 60 tablet, Rfl: 0    pantoprazole (PROTONIX) 40 MG tablet, Take 1 tablet (40 mg total) by mouth once daily., Disp: 30 tablet, Rfl: 11    " pyridoxine, vitamin B6, (VITAMIN B-6) 100 MG Tab, Take 1 tablet (100 mg total) by mouth once daily., Disp: 30 tablet, Rfl: 0    ROS: 14 point complete review of systems reviewed and otherwise negative except for pertinent positives above       PFSH:  Complete PFSH taken during an earlier encounter was re-examined and reviewed with the patient.  For details please refer to the dictated initial consult note.  Since that time the patient reports nothing new     /67   Pulse 80   Temp 97.2 °F (36.2 °C) (Tympanic)   Ht 5' 10" (1.778 m)   Wt 69.9 kg (154 lb)   SpO2 98%   BMI 22.10 kg/m²   PHYSICAL EXAM:  General patient is in no distress well developed well nourished  Psych pleasant affect no evidence of depression no evidence of anxiety  Head normocephalic atraumatic, no hoarseness,  well-spoken speech intact  Eyes noninjected sclera conjunctiva appear pink  Face is symmetric  Skin intact no lesions noted no ecchymosis no rash  Neck with no limited range of motion no JVD evident  Chest with no use of accessory muscles no labored breathing no evidence of tachypnea  No respiratory distress, effort normal   Abdomen appears to be nondistended  Extremities with no cyanosis no evidence of edema  Neuro muscular cranial nerves appear grossly intact  Gait appears steady  No joint effusions  Behavior normal judgment normal    Labs reviewed: pertinent findings include  Lab Results   Component Value Date    WBC 4.18 10/16/2020    HGB 12.5 (L) 10/16/2020    HCT 36.8 (L) 10/16/2020     (H) 10/16/2020     10/16/2020       CMP  Sodium   Date Value Ref Range Status   10/16/2020 139 136 - 145 mmol/L Final     Potassium   Date Value Ref Range Status   10/16/2020 4.0 3.5 - 5.1 mmol/L Final     Chloride   Date Value Ref Range Status   10/16/2020 106 95 - 110 mmol/L Final     CO2   Date Value Ref Range Status   10/16/2020 25 23 - 29 mmol/L Final     Glucose   Date Value Ref Range Status   10/16/2020 107 70 - 110 " mg/dL Final     BUN, Bld   Date Value Ref Range Status   10/16/2020 9 8 - 23 mg/dL Final     Creatinine   Date Value Ref Range Status   10/16/2020 0.7 0.5 - 1.4 mg/dL Final     Calcium   Date Value Ref Range Status   10/16/2020 8.8 8.7 - 10.5 mg/dL Final     Total Protein   Date Value Ref Range Status   10/16/2020 7.2 6.0 - 8.4 g/dL Final     Albumin   Date Value Ref Range Status   10/16/2020 3.8 3.5 - 5.2 g/dL Final     Total Bilirubin   Date Value Ref Range Status   10/16/2020 0.7 0.1 - 1.0 mg/dL Final     Comment:     For infants and newborns, interpretation of results should be based  on gestational age, weight and in agreement with clinical  observations.  Premature Infant recommended reference ranges:  Up to 24 hours.............<8.0 mg/dL  Up to 48 hours............<12.0 mg/dL  3-5 days..................<15.0 mg/dL  6-29 days.................<15.0 mg/dL       Alkaline Phosphatase   Date Value Ref Range Status   10/16/2020 76 55 - 135 U/L Final     AST   Date Value Ref Range Status   10/16/2020 22 10 - 40 U/L Final     ALT   Date Value Ref Range Status   10/16/2020 19 10 - 44 U/L Final     Anion Gap   Date Value Ref Range Status   10/16/2020 8 8 - 16 mmol/L Final     eGFR if    Date Value Ref Range Status   10/16/2020 >60.0 >60 mL/min/1.73 m^2 Final     eGFR if non    Date Value Ref Range Status   10/16/2020 >60.0 >60 mL/min/1.73 m^2 Final     Comment:     Calculation used to obtain the estimated glomerular filtration  rate (eGFR) is the CKD-EPI equation.        Health Maintenance Summary    PROSTATE-SPECIFIC ANTIGEN Overdue 1955    Hepatitis C Screening Overdue 1955    Lipid Panel Overdue 1955    HIV Screening Overdue 10/27/1969    TETANUS VACCINE Overdue 10/27/1972    Shingles Vaccine Overdue 10/27/2004    Pneumococcal Vaccine (65+ High/Highest Risk) Overdue 10/27/2019    Influenza Vaccine Overdue 8/1/2020    Colorectal Cancer Screening Next Due 10/21/2024         Assessment:    1.Exacerbation of paresthesias   2.Rectal adenocarcinoma CARLIE   3.Chronic diarrhea  4.rectal cancer in remission  5. GERD stable with PPI  6. Due for flu shot     Plan:     1.Scans due in 4 months with [facility] To Dr Lagunas in one month and rTC 4 months  2.Cont PPI for GERD  3.Cont opioid prn pain: last script but if he cannot find another doctor to help when he is due for refill for presthesia pain I will refill   4.The importance of diet pertaining to increase protein and iron rich foods  was explained  5.Cont celexa for mood stabilization which is stable    See pcp for vaccinations    Advance directives and care plan have been reviewed , Patient reports no changes to such  Pt is encouraged to use My Chart and ask the staff for assistance in setting up during wrap up.    Recommend healthy living: no nicotine,  should avoid alcohol, healthy diet and regular exercise aiming for fitness, and weight control  1. Discussed healthy alcohol daily limit of 0.5 oz of pure alcohol in any 24 hours (roughly one 12-oz beers, 4 oz of wine (8%-12% alcohol), or 1.25 oz (half a shot) of liquor (80 proof)), can not save up.  2.   Discussed good exercise program, 4 key elements: 1. Aerobic (walking, swimming, dancing, jogging, biking, etc, 2. Muscle strengthening / resistance exercise, need to do 2-3 times  weekly, 3. Stretching daily, good stretch takes a whole  total minute. 4. Balance exercise daily.  3.   Discussed healthy diet for over 15 minutes with handouts given to the patient     Discussed COVID-19 and social distancing in great detail, avoid all non-essential visits out of the home if possible and avoid sick contacts.

## 2020-10-22 ENCOUNTER — PATIENT MESSAGE (OUTPATIENT)
Dept: HEMATOLOGY/ONCOLOGY | Facility: CLINIC | Age: 65
End: 2020-10-22

## 2020-10-26 ENCOUNTER — OFFICE VISIT (OUTPATIENT)
Dept: FAMILY MEDICINE | Facility: CLINIC | Age: 65
End: 2020-10-26
Payer: MEDICARE

## 2020-10-26 VITALS
HEIGHT: 70 IN | DIASTOLIC BLOOD PRESSURE: 64 MMHG | OXYGEN SATURATION: 97 % | TEMPERATURE: 98 F | BODY MASS INDEX: 21.96 KG/M2 | HEART RATE: 73 BPM | RESPIRATION RATE: 15 BRPM | WEIGHT: 153.38 LBS | SYSTOLIC BLOOD PRESSURE: 112 MMHG

## 2020-10-26 DIAGNOSIS — M25.511 ACUTE PAIN OF RIGHT SHOULDER: Primary | ICD-10-CM

## 2020-10-26 DIAGNOSIS — Z13.220 ENCOUNTER FOR LIPID SCREENING FOR CARDIOVASCULAR DISEASE: ICD-10-CM

## 2020-10-26 DIAGNOSIS — Z13.6 ENCOUNTER FOR LIPID SCREENING FOR CARDIOVASCULAR DISEASE: ICD-10-CM

## 2020-10-26 DIAGNOSIS — Z11.4 SCREENING FOR HIV (HUMAN IMMUNODEFICIENCY VIRUS): ICD-10-CM

## 2020-10-26 DIAGNOSIS — Z11.59 ENCOUNTER FOR HEPATITIS C SCREENING TEST FOR LOW RISK PATIENT: ICD-10-CM

## 2020-10-26 PROCEDURE — 3008F PR BODY MASS INDEX (BMI) DOCUMENTED: ICD-10-PCS | Mod: CPTII,S$GLB,, | Performed by: FAMILY MEDICINE

## 2020-10-26 PROCEDURE — 3008F BODY MASS INDEX DOCD: CPT | Mod: CPTII,S$GLB,, | Performed by: FAMILY MEDICINE

## 2020-10-26 PROCEDURE — 1101F PR PT FALLS ASSESS DOC 0-1 FALLS W/OUT INJ PAST YR: ICD-10-PCS | Mod: CPTII,S$GLB,, | Performed by: FAMILY MEDICINE

## 2020-10-26 PROCEDURE — 1101F PT FALLS ASSESS-DOCD LE1/YR: CPT | Mod: CPTII,S$GLB,, | Performed by: FAMILY MEDICINE

## 2020-10-26 PROCEDURE — 99214 PR OFFICE/OUTPT VISIT, EST, LEVL IV, 30-39 MIN: ICD-10-PCS | Mod: S$GLB,,, | Performed by: FAMILY MEDICINE

## 2020-10-26 PROCEDURE — 99214 OFFICE O/P EST MOD 30 MIN: CPT | Mod: S$GLB,,, | Performed by: FAMILY MEDICINE

## 2020-10-26 RX ORDER — DICLOFENAC SODIUM 10 MG/G
2 GEL TOPICAL 4 TIMES DAILY PRN
Qty: 100 G | Refills: 1 | Status: SHIPPED | OUTPATIENT
Start: 2020-10-26 | End: 2020-12-29 | Stop reason: SDUPTHER

## 2020-10-26 RX ORDER — METHOCARBAMOL 500 MG/1
500 TABLET, FILM COATED ORAL 3 TIMES DAILY PRN
Qty: 60 TABLET | Refills: 0 | Status: SHIPPED | OUTPATIENT
Start: 2020-10-26 | End: 2020-12-29 | Stop reason: SDUPTHER

## 2020-10-26 NOTE — PROGRESS NOTES
[facility] - Clinic Note    Subjective      Mr. Apple is a 64 y.o. male who presents to clinic for Shoulder Pain (R shoulder) and Establish Care    Patient reports persistent pain in his right shoulder over the last month.  He has been working more outside in the yd raking leaves, etc. The pain is located in his upper shoulder/shoulder joint.  Has some decreased reach of motion.  Has some paresthesias secondary to the chemotherapy.  He is supposed to follow-up with Dr. Stringer and Dr. Lagunas.    [facility] Miguel Angel has a past medical history of Colon cancer, Depression, GERD (gastroesophageal reflux disease), and Medical history reviewed with no changes.   PSX Miguel nAgel has a past surgical history that includes no surgical history; Flexible sigmoidoscopy (N/A, 10/21/2019); Right hemicolectomy (Right, 10/21/2019); Cholecystectomy (10/21/2019); Colostomy (10/21/2019); Repair of abdominal wall (N/A, 10/30/2019); Closure of wound (N/A, 10/30/2019); and Insertion of tunneled central venous catheter (CVC) with subcutaneous port (Right, 12/23/2019).    Miguel Angel's family history includes Diabetes in his mother; Heart disease in his father.    Miguel Angel reports that he has quit smoking. He has never used smokeless tobacco. He reports previous alcohol use. He reports that he does not use drugs.   ALVAREZ Michelle has No Known Allergies.   JONA Michelle has a current medication list which includes the following prescription(s): citalopram, cyanocobalamin, ondansetron, ondansetron, oxycodone-acetaminophen, pantoprazole, pyridoxine (vitamin b6), diclofenac sodium, and methocarbamol.     Review of Systems   Constitutional: Negative for chills and fever.   Respiratory: Negative for shortness of breath.    Cardiovascular: Negative for chest pain.   Musculoskeletal: Positive for arthralgias.   Neurological: Positive for numbness.     Objective     /64 (BP Location: Left arm, Patient Position: Sitting, BP Method: Large (Automatic))   Pulse 73   Temp  "98.1 °F (36.7 °C) (Temporal)   Resp 15   Ht 5' 10" (1.778 m)   Wt 69.6 kg (153 lb 6.4 oz)   SpO2 97%   BMI 22.01 kg/m²     Physical Exam  Vitals signs and nursing note reviewed.   Constitutional:       General: He is not in acute distress.     Appearance: Normal appearance. He is well-developed. He is not diaphoretic.   HENT:      Head: Normocephalic and atraumatic.      Right Ear: External ear normal.      Left Ear: External ear normal.   Eyes:      General:         Right eye: No discharge.         Left eye: No discharge.   Cardiovascular:      Rate and Rhythm: Normal rate and regular rhythm.      Heart sounds: Normal heart sounds.   Pulmonary:      Effort: Pulmonary effort is normal.      Breath sounds: Normal breath sounds. No wheezing or rales.   Musculoskeletal:      Right shoulder: He exhibits decreased range of motion, tenderness, pain and spasm. He exhibits normal strength.   Skin:     General: Skin is warm and dry.   Neurological:      Mental Status: He is alert and oriented to person, place, and time. Mental status is at baseline.   Psychiatric:         Mood and Affect: Mood normal.         Behavior: Behavior normal.         Thought Content: Thought content normal.         Judgment: Judgment normal.        Assessment/Plan     1. Acute pain of right shoulder  diclofenac sodium (VOLTAREN) 1 % Gel    methocarbamoL (ROBAXIN) 500 MG Tab   2. Encounter for lipid screening for cardiovascular disease  Lipid Panel   3. Screening for HIV (human immunodeficiency virus)  HIV 1/2 Ag/Ab (4th Gen)   4. Encounter for hepatitis C screening test for low risk patient  Hepatitis C Antibody     Appears to have tendinitis/rotator cuff strain.  Will treat with Voltaren gel, Robaxin.  Screening lab work as above.  Keep follow-up with Dr. Stringer and Dr. Lagunas.  Follow-up in 2 months.    Future Appointments   Date Time Provider Department Center   11/3/2020  8:30 AM [facility], LABORATORY [facility] LAB [facility]   12/29/2020  9:40 AM " Abdiaziz Jimenez MD [facility]   2/8/2021  9:00 AM [facility], LABORATORY [facility] LAB [facility]   2/8/2021 10:00 AM [facility] CT1 [facility] CT [facility]   2/17/2021  8:15 AM Jose Lazo MD [facility] G[facility]   2/17/2021  8:40 AM Shayla Stringer MD [facility] HEMON1 [facility]         Abdiaziz Jimenez MD  Family Medicine  [facility]

## 2020-11-02 DIAGNOSIS — G89.3 NEOPLASM RELATED PAIN: ICD-10-CM

## 2020-11-02 RX ORDER — OXYCODONE AND ACETAMINOPHEN 10; 325 MG/1; MG/1
1 TABLET ORAL EVERY 8 HOURS PRN
Qty: 60 TABLET | Refills: 0 | Status: SHIPPED | OUTPATIENT
Start: 2020-11-02 | End: 2020-12-01 | Stop reason: SDUPTHER

## 2020-11-03 ENCOUNTER — LAB VISIT (OUTPATIENT)
Dept: LAB | Facility: HOSPITAL | Age: 65
End: 2020-11-03
Attending: FAMILY MEDICINE
Payer: MEDICARE

## 2020-11-03 DIAGNOSIS — Z11.59 ENCOUNTER FOR HEPATITIS C SCREENING TEST FOR LOW RISK PATIENT: ICD-10-CM

## 2020-11-03 DIAGNOSIS — Z13.220 ENCOUNTER FOR LIPID SCREENING FOR CARDIOVASCULAR DISEASE: ICD-10-CM

## 2020-11-03 DIAGNOSIS — Z13.6 ENCOUNTER FOR LIPID SCREENING FOR CARDIOVASCULAR DISEASE: ICD-10-CM

## 2020-11-03 DIAGNOSIS — Z11.4 SCREENING FOR HIV (HUMAN IMMUNODEFICIENCY VIRUS): ICD-10-CM

## 2020-11-03 LAB
CHOLEST SERPL-MCNC: 183 MG/DL (ref 120–199)
CHOLEST/HDLC SERPL: 4.7 {RATIO} (ref 2–5)
HDLC SERPL-MCNC: 39 MG/DL (ref 40–75)
HDLC SERPL: 21.3 % (ref 20–50)
LDLC SERPL CALC-MCNC: 105.6 MG/DL (ref 63–159)
NONHDLC SERPL-MCNC: 144 MG/DL
TRIGL SERPL-MCNC: 192 MG/DL (ref 30–150)

## 2020-11-03 PROCEDURE — 36415 COLL VENOUS BLD VENIPUNCTURE: CPT

## 2020-11-03 PROCEDURE — 86803 HEPATITIS C AB TEST: CPT

## 2020-11-03 PROCEDURE — 80061 LIPID PANEL: CPT

## 2020-11-03 PROCEDURE — 86703 HIV-1/HIV-2 1 RESULT ANTBDY: CPT

## 2020-11-04 LAB
HCV AB SERPL QL IA: NEGATIVE
HIV 1+2 AB+HIV1 P24 AG SERPL QL IA: NEGATIVE

## 2020-11-07 DIAGNOSIS — Z91.89 AT RISK FOR CARDIOVASCULAR EVENT: Primary | ICD-10-CM

## 2020-11-07 RX ORDER — ATORVASTATIN CALCIUM 20 MG/1
20 TABLET, FILM COATED ORAL DAILY
Qty: 90 TABLET | Refills: 3 | Status: SHIPPED | OUTPATIENT
Start: 2020-11-07 | End: 2021-04-15 | Stop reason: CLARIF

## 2020-12-01 DIAGNOSIS — G89.3 NEOPLASM RELATED PAIN: ICD-10-CM

## 2020-12-04 DIAGNOSIS — G89.3 NEOPLASM RELATED PAIN: ICD-10-CM

## 2020-12-05 RX ORDER — OXYCODONE AND ACETAMINOPHEN 10; 325 MG/1; MG/1
1 TABLET ORAL EVERY 8 HOURS PRN
Qty: 60 TABLET | Refills: 0 | Status: SHIPPED | OUTPATIENT
Start: 2020-12-05 | End: 2021-01-08 | Stop reason: SDUPTHER

## 2020-12-06 RX ORDER — OXYCODONE AND ACETAMINOPHEN 10; 325 MG/1; MG/1
1 TABLET ORAL EVERY 8 HOURS PRN
Qty: 60 TABLET | Refills: 0 | OUTPATIENT
Start: 2020-12-06

## 2020-12-08 ENCOUNTER — PATIENT MESSAGE (OUTPATIENT)
Dept: HEMATOLOGY/ONCOLOGY | Facility: CLINIC | Age: 65
End: 2020-12-08

## 2020-12-29 ENCOUNTER — OFFICE VISIT (OUTPATIENT)
Dept: FAMILY MEDICINE | Facility: CLINIC | Age: 65
End: 2020-12-29
Payer: MEDICARE

## 2020-12-29 ENCOUNTER — HOSPITAL ENCOUNTER (OUTPATIENT)
Dept: RADIOLOGY | Facility: HOSPITAL | Age: 65
Discharge: HOME | End: 2020-12-29
Attending: FAMILY MEDICINE
Payer: MEDICARE

## 2020-12-29 VITALS
OXYGEN SATURATION: 97 % | DIASTOLIC BLOOD PRESSURE: 58 MMHG | RESPIRATION RATE: 14 BRPM | BODY MASS INDEX: 23.48 KG/M2 | HEIGHT: 70 IN | WEIGHT: 164 LBS | SYSTOLIC BLOOD PRESSURE: 106 MMHG | HEART RATE: 79 BPM | TEMPERATURE: 98 F

## 2020-12-29 DIAGNOSIS — M25.511 CHRONIC RIGHT SHOULDER PAIN: ICD-10-CM

## 2020-12-29 DIAGNOSIS — G89.29 CHRONIC RIGHT SHOULDER PAIN: Primary | ICD-10-CM

## 2020-12-29 DIAGNOSIS — M25.511 CHRONIC RIGHT SHOULDER PAIN: Primary | ICD-10-CM

## 2020-12-29 DIAGNOSIS — G89.29 CHRONIC RIGHT SHOULDER PAIN: ICD-10-CM

## 2020-12-29 PROCEDURE — 1101F PR PT FALLS ASSESS DOC 0-1 FALLS W/OUT INJ PAST YR: ICD-10-PCS | Mod: CPTII,S$GLB,, | Performed by: FAMILY MEDICINE

## 2020-12-29 PROCEDURE — 73030 X-RAY EXAM OF SHOULDER: CPT | Mod: 26,RT,, | Performed by: RADIOLOGY

## 2020-12-29 PROCEDURE — 3008F BODY MASS INDEX DOCD: CPT | Mod: CPTII,S$GLB,, | Performed by: FAMILY MEDICINE

## 2020-12-29 PROCEDURE — 3288F FALL RISK ASSESSMENT DOCD: CPT | Mod: CPTII,S$GLB,, | Performed by: FAMILY MEDICINE

## 2020-12-29 PROCEDURE — 1125F PR PAIN SEVERITY QUANTIFIED, PAIN PRESENT: ICD-10-PCS | Mod: S$GLB,,, | Performed by: FAMILY MEDICINE

## 2020-12-29 PROCEDURE — 3008F PR BODY MASS INDEX (BMI) DOCUMENTED: ICD-10-PCS | Mod: CPTII,S$GLB,, | Performed by: FAMILY MEDICINE

## 2020-12-29 PROCEDURE — 73030 X-RAY EXAM OF SHOULDER: CPT | Mod: TC,FY,RT

## 2020-12-29 PROCEDURE — 1159F PR MEDICATION LIST DOCUMENTED IN MEDICAL RECORD: ICD-10-PCS | Mod: S$GLB,,, | Performed by: FAMILY MEDICINE

## 2020-12-29 PROCEDURE — 3288F PR FALLS RISK ASSESSMENT DOCUMENTED: ICD-10-PCS | Mod: CPTII,S$GLB,, | Performed by: FAMILY MEDICINE

## 2020-12-29 PROCEDURE — 1101F PT FALLS ASSESS-DOCD LE1/YR: CPT | Mod: CPTII,S$GLB,, | Performed by: FAMILY MEDICINE

## 2020-12-29 PROCEDURE — 73030 XR SHOULDER COMPLETE 2 OR MORE VIEWS RIGHT: ICD-10-PCS | Mod: 26,RT,, | Performed by: RADIOLOGY

## 2020-12-29 PROCEDURE — 99214 OFFICE O/P EST MOD 30 MIN: CPT | Mod: S$GLB,,, | Performed by: FAMILY MEDICINE

## 2020-12-29 PROCEDURE — 1125F AMNT PAIN NOTED PAIN PRSNT: CPT | Mod: S$GLB,,, | Performed by: FAMILY MEDICINE

## 2020-12-29 PROCEDURE — 99214 PR OFFICE/OUTPT VISIT, EST, LEVL IV, 30-39 MIN: ICD-10-PCS | Mod: S$GLB,,, | Performed by: FAMILY MEDICINE

## 2020-12-29 PROCEDURE — 1159F MED LIST DOCD IN RCRD: CPT | Mod: S$GLB,,, | Performed by: FAMILY MEDICINE

## 2020-12-29 RX ORDER — METHOCARBAMOL 500 MG/1
500 TABLET, FILM COATED ORAL 3 TIMES DAILY PRN
Qty: 60 TABLET | Refills: 0 | Status: SHIPPED | OUTPATIENT
Start: 2020-12-29 | End: 2021-03-31 | Stop reason: SDUPTHER

## 2020-12-29 RX ORDER — DICLOFENAC SODIUM 10 MG/G
2 GEL TOPICAL 4 TIMES DAILY PRN
Qty: 100 G | Refills: 1 | Status: SHIPPED | OUTPATIENT
Start: 2020-12-29 | End: 2021-04-15 | Stop reason: CLARIF

## 2020-12-30 NOTE — PROGRESS NOTES
[facility] - Clinic Note    Subjective      Mr. Apple is a 65 y.o. male who presents to clinic for Follow-up (requesting refills)    Patient reports that he has been doing fine.  He still has pain of his right shoulder.  This has been going on for the last 3 months.  He has been doing some exercise at home.  He has some weakness in his  and decreased range of motion greater than 90°.  The Robaxin did help with some of the pain.  He is also taking pain medication.  The diclofenac gel was also helping.    [facility] Miguel Angel has a past medical history of Colon cancer, Depression, GERD (gastroesophageal reflux disease), and Medical history reviewed with no changes.   PSXH Miguel Angel has a past surgical history that includes no surgical history; Flexible sigmoidoscopy (N/A, 10/21/2019); Right hemicolectomy (Right, 10/21/2019); Cholecystectomy (10/21/2019); Colostomy (10/21/2019); Repair of abdominal wall (N/A, 10/30/2019); Closure of wound (N/A, 10/30/2019); and Insertion of tunneled central venous catheter (CVC) with subcutaneous port (Right, 12/23/2019).    Miguel Angel's family history includes Diabetes in his mother; Heart disease in his father.    Miguel Angel reports that he has quit smoking. He has never used smokeless tobacco. He reports previous alcohol use. He reports that he does not use drugs.   [facility] Miguel Angel has No Known Allergies.   [facility] Miguel Angel has a current medication list which includes the following prescription(s): atorvastatin, cyanocobalamin, diclofenac sodium, methocarbamol, oxycodone-acetaminophen, pyridoxine (vitamin b6), citalopram, and pantoprazole.     Review of Systems   Constitutional: Negative for chills and fever.   Respiratory: Negative for shortness of breath.    Cardiovascular: Negative for chest pain.   Musculoskeletal: Positive for arthralgias.     Objective     BP (!) 106/58 (BP Location: Left arm, Patient Position: Sitting, BP Method: Large (Automatic))   Pulse 79   Temp 97.7 °F (36.5 °C) (Temporal)    "Resp 14   Ht 5' 10" (1.778 m)   Wt 74.4 kg (164 lb)   SpO2 97%   BMI 23.53 kg/m²     Physical Exam  Vitals signs and nursing note reviewed.   Constitutional:       General: He is not in acute distress.     Appearance: Normal appearance. He is well-developed. He is not diaphoretic.   HENT:      Head: Normocephalic and atraumatic.      Right Ear: External ear normal.      Left Ear: External ear normal.   Eyes:      General:         Right eye: No discharge.         Left eye: No discharge.   Cardiovascular:      Rate and Rhythm: Normal rate and regular rhythm.      Heart sounds: Normal heart sounds.   Pulmonary:      Effort: Pulmonary effort is normal.      Breath sounds: Normal breath sounds. No wheezing or rales.   Musculoskeletal:      Right shoulder: He exhibits decreased range of motion, tenderness, pain, spasm and decreased strength.      Comments: Right shoulder:  Unable to perform lift-off test.  Weakness with empty can.   Skin:     General: Skin is warm and dry.   Neurological:      Mental Status: He is alert and oriented to person, place, and time. Mental status is at baseline.   Psychiatric:         Mood and Affect: Mood normal.         Behavior: Behavior normal.         Thought Content: Thought content normal.         Judgment: Judgment normal.        Assessment/Plan     1. Chronic right shoulder pain  methocarbamoL (ROBAXIN) 500 MG Tab    diclofenac sodium (VOLTAREN) 1 % Gel    X-ray Shoulder 2 or More Views Right    Ambulatory referral/consult to Orthopedics     Given findings in right shoulder likely has rotator cuff pathology.  Will continue Voltaren and Robaxin.  Will obtain x-ray of the right shoulder.  Referral to orthopedics placed for evaluation of right shoulder.  Follow-up in 3 months.    Future Appointments   Date Time Provider Department Center   2/5/2021  2:30 PM Cameron Woodard DO [facility] ORTHO [facility]   2/8/2021  9:00 AM [facility], LABORATORY [facility] LAB [facility]   2/8/2021 10:00 AM [facility] " CT1 [facility] CT [facility]   2/17/2021  8:15 AM Jose Lazo MD [facility]   2/17/2021  8:40 AM Shayla Stringer MD [facility] [address] [place] [address]   3/30/2021 10:00 AM Abdiaziz Jimenez MD [facility]         Abdiaziz Jimenez MD  Family Medicine  [facility]

## 2021-01-07 ENCOUNTER — PATIENT MESSAGE (OUTPATIENT)
Dept: HEMATOLOGY/ONCOLOGY | Facility: CLINIC | Age: 66
End: 2021-01-07

## 2021-01-08 DIAGNOSIS — G89.3 NEOPLASM RELATED PAIN: ICD-10-CM

## 2021-01-13 ENCOUNTER — INFUSION (OUTPATIENT)
Dept: INFUSION THERAPY | Facility: HOSPITAL | Age: 66
End: 2021-01-13
Attending: INTERNAL MEDICINE
Payer: MEDICARE

## 2021-01-13 VITALS
HEART RATE: 98 BPM | RESPIRATION RATE: 18 BRPM | TEMPERATURE: 98 F | DIASTOLIC BLOOD PRESSURE: 74 MMHG | SYSTOLIC BLOOD PRESSURE: 137 MMHG | OXYGEN SATURATION: 97 %

## 2021-01-13 DIAGNOSIS — Z51.11 ENCOUNTER FOR CHEMOTHERAPY MANAGEMENT: Primary | ICD-10-CM

## 2021-01-13 PROCEDURE — 25000003 PHARM REV CODE 250: Performed by: INTERNAL MEDICINE

## 2021-01-13 PROCEDURE — A4216 STERILE WATER/SALINE, 10 ML: HCPCS | Performed by: INTERNAL MEDICINE

## 2021-01-13 PROCEDURE — 96523 IRRIG DRUG DELIVERY DEVICE: CPT

## 2021-01-13 PROCEDURE — 63600175 PHARM REV CODE 636 W HCPCS: Performed by: INTERNAL MEDICINE

## 2021-01-13 RX ORDER — OXYCODONE AND ACETAMINOPHEN 10; 325 MG/1; MG/1
1 TABLET ORAL EVERY 8 HOURS PRN
Qty: 60 TABLET | Refills: 0 | Status: SHIPPED | OUTPATIENT
Start: 2021-01-13 | End: 2021-03-02 | Stop reason: SDUPTHER

## 2021-01-13 RX ORDER — HEPARIN 100 UNIT/ML
500 SYRINGE INTRAVENOUS
Status: DISCONTINUED | OUTPATIENT
Start: 2021-01-13 | End: 2021-01-13 | Stop reason: HOSPADM

## 2021-01-13 RX ORDER — HEPARIN 100 UNIT/ML
500 SYRINGE INTRAVENOUS
Status: CANCELLED | OUTPATIENT
Start: 2021-01-13

## 2021-01-13 RX ORDER — SODIUM CHLORIDE 0.9 % (FLUSH) 0.9 %
10 SYRINGE (ML) INJECTION
Status: CANCELLED | OUTPATIENT
Start: 2021-01-13

## 2021-01-13 RX ORDER — SODIUM CHLORIDE 0.9 % (FLUSH) 0.9 %
10 SYRINGE (ML) INJECTION
Status: DISCONTINUED | OUTPATIENT
Start: 2021-01-13 | End: 2021-01-13 | Stop reason: HOSPADM

## 2021-01-13 RX ADMIN — HEPARIN 500 UNITS: 100 SYRINGE at 01:01

## 2021-01-13 RX ADMIN — Medication 10 ML: at 01:01

## 2021-02-02 ENCOUNTER — PATIENT OUTREACH (OUTPATIENT)
Dept: ADMINISTRATIVE | Facility: OTHER | Age: 66
End: 2021-02-02

## 2021-02-04 ENCOUNTER — PATIENT MESSAGE (OUTPATIENT)
Dept: HEMATOLOGY/ONCOLOGY | Facility: CLINIC | Age: 66
End: 2021-02-04

## 2021-02-05 ENCOUNTER — OFFICE VISIT (OUTPATIENT)
Dept: ORTHOPEDICS | Facility: CLINIC | Age: 66
End: 2021-02-05
Payer: MEDICARE

## 2021-02-05 VITALS
WEIGHT: 169 LBS | OXYGEN SATURATION: 99 % | HEART RATE: 97 BPM | HEIGHT: 70 IN | BODY MASS INDEX: 24.2 KG/M2 | TEMPERATURE: 98 F

## 2021-02-05 DIAGNOSIS — M75.111 INCOMPLETE ROTATOR CUFF TEAR OR RUPTURE OF RIGHT SHOULDER, NOT SPECIFIED AS TRAUMATIC: Primary | ICD-10-CM

## 2021-02-05 DIAGNOSIS — M75.21 TENDONITIS OF LONG HEAD OF BICEPS BRACHII OF RIGHT SHOULDER: ICD-10-CM

## 2021-02-05 DIAGNOSIS — M19.011 ARTHRITIS OF RIGHT ACROMIOCLAVICULAR JOINT: ICD-10-CM

## 2021-02-05 DIAGNOSIS — G89.29 CHRONIC RIGHT SHOULDER PAIN: ICD-10-CM

## 2021-02-05 DIAGNOSIS — M24.611 ARTHROFIBROSIS OF RIGHT SHOULDER: ICD-10-CM

## 2021-02-05 DIAGNOSIS — M19.011 GLENOHUMERAL ARTHRITIS, RIGHT: ICD-10-CM

## 2021-02-05 DIAGNOSIS — M25.511 CHRONIC RIGHT SHOULDER PAIN: ICD-10-CM

## 2021-02-05 PROCEDURE — 1101F PR PT FALLS ASSESS DOC 0-1 FALLS W/OUT INJ PAST YR: ICD-10-PCS | Mod: CPTII,S$GLB,, | Performed by: ORTHOPAEDIC SURGERY

## 2021-02-05 PROCEDURE — 1159F PR MEDICATION LIST DOCUMENTED IN MEDICAL RECORD: ICD-10-PCS | Mod: S$GLB,,, | Performed by: ORTHOPAEDIC SURGERY

## 2021-02-05 PROCEDURE — 99999 PR PBB SHADOW E&M-EST. PATIENT-LVL IV: ICD-10-PCS | Mod: PBBFAC,,, | Performed by: ORTHOPAEDIC SURGERY

## 2021-02-05 PROCEDURE — 1101F PT FALLS ASSESS-DOCD LE1/YR: CPT | Mod: CPTII,S$GLB,, | Performed by: ORTHOPAEDIC SURGERY

## 2021-02-05 PROCEDURE — 1159F MED LIST DOCD IN RCRD: CPT | Mod: S$GLB,,, | Performed by: ORTHOPAEDIC SURGERY

## 2021-02-05 PROCEDURE — 99203 PR OFFICE/OUTPT VISIT, NEW, LEVL III, 30-44 MIN: ICD-10-PCS | Mod: 25,S$GLB,, | Performed by: ORTHOPAEDIC SURGERY

## 2021-02-05 PROCEDURE — 99203 OFFICE O/P NEW LOW 30 MIN: CPT | Mod: 25,S$GLB,, | Performed by: ORTHOPAEDIC SURGERY

## 2021-02-05 PROCEDURE — 1126F PR PAIN SEVERITY QUANTIFIED, NO PAIN PRESENT: ICD-10-PCS | Mod: S$GLB,,, | Performed by: ORTHOPAEDIC SURGERY

## 2021-02-05 PROCEDURE — 1126F AMNT PAIN NOTED NONE PRSNT: CPT | Mod: S$GLB,,, | Performed by: ORTHOPAEDIC SURGERY

## 2021-02-05 PROCEDURE — 99999 PR PBB SHADOW E&M-EST. PATIENT-LVL IV: CPT | Mod: PBBFAC,,, | Performed by: ORTHOPAEDIC SURGERY

## 2021-02-05 PROCEDURE — 3288F PR FALLS RISK ASSESSMENT DOCUMENTED: ICD-10-PCS | Mod: CPTII,S$GLB,, | Performed by: ORTHOPAEDIC SURGERY

## 2021-02-05 PROCEDURE — 3288F FALL RISK ASSESSMENT DOCD: CPT | Mod: CPTII,S$GLB,, | Performed by: ORTHOPAEDIC SURGERY

## 2021-02-05 PROCEDURE — 3008F PR BODY MASS INDEX (BMI) DOCUMENTED: ICD-10-PCS | Mod: CPTII,S$GLB,, | Performed by: ORTHOPAEDIC SURGERY

## 2021-02-05 PROCEDURE — 3008F BODY MASS INDEX DOCD: CPT | Mod: CPTII,S$GLB,, | Performed by: ORTHOPAEDIC SURGERY

## 2021-02-05 PROCEDURE — 20610 LARGE JOINT ASPIRATION/INJECTION: R GLENOHUMERAL: ICD-10-PCS | Mod: RT,S$GLB,, | Performed by: ORTHOPAEDIC SURGERY

## 2021-02-05 PROCEDURE — 20610 DRAIN/INJ JOINT/BURSA W/O US: CPT | Mod: RT,S$GLB,, | Performed by: ORTHOPAEDIC SURGERY

## 2021-02-05 RX ORDER — TRIAMCINOLONE ACETONIDE 40 MG/ML
40 INJECTION, SUSPENSION INTRA-ARTICULAR; INTRAMUSCULAR
Status: DISCONTINUED | OUTPATIENT
Start: 2021-02-05 | End: 2021-02-05 | Stop reason: HOSPADM

## 2021-02-05 RX ADMIN — TRIAMCINOLONE ACETONIDE 40 MG: 40 INJECTION, SUSPENSION INTRA-ARTICULAR; INTRAMUSCULAR at 02:02

## 2021-02-08 ENCOUNTER — HOSPITAL ENCOUNTER (OUTPATIENT)
Dept: RADIOLOGY | Facility: HOSPITAL | Age: 66
Discharge: HOME | End: 2021-02-08
Attending: INTERNAL MEDICINE
Payer: MEDICARE

## 2021-02-08 DIAGNOSIS — C20 RECTAL ADENOCARCINOMA: ICD-10-CM

## 2021-02-08 DIAGNOSIS — R20.2 PARESTHESIA: ICD-10-CM

## 2021-02-08 PROCEDURE — 71260 CT CHEST ABDOMEN PELVIS WITH CONTRAST (XPD): ICD-10-PCS | Mod: 26,,, | Performed by: RADIOLOGY

## 2021-02-08 PROCEDURE — A9698 NON-RAD CONTRAST MATERIALNOC: HCPCS | Performed by: INTERNAL MEDICINE

## 2021-02-08 PROCEDURE — 74177 CT CHEST ABDOMEN PELVIS WITH CONTRAST (XPD): ICD-10-PCS | Mod: 26,,, | Performed by: RADIOLOGY

## 2021-02-08 PROCEDURE — 25500020 PHARM REV CODE 255: Performed by: INTERNAL MEDICINE

## 2021-02-08 PROCEDURE — 74177 CT ABD & PELVIS W/CONTRAST: CPT | Mod: TC

## 2021-02-08 PROCEDURE — 71260 CT THORAX DX C+: CPT | Mod: 26,,, | Performed by: RADIOLOGY

## 2021-02-08 PROCEDURE — 74177 CT ABD & PELVIS W/CONTRAST: CPT | Mod: 26,,, | Performed by: RADIOLOGY

## 2021-02-08 RX ADMIN — IOHEXOL 75 ML: 350 INJECTION, SOLUTION INTRAVENOUS at 10:02

## 2021-02-08 RX ADMIN — IOHEXOL 1000 ML: 9 SOLUTION ORAL at 10:02

## 2021-02-17 ENCOUNTER — OFFICE VISIT (OUTPATIENT)
Dept: SURGERY | Facility: CLINIC | Age: 66
End: 2021-02-17
Payer: MEDICARE

## 2021-02-17 VITALS
OXYGEN SATURATION: 97 % | BODY MASS INDEX: 23.51 KG/M2 | HEIGHT: 70 IN | DIASTOLIC BLOOD PRESSURE: 75 MMHG | RESPIRATION RATE: 15 BRPM | SYSTOLIC BLOOD PRESSURE: 119 MMHG | WEIGHT: 164.19 LBS | TEMPERATURE: 98 F | HEART RATE: 78 BPM

## 2021-02-17 DIAGNOSIS — K94.03 COLOSTOMY STRICTURE: ICD-10-CM

## 2021-02-17 DIAGNOSIS — C20 RECTAL CANCER: Primary | ICD-10-CM

## 2021-02-17 PROCEDURE — 3288F FALL RISK ASSESSMENT DOCD: CPT | Mod: CPTII,S$GLB,, | Performed by: SURGERY

## 2021-02-17 PROCEDURE — 1159F PR MEDICATION LIST DOCUMENTED IN MEDICAL RECORD: ICD-10-PCS | Mod: S$GLB,,, | Performed by: SURGERY

## 2021-02-17 PROCEDURE — 1126F AMNT PAIN NOTED NONE PRSNT: CPT | Mod: S$GLB,,, | Performed by: SURGERY

## 2021-02-17 PROCEDURE — 1159F MED LIST DOCD IN RCRD: CPT | Mod: S$GLB,,, | Performed by: SURGERY

## 2021-02-17 PROCEDURE — 99214 OFFICE O/P EST MOD 30 MIN: CPT | Mod: S$GLB,,, | Performed by: SURGERY

## 2021-02-17 PROCEDURE — 99214 PR OFFICE/OUTPT VISIT, EST, LEVL IV, 30-39 MIN: ICD-10-PCS | Mod: S$GLB,,, | Performed by: SURGERY

## 2021-02-17 PROCEDURE — 3288F PR FALLS RISK ASSESSMENT DOCUMENTED: ICD-10-PCS | Mod: CPTII,S$GLB,, | Performed by: SURGERY

## 2021-02-17 PROCEDURE — 3008F PR BODY MASS INDEX (BMI) DOCUMENTED: ICD-10-PCS | Mod: CPTII,S$GLB,, | Performed by: SURGERY

## 2021-02-17 PROCEDURE — 1101F PR PT FALLS ASSESS DOC 0-1 FALLS W/OUT INJ PAST YR: ICD-10-PCS | Mod: CPTII,S$GLB,, | Performed by: SURGERY

## 2021-02-17 PROCEDURE — 3008F BODY MASS INDEX DOCD: CPT | Mod: CPTII,S$GLB,, | Performed by: SURGERY

## 2021-02-17 PROCEDURE — 1126F PR PAIN SEVERITY QUANTIFIED, NO PAIN PRESENT: ICD-10-PCS | Mod: S$GLB,,, | Performed by: SURGERY

## 2021-02-17 PROCEDURE — 1101F PT FALLS ASSESS-DOCD LE1/YR: CPT | Mod: CPTII,S$GLB,, | Performed by: SURGERY

## 2021-02-24 ENCOUNTER — INFUSION (OUTPATIENT)
Dept: INFUSION THERAPY | Facility: HOSPITAL | Age: 66
End: 2021-02-24
Payer: MEDICARE

## 2021-02-24 VITALS
DIASTOLIC BLOOD PRESSURE: 57 MMHG | RESPIRATION RATE: 18 BRPM | OXYGEN SATURATION: 97 % | HEART RATE: 73 BPM | SYSTOLIC BLOOD PRESSURE: 108 MMHG | TEMPERATURE: 98 F

## 2021-02-24 DIAGNOSIS — Z51.11 ENCOUNTER FOR CHEMOTHERAPY MANAGEMENT: Primary | ICD-10-CM

## 2021-02-24 PROCEDURE — A4216 STERILE WATER/SALINE, 10 ML: HCPCS | Performed by: INTERNAL MEDICINE

## 2021-02-24 PROCEDURE — 25000003 PHARM REV CODE 250: Performed by: INTERNAL MEDICINE

## 2021-02-24 PROCEDURE — 96523 IRRIG DRUG DELIVERY DEVICE: CPT

## 2021-02-24 PROCEDURE — 63600175 PHARM REV CODE 636 W HCPCS: Performed by: INTERNAL MEDICINE

## 2021-02-24 RX ORDER — SODIUM CHLORIDE 0.9 % (FLUSH) 0.9 %
10 SYRINGE (ML) INJECTION
Status: DISCONTINUED | OUTPATIENT
Start: 2021-02-24 | End: 2021-02-24 | Stop reason: HOSPADM

## 2021-02-24 RX ORDER — SODIUM CHLORIDE 0.9 % (FLUSH) 0.9 %
10 SYRINGE (ML) INJECTION
OUTPATIENT
Start: 2021-02-24

## 2021-02-24 RX ORDER — HEPARIN 100 UNIT/ML
500 SYRINGE INTRAVENOUS
OUTPATIENT
Start: 2021-02-24

## 2021-02-24 RX ORDER — HEPARIN 100 UNIT/ML
500 SYRINGE INTRAVENOUS
Status: DISCONTINUED | OUTPATIENT
Start: 2021-02-24 | End: 2021-02-24 | Stop reason: HOSPADM

## 2021-02-24 RX ADMIN — HEPARIN 500 UNITS: 100 SYRINGE at 09:02

## 2021-02-24 RX ADMIN — Medication 10 ML: at 09:02

## 2021-03-02 DIAGNOSIS — G89.3 NEOPLASM RELATED PAIN: ICD-10-CM

## 2021-03-03 RX ORDER — OXYCODONE AND ACETAMINOPHEN 10; 325 MG/1; MG/1
1 TABLET ORAL EVERY 8 HOURS PRN
Qty: 60 TABLET | Refills: 0 | Status: SHIPPED | OUTPATIENT
Start: 2021-03-03 | End: 2021-03-10 | Stop reason: SDUPTHER

## 2021-03-10 ENCOUNTER — OFFICE VISIT (OUTPATIENT)
Dept: HEMATOLOGY/ONCOLOGY | Facility: CLINIC | Age: 66
End: 2021-03-10
Payer: MEDICARE

## 2021-03-10 VITALS
HEART RATE: 74 BPM | TEMPERATURE: 98 F | DIASTOLIC BLOOD PRESSURE: 78 MMHG | SYSTOLIC BLOOD PRESSURE: 129 MMHG | WEIGHT: 168 LBS | BODY MASS INDEX: 24.05 KG/M2 | OXYGEN SATURATION: 97 % | HEIGHT: 70 IN

## 2021-03-10 DIAGNOSIS — Z71.89 ENCOUNTER FOR PAIN MANAGEMENT COUNSELING: ICD-10-CM

## 2021-03-10 DIAGNOSIS — Z51.11 ENCOUNTER FOR CHEMOTHERAPY MANAGEMENT: ICD-10-CM

## 2021-03-10 DIAGNOSIS — E53.8 B12 DEFICIENCY: ICD-10-CM

## 2021-03-10 DIAGNOSIS — C20 MALIGNANT NEOPLASM OF RECTUM: Primary | ICD-10-CM

## 2021-03-10 DIAGNOSIS — G89.3 NEOPLASM RELATED PAIN: ICD-10-CM

## 2021-03-10 PROCEDURE — 1101F PT FALLS ASSESS-DOCD LE1/YR: CPT | Mod: CPTII,S$GLB,, | Performed by: INTERNAL MEDICINE

## 2021-03-10 PROCEDURE — 3288F PR FALLS RISK ASSESSMENT DOCUMENTED: ICD-10-PCS | Mod: CPTII,S$GLB,, | Performed by: INTERNAL MEDICINE

## 2021-03-10 PROCEDURE — 99215 OFFICE O/P EST HI 40 MIN: CPT | Mod: S$GLB,,, | Performed by: INTERNAL MEDICINE

## 2021-03-10 PROCEDURE — 1159F MED LIST DOCD IN RCRD: CPT | Mod: S$GLB,,, | Performed by: INTERNAL MEDICINE

## 2021-03-10 PROCEDURE — 1101F PR PT FALLS ASSESS DOC 0-1 FALLS W/OUT INJ PAST YR: ICD-10-PCS | Mod: CPTII,S$GLB,, | Performed by: INTERNAL MEDICINE

## 2021-03-10 PROCEDURE — 1125F PR PAIN SEVERITY QUANTIFIED, PAIN PRESENT: ICD-10-PCS | Mod: S$GLB,,, | Performed by: INTERNAL MEDICINE

## 2021-03-10 PROCEDURE — 3288F FALL RISK ASSESSMENT DOCD: CPT | Mod: CPTII,S$GLB,, | Performed by: INTERNAL MEDICINE

## 2021-03-10 PROCEDURE — 1125F AMNT PAIN NOTED PAIN PRSNT: CPT | Mod: S$GLB,,, | Performed by: INTERNAL MEDICINE

## 2021-03-10 PROCEDURE — 3008F PR BODY MASS INDEX (BMI) DOCUMENTED: ICD-10-PCS | Mod: CPTII,S$GLB,, | Performed by: INTERNAL MEDICINE

## 2021-03-10 PROCEDURE — 99999 PR PBB SHADOW E&M-EST. PATIENT-LVL IV: ICD-10-PCS | Mod: PBBFAC,,, | Performed by: INTERNAL MEDICINE

## 2021-03-10 PROCEDURE — 99999 PR PBB SHADOW E&M-EST. PATIENT-LVL IV: CPT | Mod: PBBFAC,,, | Performed by: INTERNAL MEDICINE

## 2021-03-10 PROCEDURE — 3008F BODY MASS INDEX DOCD: CPT | Mod: CPTII,S$GLB,, | Performed by: INTERNAL MEDICINE

## 2021-03-10 PROCEDURE — 99215 PR OFFICE/OUTPT VISIT, EST, LEVL V, 40-54 MIN: ICD-10-PCS | Mod: S$GLB,,, | Performed by: INTERNAL MEDICINE

## 2021-03-10 PROCEDURE — 1159F PR MEDICATION LIST DOCUMENTED IN MEDICAL RECORD: ICD-10-PCS | Mod: S$GLB,,, | Performed by: INTERNAL MEDICINE

## 2021-03-10 RX ORDER — OXYCODONE AND ACETAMINOPHEN 10; 325 MG/1; MG/1
1 TABLET ORAL EVERY 8 HOURS PRN
Qty: 60 TABLET | Refills: 0 | Status: SHIPPED | OUTPATIENT
Start: 2021-03-10 | End: 2021-04-08 | Stop reason: SDUPTHER

## 2021-03-11 DIAGNOSIS — C20 RECTAL ADENOCARCINOMA: Primary | ICD-10-CM

## 2021-03-12 ENCOUNTER — TELEPHONE (OUTPATIENT)
Dept: SURGERY | Facility: CLINIC | Age: 66
End: 2021-03-12

## 2021-03-15 ENCOUNTER — TELEPHONE (OUTPATIENT)
Dept: SURGERY | Facility: CLINIC | Age: 66
End: 2021-03-15

## 2021-03-15 DIAGNOSIS — E43 SEVERE MALNUTRITION: ICD-10-CM

## 2021-03-15 DIAGNOSIS — E55.9 VITAMIN D DEFICIENCY: ICD-10-CM

## 2021-03-15 DIAGNOSIS — E16.2 HYPOGLYCEMIA: ICD-10-CM

## 2021-03-15 DIAGNOSIS — C20 RECTAL CANCER: Primary | ICD-10-CM

## 2021-03-22 ENCOUNTER — CLINICAL SUPPORT (OUTPATIENT)
Dept: HEMATOLOGY/ONCOLOGY | Facility: CLINIC | Age: 66
End: 2021-03-22
Payer: MEDICARE

## 2021-03-22 ENCOUNTER — OFFICE VISIT (OUTPATIENT)
Dept: SURGERY | Facility: CLINIC | Age: 66
End: 2021-03-22
Payer: MEDICARE

## 2021-03-22 ENCOUNTER — HOSPITAL ENCOUNTER (OUTPATIENT)
Dept: RADIOLOGY | Facility: HOSPITAL | Age: 66
Discharge: HOME | End: 2021-03-22
Attending: COLON & RECTAL SURGERY
Payer: MEDICARE

## 2021-03-22 ENCOUNTER — CLINICAL SUPPORT (OUTPATIENT)
Dept: REHABILITATION | Facility: HOSPITAL | Age: 66
End: 2021-03-22
Payer: MEDICARE

## 2021-03-22 ENCOUNTER — PATIENT MESSAGE (OUTPATIENT)
Dept: ADMINISTRATIVE | Facility: OTHER | Age: 66
End: 2021-03-22

## 2021-03-22 VITALS
DIASTOLIC BLOOD PRESSURE: 63 MMHG | SYSTOLIC BLOOD PRESSURE: 135 MMHG | BODY MASS INDEX: 23.66 KG/M2 | HEART RATE: 73 BPM | WEIGHT: 169 LBS | HEIGHT: 71 IN

## 2021-03-22 VITALS
DIASTOLIC BLOOD PRESSURE: 59 MMHG | RESPIRATION RATE: 17 BRPM | TEMPERATURE: 97 F | WEIGHT: 167 LBS | BODY MASS INDEX: 23.38 KG/M2 | HEART RATE: 70 BPM | HEIGHT: 71 IN | SYSTOLIC BLOOD PRESSURE: 131 MMHG

## 2021-03-22 DIAGNOSIS — Z74.09 IMPAIRED FUNCTIONAL MOBILITY AND ACTIVITY TOLERANCE: ICD-10-CM

## 2021-03-22 DIAGNOSIS — M62.81 MUSCLE WEAKNESS (GENERALIZED): Primary | ICD-10-CM

## 2021-03-22 DIAGNOSIS — Z93.3 COLOSTOMY IN PLACE: ICD-10-CM

## 2021-03-22 DIAGNOSIS — E43 SEVERE MALNUTRITION: ICD-10-CM

## 2021-03-22 DIAGNOSIS — R53.81 PHYSICAL DECONDITIONING: ICD-10-CM

## 2021-03-22 DIAGNOSIS — F41.9 ANXIETY IN CANCER PATIENT: ICD-10-CM

## 2021-03-22 DIAGNOSIS — F32.A DEPRESSION, UNSPECIFIED DEPRESSION TYPE: ICD-10-CM

## 2021-03-22 DIAGNOSIS — C20 RECTAL CANCER: Primary | ICD-10-CM

## 2021-03-22 DIAGNOSIS — C20 RECTAL CANCER: ICD-10-CM

## 2021-03-22 DIAGNOSIS — Z71.3 NUTRITIONAL COUNSELING: ICD-10-CM

## 2021-03-22 DIAGNOSIS — C20 MALIGNANT NEOPLASM OF RECTUM: Primary | ICD-10-CM

## 2021-03-22 DIAGNOSIS — C20 RECTAL ADENOCARCINOMA: ICD-10-CM

## 2021-03-22 DIAGNOSIS — Z74.09 DECREASED MOBILITY AND ENDURANCE: ICD-10-CM

## 2021-03-22 DIAGNOSIS — M25.611 DECREASED RANGE OF MOTION OF RIGHT SHOULDER: ICD-10-CM

## 2021-03-22 DIAGNOSIS — C20 RECTAL ADENOCARCINOMA: Primary | ICD-10-CM

## 2021-03-22 PROCEDURE — 97802 MEDICAL NUTRITION INDIV IN: CPT | Mod: S$GLB,,, | Performed by: DIETITIAN, REGISTERED

## 2021-03-22 PROCEDURE — 3288F FALL RISK ASSESSMENT DOCD: CPT | Mod: CPTII,S$GLB,, | Performed by: NURSE PRACTITIONER

## 2021-03-22 PROCEDURE — 3288F PR FALLS RISK ASSESSMENT DOCUMENTED: ICD-10-PCS | Mod: CPTII,S$GLB,, | Performed by: COLON & RECTAL SURGERY

## 2021-03-22 PROCEDURE — 72195 MRI RECTAL CANCER WITHOUT CONTRAST: ICD-10-PCS | Mod: 26,,, | Performed by: RADIOLOGY

## 2021-03-22 PROCEDURE — 99213 PR OFFICE/OUTPT VISIT, EST, LEVL III, 20-29 MIN: ICD-10-PCS | Mod: S$GLB,,, | Performed by: COLON & RECTAL SURGERY

## 2021-03-22 PROCEDURE — 97110 THERAPEUTIC EXERCISES: CPT

## 2021-03-22 PROCEDURE — 3288F FALL RISK ASSESSMENT DOCD: CPT | Mod: CPTII,S$GLB,, | Performed by: COLON & RECTAL SURGERY

## 2021-03-22 PROCEDURE — 72195 MRI PELVIS W/O DYE: CPT | Mod: TC

## 2021-03-22 PROCEDURE — 99999 PR PBB SHADOW E&M-EST. PATIENT-LVL IV: ICD-10-PCS | Mod: PBBFAC,,, | Performed by: NURSE PRACTITIONER

## 2021-03-22 PROCEDURE — 1101F PT FALLS ASSESS-DOCD LE1/YR: CPT | Mod: CPTII,S$GLB,, | Performed by: COLON & RECTAL SURGERY

## 2021-03-22 PROCEDURE — 3008F BODY MASS INDEX DOCD: CPT | Mod: CPTII,S$GLB,, | Performed by: NURSE PRACTITIONER

## 2021-03-22 PROCEDURE — 3288F PR FALLS RISK ASSESSMENT DOCUMENTED: ICD-10-PCS | Mod: CPTII,S$GLB,, | Performed by: NURSE PRACTITIONER

## 2021-03-22 PROCEDURE — 1126F PR PAIN SEVERITY QUANTIFIED, NO PAIN PRESENT: ICD-10-PCS | Mod: S$GLB,,, | Performed by: COLON & RECTAL SURGERY

## 2021-03-22 PROCEDURE — 1125F AMNT PAIN NOTED PAIN PRSNT: CPT | Mod: S$GLB,,, | Performed by: NURSE PRACTITIONER

## 2021-03-22 PROCEDURE — 99417 PROLNG OP E/M EACH 15 MIN: CPT | Mod: S$GLB,,, | Performed by: NURSE PRACTITIONER

## 2021-03-22 PROCEDURE — 1125F PR PAIN SEVERITY QUANTIFIED, PAIN PRESENT: ICD-10-PCS | Mod: S$GLB,,, | Performed by: NURSE PRACTITIONER

## 2021-03-22 PROCEDURE — 97802 PR MED NUTR THER, 1ST, INDIV, EA 15 MIN: ICD-10-PCS | Mod: S$GLB,,, | Performed by: DIETITIAN, REGISTERED

## 2021-03-22 PROCEDURE — 99999 PR PBB SHADOW E&M-EST. PATIENT-LVL V: CPT | Mod: PBBFAC,,, | Performed by: COLON & RECTAL SURGERY

## 2021-03-22 PROCEDURE — 3008F PR BODY MASS INDEX (BMI) DOCUMENTED: ICD-10-PCS | Mod: CPTII,S$GLB,, | Performed by: COLON & RECTAL SURGERY

## 2021-03-22 PROCEDURE — 99999 PR PBB SHADOW E&M-EST. PATIENT-LVL V: ICD-10-PCS | Mod: PBBFAC,,, | Performed by: COLON & RECTAL SURGERY

## 2021-03-22 PROCEDURE — 97162 PT EVAL MOD COMPLEX 30 MIN: CPT

## 2021-03-22 PROCEDURE — 99215 OFFICE O/P EST HI 40 MIN: CPT | Mod: S$GLB,,, | Performed by: NURSE PRACTITIONER

## 2021-03-22 PROCEDURE — 99417 PR PROLONGED SVC, OUTPT, W/WO DIRECT PT CONTACT,  EA ADDTL 15 MIN: ICD-10-PCS | Mod: S$GLB,,, | Performed by: NURSE PRACTITIONER

## 2021-03-22 PROCEDURE — 99213 OFFICE O/P EST LOW 20 MIN: CPT | Mod: S$GLB,,, | Performed by: COLON & RECTAL SURGERY

## 2021-03-22 PROCEDURE — 1159F PR MEDICATION LIST DOCUMENTED IN MEDICAL RECORD: ICD-10-PCS | Mod: S$GLB,,, | Performed by: NURSE PRACTITIONER

## 2021-03-22 PROCEDURE — 3008F BODY MASS INDEX DOCD: CPT | Mod: CPTII,S$GLB,, | Performed by: COLON & RECTAL SURGERY

## 2021-03-22 PROCEDURE — 1159F MED LIST DOCD IN RCRD: CPT | Mod: S$GLB,,, | Performed by: NURSE PRACTITIONER

## 2021-03-22 PROCEDURE — 99999 PR PBB SHADOW E&M-EST. PATIENT-LVL I: CPT | Mod: PBBFAC,,, | Performed by: DIETITIAN, REGISTERED

## 2021-03-22 PROCEDURE — 1101F PT FALLS ASSESS-DOCD LE1/YR: CPT | Mod: CPTII,S$GLB,, | Performed by: NURSE PRACTITIONER

## 2021-03-22 PROCEDURE — 99999 PR PBB SHADOW E&M-EST. PATIENT-LVL IV: CPT | Mod: PBBFAC,,, | Performed by: NURSE PRACTITIONER

## 2021-03-22 PROCEDURE — 1101F PR PT FALLS ASSESS DOC 0-1 FALLS W/OUT INJ PAST YR: ICD-10-PCS | Mod: CPTII,S$GLB,, | Performed by: NURSE PRACTITIONER

## 2021-03-22 PROCEDURE — 99999 PR PBB SHADOW E&M-EST. PATIENT-LVL I: ICD-10-PCS | Mod: PBBFAC,,, | Performed by: DIETITIAN, REGISTERED

## 2021-03-22 PROCEDURE — 72195 MRI PELVIS W/O DYE: CPT | Mod: 26,,, | Performed by: RADIOLOGY

## 2021-03-22 PROCEDURE — 1159F PR MEDICATION LIST DOCUMENTED IN MEDICAL RECORD: ICD-10-PCS | Mod: S$GLB,,, | Performed by: COLON & RECTAL SURGERY

## 2021-03-22 PROCEDURE — 1126F AMNT PAIN NOTED NONE PRSNT: CPT | Mod: S$GLB,,, | Performed by: COLON & RECTAL SURGERY

## 2021-03-22 PROCEDURE — 1101F PR PT FALLS ASSESS DOC 0-1 FALLS W/OUT INJ PAST YR: ICD-10-PCS | Mod: CPTII,S$GLB,, | Performed by: COLON & RECTAL SURGERY

## 2021-03-22 PROCEDURE — 1159F MED LIST DOCD IN RCRD: CPT | Mod: S$GLB,,, | Performed by: COLON & RECTAL SURGERY

## 2021-03-22 PROCEDURE — 99215 PR OFFICE/OUTPT VISIT, EST, LEVL V, 40-54 MIN: ICD-10-PCS | Mod: S$GLB,,, | Performed by: NURSE PRACTITIONER

## 2021-03-22 PROCEDURE — 3008F PR BODY MASS INDEX (BMI) DOCUMENTED: ICD-10-PCS | Mod: CPTII,S$GLB,, | Performed by: NURSE PRACTITIONER

## 2021-03-22 RX ORDER — METRONIDAZOLE 500 MG/1
500 TABLET ORAL SEE ADMIN INSTRUCTIONS
Qty: 3 TABLET | Refills: 0 | Status: ON HOLD | OUTPATIENT
Start: 2021-03-22 | End: 2021-05-07 | Stop reason: HOSPADM

## 2021-03-22 RX ORDER — NEOMYCIN SULFATE 500 MG/1
1000 TABLET ORAL SEE ADMIN INSTRUCTIONS
Qty: 6 TABLET | Refills: 0 | Status: ON HOLD | OUTPATIENT
Start: 2021-03-22 | End: 2021-05-07 | Stop reason: HOSPADM

## 2021-03-22 RX ORDER — SODIUM CHLORIDE 9 MG/ML
INJECTION, SOLUTION INTRAVENOUS CONTINUOUS
Status: CANCELLED | OUTPATIENT
Start: 2021-03-22

## 2021-03-22 RX ORDER — GABAPENTIN 300 MG/1
300 CAPSULE ORAL 3 TIMES DAILY
Status: CANCELLED | OUTPATIENT
Start: 2021-03-22

## 2021-03-24 ENCOUNTER — CLINICAL SUPPORT (OUTPATIENT)
Dept: REHABILITATION | Facility: HOSPITAL | Age: 66
End: 2021-03-24
Attending: INTERNAL MEDICINE
Payer: MEDICARE

## 2021-03-24 DIAGNOSIS — Z74.09 IMPAIRED FUNCTIONAL MOBILITY AND ACTIVITY TOLERANCE: ICD-10-CM

## 2021-03-24 DIAGNOSIS — M25.611 DECREASED RANGE OF MOTION OF RIGHT SHOULDER: ICD-10-CM

## 2021-03-24 DIAGNOSIS — M62.81 MUSCLE WEAKNESS (GENERALIZED): ICD-10-CM

## 2021-03-24 DIAGNOSIS — Z74.09 DECREASED MOBILITY AND ENDURANCE: ICD-10-CM

## 2021-03-24 DIAGNOSIS — R53.81 PHYSICAL DECONDITIONING: ICD-10-CM

## 2021-03-24 PROCEDURE — 97110 THERAPEUTIC EXERCISES: CPT | Mod: PN

## 2021-03-24 PROCEDURE — 97140 MANUAL THERAPY 1/> REGIONS: CPT | Mod: PN

## 2021-03-26 ENCOUNTER — CLINICAL SUPPORT (OUTPATIENT)
Dept: REHABILITATION | Facility: HOSPITAL | Age: 66
End: 2021-03-26
Attending: INTERNAL MEDICINE
Payer: MEDICARE

## 2021-03-26 DIAGNOSIS — M25.611 DECREASED RANGE OF MOTION OF RIGHT SHOULDER: Primary | ICD-10-CM

## 2021-03-26 DIAGNOSIS — M62.81 MUSCLE WEAKNESS (GENERALIZED): ICD-10-CM

## 2021-03-26 DIAGNOSIS — Z74.09 IMPAIRED FUNCTIONAL MOBILITY AND ACTIVITY TOLERANCE: ICD-10-CM

## 2021-03-26 DIAGNOSIS — Z74.09 DECREASED MOBILITY AND ENDURANCE: ICD-10-CM

## 2021-03-26 PROCEDURE — 97110 THERAPEUTIC EXERCISES: CPT | Mod: PN,CQ

## 2021-03-26 PROCEDURE — 97140 MANUAL THERAPY 1/> REGIONS: CPT | Mod: PN,CQ

## 2021-03-29 ENCOUNTER — CLINICAL SUPPORT (OUTPATIENT)
Dept: REHABILITATION | Facility: HOSPITAL | Age: 66
End: 2021-03-29
Attending: INTERNAL MEDICINE
Payer: MEDICARE

## 2021-03-29 DIAGNOSIS — Z74.09 DECREASED MOBILITY AND ENDURANCE: ICD-10-CM

## 2021-03-29 DIAGNOSIS — Z74.09 IMPAIRED FUNCTIONAL MOBILITY AND ACTIVITY TOLERANCE: ICD-10-CM

## 2021-03-29 DIAGNOSIS — M62.81 MUSCLE WEAKNESS (GENERALIZED): Primary | ICD-10-CM

## 2021-03-29 DIAGNOSIS — M25.611 DECREASED RANGE OF MOTION OF RIGHT SHOULDER: ICD-10-CM

## 2021-03-29 PROCEDURE — 97110 THERAPEUTIC EXERCISES: CPT | Mod: PN,CQ

## 2021-03-29 PROCEDURE — 97140 MANUAL THERAPY 1/> REGIONS: CPT | Mod: PN,CQ

## 2021-04-01 ENCOUNTER — CLINICAL SUPPORT (OUTPATIENT)
Dept: REHABILITATION | Facility: HOSPITAL | Age: 66
End: 2021-04-01
Attending: INTERNAL MEDICINE
Payer: MEDICARE

## 2021-04-01 DIAGNOSIS — R53.81 PHYSICAL DECONDITIONING: ICD-10-CM

## 2021-04-01 DIAGNOSIS — M25.611 DECREASED RANGE OF MOTION OF RIGHT SHOULDER: ICD-10-CM

## 2021-04-01 DIAGNOSIS — Z74.09 IMPAIRED FUNCTIONAL MOBILITY AND ACTIVITY TOLERANCE: ICD-10-CM

## 2021-04-01 DIAGNOSIS — M62.81 MUSCLE WEAKNESS (GENERALIZED): Primary | ICD-10-CM

## 2021-04-01 DIAGNOSIS — Z74.09 DECREASED MOBILITY AND ENDURANCE: ICD-10-CM

## 2021-04-01 PROCEDURE — 97140 MANUAL THERAPY 1/> REGIONS: CPT | Mod: PN

## 2021-04-01 PROCEDURE — 97110 THERAPEUTIC EXERCISES: CPT | Mod: PN

## 2021-04-05 ENCOUNTER — CLINICAL SUPPORT (OUTPATIENT)
Dept: HEMATOLOGY/ONCOLOGY | Facility: CLINIC | Age: 66
End: 2021-04-05
Payer: MEDICARE

## 2021-04-05 ENCOUNTER — OFFICE VISIT (OUTPATIENT)
Dept: SURGERY | Facility: CLINIC | Age: 66
End: 2021-04-05
Payer: MEDICARE

## 2021-04-05 ENCOUNTER — LAB VISIT (OUTPATIENT)
Dept: LAB | Facility: HOSPITAL | Age: 66
End: 2021-04-05
Payer: MEDICARE

## 2021-04-05 VITALS
RESPIRATION RATE: 17 BRPM | TEMPERATURE: 98 F | WEIGHT: 170.88 LBS | BODY MASS INDEX: 23.92 KG/M2 | HEIGHT: 71 IN | SYSTOLIC BLOOD PRESSURE: 130 MMHG | DIASTOLIC BLOOD PRESSURE: 59 MMHG | OXYGEN SATURATION: 97 % | HEART RATE: 70 BPM

## 2021-04-05 DIAGNOSIS — E55.9 VITAMIN D INSUFFICIENCY: ICD-10-CM

## 2021-04-05 DIAGNOSIS — R53.81 PHYSICAL DECONDITIONING: ICD-10-CM

## 2021-04-05 DIAGNOSIS — Z71.3 NUTRITIONAL COUNSELING: ICD-10-CM

## 2021-04-05 DIAGNOSIS — C20 RECTAL ADENOCARCINOMA: ICD-10-CM

## 2021-04-05 DIAGNOSIS — C20 RECTAL ADENOCARCINOMA: Primary | ICD-10-CM

## 2021-04-05 DIAGNOSIS — C20 RECTAL CANCER: Primary | ICD-10-CM

## 2021-04-05 LAB
25(OH)D3+25(OH)D2 SERPL-MCNC: 23 NG/ML (ref 30–96)
ALBUMIN SERPL BCP-MCNC: 3.7 G/DL (ref 3.5–5.2)
ALP SERPL-CCNC: 85 U/L (ref 55–135)
ALT SERPL W/O P-5'-P-CCNC: 33 U/L (ref 10–44)
ANION GAP SERPL CALC-SCNC: 6 MMOL/L (ref 8–16)
AST SERPL-CCNC: 22 U/L (ref 10–40)
BASOPHILS # BLD AUTO: 0.03 K/UL (ref 0–0.2)
BASOPHILS NFR BLD: 0.4 % (ref 0–1.9)
BILIRUB SERPL-MCNC: 0.3 MG/DL (ref 0.1–1)
BUN SERPL-MCNC: 9 MG/DL (ref 8–23)
CALCIUM SERPL-MCNC: 9.3 MG/DL (ref 8.7–10.5)
CHLORIDE SERPL-SCNC: 105 MMOL/L (ref 95–110)
CO2 SERPL-SCNC: 29 MMOL/L (ref 23–29)
CREAT SERPL-MCNC: 0.9 MG/DL (ref 0.5–1.4)
CRP SERPL-MCNC: 3.9 MG/L (ref 0–8.2)
DIFFERENTIAL METHOD: ABNORMAL
EOSINOPHIL # BLD AUTO: 0.1 K/UL (ref 0–0.5)
EOSINOPHIL NFR BLD: 0.8 % (ref 0–8)
ERYTHROCYTE [DISTWIDTH] IN BLOOD BY AUTOMATED COUNT: 13.2 % (ref 11.5–14.5)
EST. GFR  (AFRICAN AMERICAN): >60 ML/MIN/1.73 M^2
EST. GFR  (NON AFRICAN AMERICAN): >60 ML/MIN/1.73 M^2
GLUCOSE SERPL-MCNC: 102 MG/DL (ref 70–110)
HCT VFR BLD AUTO: 38.5 % (ref 40–54)
HGB BLD-MCNC: 13.2 G/DL (ref 14–18)
IMM GRANULOCYTES # BLD AUTO: 0.07 K/UL (ref 0–0.04)
IMM GRANULOCYTES NFR BLD AUTO: 1 % (ref 0–0.5)
LYMPHOCYTES # BLD AUTO: 1.1 K/UL (ref 1–4.8)
LYMPHOCYTES NFR BLD: 14.3 % (ref 18–48)
MCH RBC QN AUTO: 32.6 PG (ref 27–31)
MCHC RBC AUTO-ENTMCNC: 34.3 G/DL (ref 32–36)
MCV RBC AUTO: 95 FL (ref 82–98)
MONOCYTES # BLD AUTO: 0.7 K/UL (ref 0.3–1)
MONOCYTES NFR BLD: 9.2 % (ref 4–15)
NEUTROPHILS # BLD AUTO: 5.5 K/UL (ref 1.8–7.7)
NEUTROPHILS NFR BLD: 74.3 % (ref 38–73)
NRBC BLD-RTO: 0 /100 WBC
PLATELET # BLD AUTO: 262 K/UL (ref 150–450)
PMV BLD AUTO: 9.2 FL (ref 9.2–12.9)
POTASSIUM SERPL-SCNC: 4.4 MMOL/L (ref 3.5–5.1)
PREALB SERPL-MCNC: 30 MG/DL (ref 20–43)
PROT SERPL-MCNC: 7.4 G/DL (ref 6–8.4)
RBC # BLD AUTO: 4.05 M/UL (ref 4.6–6.2)
SODIUM SERPL-SCNC: 140 MMOL/L (ref 136–145)
WBC # BLD AUTO: 7.36 K/UL (ref 3.9–12.7)

## 2021-04-05 PROCEDURE — 84134 ASSAY OF PREALBUMIN: CPT | Performed by: NURSE PRACTITIONER

## 2021-04-05 PROCEDURE — 3008F BODY MASS INDEX DOCD: CPT | Mod: CPTII,S$GLB,, | Performed by: NURSE PRACTITIONER

## 2021-04-05 PROCEDURE — 3008F PR BODY MASS INDEX (BMI) DOCUMENTED: ICD-10-PCS | Mod: CPTII,S$GLB,, | Performed by: NURSE PRACTITIONER

## 2021-04-05 PROCEDURE — 99215 OFFICE O/P EST HI 40 MIN: CPT | Mod: S$GLB,,, | Performed by: NURSE PRACTITIONER

## 2021-04-05 PROCEDURE — 1125F PR PAIN SEVERITY QUANTIFIED, PAIN PRESENT: ICD-10-PCS | Mod: S$GLB,,, | Performed by: NURSE PRACTITIONER

## 2021-04-05 PROCEDURE — 82306 VITAMIN D 25 HYDROXY: CPT | Performed by: NURSE PRACTITIONER

## 2021-04-05 PROCEDURE — 86140 C-REACTIVE PROTEIN: CPT | Performed by: NURSE PRACTITIONER

## 2021-04-05 PROCEDURE — 3288F PR FALLS RISK ASSESSMENT DOCUMENTED: ICD-10-PCS | Mod: CPTII,S$GLB,, | Performed by: NURSE PRACTITIONER

## 2021-04-05 PROCEDURE — 1159F MED LIST DOCD IN RCRD: CPT | Mod: S$GLB,,, | Performed by: NURSE PRACTITIONER

## 2021-04-05 PROCEDURE — 80053 COMPREHEN METABOLIC PANEL: CPT | Performed by: NURSE PRACTITIONER

## 2021-04-05 PROCEDURE — 99215 PR OFFICE/OUTPT VISIT, EST, LEVL V, 40-54 MIN: ICD-10-PCS | Mod: S$GLB,,, | Performed by: NURSE PRACTITIONER

## 2021-04-05 PROCEDURE — 1101F PT FALLS ASSESS-DOCD LE1/YR: CPT | Mod: CPTII,S$GLB,, | Performed by: NURSE PRACTITIONER

## 2021-04-05 PROCEDURE — 97803 MED NUTRITION INDIV SUBSEQ: CPT | Mod: S$GLB,,, | Performed by: DIETITIAN, REGISTERED

## 2021-04-05 PROCEDURE — 97803 PR MED NUTR THER, SUBSQ, INDIV, EA 15 MIN: ICD-10-PCS | Mod: S$GLB,,, | Performed by: DIETITIAN, REGISTERED

## 2021-04-05 PROCEDURE — 85025 COMPLETE CBC W/AUTO DIFF WBC: CPT | Performed by: NURSE PRACTITIONER

## 2021-04-05 PROCEDURE — 99999 PR PBB SHADOW E&M-EST. PATIENT-LVL III: CPT | Mod: PBBFAC,,, | Performed by: NURSE PRACTITIONER

## 2021-04-05 PROCEDURE — 1125F AMNT PAIN NOTED PAIN PRSNT: CPT | Mod: S$GLB,,, | Performed by: NURSE PRACTITIONER

## 2021-04-05 PROCEDURE — 1159F PR MEDICATION LIST DOCUMENTED IN MEDICAL RECORD: ICD-10-PCS | Mod: S$GLB,,, | Performed by: NURSE PRACTITIONER

## 2021-04-05 PROCEDURE — 84590 ASSAY OF VITAMIN A: CPT | Performed by: NURSE PRACTITIONER

## 2021-04-05 PROCEDURE — 1101F PR PT FALLS ASSESS DOC 0-1 FALLS W/OUT INJ PAST YR: ICD-10-PCS | Mod: CPTII,S$GLB,, | Performed by: NURSE PRACTITIONER

## 2021-04-05 PROCEDURE — 36415 COLL VENOUS BLD VENIPUNCTURE: CPT | Performed by: NURSE PRACTITIONER

## 2021-04-05 PROCEDURE — 3288F FALL RISK ASSESSMENT DOCD: CPT | Mod: CPTII,S$GLB,, | Performed by: NURSE PRACTITIONER

## 2021-04-05 PROCEDURE — 99999 PR PBB SHADOW E&M-EST. PATIENT-LVL III: ICD-10-PCS | Mod: PBBFAC,,, | Performed by: NURSE PRACTITIONER

## 2021-04-06 ENCOUNTER — CLINICAL SUPPORT (OUTPATIENT)
Dept: REHABILITATION | Facility: HOSPITAL | Age: 66
End: 2021-04-06
Attending: INTERNAL MEDICINE
Payer: MEDICARE

## 2021-04-06 DIAGNOSIS — Z74.09 IMPAIRED FUNCTIONAL MOBILITY AND ACTIVITY TOLERANCE: ICD-10-CM

## 2021-04-06 DIAGNOSIS — Z74.09 DECREASED MOBILITY AND ENDURANCE: ICD-10-CM

## 2021-04-06 DIAGNOSIS — M25.611 DECREASED RANGE OF MOTION OF RIGHT SHOULDER: ICD-10-CM

## 2021-04-06 DIAGNOSIS — M62.81 MUSCLE WEAKNESS (GENERALIZED): Primary | ICD-10-CM

## 2021-04-06 DIAGNOSIS — R53.81 PHYSICAL DECONDITIONING: ICD-10-CM

## 2021-04-06 PROCEDURE — 97110 THERAPEUTIC EXERCISES: CPT | Mod: PN,CQ

## 2021-04-06 PROCEDURE — 97140 MANUAL THERAPY 1/> REGIONS: CPT | Mod: PN,CQ

## 2021-04-07 ENCOUNTER — TELEPHONE (OUTPATIENT)
Dept: SURGERY | Facility: CLINIC | Age: 66
End: 2021-04-07

## 2021-04-07 LAB — VIT A SERPL-MCNC: 43 UG/DL (ref 38–106)

## 2021-04-08 ENCOUNTER — TELEPHONE (OUTPATIENT)
Dept: FAMILY MEDICINE | Facility: CLINIC | Age: 66
End: 2021-04-08

## 2021-04-08 ENCOUNTER — CLINICAL SUPPORT (OUTPATIENT)
Dept: REHABILITATION | Facility: HOSPITAL | Age: 66
End: 2021-04-08
Attending: INTERNAL MEDICINE
Payer: MEDICARE

## 2021-04-08 DIAGNOSIS — M25.611 DECREASED RANGE OF MOTION OF RIGHT SHOULDER: ICD-10-CM

## 2021-04-08 DIAGNOSIS — G89.3 NEOPLASM RELATED PAIN: ICD-10-CM

## 2021-04-08 DIAGNOSIS — Z74.09 DECREASED MOBILITY AND ENDURANCE: ICD-10-CM

## 2021-04-08 DIAGNOSIS — M62.81 MUSCLE WEAKNESS (GENERALIZED): Primary | ICD-10-CM

## 2021-04-08 DIAGNOSIS — R53.81 PHYSICAL DECONDITIONING: ICD-10-CM

## 2021-04-08 DIAGNOSIS — Z74.09 IMPAIRED FUNCTIONAL MOBILITY AND ACTIVITY TOLERANCE: ICD-10-CM

## 2021-04-08 PROCEDURE — 97140 MANUAL THERAPY 1/> REGIONS: CPT | Mod: PN

## 2021-04-08 PROCEDURE — 97110 THERAPEUTIC EXERCISES: CPT | Mod: PN

## 2021-04-08 RX ORDER — OXYCODONE AND ACETAMINOPHEN 10; 325 MG/1; MG/1
1 TABLET ORAL EVERY 8 HOURS PRN
Qty: 60 TABLET | Refills: 0 | Status: ON HOLD | OUTPATIENT
Start: 2021-04-08 | End: 2021-05-07 | Stop reason: HOSPADM

## 2021-04-09 ENCOUNTER — DOCUMENTATION ONLY (OUTPATIENT)
Dept: REHABILITATION | Facility: HOSPITAL | Age: 66
End: 2021-04-09

## 2021-04-09 DIAGNOSIS — Z74.09 IMPAIRED FUNCTIONAL MOBILITY AND ACTIVITY TOLERANCE: ICD-10-CM

## 2021-04-09 DIAGNOSIS — Z74.09 DECREASED MOBILITY AND ENDURANCE: ICD-10-CM

## 2021-04-09 DIAGNOSIS — M25.611 DECREASED RANGE OF MOTION OF RIGHT SHOULDER: ICD-10-CM

## 2021-04-09 DIAGNOSIS — R53.81 PHYSICAL DECONDITIONING: ICD-10-CM

## 2021-04-09 DIAGNOSIS — M62.81 MUSCLE WEAKNESS (GENERALIZED): Primary | ICD-10-CM

## 2021-04-12 ENCOUNTER — CLINICAL SUPPORT (OUTPATIENT)
Dept: REHABILITATION | Facility: HOSPITAL | Age: 66
End: 2021-04-12
Attending: INTERNAL MEDICINE
Payer: MEDICARE

## 2021-04-12 ENCOUNTER — TELEPHONE (OUTPATIENT)
Dept: ENDOSCOPY | Facility: HOSPITAL | Age: 66
End: 2021-04-12

## 2021-04-12 DIAGNOSIS — Z74.09 DECREASED MOBILITY AND ENDURANCE: ICD-10-CM

## 2021-04-12 DIAGNOSIS — Z12.11 SCREENING FOR COLON CANCER: Primary | ICD-10-CM

## 2021-04-12 DIAGNOSIS — M62.81 MUSCLE WEAKNESS (GENERALIZED): Primary | ICD-10-CM

## 2021-04-12 DIAGNOSIS — M25.611 DECREASED RANGE OF MOTION OF RIGHT SHOULDER: ICD-10-CM

## 2021-04-12 DIAGNOSIS — R53.81 PHYSICAL DECONDITIONING: ICD-10-CM

## 2021-04-12 DIAGNOSIS — Z01.818 PRE-OP TESTING: ICD-10-CM

## 2021-04-12 DIAGNOSIS — Z74.09 IMPAIRED FUNCTIONAL MOBILITY AND ACTIVITY TOLERANCE: ICD-10-CM

## 2021-04-12 PROCEDURE — 97110 THERAPEUTIC EXERCISES: CPT | Mod: PN,CQ

## 2021-04-12 RX ORDER — SODIUM, POTASSIUM,MAG SULFATES 17.5-3.13G
1 SOLUTION, RECONSTITUTED, ORAL ORAL ONCE
Qty: 1 BOTTLE | Refills: 0 | Status: SHIPPED | OUTPATIENT
Start: 2021-04-12 | End: 2021-04-12

## 2021-04-13 ENCOUNTER — PATIENT MESSAGE (OUTPATIENT)
Dept: ENDOSCOPY | Facility: HOSPITAL | Age: 66
End: 2021-04-13

## 2021-04-15 DIAGNOSIS — Z01.818 PRE-OP TESTING: Primary | ICD-10-CM

## 2021-04-17 ENCOUNTER — LAB VISIT (OUTPATIENT)
Dept: FAMILY MEDICINE | Facility: CLINIC | Age: 66
End: 2021-04-17
Payer: MEDICARE

## 2021-04-17 DIAGNOSIS — Z01.818 PRE-OP TESTING: ICD-10-CM

## 2021-04-17 PROCEDURE — U0005 INFEC AGEN DETEC AMPLI PROBE: HCPCS | Performed by: CLINICAL NURSE SPECIALIST

## 2021-04-17 PROCEDURE — U0003 INFECTIOUS AGENT DETECTION BY NUCLEIC ACID (DNA OR RNA); SEVERE ACUTE RESPIRATORY SYNDROME CORONAVIRUS 2 (SARS-COV-2) (CORONAVIRUS DISEASE [COVID-19]), AMPLIFIED PROBE TECHNIQUE, MAKING USE OF HIGH THROUGHPUT TECHNOLOGIES AS DESCRIBED BY CMS-2020-01-R: HCPCS | Performed by: CLINICAL NURSE SPECIALIST

## 2021-04-18 LAB — SARS-COV-2 RNA RESP QL NAA+PROBE: NOT DETECTED

## 2021-04-20 ENCOUNTER — TELEPHONE (OUTPATIENT)
Dept: SURGERY | Facility: CLINIC | Age: 66
End: 2021-04-20

## 2021-04-20 ENCOUNTER — PATIENT MESSAGE (OUTPATIENT)
Dept: SURGERY | Facility: CLINIC | Age: 66
End: 2021-04-20

## 2021-04-20 ENCOUNTER — ANESTHESIA EVENT (OUTPATIENT)
Dept: SURGERY | Facility: HOSPITAL | Age: 66
DRG: 329 | End: 2021-04-20
Payer: MEDICARE

## 2021-04-20 ENCOUNTER — ANESTHESIA EVENT (OUTPATIENT)
Dept: ENDOSCOPY | Facility: HOSPITAL | Age: 66
DRG: 329 | End: 2021-04-20
Payer: MEDICARE

## 2021-04-20 ENCOUNTER — ANESTHESIA (OUTPATIENT)
Dept: ENDOSCOPY | Facility: HOSPITAL | Age: 66
DRG: 329 | End: 2021-04-20
Payer: MEDICARE

## 2021-04-20 PROCEDURE — E9220 PRA ENDO ANESTHESIA: ICD-10-PCS | Mod: ,,, | Performed by: STUDENT IN AN ORGANIZED HEALTH CARE EDUCATION/TRAINING PROGRAM

## 2021-04-20 PROCEDURE — E9220 PRA ENDO ANESTHESIA: HCPCS | Mod: ,,, | Performed by: STUDENT IN AN ORGANIZED HEALTH CARE EDUCATION/TRAINING PROGRAM

## 2021-04-20 PROCEDURE — 63600175 PHARM REV CODE 636 W HCPCS: Performed by: STUDENT IN AN ORGANIZED HEALTH CARE EDUCATION/TRAINING PROGRAM

## 2021-04-20 PROCEDURE — 25000003 PHARM REV CODE 250: Performed by: COLON & RECTAL SURGERY

## 2021-04-20 RX ORDER — PROPOFOL 10 MG/ML
VIAL (ML) INTRAVENOUS
Status: DISCONTINUED | OUTPATIENT
Start: 2021-04-20 | End: 2021-04-20

## 2021-04-20 RX ORDER — PROPOFOL 10 MG/ML
VIAL (ML) INTRAVENOUS CONTINUOUS PRN
Status: DISCONTINUED | OUTPATIENT
Start: 2021-04-20 | End: 2021-04-20

## 2021-04-20 RX ORDER — LIDOCAINE HCL/PF 100 MG/5ML
SYRINGE (ML) INTRAVENOUS
Status: DISCONTINUED | OUTPATIENT
Start: 2021-04-20 | End: 2021-04-20

## 2021-04-20 RX ADMIN — PROPOFOL 150 MCG/KG/MIN: 10 INJECTION, EMULSION INTRAVENOUS at 12:04

## 2021-04-20 RX ADMIN — PROPOFOL 70 MG: 10 INJECTION, EMULSION INTRAVENOUS at 12:04

## 2021-04-20 RX ADMIN — PROPOFOL 30 MG: 10 INJECTION, EMULSION INTRAVENOUS at 12:04

## 2021-04-20 RX ADMIN — Medication 50 MG: at 12:04

## 2021-04-21 ENCOUNTER — ANESTHESIA (OUTPATIENT)
Dept: SURGERY | Facility: HOSPITAL | Age: 66
DRG: 329 | End: 2021-04-21
Payer: MEDICARE

## 2021-04-21 ENCOUNTER — HOSPITAL ENCOUNTER (INPATIENT)
Facility: HOSPITAL | Age: 66
LOS: 16 days | Discharge: HOME | DRG: 329 | End: 2021-05-07
Attending: COLON & RECTAL SURGERY | Admitting: COLON & RECTAL SURGERY
Payer: MEDICARE

## 2021-04-21 DIAGNOSIS — C20 RECTAL CANCER: Primary | ICD-10-CM

## 2021-04-21 DIAGNOSIS — C20 RECTAL ADENOCARCINOMA: ICD-10-CM

## 2021-04-21 LAB
ABO + RH BLD: NORMAL
ANION GAP SERPL CALC-SCNC: 9 MMOL/L (ref 8–16)
BASOPHILS # BLD AUTO: 0.02 K/UL (ref 0–0.2)
BASOPHILS NFR BLD: 0.2 % (ref 0–1.9)
BLD GP AB SCN CELLS X3 SERPL QL: NORMAL
BLOOD GROUP ANTIBODIES SERPL: NORMAL
BUN SERPL-MCNC: 10 MG/DL (ref 8–23)
CALCIUM SERPL-MCNC: 6.6 MG/DL (ref 8.7–10.5)
CHLORIDE SERPL-SCNC: 107 MMOL/L (ref 95–110)
CO2 SERPL-SCNC: 20 MMOL/L (ref 23–29)
CREAT SERPL-MCNC: 0.8 MG/DL (ref 0.5–1.4)
DIFFERENTIAL METHOD: ABNORMAL
EOSINOPHIL # BLD AUTO: 0 K/UL (ref 0–0.5)
EOSINOPHIL NFR BLD: 0 % (ref 0–8)
ERYTHROCYTE [DISTWIDTH] IN BLOOD BY AUTOMATED COUNT: 13 % (ref 11.5–14.5)
EST. GFR  (AFRICAN AMERICAN): >60 ML/MIN/1.73 M^2
EST. GFR  (NON AFRICAN AMERICAN): >60 ML/MIN/1.73 M^2
GLUCOSE SERPL-MCNC: 118 MG/DL (ref 70–110)
GLUCOSE SERPL-MCNC: 153 MG/DL (ref 70–110)
GLUCOSE SERPL-MCNC: 158 MG/DL (ref 70–110)
HCO3 UR-SCNC: 20.8 MMOL/L (ref 24–28)
HCO3 UR-SCNC: 21.5 MMOL/L (ref 24–28)
HCT VFR BLD AUTO: 33.6 % (ref 40–54)
HCT VFR BLD CALC: 32 %PCV (ref 36–54)
HCT VFR BLD CALC: 32 %PCV (ref 36–54)
HGB BLD-MCNC: 11.7 G/DL (ref 14–18)
IMM GRANULOCYTES # BLD AUTO: 0.04 K/UL (ref 0–0.04)
IMM GRANULOCYTES NFR BLD AUTO: 0.3 % (ref 0–0.5)
LYMPHOCYTES # BLD AUTO: 0.5 K/UL (ref 1–4.8)
LYMPHOCYTES NFR BLD: 3.9 % (ref 18–48)
MCH RBC QN AUTO: 32.8 PG (ref 27–31)
MCHC RBC AUTO-ENTMCNC: 34.8 G/DL (ref 32–36)
MCV RBC AUTO: 94 FL (ref 82–98)
MONOCYTES # BLD AUTO: 0.8 K/UL (ref 0.3–1)
MONOCYTES NFR BLD: 6.1 % (ref 4–15)
NEUTROPHILS # BLD AUTO: 11.9 K/UL (ref 1.8–7.7)
NEUTROPHILS NFR BLD: 89.5 % (ref 38–73)
NRBC BLD-RTO: 0 /100 WBC
PCO2 BLDA: 35 MMHG (ref 35–45)
PCO2 BLDA: 35 MMHG (ref 35–45)
PH SMN: 7.38 [PH] (ref 7.35–7.45)
PH SMN: 7.39 [PH] (ref 7.35–7.45)
PLATELET # BLD AUTO: 230 K/UL (ref 150–450)
PMV BLD AUTO: 9.3 FL (ref 9.2–12.9)
PO2 BLDA: 152 MMHG (ref 80–100)
PO2 BLDA: 158 MMHG (ref 80–100)
POC BE: -3 MMOL/L
POC BE: -4 MMOL/L
POC IONIZED CALCIUM: 1.04 MMOL/L (ref 1.06–1.42)
POC IONIZED CALCIUM: 1.08 MMOL/L (ref 1.06–1.42)
POC SATURATED O2: 99 % (ref 95–100)
POC SATURATED O2: 99 % (ref 95–100)
POC TCO2: 22 MMOL/L (ref 23–27)
POC TCO2: 23 MMOL/L (ref 23–27)
POTASSIUM BLD-SCNC: 3.4 MMOL/L (ref 3.5–5.1)
POTASSIUM BLD-SCNC: 4.1 MMOL/L (ref 3.5–5.1)
POTASSIUM SERPL-SCNC: 3.9 MMOL/L (ref 3.5–5.1)
RBC # BLD AUTO: 3.57 M/UL (ref 4.6–6.2)
SAMPLE: ABNORMAL
SAMPLE: ABNORMAL
SARS-COV-2 RDRP RESP QL NAA+PROBE: NEGATIVE
SODIUM BLD-SCNC: 139 MMOL/L (ref 136–145)
SODIUM BLD-SCNC: 140 MMOL/L (ref 136–145)
SODIUM SERPL-SCNC: 136 MMOL/L (ref 136–145)
WBC # BLD AUTO: 13.24 K/UL (ref 3.9–12.7)

## 2021-04-21 PROCEDURE — 88304 TISSUE EXAM BY PATHOLOGIST: CPT | Performed by: PATHOLOGY

## 2021-04-21 PROCEDURE — D9220A PRA ANESTHESIA: ICD-10-PCS | Mod: CRNA,,, | Performed by: NURSE ANESTHETIST, CERTIFIED REGISTERED

## 2021-04-21 PROCEDURE — 86870 RBC ANTIBODY IDENTIFICATION: CPT | Performed by: NURSE PRACTITIONER

## 2021-04-21 PROCEDURE — D9220A PRA ANESTHESIA: Mod: CRNA,,, | Performed by: NURSE ANESTHETIST, CERTIFIED REGISTERED

## 2021-04-21 PROCEDURE — 36620 PR INSERT CATH,ART,PERCUT,SHORTTERM: ICD-10-PCS | Mod: 59,,, | Performed by: ANESTHESIOLOGY

## 2021-04-21 PROCEDURE — 99900035 HC TECH TIME PER 15 MIN (STAT)

## 2021-04-21 PROCEDURE — 44310 ILEOSTOMY/JEJUNOSTOMY: CPT | Mod: 51,,, | Performed by: COLON & RECTAL SURGERY

## 2021-04-21 PROCEDURE — D9220A PRA ANESTHESIA: ICD-10-PCS | Mod: ANES,,, | Performed by: ANESTHESIOLOGY

## 2021-04-21 PROCEDURE — 71000016 HC POSTOP RECOV ADDL HR: Performed by: COLON & RECTAL SURGERY

## 2021-04-21 PROCEDURE — 88309 PR  SURG PATH,LEVEL VI: ICD-10-PCS | Mod: 26,,, | Performed by: PATHOLOGY

## 2021-04-21 PROCEDURE — 25000003 PHARM REV CODE 250: Performed by: NURSE ANESTHETIST, CERTIFIED REGISTERED

## 2021-04-21 PROCEDURE — 94799 UNLISTED PULMONARY SVC/PX: CPT

## 2021-04-21 PROCEDURE — 36620 INSERTION CATHETER ARTERY: CPT | Mod: 59,,, | Performed by: ANESTHESIOLOGY

## 2021-04-21 PROCEDURE — 37000009 HC ANESTHESIA EA ADD 15 MINS: Performed by: COLON & RECTAL SURGERY

## 2021-04-21 PROCEDURE — 63600175 PHARM REV CODE 636 W HCPCS: Performed by: SURGERY

## 2021-04-21 PROCEDURE — 63600175 PHARM REV CODE 636 W HCPCS: Performed by: STUDENT IN AN ORGANIZED HEALTH CARE EDUCATION/TRAINING PROGRAM

## 2021-04-21 PROCEDURE — 36000708 HC OR TIME LEV III 1ST 15 MIN: Performed by: COLON & RECTAL SURGERY

## 2021-04-21 PROCEDURE — 71000015 HC POSTOP RECOV 1ST HR: Performed by: COLON & RECTAL SURGERY

## 2021-04-21 PROCEDURE — 25000003 PHARM REV CODE 250: Performed by: NURSE PRACTITIONER

## 2021-04-21 PROCEDURE — 25000003 PHARM REV CODE 250: Performed by: STUDENT IN AN ORGANIZED HEALTH CARE EDUCATION/TRAINING PROGRAM

## 2021-04-21 PROCEDURE — 25000003 PHARM REV CODE 250: Performed by: SURGERY

## 2021-04-21 PROCEDURE — 36000709 HC OR TIME LEV III EA ADD 15 MIN: Performed by: COLON & RECTAL SURGERY

## 2021-04-21 PROCEDURE — 88309 TISSUE EXAM BY PATHOLOGIST: CPT | Mod: 26,,, | Performed by: PATHOLOGY

## 2021-04-21 PROCEDURE — 88305 TISSUE EXAM BY PATHOLOGIST: CPT | Performed by: PATHOLOGY

## 2021-04-21 PROCEDURE — 71000039 HC RECOVERY, EACH ADD'L HOUR: Performed by: COLON & RECTAL SURGERY

## 2021-04-21 PROCEDURE — 52005 CYSTO W/URTRL CATHJ: CPT | Mod: ,,, | Performed by: UROLOGY

## 2021-04-21 PROCEDURE — 52005 PR CYSTOURETHROSCOPY,URETER CATHETER: ICD-10-PCS | Mod: ,,, | Performed by: UROLOGY

## 2021-04-21 PROCEDURE — 88305 TISSUE EXAM BY PATHOLOGIST: ICD-10-PCS | Mod: 26,,, | Performed by: PATHOLOGY

## 2021-04-21 PROCEDURE — 44626 PR CLOSE ENTEROSTOMY,RESEC+COLOREC ANAS: ICD-10-PCS | Mod: 22,,, | Performed by: COLON & RECTAL SURGERY

## 2021-04-21 PROCEDURE — 63600175 PHARM REV CODE 636 W HCPCS: Performed by: NURSE PRACTITIONER

## 2021-04-21 PROCEDURE — C1769 GUIDE WIRE: HCPCS | Performed by: COLON & RECTAL SURGERY

## 2021-04-21 PROCEDURE — 44626 REPAIR BOWEL OPENING: CPT | Mod: 22,,, | Performed by: COLON & RECTAL SURGERY

## 2021-04-21 PROCEDURE — 27201423 OPTIME MED/SURG SUP & DEVICES STERILE SUPPLY: Performed by: COLON & RECTAL SURGERY

## 2021-04-21 PROCEDURE — 85025 COMPLETE CBC W/AUTO DIFF WBC: CPT | Performed by: SURGERY

## 2021-04-21 PROCEDURE — 88305 TISSUE EXAM BY PATHOLOGIST: CPT | Mod: 26,,, | Performed by: PATHOLOGY

## 2021-04-21 PROCEDURE — 88307 PR  SURG PATH,LEVEL V: ICD-10-PCS | Mod: 26,,, | Performed by: PATHOLOGY

## 2021-04-21 PROCEDURE — 86900 BLOOD TYPING SEROLOGIC ABO: CPT | Performed by: NURSE PRACTITIONER

## 2021-04-21 PROCEDURE — 86905 BLOOD TYPING RBC ANTIGENS: CPT | Mod: 91 | Performed by: NURSE PRACTITIONER

## 2021-04-21 PROCEDURE — D9220A PRA ANESTHESIA: Mod: ANES,,, | Performed by: ANESTHESIOLOGY

## 2021-04-21 PROCEDURE — 27201040 HC RC 50 FILTER: Performed by: NURSE PRACTITIONER

## 2021-04-21 PROCEDURE — 86922 COMPATIBILITY TEST ANTIGLOB: CPT | Performed by: COLON & RECTAL SURGERY

## 2021-04-21 PROCEDURE — 63600175 PHARM REV CODE 636 W HCPCS: Performed by: ANESTHESIOLOGY

## 2021-04-21 PROCEDURE — 94761 N-INVAS EAR/PLS OXIMETRY MLT: CPT

## 2021-04-21 PROCEDURE — 88342 CHG IMMUNOCYTOCHEMISTRY: ICD-10-PCS | Mod: 26,,, | Performed by: PATHOLOGY

## 2021-04-21 PROCEDURE — 88342 IMHCHEM/IMCYTCHM 1ST ANTB: CPT | Performed by: PATHOLOGY

## 2021-04-21 PROCEDURE — 88309 TISSUE EXAM BY PATHOLOGIST: CPT | Performed by: PATHOLOGY

## 2021-04-21 PROCEDURE — 20600001 HC STEP DOWN PRIVATE ROOM

## 2021-04-21 PROCEDURE — 37000008 HC ANESTHESIA 1ST 15 MINUTES: Performed by: COLON & RECTAL SURGERY

## 2021-04-21 PROCEDURE — 71000033 HC RECOVERY, INTIAL HOUR: Performed by: COLON & RECTAL SURGERY

## 2021-04-21 PROCEDURE — 27201037 HC PRESSURE MONITORING SET UP

## 2021-04-21 PROCEDURE — 63600175 PHARM REV CODE 636 W HCPCS: Performed by: NURSE ANESTHETIST, CERTIFIED REGISTERED

## 2021-04-21 PROCEDURE — S0030 INJECTION, METRONIDAZOLE: HCPCS | Performed by: NURSE PRACTITIONER

## 2021-04-21 PROCEDURE — 80048 BASIC METABOLIC PNL TOTAL CA: CPT | Performed by: SURGERY

## 2021-04-21 PROCEDURE — 88307 TISSUE EXAM BY PATHOLOGIST: CPT | Mod: 26,,, | Performed by: PATHOLOGY

## 2021-04-21 PROCEDURE — 88307 TISSUE EXAM BY PATHOLOGIST: CPT | Performed by: PATHOLOGY

## 2021-04-21 PROCEDURE — C1765 ADHESION BARRIER: HCPCS | Performed by: COLON & RECTAL SURGERY

## 2021-04-21 PROCEDURE — 44310 PR ILEOSTOMY/JEJUNOSTOMY,NONTUBE: ICD-10-PCS | Mod: 51,,, | Performed by: COLON & RECTAL SURGERY

## 2021-04-21 PROCEDURE — U0002 COVID-19 LAB TEST NON-CDC: HCPCS | Performed by: COLON & RECTAL SURGERY

## 2021-04-21 PROCEDURE — C1758 CATHETER, URETERAL: HCPCS | Performed by: COLON & RECTAL SURGERY

## 2021-04-21 PROCEDURE — 88342 IMHCHEM/IMCYTCHM 1ST ANTB: CPT | Mod: 26,,, | Performed by: PATHOLOGY

## 2021-04-21 DEVICE — MEMBRANE SEPRAFILM 5 X 6: Type: IMPLANTABLE DEVICE | Site: ABDOMEN | Status: FUNCTIONAL

## 2021-04-21 RX ORDER — ACETAMINOPHEN 500 MG
1000 TABLET ORAL EVERY 8 HOURS
Status: DISCONTINUED | OUTPATIENT
Start: 2021-04-22 | End: 2021-04-23

## 2021-04-21 RX ORDER — ENOXAPARIN SODIUM 100 MG/ML
40 INJECTION SUBCUTANEOUS EVERY 24 HOURS
Status: DISCONTINUED | OUTPATIENT
Start: 2021-04-22 | End: 2021-05-04

## 2021-04-21 RX ORDER — TRIPROLIDINE/PSEUDOEPHEDRINE 2.5MG-60MG
600 TABLET ORAL
Status: COMPLETED | OUTPATIENT
Start: 2021-04-21 | End: 2021-04-21

## 2021-04-21 RX ORDER — ALVIMOPAN 12 MG/1
12 CAPSULE ORAL ONCE
Status: DISCONTINUED | OUTPATIENT
Start: 2021-04-21 | End: 2021-04-21

## 2021-04-21 RX ORDER — DIPHENHYDRAMINE HYDROCHLORIDE 50 MG/ML
25 INJECTION INTRAMUSCULAR; INTRAVENOUS EVERY 6 HOURS PRN
Status: DISCONTINUED | OUTPATIENT
Start: 2021-04-21 | End: 2021-05-07 | Stop reason: HOSPADM

## 2021-04-21 RX ORDER — ONDANSETRON 2 MG/ML
INJECTION INTRAMUSCULAR; INTRAVENOUS
Status: DISCONTINUED | OUTPATIENT
Start: 2021-04-21 | End: 2021-04-21

## 2021-04-21 RX ORDER — DEXAMETHASONE SODIUM PHOSPHATE 4 MG/ML
INJECTION, SOLUTION INTRA-ARTICULAR; INTRALESIONAL; INTRAMUSCULAR; INTRAVENOUS; SOFT TISSUE
Status: DISCONTINUED | OUTPATIENT
Start: 2021-04-21 | End: 2021-04-21

## 2021-04-21 RX ORDER — SODIUM CHLORIDE, SODIUM LACTATE, POTASSIUM CHLORIDE, CALCIUM CHLORIDE 600; 310; 30; 20 MG/100ML; MG/100ML; MG/100ML; MG/100ML
INJECTION, SOLUTION INTRAVENOUS CONTINUOUS
Status: DISCONTINUED | OUTPATIENT
Start: 2021-04-21 | End: 2021-04-24

## 2021-04-21 RX ORDER — METRONIDAZOLE 500 MG/100ML
500 INJECTION, SOLUTION INTRAVENOUS
Status: COMPLETED | OUTPATIENT
Start: 2021-04-21 | End: 2021-04-21

## 2021-04-21 RX ORDER — LIDOCAINE HYDROCHLORIDE ANHYDROUS AND DEXTROSE MONOHYDRATE .8; 5 G/100ML; G/100ML
INJECTION, SOLUTION INTRAVENOUS CONTINUOUS PRN
Status: DISCONTINUED | OUTPATIENT
Start: 2021-04-21 | End: 2021-04-21

## 2021-04-21 RX ORDER — MIDAZOLAM HYDROCHLORIDE 1 MG/ML
INJECTION, SOLUTION INTRAMUSCULAR; INTRAVENOUS
Status: DISCONTINUED | OUTPATIENT
Start: 2021-04-21 | End: 2021-04-21

## 2021-04-21 RX ORDER — HEPARIN SODIUM 5000 [USP'U]/ML
5000 INJECTION, SOLUTION INTRAVENOUS; SUBCUTANEOUS EVERY 8 HOURS
Status: COMPLETED | OUTPATIENT
Start: 2021-04-21 | End: 2021-04-21

## 2021-04-21 RX ORDER — GABAPENTIN 300 MG/1
300 CAPSULE ORAL 3 TIMES DAILY
Status: DISCONTINUED | OUTPATIENT
Start: 2021-04-21 | End: 2021-04-23

## 2021-04-21 RX ORDER — MUPIROCIN 20 MG/G
1 OINTMENT TOPICAL
Status: DISCONTINUED | OUTPATIENT
Start: 2021-04-21 | End: 2021-04-21

## 2021-04-21 RX ORDER — ACETAMINOPHEN 650 MG/20.3ML
975 LIQUID ORAL
Status: DISCONTINUED | OUTPATIENT
Start: 2021-04-21 | End: 2021-04-21

## 2021-04-21 RX ORDER — FENTANYL CITRATE 50 UG/ML
INJECTION, SOLUTION INTRAMUSCULAR; INTRAVENOUS
Status: DISCONTINUED | OUTPATIENT
Start: 2021-04-21 | End: 2021-04-21

## 2021-04-21 RX ORDER — SODIUM CHLORIDE 0.9 % (FLUSH) 0.9 %
10 SYRINGE (ML) INJECTION
Status: DISCONTINUED | OUTPATIENT
Start: 2021-04-21 | End: 2021-05-07 | Stop reason: HOSPADM

## 2021-04-21 RX ORDER — NALOXONE HCL 0.4 MG/ML
0.02 VIAL (ML) INJECTION
Status: DISCONTINUED | OUTPATIENT
Start: 2021-04-21 | End: 2021-04-26

## 2021-04-21 RX ORDER — IBUPROFEN 400 MG/1
800 TABLET ORAL EVERY 8 HOURS
Status: DISCONTINUED | OUTPATIENT
Start: 2021-04-22 | End: 2021-04-22

## 2021-04-21 RX ORDER — MUPIROCIN 20 MG/G
1 OINTMENT TOPICAL
Status: COMPLETED | OUTPATIENT
Start: 2021-04-21 | End: 2021-04-21

## 2021-04-21 RX ORDER — NEOSTIGMINE METHYLSULFATE 0.5 MG/ML
INJECTION, SOLUTION INTRAVENOUS
Status: DISCONTINUED | OUTPATIENT
Start: 2021-04-21 | End: 2021-04-21

## 2021-04-21 RX ORDER — ACETAMINOPHEN 650 MG/20.3ML
975 LIQUID ORAL
Status: COMPLETED | OUTPATIENT
Start: 2021-04-21 | End: 2021-04-21

## 2021-04-21 RX ORDER — LIDOCAINE HYDROCHLORIDE 10 MG/ML
1 INJECTION, SOLUTION EPIDURAL; INFILTRATION; INTRACAUDAL; PERINEURAL
Status: COMPLETED | OUTPATIENT
Start: 2021-04-21 | End: 2021-04-21

## 2021-04-21 RX ORDER — KETAMINE HCL IN 0.9 % NACL 50 MG/5 ML
SYRINGE (ML) INTRAVENOUS
Status: DISCONTINUED | OUTPATIENT
Start: 2021-04-21 | End: 2021-04-21

## 2021-04-21 RX ORDER — SODIUM CHLORIDE 9 MG/ML
INJECTION, SOLUTION INTRAVENOUS
Status: DISCONTINUED | OUTPATIENT
Start: 2021-04-21 | End: 2021-04-21

## 2021-04-21 RX ORDER — ONDANSETRON 2 MG/ML
4 INJECTION INTRAMUSCULAR; INTRAVENOUS ONCE AS NEEDED
Status: DISCONTINUED | OUTPATIENT
Start: 2021-04-21 | End: 2021-04-21 | Stop reason: HOSPADM

## 2021-04-21 RX ORDER — ACETAMINOPHEN 10 MG/ML
INJECTION, SOLUTION INTRAVENOUS
Status: DISCONTINUED | OUTPATIENT
Start: 2021-04-21 | End: 2021-04-21

## 2021-04-21 RX ORDER — DEXMEDETOMIDINE HYDROCHLORIDE 100 UG/ML
INJECTION, SOLUTION INTRAVENOUS
Status: DISCONTINUED | OUTPATIENT
Start: 2021-04-21 | End: 2021-04-21

## 2021-04-21 RX ORDER — HEPARIN SODIUM 5000 [USP'U]/ML
5000 INJECTION, SOLUTION INTRAVENOUS; SUBCUTANEOUS EVERY 8 HOURS
Status: DISCONTINUED | OUTPATIENT
Start: 2021-04-21 | End: 2021-04-21

## 2021-04-21 RX ORDER — GABAPENTIN 300 MG/1
300 CAPSULE ORAL
Status: COMPLETED | OUTPATIENT
Start: 2021-04-21 | End: 2021-04-21

## 2021-04-21 RX ORDER — HYDROMORPHONE HYDROCHLORIDE 1 MG/ML
0.2 INJECTION, SOLUTION INTRAMUSCULAR; INTRAVENOUS; SUBCUTANEOUS EVERY 5 MIN PRN
Status: DISCONTINUED | OUTPATIENT
Start: 2021-04-21 | End: 2021-04-21 | Stop reason: HOSPADM

## 2021-04-21 RX ORDER — MUPIROCIN 20 MG/G
OINTMENT TOPICAL 2 TIMES DAILY
Status: COMPLETED | OUTPATIENT
Start: 2021-04-21 | End: 2021-04-26

## 2021-04-21 RX ORDER — LIDOCAINE HYDROCHLORIDE 10 MG/ML
1 INJECTION, SOLUTION EPIDURAL; INFILTRATION; INTRACAUDAL; PERINEURAL
Status: DISCONTINUED | OUTPATIENT
Start: 2021-04-21 | End: 2021-04-21

## 2021-04-21 RX ORDER — ROCURONIUM BROMIDE 10 MG/ML
INJECTION, SOLUTION INTRAVENOUS
Status: DISCONTINUED | OUTPATIENT
Start: 2021-04-21 | End: 2021-04-21

## 2021-04-21 RX ORDER — PROCHLORPERAZINE EDISYLATE 5 MG/ML
5 INJECTION INTRAMUSCULAR; INTRAVENOUS EVERY 30 MIN PRN
Status: DISCONTINUED | OUTPATIENT
Start: 2021-04-21 | End: 2021-04-21 | Stop reason: HOSPADM

## 2021-04-21 RX ORDER — PHENYLEPHRINE HYDROCHLORIDE 10 MG/ML
INJECTION INTRAVENOUS
Status: DISCONTINUED | OUTPATIENT
Start: 2021-04-21 | End: 2021-04-21

## 2021-04-21 RX ORDER — ONDANSETRON 2 MG/ML
4 INJECTION INTRAMUSCULAR; INTRAVENOUS EVERY 6 HOURS PRN
Status: DISCONTINUED | OUTPATIENT
Start: 2021-04-21 | End: 2021-05-07 | Stop reason: HOSPADM

## 2021-04-21 RX ORDER — SODIUM CHLORIDE 9 MG/ML
INJECTION, SOLUTION INTRAVENOUS
Status: COMPLETED | OUTPATIENT
Start: 2021-04-21 | End: 2021-04-21

## 2021-04-21 RX ORDER — METOCLOPRAMIDE HYDROCHLORIDE 5 MG/ML
10 INJECTION INTRAMUSCULAR; INTRAVENOUS EVERY 6 HOURS PRN
Status: DISCONTINUED | OUTPATIENT
Start: 2021-04-21 | End: 2021-05-07 | Stop reason: HOSPADM

## 2021-04-21 RX ORDER — PROPOFOL 10 MG/ML
VIAL (ML) INTRAVENOUS
Status: DISCONTINUED | OUTPATIENT
Start: 2021-04-21 | End: 2021-04-21

## 2021-04-21 RX ORDER — TRIPROLIDINE/PSEUDOEPHEDRINE 2.5MG-60MG
600 TABLET ORAL
Status: DISCONTINUED | OUTPATIENT
Start: 2021-04-21 | End: 2021-04-21

## 2021-04-21 RX ORDER — FENTANYL CITRATE 50 UG/ML
25 INJECTION, SOLUTION INTRAMUSCULAR; INTRAVENOUS EVERY 5 MIN PRN
Status: COMPLETED | OUTPATIENT
Start: 2021-04-21 | End: 2021-04-21

## 2021-04-21 RX ORDER — METRONIDAZOLE 500 MG/100ML
500 INJECTION, SOLUTION INTRAVENOUS
Status: DISCONTINUED | OUTPATIENT
Start: 2021-04-21 | End: 2021-04-21

## 2021-04-21 RX ORDER — ACETAMINOPHEN 10 MG/ML
1000 INJECTION, SOLUTION INTRAVENOUS EVERY 8 HOURS
Status: COMPLETED | OUTPATIENT
Start: 2021-04-21 | End: 2021-04-22

## 2021-04-21 RX ORDER — GABAPENTIN 300 MG/1
300 CAPSULE ORAL
Status: DISCONTINUED | OUTPATIENT
Start: 2021-04-21 | End: 2021-04-21

## 2021-04-21 RX ORDER — LIDOCAINE HYDROCHLORIDE 20 MG/ML
INJECTION INTRAVENOUS
Status: DISCONTINUED | OUTPATIENT
Start: 2021-04-21 | End: 2021-04-21

## 2021-04-21 RX ORDER — HYDROMORPHONE HCL IN 0.9% NACL 6 MG/30 ML
PATIENT CONTROLLED ANALGESIA SYRINGE INTRAVENOUS CONTINUOUS
Status: DISCONTINUED | OUTPATIENT
Start: 2021-04-21 | End: 2021-04-26

## 2021-04-21 RX ADMIN — IBUPROFEN 600 MG: 100 SUSPENSION ORAL at 06:04

## 2021-04-21 RX ADMIN — FENTANYL CITRATE 100 MCG: 50 INJECTION, SOLUTION INTRAMUSCULAR; INTRAVENOUS at 08:04

## 2021-04-21 RX ADMIN — ROCURONIUM BROMIDE 10 MG: 10 INJECTION, SOLUTION INTRAVENOUS at 12:04

## 2021-04-21 RX ADMIN — Medication: at 03:04

## 2021-04-21 RX ADMIN — SODIUM CHLORIDE, SODIUM LACTATE, POTASSIUM CHLORIDE, AND CALCIUM CHLORIDE: .6; .31; .03; .02 INJECTION, SOLUTION INTRAVENOUS at 03:04

## 2021-04-21 RX ADMIN — HYDROMORPHONE HYDROCHLORIDE 0.2 MG: 1 INJECTION, SOLUTION INTRAMUSCULAR; INTRAVENOUS; SUBCUTANEOUS at 04:04

## 2021-04-21 RX ADMIN — FENTANYL CITRATE 25 MCG: 50 INJECTION, SOLUTION INTRAMUSCULAR; INTRAVENOUS at 02:04

## 2021-04-21 RX ADMIN — HEPARIN SODIUM 5000 UNITS: 5000 INJECTION INTRAVENOUS; SUBCUTANEOUS at 06:04

## 2021-04-21 RX ADMIN — MUPIROCIN: 20 OINTMENT TOPICAL at 09:04

## 2021-04-21 RX ADMIN — LIDOCAINE HYDROCHLORIDE 0.3 ML: 10 INJECTION, SOLUTION EPIDURAL; INFILTRATION; INTRACAUDAL at 06:04

## 2021-04-21 RX ADMIN — ROCURONIUM BROMIDE 30 MG: 10 INJECTION, SOLUTION INTRAVENOUS at 09:04

## 2021-04-21 RX ADMIN — ROCURONIUM BROMIDE 10 MG: 10 INJECTION, SOLUTION INTRAVENOUS at 11:04

## 2021-04-21 RX ADMIN — Medication 15 MG: at 09:04

## 2021-04-21 RX ADMIN — GABAPENTIN 300 MG: 300 CAPSULE ORAL at 07:04

## 2021-04-21 RX ADMIN — PHENYLEPHRINE HYDROCHLORIDE 100 MCG: 10 INJECTION INTRAVENOUS at 10:04

## 2021-04-21 RX ADMIN — FENTANYL CITRATE 25 MCG: 50 INJECTION INTRAMUSCULAR; INTRAVENOUS at 03:04

## 2021-04-21 RX ADMIN — METRONIDAZOLE 500 MG: 500 SOLUTION INTRAVENOUS at 08:04

## 2021-04-21 RX ADMIN — SODIUM CHLORIDE: 0.9 INJECTION, SOLUTION INTRAVENOUS at 08:04

## 2021-04-21 RX ADMIN — GABAPENTIN 300 MG: 300 CAPSULE ORAL at 03:04

## 2021-04-21 RX ADMIN — MIDAZOLAM HYDROCHLORIDE 2 MG: 1 INJECTION, SOLUTION INTRAMUSCULAR; INTRAVENOUS at 08:04

## 2021-04-21 RX ADMIN — Medication 10 MG: at 11:04

## 2021-04-21 RX ADMIN — GABAPENTIN 300 MG: 300 CAPSULE ORAL at 06:04

## 2021-04-21 RX ADMIN — Medication 10 MG: at 12:04

## 2021-04-21 RX ADMIN — Medication 25 MG: at 08:04

## 2021-04-21 RX ADMIN — CEFTRIAXONE 2 G: 2 INJECTION, SOLUTION INTRAVENOUS at 08:04

## 2021-04-21 RX ADMIN — PHENYLEPHRINE HYDROCHLORIDE 200 MCG: 10 INJECTION INTRAVENOUS at 11:04

## 2021-04-21 RX ADMIN — Medication 10 MG: at 10:04

## 2021-04-21 RX ADMIN — SODIUM CHLORIDE: 0.9 INJECTION, SOLUTION INTRAVENOUS at 06:04

## 2021-04-21 RX ADMIN — Medication: at 07:04

## 2021-04-21 RX ADMIN — DEXMEDETOMIDINE HYDROCHLORIDE 16 MCG: 100 INJECTION, SOLUTION INTRAVENOUS at 01:04

## 2021-04-21 RX ADMIN — LIDOCAINE HYDROCHLORIDE 100 MG: 20 INJECTION, SOLUTION INTRAVENOUS at 08:04

## 2021-04-21 RX ADMIN — LIDOCAINE HYDROCHLORIDE 0.03 MG/KG/MIN: 8 INJECTION, SOLUTION INTRAVENOUS at 08:04

## 2021-04-21 RX ADMIN — PHENYLEPHRINE HYDROCHLORIDE 100 MCG: 10 INJECTION INTRAVENOUS at 09:04

## 2021-04-21 RX ADMIN — ONDANSETRON 4 MG: 2 INJECTION, SOLUTION INTRAMUSCULAR; INTRAVENOUS at 01:04

## 2021-04-21 RX ADMIN — MUPIROCIN: 20 OINTMENT TOPICAL at 07:04

## 2021-04-21 RX ADMIN — ACETAMINOPHEN 976.6 MG: 160 SOLUTION ORAL at 06:04

## 2021-04-21 RX ADMIN — PROPOFOL 160 MG: 10 INJECTION, EMULSION INTRAVENOUS at 08:04

## 2021-04-21 RX ADMIN — MUPIROCIN 1 G: 20 OINTMENT TOPICAL at 06:04

## 2021-04-21 RX ADMIN — NEOSTIGMINE METHYLSULFATE 5 MG: 0.5 INJECTION INTRAVENOUS at 02:04

## 2021-04-21 RX ADMIN — SODIUM CHLORIDE: 0.9 INJECTION, SOLUTION INTRAVENOUS at 07:04

## 2021-04-21 RX ADMIN — ACETAMINOPHEN 1000 MG: 10 INJECTION INTRAVENOUS at 11:04

## 2021-04-21 RX ADMIN — ROCURONIUM BROMIDE 50 MG: 10 INJECTION, SOLUTION INTRAVENOUS at 08:04

## 2021-04-21 RX ADMIN — ROCURONIUM BROMIDE 10 MG: 10 INJECTION, SOLUTION INTRAVENOUS at 09:04

## 2021-04-21 RX ADMIN — PROPOFOL 40 MG: 10 INJECTION, EMULSION INTRAVENOUS at 08:04

## 2021-04-21 RX ADMIN — CALCIUM GLUCONATE 2 G: 98 INJECTION, SOLUTION INTRAVENOUS at 06:04

## 2021-04-21 RX ADMIN — DEXAMETHASONE SODIUM PHOSPHATE 8 MG: 4 INJECTION, SOLUTION INTRAMUSCULAR; INTRAVENOUS at 08:04

## 2021-04-21 RX ADMIN — ACETAMINOPHEN 1000 MG: 10 INJECTION, SOLUTION INTRAVENOUS at 10:04

## 2021-04-21 RX ADMIN — GLYCOPYRROLATE 0.6 MCG: 0.2 INJECTION, SOLUTION INTRAMUSCULAR; INTRAVITREAL at 02:04

## 2021-04-22 LAB
ANION GAP SERPL CALC-SCNC: 10 MMOL/L (ref 8–16)
BASOPHILS # BLD AUTO: 0.02 K/UL (ref 0–0.2)
BASOPHILS NFR BLD: 0.1 % (ref 0–1.9)
BUN SERPL-MCNC: 13 MG/DL (ref 8–23)
CALCIUM SERPL-MCNC: 8 MG/DL (ref 8.7–10.5)
CHLORIDE SERPL-SCNC: 106 MMOL/L (ref 95–110)
CO2 SERPL-SCNC: 21 MMOL/L (ref 23–29)
CREAT SERPL-MCNC: 1.2 MG/DL (ref 0.5–1.4)
DIFFERENTIAL METHOD: ABNORMAL
EOSINOPHIL # BLD AUTO: 0 K/UL (ref 0–0.5)
EOSINOPHIL NFR BLD: 0 % (ref 0–8)
ERYTHROCYTE [DISTWIDTH] IN BLOOD BY AUTOMATED COUNT: 13.2 % (ref 11.5–14.5)
EST. GFR  (AFRICAN AMERICAN): >60 ML/MIN/1.73 M^2
EST. GFR  (NON AFRICAN AMERICAN): >60 ML/MIN/1.73 M^2
GLUCOSE SERPL-MCNC: 120 MG/DL (ref 70–110)
HCT VFR BLD AUTO: 41.7 % (ref 40–54)
HGB BLD-MCNC: 14.2 G/DL (ref 14–18)
IMM GRANULOCYTES # BLD AUTO: 0.05 K/UL (ref 0–0.04)
IMM GRANULOCYTES NFR BLD AUTO: 0.3 % (ref 0–0.5)
LYMPHOCYTES # BLD AUTO: 1.2 K/UL (ref 1–4.8)
LYMPHOCYTES NFR BLD: 7.9 % (ref 18–48)
MAGNESIUM SERPL-MCNC: 2.1 MG/DL (ref 1.6–2.6)
MCH RBC QN AUTO: 33 PG (ref 27–31)
MCHC RBC AUTO-ENTMCNC: 34.1 G/DL (ref 32–36)
MCV RBC AUTO: 97 FL (ref 82–98)
MONOCYTES # BLD AUTO: 1.6 K/UL (ref 0.3–1)
MONOCYTES NFR BLD: 10.7 % (ref 4–15)
NEUTROPHILS # BLD AUTO: 12 K/UL (ref 1.8–7.7)
NEUTROPHILS NFR BLD: 81 % (ref 38–73)
NRBC BLD-RTO: 0 /100 WBC
PHOSPHATE SERPL-MCNC: 3.6 MG/DL (ref 2.7–4.5)
PLATELET # BLD AUTO: 259 K/UL (ref 150–450)
PMV BLD AUTO: 10.1 FL (ref 9.2–12.9)
POTASSIUM SERPL-SCNC: 4.5 MMOL/L (ref 3.5–5.1)
RBC # BLD AUTO: 4.3 M/UL (ref 4.6–6.2)
SODIUM SERPL-SCNC: 137 MMOL/L (ref 136–145)
WBC # BLD AUTO: 14.85 K/UL (ref 3.9–12.7)

## 2021-04-22 PROCEDURE — 63600175 PHARM REV CODE 636 W HCPCS: Performed by: SURGERY

## 2021-04-22 PROCEDURE — 25000003 PHARM REV CODE 250: Performed by: NURSE PRACTITIONER

## 2021-04-22 PROCEDURE — 85025 COMPLETE CBC W/AUTO DIFF WBC: CPT | Performed by: SURGERY

## 2021-04-22 PROCEDURE — 94761 N-INVAS EAR/PLS OXIMETRY MLT: CPT

## 2021-04-22 PROCEDURE — 99900035 HC TECH TIME PER 15 MIN (STAT)

## 2021-04-22 PROCEDURE — 97535 SELF CARE MNGMENT TRAINING: CPT

## 2021-04-22 PROCEDURE — 97161 PT EVAL LOW COMPLEX 20 MIN: CPT

## 2021-04-22 PROCEDURE — 25000003 PHARM REV CODE 250: Performed by: SURGERY

## 2021-04-22 PROCEDURE — 63600175 PHARM REV CODE 636 W HCPCS: Performed by: STUDENT IN AN ORGANIZED HEALTH CARE EDUCATION/TRAINING PROGRAM

## 2021-04-22 PROCEDURE — 80048 BASIC METABOLIC PNL TOTAL CA: CPT | Performed by: SURGERY

## 2021-04-22 PROCEDURE — 97165 OT EVAL LOW COMPLEX 30 MIN: CPT

## 2021-04-22 PROCEDURE — 84100 ASSAY OF PHOSPHORUS: CPT | Performed by: SURGERY

## 2021-04-22 PROCEDURE — 20600001 HC STEP DOWN PRIVATE ROOM

## 2021-04-22 PROCEDURE — 97530 THERAPEUTIC ACTIVITIES: CPT

## 2021-04-22 PROCEDURE — 83735 ASSAY OF MAGNESIUM: CPT | Performed by: SURGERY

## 2021-04-22 PROCEDURE — 36415 COLL VENOUS BLD VENIPUNCTURE: CPT | Performed by: SURGERY

## 2021-04-22 RX ORDER — ENOXAPARIN SODIUM 100 MG/ML
40 INJECTION SUBCUTANEOUS EVERY 24 HOURS
Status: DISCONTINUED | OUTPATIENT
Start: 2021-04-22 | End: 2021-04-22 | Stop reason: SDUPTHER

## 2021-04-22 RX ADMIN — ACETAMINOPHEN 1000 MG: 10 INJECTION INTRAVENOUS at 02:04

## 2021-04-22 RX ADMIN — SODIUM CHLORIDE, SODIUM LACTATE, POTASSIUM CHLORIDE, AND CALCIUM CHLORIDE: .6; .31; .03; .02 INJECTION, SOLUTION INTRAVENOUS at 03:04

## 2021-04-22 RX ADMIN — ENOXAPARIN SODIUM 40 MG: 40 INJECTION SUBCUTANEOUS at 05:04

## 2021-04-22 RX ADMIN — Medication: at 09:04

## 2021-04-22 RX ADMIN — ACETAMINOPHEN 1000 MG: 10 INJECTION INTRAVENOUS at 05:04

## 2021-04-22 RX ADMIN — MUPIROCIN: 20 OINTMENT TOPICAL at 08:04

## 2021-04-22 RX ADMIN — ACETAMINOPHEN 1000 MG: 500 TABLET, FILM COATED ORAL at 08:04

## 2021-04-22 RX ADMIN — GABAPENTIN 300 MG: 300 CAPSULE ORAL at 08:04

## 2021-04-22 RX ADMIN — IBUPROFEN 800 MG: 800 INJECTION INTRAVENOUS at 12:04

## 2021-04-22 RX ADMIN — ONDANSETRON 4 MG: 2 INJECTION INTRAMUSCULAR; INTRAVENOUS at 11:04

## 2021-04-22 RX ADMIN — Medication: at 12:04

## 2021-04-22 RX ADMIN — SODIUM CHLORIDE, SODIUM LACTATE, POTASSIUM CHLORIDE, AND CALCIUM CHLORIDE 1000 ML: .6; .31; .03; .02 INJECTION, SOLUTION INTRAVENOUS at 11:04

## 2021-04-22 RX ADMIN — GABAPENTIN 300 MG: 300 CAPSULE ORAL at 04:04

## 2021-04-22 RX ADMIN — IBUPROFEN 800 MG: 800 INJECTION INTRAVENOUS at 06:04

## 2021-04-22 RX ADMIN — Medication: at 02:04

## 2021-04-22 RX ADMIN — Medication: at 06:04

## 2021-04-23 LAB
ANION GAP SERPL CALC-SCNC: 9 MMOL/L (ref 8–16)
BASOPHILS # BLD AUTO: 0.02 K/UL (ref 0–0.2)
BASOPHILS NFR BLD: 0.1 % (ref 0–1.9)
BUN SERPL-MCNC: 12 MG/DL (ref 8–23)
CALCIUM SERPL-MCNC: 8.4 MG/DL (ref 8.7–10.5)
CHLORIDE SERPL-SCNC: 103 MMOL/L (ref 95–110)
CO2 SERPL-SCNC: 28 MMOL/L (ref 23–29)
CREAT SERPL-MCNC: 1 MG/DL (ref 0.5–1.4)
DIFFERENTIAL METHOD: ABNORMAL
EOSINOPHIL # BLD AUTO: 0 K/UL (ref 0–0.5)
EOSINOPHIL NFR BLD: 0.1 % (ref 0–8)
ERYTHROCYTE [DISTWIDTH] IN BLOOD BY AUTOMATED COUNT: 13.3 % (ref 11.5–14.5)
EST. GFR  (AFRICAN AMERICAN): >60 ML/MIN/1.73 M^2
EST. GFR  (NON AFRICAN AMERICAN): >60 ML/MIN/1.73 M^2
GLUCOSE SERPL-MCNC: 88 MG/DL (ref 70–110)
HCT VFR BLD AUTO: 37.1 % (ref 40–54)
HGB BLD-MCNC: 12.7 G/DL (ref 14–18)
IMM GRANULOCYTES # BLD AUTO: 0.08 K/UL (ref 0–0.04)
IMM GRANULOCYTES NFR BLD AUTO: 0.6 % (ref 0–0.5)
LYMPHOCYTES # BLD AUTO: 1.1 K/UL (ref 1–4.8)
LYMPHOCYTES NFR BLD: 8.5 % (ref 18–48)
MCH RBC QN AUTO: 33.5 PG (ref 27–31)
MCHC RBC AUTO-ENTMCNC: 34.2 G/DL (ref 32–36)
MCV RBC AUTO: 98 FL (ref 82–98)
MONOCYTES # BLD AUTO: 1.3 K/UL (ref 0.3–1)
MONOCYTES NFR BLD: 9.5 % (ref 4–15)
NEUTROPHILS # BLD AUTO: 10.8 K/UL (ref 1.8–7.7)
NEUTROPHILS NFR BLD: 81.2 % (ref 38–73)
NRBC BLD-RTO: 0 /100 WBC
PLATELET # BLD AUTO: 206 K/UL (ref 150–450)
PMV BLD AUTO: 10.2 FL (ref 9.2–12.9)
POTASSIUM SERPL-SCNC: 4.5 MMOL/L (ref 3.5–5.1)
RBC # BLD AUTO: 3.79 M/UL (ref 4.6–6.2)
SODIUM SERPL-SCNC: 140 MMOL/L (ref 136–145)
WBC # BLD AUTO: 13.34 K/UL (ref 3.9–12.7)

## 2021-04-23 PROCEDURE — 63600175 PHARM REV CODE 636 W HCPCS: Performed by: SURGERY

## 2021-04-23 PROCEDURE — 63600175 PHARM REV CODE 636 W HCPCS: Performed by: STUDENT IN AN ORGANIZED HEALTH CARE EDUCATION/TRAINING PROGRAM

## 2021-04-23 PROCEDURE — 97530 THERAPEUTIC ACTIVITIES: CPT

## 2021-04-23 PROCEDURE — 20600001 HC STEP DOWN PRIVATE ROOM

## 2021-04-23 PROCEDURE — S0030 INJECTION, METRONIDAZOLE: HCPCS | Performed by: STUDENT IN AN ORGANIZED HEALTH CARE EDUCATION/TRAINING PROGRAM

## 2021-04-23 PROCEDURE — 80048 BASIC METABOLIC PNL TOTAL CA: CPT | Performed by: STUDENT IN AN ORGANIZED HEALTH CARE EDUCATION/TRAINING PROGRAM

## 2021-04-23 PROCEDURE — 36415 COLL VENOUS BLD VENIPUNCTURE: CPT | Performed by: STUDENT IN AN ORGANIZED HEALTH CARE EDUCATION/TRAINING PROGRAM

## 2021-04-23 PROCEDURE — 85025 COMPLETE CBC W/AUTO DIFF WBC: CPT | Performed by: STUDENT IN AN ORGANIZED HEALTH CARE EDUCATION/TRAINING PROGRAM

## 2021-04-23 PROCEDURE — 94761 N-INVAS EAR/PLS OXIMETRY MLT: CPT

## 2021-04-23 PROCEDURE — 99900035 HC TECH TIME PER 15 MIN (STAT)

## 2021-04-23 PROCEDURE — 25000003 PHARM REV CODE 250: Performed by: STUDENT IN AN ORGANIZED HEALTH CARE EDUCATION/TRAINING PROGRAM

## 2021-04-23 PROCEDURE — 97116 GAIT TRAINING THERAPY: CPT

## 2021-04-23 PROCEDURE — 25000003 PHARM REV CODE 250: Performed by: SURGERY

## 2021-04-23 RX ORDER — ACETAMINOPHEN 500 MG
1000 TABLET ORAL EVERY 8 HOURS
Status: DISCONTINUED | OUTPATIENT
Start: 2021-04-24 | End: 2021-04-23

## 2021-04-23 RX ORDER — ACETAMINOPHEN 10 MG/ML
1000 INJECTION, SOLUTION INTRAVENOUS EVERY 8 HOURS
Status: DISPENSED | OUTPATIENT
Start: 2021-04-23 | End: 2021-04-24

## 2021-04-23 RX ORDER — CIPROFLOXACIN 2 MG/ML
400 INJECTION, SOLUTION INTRAVENOUS
Status: DISCONTINUED | OUTPATIENT
Start: 2021-04-23 | End: 2021-04-24

## 2021-04-23 RX ORDER — METRONIDAZOLE 500 MG/100ML
500 INJECTION, SOLUTION INTRAVENOUS
Status: DISCONTINUED | OUTPATIENT
Start: 2021-04-23 | End: 2021-04-24

## 2021-04-23 RX ADMIN — ENOXAPARIN SODIUM 40 MG: 40 INJECTION SUBCUTANEOUS at 05:04

## 2021-04-23 RX ADMIN — MUPIROCIN: 20 OINTMENT TOPICAL at 09:04

## 2021-04-23 RX ADMIN — Medication: at 10:04

## 2021-04-23 RX ADMIN — ACETAMINOPHEN 1000 MG: 500 TABLET, FILM COATED ORAL at 05:04

## 2021-04-23 RX ADMIN — Medication: at 05:04

## 2021-04-23 RX ADMIN — ACETAMINOPHEN 1000 MG: 10 INJECTION INTRAVENOUS at 08:04

## 2021-04-23 RX ADMIN — METRONIDAZOLE 500 MG: 500 INJECTION, SOLUTION INTRAVENOUS at 08:04

## 2021-04-23 RX ADMIN — Medication: at 04:04

## 2021-04-23 RX ADMIN — CIPROFLOXACIN 400 MG: 2 INJECTION, SOLUTION INTRAVENOUS at 12:04

## 2021-04-23 RX ADMIN — METRONIDAZOLE 500 MG: 500 INJECTION, SOLUTION INTRAVENOUS at 12:04

## 2021-04-24 LAB
ANION GAP SERPL CALC-SCNC: 11 MMOL/L (ref 8–16)
BASOPHILS # BLD AUTO: 0.02 K/UL (ref 0–0.2)
BASOPHILS NFR BLD: 0.2 % (ref 0–1.9)
BUN SERPL-MCNC: 10 MG/DL (ref 8–23)
CALCIUM SERPL-MCNC: 8.1 MG/DL (ref 8.7–10.5)
CHLORIDE SERPL-SCNC: 99 MMOL/L (ref 95–110)
CO2 SERPL-SCNC: 28 MMOL/L (ref 23–29)
CREAT SERPL-MCNC: 0.9 MG/DL (ref 0.5–1.4)
CRP SERPL-MCNC: 280 MG/L (ref 0–8.2)
DIFFERENTIAL METHOD: ABNORMAL
EOSINOPHIL # BLD AUTO: 0 K/UL (ref 0–0.5)
EOSINOPHIL NFR BLD: 0.2 % (ref 0–8)
ERYTHROCYTE [DISTWIDTH] IN BLOOD BY AUTOMATED COUNT: 13 % (ref 11.5–14.5)
EST. GFR  (AFRICAN AMERICAN): >60 ML/MIN/1.73 M^2
EST. GFR  (NON AFRICAN AMERICAN): >60 ML/MIN/1.73 M^2
GLUCOSE SERPL-MCNC: 99 MG/DL (ref 70–110)
HCT VFR BLD AUTO: 30.7 % (ref 40–54)
HGB BLD-MCNC: 10.3 G/DL (ref 14–18)
IMM GRANULOCYTES # BLD AUTO: 0.13 K/UL (ref 0–0.04)
IMM GRANULOCYTES NFR BLD AUTO: 1 % (ref 0–0.5)
LYMPHOCYTES # BLD AUTO: 0.8 K/UL (ref 1–4.8)
LYMPHOCYTES NFR BLD: 5.7 % (ref 18–48)
MCH RBC QN AUTO: 32.5 PG (ref 27–31)
MCHC RBC AUTO-ENTMCNC: 33.6 G/DL (ref 32–36)
MCV RBC AUTO: 97 FL (ref 82–98)
MONOCYTES # BLD AUTO: 1 K/UL (ref 0.3–1)
MONOCYTES NFR BLD: 7.8 % (ref 4–15)
NEUTROPHILS # BLD AUTO: 11.3 K/UL (ref 1.8–7.7)
NEUTROPHILS NFR BLD: 85.1 % (ref 38–73)
NRBC BLD-RTO: 0 /100 WBC
PLATELET # BLD AUTO: 234 K/UL (ref 150–450)
PMV BLD AUTO: 9.7 FL (ref 9.2–12.9)
POTASSIUM SERPL-SCNC: 3.6 MMOL/L (ref 3.5–5.1)
RBC # BLD AUTO: 3.17 M/UL (ref 4.6–6.2)
SODIUM SERPL-SCNC: 138 MMOL/L (ref 136–145)
WBC # BLD AUTO: 13.27 K/UL (ref 3.9–12.7)

## 2021-04-24 PROCEDURE — 63600175 PHARM REV CODE 636 W HCPCS: Performed by: STUDENT IN AN ORGANIZED HEALTH CARE EDUCATION/TRAINING PROGRAM

## 2021-04-24 PROCEDURE — 99900035 HC TECH TIME PER 15 MIN (STAT)

## 2021-04-24 PROCEDURE — 20600001 HC STEP DOWN PRIVATE ROOM

## 2021-04-24 PROCEDURE — 86140 C-REACTIVE PROTEIN: CPT | Performed by: SURGERY

## 2021-04-24 PROCEDURE — 25000003 PHARM REV CODE 250: Performed by: STUDENT IN AN ORGANIZED HEALTH CARE EDUCATION/TRAINING PROGRAM

## 2021-04-24 PROCEDURE — 80048 BASIC METABOLIC PNL TOTAL CA: CPT | Performed by: STUDENT IN AN ORGANIZED HEALTH CARE EDUCATION/TRAINING PROGRAM

## 2021-04-24 PROCEDURE — 36415 COLL VENOUS BLD VENIPUNCTURE: CPT | Performed by: SURGERY

## 2021-04-24 PROCEDURE — 36415 COLL VENOUS BLD VENIPUNCTURE: CPT | Performed by: COLON & RECTAL SURGERY

## 2021-04-24 PROCEDURE — 63600175 PHARM REV CODE 636 W HCPCS: Performed by: SURGERY

## 2021-04-24 PROCEDURE — 63600175 PHARM REV CODE 636 W HCPCS: Performed by: COLON & RECTAL SURGERY

## 2021-04-24 PROCEDURE — 25000003 PHARM REV CODE 250: Performed by: COLON & RECTAL SURGERY

## 2021-04-24 PROCEDURE — S0030 INJECTION, METRONIDAZOLE: HCPCS | Performed by: STUDENT IN AN ORGANIZED HEALTH CARE EDUCATION/TRAINING PROGRAM

## 2021-04-24 PROCEDURE — 85025 COMPLETE CBC W/AUTO DIFF WBC: CPT | Performed by: COLON & RECTAL SURGERY

## 2021-04-24 RX ORDER — DEXTROSE MONOHYDRATE AND SODIUM CHLORIDE 5; .9 G/100ML; G/100ML
INJECTION, SOLUTION INTRAVENOUS CONTINUOUS
Status: DISCONTINUED | OUTPATIENT
Start: 2021-04-24 | End: 2021-04-25

## 2021-04-24 RX ORDER — ACETAMINOPHEN 10 MG/ML
1000 INJECTION, SOLUTION INTRAVENOUS EVERY 8 HOURS
Status: COMPLETED | OUTPATIENT
Start: 2021-04-24 | End: 2021-04-25

## 2021-04-24 RX ADMIN — METRONIDAZOLE 500 MG: 500 INJECTION, SOLUTION INTRAVENOUS at 05:04

## 2021-04-24 RX ADMIN — ACETAMINOPHEN 1000 MG: 10 INJECTION INTRAVENOUS at 09:04

## 2021-04-24 RX ADMIN — ACETAMINOPHEN 1000 MG: 10 INJECTION INTRAVENOUS at 06:04

## 2021-04-24 RX ADMIN — MUPIROCIN: 20 OINTMENT TOPICAL at 11:04

## 2021-04-24 RX ADMIN — Medication: at 06:04

## 2021-04-24 RX ADMIN — CIPROFLOXACIN 400 MG: 2 INJECTION, SOLUTION INTRAVENOUS at 01:04

## 2021-04-24 RX ADMIN — MUPIROCIN: 20 OINTMENT TOPICAL at 09:04

## 2021-04-24 RX ADMIN — Medication: at 07:04

## 2021-04-24 RX ADMIN — VANCOMYCIN HYDROCHLORIDE 1250 MG: 1.25 INJECTION, POWDER, LYOPHILIZED, FOR SOLUTION INTRAVENOUS at 12:04

## 2021-04-24 RX ADMIN — PIPERACILLIN AND TAZOBACTAM 4.5 G: 4; .5 INJECTION, POWDER, LYOPHILIZED, FOR SOLUTION INTRAVENOUS; PARENTERAL at 10:04

## 2021-04-24 RX ADMIN — PIPERACILLIN AND TAZOBACTAM 4.5 G: 4; .5 INJECTION, POWDER, LYOPHILIZED, FOR SOLUTION INTRAVENOUS; PARENTERAL at 08:04

## 2021-04-24 RX ADMIN — VANCOMYCIN HYDROCHLORIDE 1250 MG: 1.25 INJECTION, POWDER, LYOPHILIZED, FOR SOLUTION INTRAVENOUS at 11:04

## 2021-04-24 RX ADMIN — ENOXAPARIN SODIUM 40 MG: 40 INJECTION SUBCUTANEOUS at 04:04

## 2021-04-24 RX ADMIN — DEXTROSE AND SODIUM CHLORIDE: 5; .9 INJECTION, SOLUTION INTRAVENOUS at 12:04

## 2021-04-25 LAB
ANION GAP SERPL CALC-SCNC: 10 MMOL/L (ref 8–16)
BASOPHILS # BLD AUTO: 0.04 K/UL (ref 0–0.2)
BASOPHILS NFR BLD: 0.4 % (ref 0–1.9)
BLD PROD TYP BPU: NORMAL
BLD PROD TYP BPU: NORMAL
BLOOD UNIT EXPIRATION DATE: NORMAL
BLOOD UNIT EXPIRATION DATE: NORMAL
BLOOD UNIT TYPE CODE: 1700
BLOOD UNIT TYPE CODE: 9500
BLOOD UNIT TYPE: NORMAL
BLOOD UNIT TYPE: NORMAL
BUN SERPL-MCNC: 10 MG/DL (ref 8–23)
CALCIUM SERPL-MCNC: 8.1 MG/DL (ref 8.7–10.5)
CHLORIDE SERPL-SCNC: 96 MMOL/L (ref 95–110)
CO2 SERPL-SCNC: 36 MMOL/L (ref 23–29)
CODING SYSTEM: NORMAL
CODING SYSTEM: NORMAL
CREAT SERPL-MCNC: 1 MG/DL (ref 0.5–1.4)
CRP SERPL-MCNC: 203.6 MG/L (ref 0–8.2)
DIFFERENTIAL METHOD: ABNORMAL
DISPENSE STATUS: NORMAL
DISPENSE STATUS: NORMAL
EOSINOPHIL # BLD AUTO: 0.1 K/UL (ref 0–0.5)
EOSINOPHIL NFR BLD: 1.1 % (ref 0–8)
ERYTHROCYTE [DISTWIDTH] IN BLOOD BY AUTOMATED COUNT: 13.1 % (ref 11.5–14.5)
EST. GFR  (AFRICAN AMERICAN): >60 ML/MIN/1.73 M^2
EST. GFR  (NON AFRICAN AMERICAN): >60 ML/MIN/1.73 M^2
GLUCOSE SERPL-MCNC: 129 MG/DL (ref 70–110)
HCT VFR BLD AUTO: 30.7 % (ref 40–54)
HGB BLD-MCNC: 10.5 G/DL (ref 14–18)
IMM GRANULOCYTES # BLD AUTO: 0.13 K/UL (ref 0–0.04)
IMM GRANULOCYTES NFR BLD AUTO: 1.1 % (ref 0–0.5)
LYMPHOCYTES # BLD AUTO: 0.7 K/UL (ref 1–4.8)
LYMPHOCYTES NFR BLD: 6.3 % (ref 18–48)
MCH RBC QN AUTO: 32.1 PG (ref 27–31)
MCHC RBC AUTO-ENTMCNC: 34.2 G/DL (ref 32–36)
MCV RBC AUTO: 94 FL (ref 82–98)
MONOCYTES # BLD AUTO: 0.9 K/UL (ref 0.3–1)
MONOCYTES NFR BLD: 7.9 % (ref 4–15)
NEUTROPHILS # BLD AUTO: 9.5 K/UL (ref 1.8–7.7)
NEUTROPHILS NFR BLD: 83.2 % (ref 38–73)
NRBC BLD-RTO: 0 /100 WBC
NUM UNITS TRANS PACKED RBC: NORMAL
PLATELET # BLD AUTO: 253 K/UL (ref 150–450)
PMV BLD AUTO: 9 FL (ref 9.2–12.9)
POTASSIUM SERPL-SCNC: 3.2 MMOL/L (ref 3.5–5.1)
RBC # BLD AUTO: 3.27 M/UL (ref 4.6–6.2)
SODIUM SERPL-SCNC: 142 MMOL/L (ref 136–145)
TRANS ERYTHROCYTES VOL PATIENT: NORMAL ML
VANCOMYCIN TROUGH SERPL-MCNC: 17.2 UG/ML (ref 10–22)
WBC # BLD AUTO: 11.36 K/UL (ref 3.9–12.7)

## 2021-04-25 PROCEDURE — 99900035 HC TECH TIME PER 15 MIN (STAT)

## 2021-04-25 PROCEDURE — 25000003 PHARM REV CODE 250: Performed by: SURGERY

## 2021-04-25 PROCEDURE — 80202 ASSAY OF VANCOMYCIN: CPT | Performed by: COLON & RECTAL SURGERY

## 2021-04-25 PROCEDURE — 63600175 PHARM REV CODE 636 W HCPCS: Performed by: STUDENT IN AN ORGANIZED HEALTH CARE EDUCATION/TRAINING PROGRAM

## 2021-04-25 PROCEDURE — 85025 COMPLETE CBC W/AUTO DIFF WBC: CPT | Performed by: COLON & RECTAL SURGERY

## 2021-04-25 PROCEDURE — 63600175 PHARM REV CODE 636 W HCPCS: Performed by: COLON & RECTAL SURGERY

## 2021-04-25 PROCEDURE — 94761 N-INVAS EAR/PLS OXIMETRY MLT: CPT

## 2021-04-25 PROCEDURE — 25000003 PHARM REV CODE 250: Performed by: COLON & RECTAL SURGERY

## 2021-04-25 PROCEDURE — 25000003 PHARM REV CODE 250: Performed by: STUDENT IN AN ORGANIZED HEALTH CARE EDUCATION/TRAINING PROGRAM

## 2021-04-25 PROCEDURE — 20600001 HC STEP DOWN PRIVATE ROOM

## 2021-04-25 PROCEDURE — 80048 BASIC METABOLIC PNL TOTAL CA: CPT | Performed by: STUDENT IN AN ORGANIZED HEALTH CARE EDUCATION/TRAINING PROGRAM

## 2021-04-25 PROCEDURE — 86140 C-REACTIVE PROTEIN: CPT | Performed by: SURGERY

## 2021-04-25 PROCEDURE — 63600175 PHARM REV CODE 636 W HCPCS: Performed by: SURGERY

## 2021-04-25 PROCEDURE — 94799 UNLISTED PULMONARY SVC/PX: CPT

## 2021-04-25 PROCEDURE — 27000221 HC OXYGEN, UP TO 24 HOURS

## 2021-04-25 RX ORDER — SODIUM CHLORIDE 0.9 % (FLUSH) 0.9 %
.1-1 SYRINGE (ML) INJECTION
Status: DISCONTINUED | OUTPATIENT
Start: 2021-04-25 | End: 2021-05-07 | Stop reason: HOSPADM

## 2021-04-25 RX ORDER — SODIUM CHLORIDE 9 MG/ML
INJECTION, SOLUTION INTRAVENOUS CONTINUOUS
Status: DISCONTINUED | OUTPATIENT
Start: 2021-04-25 | End: 2021-04-26

## 2021-04-25 RX ORDER — SODIUM CHLORIDE 0.9 % (FLUSH) 0.9 %
3 SYRINGE (ML) INJECTION
Status: DISCONTINUED | OUTPATIENT
Start: 2021-04-25 | End: 2021-05-07 | Stop reason: HOSPADM

## 2021-04-25 RX ORDER — DEXTROSE, SODIUM CHLORIDE, SODIUM LACTATE, POTASSIUM CHLORIDE, AND CALCIUM CHLORIDE 5; .6; .31; .03; .02 G/100ML; G/100ML; G/100ML; G/100ML; G/100ML
INJECTION, SOLUTION INTRAVENOUS CONTINUOUS
Status: DISCONTINUED | OUTPATIENT
Start: 2021-04-25 | End: 2021-05-07 | Stop reason: HOSPADM

## 2021-04-25 RX ADMIN — DEXTROSE, SODIUM CHLORIDE, SODIUM LACTATE, POTASSIUM CHLORIDE, AND CALCIUM CHLORIDE: 5; .6; .31; .03; .02 INJECTION, SOLUTION INTRAVENOUS at 09:04

## 2021-04-25 RX ADMIN — MUPIROCIN: 20 OINTMENT TOPICAL at 09:04

## 2021-04-25 RX ADMIN — ENOXAPARIN SODIUM 40 MG: 40 INJECTION SUBCUTANEOUS at 06:04

## 2021-04-25 RX ADMIN — PIPERACILLIN AND TAZOBACTAM 4.5 G: 4; .5 INJECTION, POWDER, LYOPHILIZED, FOR SOLUTION INTRAVENOUS; PARENTERAL at 02:04

## 2021-04-25 RX ADMIN — ACETAMINOPHEN 1000 MG: 10 INJECTION INTRAVENOUS at 02:04

## 2021-04-25 RX ADMIN — Medication: at 02:04

## 2021-04-25 RX ADMIN — ACETAMINOPHEN 1000 MG: 10 INJECTION INTRAVENOUS at 05:04

## 2021-04-25 RX ADMIN — Medication: at 11:04

## 2021-04-25 RX ADMIN — SODIUM CHLORIDE: 0.9 INJECTION, SOLUTION INTRAVENOUS at 12:04

## 2021-04-25 RX ADMIN — VANCOMYCIN HYDROCHLORIDE 1250 MG: 1.25 INJECTION, POWDER, LYOPHILIZED, FOR SOLUTION INTRAVENOUS at 10:04

## 2021-04-25 RX ADMIN — DEXTROSE, SODIUM CHLORIDE, SODIUM LACTATE, POTASSIUM CHLORIDE, AND CALCIUM CHLORIDE: 5; .6; .31; .03; .02 INJECTION, SOLUTION INTRAVENOUS at 06:04

## 2021-04-25 RX ADMIN — VANCOMYCIN HYDROCHLORIDE 1250 MG: 1.25 INJECTION, POWDER, LYOPHILIZED, FOR SOLUTION INTRAVENOUS at 12:04

## 2021-04-25 RX ADMIN — Medication: at 08:04

## 2021-04-25 RX ADMIN — PIPERACILLIN AND TAZOBACTAM 4.5 G: 4; .5 INJECTION, POWDER, LYOPHILIZED, FOR SOLUTION INTRAVENOUS; PARENTERAL at 06:04

## 2021-04-25 RX ADMIN — PIPERACILLIN AND TAZOBACTAM 4.5 G: 4; .5 INJECTION, POWDER, LYOPHILIZED, FOR SOLUTION INTRAVENOUS; PARENTERAL at 11:04

## 2021-04-26 ENCOUNTER — PATIENT MESSAGE (OUTPATIENT)
Dept: INFUSION THERAPY | Facility: HOSPITAL | Age: 66
End: 2021-04-26

## 2021-04-26 LAB
ANION GAP SERPL CALC-SCNC: 10 MMOL/L (ref 8–16)
BASOPHILS # BLD AUTO: 0.03 K/UL (ref 0–0.2)
BASOPHILS NFR BLD: 0.3 % (ref 0–1.9)
BUN SERPL-MCNC: 8 MG/DL (ref 8–23)
CALCIUM SERPL-MCNC: 8.3 MG/DL (ref 8.7–10.5)
CHLORIDE SERPL-SCNC: 97 MMOL/L (ref 95–110)
CO2 SERPL-SCNC: 35 MMOL/L (ref 23–29)
CREAT SERPL-MCNC: 0.9 MG/DL (ref 0.5–1.4)
CRP SERPL-MCNC: 142.6 MG/L (ref 0–8.2)
DIFFERENTIAL METHOD: ABNORMAL
EOSINOPHIL # BLD AUTO: 0.2 K/UL (ref 0–0.5)
EOSINOPHIL NFR BLD: 1.6 % (ref 0–8)
ERYTHROCYTE [DISTWIDTH] IN BLOOD BY AUTOMATED COUNT: 12.9 % (ref 11.5–14.5)
EST. GFR  (AFRICAN AMERICAN): >60 ML/MIN/1.73 M^2
EST. GFR  (NON AFRICAN AMERICAN): >60 ML/MIN/1.73 M^2
GLUCOSE SERPL-MCNC: 123 MG/DL (ref 70–110)
HCT VFR BLD AUTO: 31.6 % (ref 40–54)
HGB BLD-MCNC: 10.7 G/DL (ref 14–18)
IMM GRANULOCYTES # BLD AUTO: 0.11 K/UL (ref 0–0.04)
IMM GRANULOCYTES NFR BLD AUTO: 0.9 % (ref 0–0.5)
LYMPHOCYTES # BLD AUTO: 0.9 K/UL (ref 1–4.8)
LYMPHOCYTES NFR BLD: 7.3 % (ref 18–48)
MCH RBC QN AUTO: 33 PG (ref 27–31)
MCHC RBC AUTO-ENTMCNC: 33.9 G/DL (ref 32–36)
MCV RBC AUTO: 98 FL (ref 82–98)
MONOCYTES # BLD AUTO: 1.1 K/UL (ref 0.3–1)
MONOCYTES NFR BLD: 9.2 % (ref 4–15)
NEUTROPHILS # BLD AUTO: 9.4 K/UL (ref 1.8–7.7)
NEUTROPHILS NFR BLD: 80.7 % (ref 38–73)
NRBC BLD-RTO: 0 /100 WBC
PLATELET # BLD AUTO: 297 K/UL (ref 150–450)
PMV BLD AUTO: 9.3 FL (ref 9.2–12.9)
POTASSIUM SERPL-SCNC: 3.3 MMOL/L (ref 3.5–5.1)
RBC # BLD AUTO: 3.24 M/UL (ref 4.6–6.2)
SODIUM SERPL-SCNC: 142 MMOL/L (ref 136–145)
WBC # BLD AUTO: 11.68 K/UL (ref 3.9–12.7)

## 2021-04-26 PROCEDURE — 86140 C-REACTIVE PROTEIN: CPT | Performed by: SURGERY

## 2021-04-26 PROCEDURE — 97530 THERAPEUTIC ACTIVITIES: CPT

## 2021-04-26 PROCEDURE — 25000003 PHARM REV CODE 250: Performed by: STUDENT IN AN ORGANIZED HEALTH CARE EDUCATION/TRAINING PROGRAM

## 2021-04-26 PROCEDURE — 63600175 PHARM REV CODE 636 W HCPCS: Performed by: STUDENT IN AN ORGANIZED HEALTH CARE EDUCATION/TRAINING PROGRAM

## 2021-04-26 PROCEDURE — 99900035 HC TECH TIME PER 15 MIN (STAT)

## 2021-04-26 PROCEDURE — 97535 SELF CARE MNGMENT TRAINING: CPT

## 2021-04-26 PROCEDURE — 20600001 HC STEP DOWN PRIVATE ROOM

## 2021-04-26 PROCEDURE — 80048 BASIC METABOLIC PNL TOTAL CA: CPT | Performed by: STUDENT IN AN ORGANIZED HEALTH CARE EDUCATION/TRAINING PROGRAM

## 2021-04-26 PROCEDURE — 63600175 PHARM REV CODE 636 W HCPCS: Performed by: COLON & RECTAL SURGERY

## 2021-04-26 PROCEDURE — 63600175 PHARM REV CODE 636 W HCPCS: Performed by: SURGERY

## 2021-04-26 PROCEDURE — 25000003 PHARM REV CODE 250: Performed by: COLON & RECTAL SURGERY

## 2021-04-26 PROCEDURE — 85025 COMPLETE CBC W/AUTO DIFF WBC: CPT | Performed by: COLON & RECTAL SURGERY

## 2021-04-26 RX ORDER — POTASSIUM CHLORIDE 20 MEQ/1
60 TABLET, EXTENDED RELEASE ORAL ONCE
Status: DISCONTINUED | OUTPATIENT
Start: 2021-04-26 | End: 2021-04-26

## 2021-04-26 RX ORDER — OXYCODONE HYDROCHLORIDE 5 MG/1
5 TABLET ORAL EVERY 4 HOURS PRN
Status: DISCONTINUED | OUTPATIENT
Start: 2021-04-26 | End: 2021-05-07 | Stop reason: HOSPADM

## 2021-04-26 RX ORDER — OXYCODONE HYDROCHLORIDE 10 MG/1
10 TABLET ORAL EVERY 4 HOURS PRN
Status: DISCONTINUED | OUTPATIENT
Start: 2021-04-26 | End: 2021-05-07 | Stop reason: HOSPADM

## 2021-04-26 RX ORDER — HYDROMORPHONE HYDROCHLORIDE 1 MG/ML
0.5 INJECTION, SOLUTION INTRAMUSCULAR; INTRAVENOUS; SUBCUTANEOUS EVERY 4 HOURS PRN
Status: DISCONTINUED | OUTPATIENT
Start: 2021-04-26 | End: 2021-05-07 | Stop reason: HOSPADM

## 2021-04-26 RX ORDER — POTASSIUM CHLORIDE 7.45 MG/ML
10 INJECTION INTRAVENOUS
Status: COMPLETED | OUTPATIENT
Start: 2021-04-26 | End: 2021-04-26

## 2021-04-26 RX ADMIN — POTASSIUM CHLORIDE 10 MEQ: 7.46 INJECTION, SOLUTION INTRAVENOUS at 06:04

## 2021-04-26 RX ADMIN — VANCOMYCIN HYDROCHLORIDE 1250 MG: 1.25 INJECTION, POWDER, LYOPHILIZED, FOR SOLUTION INTRAVENOUS at 11:04

## 2021-04-26 RX ADMIN — OXYCODONE HYDROCHLORIDE 10 MG: 10 TABLET ORAL at 08:04

## 2021-04-26 RX ADMIN — HYDROMORPHONE HYDROCHLORIDE 0.5 MG: 1 INJECTION, SOLUTION INTRAMUSCULAR; INTRAVENOUS; SUBCUTANEOUS at 06:04

## 2021-04-26 RX ADMIN — PIPERACILLIN AND TAZOBACTAM 4.5 G: 4; .5 INJECTION, POWDER, LYOPHILIZED, FOR SOLUTION INTRAVENOUS; PARENTERAL at 03:04

## 2021-04-26 RX ADMIN — ONDANSETRON 4 MG: 2 INJECTION INTRAMUSCULAR; INTRAVENOUS at 06:04

## 2021-04-26 RX ADMIN — SODIUM CHLORIDE: 0.9 INJECTION, SOLUTION INTRAVENOUS at 06:04

## 2021-04-26 RX ADMIN — OXYCODONE HYDROCHLORIDE 10 MG: 10 TABLET ORAL at 04:04

## 2021-04-26 RX ADMIN — MUPIROCIN: 20 OINTMENT TOPICAL at 08:04

## 2021-04-26 RX ADMIN — PIPERACILLIN AND TAZOBACTAM 4.5 G: 4; .5 INJECTION, POWDER, LYOPHILIZED, FOR SOLUTION INTRAVENOUS; PARENTERAL at 06:04

## 2021-04-26 RX ADMIN — DEXTROSE, SODIUM CHLORIDE, SODIUM LACTATE, POTASSIUM CHLORIDE, AND CALCIUM CHLORIDE: 5; .6; .31; .03; .02 INJECTION, SOLUTION INTRAVENOUS at 11:04

## 2021-04-26 RX ADMIN — POTASSIUM CHLORIDE 10 MEQ: 7.46 INJECTION, SOLUTION INTRAVENOUS at 09:04

## 2021-04-26 RX ADMIN — ONDANSETRON 4 MG: 2 INJECTION INTRAMUSCULAR; INTRAVENOUS at 10:04

## 2021-04-26 RX ADMIN — HYDROMORPHONE HYDROCHLORIDE 0.5 MG: 1 INJECTION, SOLUTION INTRAMUSCULAR; INTRAVENOUS; SUBCUTANEOUS at 10:04

## 2021-04-26 RX ADMIN — METOCLOPRAMIDE 10 MG: 5 INJECTION, SOLUTION INTRAMUSCULAR; INTRAVENOUS at 12:04

## 2021-04-26 RX ADMIN — PIPERACILLIN AND TAZOBACTAM 4.5 G: 4; .5 INJECTION, POWDER, LYOPHILIZED, FOR SOLUTION INTRAVENOUS; PARENTERAL at 11:04

## 2021-04-26 RX ADMIN — POTASSIUM CHLORIDE 10 MEQ: 7.46 INJECTION, SOLUTION INTRAVENOUS at 10:04

## 2021-04-26 RX ADMIN — VANCOMYCIN HYDROCHLORIDE 1250 MG: 1.25 INJECTION, POWDER, LYOPHILIZED, FOR SOLUTION INTRAVENOUS at 10:04

## 2021-04-26 RX ADMIN — ENOXAPARIN SODIUM 40 MG: 40 INJECTION SUBCUTANEOUS at 06:04

## 2021-04-26 RX ADMIN — POTASSIUM CHLORIDE 10 MEQ: 7.46 INJECTION, SOLUTION INTRAVENOUS at 08:04

## 2021-04-26 RX ADMIN — Medication: at 04:04

## 2021-04-26 RX ADMIN — OXYCODONE HYDROCHLORIDE 10 MG: 10 TABLET ORAL at 12:04

## 2021-04-27 LAB — CRP SERPL-MCNC: 84.5 MG/L (ref 0–8.2)

## 2021-04-27 PROCEDURE — 63600175 PHARM REV CODE 636 W HCPCS: Performed by: STUDENT IN AN ORGANIZED HEALTH CARE EDUCATION/TRAINING PROGRAM

## 2021-04-27 PROCEDURE — 86140 C-REACTIVE PROTEIN: CPT | Performed by: SURGERY

## 2021-04-27 PROCEDURE — 25000003 PHARM REV CODE 250: Performed by: STUDENT IN AN ORGANIZED HEALTH CARE EDUCATION/TRAINING PROGRAM

## 2021-04-27 PROCEDURE — 20600001 HC STEP DOWN PRIVATE ROOM

## 2021-04-27 PROCEDURE — 80202 ASSAY OF VANCOMYCIN: CPT | Performed by: COLON & RECTAL SURGERY

## 2021-04-27 PROCEDURE — 63600175 PHARM REV CODE 636 W HCPCS: Performed by: COLON & RECTAL SURGERY

## 2021-04-27 PROCEDURE — 63600175 PHARM REV CODE 636 W HCPCS: Performed by: SURGERY

## 2021-04-27 RX ADMIN — ENOXAPARIN SODIUM 40 MG: 40 INJECTION SUBCUTANEOUS at 06:04

## 2021-04-27 RX ADMIN — VANCOMYCIN HYDROCHLORIDE 1250 MG: 1.25 INJECTION, POWDER, LYOPHILIZED, FOR SOLUTION INTRAVENOUS at 11:04

## 2021-04-27 RX ADMIN — HYDROMORPHONE HYDROCHLORIDE 0.5 MG: 1 INJECTION, SOLUTION INTRAMUSCULAR; INTRAVENOUS; SUBCUTANEOUS at 01:04

## 2021-04-27 RX ADMIN — PIPERACILLIN AND TAZOBACTAM 4.5 G: 4; .5 INJECTION, POWDER, LYOPHILIZED, FOR SOLUTION INTRAVENOUS; PARENTERAL at 10:04

## 2021-04-27 RX ADMIN — OXYCODONE HYDROCHLORIDE 10 MG: 10 TABLET ORAL at 05:04

## 2021-04-27 RX ADMIN — DEXTROSE, SODIUM CHLORIDE, SODIUM LACTATE, POTASSIUM CHLORIDE, AND CALCIUM CHLORIDE: 5; .6; .31; .03; .02 INJECTION, SOLUTION INTRAVENOUS at 03:04

## 2021-04-27 RX ADMIN — OXYCODONE HYDROCHLORIDE 5 MG: 5 TABLET ORAL at 03:04

## 2021-04-27 RX ADMIN — HYDROMORPHONE HYDROCHLORIDE 0.5 MG: 1 INJECTION, SOLUTION INTRAMUSCULAR; INTRAVENOUS; SUBCUTANEOUS at 06:04

## 2021-04-27 RX ADMIN — ONDANSETRON 4 MG: 2 INJECTION INTRAMUSCULAR; INTRAVENOUS at 09:04

## 2021-04-27 RX ADMIN — OXYCODONE HYDROCHLORIDE 10 MG: 10 TABLET ORAL at 01:04

## 2021-04-27 RX ADMIN — PIPERACILLIN AND TAZOBACTAM 4.5 G: 4; .5 INJECTION, POWDER, LYOPHILIZED, FOR SOLUTION INTRAVENOUS; PARENTERAL at 04:04

## 2021-04-27 RX ADMIN — ONDANSETRON 4 MG: 2 INJECTION INTRAMUSCULAR; INTRAVENOUS at 03:04

## 2021-04-27 RX ADMIN — OXYCODONE HYDROCHLORIDE 10 MG: 10 TABLET ORAL at 09:04

## 2021-04-27 RX ADMIN — PIPERACILLIN AND TAZOBACTAM 4.5 G: 4; .5 INJECTION, POWDER, LYOPHILIZED, FOR SOLUTION INTRAVENOUS; PARENTERAL at 06:04

## 2021-04-27 RX ADMIN — DEXTROSE, SODIUM CHLORIDE, SODIUM LACTATE, POTASSIUM CHLORIDE, AND CALCIUM CHLORIDE: 5; .6; .31; .03; .02 INJECTION, SOLUTION INTRAVENOUS at 09:04

## 2021-04-27 RX ADMIN — OXYCODONE HYDROCHLORIDE 10 MG: 10 TABLET ORAL at 08:04

## 2021-04-28 LAB
ANION GAP SERPL CALC-SCNC: 11 MMOL/L (ref 8–16)
BUN SERPL-MCNC: 10 MG/DL (ref 8–23)
CALCIUM SERPL-MCNC: 8.3 MG/DL (ref 8.7–10.5)
CHLORIDE SERPL-SCNC: 101 MMOL/L (ref 95–110)
CO2 SERPL-SCNC: 30 MMOL/L (ref 23–29)
COMMENT: NORMAL
CREAT SERPL-MCNC: 2.1 MG/DL (ref 0.5–1.4)
CREAT SERPL-MCNC: 2.1 MG/DL (ref 0.5–1.4)
CRP SERPL-MCNC: 74.3 MG/L (ref 0–8.2)
EST. GFR  (AFRICAN AMERICAN): 36.8 ML/MIN/1.73 M^2
EST. GFR  (AFRICAN AMERICAN): 36.8 ML/MIN/1.73 M^2
EST. GFR  (NON AFRICAN AMERICAN): 31.8 ML/MIN/1.73 M^2
EST. GFR  (NON AFRICAN AMERICAN): 31.8 ML/MIN/1.73 M^2
FINAL PATHOLOGIC DIAGNOSIS: NORMAL
GLUCOSE SERPL-MCNC: 109 MG/DL (ref 70–110)
GROSS: NORMAL
Lab: NORMAL
POTASSIUM SERPL-SCNC: 3.4 MMOL/L (ref 3.5–5.1)
SODIUM SERPL-SCNC: 142 MMOL/L (ref 136–145)
VANCOMYCIN TROUGH SERPL-MCNC: 42.7 UG/ML (ref 10–22)

## 2021-04-28 PROCEDURE — 63600175 PHARM REV CODE 636 W HCPCS: Performed by: STUDENT IN AN ORGANIZED HEALTH CARE EDUCATION/TRAINING PROGRAM

## 2021-04-28 PROCEDURE — 97530 THERAPEUTIC ACTIVITIES: CPT

## 2021-04-28 PROCEDURE — 25000003 PHARM REV CODE 250: Performed by: STUDENT IN AN ORGANIZED HEALTH CARE EDUCATION/TRAINING PROGRAM

## 2021-04-28 PROCEDURE — 80048 BASIC METABOLIC PNL TOTAL CA: CPT | Performed by: STUDENT IN AN ORGANIZED HEALTH CARE EDUCATION/TRAINING PROGRAM

## 2021-04-28 PROCEDURE — 86140 C-REACTIVE PROTEIN: CPT | Performed by: SURGERY

## 2021-04-28 PROCEDURE — 97535 SELF CARE MNGMENT TRAINING: CPT

## 2021-04-28 PROCEDURE — 20600001 HC STEP DOWN PRIVATE ROOM

## 2021-04-28 PROCEDURE — 25000003 PHARM REV CODE 250: Performed by: COLON & RECTAL SURGERY

## 2021-04-28 PROCEDURE — 63600175 PHARM REV CODE 636 W HCPCS: Performed by: SURGERY

## 2021-04-28 RX ORDER — SODIUM CHLORIDE 9 MG/ML
INJECTION, SOLUTION INTRAVENOUS CONTINUOUS PRN
Status: DISCONTINUED | OUTPATIENT
Start: 2021-04-28 | End: 2021-05-07 | Stop reason: HOSPADM

## 2021-04-28 RX ORDER — AMOXICILLIN AND CLAVULANATE POTASSIUM 500; 125 MG/1; MG/1
1 TABLET, FILM COATED ORAL EVERY 12 HOURS
Status: COMPLETED | OUTPATIENT
Start: 2021-04-28 | End: 2021-04-29

## 2021-04-28 RX ADMIN — DEXTROSE, SODIUM CHLORIDE, SODIUM LACTATE, POTASSIUM CHLORIDE, AND CALCIUM CHLORIDE: 5; .6; .31; .03; .02 INJECTION, SOLUTION INTRAVENOUS at 10:04

## 2021-04-28 RX ADMIN — OXYCODONE HYDROCHLORIDE 10 MG: 10 TABLET ORAL at 03:04

## 2021-04-28 RX ADMIN — OXYCODONE HYDROCHLORIDE 10 MG: 10 TABLET ORAL at 05:04

## 2021-04-28 RX ADMIN — AMOXICILLIN AND CLAVULANATE POTASSIUM 500 MG: 500; 125 TABLET, FILM COATED ORAL at 09:04

## 2021-04-28 RX ADMIN — PIPERACILLIN AND TAZOBACTAM 4.5 G: 4; .5 INJECTION, POWDER, LYOPHILIZED, FOR SOLUTION INTRAVENOUS; PARENTERAL at 04:04

## 2021-04-28 RX ADMIN — OXYCODONE HYDROCHLORIDE 10 MG: 10 TABLET ORAL at 09:04

## 2021-04-28 RX ADMIN — OXYCODONE HYDROCHLORIDE 10 MG: 10 TABLET ORAL at 12:04

## 2021-04-28 RX ADMIN — SODIUM CHLORIDE: 0.9 INJECTION, SOLUTION INTRAVENOUS at 04:04

## 2021-04-28 RX ADMIN — ENOXAPARIN SODIUM 40 MG: 40 INJECTION SUBCUTANEOUS at 05:04

## 2021-04-28 RX ADMIN — SODIUM CHLORIDE, SODIUM LACTATE, POTASSIUM CHLORIDE, AND CALCIUM CHLORIDE 1000 ML: .6; .31; .03; .02 INJECTION, SOLUTION INTRAVENOUS at 09:04

## 2021-04-28 RX ADMIN — METOCLOPRAMIDE 10 MG: 5 INJECTION, SOLUTION INTRAMUSCULAR; INTRAVENOUS at 12:04

## 2021-04-28 RX ADMIN — ONDANSETRON 4 MG: 2 INJECTION INTRAMUSCULAR; INTRAVENOUS at 09:04

## 2021-04-28 RX ADMIN — ONDANSETRON 4 MG: 2 INJECTION INTRAMUSCULAR; INTRAVENOUS at 05:04

## 2021-04-28 RX ADMIN — OXYCODONE HYDROCHLORIDE 10 MG: 10 TABLET ORAL at 04:04

## 2021-04-28 RX ADMIN — OXYCODONE HYDROCHLORIDE 10 MG: 10 TABLET ORAL at 11:04

## 2021-04-28 RX ADMIN — AMOXICILLIN AND CLAVULANATE POTASSIUM 500 MG: 500; 125 TABLET, FILM COATED ORAL at 10:04

## 2021-04-28 RX ADMIN — ONDANSETRON 4 MG: 2 INJECTION INTRAMUSCULAR; INTRAVENOUS at 12:04

## 2021-04-29 LAB
ANION GAP SERPL CALC-SCNC: 12 MMOL/L (ref 8–16)
BUN SERPL-MCNC: 9 MG/DL (ref 8–23)
CALCIUM SERPL-MCNC: 8.1 MG/DL (ref 8.7–10.5)
CHLORIDE SERPL-SCNC: 101 MMOL/L (ref 95–110)
CO2 SERPL-SCNC: 28 MMOL/L (ref 23–29)
CREAT SERPL-MCNC: 2.2 MG/DL (ref 0.5–1.4)
CRP SERPL-MCNC: 66.9 MG/L (ref 0–8.2)
EST. GFR  (AFRICAN AMERICAN): 34.8 ML/MIN/1.73 M^2
EST. GFR  (NON AFRICAN AMERICAN): 30.1 ML/MIN/1.73 M^2
GLUCOSE SERPL-MCNC: 120 MG/DL (ref 70–110)
POTASSIUM SERPL-SCNC: 3.3 MMOL/L (ref 3.5–5.1)
SODIUM SERPL-SCNC: 141 MMOL/L (ref 136–145)

## 2021-04-29 PROCEDURE — 80048 BASIC METABOLIC PNL TOTAL CA: CPT | Performed by: STUDENT IN AN ORGANIZED HEALTH CARE EDUCATION/TRAINING PROGRAM

## 2021-04-29 PROCEDURE — 86140 C-REACTIVE PROTEIN: CPT | Performed by: SURGERY

## 2021-04-29 PROCEDURE — 20600001 HC STEP DOWN PRIVATE ROOM

## 2021-04-29 PROCEDURE — 25000003 PHARM REV CODE 250: Performed by: STUDENT IN AN ORGANIZED HEALTH CARE EDUCATION/TRAINING PROGRAM

## 2021-04-29 PROCEDURE — 97530 THERAPEUTIC ACTIVITIES: CPT

## 2021-04-29 PROCEDURE — 63600175 PHARM REV CODE 636 W HCPCS: Performed by: SURGERY

## 2021-04-29 PROCEDURE — 63600175 PHARM REV CODE 636 W HCPCS: Performed by: STUDENT IN AN ORGANIZED HEALTH CARE EDUCATION/TRAINING PROGRAM

## 2021-04-29 RX ORDER — POTASSIUM CHLORIDE 20 MEQ/1
60 TABLET, EXTENDED RELEASE ORAL ONCE
Status: COMPLETED | OUTPATIENT
Start: 2021-04-29 | End: 2021-04-29

## 2021-04-29 RX ADMIN — OXYCODONE HYDROCHLORIDE 10 MG: 10 TABLET ORAL at 12:04

## 2021-04-29 RX ADMIN — OXYCODONE HYDROCHLORIDE 10 MG: 10 TABLET ORAL at 03:04

## 2021-04-29 RX ADMIN — ENOXAPARIN SODIUM 40 MG: 40 INJECTION SUBCUTANEOUS at 04:04

## 2021-04-29 RX ADMIN — DEXTROSE, SODIUM CHLORIDE, SODIUM LACTATE, POTASSIUM CHLORIDE, AND CALCIUM CHLORIDE: 5; .6; .31; .03; .02 INJECTION, SOLUTION INTRAVENOUS at 08:04

## 2021-04-29 RX ADMIN — OXYCODONE HYDROCHLORIDE 10 MG: 10 TABLET ORAL at 07:04

## 2021-04-29 RX ADMIN — AMOXICILLIN AND CLAVULANATE POTASSIUM 500 MG: 500; 125 TABLET, FILM COATED ORAL at 10:04

## 2021-04-29 RX ADMIN — POTASSIUM CHLORIDE 60 MEQ: 1500 TABLET, EXTENDED RELEASE ORAL at 06:04

## 2021-04-29 RX ADMIN — OXYCODONE HYDROCHLORIDE 10 MG: 10 TABLET ORAL at 04:04

## 2021-04-29 RX ADMIN — OXYCODONE HYDROCHLORIDE 10 MG: 10 TABLET ORAL at 08:04

## 2021-04-29 RX ADMIN — ONDANSETRON 4 MG: 2 INJECTION INTRAMUSCULAR; INTRAVENOUS at 03:04

## 2021-04-30 LAB
ANION GAP SERPL CALC-SCNC: 10 MMOL/L (ref 8–16)
BASOPHILS # BLD AUTO: 0.06 K/UL (ref 0–0.2)
BASOPHILS NFR BLD: 0.4 % (ref 0–1.9)
BUN SERPL-MCNC: 10 MG/DL (ref 8–23)
CALCIUM SERPL-MCNC: 8.2 MG/DL (ref 8.7–10.5)
CHLORIDE SERPL-SCNC: 100 MMOL/L (ref 95–110)
CO2 SERPL-SCNC: 29 MMOL/L (ref 23–29)
CREAT SERPL-MCNC: 2.2 MG/DL (ref 0.5–1.4)
CRP SERPL-MCNC: 116 MG/L (ref 0–8.2)
DIFFERENTIAL METHOD: ABNORMAL
EOSINOPHIL # BLD AUTO: 0.2 K/UL (ref 0–0.5)
EOSINOPHIL NFR BLD: 1.6 % (ref 0–8)
ERYTHROCYTE [DISTWIDTH] IN BLOOD BY AUTOMATED COUNT: 13.1 % (ref 11.5–14.5)
EST. GFR  (AFRICAN AMERICAN): 34.8 ML/MIN/1.73 M^2
EST. GFR  (NON AFRICAN AMERICAN): 30.1 ML/MIN/1.73 M^2
GLUCOSE SERPL-MCNC: 108 MG/DL (ref 70–110)
HCT VFR BLD AUTO: 29 % (ref 40–54)
HGB BLD-MCNC: 9.6 G/DL (ref 14–18)
IMM GRANULOCYTES # BLD AUTO: 0.45 K/UL (ref 0–0.04)
IMM GRANULOCYTES NFR BLD AUTO: 3.2 % (ref 0–0.5)
LYMPHOCYTES # BLD AUTO: 1.1 K/UL (ref 1–4.8)
LYMPHOCYTES NFR BLD: 8 % (ref 18–48)
MCH RBC QN AUTO: 31.7 PG (ref 27–31)
MCHC RBC AUTO-ENTMCNC: 33.1 G/DL (ref 32–36)
MCV RBC AUTO: 96 FL (ref 82–98)
MONOCYTES # BLD AUTO: 1.3 K/UL (ref 0.3–1)
MONOCYTES NFR BLD: 9 % (ref 4–15)
NEUTROPHILS # BLD AUTO: 11 K/UL (ref 1.8–7.7)
NEUTROPHILS NFR BLD: 77.8 % (ref 38–73)
NRBC BLD-RTO: 0 /100 WBC
PLATELET # BLD AUTO: 416 K/UL (ref 150–450)
PMV BLD AUTO: 8.9 FL (ref 9.2–12.9)
POTASSIUM SERPL-SCNC: 3.7 MMOL/L (ref 3.5–5.1)
RBC # BLD AUTO: 3.03 M/UL (ref 4.6–6.2)
SODIUM SERPL-SCNC: 139 MMOL/L (ref 136–145)
WBC # BLD AUTO: 14.17 K/UL (ref 3.9–12.7)

## 2021-04-30 PROCEDURE — 63600175 PHARM REV CODE 636 W HCPCS: Performed by: SURGERY

## 2021-04-30 PROCEDURE — 63600175 PHARM REV CODE 636 W HCPCS: Performed by: STUDENT IN AN ORGANIZED HEALTH CARE EDUCATION/TRAINING PROGRAM

## 2021-04-30 PROCEDURE — 80048 BASIC METABOLIC PNL TOTAL CA: CPT | Performed by: STUDENT IN AN ORGANIZED HEALTH CARE EDUCATION/TRAINING PROGRAM

## 2021-04-30 PROCEDURE — 97110 THERAPEUTIC EXERCISES: CPT

## 2021-04-30 PROCEDURE — 85025 COMPLETE CBC W/AUTO DIFF WBC: CPT | Performed by: STUDENT IN AN ORGANIZED HEALTH CARE EDUCATION/TRAINING PROGRAM

## 2021-04-30 PROCEDURE — 25000003 PHARM REV CODE 250: Performed by: STUDENT IN AN ORGANIZED HEALTH CARE EDUCATION/TRAINING PROGRAM

## 2021-04-30 PROCEDURE — 20600001 HC STEP DOWN PRIVATE ROOM

## 2021-04-30 PROCEDURE — 86140 C-REACTIVE PROTEIN: CPT | Performed by: SURGERY

## 2021-04-30 RX ORDER — LOPERAMIDE HYDROCHLORIDE 2 MG/1
2 CAPSULE ORAL 2 TIMES DAILY
Status: DISCONTINUED | OUTPATIENT
Start: 2021-04-30 | End: 2021-05-01

## 2021-04-30 RX ADMIN — LOPERAMIDE HYDROCHLORIDE 2 MG: 2 CAPSULE ORAL at 08:04

## 2021-04-30 RX ADMIN — DEXTROSE, SODIUM CHLORIDE, SODIUM LACTATE, POTASSIUM CHLORIDE, AND CALCIUM CHLORIDE: 5; .6; .31; .03; .02 INJECTION, SOLUTION INTRAVENOUS at 04:04

## 2021-04-30 RX ADMIN — OXYCODONE HYDROCHLORIDE 10 MG: 10 TABLET ORAL at 12:04

## 2021-04-30 RX ADMIN — ENOXAPARIN SODIUM 40 MG: 40 INJECTION SUBCUTANEOUS at 04:04

## 2021-04-30 RX ADMIN — OXYCODONE HYDROCHLORIDE 10 MG: 10 TABLET ORAL at 04:04

## 2021-04-30 RX ADMIN — OXYCODONE HYDROCHLORIDE 10 MG: 10 TABLET ORAL at 08:04

## 2021-04-30 RX ADMIN — OXYCODONE HYDROCHLORIDE 10 MG: 10 TABLET ORAL at 11:04

## 2021-04-30 RX ADMIN — LOPERAMIDE HYDROCHLORIDE 2 MG: 2 CAPSULE ORAL at 10:04

## 2021-04-30 RX ADMIN — HYDROMORPHONE HYDROCHLORIDE 0.5 MG: 1 INJECTION, SOLUTION INTRAMUSCULAR; INTRAVENOUS; SUBCUTANEOUS at 08:04

## 2021-05-01 LAB
ANION GAP SERPL CALC-SCNC: 10 MMOL/L (ref 8–16)
BASOPHILS # BLD AUTO: 0.06 K/UL (ref 0–0.2)
BASOPHILS NFR BLD: 0.4 % (ref 0–1.9)
BUN SERPL-MCNC: 13 MG/DL (ref 8–23)
CALCIUM SERPL-MCNC: 8.9 MG/DL (ref 8.7–10.5)
CHLORIDE SERPL-SCNC: 99 MMOL/L (ref 95–110)
CO2 SERPL-SCNC: 29 MMOL/L (ref 23–29)
CREAT SERPL-MCNC: 2.2 MG/DL (ref 0.5–1.4)
CRP SERPL-MCNC: 126.5 MG/L (ref 0–8.2)
DIFFERENTIAL METHOD: ABNORMAL
EOSINOPHIL # BLD AUTO: 0.2 K/UL (ref 0–0.5)
EOSINOPHIL NFR BLD: 1.1 % (ref 0–8)
ERYTHROCYTE [DISTWIDTH] IN BLOOD BY AUTOMATED COUNT: 12.9 % (ref 11.5–14.5)
EST. GFR  (AFRICAN AMERICAN): 34.8 ML/MIN/1.73 M^2
EST. GFR  (NON AFRICAN AMERICAN): 30.1 ML/MIN/1.73 M^2
GLUCOSE SERPL-MCNC: 134 MG/DL (ref 70–110)
HCT VFR BLD AUTO: 29.5 % (ref 40–54)
HGB BLD-MCNC: 9.9 G/DL (ref 14–18)
IMM GRANULOCYTES # BLD AUTO: 0.31 K/UL (ref 0–0.04)
IMM GRANULOCYTES NFR BLD AUTO: 2 % (ref 0–0.5)
LYMPHOCYTES # BLD AUTO: 1.1 K/UL (ref 1–4.8)
LYMPHOCYTES NFR BLD: 6.8 % (ref 18–48)
MCH RBC QN AUTO: 32.9 PG (ref 27–31)
MCHC RBC AUTO-ENTMCNC: 33.6 G/DL (ref 32–36)
MCV RBC AUTO: 98 FL (ref 82–98)
MONOCYTES # BLD AUTO: 1.1 K/UL (ref 0.3–1)
MONOCYTES NFR BLD: 7.1 % (ref 4–15)
NEUTROPHILS # BLD AUTO: 12.8 K/UL (ref 1.8–7.7)
NEUTROPHILS NFR BLD: 82.6 % (ref 38–73)
NRBC BLD-RTO: 0 /100 WBC
PLATELET # BLD AUTO: 478 K/UL (ref 150–450)
PMV BLD AUTO: 8.9 FL (ref 9.2–12.9)
POTASSIUM SERPL-SCNC: 3.5 MMOL/L (ref 3.5–5.1)
RBC # BLD AUTO: 3.01 M/UL (ref 4.6–6.2)
SODIUM SERPL-SCNC: 138 MMOL/L (ref 136–145)
WBC # BLD AUTO: 15.45 K/UL (ref 3.9–12.7)

## 2021-05-01 PROCEDURE — 63600175 PHARM REV CODE 636 W HCPCS: Performed by: STUDENT IN AN ORGANIZED HEALTH CARE EDUCATION/TRAINING PROGRAM

## 2021-05-01 PROCEDURE — 63600175 PHARM REV CODE 636 W HCPCS: Performed by: SURGERY

## 2021-05-01 PROCEDURE — 25000003 PHARM REV CODE 250: Performed by: STUDENT IN AN ORGANIZED HEALTH CARE EDUCATION/TRAINING PROGRAM

## 2021-05-01 PROCEDURE — 25500020 PHARM REV CODE 255: Performed by: SURGERY

## 2021-05-01 PROCEDURE — 86140 C-REACTIVE PROTEIN: CPT | Performed by: SURGERY

## 2021-05-01 PROCEDURE — 85025 COMPLETE CBC W/AUTO DIFF WBC: CPT | Performed by: STUDENT IN AN ORGANIZED HEALTH CARE EDUCATION/TRAINING PROGRAM

## 2021-05-01 PROCEDURE — 80048 BASIC METABOLIC PNL TOTAL CA: CPT | Performed by: STUDENT IN AN ORGANIZED HEALTH CARE EDUCATION/TRAINING PROGRAM

## 2021-05-01 PROCEDURE — 20600001 HC STEP DOWN PRIVATE ROOM

## 2021-05-01 RX ORDER — LOPERAMIDE HYDROCHLORIDE 2 MG/1
2 CAPSULE ORAL 4 TIMES DAILY
Status: DISCONTINUED | OUTPATIENT
Start: 2021-05-01 | End: 2021-05-07 | Stop reason: HOSPADM

## 2021-05-01 RX ADMIN — OXYCODONE HYDROCHLORIDE 10 MG: 10 TABLET ORAL at 04:05

## 2021-05-01 RX ADMIN — IOHEXOL 15 ML: 350 INJECTION, SOLUTION INTRAVENOUS at 11:05

## 2021-05-01 RX ADMIN — IOHEXOL 15 ML: 350 INJECTION, SOLUTION INTRAVENOUS at 10:05

## 2021-05-01 RX ADMIN — OXYCODONE HYDROCHLORIDE 10 MG: 10 TABLET ORAL at 08:05

## 2021-05-01 RX ADMIN — LOPERAMIDE HYDROCHLORIDE 2 MG: 2 CAPSULE ORAL at 08:05

## 2021-05-01 RX ADMIN — LOPERAMIDE HYDROCHLORIDE 2 MG: 2 CAPSULE ORAL at 12:05

## 2021-05-01 RX ADMIN — METOCLOPRAMIDE 10 MG: 5 INJECTION, SOLUTION INTRAMUSCULAR; INTRAVENOUS at 04:05

## 2021-05-01 RX ADMIN — DEXTROSE, SODIUM CHLORIDE, SODIUM LACTATE, POTASSIUM CHLORIDE, AND CALCIUM CHLORIDE: 5; .6; .31; .03; .02 INJECTION, SOLUTION INTRAVENOUS at 02:05

## 2021-05-01 RX ADMIN — OXYCODONE HYDROCHLORIDE 10 MG: 10 TABLET ORAL at 01:05

## 2021-05-01 RX ADMIN — LOPERAMIDE HYDROCHLORIDE 2 MG: 2 CAPSULE ORAL at 04:05

## 2021-05-01 RX ADMIN — ONDANSETRON 4 MG: 2 INJECTION INTRAMUSCULAR; INTRAVENOUS at 08:05

## 2021-05-01 RX ADMIN — ENOXAPARIN SODIUM 40 MG: 40 INJECTION SUBCUTANEOUS at 04:05

## 2021-05-01 RX ADMIN — ONDANSETRON 4 MG: 2 INJECTION INTRAMUSCULAR; INTRAVENOUS at 12:05

## 2021-05-01 RX ADMIN — SODIUM CHLORIDE, SODIUM LACTATE, POTASSIUM CHLORIDE, AND CALCIUM CHLORIDE 1000 ML: .6; .31; .03; .02 INJECTION, SOLUTION INTRAVENOUS at 12:05

## 2021-05-02 LAB
ANION GAP SERPL CALC-SCNC: 10 MMOL/L (ref 8–16)
BASOPHILS # BLD AUTO: 0.04 K/UL (ref 0–0.2)
BASOPHILS NFR BLD: 0.2 % (ref 0–1.9)
BUN SERPL-MCNC: 15 MG/DL (ref 8–23)
CALCIUM SERPL-MCNC: 8.4 MG/DL (ref 8.7–10.5)
CHLORIDE SERPL-SCNC: 97 MMOL/L (ref 95–110)
CO2 SERPL-SCNC: 30 MMOL/L (ref 23–29)
CREAT SERPL-MCNC: 2.3 MG/DL (ref 0.5–1.4)
CRP SERPL-MCNC: 115 MG/L (ref 0–8.2)
DIFFERENTIAL METHOD: ABNORMAL
EOSINOPHIL # BLD AUTO: 0.2 K/UL (ref 0–0.5)
EOSINOPHIL NFR BLD: 1.1 % (ref 0–8)
ERYTHROCYTE [DISTWIDTH] IN BLOOD BY AUTOMATED COUNT: 12.8 % (ref 11.5–14.5)
EST. GFR  (AFRICAN AMERICAN): 33 ML/MIN/1.73 M^2
EST. GFR  (NON AFRICAN AMERICAN): 28.5 ML/MIN/1.73 M^2
GLUCOSE SERPL-MCNC: 119 MG/DL (ref 70–110)
GRAM STN SPEC: NORMAL
GRAM STN SPEC: NORMAL
HCT VFR BLD AUTO: 28.1 % (ref 40–54)
HGB BLD-MCNC: 9.4 G/DL (ref 14–18)
IMM GRANULOCYTES # BLD AUTO: 0.29 K/UL (ref 0–0.04)
IMM GRANULOCYTES NFR BLD AUTO: 1.7 % (ref 0–0.5)
LYMPHOCYTES # BLD AUTO: 1 K/UL (ref 1–4.8)
LYMPHOCYTES NFR BLD: 5.6 % (ref 18–48)
MCH RBC QN AUTO: 32.9 PG (ref 27–31)
MCHC RBC AUTO-ENTMCNC: 33.5 G/DL (ref 32–36)
MCV RBC AUTO: 98 FL (ref 82–98)
MONOCYTES # BLD AUTO: 1.2 K/UL (ref 0.3–1)
MONOCYTES NFR BLD: 7.2 % (ref 4–15)
NEUTROPHILS # BLD AUTO: 14.6 K/UL (ref 1.8–7.7)
NEUTROPHILS NFR BLD: 84.2 % (ref 38–73)
NRBC BLD-RTO: 0 /100 WBC
PLATELET # BLD AUTO: 518 K/UL (ref 150–450)
PMV BLD AUTO: 8.8 FL (ref 9.2–12.9)
POTASSIUM SERPL-SCNC: 3.8 MMOL/L (ref 3.5–5.1)
RBC # BLD AUTO: 2.86 M/UL (ref 4.6–6.2)
SODIUM SERPL-SCNC: 137 MMOL/L (ref 136–145)
WBC # BLD AUTO: 17.32 K/UL (ref 3.9–12.7)

## 2021-05-02 PROCEDURE — 63600175 PHARM REV CODE 636 W HCPCS: Performed by: RADIOLOGY

## 2021-05-02 PROCEDURE — 87205 SMEAR GRAM STAIN: CPT | Performed by: COLON & RECTAL SURGERY

## 2021-05-02 PROCEDURE — 87070 CULTURE OTHR SPECIMN AEROBIC: CPT | Performed by: COLON & RECTAL SURGERY

## 2021-05-02 PROCEDURE — 20600001 HC STEP DOWN PRIVATE ROOM

## 2021-05-02 PROCEDURE — 86140 C-REACTIVE PROTEIN: CPT | Performed by: SURGERY

## 2021-05-02 PROCEDURE — 63600175 PHARM REV CODE 636 W HCPCS: Performed by: SURGERY

## 2021-05-02 PROCEDURE — 85025 COMPLETE CBC W/AUTO DIFF WBC: CPT | Performed by: STUDENT IN AN ORGANIZED HEALTH CARE EDUCATION/TRAINING PROGRAM

## 2021-05-02 PROCEDURE — 25000003 PHARM REV CODE 250: Performed by: STUDENT IN AN ORGANIZED HEALTH CARE EDUCATION/TRAINING PROGRAM

## 2021-05-02 PROCEDURE — 63600175 PHARM REV CODE 636 W HCPCS: Performed by: STUDENT IN AN ORGANIZED HEALTH CARE EDUCATION/TRAINING PROGRAM

## 2021-05-02 PROCEDURE — 80048 BASIC METABOLIC PNL TOTAL CA: CPT | Performed by: STUDENT IN AN ORGANIZED HEALTH CARE EDUCATION/TRAINING PROGRAM

## 2021-05-02 PROCEDURE — 87075 CULTR BACTERIA EXCEPT BLOOD: CPT | Performed by: COLON & RECTAL SURGERY

## 2021-05-02 RX ORDER — MIDAZOLAM HYDROCHLORIDE 1 MG/ML
INJECTION INTRAMUSCULAR; INTRAVENOUS CODE/TRAUMA/SEDATION MEDICATION
Status: COMPLETED | OUTPATIENT
Start: 2021-05-02 | End: 2021-05-02

## 2021-05-02 RX ORDER — FENTANYL CITRATE 50 UG/ML
INJECTION, SOLUTION INTRAMUSCULAR; INTRAVENOUS CODE/TRAUMA/SEDATION MEDICATION
Status: COMPLETED | OUTPATIENT
Start: 2021-05-02 | End: 2021-05-02

## 2021-05-02 RX ADMIN — ONDANSETRON 4 MG: 2 INJECTION INTRAMUSCULAR; INTRAVENOUS at 09:05

## 2021-05-02 RX ADMIN — FENTANYL CITRATE 50 MCG: 50 INJECTION, SOLUTION INTRAMUSCULAR; INTRAVENOUS at 01:05

## 2021-05-02 RX ADMIN — ONDANSETRON 4 MG: 2 INJECTION INTRAMUSCULAR; INTRAVENOUS at 08:05

## 2021-05-02 RX ADMIN — OXYCODONE HYDROCHLORIDE 10 MG: 10 TABLET ORAL at 08:05

## 2021-05-02 RX ADMIN — OXYCODONE HYDROCHLORIDE 10 MG: 10 TABLET ORAL at 12:05

## 2021-05-02 RX ADMIN — MIDAZOLAM HYDROCHLORIDE 1 MG: 1 INJECTION INTRAMUSCULAR; INTRAVENOUS at 12:05

## 2021-05-02 RX ADMIN — OXYCODONE HYDROCHLORIDE 10 MG: 10 TABLET ORAL at 04:05

## 2021-05-02 RX ADMIN — SODIUM CHLORIDE, SODIUM LACTATE, POTASSIUM CHLORIDE, AND CALCIUM CHLORIDE 1000 ML: .6; .31; .03; .02 INJECTION, SOLUTION INTRAVENOUS at 11:05

## 2021-05-02 RX ADMIN — LOPERAMIDE HYDROCHLORIDE 2 MG: 2 CAPSULE ORAL at 08:05

## 2021-05-02 RX ADMIN — FENTANYL CITRATE 50 MCG: 50 INJECTION, SOLUTION INTRAMUSCULAR; INTRAVENOUS at 12:05

## 2021-05-02 RX ADMIN — OXYCODONE HYDROCHLORIDE 5 MG: 5 TABLET ORAL at 08:05

## 2021-05-03 PROBLEM — Z93.2 HIGH OUTPUT ILEOSTOMY: Status: ACTIVE | Noted: 2021-05-03

## 2021-05-03 PROBLEM — R19.8 HIGH OUTPUT ILEOSTOMY: Status: ACTIVE | Noted: 2021-05-03

## 2021-05-03 LAB
BASOPHILS # BLD AUTO: 0.05 K/UL (ref 0–0.2)
BASOPHILS NFR BLD: 0.3 % (ref 0–1.9)
CRP SERPL-MCNC: 156.4 MG/L (ref 0–8.2)
DIFFERENTIAL METHOD: ABNORMAL
EOSINOPHIL # BLD AUTO: 0.2 K/UL (ref 0–0.5)
EOSINOPHIL NFR BLD: 1 % (ref 0–8)
ERYTHROCYTE [DISTWIDTH] IN BLOOD BY AUTOMATED COUNT: 12.6 % (ref 11.5–14.5)
HCT VFR BLD AUTO: 28.8 % (ref 40–54)
HGB BLD-MCNC: 9.7 G/DL (ref 14–18)
IMM GRANULOCYTES # BLD AUTO: 0.17 K/UL (ref 0–0.04)
IMM GRANULOCYTES NFR BLD AUTO: 1.1 % (ref 0–0.5)
LYMPHOCYTES # BLD AUTO: 1 K/UL (ref 1–4.8)
LYMPHOCYTES NFR BLD: 6.2 % (ref 18–48)
MCH RBC QN AUTO: 33.2 PG (ref 27–31)
MCHC RBC AUTO-ENTMCNC: 33.7 G/DL (ref 32–36)
MCV RBC AUTO: 99 FL (ref 82–98)
MONOCYTES # BLD AUTO: 1.3 K/UL (ref 0.3–1)
MONOCYTES NFR BLD: 7.9 % (ref 4–15)
NEUTROPHILS # BLD AUTO: 13.5 K/UL (ref 1.8–7.7)
NEUTROPHILS NFR BLD: 83.5 % (ref 38–73)
NRBC BLD-RTO: 0 /100 WBC
PLATELET # BLD AUTO: 593 K/UL (ref 150–450)
PMV BLD AUTO: 8.7 FL (ref 9.2–12.9)
RBC # BLD AUTO: 2.92 M/UL (ref 4.6–6.2)
WBC # BLD AUTO: 16.12 K/UL (ref 3.9–12.7)

## 2021-05-03 PROCEDURE — 86140 C-REACTIVE PROTEIN: CPT | Performed by: SURGERY

## 2021-05-03 PROCEDURE — 20600001 HC STEP DOWN PRIVATE ROOM

## 2021-05-03 PROCEDURE — 63600175 PHARM REV CODE 636 W HCPCS: Performed by: STUDENT IN AN ORGANIZED HEALTH CARE EDUCATION/TRAINING PROGRAM

## 2021-05-03 PROCEDURE — 97110 THERAPEUTIC EXERCISES: CPT

## 2021-05-03 PROCEDURE — 25000242 PHARM REV CODE 250 ALT 637 W/ HCPCS: Performed by: SURGERY

## 2021-05-03 PROCEDURE — 63600175 PHARM REV CODE 636 W HCPCS: Performed by: SURGERY

## 2021-05-03 PROCEDURE — 25000003 PHARM REV CODE 250: Performed by: SURGERY

## 2021-05-03 PROCEDURE — 25000003 PHARM REV CODE 250: Performed by: STUDENT IN AN ORGANIZED HEALTH CARE EDUCATION/TRAINING PROGRAM

## 2021-05-03 PROCEDURE — 85025 COMPLETE CBC W/AUTO DIFF WBC: CPT | Performed by: STUDENT IN AN ORGANIZED HEALTH CARE EDUCATION/TRAINING PROGRAM

## 2021-05-03 PROCEDURE — S0030 INJECTION, METRONIDAZOLE: HCPCS | Performed by: SURGERY

## 2021-05-03 RX ORDER — CIPROFLOXACIN 2 MG/ML
400 INJECTION, SOLUTION INTRAVENOUS
Status: DISCONTINUED | OUTPATIENT
Start: 2021-05-03 | End: 2021-05-05

## 2021-05-03 RX ORDER — METRONIDAZOLE 500 MG/100ML
500 INJECTION, SOLUTION INTRAVENOUS
Status: DISCONTINUED | OUTPATIENT
Start: 2021-05-03 | End: 2021-05-06

## 2021-05-03 RX ADMIN — CIPROFLOXACIN 400 MG: 2 INJECTION, SOLUTION INTRAVENOUS at 08:05

## 2021-05-03 RX ADMIN — LOPERAMIDE HYDROCHLORIDE 2 MG: 2 CAPSULE ORAL at 12:05

## 2021-05-03 RX ADMIN — Medication 1 PACKET: at 09:05

## 2021-05-03 RX ADMIN — OXYCODONE HYDROCHLORIDE 10 MG: 10 TABLET ORAL at 11:05

## 2021-05-03 RX ADMIN — OXYCODONE HYDROCHLORIDE 10 MG: 10 TABLET ORAL at 12:05

## 2021-05-03 RX ADMIN — LOPERAMIDE HYDROCHLORIDE 2 MG: 2 CAPSULE ORAL at 08:05

## 2021-05-03 RX ADMIN — ONDANSETRON 4 MG: 2 INJECTION INTRAMUSCULAR; INTRAVENOUS at 03:05

## 2021-05-03 RX ADMIN — OXYCODONE HYDROCHLORIDE 10 MG: 10 TABLET ORAL at 03:05

## 2021-05-03 RX ADMIN — METRONIDAZOLE 500 MG: 500 INJECTION, SOLUTION INTRAVENOUS at 11:05

## 2021-05-03 RX ADMIN — OXYCODONE HYDROCHLORIDE 10 MG: 10 TABLET ORAL at 09:05

## 2021-05-03 RX ADMIN — OXYCODONE HYDROCHLORIDE 10 MG: 10 TABLET ORAL at 07:05

## 2021-05-03 RX ADMIN — LOPERAMIDE HYDROCHLORIDE 2 MG: 2 CAPSULE ORAL at 05:05

## 2021-05-03 RX ADMIN — METRONIDAZOLE 500 MG: 500 INJECTION, SOLUTION INTRAVENOUS at 09:05

## 2021-05-03 RX ADMIN — LOPERAMIDE HYDROCHLORIDE 2 MG: 2 CAPSULE ORAL at 09:05

## 2021-05-03 RX ADMIN — METRONIDAZOLE 500 MG: 500 INJECTION, SOLUTION INTRAVENOUS at 03:05

## 2021-05-03 RX ADMIN — CIPROFLOXACIN 400 MG: 2 INJECTION, SOLUTION INTRAVENOUS at 09:05

## 2021-05-03 RX ADMIN — DEXTROSE, SODIUM CHLORIDE, SODIUM LACTATE, POTASSIUM CHLORIDE, AND CALCIUM CHLORIDE: 5; .6; .31; .03; .02 INJECTION, SOLUTION INTRAVENOUS at 10:05

## 2021-05-03 RX ADMIN — OXYCODONE HYDROCHLORIDE 10 MG: 10 TABLET ORAL at 04:05

## 2021-05-04 PROBLEM — N17.9 AKI (ACUTE KIDNEY INJURY): Status: ACTIVE | Noted: 2021-05-04

## 2021-05-04 LAB
ANION GAP SERPL CALC-SCNC: 14 MMOL/L (ref 8–16)
BASOPHILS # BLD AUTO: 0.06 K/UL (ref 0–0.2)
BASOPHILS NFR BLD: 0.4 % (ref 0–1.9)
BUN SERPL-MCNC: 20 MG/DL (ref 8–23)
CALCIUM SERPL-MCNC: 9 MG/DL (ref 8.7–10.5)
CHLORIDE SERPL-SCNC: 95 MMOL/L (ref 95–110)
CO2 SERPL-SCNC: 26 MMOL/L (ref 23–29)
CREAT SERPL-MCNC: 2.5 MG/DL (ref 0.5–1.4)
CRP SERPL-MCNC: 135 MG/L (ref 0–8.2)
DIFFERENTIAL METHOD: ABNORMAL
EOSINOPHIL # BLD AUTO: 0.2 K/UL (ref 0–0.5)
EOSINOPHIL NFR BLD: 1 % (ref 0–8)
ERYTHROCYTE [DISTWIDTH] IN BLOOD BY AUTOMATED COUNT: 12.5 % (ref 11.5–14.5)
EST. GFR  (AFRICAN AMERICAN): 29.8 ML/MIN/1.73 M^2
EST. GFR  (NON AFRICAN AMERICAN): 25.8 ML/MIN/1.73 M^2
GLUCOSE SERPL-MCNC: 114 MG/DL (ref 70–110)
HCT VFR BLD AUTO: 30.1 % (ref 40–54)
HGB BLD-MCNC: 9.9 G/DL (ref 14–18)
IMM GRANULOCYTES # BLD AUTO: 0.17 K/UL (ref 0–0.04)
IMM GRANULOCYTES NFR BLD AUTO: 1.1 % (ref 0–0.5)
LYMPHOCYTES # BLD AUTO: 1.2 K/UL (ref 1–4.8)
LYMPHOCYTES NFR BLD: 7.6 % (ref 18–48)
MCH RBC QN AUTO: 32.5 PG (ref 27–31)
MCHC RBC AUTO-ENTMCNC: 32.9 G/DL (ref 32–36)
MCV RBC AUTO: 99 FL (ref 82–98)
MONOCYTES # BLD AUTO: 1.3 K/UL (ref 0.3–1)
MONOCYTES NFR BLD: 8.7 % (ref 4–15)
NEUTROPHILS # BLD AUTO: 12.3 K/UL (ref 1.8–7.7)
NEUTROPHILS NFR BLD: 81.2 % (ref 38–73)
NRBC BLD-RTO: 0 /100 WBC
PLATELET # BLD AUTO: 651 K/UL (ref 150–450)
PMV BLD AUTO: 8.8 FL (ref 9.2–12.9)
POTASSIUM SERPL-SCNC: 4.5 MMOL/L (ref 3.5–5.1)
RBC # BLD AUTO: 3.05 M/UL (ref 4.6–6.2)
SODIUM SERPL-SCNC: 135 MMOL/L (ref 136–145)
WBC # BLD AUTO: 15.14 K/UL (ref 3.9–12.7)

## 2021-05-04 PROCEDURE — 84300 ASSAY OF URINE SODIUM: CPT | Performed by: COLON & RECTAL SURGERY

## 2021-05-04 PROCEDURE — 85025 COMPLETE CBC W/AUTO DIFF WBC: CPT | Performed by: STUDENT IN AN ORGANIZED HEALTH CARE EDUCATION/TRAINING PROGRAM

## 2021-05-04 PROCEDURE — 82570 ASSAY OF URINE CREATININE: CPT | Performed by: COLON & RECTAL SURGERY

## 2021-05-04 PROCEDURE — 97535 SELF CARE MNGMENT TRAINING: CPT | Mod: CO

## 2021-05-04 PROCEDURE — 94761 N-INVAS EAR/PLS OXIMETRY MLT: CPT

## 2021-05-04 PROCEDURE — 63600175 PHARM REV CODE 636 W HCPCS: Performed by: SURGERY

## 2021-05-04 PROCEDURE — S0030 INJECTION, METRONIDAZOLE: HCPCS | Performed by: SURGERY

## 2021-05-04 PROCEDURE — 80048 BASIC METABOLIC PNL TOTAL CA: CPT | Performed by: STUDENT IN AN ORGANIZED HEALTH CARE EDUCATION/TRAINING PROGRAM

## 2021-05-04 PROCEDURE — 25000003 PHARM REV CODE 250: Performed by: STUDENT IN AN ORGANIZED HEALTH CARE EDUCATION/TRAINING PROGRAM

## 2021-05-04 PROCEDURE — 25000003 PHARM REV CODE 250: Performed by: SURGERY

## 2021-05-04 PROCEDURE — 86140 C-REACTIVE PROTEIN: CPT | Performed by: SURGERY

## 2021-05-04 PROCEDURE — 20600001 HC STEP DOWN PRIVATE ROOM

## 2021-05-04 PROCEDURE — 84540 ASSAY OF URINE/UREA-N: CPT | Performed by: COLON & RECTAL SURGERY

## 2021-05-04 PROCEDURE — 63600175 PHARM REV CODE 636 W HCPCS: Performed by: COLON & RECTAL SURGERY

## 2021-05-04 PROCEDURE — 25000242 PHARM REV CODE 250 ALT 637 W/ HCPCS: Performed by: SURGERY

## 2021-05-04 RX ORDER — ENOXAPARIN SODIUM 100 MG/ML
30 INJECTION SUBCUTANEOUS EVERY 24 HOURS
Status: DISCONTINUED | OUTPATIENT
Start: 2021-05-04 | End: 2021-05-07 | Stop reason: HOSPADM

## 2021-05-04 RX ADMIN — METOCLOPRAMIDE 10 MG: 5 INJECTION, SOLUTION INTRAMUSCULAR; INTRAVENOUS at 08:05

## 2021-05-04 RX ADMIN — OXYCODONE HYDROCHLORIDE 10 MG: 10 TABLET ORAL at 12:05

## 2021-05-04 RX ADMIN — CIPROFLOXACIN 400 MG: 2 INJECTION, SOLUTION INTRAVENOUS at 12:05

## 2021-05-04 RX ADMIN — CIPROFLOXACIN 400 MG: 2 INJECTION, SOLUTION INTRAVENOUS at 08:05

## 2021-05-04 RX ADMIN — METRONIDAZOLE 500 MG: 500 INJECTION, SOLUTION INTRAVENOUS at 05:05

## 2021-05-04 RX ADMIN — DEXTROSE, SODIUM CHLORIDE, SODIUM LACTATE, POTASSIUM CHLORIDE, AND CALCIUM CHLORIDE: 5; .6; .31; .03; .02 INJECTION, SOLUTION INTRAVENOUS at 07:05

## 2021-05-04 RX ADMIN — OXYCODONE HYDROCHLORIDE 10 MG: 10 TABLET ORAL at 04:05

## 2021-05-04 RX ADMIN — OXYCODONE HYDROCHLORIDE 10 MG: 10 TABLET ORAL at 08:05

## 2021-05-04 RX ADMIN — ONDANSETRON 4 MG: 2 INJECTION INTRAMUSCULAR; INTRAVENOUS at 04:05

## 2021-05-04 RX ADMIN — ENOXAPARIN SODIUM 30 MG: 40 INJECTION SUBCUTANEOUS at 05:05

## 2021-05-04 RX ADMIN — OXYCODONE HYDROCHLORIDE 10 MG: 10 TABLET ORAL at 09:05

## 2021-05-04 RX ADMIN — LOPERAMIDE HYDROCHLORIDE 2 MG: 2 CAPSULE ORAL at 12:05

## 2021-05-04 RX ADMIN — Medication 1 PACKET: at 09:05

## 2021-05-04 RX ADMIN — LOPERAMIDE HYDROCHLORIDE 2 MG: 2 CAPSULE ORAL at 08:05

## 2021-05-04 RX ADMIN — LOPERAMIDE HYDROCHLORIDE 2 MG: 2 CAPSULE ORAL at 04:05

## 2021-05-04 RX ADMIN — METRONIDAZOLE 500 MG: 500 INJECTION, SOLUTION INTRAVENOUS at 08:05

## 2021-05-04 RX ADMIN — ONDANSETRON 4 MG: 2 INJECTION INTRAMUSCULAR; INTRAVENOUS at 12:05

## 2021-05-05 LAB
ANION GAP SERPL CALC-SCNC: 11 MMOL/L (ref 8–16)
BACTERIA SPEC AEROBE CULT: NO GROWTH
BASOPHILS # BLD AUTO: 0.05 K/UL (ref 0–0.2)
BASOPHILS NFR BLD: 0.4 % (ref 0–1.9)
BUN SERPL-MCNC: 19 MG/DL (ref 8–23)
CALCIUM SERPL-MCNC: 8.5 MG/DL (ref 8.7–10.5)
CHLORIDE SERPL-SCNC: 98 MMOL/L (ref 95–110)
CK SERPL-CCNC: 14 U/L (ref 20–200)
CO2 SERPL-SCNC: 28 MMOL/L (ref 23–29)
CREAT SERPL-MCNC: 2.6 MG/DL (ref 0.5–1.4)
CREAT UR-MCNC: 118 MG/DL (ref 23–375)
CRP SERPL-MCNC: 86.4 MG/L (ref 0–8.2)
DIFFERENTIAL METHOD: ABNORMAL
EOSINOPHIL # BLD AUTO: 0.2 K/UL (ref 0–0.5)
EOSINOPHIL NFR BLD: 1.7 % (ref 0–8)
ERYTHROCYTE [DISTWIDTH] IN BLOOD BY AUTOMATED COUNT: 12.4 % (ref 11.5–14.5)
EST. GFR  (AFRICAN AMERICAN): 28.4 ML/MIN/1.73 M^2
EST. GFR  (NON AFRICAN AMERICAN): 24.6 ML/MIN/1.73 M^2
GLUCOSE SERPL-MCNC: 125 MG/DL (ref 70–110)
HCT VFR BLD AUTO: 25.1 % (ref 40–54)
HGB BLD-MCNC: 8.2 G/DL (ref 14–18)
IMM GRANULOCYTES # BLD AUTO: 0.11 K/UL (ref 0–0.04)
IMM GRANULOCYTES NFR BLD AUTO: 1 % (ref 0–0.5)
LYMPHOCYTES # BLD AUTO: 0.8 K/UL (ref 1–4.8)
LYMPHOCYTES NFR BLD: 7.2 % (ref 18–48)
MCH RBC QN AUTO: 31.4 PG (ref 27–31)
MCHC RBC AUTO-ENTMCNC: 32.7 G/DL (ref 32–36)
MCV RBC AUTO: 96 FL (ref 82–98)
MONOCYTES # BLD AUTO: 1.1 K/UL (ref 0.3–1)
MONOCYTES NFR BLD: 9.7 % (ref 4–15)
NEUTROPHILS # BLD AUTO: 9.2 K/UL (ref 1.8–7.7)
NEUTROPHILS NFR BLD: 80 % (ref 38–73)
NRBC BLD-RTO: 0 /100 WBC
PLATELET # BLD AUTO: 551 K/UL (ref 150–450)
PMV BLD AUTO: 8.6 FL (ref 9.2–12.9)
POTASSIUM SERPL-SCNC: 4.2 MMOL/L (ref 3.5–5.1)
PROT UR-MCNC: 33 MG/DL (ref 0–15)
PROT/CREAT UR: 0.28 MG/G{CREAT} (ref 0–0.2)
RBC # BLD AUTO: 2.61 M/UL (ref 4.6–6.2)
SODIUM SERPL-SCNC: 137 MMOL/L (ref 136–145)
SODIUM UR-SCNC: 20 MMOL/L (ref 20–250)
UUN UR-MCNC: 468 MG/DL (ref 140–1050)
WBC # BLD AUTO: 11.5 K/UL (ref 3.9–12.7)

## 2021-05-05 PROCEDURE — 99223 PR INITIAL HOSPITAL CARE,LEVL III: ICD-10-PCS | Mod: ,,, | Performed by: INTERNAL MEDICINE

## 2021-05-05 PROCEDURE — 86140 C-REACTIVE PROTEIN: CPT | Performed by: SURGERY

## 2021-05-05 PROCEDURE — 25000003 PHARM REV CODE 250: Performed by: SURGERY

## 2021-05-05 PROCEDURE — 63600175 PHARM REV CODE 636 W HCPCS: Performed by: SURGERY

## 2021-05-05 PROCEDURE — S0030 INJECTION, METRONIDAZOLE: HCPCS | Performed by: SURGERY

## 2021-05-05 PROCEDURE — 99223 1ST HOSP IP/OBS HIGH 75: CPT | Mod: ,,, | Performed by: INTERNAL MEDICINE

## 2021-05-05 PROCEDURE — 25000242 PHARM REV CODE 250 ALT 637 W/ HCPCS: Performed by: SURGERY

## 2021-05-05 PROCEDURE — 20600001 HC STEP DOWN PRIVATE ROOM

## 2021-05-05 PROCEDURE — 85025 COMPLETE CBC W/AUTO DIFF WBC: CPT | Performed by: STUDENT IN AN ORGANIZED HEALTH CARE EDUCATION/TRAINING PROGRAM

## 2021-05-05 PROCEDURE — 25000003 PHARM REV CODE 250: Performed by: STUDENT IN AN ORGANIZED HEALTH CARE EDUCATION/TRAINING PROGRAM

## 2021-05-05 PROCEDURE — 82550 ASSAY OF CK (CPK): CPT | Performed by: GENERAL PRACTICE

## 2021-05-05 PROCEDURE — 80048 BASIC METABOLIC PNL TOTAL CA: CPT | Performed by: STUDENT IN AN ORGANIZED HEALTH CARE EDUCATION/TRAINING PROGRAM

## 2021-05-05 PROCEDURE — 63600175 PHARM REV CODE 636 W HCPCS: Performed by: COLON & RECTAL SURGERY

## 2021-05-05 RX ORDER — CIPROFLOXACIN 2 MG/ML
400 INJECTION, SOLUTION INTRAVENOUS
Status: DISCONTINUED | OUTPATIENT
Start: 2021-05-06 | End: 2021-05-06

## 2021-05-05 RX ADMIN — OXYCODONE HYDROCHLORIDE 10 MG: 10 TABLET ORAL at 01:05

## 2021-05-05 RX ADMIN — OXYCODONE HYDROCHLORIDE 10 MG: 10 TABLET ORAL at 09:05

## 2021-05-05 RX ADMIN — ENOXAPARIN SODIUM 30 MG: 40 INJECTION SUBCUTANEOUS at 05:05

## 2021-05-05 RX ADMIN — METRONIDAZOLE 500 MG: 500 INJECTION, SOLUTION INTRAVENOUS at 12:05

## 2021-05-05 RX ADMIN — OXYCODONE HYDROCHLORIDE 10 MG: 10 TABLET ORAL at 10:05

## 2021-05-05 RX ADMIN — OXYCODONE HYDROCHLORIDE 10 MG: 10 TABLET ORAL at 06:05

## 2021-05-05 RX ADMIN — LOPERAMIDE HYDROCHLORIDE 2 MG: 2 CAPSULE ORAL at 01:05

## 2021-05-05 RX ADMIN — OXYCODONE HYDROCHLORIDE 10 MG: 10 TABLET ORAL at 02:05

## 2021-05-05 RX ADMIN — LOPERAMIDE HYDROCHLORIDE 2 MG: 2 CAPSULE ORAL at 09:05

## 2021-05-05 RX ADMIN — METRONIDAZOLE 500 MG: 500 INJECTION, SOLUTION INTRAVENOUS at 04:05

## 2021-05-05 RX ADMIN — LOPERAMIDE HYDROCHLORIDE 2 MG: 2 CAPSULE ORAL at 04:05

## 2021-05-05 RX ADMIN — LOPERAMIDE HYDROCHLORIDE 2 MG: 2 CAPSULE ORAL at 08:05

## 2021-05-05 RX ADMIN — DEXTROSE, SODIUM CHLORIDE, SODIUM LACTATE, POTASSIUM CHLORIDE, AND CALCIUM CHLORIDE: 5; .6; .31; .03; .02 INJECTION, SOLUTION INTRAVENOUS at 10:05

## 2021-05-05 RX ADMIN — Medication 1 PACKET: at 09:05

## 2021-05-05 RX ADMIN — CIPROFLOXACIN 400 MG: 2 INJECTION, SOLUTION INTRAVENOUS at 07:05

## 2021-05-05 RX ADMIN — METRONIDAZOLE 500 MG: 500 INJECTION, SOLUTION INTRAVENOUS at 08:05

## 2021-05-06 LAB
ANION GAP SERPL CALC-SCNC: 12 MMOL/L (ref 8–16)
BASOPHILS # BLD AUTO: 0.05 K/UL (ref 0–0.2)
BASOPHILS NFR BLD: 0.4 % (ref 0–1.9)
BUN SERPL-MCNC: 17 MG/DL (ref 8–23)
CALCIUM SERPL-MCNC: 8.7 MG/DL (ref 8.7–10.5)
CHLORIDE SERPL-SCNC: 98 MMOL/L (ref 95–110)
CO2 SERPL-SCNC: 26 MMOL/L (ref 23–29)
CREAT SERPL-MCNC: 2.5 MG/DL (ref 0.5–1.4)
CRP SERPL-MCNC: 77.7 MG/L (ref 0–8.2)
DIFFERENTIAL METHOD: ABNORMAL
EOSINOPHIL # BLD AUTO: 0.1 K/UL (ref 0–0.5)
EOSINOPHIL NFR BLD: 1.1 % (ref 0–8)
ERYTHROCYTE [DISTWIDTH] IN BLOOD BY AUTOMATED COUNT: 12.4 % (ref 11.5–14.5)
EST. GFR  (AFRICAN AMERICAN): 29.8 ML/MIN/1.73 M^2
EST. GFR  (NON AFRICAN AMERICAN): 25.8 ML/MIN/1.73 M^2
GLUCOSE SERPL-MCNC: 139 MG/DL (ref 70–110)
HCT VFR BLD AUTO: 25.9 % (ref 40–54)
HGB BLD-MCNC: 8.4 G/DL (ref 14–18)
IMM GRANULOCYTES # BLD AUTO: 0.13 K/UL (ref 0–0.04)
IMM GRANULOCYTES NFR BLD AUTO: 1.1 % (ref 0–0.5)
LYMPHOCYTES # BLD AUTO: 0.6 K/UL (ref 1–4.8)
LYMPHOCYTES NFR BLD: 5.2 % (ref 18–48)
MCH RBC QN AUTO: 31.6 PG (ref 27–31)
MCHC RBC AUTO-ENTMCNC: 32.4 G/DL (ref 32–36)
MCV RBC AUTO: 97 FL (ref 82–98)
MONOCYTES # BLD AUTO: 1.2 K/UL (ref 0.3–1)
MONOCYTES NFR BLD: 9.8 % (ref 4–15)
NEUTROPHILS # BLD AUTO: 9.9 K/UL (ref 1.8–7.7)
NEUTROPHILS NFR BLD: 82.4 % (ref 38–73)
NRBC BLD-RTO: 0 /100 WBC
PLATELET # BLD AUTO: 622 K/UL (ref 150–450)
PMV BLD AUTO: 8.9 FL (ref 9.2–12.9)
POTASSIUM SERPL-SCNC: 4 MMOL/L (ref 3.5–5.1)
RBC # BLD AUTO: 2.66 M/UL (ref 4.6–6.2)
SODIUM SERPL-SCNC: 136 MMOL/L (ref 136–145)
WBC # BLD AUTO: 12.06 K/UL (ref 3.9–12.7)

## 2021-05-06 PROCEDURE — 25000003 PHARM REV CODE 250: Performed by: SURGERY

## 2021-05-06 PROCEDURE — 25000003 PHARM REV CODE 250: Performed by: STUDENT IN AN ORGANIZED HEALTH CARE EDUCATION/TRAINING PROGRAM

## 2021-05-06 PROCEDURE — 99233 PR SUBSEQUENT HOSPITAL CARE,LEVL III: ICD-10-PCS | Mod: ,,, | Performed by: INTERNAL MEDICINE

## 2021-05-06 PROCEDURE — S0030 INJECTION, METRONIDAZOLE: HCPCS | Performed by: SURGERY

## 2021-05-06 PROCEDURE — 25000003 PHARM REV CODE 250: Performed by: COLON & RECTAL SURGERY

## 2021-05-06 PROCEDURE — 99233 SBSQ HOSP IP/OBS HIGH 50: CPT | Mod: ,,, | Performed by: INTERNAL MEDICINE

## 2021-05-06 PROCEDURE — 63600175 PHARM REV CODE 636 W HCPCS: Performed by: COLON & RECTAL SURGERY

## 2021-05-06 PROCEDURE — 20600001 HC STEP DOWN PRIVATE ROOM

## 2021-05-06 PROCEDURE — 63600175 PHARM REV CODE 636 W HCPCS: Performed by: SURGERY

## 2021-05-06 PROCEDURE — 85025 COMPLETE CBC W/AUTO DIFF WBC: CPT | Performed by: STUDENT IN AN ORGANIZED HEALTH CARE EDUCATION/TRAINING PROGRAM

## 2021-05-06 PROCEDURE — 25000242 PHARM REV CODE 250 ALT 637 W/ HCPCS: Performed by: SURGERY

## 2021-05-06 PROCEDURE — 80048 BASIC METABOLIC PNL TOTAL CA: CPT | Performed by: STUDENT IN AN ORGANIZED HEALTH CARE EDUCATION/TRAINING PROGRAM

## 2021-05-06 PROCEDURE — 86140 C-REACTIVE PROTEIN: CPT | Performed by: SURGERY

## 2021-05-06 RX ORDER — CIPROFLOXACIN 500 MG/1
500 TABLET ORAL EVERY 24 HOURS
Status: DISCONTINUED | OUTPATIENT
Start: 2021-05-07 | End: 2021-05-07 | Stop reason: HOSPADM

## 2021-05-06 RX ORDER — METRONIDAZOLE 500 MG/1
500 TABLET ORAL EVERY 8 HOURS
Status: DISCONTINUED | OUTPATIENT
Start: 2021-05-06 | End: 2021-05-07 | Stop reason: HOSPADM

## 2021-05-06 RX ADMIN — METRONIDAZOLE 500 MG: 500 TABLET ORAL at 05:05

## 2021-05-06 RX ADMIN — ENOXAPARIN SODIUM 30 MG: 40 INJECTION SUBCUTANEOUS at 05:05

## 2021-05-06 RX ADMIN — SODIUM CHLORIDE 5 ML/HR: 0.9 INJECTION, SOLUTION INTRAVENOUS at 12:05

## 2021-05-06 RX ADMIN — LOPERAMIDE HYDROCHLORIDE 2 MG: 2 CAPSULE ORAL at 01:05

## 2021-05-06 RX ADMIN — DEXTROSE, SODIUM CHLORIDE, SODIUM LACTATE, POTASSIUM CHLORIDE, AND CALCIUM CHLORIDE: 5; .6; .31; .03; .02 INJECTION, SOLUTION INTRAVENOUS at 09:05

## 2021-05-06 RX ADMIN — LOPERAMIDE HYDROCHLORIDE 2 MG: 2 CAPSULE ORAL at 08:05

## 2021-05-06 RX ADMIN — DEXTROSE, SODIUM CHLORIDE, SODIUM LACTATE, POTASSIUM CHLORIDE, AND CALCIUM CHLORIDE: 5; .6; .31; .03; .02 INJECTION, SOLUTION INTRAVENOUS at 01:05

## 2021-05-06 RX ADMIN — CIPROFLOXACIN 400 MG: 2 INJECTION, SOLUTION INTRAVENOUS at 09:05

## 2021-05-06 RX ADMIN — LOPERAMIDE HYDROCHLORIDE 2 MG: 2 CAPSULE ORAL at 09:05

## 2021-05-06 RX ADMIN — METRONIDAZOLE 500 MG: 500 INJECTION, SOLUTION INTRAVENOUS at 09:05

## 2021-05-06 RX ADMIN — MICONAZOLE NITRATE: 20 OINTMENT TOPICAL at 08:05

## 2021-05-06 RX ADMIN — OXYCODONE HYDROCHLORIDE 10 MG: 10 TABLET ORAL at 10:05

## 2021-05-06 RX ADMIN — OXYCODONE HYDROCHLORIDE 10 MG: 10 TABLET ORAL at 04:05

## 2021-05-06 RX ADMIN — OXYCODONE HYDROCHLORIDE 10 MG: 10 TABLET ORAL at 01:05

## 2021-05-06 RX ADMIN — METRONIDAZOLE 500 MG: 500 INJECTION, SOLUTION INTRAVENOUS at 12:05

## 2021-05-06 RX ADMIN — MICONAZOLE NITRATE: 20 OINTMENT TOPICAL at 01:05

## 2021-05-06 RX ADMIN — OXYCODONE HYDROCHLORIDE 10 MG: 10 TABLET ORAL at 08:05

## 2021-05-06 RX ADMIN — LOPERAMIDE HYDROCHLORIDE 2 MG: 2 CAPSULE ORAL at 05:05

## 2021-05-06 RX ADMIN — Medication 1 PACKET: at 09:05

## 2021-05-06 RX ADMIN — OXYCODONE HYDROCHLORIDE 10 MG: 10 TABLET ORAL at 06:05

## 2021-05-07 ENCOUNTER — PATIENT MESSAGE (OUTPATIENT)
Dept: SURGERY | Facility: CLINIC | Age: 66
End: 2021-05-07

## 2021-05-07 VITALS
SYSTOLIC BLOOD PRESSURE: 126 MMHG | BODY MASS INDEX: 24.34 KG/M2 | RESPIRATION RATE: 20 BRPM | DIASTOLIC BLOOD PRESSURE: 65 MMHG | TEMPERATURE: 99 F | HEIGHT: 70 IN | OXYGEN SATURATION: 96 % | HEART RATE: 95 BPM | WEIGHT: 170 LBS

## 2021-05-07 LAB
ANION GAP SERPL CALC-SCNC: 8 MMOL/L (ref 8–16)
BASOPHILS # BLD AUTO: 0.03 K/UL (ref 0–0.2)
BASOPHILS NFR BLD: 0.3 % (ref 0–1.9)
BUN SERPL-MCNC: 13 MG/DL (ref 8–23)
CALCIUM SERPL-MCNC: 8.4 MG/DL (ref 8.7–10.5)
CHLORIDE SERPL-SCNC: 102 MMOL/L (ref 95–110)
CO2 SERPL-SCNC: 28 MMOL/L (ref 23–29)
CREAT SERPL-MCNC: 2.2 MG/DL (ref 0.5–1.4)
CRP SERPL-MCNC: 61.6 MG/L (ref 0–8.2)
DIFFERENTIAL METHOD: ABNORMAL
EOSINOPHIL # BLD AUTO: 0.1 K/UL (ref 0–0.5)
EOSINOPHIL NFR BLD: 1.3 % (ref 0–8)
ERYTHROCYTE [DISTWIDTH] IN BLOOD BY AUTOMATED COUNT: 12.2 % (ref 11.5–14.5)
EST. GFR  (AFRICAN AMERICAN): 34.8 ML/MIN/1.73 M^2
EST. GFR  (NON AFRICAN AMERICAN): 30.1 ML/MIN/1.73 M^2
GLUCOSE SERPL-MCNC: 116 MG/DL (ref 70–110)
HCT VFR BLD AUTO: 24.5 % (ref 40–54)
HGB BLD-MCNC: 8 G/DL (ref 14–18)
IMM GRANULOCYTES # BLD AUTO: 0.11 K/UL (ref 0–0.04)
IMM GRANULOCYTES NFR BLD AUTO: 1.2 % (ref 0–0.5)
LYMPHOCYTES # BLD AUTO: 0.8 K/UL (ref 1–4.8)
LYMPHOCYTES NFR BLD: 8.3 % (ref 18–48)
MCH RBC QN AUTO: 32.5 PG (ref 27–31)
MCHC RBC AUTO-ENTMCNC: 32.7 G/DL (ref 32–36)
MCV RBC AUTO: 100 FL (ref 82–98)
MONOCYTES # BLD AUTO: 0.9 K/UL (ref 0.3–1)
MONOCYTES NFR BLD: 9.7 % (ref 4–15)
NEUTROPHILS # BLD AUTO: 7.3 K/UL (ref 1.8–7.7)
NEUTROPHILS NFR BLD: 79.2 % (ref 38–73)
NRBC BLD-RTO: 0 /100 WBC
PLATELET # BLD AUTO: 534 K/UL (ref 150–450)
PMV BLD AUTO: 8.6 FL (ref 9.2–12.9)
POTASSIUM SERPL-SCNC: 4 MMOL/L (ref 3.5–5.1)
RBC # BLD AUTO: 2.46 M/UL (ref 4.6–6.2)
SODIUM SERPL-SCNC: 138 MMOL/L (ref 136–145)
WBC # BLD AUTO: 9.19 K/UL (ref 3.9–12.7)

## 2021-05-07 PROCEDURE — 80048 BASIC METABOLIC PNL TOTAL CA: CPT | Performed by: STUDENT IN AN ORGANIZED HEALTH CARE EDUCATION/TRAINING PROGRAM

## 2021-05-07 PROCEDURE — 63600175 PHARM REV CODE 636 W HCPCS: Performed by: SURGERY

## 2021-05-07 PROCEDURE — 25000003 PHARM REV CODE 250: Performed by: SURGERY

## 2021-05-07 PROCEDURE — 86140 C-REACTIVE PROTEIN: CPT | Performed by: SURGERY

## 2021-05-07 PROCEDURE — 25000003 PHARM REV CODE 250: Performed by: STUDENT IN AN ORGANIZED HEALTH CARE EDUCATION/TRAINING PROGRAM

## 2021-05-07 PROCEDURE — 85025 COMPLETE CBC W/AUTO DIFF WBC: CPT | Performed by: STUDENT IN AN ORGANIZED HEALTH CARE EDUCATION/TRAINING PROGRAM

## 2021-05-07 RX ORDER — LOPERAMIDE HYDROCHLORIDE 2 MG/1
2 CAPSULE ORAL 4 TIMES DAILY
Qty: 120 CAPSULE | Refills: 0 | Status: SHIPPED | OUTPATIENT
Start: 2021-05-07 | End: 2021-06-07

## 2021-05-07 RX ORDER — OXYCODONE HYDROCHLORIDE 5 MG/1
5 TABLET ORAL EVERY 4 HOURS PRN
Qty: 30 TABLET | Refills: 0 | Status: ON HOLD | OUTPATIENT
Start: 2021-05-07 | End: 2021-05-31 | Stop reason: HOSPADM

## 2021-05-07 RX ORDER — CIPROFLOXACIN 500 MG/1
500 TABLET ORAL EVERY 12 HOURS
Qty: 14 TABLET | Refills: 0 | Status: SHIPPED | OUTPATIENT
Start: 2021-05-07 | End: 2021-05-14

## 2021-05-07 RX ORDER — METRONIDAZOLE 500 MG/1
500 TABLET ORAL EVERY 8 HOURS
Qty: 21 TABLET | Refills: 0 | Status: SHIPPED | OUTPATIENT
Start: 2021-05-07 | End: 2021-05-14

## 2021-05-07 RX ADMIN — LOPERAMIDE HYDROCHLORIDE 2 MG: 2 CAPSULE ORAL at 08:05

## 2021-05-07 RX ADMIN — DEXTROSE, SODIUM CHLORIDE, SODIUM LACTATE, POTASSIUM CHLORIDE, AND CALCIUM CHLORIDE: 5; .6; .31; .03; .02 INJECTION, SOLUTION INTRAVENOUS at 10:05

## 2021-05-07 RX ADMIN — METRONIDAZOLE 500 MG: 500 TABLET ORAL at 01:05

## 2021-05-07 RX ADMIN — OXYCODONE HYDROCHLORIDE 10 MG: 10 TABLET ORAL at 06:05

## 2021-05-07 RX ADMIN — METRONIDAZOLE 500 MG: 500 TABLET ORAL at 10:05

## 2021-05-07 RX ADMIN — OXYCODONE HYDROCHLORIDE 10 MG: 10 TABLET ORAL at 10:05

## 2021-05-07 RX ADMIN — MICONAZOLE NITRATE: 20 OINTMENT TOPICAL at 08:05

## 2021-05-07 RX ADMIN — CIPROFLOXACIN 500 MG: 500 TABLET, FILM COATED ORAL at 08:05

## 2021-05-07 RX ADMIN — OXYCODONE HYDROCHLORIDE 10 MG: 10 TABLET ORAL at 01:05

## 2021-05-10 ENCOUNTER — PATIENT OUTREACH (OUTPATIENT)
Dept: ADMINISTRATIVE | Facility: CLINIC | Age: 66
End: 2021-05-10

## 2021-05-10 ENCOUNTER — TELEPHONE (OUTPATIENT)
Dept: SURGERY | Facility: CLINIC | Age: 66
End: 2021-05-10

## 2021-05-10 ENCOUNTER — TELEPHONE (OUTPATIENT)
Dept: SURGERY | Facility: HOSPITAL | Age: 66
End: 2021-05-10

## 2021-05-10 LAB — BACTERIA SPEC ANAEROBE CULT: NORMAL

## 2021-05-11 ENCOUNTER — LAB VISIT (OUTPATIENT)
Dept: LAB | Facility: HOSPITAL | Age: 66
End: 2021-05-11
Attending: COLON & RECTAL SURGERY
Payer: MEDICARE

## 2021-05-11 DIAGNOSIS — C20 MALIGNANT NEOPLASM OF RECTUM: ICD-10-CM

## 2021-05-11 DIAGNOSIS — Z45.2 FITTING AND ADJUSTMENT OF VASCULAR CATHETER: Primary | ICD-10-CM

## 2021-05-11 LAB
ANION GAP SERPL CALC-SCNC: 13 MMOL/L (ref 8–16)
BUN SERPL-MCNC: 19 MG/DL (ref 8–23)
CALCIUM SERPL-MCNC: 8.8 MG/DL (ref 8.7–10.5)
CHLORIDE SERPL-SCNC: 98 MMOL/L (ref 95–110)
CO2 SERPL-SCNC: 27 MMOL/L (ref 23–29)
CREAT SERPL-MCNC: 2.4 MG/DL (ref 0.5–1.4)
EST. GFR  (AFRICAN AMERICAN): 31.3 ML/MIN/1.73 M^2
EST. GFR  (NON AFRICAN AMERICAN): 27.1 ML/MIN/1.73 M^2
GLUCOSE SERPL-MCNC: 131 MG/DL (ref 70–110)
POTASSIUM SERPL-SCNC: 5 MMOL/L (ref 3.5–5.1)
SODIUM SERPL-SCNC: 138 MMOL/L (ref 136–145)

## 2021-05-11 PROCEDURE — 36415 COLL VENOUS BLD VENIPUNCTURE: CPT | Performed by: COLON & RECTAL SURGERY

## 2021-05-11 PROCEDURE — 80048 BASIC METABOLIC PNL TOTAL CA: CPT | Performed by: COLON & RECTAL SURGERY

## 2021-05-12 ENCOUNTER — TELEPHONE (OUTPATIENT)
Dept: SURGERY | Facility: HOSPITAL | Age: 66
End: 2021-05-12

## 2021-05-14 ENCOUNTER — LAB VISIT (OUTPATIENT)
Dept: LAB | Facility: HOSPITAL | Age: 66
End: 2021-05-14
Attending: INTERNAL MEDICINE
Payer: MEDICARE

## 2021-05-14 DIAGNOSIS — C20 MALIGNANT NEOPLASM OF RECTUM: Primary | ICD-10-CM

## 2021-05-14 LAB
ANION GAP SERPL CALC-SCNC: 12 MMOL/L (ref 8–16)
BUN SERPL-MCNC: 14 MG/DL (ref 8–23)
CALCIUM SERPL-MCNC: 8.8 MG/DL (ref 8.7–10.5)
CHLORIDE SERPL-SCNC: 102 MMOL/L (ref 95–110)
CO2 SERPL-SCNC: 26 MMOL/L (ref 23–29)
CREAT SERPL-MCNC: 1.9 MG/DL (ref 0.5–1.4)
EST. GFR  (AFRICAN AMERICAN): 41.5 ML/MIN/1.73 M^2
EST. GFR  (NON AFRICAN AMERICAN): 35.9 ML/MIN/1.73 M^2
GLUCOSE SERPL-MCNC: 120 MG/DL (ref 70–110)
POTASSIUM SERPL-SCNC: 4.4 MMOL/L (ref 3.5–5.1)
SODIUM SERPL-SCNC: 140 MMOL/L (ref 136–145)

## 2021-05-14 PROCEDURE — 36415 COLL VENOUS BLD VENIPUNCTURE: CPT | Performed by: INTERNAL MEDICINE

## 2021-05-14 PROCEDURE — 80048 BASIC METABOLIC PNL TOTAL CA: CPT | Performed by: INTERNAL MEDICINE

## 2021-05-17 ENCOUNTER — TELEPHONE (OUTPATIENT)
Dept: SURGERY | Facility: CLINIC | Age: 66
End: 2021-05-17

## 2021-05-17 DIAGNOSIS — C18.9 MALIGNANT NEOPLASM OF COLON, UNSPECIFIED PART OF COLON: Primary | ICD-10-CM

## 2021-05-18 ENCOUNTER — TELEPHONE (OUTPATIENT)
Dept: SURGERY | Facility: CLINIC | Age: 66
End: 2021-05-18

## 2021-05-18 ENCOUNTER — PATIENT OUTREACH (OUTPATIENT)
Dept: ADMINISTRATIVE | Facility: OTHER | Age: 66
End: 2021-05-18

## 2021-05-19 ENCOUNTER — OFFICE VISIT (OUTPATIENT)
Dept: SURGERY | Facility: CLINIC | Age: 66
End: 2021-05-19
Payer: MEDICARE

## 2021-05-19 ENCOUNTER — LAB VISIT (OUTPATIENT)
Dept: LAB | Facility: HOSPITAL | Age: 66
End: 2021-05-19
Attending: SURGERY
Payer: MEDICARE

## 2021-05-19 VITALS
HEART RATE: 125 BPM | DIASTOLIC BLOOD PRESSURE: 76 MMHG | BODY MASS INDEX: 20.83 KG/M2 | WEIGHT: 145.5 LBS | SYSTOLIC BLOOD PRESSURE: 110 MMHG | TEMPERATURE: 98 F | HEIGHT: 70 IN | OXYGEN SATURATION: 98 %

## 2021-05-19 DIAGNOSIS — E86.0 DEHYDRATION: ICD-10-CM

## 2021-05-19 DIAGNOSIS — Z93.2 ILEOSTOMY IN PLACE: ICD-10-CM

## 2021-05-19 DIAGNOSIS — Z93.2 ILEOSTOMY IN PLACE: Primary | ICD-10-CM

## 2021-05-19 LAB
ALBUMIN SERPL BCP-MCNC: 4.1 G/DL (ref 3.5–5.2)
ALP SERPL-CCNC: 140 U/L (ref 55–135)
ALT SERPL W/O P-5'-P-CCNC: 64 U/L (ref 10–44)
ANION GAP SERPL CALC-SCNC: 15 MMOL/L (ref 8–16)
AST SERPL-CCNC: 41 U/L (ref 10–40)
BASOPHILS # BLD AUTO: 0.06 K/UL (ref 0–0.2)
BASOPHILS NFR BLD: 0.3 % (ref 0–1.9)
BILIRUB SERPL-MCNC: 0.6 MG/DL (ref 0.1–1)
BUN SERPL-MCNC: 17 MG/DL (ref 8–23)
CALCIUM SERPL-MCNC: 9.5 MG/DL (ref 8.7–10.5)
CHLORIDE SERPL-SCNC: 97 MMOL/L (ref 95–110)
CO2 SERPL-SCNC: 25 MMOL/L (ref 23–29)
CREAT SERPL-MCNC: 1.7 MG/DL (ref 0.5–1.4)
DIFFERENTIAL METHOD: ABNORMAL
EOSINOPHIL # BLD AUTO: 0.2 K/UL (ref 0–0.5)
EOSINOPHIL NFR BLD: 0.9 % (ref 0–8)
ERYTHROCYTE [DISTWIDTH] IN BLOOD BY AUTOMATED COUNT: 12.4 % (ref 11.5–14.5)
EST. GFR  (AFRICAN AMERICAN): 47.5 ML/MIN/1.73 M^2
EST. GFR  (NON AFRICAN AMERICAN): 41.1 ML/MIN/1.73 M^2
GLUCOSE SERPL-MCNC: 144 MG/DL (ref 70–110)
HCT VFR BLD AUTO: 36.1 % (ref 40–54)
HGB BLD-MCNC: 12.1 G/DL (ref 14–18)
IMM GRANULOCYTES # BLD AUTO: 0.25 K/UL (ref 0–0.04)
IMM GRANULOCYTES NFR BLD AUTO: 1.4 % (ref 0–0.5)
LYMPHOCYTES # BLD AUTO: 1.3 K/UL (ref 1–4.8)
LYMPHOCYTES NFR BLD: 7.2 % (ref 18–48)
MCH RBC QN AUTO: 31.1 PG (ref 27–31)
MCHC RBC AUTO-ENTMCNC: 33.5 G/DL (ref 32–36)
MCV RBC AUTO: 93 FL (ref 82–98)
MONOCYTES # BLD AUTO: 1 K/UL (ref 0.3–1)
MONOCYTES NFR BLD: 5.6 % (ref 4–15)
NEUTROPHILS # BLD AUTO: 15.6 K/UL (ref 1.8–7.7)
NEUTROPHILS NFR BLD: 84.6 % (ref 38–73)
NRBC BLD-RTO: 0 /100 WBC
PLATELET # BLD AUTO: 494 K/UL (ref 150–450)
PMV BLD AUTO: 8.5 FL (ref 9.2–12.9)
POTASSIUM SERPL-SCNC: 3.7 MMOL/L (ref 3.5–5.1)
PROT SERPL-MCNC: 9.3 G/DL (ref 6–8.4)
RBC # BLD AUTO: 3.89 M/UL (ref 4.6–6.2)
SODIUM SERPL-SCNC: 137 MMOL/L (ref 136–145)
WBC # BLD AUTO: 18.44 K/UL (ref 3.9–12.7)

## 2021-05-19 PROCEDURE — 36415 COLL VENOUS BLD VENIPUNCTURE: CPT | Performed by: SURGERY

## 2021-05-19 PROCEDURE — 99214 PR OFFICE/OUTPT VISIT, EST, LEVL IV, 30-39 MIN: ICD-10-PCS | Mod: S$GLB,,, | Performed by: SURGERY

## 2021-05-19 PROCEDURE — 1101F PT FALLS ASSESS-DOCD LE1/YR: CPT | Mod: CPTII,S$GLB,, | Performed by: SURGERY

## 2021-05-19 PROCEDURE — 80053 COMPREHEN METABOLIC PANEL: CPT | Performed by: SURGERY

## 2021-05-19 PROCEDURE — 3008F PR BODY MASS INDEX (BMI) DOCUMENTED: ICD-10-PCS | Mod: CPTII,S$GLB,, | Performed by: SURGERY

## 2021-05-19 PROCEDURE — 1101F PR PT FALLS ASSESS DOC 0-1 FALLS W/OUT INJ PAST YR: ICD-10-PCS | Mod: CPTII,S$GLB,, | Performed by: SURGERY

## 2021-05-19 PROCEDURE — 1125F AMNT PAIN NOTED PAIN PRSNT: CPT | Mod: S$GLB,,, | Performed by: SURGERY

## 2021-05-19 PROCEDURE — 1125F PR PAIN SEVERITY QUANTIFIED, PAIN PRESENT: ICD-10-PCS | Mod: S$GLB,,, | Performed by: SURGERY

## 2021-05-19 PROCEDURE — 99214 OFFICE O/P EST MOD 30 MIN: CPT | Mod: S$GLB,,, | Performed by: SURGERY

## 2021-05-19 PROCEDURE — 1159F MED LIST DOCD IN RCRD: CPT | Mod: S$GLB,,, | Performed by: SURGERY

## 2021-05-19 PROCEDURE — 3008F BODY MASS INDEX DOCD: CPT | Mod: CPTII,S$GLB,, | Performed by: SURGERY

## 2021-05-19 PROCEDURE — 1159F PR MEDICATION LIST DOCUMENTED IN MEDICAL RECORD: ICD-10-PCS | Mod: S$GLB,,, | Performed by: SURGERY

## 2021-05-19 PROCEDURE — 3288F FALL RISK ASSESSMENT DOCD: CPT | Mod: CPTII,S$GLB,, | Performed by: SURGERY

## 2021-05-19 PROCEDURE — 85025 COMPLETE CBC W/AUTO DIFF WBC: CPT | Performed by: SURGERY

## 2021-05-19 PROCEDURE — 3288F PR FALLS RISK ASSESSMENT DOCUMENTED: ICD-10-PCS | Mod: CPTII,S$GLB,, | Performed by: SURGERY

## 2021-05-19 RX ORDER — SODIUM CHLORIDE 9 MG/ML
INJECTION, SOLUTION INTRAVENOUS
Status: ON HOLD | COMMUNITY
Start: 2021-05-07 | End: 2021-05-31 | Stop reason: HOSPADM

## 2021-05-19 RX ORDER — DIPHENOXYLATE HYDROCHLORIDE AND ATROPINE SULFATE 2.5; .025 MG/1; MG/1
2 TABLET ORAL 4 TIMES DAILY PRN
Qty: 240 TABLET | Refills: 0 | Status: SHIPPED | OUTPATIENT
Start: 2021-05-19 | End: 2021-06-18

## 2021-05-19 RX ORDER — ONDANSETRON 8 MG/1
TABLET, ORALLY DISINTEGRATING ORAL
COMMUNITY
Start: 2020-07-01 | End: 2021-06-07 | Stop reason: SDUPTHER

## 2021-05-20 ENCOUNTER — TELEPHONE (OUTPATIENT)
Dept: SURGERY | Facility: CLINIC | Age: 66
End: 2021-05-20

## 2021-05-20 ENCOUNTER — TELEPHONE (OUTPATIENT)
Dept: HEMATOLOGY/ONCOLOGY | Facility: CLINIC | Age: 66
End: 2021-05-20

## 2021-05-24 ENCOUNTER — OFFICE VISIT (OUTPATIENT)
Dept: SURGERY | Facility: CLINIC | Age: 66
End: 2021-05-24
Payer: MEDICARE

## 2021-05-24 VITALS
HEART RATE: 91 BPM | BODY MASS INDEX: 20.89 KG/M2 | DIASTOLIC BLOOD PRESSURE: 72 MMHG | SYSTOLIC BLOOD PRESSURE: 128 MMHG | HEIGHT: 70 IN | WEIGHT: 145.94 LBS

## 2021-05-24 DIAGNOSIS — Z93.2 ILEOSTOMY IN PLACE: ICD-10-CM

## 2021-05-24 DIAGNOSIS — C20 RECTAL ADENOCARCINOMA: Primary | ICD-10-CM

## 2021-05-24 PROCEDURE — 3008F PR BODY MASS INDEX (BMI) DOCUMENTED: ICD-10-PCS | Mod: CPTII,S$GLB,, | Performed by: COLON & RECTAL SURGERY

## 2021-05-24 PROCEDURE — 1125F AMNT PAIN NOTED PAIN PRSNT: CPT | Mod: S$GLB,,, | Performed by: COLON & RECTAL SURGERY

## 2021-05-24 PROCEDURE — 1125F PR PAIN SEVERITY QUANTIFIED, PAIN PRESENT: ICD-10-PCS | Mod: S$GLB,,, | Performed by: COLON & RECTAL SURGERY

## 2021-05-24 PROCEDURE — 3288F PR FALLS RISK ASSESSMENT DOCUMENTED: ICD-10-PCS | Mod: CPTII,S$GLB,, | Performed by: COLON & RECTAL SURGERY

## 2021-05-24 PROCEDURE — 1101F PR PT FALLS ASSESS DOC 0-1 FALLS W/OUT INJ PAST YR: ICD-10-PCS | Mod: CPTII,S$GLB,, | Performed by: COLON & RECTAL SURGERY

## 2021-05-24 PROCEDURE — 99999 PR PBB SHADOW E&M-EST. PATIENT-LVL III: ICD-10-PCS | Mod: PBBFAC,,, | Performed by: COLON & RECTAL SURGERY

## 2021-05-24 PROCEDURE — 3288F FALL RISK ASSESSMENT DOCD: CPT | Mod: CPTII,S$GLB,, | Performed by: COLON & RECTAL SURGERY

## 2021-05-24 PROCEDURE — 3008F BODY MASS INDEX DOCD: CPT | Mod: CPTII,S$GLB,, | Performed by: COLON & RECTAL SURGERY

## 2021-05-24 PROCEDURE — 99999 PR PBB SHADOW E&M-EST. PATIENT-LVL III: CPT | Mod: PBBFAC,,, | Performed by: COLON & RECTAL SURGERY

## 2021-05-24 PROCEDURE — 1101F PT FALLS ASSESS-DOCD LE1/YR: CPT | Mod: CPTII,S$GLB,, | Performed by: COLON & RECTAL SURGERY

## 2021-05-24 PROCEDURE — 99024 PR POST-OP FOLLOW-UP VISIT: ICD-10-PCS | Mod: S$GLB,,, | Performed by: COLON & RECTAL SURGERY

## 2021-05-24 PROCEDURE — 99024 POSTOP FOLLOW-UP VISIT: CPT | Mod: S$GLB,,, | Performed by: COLON & RECTAL SURGERY

## 2021-05-25 ENCOUNTER — PATIENT MESSAGE (OUTPATIENT)
Dept: SURGERY | Facility: CLINIC | Age: 66
End: 2021-05-25

## 2021-05-26 ENCOUNTER — HOSPITAL ENCOUNTER (INPATIENT)
Facility: HOSPITAL | Age: 66
LOS: 5 days | Discharge: HOME HEALTH | DRG: 389 | End: 2021-05-31
Attending: COLON & RECTAL SURGERY | Admitting: COLON & RECTAL SURGERY
Payer: MEDICARE

## 2021-05-26 ENCOUNTER — HOSPITAL ENCOUNTER (EMERGENCY)
Facility: HOSPITAL | Age: 66
Discharge: ANOTHER HOSPITAL | End: 2021-05-26
Attending: EMERGENCY MEDICINE
Payer: MEDICARE

## 2021-05-26 VITALS
HEART RATE: 131 BPM | RESPIRATION RATE: 24 BRPM | TEMPERATURE: 98 F | OXYGEN SATURATION: 97 % | WEIGHT: 145 LBS | DIASTOLIC BLOOD PRESSURE: 53 MMHG | BODY MASS INDEX: 20.76 KG/M2 | SYSTOLIC BLOOD PRESSURE: 112 MMHG | HEIGHT: 70 IN

## 2021-05-26 DIAGNOSIS — Z93.2 HIGH OUTPUT ILEOSTOMY: ICD-10-CM

## 2021-05-26 DIAGNOSIS — I49.9 CARDIAC RHYTHM DISORDER OR DISTURBANCE OR CHANGE: ICD-10-CM

## 2021-05-26 DIAGNOSIS — K56.7 ILEUS: Chronic | ICD-10-CM

## 2021-05-26 DIAGNOSIS — R50.9 FEVER AND CHILLS: ICD-10-CM

## 2021-05-26 DIAGNOSIS — R10.9 ABDOMINAL PAIN: ICD-10-CM

## 2021-05-26 DIAGNOSIS — R19.8 HIGH OUTPUT ILEOSTOMY: ICD-10-CM

## 2021-05-26 DIAGNOSIS — R78.81 BACTEREMIA: ICD-10-CM

## 2021-05-26 DIAGNOSIS — I95.9 HYPOTENSION: ICD-10-CM

## 2021-05-26 DIAGNOSIS — K56.609 SMALL BOWEL OBSTRUCTION: Primary | ICD-10-CM

## 2021-05-26 DIAGNOSIS — E87.20 LACTIC ACIDEMIA: Primary | ICD-10-CM

## 2021-05-26 DIAGNOSIS — A41.9 SEPSIS, DUE TO UNSPECIFIED ORGANISM, UNSPECIFIED WHETHER ACUTE ORGAN DYSFUNCTION PRESENT: ICD-10-CM

## 2021-05-26 LAB
ALBUMIN SERPL BCP-MCNC: 2.4 G/DL (ref 3.5–5.2)
ALBUMIN SERPL BCP-MCNC: 2.6 G/DL (ref 3.5–5.2)
ALBUMIN SERPL BCP-MCNC: 3 G/DL (ref 3.5–5.2)
ALLENS TEST: ABNORMAL
ALP SERPL-CCNC: 110 U/L (ref 55–135)
ALP SERPL-CCNC: 124 U/L (ref 55–135)
ALP SERPL-CCNC: 137 U/L (ref 55–135)
ALT SERPL W/O P-5'-P-CCNC: 31 U/L (ref 10–44)
ALT SERPL W/O P-5'-P-CCNC: 35 U/L (ref 10–44)
ALT SERPL W/O P-5'-P-CCNC: 40 U/L (ref 10–44)
AMORPH CRY URNS QL MICRO: ABNORMAL
ANION GAP SERPL CALC-SCNC: 10 MMOL/L (ref 8–16)
ANION GAP SERPL CALC-SCNC: 10 MMOL/L (ref 8–16)
ANION GAP SERPL CALC-SCNC: 12 MMOL/L (ref 8–16)
AST SERPL-CCNC: 20 U/L (ref 10–40)
AST SERPL-CCNC: 23 U/L (ref 10–40)
AST SERPL-CCNC: 28 U/L (ref 10–40)
BACTERIA #/AREA URNS HPF: ABNORMAL /HPF
BASOPHILS # BLD AUTO: 0.02 K/UL (ref 0–0.2)
BASOPHILS # BLD AUTO: 0.02 K/UL (ref 0–0.2)
BASOPHILS # BLD AUTO: 0.03 K/UL (ref 0–0.2)
BASOPHILS NFR BLD: 0.1 % (ref 0–1.9)
BASOPHILS NFR BLD: 0.2 % (ref 0–1.9)
BASOPHILS NFR BLD: 0.3 % (ref 0–1.9)
BILIRUB SERPL-MCNC: 0.4 MG/DL (ref 0.1–1)
BILIRUB SERPL-MCNC: 0.6 MG/DL (ref 0.1–1)
BILIRUB SERPL-MCNC: 0.6 MG/DL (ref 0.1–1)
BILIRUB UR QL STRIP: NEGATIVE
BNP SERPL-MCNC: 209 PG/ML (ref 0–99)
BUN SERPL-MCNC: 14 MG/DL (ref 6–30)
BUN SERPL-MCNC: 14 MG/DL (ref 8–23)
BUN SERPL-MCNC: 15 MG/DL (ref 8–23)
BUN SERPL-MCNC: 20 MG/DL (ref 8–23)
CALCIUM SERPL-MCNC: 7.7 MG/DL (ref 8.7–10.5)
CALCIUM SERPL-MCNC: 7.7 MG/DL (ref 8.7–10.5)
CALCIUM SERPL-MCNC: 7.8 MG/DL (ref 8.7–10.5)
CHLORIDE SERPL-SCNC: 105 MMOL/L (ref 95–110)
CHLORIDE SERPL-SCNC: 106 MMOL/L (ref 95–110)
CHLORIDE SERPL-SCNC: 108 MMOL/L (ref 95–110)
CHLORIDE SERPL-SCNC: 111 MMOL/L (ref 95–110)
CLARITY UR: CLEAR
CO2 SERPL-SCNC: 21 MMOL/L (ref 23–29)
CO2 SERPL-SCNC: 23 MMOL/L (ref 23–29)
CO2 SERPL-SCNC: 24 MMOL/L (ref 23–29)
COLOR UR: YELLOW
CREAT SERPL-MCNC: 1 MG/DL (ref 0.5–1.4)
CREAT SERPL-MCNC: 1.2 MG/DL (ref 0.5–1.4)
CREAT SERPL-MCNC: 1.3 MG/DL (ref 0.5–1.4)
CREAT SERPL-MCNC: 1.4 MG/DL (ref 0.5–1.4)
D DIMER PPP IA.FEU-MCNC: 4.53 MG/L FEU
DELSYS: ABNORMAL
DIFFERENTIAL METHOD: ABNORMAL
EOSINOPHIL # BLD AUTO: 0 K/UL (ref 0–0.5)
EOSINOPHIL NFR BLD: 0 % (ref 0–8)
EOSINOPHIL NFR BLD: 0.1 % (ref 0–8)
EOSINOPHIL NFR BLD: 0.3 % (ref 0–8)
ERYTHROCYTE [DISTWIDTH] IN BLOOD BY AUTOMATED COUNT: 12.9 % (ref 11.5–14.5)
ERYTHROCYTE [DISTWIDTH] IN BLOOD BY AUTOMATED COUNT: 13.1 % (ref 11.5–14.5)
ERYTHROCYTE [DISTWIDTH] IN BLOOD BY AUTOMATED COUNT: 13.2 % (ref 11.5–14.5)
EST. GFR  (AFRICAN AMERICAN): >60 ML/MIN/1.73 M^2
EST. GFR  (NON AFRICAN AMERICAN): 52 ML/MIN/1.73 M^2
EST. GFR  (NON AFRICAN AMERICAN): >60 ML/MIN/1.73 M^2
EST. GFR  (NON AFRICAN AMERICAN): >60 ML/MIN/1.73 M^2
FIO2: 21
GLUCOSE SERPL-MCNC: 101 MG/DL (ref 70–110)
GLUCOSE SERPL-MCNC: 102 MG/DL (ref 70–110)
GLUCOSE SERPL-MCNC: 111 MG/DL (ref 70–110)
GLUCOSE SERPL-MCNC: 98 MG/DL (ref 70–110)
GLUCOSE UR QL STRIP: NEGATIVE
HCO3 UR-SCNC: 15.3 MMOL/L (ref 24–28)
HCT VFR BLD AUTO: 25 % (ref 40–54)
HCT VFR BLD AUTO: 26 % (ref 40–54)
HCT VFR BLD AUTO: 27.6 % (ref 40–54)
HCT VFR BLD CALC: 22 %PCV (ref 36–54)
HGB BLD-MCNC: 8.2 G/DL (ref 14–18)
HGB BLD-MCNC: 8.4 G/DL (ref 14–18)
HGB BLD-MCNC: 9.4 G/DL (ref 14–18)
HGB UR QL STRIP: ABNORMAL
HYALINE CASTS #/AREA URNS LPF: 2 /LPF
IMM GRANULOCYTES # BLD AUTO: 0.04 K/UL (ref 0–0.04)
IMM GRANULOCYTES # BLD AUTO: 0.05 K/UL (ref 0–0.04)
IMM GRANULOCYTES # BLD AUTO: 0.1 K/UL (ref 0–0.04)
IMM GRANULOCYTES NFR BLD AUTO: 0.5 % (ref 0–0.5)
IMM GRANULOCYTES NFR BLD AUTO: 0.6 % (ref 0–0.5)
IMM GRANULOCYTES NFR BLD AUTO: 0.7 % (ref 0–0.5)
KETONES UR QL STRIP: NEGATIVE
LACTATE SERPL-SCNC: 0.7 MMOL/L (ref 0.5–2.2)
LACTATE SERPL-SCNC: 1.1 MMOL/L (ref 0.5–2.2)
LACTATE SERPL-SCNC: 2.1 MMOL/L (ref 0.5–2.2)
LACTATE SERPL-SCNC: 9 MMOL/L (ref 0.5–2.2)
LEUKOCYTE ESTERASE UR QL STRIP: NEGATIVE
LYMPHOCYTES # BLD AUTO: 0.3 K/UL (ref 1–4.8)
LYMPHOCYTES # BLD AUTO: 0.3 K/UL (ref 1–4.8)
LYMPHOCYTES # BLD AUTO: 0.5 K/UL (ref 1–4.8)
LYMPHOCYTES NFR BLD: 2.5 % (ref 18–48)
LYMPHOCYTES NFR BLD: 3.8 % (ref 18–48)
LYMPHOCYTES NFR BLD: 5.6 % (ref 18–48)
MCH RBC QN AUTO: 30.7 PG (ref 27–31)
MCH RBC QN AUTO: 31.2 PG (ref 27–31)
MCH RBC QN AUTO: 31.4 PG (ref 27–31)
MCHC RBC AUTO-ENTMCNC: 31.5 G/DL (ref 32–36)
MCHC RBC AUTO-ENTMCNC: 33.6 G/DL (ref 32–36)
MCHC RBC AUTO-ENTMCNC: 34.1 G/DL (ref 32–36)
MCV RBC AUTO: 92 FL (ref 82–98)
MCV RBC AUTO: 93 FL (ref 82–98)
MCV RBC AUTO: 97 FL (ref 82–98)
MICROSCOPIC COMMENT: ABNORMAL
MODE: ABNORMAL
MONOCYTES # BLD AUTO: 0.1 K/UL (ref 0.3–1)
MONOCYTES # BLD AUTO: 0.3 K/UL (ref 0.3–1)
MONOCYTES # BLD AUTO: 0.5 K/UL (ref 0.3–1)
MONOCYTES NFR BLD: 0.9 % (ref 4–15)
MONOCYTES NFR BLD: 3.4 % (ref 4–15)
MONOCYTES NFR BLD: 5.3 % (ref 4–15)
NEUTROPHILS # BLD AUTO: 13.3 K/UL (ref 1.8–7.7)
NEUTROPHILS # BLD AUTO: 7.5 K/UL (ref 1.8–7.7)
NEUTROPHILS # BLD AUTO: 7.7 K/UL (ref 1.8–7.7)
NEUTROPHILS NFR BLD: 87.9 % (ref 38–73)
NEUTROPHILS NFR BLD: 92.1 % (ref 38–73)
NEUTROPHILS NFR BLD: 95.7 % (ref 38–73)
NITRITE UR QL STRIP: NEGATIVE
NRBC BLD-RTO: 0 /100 WBC
PCO2 BLDA: 25.1 MMHG (ref 35–45)
PH SMN: 7.39 [PH] (ref 7.35–7.45)
PH UR STRIP: 7 [PH] (ref 5–8)
PLATELET # BLD AUTO: 198 K/UL (ref 150–450)
PLATELET # BLD AUTO: 211 K/UL (ref 150–450)
PLATELET # BLD AUTO: 273 K/UL (ref 150–450)
PMV BLD AUTO: 8.7 FL (ref 9.2–12.9)
PMV BLD AUTO: 9 FL (ref 9.2–12.9)
PMV BLD AUTO: 9.5 FL (ref 9.2–12.9)
PO2 BLDA: 76 MMHG (ref 80–100)
POC BE: -10 MMOL/L
POC IONIZED CALCIUM: 1.05 MMOL/L (ref 1.06–1.42)
POC SATURATED O2: 95 % (ref 95–100)
POC TCO2 (MEASURED): 22 MMOL/L (ref 23–29)
POC TCO2: 16 MMOL/L (ref 23–27)
POTASSIUM BLD-SCNC: 3.5 MMOL/L (ref 3.5–5.1)
POTASSIUM SERPL-SCNC: 3.5 MMOL/L (ref 3.5–5.1)
POTASSIUM SERPL-SCNC: 3.5 MMOL/L (ref 3.5–5.1)
POTASSIUM SERPL-SCNC: 3.6 MMOL/L (ref 3.5–5.1)
PROT SERPL-MCNC: 5.8 G/DL (ref 6–8.4)
PROT SERPL-MCNC: 6 G/DL (ref 6–8.4)
PROT SERPL-MCNC: 6.4 G/DL (ref 6–8.4)
PROT UR QL STRIP: ABNORMAL
RBC # BLD AUTO: 2.67 M/UL (ref 4.6–6.2)
RBC # BLD AUTO: 2.69 M/UL (ref 4.6–6.2)
RBC # BLD AUTO: 2.99 M/UL (ref 4.6–6.2)
RBC #/AREA URNS HPF: 5 /HPF (ref 0–4)
SAMPLE: ABNORMAL
SAMPLE: ABNORMAL
SARS-COV-2 RDRP RESP QL NAA+PROBE: NEGATIVE
SITE: ABNORMAL
SODIUM BLD-SCNC: 143 MMOL/L (ref 136–145)
SODIUM SERPL-SCNC: 141 MMOL/L (ref 136–145)
SODIUM SERPL-SCNC: 141 MMOL/L (ref 136–145)
SODIUM SERPL-SCNC: 142 MMOL/L (ref 136–145)
SP GR UR STRIP: 1.01 (ref 1–1.03)
SQUAMOUS #/AREA URNS HPF: 7 /HPF
URN SPEC COLLECT METH UR: ABNORMAL
UROBILINOGEN UR STRIP-ACNC: NEGATIVE EU/DL
WBC # BLD AUTO: 13.86 K/UL (ref 3.9–12.7)
WBC # BLD AUTO: 8.17 K/UL (ref 3.9–12.7)
WBC # BLD AUTO: 8.72 K/UL (ref 3.9–12.7)
WBC #/AREA URNS HPF: 3 /HPF (ref 0–5)

## 2021-05-26 PROCEDURE — 85025 COMPLETE CBC W/AUTO DIFF WBC: CPT | Mod: 91 | Performed by: EMERGENCY MEDICINE

## 2021-05-26 PROCEDURE — 96374 THER/PROPH/DIAG INJ IV PUSH: CPT

## 2021-05-26 PROCEDURE — 83605 ASSAY OF LACTIC ACID: CPT | Mod: 91 | Performed by: EMERGENCY MEDICINE

## 2021-05-26 PROCEDURE — 74019 RADEX ABDOMEN 2 VIEWS: CPT | Mod: TC,FY

## 2021-05-26 PROCEDURE — 99285 PR EMERGENCY DEPT VISIT,LEVEL V: ICD-10-PCS | Mod: ,,, | Performed by: EMERGENCY MEDICINE

## 2021-05-26 PROCEDURE — 74176 CT ABD & PELVIS W/O CONTRAST: CPT | Mod: TC

## 2021-05-26 PROCEDURE — 80053 COMPREHEN METABOLIC PANEL: CPT | Mod: 91 | Performed by: EMERGENCY MEDICINE

## 2021-05-26 PROCEDURE — 83880 ASSAY OF NATRIURETIC PEPTIDE: CPT | Performed by: EMERGENCY MEDICINE

## 2021-05-26 PROCEDURE — 82803 BLOOD GASES ANY COMBINATION: CPT

## 2021-05-26 PROCEDURE — 96375 TX/PRO/DX INJ NEW DRUG ADDON: CPT

## 2021-05-26 PROCEDURE — 99285 EMERGENCY DEPT VISIT HI MDM: CPT | Mod: ,,, | Performed by: EMERGENCY MEDICINE

## 2021-05-26 PROCEDURE — 96367 TX/PROPH/DG ADDL SEQ IV INF: CPT

## 2021-05-26 PROCEDURE — 74176 CT ABD & PELVIS W/O CONTRAST: CPT | Mod: 26,76,, | Performed by: RADIOLOGY

## 2021-05-26 PROCEDURE — 20600001 HC STEP DOWN PRIVATE ROOM

## 2021-05-26 PROCEDURE — 74176 CT ABD & PELVIS W/O CONTRAST: CPT | Mod: 26,,, | Performed by: RADIOLOGY

## 2021-05-26 PROCEDURE — 25000003 PHARM REV CODE 250: Performed by: SURGERY

## 2021-05-26 PROCEDURE — 74176 CT ABDOMEN PELVIS WITHOUT CONTRAST: ICD-10-PCS | Mod: 26,76,, | Performed by: RADIOLOGY

## 2021-05-26 PROCEDURE — U0002 COVID-19 LAB TEST NON-CDC: HCPCS | Performed by: EMERGENCY MEDICINE

## 2021-05-26 PROCEDURE — 96372 THER/PROPH/DIAG INJ SC/IM: CPT | Mod: 59

## 2021-05-26 PROCEDURE — 63600175 PHARM REV CODE 636 W HCPCS: Performed by: EMERGENCY MEDICINE

## 2021-05-26 PROCEDURE — 99900035 HC TECH TIME PER 15 MIN (STAT)

## 2021-05-26 PROCEDURE — 87186 SC STD MICRODIL/AGAR DIL: CPT | Performed by: EMERGENCY MEDICINE

## 2021-05-26 PROCEDURE — 83605 ASSAY OF LACTIC ACID: CPT | Performed by: EMERGENCY MEDICINE

## 2021-05-26 PROCEDURE — 87040 BLOOD CULTURE FOR BACTERIA: CPT | Performed by: EMERGENCY MEDICINE

## 2021-05-26 PROCEDURE — 99292 CRITICAL CARE ADDL 30 MIN: CPT | Mod: 59

## 2021-05-26 PROCEDURE — 96366 THER/PROPH/DIAG IV INF ADDON: CPT

## 2021-05-26 PROCEDURE — 85379 FIBRIN DEGRADATION QUANT: CPT | Performed by: EMERGENCY MEDICINE

## 2021-05-26 PROCEDURE — 25000003 PHARM REV CODE 250: Performed by: EMERGENCY MEDICINE

## 2021-05-26 PROCEDURE — 99291 CRITICAL CARE FIRST HOUR: CPT | Mod: 25

## 2021-05-26 PROCEDURE — 86803 HEPATITIS C AB TEST: CPT | Performed by: EMERGENCY MEDICINE

## 2021-05-26 PROCEDURE — 99285 EMERGENCY DEPT VISIT HI MDM: CPT | Mod: 25

## 2021-05-26 PROCEDURE — 80053 COMPREHEN METABOLIC PANEL: CPT | Performed by: EMERGENCY MEDICINE

## 2021-05-26 PROCEDURE — 27000221 HC OXYGEN, UP TO 24 HOURS

## 2021-05-26 PROCEDURE — 87077 CULTURE AEROBIC IDENTIFY: CPT | Performed by: EMERGENCY MEDICINE

## 2021-05-26 PROCEDURE — 74019 RADEX ABDOMEN 2 VIEWS: CPT | Mod: 26,,, | Performed by: RADIOLOGY

## 2021-05-26 PROCEDURE — 81000 URINALYSIS NONAUTO W/SCOPE: CPT | Performed by: EMERGENCY MEDICINE

## 2021-05-26 PROCEDURE — 93005 ELECTROCARDIOGRAM TRACING: CPT

## 2021-05-26 PROCEDURE — 71045 X-RAY EXAM CHEST 1 VIEW: CPT | Mod: 26,,, | Performed by: RADIOLOGY

## 2021-05-26 PROCEDURE — 96365 THER/PROPH/DIAG IV INF INIT: CPT

## 2021-05-26 PROCEDURE — 63600175 PHARM REV CODE 636 W HCPCS: Performed by: SURGERY

## 2021-05-26 PROCEDURE — 80047 BASIC METABLC PNL IONIZED CA: CPT

## 2021-05-26 PROCEDURE — 85025 COMPLETE CBC W/AUTO DIFF WBC: CPT | Performed by: EMERGENCY MEDICINE

## 2021-05-26 PROCEDURE — 71045 X-RAY EXAM CHEST 1 VIEW: CPT | Mod: TC,FY

## 2021-05-26 PROCEDURE — 96361 HYDRATE IV INFUSION ADD-ON: CPT

## 2021-05-26 PROCEDURE — 71045 XR CHEST AP PORTABLE: ICD-10-PCS | Mod: 26,,, | Performed by: RADIOLOGY

## 2021-05-26 PROCEDURE — 74019 XR ABDOMEN FLAT AND ERECT: ICD-10-PCS | Mod: 26,,, | Performed by: RADIOLOGY

## 2021-05-26 PROCEDURE — 36600 WITHDRAWAL OF ARTERIAL BLOOD: CPT

## 2021-05-26 PROCEDURE — 25500020 PHARM REV CODE 255: Performed by: EMERGENCY MEDICINE

## 2021-05-26 RX ORDER — NOREPINEPHRINE BITARTRATE/D5W 4MG/250ML
0.05 PLASTIC BAG, INJECTION (ML) INTRAVENOUS CONTINUOUS
Status: DISCONTINUED | OUTPATIENT
Start: 2021-05-26 | End: 2021-05-26

## 2021-05-26 RX ORDER — ACETAMINOPHEN 325 MG/1
650 TABLET ORAL EVERY 4 HOURS PRN
Status: DISCONTINUED | OUTPATIENT
Start: 2021-05-26 | End: 2021-05-31 | Stop reason: HOSPADM

## 2021-05-26 RX ORDER — OXYCODONE HYDROCHLORIDE 10 MG/1
10 TABLET ORAL EVERY 4 HOURS PRN
Status: DISCONTINUED | OUTPATIENT
Start: 2021-05-26 | End: 2021-05-31 | Stop reason: HOSPADM

## 2021-05-26 RX ORDER — OXYCODONE HYDROCHLORIDE 5 MG/1
5 TABLET ORAL EVERY 4 HOURS PRN
Status: DISCONTINUED | OUTPATIENT
Start: 2021-05-26 | End: 2021-05-31 | Stop reason: HOSPADM

## 2021-05-26 RX ORDER — ACETAMINOPHEN 325 MG/1
650 TABLET ORAL
Status: COMPLETED | OUTPATIENT
Start: 2021-05-26 | End: 2021-05-26

## 2021-05-26 RX ORDER — VANCOMYCIN HCL IN 5 % DEXTROSE 1G/250ML
1000 PLASTIC BAG, INJECTION (ML) INTRAVENOUS
Status: COMPLETED | OUTPATIENT
Start: 2021-05-26 | End: 2021-05-26

## 2021-05-26 RX ORDER — METOCLOPRAMIDE HYDROCHLORIDE 5 MG/ML
10 INJECTION INTRAMUSCULAR; INTRAVENOUS EVERY 6 HOURS PRN
Status: DISCONTINUED | OUTPATIENT
Start: 2021-05-26 | End: 2021-05-31 | Stop reason: HOSPADM

## 2021-05-26 RX ORDER — LORAZEPAM 2 MG/ML
1 INJECTION INTRAMUSCULAR
Status: COMPLETED | OUTPATIENT
Start: 2021-05-26 | End: 2021-05-26

## 2021-05-26 RX ORDER — DEXTROSE MONOHYDRATE, SODIUM CHLORIDE, AND POTASSIUM CHLORIDE 50; 1.49; 4.5 G/1000ML; G/1000ML; G/1000ML
INJECTION, SOLUTION INTRAVENOUS CONTINUOUS
Status: DISCONTINUED | OUTPATIENT
Start: 2021-05-26 | End: 2021-05-31 | Stop reason: HOSPADM

## 2021-05-26 RX ORDER — PIPERACILLIN SODIUM, TAZOBACTAM SODIUM 3; .375 G/15ML; G/15ML
3.38 INJECTION, POWDER, LYOPHILIZED, FOR SOLUTION INTRAVENOUS
Status: DISCONTINUED | OUTPATIENT
Start: 2021-05-26 | End: 2021-05-26

## 2021-05-26 RX ORDER — ACETAMINOPHEN 325 MG/1
650 TABLET ORAL EVERY 8 HOURS PRN
Status: DISCONTINUED | OUTPATIENT
Start: 2021-05-26 | End: 2021-05-29

## 2021-05-26 RX ORDER — PIPERACILLIN SODIUM, TAZOBACTAM SODIUM 3; .375 G/15ML; G/15ML
3.38 INJECTION, POWDER, LYOPHILIZED, FOR SOLUTION INTRAVENOUS
Status: COMPLETED | OUTPATIENT
Start: 2021-05-26 | End: 2021-05-26

## 2021-05-26 RX ORDER — TALC
6 POWDER (GRAM) TOPICAL NIGHTLY PRN
Status: DISCONTINUED | OUTPATIENT
Start: 2021-05-26 | End: 2021-05-31 | Stop reason: HOSPADM

## 2021-05-26 RX ORDER — ENOXAPARIN SODIUM 100 MG/ML
40 INJECTION SUBCUTANEOUS EVERY 24 HOURS
Status: DISCONTINUED | OUTPATIENT
Start: 2021-05-26 | End: 2021-05-31 | Stop reason: HOSPADM

## 2021-05-26 RX ORDER — LIDOCAINE HYDROCHLORIDE 10 MG/ML
1 INJECTION, SOLUTION EPIDURAL; INFILTRATION; INTRACAUDAL; PERINEURAL ONCE
Status: DISCONTINUED | OUTPATIENT
Start: 2021-05-26 | End: 2021-05-29

## 2021-05-26 RX ORDER — ONDANSETRON 2 MG/ML
4 INJECTION INTRAMUSCULAR; INTRAVENOUS EVERY 6 HOURS PRN
Status: DISCONTINUED | OUTPATIENT
Start: 2021-05-26 | End: 2021-05-31 | Stop reason: HOSPADM

## 2021-05-26 RX ORDER — HYDROMORPHONE HYDROCHLORIDE 1 MG/ML
1 INJECTION, SOLUTION INTRAMUSCULAR; INTRAVENOUS; SUBCUTANEOUS
Status: COMPLETED | OUTPATIENT
Start: 2021-05-26 | End: 2021-05-26

## 2021-05-26 RX ORDER — SODIUM CHLORIDE 9 MG/ML
INJECTION, SOLUTION INTRAVENOUS
Status: COMPLETED | OUTPATIENT
Start: 2021-05-26 | End: 2021-05-26

## 2021-05-26 RX ORDER — SODIUM CHLORIDE 0.9 % (FLUSH) 0.9 %
10 SYRINGE (ML) INJECTION
Status: DISCONTINUED | OUTPATIENT
Start: 2021-05-26 | End: 2021-05-31 | Stop reason: HOSPADM

## 2021-05-26 RX ADMIN — LORAZEPAM 1 MG: 2 INJECTION INTRAMUSCULAR; INTRAVENOUS at 04:05

## 2021-05-26 RX ADMIN — IOHEXOL 75 ML: 350 INJECTION, SOLUTION INTRAVENOUS at 11:05

## 2021-05-26 RX ADMIN — SODIUM CHLORIDE: 0.9 INJECTION, SOLUTION INTRAVENOUS at 05:05

## 2021-05-26 RX ADMIN — SODIUM CHLORIDE: 0.9 INJECTION, SOLUTION INTRAVENOUS at 06:05

## 2021-05-26 RX ADMIN — SODIUM CHLORIDE 500 ML: 0.9 INJECTION, SOLUTION INTRAVENOUS at 01:05

## 2021-05-26 RX ADMIN — SODIUM CHLORIDE 500 ML: 0.9 INJECTION, SOLUTION INTRAVENOUS at 05:05

## 2021-05-26 RX ADMIN — PIPERACILLIN AND TAZOBACTAM 3.38 G: 3; .375 INJECTION, POWDER, FOR SOLUTION INTRAVENOUS at 03:05

## 2021-05-26 RX ADMIN — Medication 0.05 MCG/KG/MIN: at 08:05

## 2021-05-26 RX ADMIN — VANCOMYCIN HYDROCHLORIDE 1000 MG: 1 INJECTION, POWDER, LYOPHILIZED, FOR SOLUTION INTRAVENOUS at 03:05

## 2021-05-26 RX ADMIN — ACETAMINOPHEN 650 MG: 325 TABLET ORAL at 02:05

## 2021-05-26 RX ADMIN — ENOXAPARIN SODIUM 40 MG: 40 INJECTION SUBCUTANEOUS at 10:05

## 2021-05-26 RX ADMIN — PIPERACILLIN SODIUM AND TAZOBACTAM SODIUM 3.38 G: 3; .375 INJECTION, POWDER, FOR SOLUTION INTRAVENOUS at 06:05

## 2021-05-26 RX ADMIN — POTASSIUM CHLORIDE, DEXTROSE MONOHYDRATE AND SODIUM CHLORIDE: 150; 5; 450 INJECTION, SOLUTION INTRAVENOUS at 10:05

## 2021-05-26 RX ADMIN — SODIUM CHLORIDE 500 ML: 0.9 INJECTION, SOLUTION INTRAVENOUS at 03:05

## 2021-05-26 RX ADMIN — HYDROMORPHONE HYDROCHLORIDE 1 MG: 1 INJECTION, SOLUTION INTRAMUSCULAR; INTRAVENOUS; SUBCUTANEOUS at 08:05

## 2021-05-27 PROBLEM — R00.0 TACHYCARDIA: Status: ACTIVE | Noted: 2021-05-27

## 2021-05-27 PROBLEM — K56.7 ILEUS: Status: ACTIVE | Noted: 2021-05-27

## 2021-05-27 PROBLEM — R10.9 ABDOMINAL PAIN: Chronic | Status: ACTIVE | Noted: 2021-05-26

## 2021-05-27 PROBLEM — I95.9 HYPOTENSION: Status: ACTIVE | Noted: 2021-05-27

## 2021-05-27 PROBLEM — D72.829 LEUKOCYTOSIS: Status: ACTIVE | Noted: 2021-05-27

## 2021-05-27 PROBLEM — K56.7 ILEUS: Chronic | Status: ACTIVE | Noted: 2021-05-27

## 2021-05-27 LAB
ANION GAP SERPL CALC-SCNC: 11 MMOL/L (ref 8–16)
ASCENDING AORTA: 3.19 CM
AV INDEX (PROSTH): 0.97
AV MEAN GRADIENT: 4 MMHG
AV PEAK GRADIENT: 8 MMHG
AV VALVE AREA: 3.79 CM2
AV VELOCITY RATIO: 0.9
BASOPHILS # BLD AUTO: 0.02 K/UL (ref 0–0.2)
BASOPHILS NFR BLD: 0.3 % (ref 0–1.9)
BSA FOR ECHO PROCEDURE: 1.87 M2
BUN SERPL-MCNC: 12 MG/DL (ref 8–23)
CALCIUM SERPL-MCNC: 7.9 MG/DL (ref 8.7–10.5)
CHLORIDE SERPL-SCNC: 110 MMOL/L (ref 95–110)
CO2 SERPL-SCNC: 23 MMOL/L (ref 23–29)
CREAT SERPL-MCNC: 1.2 MG/DL (ref 0.5–1.4)
CV ECHO LV RWT: 0.33 CM
DIFFERENTIAL METHOD: ABNORMAL
DOP CALC AO PEAK VEL: 1.45 M/S
DOP CALC AO VTI: 26.6 CM
DOP CALC LVOT AREA: 3.9 CM2
DOP CALC LVOT DIAMETER: 2.23 CM
DOP CALC LVOT PEAK VEL: 1.31 M/S
DOP CALC LVOT STROKE VOLUME: 100.79 CM3
DOP CALCLVOT PEAK VEL VTI: 25.82 CM
E WAVE DECELERATION TIME: 189.07 MSEC
E/A RATIO: 1.38
E/E' RATIO: 8.42 M/S
ECHO LV POSTERIOR WALL: 0.76 CM (ref 0.6–1.1)
EJECTION FRACTION: 60 %
EOSINOPHIL # BLD AUTO: 0.1 K/UL (ref 0–0.5)
EOSINOPHIL NFR BLD: 0.9 % (ref 0–8)
ERYTHROCYTE [DISTWIDTH] IN BLOOD BY AUTOMATED COUNT: 13.3 % (ref 11.5–14.5)
EST. GFR  (AFRICAN AMERICAN): >60 ML/MIN/1.73 M^2
EST. GFR  (NON AFRICAN AMERICAN): >60 ML/MIN/1.73 M^2
FRACTIONAL SHORTENING: 35 % (ref 28–44)
GLUCOSE SERPL-MCNC: 102 MG/DL (ref 70–110)
HCT VFR BLD AUTO: 26.4 % (ref 40–54)
HGB BLD-MCNC: 8.1 G/DL (ref 14–18)
IMM GRANULOCYTES # BLD AUTO: 0.04 K/UL (ref 0–0.04)
IMM GRANULOCYTES NFR BLD AUTO: 0.6 % (ref 0–0.5)
INTERVENTRICULAR SEPTUM: 0.71 CM (ref 0.6–1.1)
LA MAJOR: 4.56 CM
LA MINOR: 4.54 CM
LA WIDTH: 3.63 CM
LEFT ATRIUM SIZE: 3.04 CM
LEFT ATRIUM VOLUME INDEX MOD: 25.1 ML/M2
LEFT ATRIUM VOLUME INDEX: 22.7 ML/M2
LEFT ATRIUM VOLUME MOD: 47.22 CM3
LEFT ATRIUM VOLUME: 42.68 CM3
LEFT INTERNAL DIMENSION IN SYSTOLE: 3.04 CM (ref 2.1–4)
LEFT VENTRICLE DIASTOLIC VOLUME INDEX: 53.66 ML/M2
LEFT VENTRICLE DIASTOLIC VOLUME: 100.88 ML
LEFT VENTRICLE MASS INDEX: 58 G/M2
LEFT VENTRICLE SYSTOLIC VOLUME INDEX: 19.3 ML/M2
LEFT VENTRICLE SYSTOLIC VOLUME: 36.23 ML
LEFT VENTRICULAR INTERNAL DIMENSION IN DIASTOLE: 4.67 CM (ref 3.5–6)
LEFT VENTRICULAR MASS: 108.45 G
LV LATERAL E/E' RATIO: 8 M/S
LV SEPTAL E/E' RATIO: 8.89 M/S
LYMPHOCYTES # BLD AUTO: 0.7 K/UL (ref 1–4.8)
LYMPHOCYTES NFR BLD: 11.1 % (ref 18–48)
MCH RBC QN AUTO: 30.6 PG (ref 27–31)
MCHC RBC AUTO-ENTMCNC: 30.7 G/DL (ref 32–36)
MCV RBC AUTO: 100 FL (ref 82–98)
MONOCYTES # BLD AUTO: 0.8 K/UL (ref 0.3–1)
MONOCYTES NFR BLD: 11.4 % (ref 4–15)
MV PEAK A VEL: 0.58 M/S
MV PEAK E VEL: 0.8 M/S
MV STENOSIS PRESSURE HALF TIME: 54.83 MS
MV VALVE AREA P 1/2 METHOD: 4.01 CM2
NEUTROPHILS # BLD AUTO: 5 K/UL (ref 1.8–7.7)
NEUTROPHILS NFR BLD: 75.7 % (ref 38–73)
NRBC BLD-RTO: 0 /100 WBC
PISA TR MAX VEL: 2.63 M/S
PLATELET # BLD AUTO: 196 K/UL (ref 150–450)
PMV BLD AUTO: 9.8 FL (ref 9.2–12.9)
POTASSIUM SERPL-SCNC: 3.9 MMOL/L (ref 3.5–5.1)
RA MAJOR: 4.8 CM
RA WIDTH: 3.78 CM
RBC # BLD AUTO: 2.65 M/UL (ref 4.6–6.2)
RIGHT VENTRICULAR END-DIASTOLIC DIMENSION: 2.54 CM
RV TISSUE DOPPLER FREE WALL SYSTOLIC VELOCITY 1 (APICAL 4 CHAMBER VIEW): 14.36 CM/S
SINUS: 3.31 CM
SODIUM SERPL-SCNC: 144 MMOL/L (ref 136–145)
STJ: 2.64 CM
TDI LATERAL: 0.1 M/S
TDI SEPTAL: 0.09 M/S
TDI: 0.1 M/S
TR MAX PG: 28 MMHG
TRICUSPID ANNULAR PLANE SYSTOLIC EXCURSION: 1.62 CM
WBC # BLD AUTO: 6.66 K/UL (ref 3.9–12.7)

## 2021-05-27 PROCEDURE — 85025 COMPLETE CBC W/AUTO DIFF WBC: CPT | Performed by: SURGERY

## 2021-05-27 PROCEDURE — 36415 COLL VENOUS BLD VENIPUNCTURE: CPT | Performed by: SURGERY

## 2021-05-27 PROCEDURE — 80048 BASIC METABOLIC PNL TOTAL CA: CPT | Performed by: SURGERY

## 2021-05-27 PROCEDURE — 25000003 PHARM REV CODE 250: Performed by: SURGERY

## 2021-05-27 PROCEDURE — 25000242 PHARM REV CODE 250 ALT 637 W/ HCPCS: Performed by: NURSE PRACTITIONER

## 2021-05-27 PROCEDURE — 20600001 HC STEP DOWN PRIVATE ROOM

## 2021-05-27 PROCEDURE — 63600175 PHARM REV CODE 636 W HCPCS: Performed by: SURGERY

## 2021-05-27 RX ORDER — LOPERAMIDE HYDROCHLORIDE 2 MG/1
2 CAPSULE ORAL 4 TIMES DAILY
Status: DISCONTINUED | OUTPATIENT
Start: 2021-05-27 | End: 2021-05-31 | Stop reason: HOSPADM

## 2021-05-27 RX ORDER — SODIUM CHLORIDE 9 MG/ML
INJECTION, SOLUTION INTRAVENOUS
Status: DISCONTINUED | OUTPATIENT
Start: 2021-05-27 | End: 2021-05-31 | Stop reason: HOSPADM

## 2021-05-27 RX ADMIN — ENOXAPARIN SODIUM 40 MG: 40 INJECTION SUBCUTANEOUS at 04:05

## 2021-05-27 RX ADMIN — LOPERAMIDE HYDROCHLORIDE 2 MG: 2 CAPSULE ORAL at 05:05

## 2021-05-27 RX ADMIN — POTASSIUM CHLORIDE, DEXTROSE MONOHYDRATE AND SODIUM CHLORIDE: 150; 5; 450 INJECTION, SOLUTION INTRAVENOUS at 05:05

## 2021-05-27 RX ADMIN — PSYLLIUM HUSK 1 PACKET: 3.4 POWDER ORAL at 03:05

## 2021-05-27 RX ADMIN — PIPERACILLIN SODIUM AND TAZOBACTAM SODIUM 4.5 G: 4; .5 INJECTION, POWDER, FOR SOLUTION INTRAVENOUS at 08:05

## 2021-05-27 RX ADMIN — POTASSIUM CHLORIDE, DEXTROSE MONOHYDRATE AND SODIUM CHLORIDE: 150; 5; 450 INJECTION, SOLUTION INTRAVENOUS at 12:05

## 2021-05-27 RX ADMIN — LOPERAMIDE HYDROCHLORIDE 2 MG: 2 CAPSULE ORAL at 02:05

## 2021-05-27 RX ADMIN — PIPERACILLIN SODIUM AND TAZOBACTAM SODIUM 4.5 G: 4; .5 INJECTION, POWDER, FOR SOLUTION INTRAVENOUS at 01:05

## 2021-05-27 RX ADMIN — OXYCODONE HYDROCHLORIDE 10 MG: 10 TABLET ORAL at 08:05

## 2021-05-27 RX ADMIN — LOPERAMIDE HYDROCHLORIDE 2 MG: 2 CAPSULE ORAL at 08:05

## 2021-05-27 RX ADMIN — OXYCODONE HYDROCHLORIDE 10 MG: 10 TABLET ORAL at 12:05

## 2021-05-27 RX ADMIN — PIPERACILLIN SODIUM AND TAZOBACTAM SODIUM 4.5 G: 4; .5 INJECTION, POWDER, FOR SOLUTION INTRAVENOUS at 04:05

## 2021-05-27 RX ADMIN — OXYCODONE 5 MG: 5 TABLET ORAL at 04:05

## 2021-05-27 RX ADMIN — SODIUM CHLORIDE: 0.9 INJECTION, SOLUTION INTRAVENOUS at 01:05

## 2021-05-28 ENCOUNTER — ANESTHESIA (OUTPATIENT)
Dept: SURGERY | Facility: HOSPITAL | Age: 66
DRG: 389 | End: 2021-05-28
Payer: MEDICARE

## 2021-05-28 ENCOUNTER — ANESTHESIA EVENT (OUTPATIENT)
Dept: SURGERY | Facility: HOSPITAL | Age: 66
DRG: 389 | End: 2021-05-28
Payer: MEDICARE

## 2021-05-28 LAB
ANION GAP SERPL CALC-SCNC: 9 MMOL/L (ref 8–16)
BASOPHILS # BLD AUTO: 0.02 K/UL (ref 0–0.2)
BASOPHILS NFR BLD: 0.4 % (ref 0–1.9)
BUN SERPL-MCNC: 6 MG/DL (ref 8–23)
CALCIUM SERPL-MCNC: 8.3 MG/DL (ref 8.7–10.5)
CHLORIDE SERPL-SCNC: 107 MMOL/L (ref 95–110)
CO2 SERPL-SCNC: 23 MMOL/L (ref 23–29)
CREAT SERPL-MCNC: 1.1 MG/DL (ref 0.5–1.4)
DIFFERENTIAL METHOD: ABNORMAL
EOSINOPHIL # BLD AUTO: 0.1 K/UL (ref 0–0.5)
EOSINOPHIL NFR BLD: 2 % (ref 0–8)
ERYTHROCYTE [DISTWIDTH] IN BLOOD BY AUTOMATED COUNT: 12.9 % (ref 11.5–14.5)
EST. GFR  (AFRICAN AMERICAN): >60 ML/MIN/1.73 M^2
EST. GFR  (NON AFRICAN AMERICAN): >60 ML/MIN/1.73 M^2
GLUCOSE SERPL-MCNC: 96 MG/DL (ref 70–110)
GRAM STN SPEC: NORMAL
GRAM STN SPEC: NORMAL
HCT VFR BLD AUTO: 25.1 % (ref 40–54)
HCV AB SERPL QL IA: NEGATIVE
HGB BLD-MCNC: 8.1 G/DL (ref 14–18)
IMM GRANULOCYTES # BLD AUTO: 0.02 K/UL (ref 0–0.04)
IMM GRANULOCYTES NFR BLD AUTO: 0.4 % (ref 0–0.5)
LYMPHOCYTES # BLD AUTO: 0.8 K/UL (ref 1–4.8)
LYMPHOCYTES NFR BLD: 14.2 % (ref 18–48)
MCH RBC QN AUTO: 31.3 PG (ref 27–31)
MCHC RBC AUTO-ENTMCNC: 32.3 G/DL (ref 32–36)
MCV RBC AUTO: 97 FL (ref 82–98)
MONOCYTES # BLD AUTO: 0.5 K/UL (ref 0.3–1)
MONOCYTES NFR BLD: 8.4 % (ref 4–15)
NEUTROPHILS # BLD AUTO: 4.2 K/UL (ref 1.8–7.7)
NEUTROPHILS NFR BLD: 74.6 % (ref 38–73)
NRBC BLD-RTO: 0 /100 WBC
PLATELET # BLD AUTO: 213 K/UL (ref 150–450)
PMV BLD AUTO: 9.6 FL (ref 9.2–12.9)
POTASSIUM SERPL-SCNC: 3.6 MMOL/L (ref 3.5–5.1)
RBC # BLD AUTO: 2.59 M/UL (ref 4.6–6.2)
SODIUM SERPL-SCNC: 139 MMOL/L (ref 136–145)
WBC # BLD AUTO: 5.62 K/UL (ref 3.9–12.7)

## 2021-05-28 PROCEDURE — 20600001 HC STEP DOWN PRIVATE ROOM

## 2021-05-28 PROCEDURE — 85025 COMPLETE CBC W/AUTO DIFF WBC: CPT | Performed by: SURGERY

## 2021-05-28 PROCEDURE — 87075 CULTR BACTERIA EXCEPT BLOOD: CPT | Performed by: COLON & RECTAL SURGERY

## 2021-05-28 PROCEDURE — 36000705 HC OR TIME LEV I EA ADD 15 MIN: Performed by: COLON & RECTAL SURGERY

## 2021-05-28 PROCEDURE — 36590 REMOVAL TUNNELED CV CATH: CPT | Mod: 79,,, | Performed by: COLON & RECTAL SURGERY

## 2021-05-28 PROCEDURE — 25000003 PHARM REV CODE 250: Performed by: NURSE PRACTITIONER

## 2021-05-28 PROCEDURE — 63600175 PHARM REV CODE 636 W HCPCS: Performed by: NURSE ANESTHETIST, CERTIFIED REGISTERED

## 2021-05-28 PROCEDURE — 37000009 HC ANESTHESIA EA ADD 15 MINS: Performed by: COLON & RECTAL SURGERY

## 2021-05-28 PROCEDURE — 36000704 HC OR TIME LEV I 1ST 15 MIN: Performed by: COLON & RECTAL SURGERY

## 2021-05-28 PROCEDURE — D9220A PRA ANESTHESIA: ICD-10-PCS | Mod: CRNA,,, | Performed by: NURSE ANESTHETIST, CERTIFIED REGISTERED

## 2021-05-28 PROCEDURE — 25000003 PHARM REV CODE 250: Performed by: SURGERY

## 2021-05-28 PROCEDURE — 25000003 PHARM REV CODE 250: Performed by: COLON & RECTAL SURGERY

## 2021-05-28 PROCEDURE — 25000242 PHARM REV CODE 250 ALT 637 W/ HCPCS: Performed by: NURSE PRACTITIONER

## 2021-05-28 PROCEDURE — 87070 CULTURE OTHR SPECIMN AEROBIC: CPT | Performed by: COLON & RECTAL SURGERY

## 2021-05-28 PROCEDURE — 71000033 HC RECOVERY, INTIAL HOUR: Performed by: COLON & RECTAL SURGERY

## 2021-05-28 PROCEDURE — 63600175 PHARM REV CODE 636 W HCPCS: Performed by: SURGERY

## 2021-05-28 PROCEDURE — D9220A PRA ANESTHESIA: Mod: ANES,,, | Performed by: ANESTHESIOLOGY

## 2021-05-28 PROCEDURE — D9220A PRA ANESTHESIA: ICD-10-PCS | Mod: ANES,,, | Performed by: ANESTHESIOLOGY

## 2021-05-28 PROCEDURE — 80048 BASIC METABOLIC PNL TOTAL CA: CPT | Performed by: SURGERY

## 2021-05-28 PROCEDURE — 36590 PR REMOVAL TUNNELED CV CATH W SUBQ PORT OR PUMP: ICD-10-PCS | Mod: 79,,, | Performed by: COLON & RECTAL SURGERY

## 2021-05-28 PROCEDURE — 87102 FUNGUS ISOLATION CULTURE: CPT | Performed by: COLON & RECTAL SURGERY

## 2021-05-28 PROCEDURE — 87205 SMEAR GRAM STAIN: CPT | Performed by: COLON & RECTAL SURGERY

## 2021-05-28 PROCEDURE — 25000003 PHARM REV CODE 250: Performed by: NURSE ANESTHETIST, CERTIFIED REGISTERED

## 2021-05-28 PROCEDURE — D9220A PRA ANESTHESIA: Mod: CRNA,,, | Performed by: NURSE ANESTHETIST, CERTIFIED REGISTERED

## 2021-05-28 PROCEDURE — 37000008 HC ANESTHESIA 1ST 15 MINUTES: Performed by: COLON & RECTAL SURGERY

## 2021-05-28 RX ORDER — MIDAZOLAM HYDROCHLORIDE 1 MG/ML
INJECTION, SOLUTION INTRAMUSCULAR; INTRAVENOUS
Status: DISCONTINUED | OUTPATIENT
Start: 2021-05-28 | End: 2021-05-28

## 2021-05-28 RX ORDER — LIDOCAINE HYDROCHLORIDE 10 MG/ML
INJECTION INFILTRATION; PERINEURAL
Status: DISCONTINUED | OUTPATIENT
Start: 2021-05-28 | End: 2021-05-28 | Stop reason: HOSPADM

## 2021-05-28 RX ORDER — LIDOCAINE HYDROCHLORIDE 20 MG/ML
INJECTION, SOLUTION EPIDURAL; INFILTRATION; INTRACAUDAL; PERINEURAL
Status: DISCONTINUED | OUTPATIENT
Start: 2021-05-28 | End: 2021-05-28

## 2021-05-28 RX ORDER — PHENYLEPHRINE HCL IN 0.9% NACL 1 MG/10 ML
SYRINGE (ML) INTRAVENOUS
Status: DISCONTINUED | OUTPATIENT
Start: 2021-05-28 | End: 2021-05-28

## 2021-05-28 RX ORDER — FENTANYL CITRATE 50 UG/ML
INJECTION, SOLUTION INTRAMUSCULAR; INTRAVENOUS
Status: DISCONTINUED | OUTPATIENT
Start: 2021-05-28 | End: 2021-05-28

## 2021-05-28 RX ORDER — PROPOFOL 10 MG/ML
VIAL (ML) INTRAVENOUS
Status: DISCONTINUED | OUTPATIENT
Start: 2021-05-28 | End: 2021-05-28

## 2021-05-28 RX ORDER — PROPOFOL 10 MG/ML
VIAL (ML) INTRAVENOUS CONTINUOUS PRN
Status: DISCONTINUED | OUTPATIENT
Start: 2021-05-28 | End: 2021-05-28

## 2021-05-28 RX ADMIN — PIPERACILLIN SODIUM AND TAZOBACTAM SODIUM 4.5 G: 4; .5 INJECTION, POWDER, FOR SOLUTION INTRAVENOUS at 05:05

## 2021-05-28 RX ADMIN — LOPERAMIDE HYDROCHLORIDE 2 MG: 2 CAPSULE ORAL at 03:05

## 2021-05-28 RX ADMIN — ACETAMINOPHEN 650 MG: 325 TABLET ORAL at 02:05

## 2021-05-28 RX ADMIN — MIDAZOLAM 2 MG: 1 INJECTION INTRAMUSCULAR; INTRAVENOUS at 01:05

## 2021-05-28 RX ADMIN — Medication 200 MCG: at 01:05

## 2021-05-28 RX ADMIN — LIDOCAINE HYDROCHLORIDE 40 MG: 20 INJECTION, SOLUTION EPIDURAL; INFILTRATION; INTRACAUDAL at 01:05

## 2021-05-28 RX ADMIN — PIPERACILLIN SODIUM AND TAZOBACTAM SODIUM 4.5 G: 4; .5 INJECTION, POWDER, FOR SOLUTION INTRAVENOUS at 02:05

## 2021-05-28 RX ADMIN — ENOXAPARIN SODIUM 40 MG: 40 INJECTION SUBCUTANEOUS at 05:05

## 2021-05-28 RX ADMIN — OXYCODONE HYDROCHLORIDE 10 MG: 10 TABLET ORAL at 06:05

## 2021-05-28 RX ADMIN — LOPERAMIDE HYDROCHLORIDE 2 MG: 2 CAPSULE ORAL at 09:05

## 2021-05-28 RX ADMIN — PROPOFOL 30 MG: 10 INJECTION, EMULSION INTRAVENOUS at 01:05

## 2021-05-28 RX ADMIN — OXYCODONE HYDROCHLORIDE 10 MG: 10 TABLET ORAL at 03:05

## 2021-05-28 RX ADMIN — PSYLLIUM HUSK 1 PACKET: 3.4 POWDER ORAL at 03:05

## 2021-05-28 RX ADMIN — OXYCODONE HYDROCHLORIDE 10 MG: 10 TABLET ORAL at 09:05

## 2021-05-28 RX ADMIN — Medication 150 MCG/KG/MIN: at 01:05

## 2021-05-28 RX ADMIN — POTASSIUM CHLORIDE, DEXTROSE MONOHYDRATE AND SODIUM CHLORIDE: 150; 5; 450 INJECTION, SOLUTION INTRAVENOUS at 11:05

## 2021-05-28 RX ADMIN — ONDANSETRON 4 MG: 2 INJECTION INTRAMUSCULAR; INTRAVENOUS at 01:05

## 2021-05-28 RX ADMIN — PIPERACILLIN SODIUM AND TAZOBACTAM SODIUM 4.5 G: 4; .5 INJECTION, POWDER, FOR SOLUTION INTRAVENOUS at 09:05

## 2021-05-28 RX ADMIN — PROPOFOL 10 MG: 10 INJECTION, EMULSION INTRAVENOUS at 01:05

## 2021-05-28 RX ADMIN — SODIUM CHLORIDE: 0.9 INJECTION, SOLUTION INTRAVENOUS at 01:05

## 2021-05-28 RX ADMIN — ONDANSETRON 4 MG: 2 INJECTION INTRAMUSCULAR; INTRAVENOUS at 07:05

## 2021-05-28 RX ADMIN — FENTANYL CITRATE 25 MCG: 50 INJECTION, SOLUTION INTRAMUSCULAR; INTRAVENOUS at 01:05

## 2021-05-28 RX ADMIN — GLYCOPYRROLATE 0.1 MCG: 0.2 INJECTION, SOLUTION INTRAMUSCULAR; INTRAVITREAL at 01:05

## 2021-05-28 RX ADMIN — POTASSIUM CHLORIDE, DEXTROSE MONOHYDRATE AND SODIUM CHLORIDE: 150; 5; 450 INJECTION, SOLUTION INTRAVENOUS at 04:05

## 2021-05-28 RX ADMIN — Medication 100 MCG: at 01:05

## 2021-05-29 LAB
ANION GAP SERPL CALC-SCNC: 9 MMOL/L (ref 8–16)
BASOPHILS # BLD AUTO: 0.02 K/UL (ref 0–0.2)
BASOPHILS NFR BLD: 0.4 % (ref 0–1.9)
BUN SERPL-MCNC: 7 MG/DL (ref 8–23)
CALCIUM SERPL-MCNC: 8.1 MG/DL (ref 8.7–10.5)
CHLORIDE SERPL-SCNC: 106 MMOL/L (ref 95–110)
CO2 SERPL-SCNC: 23 MMOL/L (ref 23–29)
CREAT SERPL-MCNC: 1.2 MG/DL (ref 0.5–1.4)
DIFFERENTIAL METHOD: ABNORMAL
EOSINOPHIL # BLD AUTO: 0.1 K/UL (ref 0–0.5)
EOSINOPHIL NFR BLD: 2 % (ref 0–8)
ERYTHROCYTE [DISTWIDTH] IN BLOOD BY AUTOMATED COUNT: 12.8 % (ref 11.5–14.5)
EST. GFR  (AFRICAN AMERICAN): >60 ML/MIN/1.73 M^2
EST. GFR  (NON AFRICAN AMERICAN): >60 ML/MIN/1.73 M^2
GLUCOSE SERPL-MCNC: 97 MG/DL (ref 70–110)
HCT VFR BLD AUTO: 25.2 % (ref 40–54)
HGB BLD-MCNC: 8.2 G/DL (ref 14–18)
IMM GRANULOCYTES # BLD AUTO: 0.01 K/UL (ref 0–0.04)
IMM GRANULOCYTES NFR BLD AUTO: 0.2 % (ref 0–0.5)
LYMPHOCYTES # BLD AUTO: 1.1 K/UL (ref 1–4.8)
LYMPHOCYTES NFR BLD: 22.9 % (ref 18–48)
MCH RBC QN AUTO: 31.7 PG (ref 27–31)
MCHC RBC AUTO-ENTMCNC: 32.5 G/DL (ref 32–36)
MCV RBC AUTO: 97 FL (ref 82–98)
MONOCYTES # BLD AUTO: 0.6 K/UL (ref 0.3–1)
MONOCYTES NFR BLD: 11.8 % (ref 4–15)
NEUTROPHILS # BLD AUTO: 3.1 K/UL (ref 1.8–7.7)
NEUTROPHILS NFR BLD: 62.7 % (ref 38–73)
NRBC BLD-RTO: 0 /100 WBC
PLATELET # BLD AUTO: 191 K/UL (ref 150–450)
PMV BLD AUTO: 9.7 FL (ref 9.2–12.9)
POTASSIUM SERPL-SCNC: 4.1 MMOL/L (ref 3.5–5.1)
RBC # BLD AUTO: 2.59 M/UL (ref 4.6–6.2)
SODIUM SERPL-SCNC: 138 MMOL/L (ref 136–145)
WBC # BLD AUTO: 4.9 K/UL (ref 3.9–12.7)

## 2021-05-29 PROCEDURE — 25000003 PHARM REV CODE 250: Performed by: SURGERY

## 2021-05-29 PROCEDURE — 36415 COLL VENOUS BLD VENIPUNCTURE: CPT | Performed by: STUDENT IN AN ORGANIZED HEALTH CARE EDUCATION/TRAINING PROGRAM

## 2021-05-29 PROCEDURE — 80048 BASIC METABOLIC PNL TOTAL CA: CPT | Performed by: SURGERY

## 2021-05-29 PROCEDURE — 25000003 PHARM REV CODE 250: Performed by: NURSE PRACTITIONER

## 2021-05-29 PROCEDURE — 85025 COMPLETE CBC W/AUTO DIFF WBC: CPT | Performed by: SURGERY

## 2021-05-29 PROCEDURE — 63600175 PHARM REV CODE 636 W HCPCS: Performed by: SURGERY

## 2021-05-29 PROCEDURE — 20600001 HC STEP DOWN PRIVATE ROOM

## 2021-05-29 PROCEDURE — 87040 BLOOD CULTURE FOR BACTERIA: CPT | Mod: 59 | Performed by: STUDENT IN AN ORGANIZED HEALTH CARE EDUCATION/TRAINING PROGRAM

## 2021-05-29 PROCEDURE — 36415 COLL VENOUS BLD VENIPUNCTURE: CPT | Performed by: SURGERY

## 2021-05-29 RX ADMIN — OXYCODONE HYDROCHLORIDE 10 MG: 10 TABLET ORAL at 02:05

## 2021-05-29 RX ADMIN — ONDANSETRON 4 MG: 2 INJECTION INTRAMUSCULAR; INTRAVENOUS at 07:05

## 2021-05-29 RX ADMIN — PIPERACILLIN SODIUM AND TAZOBACTAM SODIUM 4.5 G: 4; .5 INJECTION, POWDER, FOR SOLUTION INTRAVENOUS at 08:05

## 2021-05-29 RX ADMIN — LOPERAMIDE HYDROCHLORIDE 2 MG: 2 CAPSULE ORAL at 08:05

## 2021-05-29 RX ADMIN — OXYCODONE HYDROCHLORIDE 10 MG: 10 TABLET ORAL at 07:05

## 2021-05-29 RX ADMIN — LOPERAMIDE HYDROCHLORIDE 2 MG: 2 CAPSULE ORAL at 06:05

## 2021-05-29 RX ADMIN — OXYCODONE HYDROCHLORIDE 10 MG: 10 TABLET ORAL at 08:05

## 2021-05-29 RX ADMIN — POTASSIUM CHLORIDE, DEXTROSE MONOHYDRATE AND SODIUM CHLORIDE: 150; 5; 450 INJECTION, SOLUTION INTRAVENOUS at 07:05

## 2021-05-29 RX ADMIN — PIPERACILLIN SODIUM AND TAZOBACTAM SODIUM 4.5 G: 4; .5 INJECTION, POWDER, FOR SOLUTION INTRAVENOUS at 06:05

## 2021-05-29 RX ADMIN — ENOXAPARIN SODIUM 40 MG: 40 INJECTION SUBCUTANEOUS at 06:05

## 2021-05-29 RX ADMIN — OXYCODONE HYDROCHLORIDE 10 MG: 10 TABLET ORAL at 03:05

## 2021-05-29 RX ADMIN — LOPERAMIDE HYDROCHLORIDE 2 MG: 2 CAPSULE ORAL at 02:05

## 2021-05-29 RX ADMIN — PIPERACILLIN SODIUM AND TAZOBACTAM SODIUM 4.5 G: 4; .5 INJECTION, POWDER, FOR SOLUTION INTRAVENOUS at 02:05

## 2021-05-30 LAB
ANION GAP SERPL CALC-SCNC: 12 MMOL/L (ref 8–16)
BACTERIA BLD CULT: ABNORMAL
BASOPHILS # BLD AUTO: 0.02 K/UL (ref 0–0.2)
BASOPHILS NFR BLD: 0.4 % (ref 0–1.9)
BUN SERPL-MCNC: 5 MG/DL (ref 8–23)
CALCIUM SERPL-MCNC: 9 MG/DL (ref 8.7–10.5)
CHLORIDE SERPL-SCNC: 103 MMOL/L (ref 95–110)
CO2 SERPL-SCNC: 23 MMOL/L (ref 23–29)
CREAT SERPL-MCNC: 1.2 MG/DL (ref 0.5–1.4)
DIFFERENTIAL METHOD: ABNORMAL
EOSINOPHIL # BLD AUTO: 0.2 K/UL (ref 0–0.5)
EOSINOPHIL NFR BLD: 2.7 % (ref 0–8)
ERYTHROCYTE [DISTWIDTH] IN BLOOD BY AUTOMATED COUNT: 12.7 % (ref 11.5–14.5)
EST. GFR  (AFRICAN AMERICAN): >60 ML/MIN/1.73 M^2
EST. GFR  (NON AFRICAN AMERICAN): >60 ML/MIN/1.73 M^2
GLUCOSE SERPL-MCNC: 120 MG/DL (ref 70–110)
HCT VFR BLD AUTO: 30.1 % (ref 40–54)
HGB BLD-MCNC: 9.8 G/DL (ref 14–18)
IMM GRANULOCYTES # BLD AUTO: 0.04 K/UL (ref 0–0.04)
IMM GRANULOCYTES NFR BLD AUTO: 0.7 % (ref 0–0.5)
LYMPHOCYTES # BLD AUTO: 1.4 K/UL (ref 1–4.8)
LYMPHOCYTES NFR BLD: 25.5 % (ref 18–48)
MCH RBC QN AUTO: 30.7 PG (ref 27–31)
MCHC RBC AUTO-ENTMCNC: 32.6 G/DL (ref 32–36)
MCV RBC AUTO: 94 FL (ref 82–98)
MONOCYTES # BLD AUTO: 0.5 K/UL (ref 0.3–1)
MONOCYTES NFR BLD: 8.8 % (ref 4–15)
NEUTROPHILS # BLD AUTO: 3.5 K/UL (ref 1.8–7.7)
NEUTROPHILS NFR BLD: 61.9 % (ref 38–73)
NRBC BLD-RTO: 0 /100 WBC
PLATELET # BLD AUTO: 251 K/UL (ref 150–450)
PMV BLD AUTO: 9.3 FL (ref 9.2–12.9)
POTASSIUM SERPL-SCNC: 3.4 MMOL/L (ref 3.5–5.1)
RBC # BLD AUTO: 3.19 M/UL (ref 4.6–6.2)
SODIUM SERPL-SCNC: 138 MMOL/L (ref 136–145)
WBC # BLD AUTO: 5.57 K/UL (ref 3.9–12.7)

## 2021-05-30 PROCEDURE — 63600175 PHARM REV CODE 636 W HCPCS: Performed by: SURGERY

## 2021-05-30 PROCEDURE — 25000003 PHARM REV CODE 250: Performed by: STUDENT IN AN ORGANIZED HEALTH CARE EDUCATION/TRAINING PROGRAM

## 2021-05-30 PROCEDURE — 25000003 PHARM REV CODE 250: Performed by: NURSE PRACTITIONER

## 2021-05-30 PROCEDURE — 80048 BASIC METABOLIC PNL TOTAL CA: CPT | Performed by: SURGERY

## 2021-05-30 PROCEDURE — 87040 BLOOD CULTURE FOR BACTERIA: CPT | Performed by: STUDENT IN AN ORGANIZED HEALTH CARE EDUCATION/TRAINING PROGRAM

## 2021-05-30 PROCEDURE — 25000003 PHARM REV CODE 250: Performed by: SURGERY

## 2021-05-30 PROCEDURE — 20600001 HC STEP DOWN PRIVATE ROOM

## 2021-05-30 PROCEDURE — 85025 COMPLETE CBC W/AUTO DIFF WBC: CPT | Performed by: SURGERY

## 2021-05-30 PROCEDURE — 25000242 PHARM REV CODE 250 ALT 637 W/ HCPCS: Performed by: NURSE PRACTITIONER

## 2021-05-30 PROCEDURE — 36415 COLL VENOUS BLD VENIPUNCTURE: CPT | Performed by: STUDENT IN AN ORGANIZED HEALTH CARE EDUCATION/TRAINING PROGRAM

## 2021-05-30 PROCEDURE — 36415 COLL VENOUS BLD VENIPUNCTURE: CPT | Performed by: SURGERY

## 2021-05-30 RX ORDER — POTASSIUM CHLORIDE 20 MEQ/1
40 TABLET, EXTENDED RELEASE ORAL ONCE
Status: COMPLETED | OUTPATIENT
Start: 2021-05-30 | End: 2021-05-30

## 2021-05-30 RX ADMIN — OXYCODONE HYDROCHLORIDE 10 MG: 10 TABLET ORAL at 05:05

## 2021-05-30 RX ADMIN — ENOXAPARIN SODIUM 40 MG: 40 INJECTION SUBCUTANEOUS at 04:05

## 2021-05-30 RX ADMIN — OXYCODONE HYDROCHLORIDE 10 MG: 10 TABLET ORAL at 09:05

## 2021-05-30 RX ADMIN — OXYCODONE HYDROCHLORIDE 10 MG: 10 TABLET ORAL at 12:05

## 2021-05-30 RX ADMIN — LOPERAMIDE HYDROCHLORIDE 2 MG: 2 CAPSULE ORAL at 09:05

## 2021-05-30 RX ADMIN — PSYLLIUM HUSK 1 PACKET: 3.4 POWDER ORAL at 09:05

## 2021-05-30 RX ADMIN — ONDANSETRON 4 MG: 2 INJECTION INTRAMUSCULAR; INTRAVENOUS at 09:05

## 2021-05-30 RX ADMIN — LOPERAMIDE HYDROCHLORIDE 2 MG: 2 CAPSULE ORAL at 12:05

## 2021-05-30 RX ADMIN — LOPERAMIDE HYDROCHLORIDE 2 MG: 2 CAPSULE ORAL at 04:05

## 2021-05-30 RX ADMIN — METOCLOPRAMIDE 10 MG: 5 INJECTION, SOLUTION INTRAMUSCULAR; INTRAVENOUS at 12:05

## 2021-05-30 RX ADMIN — PIPERACILLIN SODIUM AND TAZOBACTAM SODIUM 4.5 G: 4; .5 INJECTION, POWDER, FOR SOLUTION INTRAVENOUS at 04:05

## 2021-05-30 RX ADMIN — PIPERACILLIN SODIUM AND TAZOBACTAM SODIUM 4.5 G: 4; .5 INJECTION, POWDER, FOR SOLUTION INTRAVENOUS at 12:05

## 2021-05-30 RX ADMIN — PIPERACILLIN SODIUM AND TAZOBACTAM SODIUM 4.5 G: 4; .5 INJECTION, POWDER, FOR SOLUTION INTRAVENOUS at 09:05

## 2021-05-30 RX ADMIN — ONDANSETRON 4 MG: 2 INJECTION INTRAMUSCULAR; INTRAVENOUS at 06:05

## 2021-05-30 RX ADMIN — POTASSIUM CHLORIDE 40 MEQ: 1500 TABLET, EXTENDED RELEASE ORAL at 12:05

## 2021-05-30 RX ADMIN — POTASSIUM CHLORIDE, DEXTROSE MONOHYDRATE AND SODIUM CHLORIDE: 150; 5; 450 INJECTION, SOLUTION INTRAVENOUS at 12:05

## 2021-05-30 RX ADMIN — OXYCODONE HYDROCHLORIDE 10 MG: 10 TABLET ORAL at 04:05

## 2021-05-31 VITALS
RESPIRATION RATE: 16 BRPM | TEMPERATURE: 98 F | HEART RATE: 94 BPM | OXYGEN SATURATION: 95 % | BODY MASS INDEX: 22.33 KG/M2 | WEIGHT: 156 LBS | DIASTOLIC BLOOD PRESSURE: 56 MMHG | SYSTOLIC BLOOD PRESSURE: 112 MMHG | HEIGHT: 70 IN

## 2021-05-31 PROBLEM — K56.7 ILEUS: Chronic | Status: RESOLVED | Noted: 2021-05-27 | Resolved: 2021-05-31

## 2021-05-31 PROBLEM — R00.0 TACHYCARDIA: Status: RESOLVED | Noted: 2021-05-27 | Resolved: 2021-05-31

## 2021-05-31 LAB
ANION GAP SERPL CALC-SCNC: 12 MMOL/L (ref 8–16)
BACTERIA BLD CULT: NORMAL
BACTERIA SPEC AEROBE CULT: NO GROWTH
BASOPHILS # BLD AUTO: 0.04 K/UL (ref 0–0.2)
BASOPHILS NFR BLD: 0.5 % (ref 0–1.9)
BUN SERPL-MCNC: 4 MG/DL (ref 8–23)
CALCIUM SERPL-MCNC: 9.2 MG/DL (ref 8.7–10.5)
CHLORIDE SERPL-SCNC: 104 MMOL/L (ref 95–110)
CO2 SERPL-SCNC: 22 MMOL/L (ref 23–29)
CREAT SERPL-MCNC: 1.1 MG/DL (ref 0.5–1.4)
DIFFERENTIAL METHOD: ABNORMAL
EOSINOPHIL # BLD AUTO: 0.1 K/UL (ref 0–0.5)
EOSINOPHIL NFR BLD: 1.6 % (ref 0–8)
ERYTHROCYTE [DISTWIDTH] IN BLOOD BY AUTOMATED COUNT: 12.8 % (ref 11.5–14.5)
EST. GFR  (AFRICAN AMERICAN): >60 ML/MIN/1.73 M^2
EST. GFR  (NON AFRICAN AMERICAN): >60 ML/MIN/1.73 M^2
GLUCOSE SERPL-MCNC: 98 MG/DL (ref 70–110)
HCT VFR BLD AUTO: 29.4 % (ref 40–54)
HGB BLD-MCNC: 9.5 G/DL (ref 14–18)
IMM GRANULOCYTES # BLD AUTO: 0.07 K/UL (ref 0–0.04)
IMM GRANULOCYTES NFR BLD AUTO: 0.9 % (ref 0–0.5)
LYMPHOCYTES # BLD AUTO: 1.7 K/UL (ref 1–4.8)
LYMPHOCYTES NFR BLD: 22.4 % (ref 18–48)
MCH RBC QN AUTO: 31 PG (ref 27–31)
MCHC RBC AUTO-ENTMCNC: 32.3 G/DL (ref 32–36)
MCV RBC AUTO: 96 FL (ref 82–98)
MONOCYTES # BLD AUTO: 0.6 K/UL (ref 0.3–1)
MONOCYTES NFR BLD: 7.5 % (ref 4–15)
NEUTROPHILS # BLD AUTO: 5 K/UL (ref 1.8–7.7)
NEUTROPHILS NFR BLD: 67.1 % (ref 38–73)
NRBC BLD-RTO: 0 /100 WBC
PLATELET # BLD AUTO: 318 K/UL (ref 150–450)
PMV BLD AUTO: 9.4 FL (ref 9.2–12.9)
POTASSIUM SERPL-SCNC: 4 MMOL/L (ref 3.5–5.1)
RBC # BLD AUTO: 3.06 M/UL (ref 4.6–6.2)
SODIUM SERPL-SCNC: 138 MMOL/L (ref 136–145)
WBC # BLD AUTO: 7.38 K/UL (ref 3.9–12.7)

## 2021-05-31 PROCEDURE — 25000003 PHARM REV CODE 250: Performed by: COLON & RECTAL SURGERY

## 2021-05-31 PROCEDURE — A4216 STERILE WATER/SALINE, 10 ML: HCPCS | Performed by: COLON & RECTAL SURGERY

## 2021-05-31 PROCEDURE — 85025 COMPLETE CBC W/AUTO DIFF WBC: CPT | Performed by: SURGERY

## 2021-05-31 PROCEDURE — 25000003 PHARM REV CODE 250: Performed by: SURGERY

## 2021-05-31 PROCEDURE — 80048 BASIC METABOLIC PNL TOTAL CA: CPT | Performed by: SURGERY

## 2021-05-31 PROCEDURE — 63600175 PHARM REV CODE 636 W HCPCS: Performed by: SURGERY

## 2021-05-31 PROCEDURE — 36573 INSJ PICC RS&I 5 YR+: CPT

## 2021-05-31 PROCEDURE — 25000003 PHARM REV CODE 250: Performed by: NURSE PRACTITIONER

## 2021-05-31 PROCEDURE — C1751 CATH, INF, PER/CENT/MIDLINE: HCPCS

## 2021-05-31 PROCEDURE — 25000242 PHARM REV CODE 250 ALT 637 W/ HCPCS: Performed by: NURSE PRACTITIONER

## 2021-05-31 PROCEDURE — 76937 US GUIDE VASCULAR ACCESS: CPT

## 2021-05-31 PROCEDURE — 36415 COLL VENOUS BLD VENIPUNCTURE: CPT | Performed by: SURGERY

## 2021-05-31 RX ORDER — SODIUM CHLORIDE 0.9 % (FLUSH) 0.9 %
10 SYRINGE (ML) INJECTION EVERY 6 HOURS
Status: DISCONTINUED | OUTPATIENT
Start: 2021-05-31 | End: 2021-05-31 | Stop reason: HOSPADM

## 2021-05-31 RX ORDER — OXYCODONE HYDROCHLORIDE 10 MG/1
10 TABLET ORAL EVERY 4 HOURS PRN
Qty: 20 TABLET | Refills: 0 | Status: SHIPPED | OUTPATIENT
Start: 2021-05-31 | End: 2021-08-10

## 2021-05-31 RX ORDER — SODIUM CHLORIDE 0.9 % (FLUSH) 0.9 %
10 SYRINGE (ML) INJECTION
Status: DISCONTINUED | OUTPATIENT
Start: 2021-05-31 | End: 2021-05-31 | Stop reason: HOSPADM

## 2021-05-31 RX ADMIN — LOPERAMIDE HYDROCHLORIDE 2 MG: 2 CAPSULE ORAL at 12:05

## 2021-05-31 RX ADMIN — ENOXAPARIN SODIUM 40 MG: 40 INJECTION SUBCUTANEOUS at 05:05

## 2021-05-31 RX ADMIN — OXYCODONE HYDROCHLORIDE 10 MG: 10 TABLET ORAL at 09:05

## 2021-05-31 RX ADMIN — LOPERAMIDE HYDROCHLORIDE 2 MG: 2 CAPSULE ORAL at 05:05

## 2021-05-31 RX ADMIN — PIPERACILLIN SODIUM AND TAZOBACTAM SODIUM 4.5 G: 4; .5 INJECTION, POWDER, FOR SOLUTION INTRAVENOUS at 12:05

## 2021-05-31 RX ADMIN — OXYCODONE HYDROCHLORIDE 10 MG: 10 TABLET ORAL at 04:05

## 2021-05-31 RX ADMIN — LOPERAMIDE HYDROCHLORIDE 2 MG: 2 CAPSULE ORAL at 09:05

## 2021-05-31 RX ADMIN — PIPERACILLIN SODIUM AND TAZOBACTAM SODIUM 4.5 G: 4; .5 INJECTION, POWDER, FOR SOLUTION INTRAVENOUS at 05:05

## 2021-05-31 RX ADMIN — PIPERACILLIN SODIUM AND TAZOBACTAM SODIUM 4.5 G: 4; .5 INJECTION, POWDER, FOR SOLUTION INTRAVENOUS at 09:05

## 2021-05-31 RX ADMIN — Medication 10 ML: at 05:05

## 2021-05-31 RX ADMIN — PSYLLIUM HUSK 1 PACKET: 3.4 POWDER ORAL at 10:05

## 2021-05-31 RX ADMIN — POTASSIUM CHLORIDE, DEXTROSE MONOHYDRATE AND SODIUM CHLORIDE: 150; 5; 450 INJECTION, SOLUTION INTRAVENOUS at 04:05

## 2021-05-31 RX ADMIN — OXYCODONE HYDROCHLORIDE 10 MG: 10 TABLET ORAL at 03:05

## 2021-06-01 ENCOUNTER — PATIENT MESSAGE (OUTPATIENT)
Dept: SURGERY | Facility: CLINIC | Age: 66
End: 2021-06-01

## 2021-06-01 PROBLEM — R78.81 BACTEREMIA: Status: ACTIVE | Noted: 2021-06-01

## 2021-06-03 LAB
BACTERIA BLD CULT: NORMAL
BACTERIA BLD CULT: NORMAL

## 2021-06-04 ENCOUNTER — TELEPHONE (OUTPATIENT)
Dept: SURGERY | Facility: CLINIC | Age: 66
End: 2021-06-04

## 2021-06-04 LAB
BACTERIA BLD CULT: NORMAL
BACTERIA BLD CULT: NORMAL

## 2021-06-07 ENCOUNTER — TELEPHONE (OUTPATIENT)
Dept: SURGERY | Facility: CLINIC | Age: 66
End: 2021-06-07

## 2021-06-07 ENCOUNTER — OFFICE VISIT (OUTPATIENT)
Dept: FAMILY MEDICINE | Facility: CLINIC | Age: 66
End: 2021-06-07
Payer: MEDICARE

## 2021-06-07 VITALS
WEIGHT: 149 LBS | SYSTOLIC BLOOD PRESSURE: 98 MMHG | OXYGEN SATURATION: 97 % | TEMPERATURE: 98 F | DIASTOLIC BLOOD PRESSURE: 70 MMHG | RESPIRATION RATE: 16 BRPM | HEIGHT: 70 IN | HEART RATE: 81 BPM | BODY MASS INDEX: 21.33 KG/M2

## 2021-06-07 DIAGNOSIS — C20 RECTAL CANCER: Primary | ICD-10-CM

## 2021-06-07 DIAGNOSIS — K56.609 SBO (SMALL BOWEL OBSTRUCTION): ICD-10-CM

## 2021-06-07 DIAGNOSIS — Z93.2 ILEOSTOMY IN PLACE: ICD-10-CM

## 2021-06-07 LAB — BACTERIA SPEC ANAEROBE CULT: NORMAL

## 2021-06-07 PROCEDURE — 99213 PR OFFICE/OUTPT VISIT, EST, LEVL III, 20-29 MIN: ICD-10-PCS | Mod: S$GLB,,, | Performed by: NURSE PRACTITIONER

## 2021-06-07 PROCEDURE — 1101F PR PT FALLS ASSESS DOC 0-1 FALLS W/OUT INJ PAST YR: ICD-10-PCS | Mod: CPTII,S$GLB,, | Performed by: NURSE PRACTITIONER

## 2021-06-07 PROCEDURE — 1111F PR DISCHARGE MEDS RECONCILED W/ CURRENT OUTPATIENT MED LIST: ICD-10-PCS | Mod: CPTII,S$GLB,, | Performed by: NURSE PRACTITIONER

## 2021-06-07 PROCEDURE — 3008F BODY MASS INDEX DOCD: CPT | Mod: CPTII,S$GLB,, | Performed by: NURSE PRACTITIONER

## 2021-06-07 PROCEDURE — 1101F PT FALLS ASSESS-DOCD LE1/YR: CPT | Mod: CPTII,S$GLB,, | Performed by: NURSE PRACTITIONER

## 2021-06-07 PROCEDURE — 1159F MED LIST DOCD IN RCRD: CPT | Mod: S$GLB,,, | Performed by: NURSE PRACTITIONER

## 2021-06-07 PROCEDURE — 1126F AMNT PAIN NOTED NONE PRSNT: CPT | Mod: S$GLB,,, | Performed by: NURSE PRACTITIONER

## 2021-06-07 PROCEDURE — 3288F PR FALLS RISK ASSESSMENT DOCUMENTED: ICD-10-PCS | Mod: CPTII,S$GLB,, | Performed by: NURSE PRACTITIONER

## 2021-06-07 PROCEDURE — 3008F PR BODY MASS INDEX (BMI) DOCUMENTED: ICD-10-PCS | Mod: CPTII,S$GLB,, | Performed by: NURSE PRACTITIONER

## 2021-06-07 PROCEDURE — 1159F PR MEDICATION LIST DOCUMENTED IN MEDICAL RECORD: ICD-10-PCS | Mod: S$GLB,,, | Performed by: NURSE PRACTITIONER

## 2021-06-07 PROCEDURE — 3288F FALL RISK ASSESSMENT DOCD: CPT | Mod: CPTII,S$GLB,, | Performed by: NURSE PRACTITIONER

## 2021-06-07 PROCEDURE — 99213 OFFICE O/P EST LOW 20 MIN: CPT | Mod: S$GLB,,, | Performed by: NURSE PRACTITIONER

## 2021-06-07 PROCEDURE — 1126F PR PAIN SEVERITY QUANTIFIED, NO PAIN PRESENT: ICD-10-PCS | Mod: S$GLB,,, | Performed by: NURSE PRACTITIONER

## 2021-06-07 PROCEDURE — 1111F DSCHRG MED/CURRENT MED MERGE: CPT | Mod: CPTII,S$GLB,, | Performed by: NURSE PRACTITIONER

## 2021-06-07 RX ORDER — ONDANSETRON 8 MG/1
8 TABLET, ORALLY DISINTEGRATING ORAL EVERY 6 HOURS PRN
Qty: 60 TABLET | Refills: 5 | Status: SHIPPED | OUTPATIENT
Start: 2021-06-07

## 2021-06-07 RX ORDER — SODIUM CHLORIDE, SODIUM LACTATE, POTASSIUM CHLORIDE, CALCIUM CHLORIDE 600; 310; 30; 20 MG/100ML; MG/100ML; MG/100ML; MG/100ML
INJECTION, SOLUTION INTRAVENOUS
COMMUNITY
Start: 2021-05-24 | End: 2021-08-10

## 2021-06-07 RX ORDER — HEPARIN SODIUM,PORCINE/PF 10 UNIT/ML
SYRINGE (ML) INTRAVENOUS
COMMUNITY
Start: 2021-05-25 | End: 2021-08-10

## 2021-06-07 RX ORDER — SODIUM CHLORIDE 9 MG/ML
INJECTION, SOLUTION INTRAVENOUS
COMMUNITY
Start: 2021-05-31 | End: 2021-07-25

## 2021-06-07 RX ORDER — PIPERACILLIN SODIUM, TAZOBACTAM SODIUM 36; 4.5 G/152ML; G/152ML
INJECTION, POWDER, LYOPHILIZED, FOR SOLUTION INTRAVENOUS
COMMUNITY
Start: 2021-05-31 | End: 2021-08-10

## 2021-06-07 RX ORDER — SODIUM CHLORIDE 0.9 % (FLUSH) 0.9 %
SYRINGE (ML) INJECTION
COMMUNITY
Start: 2021-05-25 | End: 2021-08-10

## 2021-06-08 ENCOUNTER — LAB VISIT (OUTPATIENT)
Dept: LAB | Facility: HOSPITAL | Age: 66
End: 2021-06-08
Attending: FAMILY MEDICINE
Payer: MEDICARE

## 2021-06-08 DIAGNOSIS — Z45.2 FITTING AND ADJUSTMENT OF VASCULAR CATHETER: Primary | ICD-10-CM

## 2021-06-08 LAB
ALBUMIN SERPL BCP-MCNC: 3.3 G/DL (ref 3.5–5.2)
ALP SERPL-CCNC: 96 U/L (ref 55–135)
ALT SERPL W/O P-5'-P-CCNC: 14 U/L (ref 10–44)
ANION GAP SERPL CALC-SCNC: 14 MMOL/L (ref 8–16)
AST SERPL-CCNC: 12 U/L (ref 10–40)
BASOPHILS # BLD AUTO: 0.06 K/UL (ref 0–0.2)
BASOPHILS NFR BLD: 0.6 % (ref 0–1.9)
BILIRUB SERPL-MCNC: 0.3 MG/DL (ref 0.1–1)
BUN SERPL-MCNC: 9 MG/DL (ref 8–23)
CALCIUM SERPL-MCNC: 8.8 MG/DL (ref 8.7–10.5)
CHLORIDE SERPL-SCNC: 105 MMOL/L (ref 95–110)
CO2 SERPL-SCNC: 23 MMOL/L (ref 23–29)
CREAT SERPL-MCNC: 1.2 MG/DL (ref 0.5–1.4)
DIFFERENTIAL METHOD: ABNORMAL
EOSINOPHIL # BLD AUTO: 0.1 K/UL (ref 0–0.5)
EOSINOPHIL NFR BLD: 1.2 % (ref 0–8)
ERYTHROCYTE [DISTWIDTH] IN BLOOD BY AUTOMATED COUNT: 13.5 % (ref 11.5–14.5)
EST. GFR  (AFRICAN AMERICAN): >60 ML/MIN/1.73 M^2
EST. GFR  (NON AFRICAN AMERICAN): >60 ML/MIN/1.73 M^2
GLUCOSE SERPL-MCNC: 195 MG/DL (ref 70–110)
HCT VFR BLD AUTO: 31.8 % (ref 40–54)
HGB BLD-MCNC: 10.1 G/DL (ref 14–18)
IMM GRANULOCYTES # BLD AUTO: 0.08 K/UL (ref 0–0.04)
IMM GRANULOCYTES NFR BLD AUTO: 0.8 % (ref 0–0.5)
LYMPHOCYTES # BLD AUTO: 1.6 K/UL (ref 1–4.8)
LYMPHOCYTES NFR BLD: 15.1 % (ref 18–48)
MCH RBC QN AUTO: 31.1 PG (ref 27–31)
MCHC RBC AUTO-ENTMCNC: 31.8 G/DL (ref 32–36)
MCV RBC AUTO: 98 FL (ref 82–98)
MONOCYTES # BLD AUTO: 0.4 K/UL (ref 0.3–1)
MONOCYTES NFR BLD: 4.1 % (ref 4–15)
NEUTROPHILS # BLD AUTO: 8.2 K/UL (ref 1.8–7.7)
NEUTROPHILS NFR BLD: 78.2 % (ref 38–73)
NRBC BLD-RTO: 0 /100 WBC
PLATELET # BLD AUTO: 496 K/UL (ref 150–450)
PMV BLD AUTO: 9 FL (ref 9.2–12.9)
POTASSIUM SERPL-SCNC: 4.5 MMOL/L (ref 3.5–5.1)
PROT SERPL-MCNC: 7.2 G/DL (ref 6–8.4)
RBC # BLD AUTO: 3.25 M/UL (ref 4.6–6.2)
SODIUM SERPL-SCNC: 142 MMOL/L (ref 136–145)
WBC # BLD AUTO: 10.5 K/UL (ref 3.9–12.7)

## 2021-06-08 PROCEDURE — 80053 COMPREHEN METABOLIC PANEL: CPT | Performed by: COLON & RECTAL SURGERY

## 2021-06-08 PROCEDURE — 36415 COLL VENOUS BLD VENIPUNCTURE: CPT | Performed by: COLON & RECTAL SURGERY

## 2021-06-08 PROCEDURE — 85025 COMPLETE CBC W/AUTO DIFF WBC: CPT | Performed by: COLON & RECTAL SURGERY

## 2021-06-15 ENCOUNTER — DOCUMENTATION ONLY (OUTPATIENT)
Dept: REHABILITATION | Facility: HOSPITAL | Age: 66
End: 2021-06-15

## 2021-06-15 DIAGNOSIS — M25.611 DECREASED RANGE OF MOTION OF RIGHT SHOULDER: ICD-10-CM

## 2021-06-15 DIAGNOSIS — Z74.09 DECREASED MOBILITY AND ENDURANCE: ICD-10-CM

## 2021-06-15 DIAGNOSIS — M62.81 MUSCLE WEAKNESS (GENERALIZED): Primary | ICD-10-CM

## 2021-06-15 DIAGNOSIS — Z74.09 IMPAIRED FUNCTIONAL MOBILITY AND ACTIVITY TOLERANCE: ICD-10-CM

## 2021-06-15 DIAGNOSIS — R53.81 PHYSICAL DECONDITIONING: ICD-10-CM

## 2021-06-21 ENCOUNTER — OFFICE VISIT (OUTPATIENT)
Dept: WOUND CARE | Facility: CLINIC | Age: 66
End: 2021-06-21
Payer: MEDICARE

## 2021-06-21 ENCOUNTER — OFFICE VISIT (OUTPATIENT)
Dept: SURGERY | Facility: CLINIC | Age: 66
End: 2021-06-21
Payer: MEDICARE

## 2021-06-21 VITALS
DIASTOLIC BLOOD PRESSURE: 64 MMHG | WEIGHT: 152.69 LBS | BODY MASS INDEX: 21.86 KG/M2 | SYSTOLIC BLOOD PRESSURE: 102 MMHG | HEIGHT: 70 IN

## 2021-06-21 DIAGNOSIS — C20 RECTAL ADENOCARCINOMA: Primary | ICD-10-CM

## 2021-06-21 DIAGNOSIS — Z43.3 ATTENTION TO COLOSTOMY: Primary | ICD-10-CM

## 2021-06-21 DIAGNOSIS — Z93.2 ILEOSTOMY IN PLACE: ICD-10-CM

## 2021-06-21 PROCEDURE — 3288F FALL RISK ASSESSMENT DOCD: CPT | Mod: CPTII,S$GLB,, | Performed by: COLON & RECTAL SURGERY

## 2021-06-21 PROCEDURE — 3008F PR BODY MASS INDEX (BMI) DOCUMENTED: ICD-10-PCS | Mod: CPTII,S$GLB,, | Performed by: COLON & RECTAL SURGERY

## 2021-06-21 PROCEDURE — 99999 PR PBB SHADOW E&M-EST. PATIENT-LVL II: CPT | Mod: PBBFAC,,, | Performed by: CLINICAL NURSE SPECIALIST

## 2021-06-21 PROCEDURE — 99024 PR POST-OP FOLLOW-UP VISIT: ICD-10-PCS | Mod: S$GLB,,, | Performed by: CLINICAL NURSE SPECIALIST

## 2021-06-21 PROCEDURE — 1125F AMNT PAIN NOTED PAIN PRSNT: CPT | Mod: CPTII,S$GLB,, | Performed by: COLON & RECTAL SURGERY

## 2021-06-21 PROCEDURE — 1101F PR PT FALLS ASSESS DOC 0-1 FALLS W/OUT INJ PAST YR: ICD-10-PCS | Mod: CPTII,S$GLB,, | Performed by: COLON & RECTAL SURGERY

## 2021-06-21 PROCEDURE — 99999 PR PBB SHADOW E&M-EST. PATIENT-LVL III: CPT | Mod: PBBFAC,,, | Performed by: COLON & RECTAL SURGERY

## 2021-06-21 PROCEDURE — 99999 PR PBB SHADOW E&M-EST. PATIENT-LVL II: ICD-10-PCS | Mod: PBBFAC,,, | Performed by: CLINICAL NURSE SPECIALIST

## 2021-06-21 PROCEDURE — 99999 PR PBB SHADOW E&M-EST. PATIENT-LVL III: ICD-10-PCS | Mod: PBBFAC,,, | Performed by: COLON & RECTAL SURGERY

## 2021-06-21 PROCEDURE — 99024 POSTOP FOLLOW-UP VISIT: CPT | Mod: S$GLB,,, | Performed by: CLINICAL NURSE SPECIALIST

## 2021-06-21 PROCEDURE — 3008F BODY MASS INDEX DOCD: CPT | Mod: CPTII,S$GLB,, | Performed by: COLON & RECTAL SURGERY

## 2021-06-21 PROCEDURE — 1101F PT FALLS ASSESS-DOCD LE1/YR: CPT | Mod: CPTII,S$GLB,, | Performed by: COLON & RECTAL SURGERY

## 2021-06-21 PROCEDURE — 99024 POSTOP FOLLOW-UP VISIT: CPT | Mod: S$GLB,,, | Performed by: COLON & RECTAL SURGERY

## 2021-06-21 PROCEDURE — 1125F PR PAIN SEVERITY QUANTIFIED, PAIN PRESENT: ICD-10-PCS | Mod: CPTII,S$GLB,, | Performed by: COLON & RECTAL SURGERY

## 2021-06-21 PROCEDURE — 99024 PR POST-OP FOLLOW-UP VISIT: ICD-10-PCS | Mod: S$GLB,,, | Performed by: COLON & RECTAL SURGERY

## 2021-06-21 PROCEDURE — 3288F PR FALLS RISK ASSESSMENT DOCUMENTED: ICD-10-PCS | Mod: CPTII,S$GLB,, | Performed by: COLON & RECTAL SURGERY

## 2021-06-22 ENCOUNTER — LAB VISIT (OUTPATIENT)
Dept: LAB | Facility: HOSPITAL | Age: 66
End: 2021-06-22
Attending: FAMILY MEDICINE
Payer: MEDICARE

## 2021-06-22 DIAGNOSIS — C20 MALIGNANT NEOPLASM OF RECTUM: ICD-10-CM

## 2021-06-22 DIAGNOSIS — Z45.2 FITTING AND ADJUSTMENT OF VASCULAR CATHETER: Primary | ICD-10-CM

## 2021-06-22 LAB
ALBUMIN SERPL BCP-MCNC: 3.7 G/DL (ref 3.5–5.2)
ALP SERPL-CCNC: 194 U/L (ref 55–135)
ALT SERPL W/O P-5'-P-CCNC: 41 U/L (ref 10–44)
ANION GAP SERPL CALC-SCNC: 17 MMOL/L (ref 8–16)
AST SERPL-CCNC: 30 U/L (ref 10–40)
BASOPHILS # BLD AUTO: 0.04 K/UL (ref 0–0.2)
BASOPHILS NFR BLD: 0.5 % (ref 0–1.9)
BILIRUB SERPL-MCNC: 0.2 MG/DL (ref 0.1–1)
BUN SERPL-MCNC: 12 MG/DL (ref 8–23)
CALCIUM SERPL-MCNC: 9.3 MG/DL (ref 8.7–10.5)
CHLORIDE SERPL-SCNC: 101 MMOL/L (ref 95–110)
CO2 SERPL-SCNC: 22 MMOL/L (ref 23–29)
CREAT SERPL-MCNC: 1 MG/DL (ref 0.5–1.4)
DIFFERENTIAL METHOD: ABNORMAL
EOSINOPHIL # BLD AUTO: 0.2 K/UL (ref 0–0.5)
EOSINOPHIL NFR BLD: 1.8 % (ref 0–8)
ERYTHROCYTE [DISTWIDTH] IN BLOOD BY AUTOMATED COUNT: 13.8 % (ref 11.5–14.5)
EST. GFR  (AFRICAN AMERICAN): >60 ML/MIN/1.73 M^2
EST. GFR  (NON AFRICAN AMERICAN): >60 ML/MIN/1.73 M^2
GLUCOSE SERPL-MCNC: 111 MG/DL (ref 70–110)
HCT VFR BLD AUTO: 35.1 % (ref 40–54)
HGB BLD-MCNC: 11.5 G/DL (ref 14–18)
IMM GRANULOCYTES # BLD AUTO: 0.07 K/UL (ref 0–0.04)
IMM GRANULOCYTES NFR BLD AUTO: 0.9 % (ref 0–0.5)
LYMPHOCYTES # BLD AUTO: 1.5 K/UL (ref 1–4.8)
LYMPHOCYTES NFR BLD: 17.9 % (ref 18–48)
MCH RBC QN AUTO: 31.5 PG (ref 27–31)
MCHC RBC AUTO-ENTMCNC: 32.8 G/DL (ref 32–36)
MCV RBC AUTO: 96 FL (ref 82–98)
MONOCYTES # BLD AUTO: 0.8 K/UL (ref 0.3–1)
MONOCYTES NFR BLD: 9.2 % (ref 4–15)
NEUTROPHILS # BLD AUTO: 5.7 K/UL (ref 1.8–7.7)
NEUTROPHILS NFR BLD: 69.7 % (ref 38–73)
NRBC BLD-RTO: 0 /100 WBC
PLATELET # BLD AUTO: 285 K/UL (ref 150–450)
PMV BLD AUTO: 9.7 FL (ref 9.2–12.9)
POTASSIUM SERPL-SCNC: 4 MMOL/L (ref 3.5–5.1)
PROT SERPL-MCNC: 7.9 G/DL (ref 6–8.4)
RBC # BLD AUTO: 3.65 M/UL (ref 4.6–6.2)
SODIUM SERPL-SCNC: 140 MMOL/L (ref 136–145)
WBC # BLD AUTO: 8.16 K/UL (ref 3.9–12.7)

## 2021-06-22 PROCEDURE — 36415 COLL VENOUS BLD VENIPUNCTURE: CPT | Performed by: FAMILY MEDICINE

## 2021-06-22 PROCEDURE — 80053 COMPREHEN METABOLIC PANEL: CPT | Performed by: FAMILY MEDICINE

## 2021-06-22 PROCEDURE — 85025 COMPLETE CBC W/AUTO DIFF WBC: CPT | Performed by: FAMILY MEDICINE

## 2021-06-29 LAB — FUNGUS SPEC CULT: NORMAL

## 2021-07-12 ENCOUNTER — PATIENT MESSAGE (OUTPATIENT)
Dept: SURGERY | Facility: CLINIC | Age: 66
End: 2021-07-12

## 2021-07-12 ENCOUNTER — HOSPITAL ENCOUNTER (OUTPATIENT)
Dept: RADIOLOGY | Facility: HOSPITAL | Age: 66
Discharge: HOME | End: 2021-07-12
Attending: COLON & RECTAL SURGERY
Payer: MEDICARE

## 2021-07-12 ENCOUNTER — OFFICE VISIT (OUTPATIENT)
Dept: SURGERY | Facility: CLINIC | Age: 66
End: 2021-07-12
Payer: MEDICARE

## 2021-07-12 VITALS
BODY MASS INDEX: 21.84 KG/M2 | HEART RATE: 89 BPM | WEIGHT: 152.56 LBS | SYSTOLIC BLOOD PRESSURE: 118 MMHG | DIASTOLIC BLOOD PRESSURE: 76 MMHG | HEIGHT: 70 IN

## 2021-07-12 DIAGNOSIS — C20 RECTAL ADENOCARCINOMA: ICD-10-CM

## 2021-07-12 DIAGNOSIS — Z93.2 ILEOSTOMY IN PLACE: ICD-10-CM

## 2021-07-12 DIAGNOSIS — C20 RECTAL ADENOCARCINOMA: Primary | ICD-10-CM

## 2021-07-12 DIAGNOSIS — Z01.818 PRE-OP TESTING: ICD-10-CM

## 2021-07-12 PROCEDURE — 1101F PT FALLS ASSESS-DOCD LE1/YR: CPT | Mod: CPTII,S$GLB,, | Performed by: COLON & RECTAL SURGERY

## 2021-07-12 PROCEDURE — 1125F PR PAIN SEVERITY QUANTIFIED, PAIN PRESENT: ICD-10-PCS | Mod: CPTII,S$GLB,, | Performed by: COLON & RECTAL SURGERY

## 2021-07-12 PROCEDURE — 3044F HG A1C LEVEL LT 7.0%: CPT | Mod: CPTII,S$GLB,, | Performed by: COLON & RECTAL SURGERY

## 2021-07-12 PROCEDURE — 1159F MED LIST DOCD IN RCRD: CPT | Mod: CPTII,S$GLB,, | Performed by: COLON & RECTAL SURGERY

## 2021-07-12 PROCEDURE — 3078F PR MOST RECENT DIASTOLIC BLOOD PRESSURE < 80 MM HG: ICD-10-PCS | Mod: CPTII,S$GLB,, | Performed by: COLON & RECTAL SURGERY

## 2021-07-12 PROCEDURE — 3008F BODY MASS INDEX DOCD: CPT | Mod: CPTII,S$GLB,, | Performed by: COLON & RECTAL SURGERY

## 2021-07-12 PROCEDURE — 1160F PR REVIEW ALL MEDS BY PRESCRIBER/CLIN PHARMACIST DOCUMENTED: ICD-10-PCS | Mod: CPTII,S$GLB,, | Performed by: COLON & RECTAL SURGERY

## 2021-07-12 PROCEDURE — 1101F PR PT FALLS ASSESS DOC 0-1 FALLS W/OUT INJ PAST YR: ICD-10-PCS | Mod: CPTII,S$GLB,, | Performed by: COLON & RECTAL SURGERY

## 2021-07-12 PROCEDURE — 74270 X-RAY XM COLON 1CNTRST STD: CPT | Mod: 26,,, | Performed by: RADIOLOGY

## 2021-07-12 PROCEDURE — 1160F RVW MEDS BY RX/DR IN RCRD: CPT | Mod: CPTII,S$GLB,, | Performed by: COLON & RECTAL SURGERY

## 2021-07-12 PROCEDURE — 99999 PR PBB SHADOW E&M-EST. PATIENT-LVL IV: CPT | Mod: PBBFAC,,, | Performed by: COLON & RECTAL SURGERY

## 2021-07-12 PROCEDURE — 25500020 PHARM REV CODE 255: Performed by: COLON & RECTAL SURGERY

## 2021-07-12 PROCEDURE — 3074F SYST BP LT 130 MM HG: CPT | Mod: CPTII,S$GLB,, | Performed by: COLON & RECTAL SURGERY

## 2021-07-12 PROCEDURE — 3074F PR MOST RECENT SYSTOLIC BLOOD PRESSURE < 130 MM HG: ICD-10-PCS | Mod: CPTII,S$GLB,, | Performed by: COLON & RECTAL SURGERY

## 2021-07-12 PROCEDURE — 99024 PR POST-OP FOLLOW-UP VISIT: ICD-10-PCS | Mod: S$GLB,,, | Performed by: COLON & RECTAL SURGERY

## 2021-07-12 PROCEDURE — 99024 POSTOP FOLLOW-UP VISIT: CPT | Mod: S$GLB,,, | Performed by: COLON & RECTAL SURGERY

## 2021-07-12 PROCEDURE — 3288F FALL RISK ASSESSMENT DOCD: CPT | Mod: CPTII,S$GLB,, | Performed by: COLON & RECTAL SURGERY

## 2021-07-12 PROCEDURE — 1159F PR MEDICATION LIST DOCUMENTED IN MEDICAL RECORD: ICD-10-PCS | Mod: CPTII,S$GLB,, | Performed by: COLON & RECTAL SURGERY

## 2021-07-12 PROCEDURE — 3288F PR FALLS RISK ASSESSMENT DOCUMENTED: ICD-10-PCS | Mod: CPTII,S$GLB,, | Performed by: COLON & RECTAL SURGERY

## 2021-07-12 PROCEDURE — 3078F DIAST BP <80 MM HG: CPT | Mod: CPTII,S$GLB,, | Performed by: COLON & RECTAL SURGERY

## 2021-07-12 PROCEDURE — 74270 X-RAY XM COLON 1CNTRST STD: CPT | Mod: TC

## 2021-07-12 PROCEDURE — 74270 FL GASTROGRAFIN ENEMA WATER SOLUBLE: ICD-10-PCS | Mod: 26,,, | Performed by: RADIOLOGY

## 2021-07-12 PROCEDURE — 1125F AMNT PAIN NOTED PAIN PRSNT: CPT | Mod: CPTII,S$GLB,, | Performed by: COLON & RECTAL SURGERY

## 2021-07-12 PROCEDURE — 3008F PR BODY MASS INDEX (BMI) DOCUMENTED: ICD-10-PCS | Mod: CPTII,S$GLB,, | Performed by: COLON & RECTAL SURGERY

## 2021-07-12 PROCEDURE — 3044F PR MOST RECENT HEMOGLOBIN A1C LEVEL <7.0%: ICD-10-PCS | Mod: CPTII,S$GLB,, | Performed by: COLON & RECTAL SURGERY

## 2021-07-12 PROCEDURE — 99999 PR PBB SHADOW E&M-EST. PATIENT-LVL IV: ICD-10-PCS | Mod: PBBFAC,,, | Performed by: COLON & RECTAL SURGERY

## 2021-07-12 RX ADMIN — DIATRIZOATE MEGLUMINE AND DIATRIZOATE SODIUM 240 ML: 660; 100 LIQUID ORAL; RECTAL at 10:07

## 2021-07-30 ENCOUNTER — TELEPHONE (OUTPATIENT)
Dept: HEMATOLOGY/ONCOLOGY | Facility: CLINIC | Age: 66
End: 2021-07-30

## 2021-08-02 ENCOUNTER — TELEPHONE (OUTPATIENT)
Dept: HEMATOLOGY/ONCOLOGY | Facility: CLINIC | Age: 66
End: 2021-08-02

## 2021-08-03 ENCOUNTER — TELEPHONE (OUTPATIENT)
Dept: SURGERY | Facility: CLINIC | Age: 66
End: 2021-08-03

## 2021-08-04 ENCOUNTER — LAB VISIT (OUTPATIENT)
Dept: LAB | Facility: HOSPITAL | Age: 66
End: 2021-08-04
Attending: COLON & RECTAL SURGERY
Payer: MEDICARE

## 2021-08-04 DIAGNOSIS — E46 PROTEIN-CALORIE MALNUTRITION, UNSPECIFIED SEVERITY: ICD-10-CM

## 2021-08-04 DIAGNOSIS — C20 RECTAL ADENOCARCINOMA: ICD-10-CM

## 2021-08-04 DIAGNOSIS — D64.9 ANEMIA, UNSPECIFIED TYPE: ICD-10-CM

## 2021-08-04 DIAGNOSIS — D50.8 OTHER IRON DEFICIENCY ANEMIA: ICD-10-CM

## 2021-08-04 DIAGNOSIS — E53.8 B12 DEFICIENCY: ICD-10-CM

## 2021-08-04 DIAGNOSIS — E83.9 DISORDER OF MINERAL METABOLISM, UNSPECIFIED: ICD-10-CM

## 2021-08-04 DIAGNOSIS — D75.838 REACTIVE THROMBOCYTOSIS: ICD-10-CM

## 2021-08-04 DIAGNOSIS — D53.8 ANEMIA DUE TO VITAMIN B6 DEFICIENCY: ICD-10-CM

## 2021-08-04 DIAGNOSIS — Z93.2 ILEOSTOMY IN PLACE: ICD-10-CM

## 2021-08-04 DIAGNOSIS — E53.1 ANEMIA DUE TO VITAMIN B6 DEFICIENCY: ICD-10-CM

## 2021-08-04 LAB
ANION GAP SERPL CALC-SCNC: 16 MMOL/L (ref 8–16)
APTT BLDCRRT: 23.9 SEC (ref 21–32)
BASOPHILS # BLD AUTO: 0.05 K/UL (ref 0–0.2)
BASOPHILS NFR BLD: 0.4 % (ref 0–1.9)
BUN SERPL-MCNC: 55 MG/DL (ref 8–23)
CALCIUM SERPL-MCNC: 10.4 MG/DL (ref 8.7–10.5)
CHLORIDE SERPL-SCNC: 88 MMOL/L (ref 95–110)
CO2 SERPL-SCNC: 28 MMOL/L (ref 23–29)
CREAT SERPL-MCNC: 2 MG/DL (ref 0.5–1.4)
DIFFERENTIAL METHOD: ABNORMAL
EOSINOPHIL # BLD AUTO: 0.1 K/UL (ref 0–0.5)
EOSINOPHIL NFR BLD: 1 % (ref 0–8)
ERYTHROCYTE [DISTWIDTH] IN BLOOD BY AUTOMATED COUNT: 13.1 % (ref 11.5–14.5)
EST. GFR  (AFRICAN AMERICAN): 39 ML/MIN/1.73 M^2
EST. GFR  (NON AFRICAN AMERICAN): 33.8 ML/MIN/1.73 M^2
GLUCOSE SERPL-MCNC: 137 MG/DL (ref 70–110)
HCT VFR BLD AUTO: 38.7 % (ref 40–54)
HGB BLD-MCNC: 13.7 G/DL (ref 14–18)
IMM GRANULOCYTES # BLD AUTO: 0.08 K/UL (ref 0–0.04)
IMM GRANULOCYTES NFR BLD AUTO: 0.7 % (ref 0–0.5)
INR PPP: 0.9 (ref 0.8–1.2)
LYMPHOCYTES # BLD AUTO: 1.7 K/UL (ref 1–4.8)
LYMPHOCYTES NFR BLD: 13.7 % (ref 18–48)
MCH RBC QN AUTO: 31.9 PG (ref 27–31)
MCHC RBC AUTO-ENTMCNC: 35.4 G/DL (ref 32–36)
MCV RBC AUTO: 90 FL (ref 82–98)
MONOCYTES # BLD AUTO: 0.8 K/UL (ref 0.3–1)
MONOCYTES NFR BLD: 6.8 % (ref 4–15)
NEUTROPHILS # BLD AUTO: 9.4 K/UL (ref 1.8–7.7)
NEUTROPHILS NFR BLD: 77.4 % (ref 38–73)
NRBC BLD-RTO: 0 /100 WBC
PLATELET # BLD AUTO: 349 K/UL (ref 150–450)
PMV BLD AUTO: 9 FL (ref 9.2–12.9)
POTASSIUM SERPL-SCNC: 4.6 MMOL/L (ref 3.5–5.1)
PROTHROMBIN TIME: 10 SEC (ref 9–12.5)
RBC # BLD AUTO: 4.3 M/UL (ref 4.6–6.2)
SODIUM SERPL-SCNC: 132 MMOL/L (ref 136–145)
WBC # BLD AUTO: 12.14 K/UL (ref 3.9–12.7)

## 2021-08-04 PROCEDURE — 85730 THROMBOPLASTIN TIME PARTIAL: CPT | Performed by: ANESTHESIOLOGY

## 2021-08-04 PROCEDURE — 85610 PROTHROMBIN TIME: CPT | Performed by: ANESTHESIOLOGY

## 2021-08-04 PROCEDURE — 85025 COMPLETE CBC W/AUTO DIFF WBC: CPT | Performed by: COLON & RECTAL SURGERY

## 2021-08-04 PROCEDURE — 36415 COLL VENOUS BLD VENIPUNCTURE: CPT | Performed by: ANESTHESIOLOGY

## 2021-08-04 PROCEDURE — 80048 BASIC METABOLIC PNL TOTAL CA: CPT | Performed by: COLON & RECTAL SURGERY

## 2021-08-05 ENCOUNTER — TELEPHONE (OUTPATIENT)
Dept: SURGERY | Facility: CLINIC | Age: 66
End: 2021-08-05

## 2021-08-10 ENCOUNTER — PATIENT MESSAGE (OUTPATIENT)
Dept: SURGERY | Facility: CLINIC | Age: 66
End: 2021-08-10

## 2021-08-10 DIAGNOSIS — G89.3 NEOPLASM RELATED PAIN: ICD-10-CM

## 2021-08-10 DIAGNOSIS — Z93.2 ILEOSTOMY IN PLACE: Primary | ICD-10-CM

## 2021-08-10 RX ORDER — OXYCODONE AND ACETAMINOPHEN 10; 325 MG/1; MG/1
1 TABLET ORAL EVERY 12 HOURS PRN
Qty: 60 TABLET | Refills: 0 | Status: SHIPPED | OUTPATIENT
Start: 2021-08-10 | End: 2021-09-22 | Stop reason: SDUPTHER

## 2021-08-12 ENCOUNTER — TELEPHONE (OUTPATIENT)
Dept: HEMATOLOGY/ONCOLOGY | Facility: CLINIC | Age: 66
End: 2021-08-12

## 2021-08-24 ENCOUNTER — PATIENT MESSAGE (OUTPATIENT)
Dept: HEMATOLOGY/ONCOLOGY | Facility: CLINIC | Age: 66
End: 2021-08-24

## 2021-08-25 DIAGNOSIS — K62.89 RECTAL MASS: Primary | ICD-10-CM

## 2021-08-25 DIAGNOSIS — C20 RECTAL CANCER: Primary | ICD-10-CM

## 2021-09-03 ENCOUNTER — HOSPITAL ENCOUNTER (OUTPATIENT)
Dept: RADIOLOGY | Facility: HOSPITAL | Age: 66
Discharge: HOME | End: 2021-09-03
Attending: INTERNAL MEDICINE
Payer: MEDICARE

## 2021-09-03 DIAGNOSIS — K62.89 RECTAL MASS: ICD-10-CM

## 2021-09-03 PROCEDURE — 74176 CT ABDOMEN PELVIS WITHOUT CONTRAST: ICD-10-PCS | Mod: 26,,, | Performed by: RADIOLOGY

## 2021-09-03 PROCEDURE — 25500020 PHARM REV CODE 255: Performed by: INTERNAL MEDICINE

## 2021-09-03 PROCEDURE — A9698 NON-RAD CONTRAST MATERIALNOC: HCPCS | Performed by: INTERNAL MEDICINE

## 2021-09-03 PROCEDURE — 74176 CT ABD & PELVIS W/O CONTRAST: CPT | Mod: TC

## 2021-09-03 PROCEDURE — 74176 CT ABD & PELVIS W/O CONTRAST: CPT | Mod: 26,,, | Performed by: RADIOLOGY

## 2021-09-03 RX ADMIN — IOHEXOL 500 ML: 9 SOLUTION ORAL at 12:09

## 2021-09-07 ENCOUNTER — OFFICE VISIT (OUTPATIENT)
Dept: FAMILY MEDICINE | Facility: CLINIC | Age: 66
End: 2021-09-07
Payer: MEDICARE

## 2021-09-07 VITALS
RESPIRATION RATE: 18 BRPM | HEART RATE: 88 BPM | DIASTOLIC BLOOD PRESSURE: 63 MMHG | WEIGHT: 155 LBS | TEMPERATURE: 97 F | SYSTOLIC BLOOD PRESSURE: 106 MMHG | HEIGHT: 70 IN | BODY MASS INDEX: 22.19 KG/M2 | OXYGEN SATURATION: 97 %

## 2021-09-07 DIAGNOSIS — Z93.2 ILEOSTOMY IN PLACE: Primary | ICD-10-CM

## 2021-09-07 DIAGNOSIS — C20 RECTAL CANCER: ICD-10-CM

## 2021-09-07 DIAGNOSIS — G89.3 NEOPLASM RELATED PAIN: ICD-10-CM

## 2021-09-07 PROCEDURE — 3044F PR MOST RECENT HEMOGLOBIN A1C LEVEL <7.0%: ICD-10-PCS | Mod: CPTII,S$GLB,, | Performed by: FAMILY MEDICINE

## 2021-09-07 PROCEDURE — 1159F MED LIST DOCD IN RCRD: CPT | Mod: CPTII,S$GLB,, | Performed by: FAMILY MEDICINE

## 2021-09-07 PROCEDURE — 99213 PR OFFICE/OUTPT VISIT, EST, LEVL III, 20-29 MIN: ICD-10-PCS | Mod: S$GLB,,, | Performed by: FAMILY MEDICINE

## 2021-09-07 PROCEDURE — 1126F PR PAIN SEVERITY QUANTIFIED, NO PAIN PRESENT: ICD-10-PCS | Mod: CPTII,S$GLB,, | Performed by: FAMILY MEDICINE

## 2021-09-07 PROCEDURE — 1101F PR PT FALLS ASSESS DOC 0-1 FALLS W/OUT INJ PAST YR: ICD-10-PCS | Mod: CPTII,S$GLB,, | Performed by: FAMILY MEDICINE

## 2021-09-07 PROCEDURE — 3008F BODY MASS INDEX DOCD: CPT | Mod: CPTII,S$GLB,, | Performed by: FAMILY MEDICINE

## 2021-09-07 PROCEDURE — 1160F PR REVIEW ALL MEDS BY PRESCRIBER/CLIN PHARMACIST DOCUMENTED: ICD-10-PCS | Mod: CPTII,S$GLB,, | Performed by: FAMILY MEDICINE

## 2021-09-07 PROCEDURE — 3078F PR MOST RECENT DIASTOLIC BLOOD PRESSURE < 80 MM HG: ICD-10-PCS | Mod: CPTII,S$GLB,, | Performed by: FAMILY MEDICINE

## 2021-09-07 PROCEDURE — 3288F PR FALLS RISK ASSESSMENT DOCUMENTED: ICD-10-PCS | Mod: CPTII,S$GLB,, | Performed by: FAMILY MEDICINE

## 2021-09-07 PROCEDURE — 99213 OFFICE O/P EST LOW 20 MIN: CPT | Mod: S$GLB,,, | Performed by: FAMILY MEDICINE

## 2021-09-07 PROCEDURE — 3044F HG A1C LEVEL LT 7.0%: CPT | Mod: CPTII,S$GLB,, | Performed by: FAMILY MEDICINE

## 2021-09-07 PROCEDURE — 1160F RVW MEDS BY RX/DR IN RCRD: CPT | Mod: CPTII,S$GLB,, | Performed by: FAMILY MEDICINE

## 2021-09-07 PROCEDURE — 3288F FALL RISK ASSESSMENT DOCD: CPT | Mod: CPTII,S$GLB,, | Performed by: FAMILY MEDICINE

## 2021-09-07 PROCEDURE — 3074F SYST BP LT 130 MM HG: CPT | Mod: CPTII,S$GLB,, | Performed by: FAMILY MEDICINE

## 2021-09-07 PROCEDURE — 1126F AMNT PAIN NOTED NONE PRSNT: CPT | Mod: CPTII,S$GLB,, | Performed by: FAMILY MEDICINE

## 2021-09-07 PROCEDURE — 3074F PR MOST RECENT SYSTOLIC BLOOD PRESSURE < 130 MM HG: ICD-10-PCS | Mod: CPTII,S$GLB,, | Performed by: FAMILY MEDICINE

## 2021-09-07 PROCEDURE — 3078F DIAST BP <80 MM HG: CPT | Mod: CPTII,S$GLB,, | Performed by: FAMILY MEDICINE

## 2021-09-07 PROCEDURE — 1101F PT FALLS ASSESS-DOCD LE1/YR: CPT | Mod: CPTII,S$GLB,, | Performed by: FAMILY MEDICINE

## 2021-09-07 PROCEDURE — 1159F PR MEDICATION LIST DOCUMENTED IN MEDICAL RECORD: ICD-10-PCS | Mod: CPTII,S$GLB,, | Performed by: FAMILY MEDICINE

## 2021-09-07 PROCEDURE — 3008F PR BODY MASS INDEX (BMI) DOCUMENTED: ICD-10-PCS | Mod: CPTII,S$GLB,, | Performed by: FAMILY MEDICINE

## 2021-09-10 ENCOUNTER — OFFICE VISIT (OUTPATIENT)
Dept: HEMATOLOGY/ONCOLOGY | Facility: CLINIC | Age: 66
End: 2021-09-10
Payer: MEDICARE

## 2021-09-10 VITALS
OXYGEN SATURATION: 98 % | HEIGHT: 70 IN | HEART RATE: 90 BPM | WEIGHT: 155.44 LBS | DIASTOLIC BLOOD PRESSURE: 67 MMHG | BODY MASS INDEX: 22.25 KG/M2 | SYSTOLIC BLOOD PRESSURE: 113 MMHG | RESPIRATION RATE: 16 BRPM

## 2021-09-10 DIAGNOSIS — C20 RECTAL ADENOCARCINOMA: Primary | ICD-10-CM

## 2021-09-10 PROCEDURE — 3078F DIAST BP <80 MM HG: CPT | Mod: CPTII,S$GLB,, | Performed by: INTERNAL MEDICINE

## 2021-09-10 PROCEDURE — 1125F AMNT PAIN NOTED PAIN PRSNT: CPT | Mod: CPTII,S$GLB,, | Performed by: INTERNAL MEDICINE

## 2021-09-10 PROCEDURE — 3074F PR MOST RECENT SYSTOLIC BLOOD PRESSURE < 130 MM HG: ICD-10-PCS | Mod: CPTII,S$GLB,, | Performed by: INTERNAL MEDICINE

## 2021-09-10 PROCEDURE — 1101F PR PT FALLS ASSESS DOC 0-1 FALLS W/OUT INJ PAST YR: ICD-10-PCS | Mod: CPTII,S$GLB,, | Performed by: INTERNAL MEDICINE

## 2021-09-10 PROCEDURE — 3074F SYST BP LT 130 MM HG: CPT | Mod: CPTII,S$GLB,, | Performed by: INTERNAL MEDICINE

## 2021-09-10 PROCEDURE — 1159F PR MEDICATION LIST DOCUMENTED IN MEDICAL RECORD: ICD-10-PCS | Mod: CPTII,S$GLB,, | Performed by: INTERNAL MEDICINE

## 2021-09-10 PROCEDURE — 3044F PR MOST RECENT HEMOGLOBIN A1C LEVEL <7.0%: ICD-10-PCS | Mod: CPTII,S$GLB,, | Performed by: INTERNAL MEDICINE

## 2021-09-10 PROCEDURE — 3008F BODY MASS INDEX DOCD: CPT | Mod: CPTII,S$GLB,, | Performed by: INTERNAL MEDICINE

## 2021-09-10 PROCEDURE — 99999 PR PBB SHADOW E&M-EST. PATIENT-LVL III: ICD-10-PCS | Mod: PBBFAC,,, | Performed by: INTERNAL MEDICINE

## 2021-09-10 PROCEDURE — 3044F HG A1C LEVEL LT 7.0%: CPT | Mod: CPTII,S$GLB,, | Performed by: INTERNAL MEDICINE

## 2021-09-10 PROCEDURE — 3078F PR MOST RECENT DIASTOLIC BLOOD PRESSURE < 80 MM HG: ICD-10-PCS | Mod: CPTII,S$GLB,, | Performed by: INTERNAL MEDICINE

## 2021-09-10 PROCEDURE — 1159F MED LIST DOCD IN RCRD: CPT | Mod: CPTII,S$GLB,, | Performed by: INTERNAL MEDICINE

## 2021-09-10 PROCEDURE — 3288F FALL RISK ASSESSMENT DOCD: CPT | Mod: CPTII,S$GLB,, | Performed by: INTERNAL MEDICINE

## 2021-09-10 PROCEDURE — 3008F PR BODY MASS INDEX (BMI) DOCUMENTED: ICD-10-PCS | Mod: CPTII,S$GLB,, | Performed by: INTERNAL MEDICINE

## 2021-09-10 PROCEDURE — 1101F PT FALLS ASSESS-DOCD LE1/YR: CPT | Mod: CPTII,S$GLB,, | Performed by: INTERNAL MEDICINE

## 2021-09-10 PROCEDURE — 99214 PR OFFICE/OUTPT VISIT, EST, LEVL IV, 30-39 MIN: ICD-10-PCS | Mod: S$GLB,,, | Performed by: INTERNAL MEDICINE

## 2021-09-10 PROCEDURE — 99214 OFFICE O/P EST MOD 30 MIN: CPT | Mod: S$GLB,,, | Performed by: INTERNAL MEDICINE

## 2021-09-10 PROCEDURE — 99999 PR PBB SHADOW E&M-EST. PATIENT-LVL III: CPT | Mod: PBBFAC,,, | Performed by: INTERNAL MEDICINE

## 2021-09-10 PROCEDURE — 3288F PR FALLS RISK ASSESSMENT DOCUMENTED: ICD-10-PCS | Mod: CPTII,S$GLB,, | Performed by: INTERNAL MEDICINE

## 2021-09-10 PROCEDURE — 1125F PR PAIN SEVERITY QUANTIFIED, PAIN PRESENT: ICD-10-PCS | Mod: CPTII,S$GLB,, | Performed by: INTERNAL MEDICINE

## 2021-09-16 ENCOUNTER — TELEPHONE (OUTPATIENT)
Dept: SURGERY | Facility: CLINIC | Age: 66
End: 2021-09-16

## 2021-09-16 ENCOUNTER — PATIENT MESSAGE (OUTPATIENT)
Dept: SURGERY | Facility: CLINIC | Age: 66
End: 2021-09-16

## 2021-09-21 ENCOUNTER — PATIENT OUTREACH (OUTPATIENT)
Dept: ADMINISTRATIVE | Facility: OTHER | Age: 66
End: 2021-09-21

## 2021-09-21 ENCOUNTER — OFFICE VISIT (OUTPATIENT)
Dept: SURGERY | Facility: CLINIC | Age: 66
End: 2021-09-21
Payer: MEDICARE

## 2021-09-21 VITALS
TEMPERATURE: 99 F | DIASTOLIC BLOOD PRESSURE: 71 MMHG | HEIGHT: 70 IN | OXYGEN SATURATION: 98 % | WEIGHT: 156 LBS | SYSTOLIC BLOOD PRESSURE: 103 MMHG | BODY MASS INDEX: 22.33 KG/M2 | HEART RATE: 101 BPM

## 2021-09-21 DIAGNOSIS — Z01.818 PRE-OP TESTING: ICD-10-CM

## 2021-09-21 DIAGNOSIS — Z93.2 ILEOSTOMY IN PLACE: Primary | ICD-10-CM

## 2021-09-21 DIAGNOSIS — Z85.048 HISTORY OF RECTAL CANCER: ICD-10-CM

## 2021-09-21 PROCEDURE — 99999 PR PBB SHADOW E&M-EST. PATIENT-LVL V: ICD-10-PCS | Mod: PBBFAC,,, | Performed by: SURGERY

## 2021-09-21 PROCEDURE — 3008F PR BODY MASS INDEX (BMI) DOCUMENTED: ICD-10-PCS | Mod: CPTII,S$GLB,, | Performed by: SURGERY

## 2021-09-21 PROCEDURE — 3044F HG A1C LEVEL LT 7.0%: CPT | Mod: CPTII,S$GLB,, | Performed by: SURGERY

## 2021-09-21 PROCEDURE — 1159F MED LIST DOCD IN RCRD: CPT | Mod: CPTII,S$GLB,, | Performed by: SURGERY

## 2021-09-21 PROCEDURE — 1125F PR PAIN SEVERITY QUANTIFIED, PAIN PRESENT: ICD-10-PCS | Mod: CPTII,S$GLB,, | Performed by: SURGERY

## 2021-09-21 PROCEDURE — 99215 PR OFFICE/OUTPT VISIT, EST, LEVL V, 40-54 MIN: ICD-10-PCS | Mod: S$GLB,,, | Performed by: SURGERY

## 2021-09-21 PROCEDURE — 3008F BODY MASS INDEX DOCD: CPT | Mod: CPTII,S$GLB,, | Performed by: SURGERY

## 2021-09-21 PROCEDURE — 99215 OFFICE O/P EST HI 40 MIN: CPT | Mod: S$GLB,,, | Performed by: SURGERY

## 2021-09-21 PROCEDURE — 1125F AMNT PAIN NOTED PAIN PRSNT: CPT | Mod: CPTII,S$GLB,, | Performed by: SURGERY

## 2021-09-21 PROCEDURE — 1101F PT FALLS ASSESS-DOCD LE1/YR: CPT | Mod: CPTII,S$GLB,, | Performed by: SURGERY

## 2021-09-21 PROCEDURE — 3074F PR MOST RECENT SYSTOLIC BLOOD PRESSURE < 130 MM HG: ICD-10-PCS | Mod: CPTII,S$GLB,, | Performed by: SURGERY

## 2021-09-21 PROCEDURE — 3074F SYST BP LT 130 MM HG: CPT | Mod: CPTII,S$GLB,, | Performed by: SURGERY

## 2021-09-21 PROCEDURE — 1159F PR MEDICATION LIST DOCUMENTED IN MEDICAL RECORD: ICD-10-PCS | Mod: CPTII,S$GLB,, | Performed by: SURGERY

## 2021-09-21 PROCEDURE — 3078F PR MOST RECENT DIASTOLIC BLOOD PRESSURE < 80 MM HG: ICD-10-PCS | Mod: CPTII,S$GLB,, | Performed by: SURGERY

## 2021-09-21 PROCEDURE — 3288F FALL RISK ASSESSMENT DOCD: CPT | Mod: CPTII,S$GLB,, | Performed by: SURGERY

## 2021-09-21 PROCEDURE — 1101F PR PT FALLS ASSESS DOC 0-1 FALLS W/OUT INJ PAST YR: ICD-10-PCS | Mod: CPTII,S$GLB,, | Performed by: SURGERY

## 2021-09-21 PROCEDURE — 3288F PR FALLS RISK ASSESSMENT DOCUMENTED: ICD-10-PCS | Mod: CPTII,S$GLB,, | Performed by: SURGERY

## 2021-09-21 PROCEDURE — 99999 PR PBB SHADOW E&M-EST. PATIENT-LVL V: CPT | Mod: PBBFAC,,, | Performed by: SURGERY

## 2021-09-21 PROCEDURE — 3044F PR MOST RECENT HEMOGLOBIN A1C LEVEL <7.0%: ICD-10-PCS | Mod: CPTII,S$GLB,, | Performed by: SURGERY

## 2021-09-21 PROCEDURE — 3078F DIAST BP <80 MM HG: CPT | Mod: CPTII,S$GLB,, | Performed by: SURGERY

## 2021-09-21 RX ORDER — SODIUM CHLORIDE 9 MG/ML
INJECTION, SOLUTION INTRAVENOUS CONTINUOUS
Status: CANCELLED | OUTPATIENT
Start: 2021-09-21

## 2021-09-22 ENCOUNTER — TELEPHONE (OUTPATIENT)
Dept: SURGERY | Facility: CLINIC | Age: 66
End: 2021-09-22

## 2021-09-22 ENCOUNTER — PATIENT MESSAGE (OUTPATIENT)
Dept: FAMILY MEDICINE | Facility: CLINIC | Age: 66
End: 2021-09-22

## 2021-09-22 DIAGNOSIS — Z93.2 ILEOSTOMY IN PLACE: ICD-10-CM

## 2021-09-22 DIAGNOSIS — G89.3 NEOPLASM RELATED PAIN: ICD-10-CM

## 2021-09-22 RX ORDER — OXYCODONE AND ACETAMINOPHEN 10; 325 MG/1; MG/1
1 TABLET ORAL EVERY 12 HOURS PRN
Qty: 60 TABLET | Refills: 0 | Status: ON HOLD | OUTPATIENT
Start: 2021-09-22 | End: 2021-10-10 | Stop reason: SDUPTHER

## 2021-10-01 ENCOUNTER — HOSPITAL ENCOUNTER (OUTPATIENT)
Dept: PREADMISSION TESTING | Facility: HOSPITAL | Age: 66
Discharge: HOME | End: 2021-10-01
Attending: SURGERY
Payer: MEDICARE

## 2021-10-01 ENCOUNTER — ANESTHESIA EVENT (OUTPATIENT)
Dept: SURGERY | Facility: HOSPITAL | Age: 66
DRG: 331 | End: 2021-10-01
Payer: MEDICARE

## 2021-10-02 ENCOUNTER — LAB VISIT (OUTPATIENT)
Dept: FAMILY MEDICINE | Facility: CLINIC | Age: 66
End: 2021-10-02
Payer: MEDICARE

## 2021-10-02 DIAGNOSIS — Z01.818 PRE-OP TESTING: ICD-10-CM

## 2021-10-02 PROCEDURE — U0005 INFEC AGEN DETEC AMPLI PROBE: HCPCS | Performed by: SURGERY

## 2021-10-02 PROCEDURE — U0003 INFECTIOUS AGENT DETECTION BY NUCLEIC ACID (DNA OR RNA); SEVERE ACUTE RESPIRATORY SYNDROME CORONAVIRUS 2 (SARS-COV-2) (CORONAVIRUS DISEASE [COVID-19]), AMPLIFIED PROBE TECHNIQUE, MAKING USE OF HIGH THROUGHPUT TECHNOLOGIES AS DESCRIBED BY CMS-2020-01-R: HCPCS | Performed by: SURGERY

## 2021-10-03 LAB
SARS-COV-2 RNA RESP QL NAA+PROBE: NOT DETECTED
SARS-COV-2- CYCLE NUMBER: NORMAL

## 2021-10-05 ENCOUNTER — ANESTHESIA (OUTPATIENT)
Dept: SURGERY | Facility: HOSPITAL | Age: 66
DRG: 331 | End: 2021-10-05
Payer: MEDICARE

## 2021-10-05 PROBLEM — Z93.2 ILEOSTOMY IN PLACE: Status: ACTIVE | Noted: 2021-10-05

## 2021-10-05 PROCEDURE — 25000003 PHARM REV CODE 250: Performed by: NURSE ANESTHETIST, CERTIFIED REGISTERED

## 2021-10-05 PROCEDURE — D9220A PRA ANESTHESIA: Mod: CRNA,,, | Performed by: NURSE ANESTHETIST, CERTIFIED REGISTERED

## 2021-10-05 PROCEDURE — D9220A PRA ANESTHESIA: ICD-10-PCS | Mod: ANES,,, | Performed by: ANESTHESIOLOGY

## 2021-10-05 PROCEDURE — D9220A PRA ANESTHESIA: Mod: ANES,,, | Performed by: ANESTHESIOLOGY

## 2021-10-05 PROCEDURE — D9220A PRA ANESTHESIA: ICD-10-PCS | Mod: CRNA,,, | Performed by: NURSE ANESTHETIST, CERTIFIED REGISTERED

## 2021-10-05 PROCEDURE — 63600175 PHARM REV CODE 636 W HCPCS: Performed by: NURSE ANESTHETIST, CERTIFIED REGISTERED

## 2021-10-05 PROCEDURE — 63600175 PHARM REV CODE 636 W HCPCS: Performed by: STUDENT IN AN ORGANIZED HEALTH CARE EDUCATION/TRAINING PROGRAM

## 2021-10-05 RX ORDER — PROPOFOL 10 MG/ML
VIAL (ML) INTRAVENOUS
Status: DISCONTINUED | OUTPATIENT
Start: 2021-10-05 | End: 2021-10-05

## 2021-10-05 RX ORDER — LIDOCAINE HYDROCHLORIDE 20 MG/ML
INJECTION, SOLUTION EPIDURAL; INFILTRATION; INTRACAUDAL; PERINEURAL
Status: DISCONTINUED | OUTPATIENT
Start: 2021-10-05 | End: 2021-10-05

## 2021-10-05 RX ADMIN — PROPOFOL 100 MG: 10 INJECTION, EMULSION INTRAVENOUS at 09:10

## 2021-10-05 RX ADMIN — SODIUM CHLORIDE, POTASSIUM CHLORIDE, SODIUM LACTATE AND CALCIUM CHLORIDE: 600; 310; 30; 20 INJECTION, SOLUTION INTRAVENOUS at 09:10

## 2021-10-05 RX ADMIN — LIDOCAINE HYDROCHLORIDE 50 MG: 20 INJECTION, SOLUTION EPIDURAL; INFILTRATION; INTRACAUDAL; PERINEURAL at 09:10

## 2021-10-05 RX ADMIN — PROPOFOL 50 MG: 10 INJECTION, EMULSION INTRAVENOUS at 09:10

## 2021-10-06 ENCOUNTER — HOSPITAL ENCOUNTER (INPATIENT)
Facility: HOSPITAL | Age: 66
LOS: 4 days | Discharge: HOME | DRG: 331 | End: 2021-10-10
Attending: SURGERY | Admitting: FAMILY MEDICINE
Payer: MEDICARE

## 2021-10-06 ENCOUNTER — ANESTHESIA (OUTPATIENT)
Dept: SURGERY | Facility: HOSPITAL | Age: 66
DRG: 331 | End: 2021-10-06
Payer: MEDICARE

## 2021-10-06 DIAGNOSIS — Z85.048 HISTORY OF RECTAL CANCER: ICD-10-CM

## 2021-10-06 DIAGNOSIS — Z98.890 STATUS POST REVERSAL OF ILEOSTOMY: Primary | ICD-10-CM

## 2021-10-06 DIAGNOSIS — G89.3 NEOPLASM RELATED PAIN: ICD-10-CM

## 2021-10-06 DIAGNOSIS — Z93.2 ILEOSTOMY IN PLACE: ICD-10-CM

## 2021-10-06 LAB — SARS-COV-2 RDRP RESP QL NAA+PROBE: NEGATIVE

## 2021-10-06 PROCEDURE — 25000003 PHARM REV CODE 250: Performed by: NURSE ANESTHETIST, CERTIFIED REGISTERED

## 2021-10-06 PROCEDURE — 44625 REPAIR BOWEL OPENING: CPT | Mod: ,,, | Performed by: SURGERY

## 2021-10-06 PROCEDURE — 63600175 PHARM REV CODE 636 W HCPCS

## 2021-10-06 PROCEDURE — 63600175 PHARM REV CODE 636 W HCPCS: Performed by: NURSE ANESTHETIST, CERTIFIED REGISTERED

## 2021-10-06 PROCEDURE — 37000009 HC ANESTHESIA EA ADD 15 MINS: Performed by: SURGERY

## 2021-10-06 PROCEDURE — 63600175 PHARM REV CODE 636 W HCPCS: Performed by: STUDENT IN AN ORGANIZED HEALTH CARE EDUCATION/TRAINING PROGRAM

## 2021-10-06 PROCEDURE — 71000033 HC RECOVERY, INTIAL HOUR: Performed by: SURGERY

## 2021-10-06 PROCEDURE — U0002 COVID-19 LAB TEST NON-CDC: HCPCS | Performed by: SURGERY

## 2021-10-06 PROCEDURE — D9220A PRA ANESTHESIA: Mod: ANES,,, | Performed by: ANESTHESIOLOGY

## 2021-10-06 PROCEDURE — 27201423 OPTIME MED/SURG SUP & DEVICES STERILE SUPPLY: Performed by: SURGERY

## 2021-10-06 PROCEDURE — 88305 TISSUE EXAM BY PATHOLOGIST: CPT | Performed by: PATHOLOGY

## 2021-10-06 PROCEDURE — 63600175 PHARM REV CODE 636 W HCPCS: Performed by: SURGERY

## 2021-10-06 PROCEDURE — 63600175 PHARM REV CODE 636 W HCPCS: Performed by: ANESTHESIOLOGY

## 2021-10-06 PROCEDURE — 44625 PR CLOSE ENTEROSTOMY,RESEC+ANAST: ICD-10-PCS | Mod: 80,,, | Performed by: STUDENT IN AN ORGANIZED HEALTH CARE EDUCATION/TRAINING PROGRAM

## 2021-10-06 PROCEDURE — 63600175 PHARM REV CODE 636 W HCPCS: Performed by: HOSPITALIST

## 2021-10-06 PROCEDURE — D9220A PRA ANESTHESIA: Mod: CRNA,,, | Performed by: NURSE ANESTHETIST, CERTIFIED REGISTERED

## 2021-10-06 PROCEDURE — 37000008 HC ANESTHESIA 1ST 15 MINUTES: Performed by: SURGERY

## 2021-10-06 PROCEDURE — 44625 REPAIR BOWEL OPENING: CPT | Mod: 80,,, | Performed by: STUDENT IN AN ORGANIZED HEALTH CARE EDUCATION/TRAINING PROGRAM

## 2021-10-06 PROCEDURE — 25000003 PHARM REV CODE 250: Performed by: ANESTHESIOLOGY

## 2021-10-06 PROCEDURE — D9220A PRA ANESTHESIA: ICD-10-PCS | Mod: CRNA,,, | Performed by: NURSE ANESTHETIST, CERTIFIED REGISTERED

## 2021-10-06 PROCEDURE — D9220A PRA ANESTHESIA: ICD-10-PCS | Mod: ANES,,, | Performed by: ANESTHESIOLOGY

## 2021-10-06 PROCEDURE — 36000708 HC OR TIME LEV III 1ST 15 MIN: Performed by: SURGERY

## 2021-10-06 PROCEDURE — 11000001 HC ACUTE MED/SURG PRIVATE ROOM

## 2021-10-06 PROCEDURE — 44625 PR CLOSE ENTEROSTOMY,RESEC+ANAST: ICD-10-PCS | Mod: ,,, | Performed by: SURGERY

## 2021-10-06 PROCEDURE — 36000709 HC OR TIME LEV III EA ADD 15 MIN: Performed by: SURGERY

## 2021-10-06 RX ORDER — MEPERIDINE HYDROCHLORIDE 50 MG/ML
INJECTION INTRAMUSCULAR; INTRAVENOUS; SUBCUTANEOUS
Status: DISCONTINUED | OUTPATIENT
Start: 2021-10-06 | End: 2021-10-06

## 2021-10-06 RX ORDER — MORPHINE SULFATE 4 MG/ML
2 INJECTION, SOLUTION INTRAMUSCULAR; INTRAVENOUS EVERY 5 MIN PRN
Status: DISCONTINUED | OUTPATIENT
Start: 2021-10-06 | End: 2021-10-06 | Stop reason: HOSPADM

## 2021-10-06 RX ORDER — LIDOCAINE HYDROCHLORIDE 10 MG/ML
1 INJECTION, SOLUTION EPIDURAL; INFILTRATION; INTRACAUDAL; PERINEURAL ONCE
Status: DISCONTINUED | OUTPATIENT
Start: 2021-10-06 | End: 2021-10-06

## 2021-10-06 RX ORDER — SODIUM CHLORIDE, SODIUM LACTATE, POTASSIUM CHLORIDE, CALCIUM CHLORIDE 600; 310; 30; 20 MG/100ML; MG/100ML; MG/100ML; MG/100ML
125 INJECTION, SOLUTION INTRAVENOUS CONTINUOUS
Status: DISCONTINUED | OUTPATIENT
Start: 2021-10-06 | End: 2021-10-06

## 2021-10-06 RX ORDER — HYDROMORPHONE HYDROCHLORIDE 2 MG/ML
1 INJECTION, SOLUTION INTRAMUSCULAR; INTRAVENOUS; SUBCUTANEOUS EVERY 4 HOURS PRN
Status: DISCONTINUED | OUTPATIENT
Start: 2021-10-06 | End: 2021-10-06

## 2021-10-06 RX ORDER — MORPHINE SULFATE 4 MG/ML
2 INJECTION, SOLUTION INTRAMUSCULAR; INTRAVENOUS EVERY 4 HOURS PRN
Status: DISCONTINUED | OUTPATIENT
Start: 2021-10-06 | End: 2021-10-06

## 2021-10-06 RX ORDER — SODIUM,POTASSIUM PHOSPHATES 280-250MG
2 POWDER IN PACKET (EA) ORAL
Status: DISCONTINUED | OUTPATIENT
Start: 2021-10-06 | End: 2021-10-10 | Stop reason: HOSPADM

## 2021-10-06 RX ORDER — HYDROMORPHONE HYDROCHLORIDE 2 MG/ML
1 INJECTION, SOLUTION INTRAMUSCULAR; INTRAVENOUS; SUBCUTANEOUS EVERY 6 HOURS PRN
Status: DISCONTINUED | OUTPATIENT
Start: 2021-10-06 | End: 2021-10-06

## 2021-10-06 RX ORDER — HYDROMORPHONE HYDROCHLORIDE 2 MG/ML
INJECTION, SOLUTION INTRAMUSCULAR; INTRAVENOUS; SUBCUTANEOUS
Status: COMPLETED
Start: 2021-10-06 | End: 2021-10-06

## 2021-10-06 RX ORDER — PROCHLORPERAZINE EDISYLATE 5 MG/ML
5 INJECTION INTRAMUSCULAR; INTRAVENOUS EVERY 6 HOURS PRN
Status: DISCONTINUED | OUTPATIENT
Start: 2021-10-06 | End: 2021-10-10 | Stop reason: HOSPADM

## 2021-10-06 RX ORDER — OXYCODONE AND ACETAMINOPHEN 5; 325 MG/1; MG/1
1 TABLET ORAL EVERY 4 HOURS PRN
Status: DISCONTINUED | OUTPATIENT
Start: 2021-10-06 | End: 2021-10-06

## 2021-10-06 RX ORDER — MORPHINE SULFATE 4 MG/ML
INJECTION, SOLUTION INTRAMUSCULAR; INTRAVENOUS
Status: COMPLETED
Start: 2021-10-06 | End: 2021-10-06

## 2021-10-06 RX ORDER — ONDANSETRON 2 MG/ML
4 INJECTION INTRAMUSCULAR; INTRAVENOUS EVERY 6 HOURS PRN
Status: DISCONTINUED | OUTPATIENT
Start: 2021-10-06 | End: 2021-10-10 | Stop reason: HOSPADM

## 2021-10-06 RX ORDER — MORPHINE SULFATE 4 MG/ML
2 INJECTION, SOLUTION INTRAMUSCULAR; INTRAVENOUS
Status: DISCONTINUED | OUTPATIENT
Start: 2021-10-06 | End: 2021-10-06

## 2021-10-06 RX ORDER — CEFAZOLIN SODIUM 2 G/50ML
2 SOLUTION INTRAVENOUS
Status: COMPLETED | OUTPATIENT
Start: 2021-10-06 | End: 2021-10-06

## 2021-10-06 RX ORDER — CEFAZOLIN SODIUM 1 G/3ML
INJECTION, POWDER, FOR SOLUTION INTRAMUSCULAR; INTRAVENOUS
Status: DISPENSED
Start: 2021-10-06 | End: 2021-10-06

## 2021-10-06 RX ORDER — LIDOCAINE HYDROCHLORIDE 10 MG/ML
1 INJECTION, SOLUTION EPIDURAL; INFILTRATION; INTRACAUDAL; PERINEURAL ONCE
Status: DISCONTINUED | OUTPATIENT
Start: 2021-10-06 | End: 2021-10-06 | Stop reason: HOSPADM

## 2021-10-06 RX ORDER — HYDROMORPHONE HYDROCHLORIDE 2 MG/ML
2 INJECTION, SOLUTION INTRAMUSCULAR; INTRAVENOUS; SUBCUTANEOUS EVERY 4 HOURS PRN
Status: DISCONTINUED | OUTPATIENT
Start: 2021-10-06 | End: 2021-10-10 | Stop reason: HOSPADM

## 2021-10-06 RX ORDER — HYDROMORPHONE HYDROCHLORIDE 2 MG/ML
2 INJECTION, SOLUTION INTRAMUSCULAR; INTRAVENOUS; SUBCUTANEOUS EVERY 6 HOURS PRN
Status: DISCONTINUED | OUTPATIENT
Start: 2021-10-06 | End: 2021-10-06

## 2021-10-06 RX ORDER — LIDOCAINE HYDROCHLORIDE 20 MG/ML
INJECTION, SOLUTION EPIDURAL; INFILTRATION; INTRACAUDAL; PERINEURAL
Status: DISCONTINUED | OUTPATIENT
Start: 2021-10-06 | End: 2021-10-06

## 2021-10-06 RX ORDER — SUCCINYLCHOLINE CHLORIDE 20 MG/ML
INJECTION INTRAMUSCULAR; INTRAVENOUS
Status: DISCONTINUED | OUTPATIENT
Start: 2021-10-06 | End: 2021-10-06

## 2021-10-06 RX ORDER — ERTAPENEM 1 G/1
INJECTION, POWDER, LYOPHILIZED, FOR SOLUTION INTRAMUSCULAR; INTRAVENOUS
Status: DISCONTINUED | OUTPATIENT
Start: 2021-10-06 | End: 2021-10-06

## 2021-10-06 RX ORDER — ONDANSETRON 2 MG/ML
INJECTION INTRAMUSCULAR; INTRAVENOUS
Status: DISCONTINUED | OUTPATIENT
Start: 2021-10-06 | End: 2021-10-06

## 2021-10-06 RX ORDER — SODIUM CHLORIDE 9 MG/ML
INJECTION, SOLUTION INTRAVENOUS CONTINUOUS
Status: DISCONTINUED | OUTPATIENT
Start: 2021-10-06 | End: 2021-10-06

## 2021-10-06 RX ORDER — GLUCAGON 1 MG
1 KIT INJECTION
Status: DISCONTINUED | OUTPATIENT
Start: 2021-10-06 | End: 2021-10-10 | Stop reason: HOSPADM

## 2021-10-06 RX ORDER — PROPOFOL 10 MG/ML
VIAL (ML) INTRAVENOUS
Status: DISCONTINUED | OUTPATIENT
Start: 2021-10-06 | End: 2021-10-06

## 2021-10-06 RX ORDER — ONDANSETRON 2 MG/ML
INJECTION INTRAMUSCULAR; INTRAVENOUS
Status: DISPENSED
Start: 2021-10-06 | End: 2021-10-07

## 2021-10-06 RX ORDER — MIDAZOLAM HYDROCHLORIDE 1 MG/ML
INJECTION, SOLUTION INTRAMUSCULAR; INTRAVENOUS
Status: DISCONTINUED | OUTPATIENT
Start: 2021-10-06 | End: 2021-10-06

## 2021-10-06 RX ORDER — DIPHENHYDRAMINE HYDROCHLORIDE 50 MG/ML
12.5 INJECTION INTRAMUSCULAR; INTRAVENOUS
Status: DISCONTINUED | OUTPATIENT
Start: 2021-10-06 | End: 2021-10-06 | Stop reason: HOSPADM

## 2021-10-06 RX ORDER — ONDANSETRON 2 MG/ML
4 INJECTION INTRAMUSCULAR; INTRAVENOUS DAILY PRN
Status: DISCONTINUED | OUTPATIENT
Start: 2021-10-06 | End: 2021-10-06 | Stop reason: HOSPADM

## 2021-10-06 RX ORDER — LANOLIN ALCOHOL/MO/W.PET/CERES
800 CREAM (GRAM) TOPICAL
Status: DISCONTINUED | OUTPATIENT
Start: 2021-10-06 | End: 2021-10-10 | Stop reason: HOSPADM

## 2021-10-06 RX ORDER — SODIUM CHLORIDE, SODIUM LACTATE, POTASSIUM CHLORIDE, CALCIUM CHLORIDE 600; 310; 30; 20 MG/100ML; MG/100ML; MG/100ML; MG/100ML
INJECTION, SOLUTION INTRAVENOUS CONTINUOUS
Status: DISCONTINUED | OUTPATIENT
Start: 2021-10-06 | End: 2021-10-10 | Stop reason: HOSPADM

## 2021-10-06 RX ORDER — MORPHINE SULFATE 4 MG/ML
4 INJECTION, SOLUTION INTRAMUSCULAR; INTRAVENOUS
Status: DISCONTINUED | OUTPATIENT
Start: 2021-10-06 | End: 2021-10-06

## 2021-10-06 RX ORDER — SODIUM CHLORIDE, SODIUM LACTATE, POTASSIUM CHLORIDE, CALCIUM CHLORIDE 600; 310; 30; 20 MG/100ML; MG/100ML; MG/100ML; MG/100ML
INJECTION, SOLUTION INTRAVENOUS CONTINUOUS
Status: DISCONTINUED | OUTPATIENT
Start: 2021-10-06 | End: 2021-10-06

## 2021-10-06 RX ORDER — FAMOTIDINE 10 MG/ML
20 INJECTION INTRAVENOUS ONCE
Status: COMPLETED | OUTPATIENT
Start: 2021-10-06 | End: 2021-10-06

## 2021-10-06 RX ORDER — ROCURONIUM BROMIDE 10 MG/ML
INJECTION, SOLUTION INTRAVENOUS
Status: DISCONTINUED | OUTPATIENT
Start: 2021-10-06 | End: 2021-10-06

## 2021-10-06 RX ORDER — SODIUM CHLORIDE 0.9 % (FLUSH) 0.9 %
10 SYRINGE (ML) INJECTION
Status: DISCONTINUED | OUTPATIENT
Start: 2021-10-06 | End: 2021-10-10 | Stop reason: HOSPADM

## 2021-10-06 RX ORDER — NALOXONE HCL 0.4 MG/ML
0.02 VIAL (ML) INJECTION
Status: DISCONTINUED | OUTPATIENT
Start: 2021-10-06 | End: 2021-10-10 | Stop reason: HOSPADM

## 2021-10-06 RX ORDER — PANTOPRAZOLE SODIUM 40 MG/10ML
40 INJECTION, POWDER, LYOPHILIZED, FOR SOLUTION INTRAVENOUS DAILY
Status: DISCONTINUED | OUTPATIENT
Start: 2021-10-07 | End: 2021-10-10 | Stop reason: HOSPADM

## 2021-10-06 RX ADMIN — ROCURONIUM BROMIDE 20 MG: 10 INJECTION, SOLUTION INTRAVENOUS at 12:10

## 2021-10-06 RX ADMIN — MIDAZOLAM HYDROCHLORIDE 2 MG: 1 INJECTION, SOLUTION INTRAMUSCULAR; INTRAVENOUS at 12:10

## 2021-10-06 RX ADMIN — SODIUM CHLORIDE, POTASSIUM CHLORIDE, SODIUM LACTATE AND CALCIUM CHLORIDE: 600; 310; 30; 20 INJECTION, SOLUTION INTRAVENOUS at 12:10

## 2021-10-06 RX ADMIN — ERTAPENEM SODIUM 1 G: 1 INJECTION, POWDER, LYOPHILIZED, FOR SOLUTION INTRAMUSCULAR; INTRAVENOUS at 12:10

## 2021-10-06 RX ADMIN — MEPERIDINE HYDROCHLORIDE 20 MG: 50 INJECTION INTRAMUSCULAR; INTRAVENOUS; SUBCUTANEOUS at 01:10

## 2021-10-06 RX ADMIN — MORPHINE SULFATE 2 MG: 4 INJECTION INTRAVENOUS at 02:10

## 2021-10-06 RX ADMIN — ROCURONIUM BROMIDE 10 MG: 10 INJECTION, SOLUTION INTRAVENOUS at 12:10

## 2021-10-06 RX ADMIN — ONDANSETRON 4 MG: 2 INJECTION INTRAMUSCULAR; INTRAVENOUS at 02:10

## 2021-10-06 RX ADMIN — MEPERIDINE HYDROCHLORIDE 10 MG: 50 INJECTION INTRAMUSCULAR; INTRAVENOUS; SUBCUTANEOUS at 12:10

## 2021-10-06 RX ADMIN — SUGAMMADEX 200 MG: 100 INJECTION, SOLUTION INTRAVENOUS at 01:10

## 2021-10-06 RX ADMIN — HYDROMORPHONE HYDROCHLORIDE 2 MG: 2 INJECTION, SOLUTION INTRAMUSCULAR; INTRAVENOUS; SUBCUTANEOUS at 08:10

## 2021-10-06 RX ADMIN — FAMOTIDINE 20 MG: 10 INJECTION INTRAVENOUS at 09:10

## 2021-10-06 RX ADMIN — SODIUM CHLORIDE, POTASSIUM CHLORIDE, SODIUM LACTATE AND CALCIUM CHLORIDE: 600; 310; 30; 20 INJECTION, SOLUTION INTRAVENOUS at 09:10

## 2021-10-06 RX ADMIN — SUCCINYLCHOLINE CHLORIDE 100 MG: 20 INJECTION, SOLUTION INTRAMUSCULAR; INTRAVENOUS at 12:10

## 2021-10-06 RX ADMIN — PROPOFOL 50 MG: 10 INJECTION, EMULSION INTRAVENOUS at 12:10

## 2021-10-06 RX ADMIN — CEFAZOLIN SODIUM 2 G: 2 SOLUTION INTRAVENOUS at 12:10

## 2021-10-06 RX ADMIN — PROPOFOL 150 MG: 10 INJECTION, EMULSION INTRAVENOUS at 12:10

## 2021-10-06 RX ADMIN — ONDANSETRON 4 MG: 2 INJECTION INTRAMUSCULAR; INTRAVENOUS at 01:10

## 2021-10-06 RX ADMIN — HYDROMORPHONE HYDROCHLORIDE 1 MG: 2 INJECTION, SOLUTION INTRAMUSCULAR; INTRAVENOUS; SUBCUTANEOUS at 04:10

## 2021-10-06 RX ADMIN — LIDOCAINE HYDROCHLORIDE 60 MG: 20 INJECTION, SOLUTION EPIDURAL; INFILTRATION; INTRACAUDAL; PERINEURAL at 12:10

## 2021-10-07 PROBLEM — E83.42 HYPOMAGNESEMIA: Status: ACTIVE | Noted: 2021-10-07

## 2021-10-07 LAB
ALBUMIN SERPL BCP-MCNC: 3 G/DL (ref 3.5–5.2)
ALP SERPL-CCNC: 160 U/L (ref 55–135)
ALT SERPL W/O P-5'-P-CCNC: 63 U/L (ref 10–44)
ANION GAP SERPL CALC-SCNC: 11 MMOL/L (ref 8–16)
AST SERPL-CCNC: 44 U/L (ref 10–40)
BASOPHILS # BLD AUTO: 0.03 K/UL (ref 0–0.2)
BASOPHILS NFR BLD: 0.3 % (ref 0–1.9)
BILIRUB SERPL-MCNC: 0.9 MG/DL (ref 0.1–1)
BUN SERPL-MCNC: 19 MG/DL (ref 8–23)
CALCIUM SERPL-MCNC: 9.3 MG/DL (ref 8.7–10.5)
CHLORIDE SERPL-SCNC: 104 MMOL/L (ref 95–110)
CO2 SERPL-SCNC: 25 MMOL/L (ref 23–29)
CREAT SERPL-MCNC: 1.2 MG/DL (ref 0.5–1.4)
DIFFERENTIAL METHOD: ABNORMAL
EOSINOPHIL # BLD AUTO: 0 K/UL (ref 0–0.5)
EOSINOPHIL NFR BLD: 0.2 % (ref 0–8)
ERYTHROCYTE [DISTWIDTH] IN BLOOD BY AUTOMATED COUNT: 13.3 % (ref 11.5–14.5)
EST. GFR  (AFRICAN AMERICAN): >60 ML/MIN/1.73 M^2
EST. GFR  (NON AFRICAN AMERICAN): >60 ML/MIN/1.73 M^2
GLUCOSE SERPL-MCNC: 111 MG/DL (ref 70–110)
HCT VFR BLD AUTO: 32.7 % (ref 40–54)
HGB BLD-MCNC: 10.9 G/DL (ref 14–18)
IMM GRANULOCYTES # BLD AUTO: 0.08 K/UL (ref 0–0.04)
IMM GRANULOCYTES NFR BLD AUTO: 0.7 % (ref 0–0.5)
LYMPHOCYTES # BLD AUTO: 0.6 K/UL (ref 1–4.8)
LYMPHOCYTES NFR BLD: 5.4 % (ref 18–48)
MAGNESIUM SERPL-MCNC: 1.5 MG/DL (ref 1.6–2.6)
MCH RBC QN AUTO: 32.8 PG (ref 27–31)
MCHC RBC AUTO-ENTMCNC: 33.3 G/DL (ref 32–36)
MCV RBC AUTO: 99 FL (ref 82–98)
MONOCYTES # BLD AUTO: 0.8 K/UL (ref 0.3–1)
MONOCYTES NFR BLD: 7 % (ref 4–15)
NEUTROPHILS # BLD AUTO: 10.2 K/UL (ref 1.8–7.7)
NEUTROPHILS NFR BLD: 86.4 % (ref 38–73)
NRBC BLD-RTO: 0 /100 WBC
PHOSPHATE SERPL-MCNC: 2.3 MG/DL (ref 2.7–4.5)
PLATELET # BLD AUTO: 284 K/UL (ref 150–450)
PMV BLD AUTO: 9.2 FL (ref 9.2–12.9)
POTASSIUM SERPL-SCNC: 4 MMOL/L (ref 3.5–5.1)
PROT SERPL-MCNC: 6.7 G/DL (ref 6–8.4)
RBC # BLD AUTO: 3.32 M/UL (ref 4.6–6.2)
SODIUM SERPL-SCNC: 140 MMOL/L (ref 136–145)
WBC # BLD AUTO: 11.77 K/UL (ref 3.9–12.7)

## 2021-10-07 PROCEDURE — 63600175 PHARM REV CODE 636 W HCPCS: Performed by: STUDENT IN AN ORGANIZED HEALTH CARE EDUCATION/TRAINING PROGRAM

## 2021-10-07 PROCEDURE — 63600175 PHARM REV CODE 636 W HCPCS: Performed by: SURGERY

## 2021-10-07 PROCEDURE — 85025 COMPLETE CBC W/AUTO DIFF WBC: CPT | Performed by: STUDENT IN AN ORGANIZED HEALTH CARE EDUCATION/TRAINING PROGRAM

## 2021-10-07 PROCEDURE — 25000003 PHARM REV CODE 250: Performed by: STUDENT IN AN ORGANIZED HEALTH CARE EDUCATION/TRAINING PROGRAM

## 2021-10-07 PROCEDURE — 63600175 PHARM REV CODE 636 W HCPCS: Performed by: HOSPITALIST

## 2021-10-07 PROCEDURE — 36415 COLL VENOUS BLD VENIPUNCTURE: CPT | Performed by: STUDENT IN AN ORGANIZED HEALTH CARE EDUCATION/TRAINING PROGRAM

## 2021-10-07 PROCEDURE — C9113 INJ PANTOPRAZOLE SODIUM, VIA: HCPCS | Performed by: SURGERY

## 2021-10-07 PROCEDURE — 11000001 HC ACUTE MED/SURG PRIVATE ROOM

## 2021-10-07 PROCEDURE — 80053 COMPREHEN METABOLIC PANEL: CPT | Performed by: STUDENT IN AN ORGANIZED HEALTH CARE EDUCATION/TRAINING PROGRAM

## 2021-10-07 PROCEDURE — 83735 ASSAY OF MAGNESIUM: CPT | Performed by: STUDENT IN AN ORGANIZED HEALTH CARE EDUCATION/TRAINING PROGRAM

## 2021-10-07 PROCEDURE — 84100 ASSAY OF PHOSPHORUS: CPT | Performed by: STUDENT IN AN ORGANIZED HEALTH CARE EDUCATION/TRAINING PROGRAM

## 2021-10-07 RX ORDER — MAGNESIUM SULFATE 1 G/100ML
4 INJECTION INTRAVENOUS ONCE
Status: COMPLETED | OUTPATIENT
Start: 2021-10-07 | End: 2021-10-07

## 2021-10-07 RX ADMIN — HYDROMORPHONE HYDROCHLORIDE 2 MG: 2 INJECTION, SOLUTION INTRAMUSCULAR; INTRAVENOUS; SUBCUTANEOUS at 01:10

## 2021-10-07 RX ADMIN — MAGNESIUM SULFATE IN DEXTROSE 4 G: 10 INJECTION, SOLUTION INTRAVENOUS at 10:10

## 2021-10-07 RX ADMIN — HYDROMORPHONE HYDROCHLORIDE 2 MG: 2 INJECTION, SOLUTION INTRAMUSCULAR; INTRAVENOUS; SUBCUTANEOUS at 05:10

## 2021-10-07 RX ADMIN — POTASSIUM PHOSPHATE, MONOBASIC AND POTASSIUM PHOSPHATE, DIBASIC 15 MMOL: 224; 236 INJECTION, SOLUTION, CONCENTRATE INTRAVENOUS at 10:10

## 2021-10-07 RX ADMIN — SODIUM CHLORIDE, SODIUM LACTATE, POTASSIUM CHLORIDE, AND CALCIUM CHLORIDE: .6; .31; .03; .02 INJECTION, SOLUTION INTRAVENOUS at 08:10

## 2021-10-07 RX ADMIN — SODIUM CHLORIDE, SODIUM LACTATE, POTASSIUM CHLORIDE, AND CALCIUM CHLORIDE: .6; .31; .03; .02 INJECTION, SOLUTION INTRAVENOUS at 04:10

## 2021-10-07 RX ADMIN — SODIUM CHLORIDE, SODIUM LACTATE, POTASSIUM CHLORIDE, AND CALCIUM CHLORIDE: .6; .31; .03; .02 INJECTION, SOLUTION INTRAVENOUS at 10:10

## 2021-10-07 RX ADMIN — HYDROMORPHONE HYDROCHLORIDE 2 MG: 2 INJECTION, SOLUTION INTRAMUSCULAR; INTRAVENOUS; SUBCUTANEOUS at 08:10

## 2021-10-07 RX ADMIN — PANTOPRAZOLE SODIUM 40 MG: 40 INJECTION, POWDER, FOR SOLUTION INTRAVENOUS at 09:10

## 2021-10-07 RX ADMIN — PROCHLORPERAZINE EDISYLATE 5 MG: 5 INJECTION INTRAMUSCULAR; INTRAVENOUS at 08:10

## 2021-10-08 PROBLEM — E83.42 HYPOMAGNESEMIA: Status: RESOLVED | Noted: 2021-10-07 | Resolved: 2021-10-08

## 2021-10-08 LAB
ALBUMIN SERPL BCP-MCNC: 2.8 G/DL (ref 3.5–5.2)
ALP SERPL-CCNC: 153 U/L (ref 55–135)
ALT SERPL W/O P-5'-P-CCNC: 45 U/L (ref 10–44)
ANION GAP SERPL CALC-SCNC: 13 MMOL/L (ref 8–16)
AST SERPL-CCNC: 34 U/L (ref 10–40)
BASOPHILS # BLD AUTO: 0.03 K/UL (ref 0–0.2)
BASOPHILS NFR BLD: 0.3 % (ref 0–1.9)
BILIRUB SERPL-MCNC: 0.8 MG/DL (ref 0.1–1)
BUN SERPL-MCNC: 12 MG/DL (ref 8–23)
CALCIUM SERPL-MCNC: 9.3 MG/DL (ref 8.7–10.5)
CHLORIDE SERPL-SCNC: 102 MMOL/L (ref 95–110)
CO2 SERPL-SCNC: 28 MMOL/L (ref 23–29)
CREAT SERPL-MCNC: 0.9 MG/DL (ref 0.5–1.4)
DIFFERENTIAL METHOD: ABNORMAL
EOSINOPHIL # BLD AUTO: 0.1 K/UL (ref 0–0.5)
EOSINOPHIL NFR BLD: 0.5 % (ref 0–8)
ERYTHROCYTE [DISTWIDTH] IN BLOOD BY AUTOMATED COUNT: 13.1 % (ref 11.5–14.5)
EST. GFR  (AFRICAN AMERICAN): >60 ML/MIN/1.73 M^2
EST. GFR  (NON AFRICAN AMERICAN): >60 ML/MIN/1.73 M^2
GLUCOSE SERPL-MCNC: 93 MG/DL (ref 70–110)
HCT VFR BLD AUTO: 30.6 % (ref 40–54)
HGB BLD-MCNC: 10.5 G/DL (ref 14–18)
IMM GRANULOCYTES # BLD AUTO: 0.05 K/UL (ref 0–0.04)
IMM GRANULOCYTES NFR BLD AUTO: 0.4 % (ref 0–0.5)
LYMPHOCYTES # BLD AUTO: 1 K/UL (ref 1–4.8)
LYMPHOCYTES NFR BLD: 8.6 % (ref 18–48)
MAGNESIUM SERPL-MCNC: 2.1 MG/DL (ref 1.6–2.6)
MCH RBC QN AUTO: 32.9 PG (ref 27–31)
MCHC RBC AUTO-ENTMCNC: 34.3 G/DL (ref 32–36)
MCV RBC AUTO: 96 FL (ref 82–98)
MONOCYTES # BLD AUTO: 0.9 K/UL (ref 0.3–1)
MONOCYTES NFR BLD: 7.6 % (ref 4–15)
NEUTROPHILS # BLD AUTO: 9.3 K/UL (ref 1.8–7.7)
NEUTROPHILS NFR BLD: 82.6 % (ref 38–73)
NRBC BLD-RTO: 0 /100 WBC
PHOSPHATE SERPL-MCNC: 2.5 MG/DL (ref 2.7–4.5)
PLATELET # BLD AUTO: 269 K/UL (ref 150–450)
PMV BLD AUTO: 8.9 FL (ref 9.2–12.9)
POTASSIUM SERPL-SCNC: 3.6 MMOL/L (ref 3.5–5.1)
PROT SERPL-MCNC: 6.8 G/DL (ref 6–8.4)
RBC # BLD AUTO: 3.19 M/UL (ref 4.6–6.2)
SODIUM SERPL-SCNC: 143 MMOL/L (ref 136–145)
WBC # BLD AUTO: 11.2 K/UL (ref 3.9–12.7)

## 2021-10-08 PROCEDURE — C9113 INJ PANTOPRAZOLE SODIUM, VIA: HCPCS | Performed by: SURGERY

## 2021-10-08 PROCEDURE — 11000001 HC ACUTE MED/SURG PRIVATE ROOM

## 2021-10-08 PROCEDURE — 84100 ASSAY OF PHOSPHORUS: CPT | Performed by: STUDENT IN AN ORGANIZED HEALTH CARE EDUCATION/TRAINING PROGRAM

## 2021-10-08 PROCEDURE — 63600175 PHARM REV CODE 636 W HCPCS: Performed by: STUDENT IN AN ORGANIZED HEALTH CARE EDUCATION/TRAINING PROGRAM

## 2021-10-08 PROCEDURE — 63600175 PHARM REV CODE 636 W HCPCS: Performed by: SURGERY

## 2021-10-08 PROCEDURE — 80053 COMPREHEN METABOLIC PANEL: CPT | Performed by: STUDENT IN AN ORGANIZED HEALTH CARE EDUCATION/TRAINING PROGRAM

## 2021-10-08 PROCEDURE — 63600175 PHARM REV CODE 636 W HCPCS: Performed by: HOSPITALIST

## 2021-10-08 PROCEDURE — 83735 ASSAY OF MAGNESIUM: CPT | Performed by: STUDENT IN AN ORGANIZED HEALTH CARE EDUCATION/TRAINING PROGRAM

## 2021-10-08 PROCEDURE — 36415 COLL VENOUS BLD VENIPUNCTURE: CPT | Performed by: STUDENT IN AN ORGANIZED HEALTH CARE EDUCATION/TRAINING PROGRAM

## 2021-10-08 PROCEDURE — 85025 COMPLETE CBC W/AUTO DIFF WBC: CPT | Performed by: STUDENT IN AN ORGANIZED HEALTH CARE EDUCATION/TRAINING PROGRAM

## 2021-10-08 PROCEDURE — 94799 UNLISTED PULMONARY SVC/PX: CPT

## 2021-10-08 RX ORDER — ENOXAPARIN SODIUM 100 MG/ML
40 INJECTION SUBCUTANEOUS EVERY 24 HOURS
Status: DISCONTINUED | OUTPATIENT
Start: 2021-10-08 | End: 2021-10-10 | Stop reason: HOSPADM

## 2021-10-08 RX ADMIN — HYDROMORPHONE HYDROCHLORIDE 2 MG: 2 INJECTION, SOLUTION INTRAMUSCULAR; INTRAVENOUS; SUBCUTANEOUS at 12:10

## 2021-10-08 RX ADMIN — SODIUM CHLORIDE, SODIUM LACTATE, POTASSIUM CHLORIDE, AND CALCIUM CHLORIDE: .6; .31; .03; .02 INJECTION, SOLUTION INTRAVENOUS at 04:10

## 2021-10-08 RX ADMIN — ENOXAPARIN SODIUM 40 MG: 40 INJECTION SUBCUTANEOUS at 05:10

## 2021-10-08 RX ADMIN — PANTOPRAZOLE SODIUM 40 MG: 40 INJECTION, POWDER, FOR SOLUTION INTRAVENOUS at 08:10

## 2021-10-08 RX ADMIN — HYDROMORPHONE HYDROCHLORIDE 2 MG: 2 INJECTION, SOLUTION INTRAMUSCULAR; INTRAVENOUS; SUBCUTANEOUS at 08:10

## 2021-10-08 RX ADMIN — HYDROMORPHONE HYDROCHLORIDE 2 MG: 2 INJECTION, SOLUTION INTRAMUSCULAR; INTRAVENOUS; SUBCUTANEOUS at 05:10

## 2021-10-08 RX ADMIN — HYDROMORPHONE HYDROCHLORIDE 2 MG: 2 INJECTION, SOLUTION INTRAMUSCULAR; INTRAVENOUS; SUBCUTANEOUS at 10:10

## 2021-10-08 RX ADMIN — HYDROMORPHONE HYDROCHLORIDE 2 MG: 2 INJECTION, SOLUTION INTRAMUSCULAR; INTRAVENOUS; SUBCUTANEOUS at 04:10

## 2021-10-09 LAB
ALBUMIN SERPL BCP-MCNC: 2.3 G/DL (ref 3.5–5.2)
ALP SERPL-CCNC: 114 U/L (ref 55–135)
ALT SERPL W/O P-5'-P-CCNC: 30 U/L (ref 10–44)
ANION GAP SERPL CALC-SCNC: 8 MMOL/L (ref 8–16)
AST SERPL-CCNC: 21 U/L (ref 10–40)
BASOPHILS # BLD AUTO: 0.02 K/UL (ref 0–0.2)
BASOPHILS NFR BLD: 0.2 % (ref 0–1.9)
BILIRUB SERPL-MCNC: 0.6 MG/DL (ref 0.1–1)
BUN SERPL-MCNC: 9 MG/DL (ref 8–23)
CALCIUM SERPL-MCNC: 8.7 MG/DL (ref 8.7–10.5)
CHLORIDE SERPL-SCNC: 101 MMOL/L (ref 95–110)
CO2 SERPL-SCNC: 32 MMOL/L (ref 23–29)
CREAT SERPL-MCNC: 0.9 MG/DL (ref 0.5–1.4)
DIFFERENTIAL METHOD: ABNORMAL
EOSINOPHIL # BLD AUTO: 0.1 K/UL (ref 0–0.5)
EOSINOPHIL NFR BLD: 1.4 % (ref 0–8)
ERYTHROCYTE [DISTWIDTH] IN BLOOD BY AUTOMATED COUNT: 13.3 % (ref 11.5–14.5)
EST. GFR  (AFRICAN AMERICAN): >60 ML/MIN/1.73 M^2
EST. GFR  (NON AFRICAN AMERICAN): >60 ML/MIN/1.73 M^2
GLUCOSE SERPL-MCNC: 93 MG/DL (ref 70–110)
HCT VFR BLD AUTO: 27.9 % (ref 40–54)
HGB BLD-MCNC: 9.3 G/DL (ref 14–18)
IMM GRANULOCYTES # BLD AUTO: 0.04 K/UL (ref 0–0.04)
IMM GRANULOCYTES NFR BLD AUTO: 0.5 % (ref 0–0.5)
LYMPHOCYTES # BLD AUTO: 1 K/UL (ref 1–4.8)
LYMPHOCYTES NFR BLD: 12.4 % (ref 18–48)
MAGNESIUM SERPL-MCNC: 1.6 MG/DL (ref 1.6–2.6)
MCH RBC QN AUTO: 33 PG (ref 27–31)
MCHC RBC AUTO-ENTMCNC: 33.3 G/DL (ref 32–36)
MCV RBC AUTO: 99 FL (ref 82–98)
MONOCYTES # BLD AUTO: 0.7 K/UL (ref 0.3–1)
MONOCYTES NFR BLD: 8.7 % (ref 4–15)
NEUTROPHILS # BLD AUTO: 6.5 K/UL (ref 1.8–7.7)
NEUTROPHILS NFR BLD: 76.8 % (ref 38–73)
NRBC BLD-RTO: 0 /100 WBC
PHOSPHATE SERPL-MCNC: 2.2 MG/DL (ref 2.7–4.5)
PLATELET # BLD AUTO: 267 K/UL (ref 150–450)
PMV BLD AUTO: 9.2 FL (ref 9.2–12.9)
POTASSIUM SERPL-SCNC: 3.4 MMOL/L (ref 3.5–5.1)
PROT SERPL-MCNC: 5.7 G/DL (ref 6–8.4)
RBC # BLD AUTO: 2.82 M/UL (ref 4.6–6.2)
SODIUM SERPL-SCNC: 141 MMOL/L (ref 136–145)
WBC # BLD AUTO: 8.4 K/UL (ref 3.9–12.7)

## 2021-10-09 PROCEDURE — 84100 ASSAY OF PHOSPHORUS: CPT | Performed by: STUDENT IN AN ORGANIZED HEALTH CARE EDUCATION/TRAINING PROGRAM

## 2021-10-09 PROCEDURE — 63600175 PHARM REV CODE 636 W HCPCS: Performed by: STUDENT IN AN ORGANIZED HEALTH CARE EDUCATION/TRAINING PROGRAM

## 2021-10-09 PROCEDURE — 63600175 PHARM REV CODE 636 W HCPCS: Performed by: HOSPITALIST

## 2021-10-09 PROCEDURE — 80053 COMPREHEN METABOLIC PANEL: CPT | Performed by: STUDENT IN AN ORGANIZED HEALTH CARE EDUCATION/TRAINING PROGRAM

## 2021-10-09 PROCEDURE — 25000003 PHARM REV CODE 250: Performed by: STUDENT IN AN ORGANIZED HEALTH CARE EDUCATION/TRAINING PROGRAM

## 2021-10-09 PROCEDURE — 11000001 HC ACUTE MED/SURG PRIVATE ROOM

## 2021-10-09 PROCEDURE — 36415 COLL VENOUS BLD VENIPUNCTURE: CPT | Performed by: STUDENT IN AN ORGANIZED HEALTH CARE EDUCATION/TRAINING PROGRAM

## 2021-10-09 PROCEDURE — 83735 ASSAY OF MAGNESIUM: CPT | Performed by: STUDENT IN AN ORGANIZED HEALTH CARE EDUCATION/TRAINING PROGRAM

## 2021-10-09 PROCEDURE — 85025 COMPLETE CBC W/AUTO DIFF WBC: CPT | Performed by: STUDENT IN AN ORGANIZED HEALTH CARE EDUCATION/TRAINING PROGRAM

## 2021-10-09 PROCEDURE — C9113 INJ PANTOPRAZOLE SODIUM, VIA: HCPCS | Performed by: SURGERY

## 2021-10-09 PROCEDURE — 63600175 PHARM REV CODE 636 W HCPCS: Performed by: SURGERY

## 2021-10-09 RX ORDER — POTASSIUM CHLORIDE 20 MEQ/1
40 TABLET, EXTENDED RELEASE ORAL ONCE
Status: COMPLETED | OUTPATIENT
Start: 2021-10-09 | End: 2021-10-09

## 2021-10-09 RX ORDER — SODIUM,POTASSIUM PHOSPHATES 280-250MG
1 POWDER IN PACKET (EA) ORAL ONCE
Status: COMPLETED | OUTPATIENT
Start: 2021-10-09 | End: 2021-10-09

## 2021-10-09 RX ADMIN — HYDROMORPHONE HYDROCHLORIDE 2 MG: 2 INJECTION, SOLUTION INTRAMUSCULAR; INTRAVENOUS; SUBCUTANEOUS at 11:10

## 2021-10-09 RX ADMIN — HYDROMORPHONE HYDROCHLORIDE 2 MG: 2 INJECTION, SOLUTION INTRAMUSCULAR; INTRAVENOUS; SUBCUTANEOUS at 08:10

## 2021-10-09 RX ADMIN — HYDROMORPHONE HYDROCHLORIDE 2 MG: 2 INJECTION, SOLUTION INTRAMUSCULAR; INTRAVENOUS; SUBCUTANEOUS at 04:10

## 2021-10-09 RX ADMIN — SODIUM CHLORIDE, SODIUM LACTATE, POTASSIUM CHLORIDE, AND CALCIUM CHLORIDE 125 ML/HR: .6; .31; .03; .02 INJECTION, SOLUTION INTRAVENOUS at 11:10

## 2021-10-09 RX ADMIN — POTASSIUM CHLORIDE 40 MEQ: 1500 TABLET, EXTENDED RELEASE ORAL at 12:10

## 2021-10-09 RX ADMIN — POTASSIUM & SODIUM PHOSPHATES POWDER PACK 280-160-250 MG 1 PACKET: 280-160-250 PACK at 12:10

## 2021-10-09 RX ADMIN — HYDROMORPHONE HYDROCHLORIDE 2 MG: 2 INJECTION, SOLUTION INTRAMUSCULAR; INTRAVENOUS; SUBCUTANEOUS at 07:10

## 2021-10-09 RX ADMIN — ENOXAPARIN SODIUM 40 MG: 40 INJECTION SUBCUTANEOUS at 04:10

## 2021-10-09 RX ADMIN — PANTOPRAZOLE SODIUM 40 MG: 40 INJECTION, POWDER, FOR SOLUTION INTRAVENOUS at 09:10

## 2021-10-10 VITALS
BODY MASS INDEX: 21.47 KG/M2 | OXYGEN SATURATION: 98 % | RESPIRATION RATE: 17 BRPM | SYSTOLIC BLOOD PRESSURE: 134 MMHG | DIASTOLIC BLOOD PRESSURE: 68 MMHG | HEIGHT: 70 IN | HEART RATE: 85 BPM | WEIGHT: 150 LBS | TEMPERATURE: 97 F

## 2021-10-10 PROBLEM — Z98.890 STATUS POST REVERSAL OF ILEOSTOMY: Status: ACTIVE | Noted: 2021-10-10

## 2021-10-10 LAB
ALBUMIN SERPL BCP-MCNC: 2.4 G/DL (ref 3.5–5.2)
ALP SERPL-CCNC: 97 U/L (ref 55–135)
ALT SERPL W/O P-5'-P-CCNC: 25 U/L (ref 10–44)
ANION GAP SERPL CALC-SCNC: 11 MMOL/L (ref 8–16)
AST SERPL-CCNC: 18 U/L (ref 10–40)
BASOPHILS # BLD AUTO: 0.02 K/UL (ref 0–0.2)
BASOPHILS NFR BLD: 0.2 % (ref 0–1.9)
BILIRUB SERPL-MCNC: 0.4 MG/DL (ref 0.1–1)
BUN SERPL-MCNC: 6 MG/DL (ref 8–23)
CALCIUM SERPL-MCNC: 8.4 MG/DL (ref 8.7–10.5)
CHLORIDE SERPL-SCNC: 102 MMOL/L (ref 95–110)
CO2 SERPL-SCNC: 25 MMOL/L (ref 23–29)
CREAT SERPL-MCNC: 0.9 MG/DL (ref 0.5–1.4)
DIFFERENTIAL METHOD: ABNORMAL
EOSINOPHIL # BLD AUTO: 0.2 K/UL (ref 0–0.5)
EOSINOPHIL NFR BLD: 2.3 % (ref 0–8)
ERYTHROCYTE [DISTWIDTH] IN BLOOD BY AUTOMATED COUNT: 13.1 % (ref 11.5–14.5)
EST. GFR  (AFRICAN AMERICAN): >60 ML/MIN/1.73 M^2
EST. GFR  (NON AFRICAN AMERICAN): >60 ML/MIN/1.73 M^2
GLUCOSE SERPL-MCNC: 124 MG/DL (ref 70–110)
HCT VFR BLD AUTO: 27.6 % (ref 40–54)
HGB BLD-MCNC: 9.1 G/DL (ref 14–18)
IMM GRANULOCYTES # BLD AUTO: 0.05 K/UL (ref 0–0.04)
IMM GRANULOCYTES NFR BLD AUTO: 0.6 % (ref 0–0.5)
LYMPHOCYTES # BLD AUTO: 0.9 K/UL (ref 1–4.8)
LYMPHOCYTES NFR BLD: 11.2 % (ref 18–48)
MAGNESIUM SERPL-MCNC: 1.2 MG/DL (ref 1.6–2.6)
MCH RBC QN AUTO: 32.6 PG (ref 27–31)
MCHC RBC AUTO-ENTMCNC: 33 G/DL (ref 32–36)
MCV RBC AUTO: 99 FL (ref 82–98)
MONOCYTES # BLD AUTO: 0.7 K/UL (ref 0.3–1)
MONOCYTES NFR BLD: 8.1 % (ref 4–15)
NEUTROPHILS # BLD AUTO: 6.4 K/UL (ref 1.8–7.7)
NEUTROPHILS NFR BLD: 77.6 % (ref 38–73)
NRBC BLD-RTO: 0 /100 WBC
PHOSPHATE SERPL-MCNC: 2.3 MG/DL (ref 2.7–4.5)
PLATELET # BLD AUTO: 277 K/UL (ref 150–450)
PMV BLD AUTO: 9 FL (ref 9.2–12.9)
POTASSIUM SERPL-SCNC: 3.4 MMOL/L (ref 3.5–5.1)
PROT SERPL-MCNC: 5.7 G/DL (ref 6–8.4)
RBC # BLD AUTO: 2.79 M/UL (ref 4.6–6.2)
SODIUM SERPL-SCNC: 138 MMOL/L (ref 136–145)
WBC # BLD AUTO: 8.25 K/UL (ref 3.9–12.7)

## 2021-10-10 PROCEDURE — 84100 ASSAY OF PHOSPHORUS: CPT | Performed by: STUDENT IN AN ORGANIZED HEALTH CARE EDUCATION/TRAINING PROGRAM

## 2021-10-10 PROCEDURE — 63600175 PHARM REV CODE 636 W HCPCS: Performed by: HOSPITALIST

## 2021-10-10 PROCEDURE — 63600175 PHARM REV CODE 636 W HCPCS: Performed by: SURGERY

## 2021-10-10 PROCEDURE — 80053 COMPREHEN METABOLIC PANEL: CPT | Performed by: STUDENT IN AN ORGANIZED HEALTH CARE EDUCATION/TRAINING PROGRAM

## 2021-10-10 PROCEDURE — 36415 COLL VENOUS BLD VENIPUNCTURE: CPT | Performed by: STUDENT IN AN ORGANIZED HEALTH CARE EDUCATION/TRAINING PROGRAM

## 2021-10-10 PROCEDURE — 99239 HOSP IP/OBS DSCHRG MGMT >30: CPT | Mod: ,,, | Performed by: FAMILY MEDICINE

## 2021-10-10 PROCEDURE — 25000003 PHARM REV CODE 250: Performed by: FAMILY MEDICINE

## 2021-10-10 PROCEDURE — C9113 INJ PANTOPRAZOLE SODIUM, VIA: HCPCS | Performed by: SURGERY

## 2021-10-10 PROCEDURE — 25000003 PHARM REV CODE 250: Performed by: STUDENT IN AN ORGANIZED HEALTH CARE EDUCATION/TRAINING PROGRAM

## 2021-10-10 PROCEDURE — 85025 COMPLETE CBC W/AUTO DIFF WBC: CPT | Performed by: STUDENT IN AN ORGANIZED HEALTH CARE EDUCATION/TRAINING PROGRAM

## 2021-10-10 PROCEDURE — 99239 PR HOSPITAL DISCHARGE DAY,>30 MIN: ICD-10-PCS | Mod: ,,, | Performed by: FAMILY MEDICINE

## 2021-10-10 PROCEDURE — 83735 ASSAY OF MAGNESIUM: CPT | Performed by: STUDENT IN AN ORGANIZED HEALTH CARE EDUCATION/TRAINING PROGRAM

## 2021-10-10 PROCEDURE — 1111F PR DISCHARGE MEDS RECONCILED W/ CURRENT OUTPATIENT MED LIST: ICD-10-PCS | Mod: CPTII,,, | Performed by: FAMILY MEDICINE

## 2021-10-10 PROCEDURE — 1111F DSCHRG MED/CURRENT MED MERGE: CPT | Mod: CPTII,,, | Performed by: FAMILY MEDICINE

## 2021-10-10 RX ORDER — CIPROFLOXACIN 500 MG/1
500 TABLET ORAL 2 TIMES DAILY
Qty: 20 TABLET | Refills: 0 | Status: SHIPPED | OUTPATIENT
Start: 2021-10-10

## 2021-10-10 RX ORDER — LANOLIN ALCOHOL/MO/W.PET/CERES
800 CREAM (GRAM) TOPICAL DAILY
Qty: 7 TABLET | Refills: 0 | Status: SHIPPED | OUTPATIENT
Start: 2021-10-10

## 2021-10-10 RX ORDER — OXYCODONE AND ACETAMINOPHEN 10; 325 MG/1; MG/1
1 TABLET ORAL ONCE
Status: COMPLETED | OUTPATIENT
Start: 2021-10-10 | End: 2021-10-10

## 2021-10-10 RX ORDER — OXYCODONE AND ACETAMINOPHEN 10; 325 MG/1; MG/1
1 TABLET ORAL EVERY 6 HOURS PRN
Qty: 14 TABLET | Refills: 0 | Status: SHIPPED | OUTPATIENT
Start: 2021-10-10 | End: 2021-11-23 | Stop reason: SDUPTHER

## 2021-10-10 RX ORDER — ONDANSETRON 4 MG/1
4 TABLET, FILM COATED ORAL EVERY 6 HOURS PRN
Qty: 30 TABLET | Refills: 1 | Status: SHIPPED | OUTPATIENT
Start: 2021-10-10

## 2021-10-10 RX ADMIN — Medication 800 MG: at 04:10

## 2021-10-10 RX ADMIN — HYDROMORPHONE HYDROCHLORIDE 2 MG: 2 INJECTION, SOLUTION INTRAMUSCULAR; INTRAVENOUS; SUBCUTANEOUS at 08:10

## 2021-10-10 RX ADMIN — HYDROMORPHONE HYDROCHLORIDE 2 MG: 2 INJECTION, SOLUTION INTRAMUSCULAR; INTRAVENOUS; SUBCUTANEOUS at 12:10

## 2021-10-10 RX ADMIN — HYDROMORPHONE HYDROCHLORIDE 2 MG: 2 INJECTION, SOLUTION INTRAMUSCULAR; INTRAVENOUS; SUBCUTANEOUS at 04:10

## 2021-10-10 RX ADMIN — PANTOPRAZOLE SODIUM 40 MG: 40 INJECTION, POWDER, FOR SOLUTION INTRAVENOUS at 08:10

## 2021-10-10 RX ADMIN — SODIUM CHLORIDE, SODIUM LACTATE, POTASSIUM CHLORIDE, AND CALCIUM CHLORIDE: .6; .31; .03; .02 INJECTION, SOLUTION INTRAVENOUS at 04:10

## 2021-10-10 RX ADMIN — OXYCODONE HYDROCHLORIDE AND ACETAMINOPHEN 1 TABLET: 10; 325 TABLET ORAL at 12:10

## 2021-10-10 RX ADMIN — Medication 800 MG: at 11:10

## 2021-10-12 LAB
FINAL PATHOLOGIC DIAGNOSIS: NORMAL
GROSS: NORMAL
Lab: NORMAL

## 2021-10-19 ENCOUNTER — OFFICE VISIT (OUTPATIENT)
Dept: SURGERY | Facility: CLINIC | Age: 66
End: 2021-10-19

## 2021-10-19 ENCOUNTER — DOCUMENTATION ONLY (OUTPATIENT)
Dept: SURGERY | Facility: CLINIC | Age: 66
End: 2021-10-19

## 2021-10-19 VITALS
TEMPERATURE: 98 F | WEIGHT: 165 LBS | BODY MASS INDEX: 23.62 KG/M2 | SYSTOLIC BLOOD PRESSURE: 104 MMHG | DIASTOLIC BLOOD PRESSURE: 61 MMHG | HEIGHT: 70 IN | HEART RATE: 76 BPM | OXYGEN SATURATION: 100 %

## 2021-10-19 DIAGNOSIS — Z09 POSTOP CHECK: Primary | ICD-10-CM

## 2021-10-19 PROCEDURE — 99999 PR PBB SHADOW E&M-EST. PATIENT-LVL III: CPT | Mod: PBBFAC,,, | Performed by: SURGERY

## 2021-10-19 PROCEDURE — 99999 PR PBB SHADOW E&M-EST. PATIENT-LVL III: ICD-10-PCS | Mod: PBBFAC,,, | Performed by: SURGERY

## 2021-10-19 PROCEDURE — 99024 POSTOP FOLLOW-UP VISIT: CPT | Mod: ,,, | Performed by: SURGERY

## 2021-10-19 PROCEDURE — 99024 PR POST-OP FOLLOW-UP VISIT: ICD-10-PCS | Mod: ,,, | Performed by: SURGERY

## 2021-11-04 ENCOUNTER — OFFICE VISIT (OUTPATIENT)
Dept: SURGERY | Facility: CLINIC | Age: 66
End: 2021-11-04
Payer: MEDICARE

## 2021-11-04 VITALS
OXYGEN SATURATION: 98 % | BODY MASS INDEX: 24.48 KG/M2 | WEIGHT: 171 LBS | TEMPERATURE: 98 F | DIASTOLIC BLOOD PRESSURE: 75 MMHG | HEART RATE: 69 BPM | HEIGHT: 70 IN | SYSTOLIC BLOOD PRESSURE: 134 MMHG

## 2021-11-04 DIAGNOSIS — I82.462 ACUTE DEEP VEIN THROMBOSIS (DVT) OF CALF MUSCLE VEIN OF LEFT LOWER EXTREMITY: ICD-10-CM

## 2021-11-04 DIAGNOSIS — M79.89 LEFT LEG SWELLING: Primary | ICD-10-CM

## 2021-11-04 DIAGNOSIS — R60.9 EDEMA, UNSPECIFIED TYPE: ICD-10-CM

## 2021-11-04 PROCEDURE — 99999 PR PBB SHADOW E&M-EST. PATIENT-LVL III: CPT | Mod: PBBFAC,,, | Performed by: SURGERY

## 2021-11-04 PROCEDURE — 3288F PR FALLS RISK ASSESSMENT DOCUMENTED: ICD-10-PCS | Mod: CPTII,S$GLB,, | Performed by: SURGERY

## 2021-11-04 PROCEDURE — 1101F PT FALLS ASSESS-DOCD LE1/YR: CPT | Mod: CPTII,S$GLB,, | Performed by: SURGERY

## 2021-11-04 PROCEDURE — 3288F FALL RISK ASSESSMENT DOCD: CPT | Mod: CPTII,S$GLB,, | Performed by: SURGERY

## 2021-11-04 PROCEDURE — 3075F SYST BP GE 130 - 139MM HG: CPT | Mod: CPTII,S$GLB,, | Performed by: SURGERY

## 2021-11-04 PROCEDURE — 3008F BODY MASS INDEX DOCD: CPT | Mod: CPTII,S$GLB,, | Performed by: SURGERY

## 2021-11-04 PROCEDURE — 3044F PR MOST RECENT HEMOGLOBIN A1C LEVEL <7.0%: ICD-10-PCS | Mod: CPTII,S$GLB,, | Performed by: SURGERY

## 2021-11-04 PROCEDURE — 3078F DIAST BP <80 MM HG: CPT | Mod: CPTII,S$GLB,, | Performed by: SURGERY

## 2021-11-04 PROCEDURE — 3008F PR BODY MASS INDEX (BMI) DOCUMENTED: ICD-10-PCS | Mod: CPTII,S$GLB,, | Performed by: SURGERY

## 2021-11-04 PROCEDURE — 1159F MED LIST DOCD IN RCRD: CPT | Mod: CPTII,S$GLB,, | Performed by: SURGERY

## 2021-11-04 PROCEDURE — 3075F PR MOST RECENT SYSTOLIC BLOOD PRESS GE 130-139MM HG: ICD-10-PCS | Mod: CPTII,S$GLB,, | Performed by: SURGERY

## 2021-11-04 PROCEDURE — 1125F PR PAIN SEVERITY QUANTIFIED, PAIN PRESENT: ICD-10-PCS | Mod: CPTII,S$GLB,, | Performed by: SURGERY

## 2021-11-04 PROCEDURE — 1111F PR DISCHARGE MEDS RECONCILED W/ CURRENT OUTPATIENT MED LIST: ICD-10-PCS | Mod: CPTII,S$GLB,, | Performed by: SURGERY

## 2021-11-04 PROCEDURE — 1111F DSCHRG MED/CURRENT MED MERGE: CPT | Mod: CPTII,S$GLB,, | Performed by: SURGERY

## 2021-11-04 PROCEDURE — 99999 PR PBB SHADOW E&M-EST. PATIENT-LVL III: ICD-10-PCS | Mod: PBBFAC,,, | Performed by: SURGERY

## 2021-11-04 PROCEDURE — 3044F HG A1C LEVEL LT 7.0%: CPT | Mod: CPTII,S$GLB,, | Performed by: SURGERY

## 2021-11-04 PROCEDURE — 1125F AMNT PAIN NOTED PAIN PRSNT: CPT | Mod: CPTII,S$GLB,, | Performed by: SURGERY

## 2021-11-04 PROCEDURE — 1159F PR MEDICATION LIST DOCUMENTED IN MEDICAL RECORD: ICD-10-PCS | Mod: CPTII,S$GLB,, | Performed by: SURGERY

## 2021-11-04 PROCEDURE — 99213 OFFICE O/P EST LOW 20 MIN: CPT | Mod: 24,S$GLB,, | Performed by: SURGERY

## 2021-11-04 PROCEDURE — 1101F PR PT FALLS ASSESS DOC 0-1 FALLS W/OUT INJ PAST YR: ICD-10-PCS | Mod: CPTII,S$GLB,, | Performed by: SURGERY

## 2021-11-04 PROCEDURE — 3078F PR MOST RECENT DIASTOLIC BLOOD PRESSURE < 80 MM HG: ICD-10-PCS | Mod: CPTII,S$GLB,, | Performed by: SURGERY

## 2021-11-04 PROCEDURE — 99213 PR OFFICE/OUTPT VISIT, EST, LEVL III, 20-29 MIN: ICD-10-PCS | Mod: 24,S$GLB,, | Performed by: SURGERY

## 2021-11-08 NOTE — PROGRESS NOTES
The patient location is:  At home  The chief complaint leading to consultation is:  How are my blood counts  Patient has consented to this visit via televisit  Visit type: Virtual visit with synchronous audio and video plus interrupted audio   Total time spent with patient: over 5 0 min with more than 90% on medical discussion, counseling and coordination of care.  Each patient to whom he or she provides medical services by telemedicine is:  (1) informed of the relationship between the physician and patient and the respective role of any other health care provider with respect to management of the patient; and (2) notified that he or she may decline to receive medical services by telemedicine and may withdraw from such care at any time.    This visit is to also I  nvolve counseling, patient education, informed consent for ordered diagnostic and laboratory tests and motivational interviewing    This document has been created using Nef[employer] dictation software and free typing.  It has been checked for errors but some errors may still exist.        CC I guess any chemo     Miguel Angel Apple Sr. is a 64 y.o.  Patient here for evaluation of a T3-T4 N1 M a 1 see rectal adenocarcinoma.  On October 21, 2019   a laparotomy was performed for perforated colon and patient underwent a right hemicolectomy with a rectal biopsy revealing rectal adenocarcinoma. He is healing from surgery but referred here to discuss treatment options.   He started xeloda with radiation and is receiving week 3 of radiation  He has completed two doses of IV iron at [place] without complications : port was accessed without any problem     Tomorrow is the assisted point of 6 weeks   He reports decreased appetite and a mild weight loss , slight heartburn     March 25 2020  Completed concomitant chemo radiation February 19, 2020 Oral xeloda 850 mg/m2 Monday through Friday with concomitant radiation to be followed 6 months of folfox through chest port in right  chest for colorectal adenocarcinoma with peritoneal seeding   Surgery delayed  due to pandemic  POst IV iron     April 20, 2026 citrix keeps cutting out   Records in tumor Board been reviewed surgical oncology notes reviewed patient is aware that chemotherapy is indicated per standard of care whether neoadjuvant her adjuvant.  Today patient will discussed with me the chemo regimen and the side effects that could possibly occur.  He is not having any evidence of bleeding he is eating well no change in his weight  Past Medical History:   Diagnosis Date    Colon cancer     Depression     GERD (gastroesophageal reflux disease)     Medical history reviewed with no changes      Past Surgical History:   Procedure Laterality Date    CHOLECYSTECTOMY  10/21/2019    Procedure: CHOLECYSTECTOMY;  Surgeon: Jose Lazo MD;  Location: [facility] OR;  Service: General;;    CLOSURE OF WOUND N/A 10/30/2019    Procedure: CLOSURE, WOUND;  Surgeon: Torres Dawkins MD;  Location: [facility] OR;  Service: General;  Laterality: N/A;    COLOSTOMY  10/21/2019    Procedure: CREATION, COLOSTOMY;  Surgeon: Jose Lazo MD;  Location: [facility] OR;  Service: General;;  END DESCENDING COLOSTOMY    FLEXIBLE SIGMOIDOSCOPY N/A 10/21/2019    Procedure: SIGMOIDOSCOPY, FLEXIBLE;  Surgeon: Jose Lazo MD;  Location: [facility] OR;  Service: General;  Laterality: N/A;    INSERTION OF TUNNELED CENTRAL VENOUS CATHETER (CVC) WITH SUBCUTANEOUS PORT Right 12/23/2019    Procedure: INSERTION, PORT-A-CATH;  Surgeon: Jose Lazo MD;  Location: [facility] OR;  Service: General;  Laterality: Right;    no surgical history      REPAIR OF ABDOMINAL WALL N/A 10/30/2019    Procedure: REPAIR, ABDOMINAL WALL;  Surgeon: Torres Dawkins MD;  Location: [facility] OR;  Service: General;  Laterality: N/A;    RIGHT HEMICOLECTOMY Right 10/21/2019    Procedure: HEMICOLECTOMY, RIGHT;  Surgeon: Jose Lazo MD;  Location: [facility] OR;  Service: General;   Laterality: Right;       Current Outpatient Medications:     citalopram (CELEXA) 10 MG tablet, Take 1 tablet (10 mg total) by mouth once daily., Disp: 30 tablet, Rfl: 11    cyanocobalamin (B-12 DOTS) 500 MCG tablet, Take 2 tablets (1,000 mcg total) by mouth once daily., Disp: 60 tablet, Rfl: 0    ondansetron (ZOFRAN) 4 MG tablet, Take 1 tablet (4 mg total) by mouth every 6 (six) hours as needed for Nausea., Disp: 24 tablet, Rfl: 1    ondansetron (ZOFRAN-ODT) 8 MG TbDL, Take 1 tablet (8 mg total) by mouth every 8 (eight) hours as needed., Disp: 90 tablet, Rfl: 3    oxyCODONE-acetaminophen (PERCOCET) 5-325 mg per tablet, Take 1 tablet by mouth every 6 (six) hours as needed for Pain (moderate pain 2-5/10 pain scale)., Disp: 60 tablet, Rfl: 0    pantoprazole (PROTONIX) 40 MG tablet, Take 1 tablet (40 mg total) by mouth once daily., Disp: 30 tablet, Rfl: 11    pyridoxine, vitamin B6, (VITAMIN B-6) 100 MG Tab, Take 1 tablet (100 mg total) by mouth once daily., Disp: 30 tablet, Rfl: 0  Review of patient's allergies indicates:  No Known Allergies  Social History     Tobacco Use    Smoking status: Former Smoker    Smokeless tobacco: Never Used   Substance Use Topics    Alcohol use: Not Currently    Drug use: Never     Family History   Problem Relation Age of Onset    Diabetes Mother     Heart disease Father        CONSTITUTIONAL: No fevers, chills, night sweats,mild  wt. loss,  + appetite changes  SKIN: no rashes or itching  ENT: No headaches, head trauma, vision changes, or eye pain  LYMPH NODES: None noticed   CV: No chest pain, palpitations.   RESP: No dyspnea on exertion, cough, wheezing, or hemoptysis  GI: No nausea, emesis, diarrhea, constipation, melena, hematochezia, pain.   : No dysuria, hematuria, urgency, or frequency   HEME: No easy bruising, bleeding problems  PSYCHIATRIC: No depression, anxiety, psychosis, hallucinations.  NEURO: No seizures, memory loss, dizziness or difficulty sleeping  MSK: No  arthralgias or joint swelling      General patient is in no distress well developed well nourished  Psych pleasant affect no evidence of depression no evidence of anxiety  Head normocephalic atraumatic, no hoarseness,  well-spoken speech intact  Eyes noninjected sclera conjunctiva appear pink  Face is symmetric  Skin intact no lesions noted no ecchymosis no rash  Neck with no limited range of motion no JVD evident  Chest with no use of accessory muscles no labored breathing no evidence of tachypnea  No respiratory distress, effort normal   Abdomen appears to be nondistended  Extremities with no cyanosis no evidence of edema  Neuro muscular cranial nerves appear grossly intact  Gait appears steady  No joint effusions  Behavior normal judgment normal  Rheum: No joint swelling    Lab Results   Component Value Date    WBC 11.71 11/04/2019    HGB 8.8 (L) 11/04/2019    HCT 28.4 (L) 11/04/2019    MCV 92 11/04/2019     (H) 11/04/2019     Lab Results   Component Value Date    WBC 6.53 04/13/2020    RBC 4.17 (L) 04/13/2020    HGB 14.0 04/13/2020    HCT 44.0 04/13/2020     (H) 04/13/2020    MCH 33.6 (H) 04/13/2020    MCHC 31.8 (L) 04/13/2020    RDW 13.6 04/13/2020     04/13/2020    MPV 8.9 (L) 04/13/2020    GRAN 5.0 04/13/2020    GRAN 75.8 (H) 04/13/2020    LYMPH 0.8 (L) 04/13/2020    LYMPH 11.9 (L) 04/13/2020    MONO 0.6 04/13/2020    MONO 9.2 04/13/2020    EOS 0.1 04/13/2020    BASO 0.05 04/13/2020    EOSINOPHIL 1.8 04/13/2020    BASOPHIL 0.8 04/13/2020       CMP  Sodium   Date Value Ref Range Status   04/13/2020 140 136 - 145 mmol/L Final     Potassium   Date Value Ref Range Status   04/13/2020 4.2 3.5 - 5.1 mmol/L Final     Chloride   Date Value Ref Range Status   04/13/2020 104 95 - 110 mmol/L Final     CO2   Date Value Ref Range Status   04/13/2020 29 23 - 29 mmol/L Final     Glucose   Date Value Ref Range Status   04/13/2020 98 70 - 110 mg/dL Final     BUN, Bld   Date Value Ref Range Status    04/13/2020 7 (L) 8 - 23 mg/dL Final     Creatinine   Date Value Ref Range Status   04/13/2020 0.9 0.5 - 1.4 mg/dL Final     Calcium   Date Value Ref Range Status   04/13/2020 9.1 8.7 - 10.5 mg/dL Final     Total Protein   Date Value Ref Range Status   04/13/2020 7.6 6.0 - 8.4 g/dL Final     Albumin   Date Value Ref Range Status   04/13/2020 3.6 3.5 - 5.2 g/dL Final     Total Bilirubin   Date Value Ref Range Status   04/13/2020 0.2 0.1 - 1.0 mg/dL Final     Comment:     For infants and newborns, interpretation of results should be based  on gestational age, weight and in agreement with clinical  observations.  Premature Infant recommended reference ranges:  Up to 24 hours.............<8.0 mg/dL  Up to 48 hours............<12.0 mg/dL  3-5 days..................<15.0 mg/dL  6-29 days.................<15.0 mg/dL       Alkaline Phosphatase   Date Value Ref Range Status   04/13/2020 76 55 - 135 U/L Final     AST   Date Value Ref Range Status   04/13/2020 17 10 - 40 U/L Final     ALT   Date Value Ref Range Status   04/13/2020 16 10 - 44 U/L Final     Anion Gap   Date Value Ref Range Status   04/13/2020 7 (L) 8 - 16 mmol/L Final     eGFR if    Date Value Ref Range Status   04/13/2020 >60.0 >60 mL/min/1.73 m^2 Final     eGFR if non    Date Value Ref Range Status   04/13/2020 >60.0 >60 mL/min/1.73 m^2 Final     Comment:     Calculation used to obtain the estimated glomerular filtration  rate (eGFR) is the CKD-EPI equation.        NM PET CT Routine Skull to Mid Thigh   Order: [id number]   Status:  Final result   Visible to patient:  Yes (Patient Portal) Next appt:  01/09/2020 at 09:00 AM in General Surgery (Jose Lazo MD) Dx:  Malignant neoplasm of rectum; Rectal ...            Malignant neoplasm of rectum    Encounter for chemotherapy management    Encounter for postoperative care        Start FOLFOX neoadjuvantly patient is aware the benefits and the possible side effects and  agrees to such.  Chest port in place a receive chemotherapy every 2 weeks for a total of possibly 6 months depending on when he may qualify for surgical intervention.  Refilling pain meds at our next visit in 2 weeks  He is aware to watch for constipation   Increase caloric intake   He is to continue Celexa for depression and Zofran p.r.n. Nausea  Up taking a PPI to help with GERD will not interfere with his chemotherapy at this time  He will knee new or labs if he is unable start chemotherapy prior to next Monday but he is cleared to begin cycle 1 I will see him back in 2 weeks with CBC and CMP as well      Current Outpatient Medications:     citalopram (CELEXA) 10 MG tablet, Take 1 tablet (10 mg total) by mouth once daily., Disp: 30 tablet, Rfl: 11    cyanocobalamin (B-12 DOTS) 500 MCG tablet, Take 2 tablets (1,000 mcg total) by mouth once daily., Disp: 60 tablet, Rfl: 0    ondansetron (ZOFRAN) 4 MG tablet, Take 1 tablet (4 mg total) by mouth every 6 (six) hours as needed for Nausea., Disp: 24 tablet, Rfl: 1    ondansetron (ZOFRAN-ODT) 8 MG TbDL, Take 1 tablet (8 mg total) by mouth every 8 (eight) hours as needed., Disp: 90 tablet, Rfl: 3    oxyCODONE-acetaminophen (PERCOCET) 5-325 mg per tablet, Take 1 tablet by mouth every 6 (six) hours as needed for Pain (moderate pain 2-5/10 pain scale)., Disp: 60 tablet, Rfl: 0    pantoprazole (PROTONIX) 40 MG tablet, Take 1 tablet (40 mg total) by mouth once daily., Disp: 30 tablet, Rfl: 11    pyridoxine, vitamin B6, (VITAMIN B-6) 100 MG Tab, Take 1 tablet (100 mg total) by mouth once daily., Disp: 30 tablet, Rfl: 0      Thank you for allowing me to evaluate and participate in the care of this pleasant patient. Please fell free to call me with any questions or concerns.    Warmly,  Shayla Stringer MD    This note was dictated with Dragon and briefly proofread. Please excuse any sentences that may be unclear or words misspelled    I discussed the available treatment  option(s) in accordance with the latest NCCN Guidelines, their overall age and their co-morbidities. I went over the risks and benefits of the chemotherapy with regard to their particular cancer type, their cancer stage, their age, and their co-morbidities. I provided literature on the chemotherapy regimen and discussed the chemotherapy side-effect profiles of the drug(s). I discussed the importance of compliance with obtaining and monitoring weekly lab work, and went over the potential hematopathology issues and risks with anemia, leucopenia and thrombocytopenia that can occur with chemotherapy. I discussed the potential risks of liver and kidney damage, which could be permanent and could necessitate dialysis long-term if kidney failure developed. I discussed the emetic and/or diarrheal potential of the regimen and the potential need for use of antiemetic and anti-diarrheal medications. I discussed the risk for development of anaphylactic shock, bronchospasm, dysrhythmia, and respiratory/cardiovascular failure. I discussed the potential risks for development of alopecia, cold sensory issues, ringing in ears, vertigo and neuropathy, all of which could end up being chronic and life-long. I discussed the risks of hand-foot syndrome and rashes, and development of other autoimmune mediated processes such as pneumonitis and colitis which could be life threatening. I discussed the risks of the potential development of leukemia and/or lymphoma from use of certain chemotherapy agents. I discussed the need for neutropenic precautions, basic hygiene/sanitation behaviors and dietary restrictions.     The patient's consent has been obtained to proceed with the chemotherapy.The patient will be referred to Chemotherapy School /[facility] for training and education on chemotherapy, use of antiemetics and/or anti-diarrheals, use of NSAID's, potential chemotherapy side-effects, and any specific recommendations and  precautions with the particular chemotherapy agents.       I answered all of the patient's (and family's, if applicable) questions to the best of my ability and to their complete satisfaction. The patient acknowledged full understanding of the risks, recommendations and plan(s).      Sincerely,  Shayla Stringer MD FACCS         independent

## 2021-11-10 ENCOUNTER — HOSPITAL ENCOUNTER (OUTPATIENT)
Dept: RADIOLOGY | Facility: HOSPITAL | Age: 66
Discharge: HOME | End: 2021-11-10
Attending: SURGERY
Payer: MEDICARE

## 2021-11-10 DIAGNOSIS — I82.462 ACUTE DEEP VEIN THROMBOSIS (DVT) OF CALF MUSCLE VEIN OF LEFT LOWER EXTREMITY: ICD-10-CM

## 2021-11-10 PROCEDURE — 93971 EXTREMITY STUDY: CPT | Mod: TC,PN,LT

## 2021-11-10 PROCEDURE — 93971 US LOWER EXTREMITY VEINS LEFT: ICD-10-PCS | Mod: 26,LT,, | Performed by: RADIOLOGY

## 2021-11-10 PROCEDURE — 93971 EXTREMITY STUDY: CPT | Mod: 26,LT,, | Performed by: RADIOLOGY

## 2021-11-18 ENCOUNTER — OFFICE VISIT (OUTPATIENT)
Dept: SURGERY | Facility: CLINIC | Age: 66
End: 2021-11-18
Payer: MEDICARE

## 2021-11-18 VITALS
DIASTOLIC BLOOD PRESSURE: 68 MMHG | WEIGHT: 167 LBS | TEMPERATURE: 99 F | HEIGHT: 70 IN | HEART RATE: 65 BPM | OXYGEN SATURATION: 96 % | SYSTOLIC BLOOD PRESSURE: 122 MMHG | BODY MASS INDEX: 23.91 KG/M2

## 2021-11-18 DIAGNOSIS — E04.1 THYROID NODULE: Primary | ICD-10-CM

## 2021-11-18 PROCEDURE — 99214 OFFICE O/P EST MOD 30 MIN: CPT | Mod: 24,S$GLB,, | Performed by: SURGERY

## 2021-11-18 PROCEDURE — 1159F MED LIST DOCD IN RCRD: CPT | Mod: CPTII,S$GLB,, | Performed by: SURGERY

## 2021-11-18 PROCEDURE — 3288F FALL RISK ASSESSMENT DOCD: CPT | Mod: CPTII,S$GLB,, | Performed by: SURGERY

## 2021-11-18 PROCEDURE — 99999 PR PBB SHADOW E&M-EST. PATIENT-LVL III: ICD-10-PCS | Mod: PBBFAC,,, | Performed by: SURGERY

## 2021-11-18 PROCEDURE — 3044F PR MOST RECENT HEMOGLOBIN A1C LEVEL <7.0%: ICD-10-PCS | Mod: CPTII,S$GLB,, | Performed by: SURGERY

## 2021-11-18 PROCEDURE — 3288F PR FALLS RISK ASSESSMENT DOCUMENTED: ICD-10-PCS | Mod: CPTII,S$GLB,, | Performed by: SURGERY

## 2021-11-18 PROCEDURE — 3008F PR BODY MASS INDEX (BMI) DOCUMENTED: ICD-10-PCS | Mod: CPTII,S$GLB,, | Performed by: SURGERY

## 2021-11-18 PROCEDURE — 1101F PT FALLS ASSESS-DOCD LE1/YR: CPT | Mod: CPTII,S$GLB,, | Performed by: SURGERY

## 2021-11-18 PROCEDURE — 3074F PR MOST RECENT SYSTOLIC BLOOD PRESSURE < 130 MM HG: ICD-10-PCS | Mod: CPTII,S$GLB,, | Performed by: SURGERY

## 2021-11-18 PROCEDURE — 1101F PR PT FALLS ASSESS DOC 0-1 FALLS W/OUT INJ PAST YR: ICD-10-PCS | Mod: CPTII,S$GLB,, | Performed by: SURGERY

## 2021-11-18 PROCEDURE — 99999 PR PBB SHADOW E&M-EST. PATIENT-LVL III: CPT | Mod: PBBFAC,,, | Performed by: SURGERY

## 2021-11-18 PROCEDURE — 3078F DIAST BP <80 MM HG: CPT | Mod: CPTII,S$GLB,, | Performed by: SURGERY

## 2021-11-18 PROCEDURE — 3008F BODY MASS INDEX DOCD: CPT | Mod: CPTII,S$GLB,, | Performed by: SURGERY

## 2021-11-18 PROCEDURE — 1125F PR PAIN SEVERITY QUANTIFIED, PAIN PRESENT: ICD-10-PCS | Mod: CPTII,S$GLB,, | Performed by: SURGERY

## 2021-11-18 PROCEDURE — 3078F PR MOST RECENT DIASTOLIC BLOOD PRESSURE < 80 MM HG: ICD-10-PCS | Mod: CPTII,S$GLB,, | Performed by: SURGERY

## 2021-11-18 PROCEDURE — 3044F HG A1C LEVEL LT 7.0%: CPT | Mod: CPTII,S$GLB,, | Performed by: SURGERY

## 2021-11-18 PROCEDURE — 99214 PR OFFICE/OUTPT VISIT, EST, LEVL IV, 30-39 MIN: ICD-10-PCS | Mod: 24,S$GLB,, | Performed by: SURGERY

## 2021-11-18 PROCEDURE — 1159F PR MEDICATION LIST DOCUMENTED IN MEDICAL RECORD: ICD-10-PCS | Mod: CPTII,S$GLB,, | Performed by: SURGERY

## 2021-11-18 PROCEDURE — 1125F AMNT PAIN NOTED PAIN PRSNT: CPT | Mod: CPTII,S$GLB,, | Performed by: SURGERY

## 2021-11-18 PROCEDURE — 3074F SYST BP LT 130 MM HG: CPT | Mod: CPTII,S$GLB,, | Performed by: SURGERY

## 2021-11-23 DIAGNOSIS — G89.3 NEOPLASM RELATED PAIN: ICD-10-CM

## 2021-11-23 RX ORDER — OXYCODONE AND ACETAMINOPHEN 10; 325 MG/1; MG/1
1 TABLET ORAL EVERY 12 HOURS PRN
Qty: 60 TABLET | Refills: 0 | Status: SHIPPED | OUTPATIENT
Start: 2021-11-23 | End: 2022-05-11 | Stop reason: SDUPTHER

## 2021-12-14 ENCOUNTER — LAB VISIT (OUTPATIENT)
Dept: LAB | Facility: HOSPITAL | Age: 66
End: 2021-12-14
Attending: INTERNAL MEDICINE
Payer: MEDICARE

## 2021-12-14 DIAGNOSIS — E04.1 THYROID NODULE: ICD-10-CM

## 2021-12-14 DIAGNOSIS — C20 RECTAL ADENOCARCINOMA: ICD-10-CM

## 2021-12-14 LAB
ALBUMIN SERPL BCP-MCNC: 3.6 G/DL (ref 3.5–5.2)
ALP SERPL-CCNC: 81 U/L (ref 55–135)
ALT SERPL W/O P-5'-P-CCNC: 12 U/L (ref 10–44)
ANION GAP SERPL CALC-SCNC: 8 MMOL/L (ref 8–16)
AST SERPL-CCNC: 15 U/L (ref 10–40)
BASOPHILS # BLD AUTO: 0.03 K/UL (ref 0–0.2)
BASOPHILS NFR BLD: 0.4 % (ref 0–1.9)
BILIRUB SERPL-MCNC: 0.4 MG/DL (ref 0.1–1)
BUN SERPL-MCNC: 7 MG/DL (ref 8–23)
CALCIUM SERPL-MCNC: 9 MG/DL (ref 8.7–10.5)
CEA SERPL-MCNC: 3.1 NG/ML (ref 0–5)
CHLORIDE SERPL-SCNC: 107 MMOL/L (ref 95–110)
CO2 SERPL-SCNC: 26 MMOL/L (ref 23–29)
CREAT SERPL-MCNC: 1.1 MG/DL (ref 0.5–1.4)
DIFFERENTIAL METHOD: ABNORMAL
EOSINOPHIL # BLD AUTO: 0.2 K/UL (ref 0–0.5)
EOSINOPHIL NFR BLD: 2.4 % (ref 0–8)
ERYTHROCYTE [DISTWIDTH] IN BLOOD BY AUTOMATED COUNT: 13 % (ref 11.5–14.5)
EST. GFR  (AFRICAN AMERICAN): >60 ML/MIN/1.73 M^2
EST. GFR  (NON AFRICAN AMERICAN): >60 ML/MIN/1.73 M^2
GLUCOSE SERPL-MCNC: 96 MG/DL (ref 70–110)
HCT VFR BLD AUTO: 38.9 % (ref 40–54)
HGB BLD-MCNC: 12.5 G/DL (ref 14–18)
IMM GRANULOCYTES # BLD AUTO: 0.02 K/UL (ref 0–0.04)
IMM GRANULOCYTES NFR BLD AUTO: 0.3 % (ref 0–0.5)
LYMPHOCYTES # BLD AUTO: 1.4 K/UL (ref 1–4.8)
LYMPHOCYTES NFR BLD: 20.5 % (ref 18–48)
MAGNESIUM SERPL-MCNC: 1.8 MG/DL (ref 1.6–2.6)
MCH RBC QN AUTO: 30.3 PG (ref 27–31)
MCHC RBC AUTO-ENTMCNC: 32.1 G/DL (ref 32–36)
MCV RBC AUTO: 94 FL (ref 82–98)
MONOCYTES # BLD AUTO: 0.6 K/UL (ref 0.3–1)
MONOCYTES NFR BLD: 8.8 % (ref 4–15)
NEUTROPHILS # BLD AUTO: 4.7 K/UL (ref 1.8–7.7)
NEUTROPHILS NFR BLD: 67.6 % (ref 38–73)
NRBC BLD-RTO: 0 /100 WBC
PLATELET # BLD AUTO: 268 K/UL (ref 150–450)
PMV BLD AUTO: 9 FL (ref 9.2–12.9)
POTASSIUM SERPL-SCNC: 3.6 MMOL/L (ref 3.5–5.1)
PROT SERPL-MCNC: 7.5 G/DL (ref 6–8.4)
RBC # BLD AUTO: 4.13 M/UL (ref 4.6–6.2)
SODIUM SERPL-SCNC: 141 MMOL/L (ref 136–145)
T3FREE SERPL-MCNC: 2.9 PG/ML (ref 2.3–4.2)
T4 FREE SERPL-MCNC: 1.1 NG/DL (ref 0.71–1.51)
TSH SERPL DL<=0.005 MIU/L-ACNC: 0.83 UIU/ML (ref 0.4–4)
WBC # BLD AUTO: 7.01 K/UL (ref 3.9–12.7)

## 2021-12-14 PROCEDURE — 84439 ASSAY OF FREE THYROXINE: CPT | Performed by: SURGERY

## 2021-12-14 PROCEDURE — 36415 COLL VENOUS BLD VENIPUNCTURE: CPT | Performed by: INTERNAL MEDICINE

## 2021-12-14 PROCEDURE — 84481 FREE ASSAY (FT-3): CPT | Performed by: SURGERY

## 2021-12-14 PROCEDURE — 83735 ASSAY OF MAGNESIUM: CPT | Performed by: INTERNAL MEDICINE

## 2021-12-14 PROCEDURE — 80053 COMPREHEN METABOLIC PANEL: CPT | Performed by: INTERNAL MEDICINE

## 2021-12-14 PROCEDURE — 85025 COMPLETE CBC W/AUTO DIFF WBC: CPT | Performed by: INTERNAL MEDICINE

## 2021-12-14 PROCEDURE — 84443 ASSAY THYROID STIM HORMONE: CPT | Performed by: SURGERY

## 2021-12-14 PROCEDURE — 82378 CARCINOEMBRYONIC ANTIGEN: CPT | Performed by: INTERNAL MEDICINE

## 2021-12-17 ENCOUNTER — OFFICE VISIT (OUTPATIENT)
Dept: HEMATOLOGY/ONCOLOGY | Facility: CLINIC | Age: 66
End: 2021-12-17
Payer: MEDICARE

## 2021-12-17 VITALS
HEART RATE: 53 BPM | DIASTOLIC BLOOD PRESSURE: 69 MMHG | WEIGHT: 163.81 LBS | HEIGHT: 70 IN | SYSTOLIC BLOOD PRESSURE: 127 MMHG | BODY MASS INDEX: 23.45 KG/M2

## 2021-12-17 DIAGNOSIS — C20 RECTAL CANCER: ICD-10-CM

## 2021-12-17 DIAGNOSIS — C20 RECTAL ADENOCARCINOMA: Primary | ICD-10-CM

## 2021-12-17 DIAGNOSIS — Z71.2 ENCOUNTER TO DISCUSS TEST RESULTS: ICD-10-CM

## 2021-12-17 PROCEDURE — 99999 PR PBB SHADOW E&M-EST. PATIENT-LVL III: ICD-10-PCS | Mod: PBBFAC,,, | Performed by: INTERNAL MEDICINE

## 2021-12-17 PROCEDURE — 99999 PR PBB SHADOW E&M-EST. PATIENT-LVL III: CPT | Mod: PBBFAC,,, | Performed by: INTERNAL MEDICINE

## 2021-12-17 PROCEDURE — 99214 OFFICE O/P EST MOD 30 MIN: CPT | Mod: S$GLB,,, | Performed by: INTERNAL MEDICINE

## 2021-12-17 PROCEDURE — 99214 PR OFFICE/OUTPT VISIT, EST, LEVL IV, 30-39 MIN: ICD-10-PCS | Mod: S$GLB,,, | Performed by: INTERNAL MEDICINE

## 2021-12-20 ENCOUNTER — PATIENT OUTREACH (OUTPATIENT)
Dept: ADMINISTRATIVE | Facility: OTHER | Age: 66
End: 2021-12-20
Payer: MEDICARE

## 2021-12-20 ENCOUNTER — HOSPITAL ENCOUNTER (OUTPATIENT)
Dept: RADIOLOGY | Facility: HOSPITAL | Age: 66
Discharge: HOME | End: 2021-12-20
Attending: SURGERY
Payer: MEDICARE

## 2021-12-20 DIAGNOSIS — E04.1 THYROID NODULE: ICD-10-CM

## 2021-12-20 PROCEDURE — 76536 US SOFT TISSUE HEAD NECK THYROID: ICD-10-PCS | Mod: 26,,, | Performed by: RADIOLOGY

## 2021-12-20 PROCEDURE — 76536 US EXAM OF HEAD AND NECK: CPT | Mod: TC

## 2021-12-20 PROCEDURE — 76536 US EXAM OF HEAD AND NECK: CPT | Mod: 26,,, | Performed by: RADIOLOGY

## 2021-12-21 ENCOUNTER — OFFICE VISIT (OUTPATIENT)
Dept: SURGERY | Facility: CLINIC | Age: 66
End: 2021-12-21
Payer: MEDICARE

## 2021-12-21 VITALS
TEMPERATURE: 98 F | BODY MASS INDEX: 23.77 KG/M2 | HEART RATE: 56 BPM | SYSTOLIC BLOOD PRESSURE: 153 MMHG | HEIGHT: 70 IN | DIASTOLIC BLOOD PRESSURE: 67 MMHG | OXYGEN SATURATION: 98 % | WEIGHT: 166 LBS

## 2021-12-21 DIAGNOSIS — E04.1 THYROID NODULE: Primary | ICD-10-CM

## 2021-12-21 PROCEDURE — 99213 OFFICE O/P EST LOW 20 MIN: CPT | Mod: 24,S$GLB,, | Performed by: SURGERY

## 2021-12-21 PROCEDURE — 99213 PR OFFICE/OUTPT VISIT, EST, LEVL III, 20-29 MIN: ICD-10-PCS | Mod: 24,S$GLB,, | Performed by: SURGERY

## 2021-12-21 PROCEDURE — 99999 PR PBB SHADOW E&M-EST. PATIENT-LVL III: ICD-10-PCS | Mod: PBBFAC,,, | Performed by: SURGERY

## 2021-12-21 PROCEDURE — 99999 PR PBB SHADOW E&M-EST. PATIENT-LVL III: CPT | Mod: PBBFAC,,, | Performed by: SURGERY

## 2022-01-07 ENCOUNTER — PATIENT MESSAGE (OUTPATIENT)
Dept: FAMILY MEDICINE | Facility: CLINIC | Age: 67
End: 2022-01-07
Payer: MEDICARE

## 2022-01-11 ENCOUNTER — IMMUNIZATION (OUTPATIENT)
Dept: FAMILY MEDICINE | Facility: CLINIC | Age: 67
End: 2022-01-11
Payer: MEDICARE

## 2022-01-11 DIAGNOSIS — Z23 NEED FOR VACCINATION: Primary | ICD-10-CM

## 2022-01-11 PROCEDURE — 91301 COVID-19, MRNA, LNP-S, PF, 100 MCG/0.5 ML DOSE VACCINE: CPT | Mod: PBBFAC | Performed by: FAMILY MEDICINE

## 2022-02-08 ENCOUNTER — IMMUNIZATION (OUTPATIENT)
Dept: FAMILY MEDICINE | Facility: CLINIC | Age: 67
End: 2022-02-08
Payer: MEDICARE

## 2022-02-08 DIAGNOSIS — Z23 NEED FOR VACCINATION: Primary | ICD-10-CM

## 2022-02-08 PROCEDURE — 0012A COVID-19, MRNA, LNP-S, PF, 100 MCG/0.5 ML DOSE VACCINE: CPT | Mod: PBBFAC | Performed by: FAMILY MEDICINE

## 2022-03-10 ENCOUNTER — LAB VISIT (OUTPATIENT)
Dept: LAB | Facility: HOSPITAL | Age: 67
End: 2022-03-10
Attending: INTERNAL MEDICINE
Payer: MEDICARE

## 2022-03-10 DIAGNOSIS — C20 RECTAL ADENOCARCINOMA: ICD-10-CM

## 2022-03-10 DIAGNOSIS — C20 RECTAL CANCER: ICD-10-CM

## 2022-03-10 LAB
ALBUMIN SERPL BCP-MCNC: 3.8 G/DL (ref 3.5–5.2)
ALP SERPL-CCNC: 76 U/L (ref 55–135)
ALT SERPL W/O P-5'-P-CCNC: 19 U/L (ref 10–44)
ANION GAP SERPL CALC-SCNC: 9 MMOL/L (ref 8–16)
AST SERPL-CCNC: 20 U/L (ref 10–40)
BASOPHILS # BLD AUTO: 0.04 K/UL (ref 0–0.2)
BASOPHILS NFR BLD: 0.5 % (ref 0–1.9)
BILIRUB SERPL-MCNC: 0.3 MG/DL (ref 0.1–1)
BUN SERPL-MCNC: 10 MG/DL (ref 8–23)
CALCIUM SERPL-MCNC: 8.6 MG/DL (ref 8.7–10.5)
CEA SERPL-MCNC: 3.1 NG/ML (ref 0–5)
CHLORIDE SERPL-SCNC: 107 MMOL/L (ref 95–110)
CO2 SERPL-SCNC: 26 MMOL/L (ref 23–29)
CREAT SERPL-MCNC: 1 MG/DL (ref 0.5–1.4)
DIFFERENTIAL METHOD: ABNORMAL
EOSINOPHIL # BLD AUTO: 0.1 K/UL (ref 0–0.5)
EOSINOPHIL NFR BLD: 0.9 % (ref 0–8)
ERYTHROCYTE [DISTWIDTH] IN BLOOD BY AUTOMATED COUNT: 14.7 % (ref 11.5–14.5)
EST. GFR  (AFRICAN AMERICAN): >60 ML/MIN/1.73 M^2
EST. GFR  (NON AFRICAN AMERICAN): >60 ML/MIN/1.73 M^2
GLUCOSE SERPL-MCNC: 92 MG/DL (ref 70–110)
HCT VFR BLD AUTO: 40.3 % (ref 40–54)
HGB BLD-MCNC: 13.3 G/DL (ref 14–18)
IMM GRANULOCYTES # BLD AUTO: 0.02 K/UL (ref 0–0.04)
IMM GRANULOCYTES NFR BLD AUTO: 0.2 % (ref 0–0.5)
LYMPHOCYTES # BLD AUTO: 1.6 K/UL (ref 1–4.8)
LYMPHOCYTES NFR BLD: 19 % (ref 18–48)
MCH RBC QN AUTO: 29.8 PG (ref 27–31)
MCHC RBC AUTO-ENTMCNC: 33 G/DL (ref 32–36)
MCV RBC AUTO: 90 FL (ref 82–98)
MONOCYTES # BLD AUTO: 0.7 K/UL (ref 0.3–1)
MONOCYTES NFR BLD: 8.4 % (ref 4–15)
NEUTROPHILS # BLD AUTO: 6.1 K/UL (ref 1.8–7.7)
NEUTROPHILS NFR BLD: 71 % (ref 38–73)
NRBC BLD-RTO: 0 /100 WBC
PLATELET # BLD AUTO: 272 K/UL (ref 150–450)
PMV BLD AUTO: 9.1 FL (ref 9.2–12.9)
POTASSIUM SERPL-SCNC: 3.6 MMOL/L (ref 3.5–5.1)
PROT SERPL-MCNC: 7 G/DL (ref 6–8.4)
RBC # BLD AUTO: 4.47 M/UL (ref 4.6–6.2)
SODIUM SERPL-SCNC: 142 MMOL/L (ref 136–145)
WBC # BLD AUTO: 8.6 K/UL (ref 3.9–12.7)

## 2022-03-10 PROCEDURE — 80053 COMPREHEN METABOLIC PANEL: CPT | Performed by: INTERNAL MEDICINE

## 2022-03-10 PROCEDURE — 85025 COMPLETE CBC W/AUTO DIFF WBC: CPT | Performed by: INTERNAL MEDICINE

## 2022-03-10 PROCEDURE — 82378 CARCINOEMBRYONIC ANTIGEN: CPT | Performed by: INTERNAL MEDICINE

## 2022-03-10 PROCEDURE — 36415 COLL VENOUS BLD VENIPUNCTURE: CPT | Performed by: INTERNAL MEDICINE

## 2022-03-11 ENCOUNTER — OFFICE VISIT (OUTPATIENT)
Dept: HEMATOLOGY/ONCOLOGY | Facility: CLINIC | Age: 67
End: 2022-03-11
Payer: MEDICARE

## 2022-03-11 VITALS
DIASTOLIC BLOOD PRESSURE: 76 MMHG | HEIGHT: 70 IN | SYSTOLIC BLOOD PRESSURE: 162 MMHG | WEIGHT: 163 LBS | OXYGEN SATURATION: 98 % | BODY MASS INDEX: 23.34 KG/M2 | HEART RATE: 71 BPM

## 2022-03-11 DIAGNOSIS — C20 RECTAL CANCER: ICD-10-CM

## 2022-03-11 DIAGNOSIS — C20 RECTAL ADENOCARCINOMA: Primary | ICD-10-CM

## 2022-03-11 DIAGNOSIS — Z71.2 ENCOUNTER TO DISCUSS TEST RESULTS: ICD-10-CM

## 2022-03-11 PROCEDURE — 3077F PR MOST RECENT SYSTOLIC BLOOD PRESSURE >= 140 MM HG: ICD-10-PCS | Mod: CPTII,S$GLB,, | Performed by: INTERNAL MEDICINE

## 2022-03-11 PROCEDURE — 99999 PR PBB SHADOW E&M-EST. PATIENT-LVL III: CPT | Mod: PBBFAC,,, | Performed by: INTERNAL MEDICINE

## 2022-03-11 PROCEDURE — 3288F PR FALLS RISK ASSESSMENT DOCUMENTED: ICD-10-PCS | Mod: CPTII,S$GLB,, | Performed by: INTERNAL MEDICINE

## 2022-03-11 PROCEDURE — 3008F BODY MASS INDEX DOCD: CPT | Mod: CPTII,S$GLB,, | Performed by: INTERNAL MEDICINE

## 2022-03-11 PROCEDURE — 1159F MED LIST DOCD IN RCRD: CPT | Mod: CPTII,S$GLB,, | Performed by: INTERNAL MEDICINE

## 2022-03-11 PROCEDURE — 1126F AMNT PAIN NOTED NONE PRSNT: CPT | Mod: CPTII,S$GLB,, | Performed by: INTERNAL MEDICINE

## 2022-03-11 PROCEDURE — 3288F FALL RISK ASSESSMENT DOCD: CPT | Mod: CPTII,S$GLB,, | Performed by: INTERNAL MEDICINE

## 2022-03-11 PROCEDURE — 1101F PR PT FALLS ASSESS DOC 0-1 FALLS W/OUT INJ PAST YR: ICD-10-PCS | Mod: CPTII,S$GLB,, | Performed by: INTERNAL MEDICINE

## 2022-03-11 PROCEDURE — 1101F PT FALLS ASSESS-DOCD LE1/YR: CPT | Mod: CPTII,S$GLB,, | Performed by: INTERNAL MEDICINE

## 2022-03-11 PROCEDURE — 1126F PR PAIN SEVERITY QUANTIFIED, NO PAIN PRESENT: ICD-10-PCS | Mod: CPTII,S$GLB,, | Performed by: INTERNAL MEDICINE

## 2022-03-11 PROCEDURE — 1159F PR MEDICATION LIST DOCUMENTED IN MEDICAL RECORD: ICD-10-PCS | Mod: CPTII,S$GLB,, | Performed by: INTERNAL MEDICINE

## 2022-03-11 PROCEDURE — 99999 PR PBB SHADOW E&M-EST. PATIENT-LVL III: ICD-10-PCS | Mod: PBBFAC,,, | Performed by: INTERNAL MEDICINE

## 2022-03-11 PROCEDURE — 3008F PR BODY MASS INDEX (BMI) DOCUMENTED: ICD-10-PCS | Mod: CPTII,S$GLB,, | Performed by: INTERNAL MEDICINE

## 2022-03-11 PROCEDURE — 99214 PR OFFICE/OUTPT VISIT, EST, LEVL IV, 30-39 MIN: ICD-10-PCS | Mod: S$GLB,,, | Performed by: INTERNAL MEDICINE

## 2022-03-11 PROCEDURE — 3078F DIAST BP <80 MM HG: CPT | Mod: CPTII,S$GLB,, | Performed by: INTERNAL MEDICINE

## 2022-03-11 PROCEDURE — 99214 OFFICE O/P EST MOD 30 MIN: CPT | Mod: S$GLB,,, | Performed by: INTERNAL MEDICINE

## 2022-03-11 PROCEDURE — 3077F SYST BP >= 140 MM HG: CPT | Mod: CPTII,S$GLB,, | Performed by: INTERNAL MEDICINE

## 2022-03-11 PROCEDURE — 3078F PR MOST RECENT DIASTOLIC BLOOD PRESSURE < 80 MM HG: ICD-10-PCS | Mod: CPTII,S$GLB,, | Performed by: INTERNAL MEDICINE

## 2022-03-11 NOTE — PROGRESS NOTES
CC: I have frozen shoulder on the right     Mr Apple is a 65 yr old male with Stage 3  T3 N1 M 0 rectal adenocarcinoma.    On October 21, 2019   a laparotomy was performed for perforated colon and patient underwent a right hemicolectomy with a rectal biopsy revealing rectal adenocarcinoma.   He completed neoadjuvant  xeloda with radiation  With chest port in place . On April 27 , 2020 he began adjuvant Folfox . He has had severe paresthesias and underwent a dose adjustment > Last adjuvant dose September 30 2020  Here to review scans and labs before F/U with Dr Lagunas to discuss surgerical intervention    Interval History: Patient presents for follow up of rectal cancer. The patient has a history of significant pain not under control; but better with opioid use  . B12 deficiency associated anemia  has not been well controlled on B replacement  Gerd has been stable on a PPI  The patient states compliance with all prescribed medications.   Here to review Ct chest abdomen and pelvis revealing no evidence of disease. Patient has new pain in shoulder and numbness down into hands   Diet has been stable       PHYSICAL EXAM:  Vitals:    03/11/22 0858   BP: (!) 162/76   Pulse: 71         General patient is in no distress well developed well nourished  Psych pleasant affect no evidence of depression no evidence of anxiety  Head normocephalic atraumatic, no hoarseness,  well-spoken speech intact  Eyes noninjected sclera conjunctiva appear pink  Face is symmetric  Skin intact no lesions noted no ecchymosis no rash  Neck with no limited range of motion no JVD evident  Chest with no use of accessory muscles no labored breathing no evidence of tachypnea  No respiratory distress, effort normal   Abdomen appears to be nondistended  Extremities with no cyanosis no evidence of edema  limited ROM right shoulder  No joint effusions  Behavior normal judgment normal    Labs reviewed: pertinent findings include  Lab Results   Component  Value Date    WBC 8.60 03/10/2022    HGB 13.3 (L) 03/10/2022    HCT 40.3 03/10/2022    MCV 90 03/10/2022     03/10/2022       CMP  Sodium   Date Value Ref Range Status   03/10/2022 142 136 - 145 mmol/L Final     Potassium   Date Value Ref Range Status   03/10/2022 3.6 3.5 - 5.1 mmol/L Final     Chloride   Date Value Ref Range Status   03/10/2022 107 95 - 110 mmol/L Final     CO2   Date Value Ref Range Status   03/10/2022 26 23 - 29 mmol/L Final     Glucose   Date Value Ref Range Status   03/10/2022 92 70 - 110 mg/dL Final     BUN   Date Value Ref Range Status   03/10/2022 10 8 - 23 mg/dL Final     Creatinine   Date Value Ref Range Status   03/10/2022 1.0 0.5 - 1.4 mg/dL Final     Calcium   Date Value Ref Range Status   03/10/2022 8.6 (L) 8.7 - 10.5 mg/dL Final     Total Protein   Date Value Ref Range Status   03/10/2022 7.0 6.0 - 8.4 g/dL Final     Albumin   Date Value Ref Range Status   03/10/2022 3.8 3.5 - 5.2 g/dL Final     Total Bilirubin   Date Value Ref Range Status   03/10/2022 0.3 0.1 - 1.0 mg/dL Final     Comment:     For infants and newborns, interpretation of results should be based  on gestational age, weight and in agreement with clinical  observations.    Premature Infant recommended reference ranges:  Up to 24 hours.............<8.0 mg/dL  Up to 48 hours............<12.0 mg/dL  3-5 days..................<15.0 mg/dL  6-29 days.................<15.0 mg/dL       Alkaline Phosphatase   Date Value Ref Range Status   03/10/2022 76 55 - 135 U/L Final     AST   Date Value Ref Range Status   03/10/2022 20 10 - 40 U/L Final     ALT   Date Value Ref Range Status   03/10/2022 19 10 - 44 U/L Final     Anion Gap   Date Value Ref Range Status   03/10/2022 9 8 - 16 mmol/L Final     eGFR if    Date Value Ref Range Status   03/10/2022 >60.0 >60 mL/min/1.73 m^2 Final     eGFR if non    Date Value Ref Range Status   03/10/2022 >60.0 >60 mL/min/1.73 m^2 Final     Comment:      Calculation used to obtain the estimated glomerular filtration  rate (eGFR) is the CKD-EPI equation.        Health Maintenance Summary    PROSTATE-SPECIFIC ANTIGEN Overdue 1955    Hepatitis C Screening Overdue 1955    Lipid Panel Overdue 1955    HIV Screening Overdue 10/27/1969    TETANUS VACCINE Overdue 10/27/1972    Shingles Vaccine Overdue 10/27/2004    Pneumococcal Vaccine (65+ High/Highest Risk) Overdue 10/27/2019    Influenza Vaccine Overdue 8/1/2020    Colorectal Cancer Screening Next Due 10/21/2024      Feb 8 2021 Ct chest abd pelvis     Impression:     Prior diverting colostomy to the left lower quadrant.  Mild rectal wall thickening without a definite mass identified to day.  Prior right colectomy.  Prior cholecystectomy.  Degenerative disc disease at L5-S1.     Metastatic disease is not identified on this study.        Electronically signed by: Miguel Angel Cabrera MD  Date:                                            02/08/2021  Time:                                           11:21    CT abdomen pelvis without contrast  9/3/21  Impression:     Interval resolution of the small fluid collection previously noted in the anterior peritoneal cavity.     Status post right hemicolectomy, LAR with no findings to indicate residual or recurrent malignancy.     Mild small bowel wall thickening in the pelvis is more apparent on today's study secondary to the use of oral contrast, but probably unchanged in retrospect, perhaps the result of prior radiation therapy.     Assessment:    Rectal cancer remission     status post surgery hemicolectomy chemotherapy.  Last known chemotherapy completed on 09/20/2020 cycle 12 FOLFOX    Most recent CT scan reviewed no evidence of recurrent or residual malignancies 09/03/2021    Status post of closure ileostomy 07/12/2021    Port removal 05/28/2021    Colonoscopy 04/20/2021 no evidence of malignancy    colosoncopy 10/5/21 no malignancy     > RTC 3 months with CBC CMP  CEA.  May consider change to [id number]  Follow up

## 2022-05-11 DIAGNOSIS — G89.3 NEOPLASM RELATED PAIN: ICD-10-CM

## 2022-05-11 RX ORDER — OXYCODONE AND ACETAMINOPHEN 10; 325 MG/1; MG/1
1 TABLET ORAL EVERY 12 HOURS PRN
Qty: 60 TABLET | Refills: 0 | Status: SHIPPED | OUTPATIENT
Start: 2022-05-11

## 2022-06-08 ENCOUNTER — LAB VISIT (OUTPATIENT)
Dept: LAB | Facility: HOSPITAL | Age: 67
End: 2022-06-08
Attending: INTERNAL MEDICINE
Payer: MEDICARE

## 2022-06-08 DIAGNOSIS — C20 RECTAL ADENOCARCINOMA: ICD-10-CM

## 2022-06-08 DIAGNOSIS — C20 RECTAL CANCER: ICD-10-CM

## 2022-06-08 DIAGNOSIS — Z71.2 ENCOUNTER TO DISCUSS TEST RESULTS: ICD-10-CM

## 2022-06-08 LAB
ALBUMIN SERPL BCP-MCNC: 3.8 G/DL (ref 3.5–5.2)
ALP SERPL-CCNC: 91 U/L (ref 55–135)
ALT SERPL W/O P-5'-P-CCNC: 18 U/L (ref 10–44)
ANION GAP SERPL CALC-SCNC: 9 MMOL/L (ref 8–16)
AST SERPL-CCNC: 19 U/L (ref 10–40)
BASOPHILS # BLD AUTO: 0.03 K/UL (ref 0–0.2)
BASOPHILS NFR BLD: 0.4 % (ref 0–1.9)
BILIRUB SERPL-MCNC: 0.3 MG/DL (ref 0.1–1)
BUN SERPL-MCNC: 13 MG/DL (ref 8–23)
CALCIUM SERPL-MCNC: 9.1 MG/DL (ref 8.7–10.5)
CEA SERPL-MCNC: 3.8 NG/ML (ref 0–5)
CHLORIDE SERPL-SCNC: 108 MMOL/L (ref 95–110)
CO2 SERPL-SCNC: 24 MMOL/L (ref 23–29)
CREAT SERPL-MCNC: 1.2 MG/DL (ref 0.5–1.4)
DIFFERENTIAL METHOD: ABNORMAL
EOSINOPHIL # BLD AUTO: 0.1 K/UL (ref 0–0.5)
EOSINOPHIL NFR BLD: 1.5 % (ref 0–8)
ERYTHROCYTE [DISTWIDTH] IN BLOOD BY AUTOMATED COUNT: 12.9 % (ref 11.5–14.5)
EST. GFR  (AFRICAN AMERICAN): >60 ML/MIN/1.73 M^2
EST. GFR  (NON AFRICAN AMERICAN): >60 ML/MIN/1.73 M^2
GLUCOSE SERPL-MCNC: 103 MG/DL (ref 70–110)
HCT VFR BLD AUTO: 40.8 % (ref 40–54)
HGB BLD-MCNC: 13.9 G/DL (ref 14–18)
IMM GRANULOCYTES # BLD AUTO: 0.02 K/UL (ref 0–0.04)
IMM GRANULOCYTES NFR BLD AUTO: 0.3 % (ref 0–0.5)
LYMPHOCYTES # BLD AUTO: 1.2 K/UL (ref 1–4.8)
LYMPHOCYTES NFR BLD: 16 % (ref 18–48)
MCH RBC QN AUTO: 31.5 PG (ref 27–31)
MCHC RBC AUTO-ENTMCNC: 34.1 G/DL (ref 32–36)
MCV RBC AUTO: 93 FL (ref 82–98)
MONOCYTES # BLD AUTO: 0.7 K/UL (ref 0.3–1)
MONOCYTES NFR BLD: 9.7 % (ref 4–15)
NEUTROPHILS # BLD AUTO: 5.3 K/UL (ref 1.8–7.7)
NEUTROPHILS NFR BLD: 72.1 % (ref 38–73)
NRBC BLD-RTO: 0 /100 WBC
PLATELET # BLD AUTO: 231 K/UL (ref 150–450)
PMV BLD AUTO: 9.2 FL (ref 9.2–12.9)
POTASSIUM SERPL-SCNC: 4.2 MMOL/L (ref 3.5–5.1)
PROT SERPL-MCNC: 7.6 G/DL (ref 6–8.4)
RBC # BLD AUTO: 4.41 M/UL (ref 4.6–6.2)
SODIUM SERPL-SCNC: 141 MMOL/L (ref 136–145)
WBC # BLD AUTO: 7.29 K/UL (ref 3.9–12.7)

## 2022-06-08 PROCEDURE — 36415 COLL VENOUS BLD VENIPUNCTURE: CPT | Performed by: INTERNAL MEDICINE

## 2022-06-08 PROCEDURE — 85025 COMPLETE CBC W/AUTO DIFF WBC: CPT | Performed by: INTERNAL MEDICINE

## 2022-06-08 PROCEDURE — 80053 COMPREHEN METABOLIC PANEL: CPT | Performed by: INTERNAL MEDICINE

## 2022-06-08 PROCEDURE — 82378 CARCINOEMBRYONIC ANTIGEN: CPT | Performed by: INTERNAL MEDICINE

## 2022-06-09 ENCOUNTER — OFFICE VISIT (OUTPATIENT)
Dept: HEMATOLOGY/ONCOLOGY | Facility: CLINIC | Age: 67
End: 2022-06-09
Payer: MEDICARE

## 2022-06-09 VITALS
HEIGHT: 69 IN | OXYGEN SATURATION: 96 % | BODY MASS INDEX: 23.4 KG/M2 | SYSTOLIC BLOOD PRESSURE: 111 MMHG | HEART RATE: 66 BPM | WEIGHT: 158 LBS | DIASTOLIC BLOOD PRESSURE: 61 MMHG

## 2022-06-09 DIAGNOSIS — T45.1X5A CHEMOTHERAPY-INDUCED PERIPHERAL NEUROPATHY: ICD-10-CM

## 2022-06-09 DIAGNOSIS — C20 RECTAL CANCER: Primary | ICD-10-CM

## 2022-06-09 DIAGNOSIS — C77.9 REGIONAL LYMPH NODE METASTASIS PRESENT: ICD-10-CM

## 2022-06-09 DIAGNOSIS — G62.0 CHEMOTHERAPY-INDUCED PERIPHERAL NEUROPATHY: ICD-10-CM

## 2022-06-09 PROCEDURE — 99999 PR PBB SHADOW E&M-EST. PATIENT-LVL III: CPT | Mod: PBBFAC,,, | Performed by: INTERNAL MEDICINE

## 2022-06-09 PROCEDURE — 1126F AMNT PAIN NOTED NONE PRSNT: CPT | Mod: CPTII,S$GLB,, | Performed by: INTERNAL MEDICINE

## 2022-06-09 PROCEDURE — 1159F PR MEDICATION LIST DOCUMENTED IN MEDICAL RECORD: ICD-10-PCS | Mod: CPTII,S$GLB,, | Performed by: INTERNAL MEDICINE

## 2022-06-09 PROCEDURE — 99214 PR OFFICE/OUTPT VISIT, EST, LEVL IV, 30-39 MIN: ICD-10-PCS | Mod: S$GLB,,, | Performed by: INTERNAL MEDICINE

## 2022-06-09 PROCEDURE — 1101F PT FALLS ASSESS-DOCD LE1/YR: CPT | Mod: CPTII,S$GLB,, | Performed by: INTERNAL MEDICINE

## 2022-06-09 PROCEDURE — 3078F PR MOST RECENT DIASTOLIC BLOOD PRESSURE < 80 MM HG: ICD-10-PCS | Mod: CPTII,S$GLB,, | Performed by: INTERNAL MEDICINE

## 2022-06-09 PROCEDURE — 99999 PR PBB SHADOW E&M-EST. PATIENT-LVL III: ICD-10-PCS | Mod: PBBFAC,,, | Performed by: INTERNAL MEDICINE

## 2022-06-09 PROCEDURE — 3074F SYST BP LT 130 MM HG: CPT | Mod: CPTII,S$GLB,, | Performed by: INTERNAL MEDICINE

## 2022-06-09 PROCEDURE — 99214 OFFICE O/P EST MOD 30 MIN: CPT | Mod: S$GLB,,, | Performed by: INTERNAL MEDICINE

## 2022-06-09 PROCEDURE — 1101F PR PT FALLS ASSESS DOC 0-1 FALLS W/OUT INJ PAST YR: ICD-10-PCS | Mod: CPTII,S$GLB,, | Performed by: INTERNAL MEDICINE

## 2022-06-09 PROCEDURE — 3288F FALL RISK ASSESSMENT DOCD: CPT | Mod: CPTII,S$GLB,, | Performed by: INTERNAL MEDICINE

## 2022-06-09 PROCEDURE — 1159F MED LIST DOCD IN RCRD: CPT | Mod: CPTII,S$GLB,, | Performed by: INTERNAL MEDICINE

## 2022-06-09 PROCEDURE — 3078F DIAST BP <80 MM HG: CPT | Mod: CPTII,S$GLB,, | Performed by: INTERNAL MEDICINE

## 2022-06-09 PROCEDURE — 3008F BODY MASS INDEX DOCD: CPT | Mod: CPTII,S$GLB,, | Performed by: INTERNAL MEDICINE

## 2022-06-09 PROCEDURE — 1126F PR PAIN SEVERITY QUANTIFIED, NO PAIN PRESENT: ICD-10-PCS | Mod: CPTII,S$GLB,, | Performed by: INTERNAL MEDICINE

## 2022-06-09 PROCEDURE — 3288F PR FALLS RISK ASSESSMENT DOCUMENTED: ICD-10-PCS | Mod: CPTII,S$GLB,, | Performed by: INTERNAL MEDICINE

## 2022-06-09 PROCEDURE — 3074F PR MOST RECENT SYSTOLIC BLOOD PRESSURE < 130 MM HG: ICD-10-PCS | Mod: CPTII,S$GLB,, | Performed by: INTERNAL MEDICINE

## 2022-06-09 PROCEDURE — 3008F PR BODY MASS INDEX (BMI) DOCUMENTED: ICD-10-PCS | Mod: CPTII,S$GLB,, | Performed by: INTERNAL MEDICINE

## 2022-06-09 NOTE — PROGRESS NOTES
History of present illness:   The patient is a 66-year-old white gentleman previously cared for by my colleague, Dr. Rider regarding a diagnosis of stage III adenocarcinoma the rectum.   Patient was diagnosed in 2019.   Patient presented with cecal perforation for which she was explored.  Patient was found to have a lesion in the rectum which was biopsied and returned positive for adenocarcinoma arising in a tubulovillous adenoma.  Patient received neoadjuvant Xeloda/XRT, followed by resection/ adjuvant FOLFOX.  Patient is status post reversal of colostomy and small bowel ileostomy.  Patient's last chemotherapy was administered on September 30, 2020. Patient is approximately 18 months from completion of adjuvant chemotherapy and returns for surveillance examination.  Patient's chemotherapy was complicated by the  Development of peripheral neuropathy which necessitated dose reduction.  Most recent endoscopy was obtained on 10/05/2021 and revealed no evidence of anastomotic recurrence or new polyps.Patient's symptoms of peripheral neuropathy are stable and involve his finger tips /the entirety of his feet.    Physical examination:   Well-developed, well-nourished, elderly, white gentleman, no acute distress, who has a weight of   158 lb.  VITAL SIGNS: Documented in EMR and reviewed  HEENT: Normocephalic, atraumatic. Oral mucosa pink and moist. Lips without lesions. Tongue midline. Oropharynx clear. Nonicteric sclerae.   NECK: Supple, no adenopathy.  HEART: Regular rate and rhythm without murmur, gallop or rub.   LUNGS: Clear to auscultation bilaterally.  ABDOMEN: Soft, nontender, nondistended with positive normoactive bowel sounds, no hepatosplenomegaly.   EXTREMITIES: No cyanosis, clubbing or edema. Distal pulses are intact.     Laboratory:  White count 7.3, hemoglobin 13.9, hematocrit 40.8, platelets 231, absolute neutrophil count is 5300.    Sodium 141, potassium 4.2, chloride 108, CO2 24, BUN 13, creatinine 1.2,  glucose 103, calcium 9.1, liver function test within normal limits, GFR is greater than 60.  CEA 3.8.    Impression:   1. Stage III adenocarcinoma the rectum -no evidence of active disease.  2.  Chemotherapy associated peripheral neuropathy-stable.    Plan:   1. Re-evaluate 6 months from now with interval CBC, CMP, LDH, CXR and CEA prior to appointment.  2. Patient is to have repeat colonoscopy performed in October.    This note was created using voice recognition software and may contain grammatical errors.

## 2022-06-09 NOTE — Clinical Note
Return to clinic to see the nurse practitioner 6 months from now with interval CBC, CMP, LDH, CEA, chest x-ray prior to appointment.   Patient is to have interval colonoscopy performed with Dr. Lazo in October of this year for surveillance.

## 2022-07-20 ENCOUNTER — PATIENT MESSAGE (OUTPATIENT)
Dept: FAMILY MEDICINE | Facility: CLINIC | Age: 67
End: 2022-07-20
Payer: MEDICARE

## 2022-07-30 ENCOUNTER — PATIENT MESSAGE (OUTPATIENT)
Dept: FAMILY MEDICINE | Facility: CLINIC | Age: 67
End: 2022-07-30
Payer: MEDICARE

## 2022-08-26 NOTE — LETTER
April 13, 2020      Shayla Stringer MD  [address] [address]           [facility]  [address]  [facility] [address]  Phone: [phone]          Patient: Miguel Angel Apple Sr.   MR Number: [id number]   YOB: 1955   Date of Visit: 4/13/2020       Dear Dr Stringer:    Thank you for referring Miguel Angel Apple to me for evaluation. Attached you will find relevant portions of my assessment and plan of care.    If you have questions, please do not hesitate to call me. I look forward to following Miguel Angel Apple along with you.    Sincerely,    Torres Baldwin MD    Enclosure  CC:  MD Jose Zapata Jr., MD    If you would like to receive this communication electronically, please contact [email or web address] or [phone] to request more information on [employer] Link access.    For providers and/or their staff who would like to refer a patient to [facility], please contact us through our one-stop-shop provider referral line, [facility], at [phone].    If you feel you have received this communication in error or would no longer like to receive these types of communications, please e-mail [email or web address]          Mirvaso Counseling: Mirvaso is a topical medication which can decrease superficial blood flow where applied. Side effects are uncommon and include stinging, redness and allergic reactions.

## 2022-12-06 ENCOUNTER — HOSPITAL ENCOUNTER (OUTPATIENT)
Dept: RADIOLOGY | Facility: HOSPITAL | Age: 67
Discharge: HOME | End: 2022-12-06
Attending: INTERNAL MEDICINE
Payer: MEDICARE

## 2022-12-06 DIAGNOSIS — C20 RECTAL CANCER: ICD-10-CM

## 2022-12-06 PROCEDURE — 71046 XR CHEST PA AND LATERAL: ICD-10-PCS | Mod: 26,,, | Performed by: RADIOLOGY

## 2022-12-06 PROCEDURE — 71046 X-RAY EXAM CHEST 2 VIEWS: CPT | Mod: 26,,, | Performed by: RADIOLOGY

## 2022-12-06 PROCEDURE — 71046 X-RAY EXAM CHEST 2 VIEWS: CPT | Mod: TC

## 2022-12-08 ENCOUNTER — TELEPHONE (OUTPATIENT)
Dept: SURGERY | Facility: CLINIC | Age: 67
End: 2022-12-08
Payer: MEDICARE

## 2022-12-08 NOTE — TELEPHONE ENCOUNTER
Returned call. Appointment rescheduled.     ----- Message from Sharla Mack sent at 12/8/2022 11:16 AM CST -----  Contact: Patient  Type:  Patient Call          Who Called: Patient         Does the patient know what this is regarding?: Requesting a call back Pt aid he will need a later time on 12/22  because he takes care of his sister ; please advise           Would the patient rather a call back or a response via [facility]? Call           Best Call Back Number: [phone]             Additional Information:

## 2022-12-09 ENCOUNTER — OFFICE VISIT (OUTPATIENT)
Dept: HEMATOLOGY/ONCOLOGY | Facility: CLINIC | Age: 67
End: 2022-12-09
Payer: MEDICARE

## 2022-12-09 VITALS
BODY MASS INDEX: 21.6 KG/M2 | OXYGEN SATURATION: 98 % | SYSTOLIC BLOOD PRESSURE: 129 MMHG | TEMPERATURE: 99 F | RESPIRATION RATE: 18 BRPM | HEART RATE: 67 BPM | DIASTOLIC BLOOD PRESSURE: 68 MMHG | WEIGHT: 150.88 LBS | HEIGHT: 70 IN

## 2022-12-09 DIAGNOSIS — C20 RECTAL CANCER: Primary | ICD-10-CM

## 2022-12-09 PROCEDURE — 99999 PR PBB SHADOW E&M-EST. PATIENT-LVL III: ICD-10-PCS | Mod: PBBFAC,,, | Performed by: NURSE PRACTITIONER

## 2022-12-09 PROCEDURE — 1125F AMNT PAIN NOTED PAIN PRSNT: CPT | Mod: CPTII,S$GLB,, | Performed by: NURSE PRACTITIONER

## 2022-12-09 PROCEDURE — 3078F PR MOST RECENT DIASTOLIC BLOOD PRESSURE < 80 MM HG: ICD-10-PCS | Mod: CPTII,S$GLB,, | Performed by: NURSE PRACTITIONER

## 2022-12-09 PROCEDURE — 3008F BODY MASS INDEX DOCD: CPT | Mod: CPTII,S$GLB,, | Performed by: NURSE PRACTITIONER

## 2022-12-09 PROCEDURE — 1101F PT FALLS ASSESS-DOCD LE1/YR: CPT | Mod: CPTII,S$GLB,, | Performed by: NURSE PRACTITIONER

## 2022-12-09 PROCEDURE — 1101F PR PT FALLS ASSESS DOC 0-1 FALLS W/OUT INJ PAST YR: ICD-10-PCS | Mod: CPTII,S$GLB,, | Performed by: NURSE PRACTITIONER

## 2022-12-09 PROCEDURE — 3074F PR MOST RECENT SYSTOLIC BLOOD PRESSURE < 130 MM HG: ICD-10-PCS | Mod: CPTII,S$GLB,, | Performed by: NURSE PRACTITIONER

## 2022-12-09 PROCEDURE — 99213 OFFICE O/P EST LOW 20 MIN: CPT | Mod: S$GLB,,, | Performed by: NURSE PRACTITIONER

## 2022-12-09 PROCEDURE — 3074F SYST BP LT 130 MM HG: CPT | Mod: CPTII,S$GLB,, | Performed by: NURSE PRACTITIONER

## 2022-12-09 PROCEDURE — 99999 PR PBB SHADOW E&M-EST. PATIENT-LVL III: CPT | Mod: PBBFAC,,, | Performed by: NURSE PRACTITIONER

## 2022-12-09 PROCEDURE — 3288F PR FALLS RISK ASSESSMENT DOCUMENTED: ICD-10-PCS | Mod: CPTII,S$GLB,, | Performed by: NURSE PRACTITIONER

## 2022-12-09 PROCEDURE — 1159F MED LIST DOCD IN RCRD: CPT | Mod: CPTII,S$GLB,, | Performed by: NURSE PRACTITIONER

## 2022-12-09 PROCEDURE — 1125F PR PAIN SEVERITY QUANTIFIED, PAIN PRESENT: ICD-10-PCS | Mod: CPTII,S$GLB,, | Performed by: NURSE PRACTITIONER

## 2022-12-09 PROCEDURE — 1159F PR MEDICATION LIST DOCUMENTED IN MEDICAL RECORD: ICD-10-PCS | Mod: CPTII,S$GLB,, | Performed by: NURSE PRACTITIONER

## 2022-12-09 PROCEDURE — 99213 PR OFFICE/OUTPT VISIT, EST, LEVL III, 20-29 MIN: ICD-10-PCS | Mod: S$GLB,,, | Performed by: NURSE PRACTITIONER

## 2022-12-09 PROCEDURE — 3078F DIAST BP <80 MM HG: CPT | Mod: CPTII,S$GLB,, | Performed by: NURSE PRACTITIONER

## 2022-12-09 PROCEDURE — 3288F FALL RISK ASSESSMENT DOCD: CPT | Mod: CPTII,S$GLB,, | Performed by: NURSE PRACTITIONER

## 2022-12-09 PROCEDURE — 3008F PR BODY MASS INDEX (BMI) DOCUMENTED: ICD-10-PCS | Mod: CPTII,S$GLB,, | Performed by: NURSE PRACTITIONER

## 2022-12-09 NOTE — PROGRESS NOTES
"History of present illness:   The patient is a 66-year-old white gentleman previously cared for by my colleague, Dr. Rider regarding a diagnosis of stage III adenocarcinoma the rectum.   Patient was diagnosed in 2019.   Patient presented with cecal perforation for which she was explored.  Patient was found to have a lesion in the rectum which was biopsied and returned positive for adenocarcinoma arising in a tubulovillous adenoma.  Patient received neoadjuvant Xeloda/XRT, followed by resection/ adjuvant FOLFOX.  Patient is status post reversal of colostomy and small bowel ileostomy.  Patient's last chemotherapy was administered on September 30, 2020. Patient is approximately 18 months from completion of adjuvant chemotherapy and returns for surveillance examination.  Patient's chemotherapy was complicated by the  Development of peripheral neuropathy which necessitated dose reduction.  Most recent endoscopy was obtained on 10/05/2021 and revealed no evidence of anastomotic recurrence or new polyps.Patient's symptoms of peripheral neuropathy are stable and involve his finger tips /the entirety of his feet.    12/9/2022:  He returns today for follow-up.  He is feeling well and has no complaints.  Had colonoscopy October 2022 that was unremarkable according to the patient.  He had a colostomy reversal in October 2021    Review of Systems   Constitutional: Negative.    HENT: Negative.     Eyes: Negative.    Respiratory: Negative.     Cardiovascular: Negative.    Gastrointestinal: Negative.    Genitourinary: Negative.    Musculoskeletal: Negative.    Skin: Negative.    Neurological: Negative.    Endo/Heme/Allergies: Negative.    Psychiatric/Behavioral: Negative.          Vitals:    12/09/22 1057   BP: 129/68   BP Location: Left arm   Patient Position: Sitting   BP Method: Medium (Automatic)   Pulse: 67   Resp: 18   Temp: 98.5 °F (36.9 °C)   TempSrc: Oral   SpO2: 98%   Weight: 68.4 kg (150 lb 14.4 oz)   Height: 5' 10" " (1.778 m)      Physical examination:   Well-developed, well-nourished, elderly, white gentleman, no acute distress.  VITAL SIGNS: Documented in EMR and reviewed  HEENT: Normocephalic, atraumatic. Oral mucosa pink and moist. Lips without lesions. Tongue midline. Oropharynx clear. Nonicteric sclerae.   NECK: Supple, no adenopathy.  HEART: Regular rate and rhythm without murmur, gallop or rub.   LUNGS: Clear to auscultation bilaterally.  ABDOMEN: Soft, nontender, nondistended with positive normoactive bowel sounds, no hepatosplenomegaly.   EXTREMITIES: No cyanosis, clubbing or edema. Distal pulses are intact.         Impression:   1. Stage III adenocarcinoma the rectum -no evidence of active disease.  2.  Chemotherapy associated peripheral neuropathy-stable.    Plan:   1. Re-evaluate 6 months from now with interval CBC, CMP, LDH, and CEA prior to appointment.  2. Keep thyroid ultrasound appointment  3. Proceed with active surveillance    This note was created using voice recognition software and may contain grammatical errors.

## 2022-12-15 ENCOUNTER — HOSPITAL ENCOUNTER (OUTPATIENT)
Dept: RADIOLOGY | Facility: HOSPITAL | Age: 67
Discharge: HOME | End: 2022-12-15
Attending: SURGERY
Payer: MEDICARE

## 2022-12-15 DIAGNOSIS — E04.1 THYROID NODULE: ICD-10-CM

## 2022-12-15 PROCEDURE — 76536 US SOFT TISSUE HEAD NECK THYROID: ICD-10-PCS | Mod: 26,,, | Performed by: RADIOLOGY

## 2022-12-15 PROCEDURE — 76536 US EXAM OF HEAD AND NECK: CPT | Mod: 26,,, | Performed by: RADIOLOGY

## 2022-12-15 PROCEDURE — 76536 US EXAM OF HEAD AND NECK: CPT | Mod: TC

## 2022-12-27 ENCOUNTER — OFFICE VISIT (OUTPATIENT)
Dept: SURGERY | Facility: CLINIC | Age: 67
End: 2022-12-27
Payer: MEDICARE

## 2022-12-27 VITALS
WEIGHT: 170 LBS | BODY MASS INDEX: 24.34 KG/M2 | DIASTOLIC BLOOD PRESSURE: 80 MMHG | SYSTOLIC BLOOD PRESSURE: 150 MMHG | HEART RATE: 65 BPM | HEIGHT: 70 IN | OXYGEN SATURATION: 97 %

## 2022-12-27 DIAGNOSIS — Z85.038 HISTORY OF COLON CANCER: ICD-10-CM

## 2022-12-27 DIAGNOSIS — E04.1 THYROID NODULE: Primary | ICD-10-CM

## 2022-12-27 PROCEDURE — 3079F PR MOST RECENT DIASTOLIC BLOOD PRESSURE 80-89 MM HG: ICD-10-PCS | Mod: CPTII,S$GLB,, | Performed by: SURGERY

## 2022-12-27 PROCEDURE — 99214 OFFICE O/P EST MOD 30 MIN: CPT | Mod: S$GLB,,, | Performed by: SURGERY

## 2022-12-27 PROCEDURE — 1159F PR MEDICATION LIST DOCUMENTED IN MEDICAL RECORD: ICD-10-PCS | Mod: CPTII,S$GLB,, | Performed by: SURGERY

## 2022-12-27 PROCEDURE — 1159F MED LIST DOCD IN RCRD: CPT | Mod: CPTII,S$GLB,, | Performed by: SURGERY

## 2022-12-27 PROCEDURE — 3008F PR BODY MASS INDEX (BMI) DOCUMENTED: ICD-10-PCS | Mod: CPTII,S$GLB,, | Performed by: SURGERY

## 2022-12-27 PROCEDURE — 99999 PR PBB SHADOW E&M-EST. PATIENT-LVL V: ICD-10-PCS | Mod: PBBFAC,,, | Performed by: SURGERY

## 2022-12-27 PROCEDURE — 1125F PR PAIN SEVERITY QUANTIFIED, PAIN PRESENT: ICD-10-PCS | Mod: CPTII,S$GLB,, | Performed by: SURGERY

## 2022-12-27 PROCEDURE — 99999 PR PBB SHADOW E&M-EST. PATIENT-LVL V: CPT | Mod: PBBFAC,,, | Performed by: SURGERY

## 2022-12-27 PROCEDURE — 3008F BODY MASS INDEX DOCD: CPT | Mod: CPTII,S$GLB,, | Performed by: SURGERY

## 2022-12-27 PROCEDURE — 3077F PR MOST RECENT SYSTOLIC BLOOD PRESSURE >= 140 MM HG: ICD-10-PCS | Mod: CPTII,S$GLB,, | Performed by: SURGERY

## 2022-12-27 PROCEDURE — 1101F PT FALLS ASSESS-DOCD LE1/YR: CPT | Mod: CPTII,S$GLB,, | Performed by: SURGERY

## 2022-12-27 PROCEDURE — 3288F PR FALLS RISK ASSESSMENT DOCUMENTED: ICD-10-PCS | Mod: CPTII,S$GLB,, | Performed by: SURGERY

## 2022-12-27 PROCEDURE — 1125F AMNT PAIN NOTED PAIN PRSNT: CPT | Mod: CPTII,S$GLB,, | Performed by: SURGERY

## 2022-12-27 PROCEDURE — 99214 PR OFFICE/OUTPT VISIT, EST, LEVL IV, 30-39 MIN: ICD-10-PCS | Mod: S$GLB,,, | Performed by: SURGERY

## 2022-12-27 PROCEDURE — 3077F SYST BP >= 140 MM HG: CPT | Mod: CPTII,S$GLB,, | Performed by: SURGERY

## 2022-12-27 PROCEDURE — 3079F DIAST BP 80-89 MM HG: CPT | Mod: CPTII,S$GLB,, | Performed by: SURGERY

## 2022-12-27 PROCEDURE — 3288F FALL RISK ASSESSMENT DOCD: CPT | Mod: CPTII,S$GLB,, | Performed by: SURGERY

## 2022-12-27 PROCEDURE — 1101F PR PT FALLS ASSESS DOC 0-1 FALLS W/OUT INJ PAST YR: ICD-10-PCS | Mod: CPTII,S$GLB,, | Performed by: SURGERY

## 2022-12-27 RX ORDER — SODIUM CHLORIDE 9 MG/ML
INJECTION, SOLUTION INTRAVENOUS CONTINUOUS
Status: CANCELLED | OUTPATIENT
Start: 2022-12-27

## 2022-12-27 NOTE — PATIENT INSTRUCTIONS
Miralax Bowel Prep Instructions     Date of procedure:  MARCH 6, 2023    Bowel Prep Instructions:  You will need to purchase at the store the following from the pharmacy:  One (1) box laxative tablets, (NOT STOOL SOFTNERS)  8.3 oz bottle of Miralax (or generic)   Two (2) quarts (64oz) of liquid to drink. We suggest Gatorade or Powerade.  Do not follow packaging instructions on either of these items    Day 1 MARCH 5, 2023   All day you will be on a Clear Liquid Diet   You may have the following chicken, beef or bone broth, Jell-O, Popsicles, Sprite, Water, Gatorade, Powerade, and Black Coffee.   Do not eat or drink anything RED OR PURPLE IN COLOR     At Noon 12:00 pm, you will take two laxative tablets.      At 2:00 pm, you will drink half of your Miralax prep Drink     At 6:00 pm, you will drink the remaining half of your prep drink and two laxative tablets.      Do not eat or drink after Midnight Before the procedure      Day 2 March 6, 2023   Take all  Morning medications after the procedure    Do not take the following Medications for 3 days prior to your procedure:   Aspirin, Excedrin, BC powder or goodies powders   Ibuprofen or Motrin  Naproxen or Aleve  Fish oils  Vitamin E        Location of Department:   [facility] [address], [place] [address]    Parking:    Use parking lot A  Enter at the main hospital entrance  Check in with outpatient registration    Contact:   Someone from surgery will call you with a time of arrival.   If you do not receive a call from surgery by 2pm the business day (March 5, 2023) before your procedure, please call [phone].     Transportation:   You will NOT be able to drive yourself.  You are required to have someone drive you home from the hospital due to the anesthesia.

## 2022-12-27 NOTE — H&P
[facility] H&P Note    Subjective:       Patient ID: Miguel Angel Apple Sr. is a 67 y.o. male.    Chief Complaint:  Follow-up thyroid nodule.  Doc I need another colonoscopy.  HPI:  Miguel Angel Apple Sr. is a 67 y.o. male history of rectal cancer status post neoadjuvant chemo radiation resection etcetera as well as history of thyroid nodule presents today for follow-up examination.  Patient since last visit undergone thyroid ultrasound.  Thyroid ultrasound this year shows evidence of continued nodules present, chronic, however stable in size.  No need for biopsy at this time.  Patient with last colonoscopy over year ago.  History of rectal cancer.  Due for repeat colonoscopy for screening purposes.  No blood in stool.  No unexplained weight loss bowel habit changes, etcetera.  Patient presents today for follow-up examination.    Past Medical History:   Diagnosis Date    Colon cancer     Depression     GERD (gastroesophageal reflux disease)     Medical history reviewed with no changes      Past Surgical History:   Procedure Laterality Date    ANASTOMOSIS OF INTESTINE N/A 10/6/2021    Procedure: ANASTOMOSIS, INTESTINE;  Surgeon: Jose Lazo MD;  Location: [facility] OR;  Service: General;  Laterality: N/A;    CATHETERIZATION OF URETER Bilateral 4/21/2021    Procedure: CATHETERIZATION, URETER;  Surgeon: Clifford Feliciano MD;  Location: [facility] OR 2ND FLR;  Service: Urology;  Laterality: Bilateral;    CHOLECYSTECTOMY  10/21/2019    Procedure: CHOLECYSTECTOMY;  Surgeon: Jose Lazo MD;  Location: [facility] OR;  Service: General;;    CLOSURE OF WOUND N/A 10/30/2019    Procedure: CLOSURE, WOUND;  Surgeon: Torres Dawkins MD;  Location: [facility] OR;  Service: General;  Laterality: N/A;    COLONOSCOPY N/A 4/20/2021    Procedure: COLONOSCOPY;  Surgeon: Torres Baldwin MD;  Location: [facility] ENDO (4TH FLR);  Service: Colon and Rectal;  Laterality: N/A;  COVID test at [facility] on 4/17-GT.pt can not come in earlier.EC     COLONOSCOPY N/A 10/5/2021    Procedure: COLONOSCOPY;  Surgeon: Bailey Desai MD;  Location: [facility] ENDO;  Service: General;  Laterality: N/A;    COLOSTOMY  10/21/2019    Procedure: CREATION, COLOSTOMY;  Surgeon: Jose Lazo MD;  Location: [facility] OR;  Service: General;;  END DESCENDING COLOSTOMY    FLEXIBLE SIGMOIDOSCOPY N/A 10/21/2019    Procedure: SIGMOIDOSCOPY, FLEXIBLE;  Surgeon: Jose Lazo MD;  Location: [facility] OR;  Service: General;  Laterality: N/A;    FOREIGN BODY REMOVAL Right 5/28/2021    Procedure: Port removal;  Surgeon: Torres Baldwin MD;  Location: [facility] OR 2ND FLR;  Service: Colon and Rectal;  Laterality: Right;    INSERTION OF TUNNELED CENTRAL VENOUS CATHETER (CVC) WITH SUBCUTANEOUS PORT Right 12/23/2019    Procedure: INSERTION, PORT-A-CATH;  Surgeon: Jose Lazo MD;  Location: [facility] OR;  Service: General;  Laterality: Right;    LAPAROTOMY N/A 10/6/2021    Procedure: LAPAROTOMY;  Surgeon: Jose Lazo MD;  Location: [facility] OR;  Service: General;  Laterality: N/A;    PROCTECTOMY N/A 4/21/2021    Procedure: RESECTION, COLON, LOW ANTERIOR LOOP ILEOSTOMY ERAS HIGH;  Surgeon: Torres Baldwin MD;  Location: [facility] OR 2ND FLR;  Service: Colon and Rectal;  Laterality: N/A;    REPAIR OF ABDOMINAL WALL N/A 10/30/2019    Procedure: REPAIR, ABDOMINAL WALL;  Surgeon: Torres Dawkins MD;  Location: [facility] OR;  Service: General;  Laterality: N/A;    RIGHT HEMICOLECTOMY Right 10/21/2019    Procedure: HEMICOLECTOMY, RIGHT;  Surgeon: Jose Lazo MD;  Location: [facility] OR;  Service: General;  Laterality: Right;     Family History   Problem Relation Age of Onset    Diabetes Mother     Heart disease Father      Social History     Socioeconomic History    Marital status:    Tobacco Use    Smoking status: Former     Packs/day: 1.00     Years: 30.00     Pack years: 30.00     Types: Cigarettes     Quit date: 9/1/2019     Years since quitting: 3.3    Smokeless tobacco: Never    Substance and Sexual Activity    Alcohol use: Not Currently     Comment: rarely    Drug use: Yes     Types: Marijuana     Comment: about 1 joint per week    Sexual activity: Not Currently     Social Determinants of Health     Financial Resource Strain: Low Risk     Difficulty of Paying Living Expenses: Not very hard   Food Insecurity: Unknown    Worried About Running Out of Food in the Last Year: Patient refused    Ran Out of Food in the Last Year: Patient refused   Transportation Needs: No Transportation Needs    Lack of Transportation (Medical): No    Lack of Transportation (Non-Medical): No   Physical Activity: Unknown    Days of Exercise per Week: 7 days   Stress: Stress Concern Present    Feeling of Stress : Very much   Social Connections: Unknown    Frequency of Communication with Friends and Family: More than three times a week    Frequency of Social Gatherings with Friends and Family: Patient refused    Active Member of Clubs or Organizations: No    Attends Club or Organization Meetings: Patient refused    Marital Status:    Housing Stability: High Risk    Unable to Pay for Housing in the Last Year: No    Number of Places Lived in the Last Year: 3    Unstable Housing in the Last Year: No       Current Outpatient Medications   Medication Sig Dispense Refill    ciprofloxacin HCl (CIPRO) 500 MG tablet Take 1 tablet (500 mg total) by mouth 2 (two) times daily. 20 tablet 0    magnesium oxide (MAG-OX) 400 mg (241.3 mg magnesium) tablet Take 2 tablets (800 mg total) by mouth once daily. 7 tablet 0    miconazole nitrate 2% (MICOTIN) 2 % Oint Apply topically 2 (two) times daily. 1 Tube 4    ondansetron (ZOFRAN) 4 MG tablet Take 1 tablet (4 mg total) by mouth every 6 (six) hours as needed. 30 tablet 1    ondansetron (ZOFRAN-ODT) 8 MG TbDL Take 1 tablet (8 mg total) by mouth every 6 (six) hours as needed (nausea). 60 tablet 5    oxyCODONE-acetaminophen (PERCOCET)  mg per tablet Take 1 tablet by mouth  "every 12 (twelve) hours as needed for Pain. 60 tablet 0     No current facility-administered medications for this visit.     Review of patient's allergies indicates:  No Known Allergies    Review of Systems   Constitutional:  Negative for appetite change, chills and fever.   HENT:  Negative for congestion, dental problem and drooling.    Eyes:  Negative for photophobia, discharge and itching.   Respiratory:  Negative for apnea and chest tightness.    Cardiovascular:  Negative for chest pain, palpitations and leg swelling.   Gastrointestinal:  Negative for abdominal distention and abdominal pain.   Endocrine: Negative for cold intolerance and heat intolerance.   Genitourinary:  Negative for difficulty urinating and dysuria.   Musculoskeletal:  Negative for arthralgias and back pain.   Skin:  Negative for color change and pallor.   Neurological:  Negative for dizziness, facial asymmetry and headaches.   Hematological:  Negative for adenopathy. Does not bruise/bleed easily.   Psychiatric/Behavioral:  Negative for agitation, behavioral problems and confusion.      Objective:      Vitals:    12/27/22 1048   BP: (!) 150/80   Pulse: 65   SpO2: 97%   Weight: 77.1 kg (170 lb)   Height: 5' 10" (1.778 m)     Physical Exam  Constitutional:       Appearance: He is well-developed. He is not diaphoretic.   HENT:      Head: Normocephalic and atraumatic.   Eyes:      Pupils: Pupils are equal, round, and reactive to light.   Neck:      Thyroid: No thyromegaly.   Cardiovascular:      Rate and Rhythm: Normal rate and regular rhythm.      Heart sounds: No murmur heard.  Pulmonary:      Effort: Pulmonary effort is normal. No respiratory distress.      Breath sounds: Normal breath sounds.   Abdominal:      General: Bowel sounds are normal. There is no distension.      Palpations: Abdomen is soft.      Tenderness: There is no abdominal tenderness.   Musculoskeletal:         General: Normal range of motion.      Cervical back: Normal range " of motion and neck supple.   Skin:     General: Skin is warm.      Capillary Refill: Capillary refill takes less than 2 seconds.      Findings: No erythema or rash.   Neurological:      Mental Status: He is alert and oriented to person, place, and time.      Cranial Nerves: No cranial nerve deficit.        Assessment:       1. Thyroid nodule    2. History of colon cancer          Plan:   Thyroid nodule  -     US Soft Tissue Head Neck Thyroid; Future; Expected date: 12/27/2022    History of colon cancer  -     Full code; Standing  -     Place in Outpatient; Standing  -     Case Request Operating Room: COLONOSCOPY  -     Vital signs; Standing  -     Insert peripheral IV; Standing  -     Notify Physician; Standing  -     Diet NPO; Standing  -     Place MAE hose; Standing  -     Place sequential compression device; Standing    Other orders  -     0.9%  NaCl infusion        Medical Decision Making/Counseling:  Thyroid nodule.  Appears benign.  Stable on previous ultrasounds.  No need for biopsy at this time.  Repeat ultrasound 1 year.  And follow up with surgery clinic at that time for results.    History of colon cancer.  Due for surveillance colonoscopy.  Last colonoscopy was good.  Risk benefits have been discussed.  Patient voiced understanding risk benefits wished to proceed near future.    Patient instructed that best way to communicate with my office staff is for patient to get on the [facility] epic patient portal to expedite communication and communication issues that may occur.  Patient was given instructions on how to get on the portal.  I encouraged patient to obtain portal access as well.  Ultimately it is up to the patient to obtain access.  Patient voiced understanding.

## 2023-02-28 ENCOUNTER — TELEPHONE (OUTPATIENT)
Dept: SURGERY | Facility: CLINIC | Age: 68
End: 2023-02-28
Payer: MEDICARE

## 2023-02-28 NOTE — TELEPHONE ENCOUNTER
Writer spoke to pt, pt rescheduled colonoscopy procedure from March 6, 2023 to March 15, 2023. Pt stated she is currently taking of his sister. Pt stated he still had his instruction and did not need any new ones mailed to him. Pt instructed to call or message office if he needed anything pt verbalized understanding. Surgery department notified and surgeon's book updated    ----- Message from Nai Brenner sent at 2/28/2023  9:21 AM CST -----  Contact: [phone]  Type: Needs Medical Advice  Who Called:  Pt     Best Call Back Number: [phone]    Additional Information: Pt is calling to reschedule his coloscopy on march 6. Pls call back and advise

## 2023-03-13 ENCOUNTER — TELEPHONE (OUTPATIENT)
Dept: SURGERY | Facility: CLINIC | Age: 68
End: 2023-03-13
Payer: MEDICARE

## 2023-03-13 NOTE — TELEPHONE ENCOUNTER
Writer spoke to pt, pt wanted to see what was the next available date to the colonoscopy but then decided to keep his procedure date for Wednesday, March 15, 2023. Pt stated he wanted to go ahead and get this taken care of. PT stated he had his instructions. Pt instructed to call or message if he needed anything. Pt verbalized understanding.       ----- Message from Marline Connell, Patient Care Assistant sent at 3/13/2023  1:59 PM CDT -----  Regarding: appointment  Contact: pt  Type: Needs Medical Advice    Who Called:  pt     Best Call Back Number: [phone] (home)       Additional Information: pt states he would like a callback regarding his appointment on 3/16/23. Please call pt to advise. Thanks!

## 2023-03-14 ENCOUNTER — TELEPHONE (OUTPATIENT)
Dept: SURGERY | Facility: CLINIC | Age: 68
End: 2023-03-14
Payer: MEDICARE

## 2023-03-14 NOTE — TELEPHONE ENCOUNTER
----- Message from Avni Chadwick sent at 3/14/2023  7:19 AM CDT -----  Type: Needs Medical Advice  Who Called:  pt  Symptoms (please be specific):  pt said he need nurse Yony to return his call it's bout his colonoscopy tomorrow--he need to reschedule--please call and advise  Best Call Back Number: [phone] (home)     Additional Information: thank you

## 2023-03-14 NOTE — TELEPHONE ENCOUNTER
Writer spoke to pt, pt needed to to reschedule colonoscopy due to some personal things going on. Pt's Colonoscopy is rescheduled to April 3, 2023. Surgeon department / surgeons book updated. Pt instructed to call or message office if he needed anything. Pt verbalized understanding.

## 2023-03-29 ENCOUNTER — TELEPHONE (OUTPATIENT)
Dept: HEMATOLOGY/ONCOLOGY | Facility: CLINIC | Age: 68
End: 2023-03-29
Payer: MEDICARE

## 2023-04-03 ENCOUNTER — ANESTHESIA EVENT (OUTPATIENT)
Dept: SURGERY | Facility: HOSPITAL | Age: 68
End: 2023-04-03
Payer: MEDICARE

## 2023-04-03 ENCOUNTER — HOSPITAL ENCOUNTER (OUTPATIENT)
Facility: HOSPITAL | Age: 68
Discharge: HOME | End: 2023-04-03
Attending: SURGERY | Admitting: SURGERY
Payer: MEDICARE

## 2023-04-03 ENCOUNTER — ANESTHESIA (OUTPATIENT)
Dept: SURGERY | Facility: HOSPITAL | Age: 68
End: 2023-04-03
Payer: MEDICARE

## 2023-04-03 VITALS
HEIGHT: 70 IN | TEMPERATURE: 98 F | BODY MASS INDEX: 24.34 KG/M2 | RESPIRATION RATE: 14 BRPM | WEIGHT: 170 LBS | OXYGEN SATURATION: 99 % | SYSTOLIC BLOOD PRESSURE: 144 MMHG | DIASTOLIC BLOOD PRESSURE: 81 MMHG | HEART RATE: 64 BPM

## 2023-04-03 DIAGNOSIS — E04.1 THYROID NODULE: ICD-10-CM

## 2023-04-03 DIAGNOSIS — Z85.038 HISTORY OF COLON CANCER: ICD-10-CM

## 2023-04-03 PROCEDURE — 25000003 PHARM REV CODE 250: Performed by: NURSE ANESTHETIST, CERTIFIED REGISTERED

## 2023-04-03 PROCEDURE — 37000008 HC ANESTHESIA 1ST 15 MINUTES: Performed by: SURGERY

## 2023-04-03 PROCEDURE — D9220A PRA ANESTHESIA: Mod: ANES,,, | Performed by: ANESTHESIOLOGY

## 2023-04-03 PROCEDURE — D9220A PRA ANESTHESIA: ICD-10-PCS | Mod: ANES,,, | Performed by: ANESTHESIOLOGY

## 2023-04-03 PROCEDURE — 63600175 PHARM REV CODE 636 W HCPCS: Performed by: NURSE ANESTHETIST, CERTIFIED REGISTERED

## 2023-04-03 PROCEDURE — G0105 COLORECTAL SCRN; HI RISK IND: ICD-10-PCS | Mod: ,,, | Performed by: SURGERY

## 2023-04-03 PROCEDURE — D9220A PRA ANESTHESIA: Mod: CRNA,,, | Performed by: NURSE ANESTHETIST, CERTIFIED REGISTERED

## 2023-04-03 PROCEDURE — 37000009 HC ANESTHESIA EA ADD 15 MINS: Performed by: SURGERY

## 2023-04-03 PROCEDURE — G0105 COLORECTAL SCRN; HI RISK IND: HCPCS | Performed by: SURGERY

## 2023-04-03 PROCEDURE — G0105 COLORECTAL SCRN; HI RISK IND: HCPCS | Mod: ,,, | Performed by: SURGERY

## 2023-04-03 PROCEDURE — D9220A PRA ANESTHESIA: ICD-10-PCS | Mod: CRNA,,, | Performed by: NURSE ANESTHETIST, CERTIFIED REGISTERED

## 2023-04-03 PROCEDURE — 63600175 PHARM REV CODE 636 W HCPCS: Performed by: ANESTHESIOLOGY

## 2023-04-03 RX ORDER — SODIUM CHLORIDE 9 MG/ML
INJECTION, SOLUTION INTRAVENOUS CONTINUOUS
Status: DISCONTINUED | OUTPATIENT
Start: 2023-04-03 | End: 2023-04-03 | Stop reason: HOSPADM

## 2023-04-03 RX ORDER — LIDOCAINE HYDROCHLORIDE 20 MG/ML
INJECTION, SOLUTION EPIDURAL; INFILTRATION; INTRACAUDAL; PERINEURAL
Status: DISCONTINUED | OUTPATIENT
Start: 2023-04-03 | End: 2023-04-03

## 2023-04-03 RX ORDER — LIDOCAINE HYDROCHLORIDE 10 MG/ML
1 INJECTION, SOLUTION EPIDURAL; INFILTRATION; INTRACAUDAL; PERINEURAL ONCE
Status: DISCONTINUED | OUTPATIENT
Start: 2023-04-03 | End: 2023-04-03 | Stop reason: HOSPADM

## 2023-04-03 RX ORDER — SODIUM CHLORIDE, SODIUM LACTATE, POTASSIUM CHLORIDE, CALCIUM CHLORIDE 600; 310; 30; 20 MG/100ML; MG/100ML; MG/100ML; MG/100ML
125 INJECTION, SOLUTION INTRAVENOUS CONTINUOUS
Status: DISCONTINUED | OUTPATIENT
Start: 2023-04-03 | End: 2023-04-03 | Stop reason: HOSPADM

## 2023-04-03 RX ORDER — SODIUM CHLORIDE, SODIUM LACTATE, POTASSIUM CHLORIDE, CALCIUM CHLORIDE 600; 310; 30; 20 MG/100ML; MG/100ML; MG/100ML; MG/100ML
INJECTION, SOLUTION INTRAVENOUS CONTINUOUS
Status: DISCONTINUED | OUTPATIENT
Start: 2023-04-03 | End: 2023-04-03 | Stop reason: HOSPADM

## 2023-04-03 RX ORDER — ONDANSETRON 2 MG/ML
4 INJECTION INTRAMUSCULAR; INTRAVENOUS DAILY PRN
Status: DISCONTINUED | OUTPATIENT
Start: 2023-04-03 | End: 2023-04-03 | Stop reason: HOSPADM

## 2023-04-03 RX ORDER — PROPOFOL 10 MG/ML
VIAL (ML) INTRAVENOUS
Status: DISCONTINUED | OUTPATIENT
Start: 2023-04-03 | End: 2023-04-03

## 2023-04-03 RX ADMIN — SODIUM CHLORIDE, POTASSIUM CHLORIDE, SODIUM LACTATE AND CALCIUM CHLORIDE: 600; 310; 30; 20 INJECTION, SOLUTION INTRAVENOUS at 11:04

## 2023-04-03 RX ADMIN — PROPOFOL 50 MG: 10 INJECTION, EMULSION INTRAVENOUS at 11:04

## 2023-04-03 RX ADMIN — LIDOCAINE HYDROCHLORIDE 100 MG: 20 INJECTION, SOLUTION EPIDURAL; INFILTRATION; INTRACAUDAL; PERINEURAL at 11:04

## 2023-04-03 RX ADMIN — PROPOFOL 100 MG: 10 INJECTION, EMULSION INTRAVENOUS at 11:04

## 2023-04-03 NOTE — PLAN OF CARE
Has met unit/department guidelines for discharge from each phase of the post procedure continuum. Leaving floor per w/c with RN. RIYA x3. Resp even and unlabored room air. No distress noted. Denies need for PO fluids. Denies pain, nausea or vomiting. All personal belongings returned to pt.

## 2023-04-03 NOTE — DISCHARGE SUMMARY
Discharge Note        SUMMARY     Admit Date: 4/3/2023    Attending Physician: Jose Lazo MD     Discharge Physician: Jose Lazo MD    Discharge Date: 4/3/2023 11:58 AM      Hospital Course: Patient tolerated procedure well.     Disposition: Home or Self Care    Patient Instructions:   Current Discharge Medication List        CONTINUE these medications which have NOT CHANGED    Details   oxyCODONE-acetaminophen (PERCOCET)  mg per tablet Take 1 tablet by mouth every 12 (twelve) hours as needed for Pain.  Qty: 60 tablet, Refills: 0    Comments: n/a   Associated Diagnoses: Neoplasm related pain      ciprofloxacin HCl (CIPRO) 500 MG tablet Take 1 tablet (500 mg total) by mouth 2 (two) times daily.  Qty: 20 tablet, Refills: 0      magnesium oxide (MAG-OX) 400 mg (241.3 mg magnesium) tablet Take 2 tablets (800 mg total) by mouth once daily.  Qty: 7 tablet, Refills: 0      miconazole nitrate 2% (MICOTIN) 2 % Oint Apply topically 2 (two) times daily.  Qty: 1 Tube, Refills: 4      ondansetron (ZOFRAN) 4 MG tablet Take 1 tablet (4 mg total) by mouth every 6 (six) hours as needed.  Qty: 30 tablet, Refills: 1      ondansetron (ZOFRAN-ODT) 8 MG TbDL Take 1 tablet (8 mg total) by mouth every 6 (six) hours as needed (nausea).  Qty: 60 tablet, Refills: 5    Associated Diagnoses: Rectal cancer; SBO (small bowel obstruction)             Discharge Procedure Orders (must include Diet, Follow-up, Activity):   Discharge Procedure Orders (must include Diet, Follow-up, Activity)   Diet general     Call MD for:  temperature >100.4     Call MD for:  persistent nausea and vomiting     Call MD for:  severe uncontrolled pain     Call MD for:  difficulty breathing, headache or visual disturbances     Call MD for:  redness, tenderness, or signs of infection (pain, swelling, redness, odor or green/yellow discharge around incision site)     Call MD for:  persistent dizziness or light-headedness        Follow Up:   Follow up as scheduled.  Resume routine diet.  Activity as tolerated.    No driving day of procedure.

## 2023-04-03 NOTE — ANESTHESIA PREPROCEDURE EVALUATION
04/03/2023  Miguel Angel Apple Sr. is a 67 y.o., male.      Pre-op Assessment    I have reviewed the Patient Summary Reports.     I have reviewed the Nursing Notes.    I have reviewed the Medications.     Review of Systems  Anesthesia Hx:  No problems with previous Anesthesia  Neg history of prior surgery. Denies Family Hx of Anesthesia complications.   Denies Personal Hx of Anesthesia complications.   Social:  Former Smoker    Hematology/Oncology:  Hematology Normal   Oncology Normal   Oncology Comments: Colon CA     EENT/Dental:EENT/Dental Normal   Cardiovascular:  Cardiovascular Normal     Pulmonary:  Pulmonary Normal    Renal/:   Chronic Renal Disease    Hepatic/GI:  Hepatic/GI Normal  Denies GERD.    Musculoskeletal:  Musculoskeletal Normal    Neurological:   Neuromuscular Disease,    Endocrine:  Endocrine Normal    Dermatological:  Skin Normal    Psych:   Psychiatric History depression          Physical Exam  General: Well nourished    Airway:  Mallampati: II   Mouth Opening: Normal  TM Distance: Normal  Tongue: Normal  Neck ROM: Normal ROM    Dental:  Intact    Chest/Lungs:  Clear to auscultation    Heart:  Rate: Normal    Abdomen:  Normal        Anesthesia Plan  Type of Anesthesia, risks & benefits discussed:    Anesthesia Type: Gen ETT  Post Op Pain Control Plan: multimodal analgesia  Airway Plan: Direct, Post-Induction  Informed Consent: Informed consent signed with the Patient and all parties understand the risks and agree with anesthesia plan.  All questions answered. Patient consented to blood products? No  ASA Score: 2  Day of Surgery Review of History & Physical: H&P Update referred to the surgeon/provider.    Ready For Surgery From Anesthesia Perspective.     .

## 2023-04-03 NOTE — ANESTHESIA POSTPROCEDURE EVALUATION
Anesthesia Post Evaluation    Patient: Miguel Angel Apple     Procedure(s) Performed: Procedure(s) (LRB):  COLONOSCOPY (N/A)    Final Anesthesia Type: general      Patient location during evaluation: PACU  Patient participation: Yes- Able to Participate  Level of consciousness: awake and awake and alert  Post-procedure vital signs: reviewed and stable  Pain management: adequate  Airway patency: patent    PONV status at discharge: No PONV  Anesthetic complications: no      Cardiovascular status: blood pressure returned to baseline  Respiratory status: unassisted and spontaneous ventilation  Hydration status: euvolemic  Follow-up not needed.          Vitals Value Taken Time   /81 04/03/23 1245   Temp 36.6 °C (97.8 °F) 04/03/23 1200   Pulse 64 04/03/23 1245   Resp 14 04/03/23 1245   SpO2 99 % 04/03/23 1245         Event Time   Out of Recovery 12:30:00         Pain/Martin Score: Martin Score: 10 (4/3/2023 12:30 PM)  Modified Martin Score: 19 (4/3/2023 12:51 PM)

## 2023-04-03 NOTE — H&P
[facility] H&P Note    Subjective:       Patient ID: Miguel Angel Apple Sr. is a 67 y.o. male.    Chief Complaint:  Follow-up thyroid nodule.  Doc I need another colonoscopy.  HPI:  Miguel Angel Apple Sr. is a 67 y.o. male history of rectal cancer status post neoadjuvant chemo radiation resection etcetera as well as history of thyroid nodule presents today for follow-up examination.  Patient since last visit undergone thyroid ultrasound.  Thyroid ultrasound this year shows evidence of continued nodules present, chronic, however stable in size.  No need for biopsy at this time.  Patient with last colonoscopy over year ago.  History of rectal cancer.  Due for repeat colonoscopy for screening purposes.  No blood in stool.  No unexplained weight loss bowel habit changes, etcetera.  Patient presents today for follow-up examination.    Past Medical History:   Diagnosis Date    Colon cancer     Depression     GERD (gastroesophageal reflux disease)     Medical history reviewed with no changes      Past Surgical History:   Procedure Laterality Date    ANASTOMOSIS OF INTESTINE N/A 10/6/2021    Procedure: ANASTOMOSIS, INTESTINE;  Surgeon: Jose Lazo MD;  Location: [facility] OR;  Service: General;  Laterality: N/A;    CATHETERIZATION OF URETER Bilateral 4/21/2021    Procedure: CATHETERIZATION, URETER;  Surgeon: Clifford Feliciano MD;  Location: [facility] OR 2ND FLR;  Service: Urology;  Laterality: Bilateral;    CHOLECYSTECTOMY  10/21/2019    Procedure: CHOLECYSTECTOMY;  Surgeon: Jose Lazo MD;  Location: [facility] OR;  Service: General;;    CLOSURE OF WOUND N/A 10/30/2019    Procedure: CLOSURE, WOUND;  Surgeon: Torres Dawkins MD;  Location: [facility] OR;  Service: General;  Laterality: N/A;    COLONOSCOPY N/A 4/20/2021    Procedure: COLONOSCOPY;  Surgeon: Torres Baldwin MD;  Location: [facility] ENDO (4TH FLR);  Service: Colon and Rectal;  Laterality: N/A;  COVID test at [facility] on 4/17-GT.pt can not come in earlier.EC     COLONOSCOPY N/A 10/5/2021    Procedure: COLONOSCOPY;  Surgeon: Bailey Desai MD;  Location: [facility] ENDO;  Service: General;  Laterality: N/A;    COLOSTOMY  10/21/2019    Procedure: CREATION, COLOSTOMY;  Surgeon: Jose Lazo MD;  Location: [facility] OR;  Service: General;;  END DESCENDING COLOSTOMY    FLEXIBLE SIGMOIDOSCOPY N/A 10/21/2019    Procedure: SIGMOIDOSCOPY, FLEXIBLE;  Surgeon: Jose Lazo MD;  Location: [facility] OR;  Service: General;  Laterality: N/A;    FOREIGN BODY REMOVAL Right 5/28/2021    Procedure: Port removal;  Surgeon: Torres Baldwin MD;  Location: [facility] OR 2ND FLR;  Service: Colon and Rectal;  Laterality: Right;    INSERTION OF TUNNELED CENTRAL VENOUS CATHETER (CVC) WITH SUBCUTANEOUS PORT Right 12/23/2019    Procedure: INSERTION, PORT-A-CATH;  Surgeon: Jose Lazo MD;  Location: [facility] OR;  Service: General;  Laterality: Right;    LAPAROTOMY N/A 10/6/2021    Procedure: LAPAROTOMY;  Surgeon: Jose Lazo MD;  Location: [facility] OR;  Service: General;  Laterality: N/A;    PROCTECTOMY N/A 4/21/2021    Procedure: RESECTION, COLON, LOW ANTERIOR LOOP ILEOSTOMY ERAS HIGH;  Surgeon: Torres Baldwin MD;  Location: [facility] OR 2ND FLR;  Service: Colon and Rectal;  Laterality: N/A;    REPAIR OF ABDOMINAL WALL N/A 10/30/2019    Procedure: REPAIR, ABDOMINAL WALL;  Surgeon: Torres Dawkins MD;  Location: [facility] OR;  Service: General;  Laterality: N/A;    RIGHT HEMICOLECTOMY Right 10/21/2019    Procedure: HEMICOLECTOMY, RIGHT;  Surgeon: Jose Lazo MD;  Location: [facility] OR;  Service: General;  Laterality: Right;     Family History   Problem Relation Age of Onset    Diabetes Mother     Heart disease Father      Social History     Socioeconomic History    Marital status:    Tobacco Use    Smoking status: Former     Packs/day: 1.00     Years: 30.00     Pack years: 30.00     Types: Cigarettes     Quit date: 9/1/2019     Years since quitting: 3.5    Smokeless tobacco: Never    Substance and Sexual Activity    Alcohol use: Not Currently     Comment: rarely    Drug use: Yes     Types: Marijuana     Comment: about 1 joint per week    Sexual activity: Not Currently     Social Determinants of Health     Financial Resource Strain: Low Risk     Difficulty of Paying Living Expenses: Not very hard   Food Insecurity: Unknown    Worried About Running Out of Food in the Last Year: Patient refused    Ran Out of Food in the Last Year: Patient refused   Transportation Needs: No Transportation Needs    Lack of Transportation (Medical): No    Lack of Transportation (Non-Medical): No   Physical Activity: Unknown    Days of Exercise per Week: 7 days   Stress: Stress Concern Present    Feeling of Stress : Very much   Social Connections: Unknown    Frequency of Communication with Friends and Family: More than three times a week    Frequency of Social Gatherings with Friends and Family: Patient refused    Active Member of Clubs or Organizations: No    Attends Club or Organization Meetings: Patient refused    Marital Status:    Housing Stability: High Risk    Unable to Pay for Housing in the Last Year: No    Number of Places Lived in the Last Year: 3    Unstable Housing in the Last Year: No       No current facility-administered medications for this encounter.     Review of patient's allergies indicates:  No Known Allergies    Review of Systems   Constitutional:  Negative for appetite change, chills and fever.   HENT:  Negative for congestion, dental problem and drooling.    Eyes:  Negative for photophobia, discharge and itching.   Respiratory:  Negative for apnea and chest tightness.    Cardiovascular:  Negative for chest pain, palpitations and leg swelling.   Gastrointestinal:  Negative for abdominal distention and abdominal pain.   Endocrine: Negative for cold intolerance and heat intolerance.   Genitourinary:  Negative for difficulty urinating and dysuria.   Musculoskeletal:  Negative for  arthralgias and back pain.   Skin:  Negative for color change and pallor.   Neurological:  Negative for dizziness, facial asymmetry and headaches.   Hematological:  Negative for adenopathy. Does not bruise/bleed easily.   Psychiatric/Behavioral:  Negative for agitation, behavioral problems and confusion.      Objective:      There were no vitals filed for this visit.    Physical Exam  Constitutional:       Appearance: He is well-developed. He is not diaphoretic.   HENT:      Head: Normocephalic and atraumatic.   Eyes:      Pupils: Pupils are equal, round, and reactive to light.   Neck:      Thyroid: No thyromegaly.   Cardiovascular:      Rate and Rhythm: Normal rate and regular rhythm.      Heart sounds: No murmur heard.  Pulmonary:      Effort: Pulmonary effort is normal. No respiratory distress.      Breath sounds: Normal breath sounds.   Abdominal:      General: Bowel sounds are normal. There is no distension.      Palpations: Abdomen is soft.      Tenderness: There is no abdominal tenderness.   Musculoskeletal:         General: Normal range of motion.      Cervical back: Normal range of motion and neck supple.   Skin:     General: Skin is warm.      Capillary Refill: Capillary refill takes less than 2 seconds.      Findings: No erythema or rash.   Neurological:      Mental Status: He is alert and oriented to person, place, and time.      Cranial Nerves: No cranial nerve deficit.        Assessment:       No diagnosis found.        Plan:   There are no diagnoses linked to this encounter.      Medical Decision Making/Counseling:  Thyroid nodule.  Appears benign.  Stable on previous ultrasounds.  No need for biopsy at this time.  Repeat ultrasound 1 year.  And follow up with surgery clinic at that time for results.    History of colon cancer.  Due for surveillance colonoscopy.  Last colonoscopy was good.  Risk benefits have been discussed.  Patient voiced understanding risk benefits wished to proceed near  future.    Patient instructed that best way to communicate with my office staff is for patient to get on the [facility] portal to expedite communication and communication issues that may occur.  Patient was given instructions on how to get on the portal.  I encouraged patient to obtain portal access as well.  Ultimately it is up to the patient to obtain access.  Patient voiced understanding.

## 2023-04-03 NOTE — DISCHARGE INSTRUCTIONS

## 2023-04-03 NOTE — [employer] PATIENT INSTRUCTIONS
Discharge Summary/Instructions after an Endoscopic Procedure  Patient Name: Miguel Angel Apple  Patient MRN: [id number]  Patient YOB: 1955  Monday, April 3, 2023  Jose Lazo MD  RESTRICTIONS:  During your procedure today, you received medications for sedation.  These   medications may affect your judgment, balance and coordination.  Therefore,   for 24 hours, you have the following restrictions:   - DO NOT drive a car, operate machinery, make legal/financial decisions,   sign important papers or drink alcohol.    ACTIVITY:  Today: no heavy lifting, straining or running due to procedural   sedation/anesthesia.  The following day: return to full activity including work.  DIET:  Eat and drink normally unless instructed otherwise.     TREATMENT FOR COMMON SIDE EFFECTS:  - Mild abdominal pain, nausea, belching, bloating or excessive gas:  rest,   eat lightly and use a heating pad.  - Sore Throat: treat with throat lozenges and/or gargle with warm salt   water.  - Because air was used during the procedure, expelling large amounts of air   from your rectum or belching is normal.  - If a bowel prep was taken, you may not have a bowel movement for 1-3 days.    This is normal.  SYMPTOMS TO WATCH FOR AND REPORT TO YOUR PHYSICIAN:  1. Abdominal pain or bloating, other than gas cramps.  2. Chest pain.  3. Back pain.  4. Signs of infection such as: chills or fever occurring within 24 hours   after the procedure.  5. Rectal bleeding, which would show as bright red, maroon, or black stools.   (A tablespoon of blood from the rectum is not serious, especially if   hemorrhoids are present.)  6. Vomiting.  7. Weakness or dizziness.  GO DIRECTLY TO THE NEAREST EMERGENCY ROOM IF YOU HAVE ANY OF THE FOLLOWING:      Difficulty breathing              Chills and/or fever over 101 F   Persistent vomiting and/or vomiting blood   Severe abdominal pain   Severe chest pain   Black, tarry stools   Bleeding- more than one  tablespoon   Any other symptom or condition that you feel may need urgent attention  Your doctor recommends these additional instructions:  If any biopsies were taken, your doctors clinic will contact you in 1 to 2   weeks with any results.  - Discharge patient to home (ambulatory).   - Patient has a contact number available for emergencies.  The signs and   symptoms of potential delayed complications were discussed with the   patient.  Return to normal activities tomorrow.  Written discharge   instructions were provided to the patient.   - Resume previous diet.   - Continue present medications.   - Return to primary care physician as previously scheduled.   - Repeat colonoscopy in 2 years for surveillance.  For questions, problems or results please call your physician - Jose Lazo MD at Work:  [phone].  [facility] EMERGENCY ROOM PHONE NUMBER: [phone]  IF A COMPLICATION OR EMERGENCY SITUATION ARISES AND YOU ARE UNABLE TO REACH   YOUR PHYSICIAN - GO DIRECTLY TO THE EMERGENCY ROOM.  MD Jose Sánchez MD  4/3/2023 11:57:26 AM  This report has been verified and signed electronically.  Dear patient,  As a result of recent federal legislation (The Federal Cures Act), you may   receive lab or pathology results from your procedure in your [facility]   account before your physician is able to contact you. Your physician or   their representative will relay the results to you with their   recommendations at their soonest availability.  Thank you,  [employer]

## 2023-04-03 NOTE — TRANSFER OF CARE
"Anesthesia Transfer of Care Note    Patient: Miguel Angel Apple Sr.    Procedure(s) Performed: Procedure(s) (LRB):  COLONOSCOPY (N/A)    Patient location: PACU    Anesthesia Type: general    Transport from OR: Transported from OR on room air with adequate spontaneous ventilation    Post pain: adequate analgesia    Post assessment: no apparent anesthetic complications and tolerated procedure well    Post vital signs: stable    Level of consciousness: awake, alert and oriented    Nausea/Vomiting: no nausea/vomiting    Complications: none    Transfer of care protocol was followed      Last vitals:   Visit Vitals  BP (!) 144/82   Pulse 76   Temp 36.6 °C (97.8 °F)   Resp 14   Ht 5' 10" (1.778 m)   Wt 77.1 kg (170 lb)   SpO2 98%   BMI 24.39 kg/m²     "

## 2023-06-06 ENCOUNTER — LAB VISIT (OUTPATIENT)
Dept: LAB | Facility: HOSPITAL | Age: 68
End: 2023-06-06
Attending: INTERNAL MEDICINE
Payer: MEDICARE

## 2023-06-06 DIAGNOSIS — C20 RECTAL CANCER: ICD-10-CM

## 2023-06-06 LAB
ALBUMIN SERPL BCP-MCNC: 3.8 G/DL (ref 3.5–5.2)
ALP SERPL-CCNC: 61 U/L (ref 55–135)
ALT SERPL W/O P-5'-P-CCNC: 21 U/L (ref 10–44)
ANION GAP SERPL CALC-SCNC: 8 MMOL/L (ref 8–16)
AST SERPL-CCNC: 20 U/L (ref 10–40)
BILIRUB SERPL-MCNC: 0.3 MG/DL (ref 0.1–1)
BUN SERPL-MCNC: 14 MG/DL (ref 8–23)
CALCIUM SERPL-MCNC: 8.9 MG/DL (ref 8.7–10.5)
CHLORIDE SERPL-SCNC: 109 MMOL/L (ref 95–110)
CO2 SERPL-SCNC: 24 MMOL/L (ref 23–29)
CREAT SERPL-MCNC: 1 MG/DL (ref 0.5–1.4)
ERYTHROCYTE [DISTWIDTH] IN BLOOD BY AUTOMATED COUNT: 13.3 % (ref 11.5–14.5)
EST. GFR  (NO RACE VARIABLE): >60 ML/MIN/1.73 M^2
GLUCOSE SERPL-MCNC: 105 MG/DL (ref 70–110)
HCT VFR BLD AUTO: 40.8 % (ref 40–54)
HGB BLD-MCNC: 14.2 G/DL (ref 14–18)
IRON SERPL-MCNC: 59 UG/DL (ref 45–160)
MCH RBC QN AUTO: 32 PG (ref 27–31)
MCHC RBC AUTO-ENTMCNC: 34.8 G/DL (ref 32–36)
MCV RBC AUTO: 92 FL (ref 82–98)
PLATELET # BLD AUTO: 202 K/UL (ref 150–450)
PMV BLD AUTO: 9.1 FL (ref 9.2–12.9)
POTASSIUM SERPL-SCNC: 4.2 MMOL/L (ref 3.5–5.1)
PROT SERPL-MCNC: 6.9 G/DL (ref 6–8.4)
RBC # BLD AUTO: 4.44 M/UL (ref 4.6–6.2)
SODIUM SERPL-SCNC: 141 MMOL/L (ref 136–145)
WBC # BLD AUTO: 7.91 K/UL (ref 3.9–12.7)

## 2023-06-06 PROCEDURE — 80053 COMPREHEN METABOLIC PANEL: CPT | Performed by: NURSE PRACTITIONER

## 2023-06-06 PROCEDURE — 85027 COMPLETE CBC AUTOMATED: CPT | Performed by: NURSE PRACTITIONER

## 2023-06-06 PROCEDURE — 83540 ASSAY OF IRON: CPT | Performed by: NURSE PRACTITIONER

## 2023-06-06 PROCEDURE — 36415 COLL VENOUS BLD VENIPUNCTURE: CPT | Performed by: NURSE PRACTITIONER

## 2023-06-06 PROCEDURE — 82378 CARCINOEMBRYONIC ANTIGEN: CPT | Performed by: NURSE PRACTITIONER

## 2023-06-06 PROCEDURE — 82728 ASSAY OF FERRITIN: CPT | Performed by: NURSE PRACTITIONER

## 2023-06-07 LAB
CEA SERPL-MCNC: 3.5 NG/ML (ref 0–5)
FERRITIN SERPL-MCNC: 36 NG/ML (ref 20–300)

## 2023-06-13 ENCOUNTER — OFFICE VISIT (OUTPATIENT)
Dept: HEMATOLOGY/ONCOLOGY | Facility: CLINIC | Age: 68
End: 2023-06-13
Payer: MEDICARE

## 2023-06-13 VITALS
SYSTOLIC BLOOD PRESSURE: 132 MMHG | BODY MASS INDEX: 23.5 KG/M2 | OXYGEN SATURATION: 96 % | TEMPERATURE: 97 F | RESPIRATION RATE: 16 BRPM | HEART RATE: 75 BPM | DIASTOLIC BLOOD PRESSURE: 61 MMHG | WEIGHT: 163.81 LBS

## 2023-06-13 DIAGNOSIS — T45.1X5A CHEMOTHERAPY-INDUCED PERIPHERAL NEUROPATHY: ICD-10-CM

## 2023-06-13 DIAGNOSIS — C77.9 REGIONAL LYMPH NODE METASTASIS PRESENT: ICD-10-CM

## 2023-06-13 DIAGNOSIS — G62.0 CHEMOTHERAPY-INDUCED PERIPHERAL NEUROPATHY: ICD-10-CM

## 2023-06-13 DIAGNOSIS — C20 RECTAL CANCER: Primary | ICD-10-CM

## 2023-06-13 PROCEDURE — 99214 OFFICE O/P EST MOD 30 MIN: CPT | Mod: S$GLB,,, | Performed by: INTERNAL MEDICINE

## 2023-06-13 PROCEDURE — 3075F SYST BP GE 130 - 139MM HG: CPT | Mod: CPTII,S$GLB,, | Performed by: INTERNAL MEDICINE

## 2023-06-13 PROCEDURE — 1159F MED LIST DOCD IN RCRD: CPT | Mod: CPTII,S$GLB,, | Performed by: INTERNAL MEDICINE

## 2023-06-13 PROCEDURE — 3078F PR MOST RECENT DIASTOLIC BLOOD PRESSURE < 80 MM HG: ICD-10-PCS | Mod: CPTII,S$GLB,, | Performed by: INTERNAL MEDICINE

## 2023-06-13 PROCEDURE — 99999 PR PBB SHADOW E&M-EST. PATIENT-LVL III: CPT | Mod: PBBFAC,,, | Performed by: INTERNAL MEDICINE

## 2023-06-13 PROCEDURE — 3008F BODY MASS INDEX DOCD: CPT | Mod: CPTII,S$GLB,, | Performed by: INTERNAL MEDICINE

## 2023-06-13 PROCEDURE — 99999 PR PBB SHADOW E&M-EST. PATIENT-LVL III: ICD-10-PCS | Mod: PBBFAC,,, | Performed by: INTERNAL MEDICINE

## 2023-06-13 PROCEDURE — 1101F PR PT FALLS ASSESS DOC 0-1 FALLS W/OUT INJ PAST YR: ICD-10-PCS | Mod: CPTII,S$GLB,, | Performed by: INTERNAL MEDICINE

## 2023-06-13 PROCEDURE — 3288F PR FALLS RISK ASSESSMENT DOCUMENTED: ICD-10-PCS | Mod: CPTII,S$GLB,, | Performed by: INTERNAL MEDICINE

## 2023-06-13 PROCEDURE — 99214 PR OFFICE/OUTPT VISIT, EST, LEVL IV, 30-39 MIN: ICD-10-PCS | Mod: S$GLB,,, | Performed by: INTERNAL MEDICINE

## 2023-06-13 PROCEDURE — 1125F AMNT PAIN NOTED PAIN PRSNT: CPT | Mod: CPTII,S$GLB,, | Performed by: INTERNAL MEDICINE

## 2023-06-13 PROCEDURE — 1125F PR PAIN SEVERITY QUANTIFIED, PAIN PRESENT: ICD-10-PCS | Mod: CPTII,S$GLB,, | Performed by: INTERNAL MEDICINE

## 2023-06-13 PROCEDURE — 3078F DIAST BP <80 MM HG: CPT | Mod: CPTII,S$GLB,, | Performed by: INTERNAL MEDICINE

## 2023-06-13 PROCEDURE — 1101F PT FALLS ASSESS-DOCD LE1/YR: CPT | Mod: CPTII,S$GLB,, | Performed by: INTERNAL MEDICINE

## 2023-06-13 PROCEDURE — 3288F FALL RISK ASSESSMENT DOCD: CPT | Mod: CPTII,S$GLB,, | Performed by: INTERNAL MEDICINE

## 2023-06-13 PROCEDURE — 3008F PR BODY MASS INDEX (BMI) DOCUMENTED: ICD-10-PCS | Mod: CPTII,S$GLB,, | Performed by: INTERNAL MEDICINE

## 2023-06-13 PROCEDURE — 1159F PR MEDICATION LIST DOCUMENTED IN MEDICAL RECORD: ICD-10-PCS | Mod: CPTII,S$GLB,, | Performed by: INTERNAL MEDICINE

## 2023-06-13 PROCEDURE — 3075F PR MOST RECENT SYSTOLIC BLOOD PRESS GE 130-139MM HG: ICD-10-PCS | Mod: CPTII,S$GLB,, | Performed by: INTERNAL MEDICINE

## 2023-06-13 NOTE — PROGRESS NOTES
CC: I have frozen shoulder on the right     Mr Apple is a 65 yr old male with Stage 3  T3 N1 M 0 rectal adenocarcinoma.    On October 21, 2019   a laparotomy was performed for perforated colon and patient underwent a right hemicolectomy with a rectal biopsy revealing rectal adenocarcinoma.   He completed neoadjuvant  xeloda with radiation  With chest port in place . On April 27 , 2020 he began adjuvant Folfox . He has had severe paresthesias and underwent a dose adjustment > Last adjuvant dose September 30 2020  Here to review scans and labs before F/U with Dr Lagunas to discuss surgerical intervention    Interval History: Patient presents for follow up of rectal cancer. The patient has a history of significant pain not under control; but better with opioid use  . B12 deficiency associated anemia  has not been well controlled on B replacement  Gerd has been stable on a PPI  The patient states compliance with all prescribed medications.   Here to review Ct chest abdomen and pelvis revealing no evidence of disease. Patient has new pain in shoulder and numbness down into hands   Diet has been stable       PHYSICAL EXAM:  Vitals:    06/13/23 1049   BP: 132/61   Pulse: 75   Resp: 16   Temp: 97.2 °F (36.2 °C)         General patient is in no distress well developed well nourished  Psych pleasant affect no evidence of depression no evidence of anxiety  Head normocephalic atraumatic, no hoarseness,  well-spoken speech intact  Eyes noninjected sclera conjunctiva appear pink  Face is symmetric  Skin intact no lesions noted no ecchymosis no rash  Neck with no limited range of motion no JVD evident  Chest with no use of accessory muscles no labored breathing no evidence of tachypnea  No respiratory distress, effort normal   Abdomen appears to be nondistended  Extremities with no cyanosis no evidence of edema  limited ROM right shoulder  No joint effusions  Behavior normal judgment normal    Labs reviewed: pertinent findings  include  Lab Results   Component Value Date    WBC 7.91 06/06/2023    HGB 14.2 06/06/2023    HCT 40.8 06/06/2023    MCV 92 06/06/2023     06/06/2023       CMP  Sodium   Date Value Ref Range Status   06/06/2023 141 136 - 145 mmol/L Final     Potassium   Date Value Ref Range Status   06/06/2023 4.2 3.5 - 5.1 mmol/L Final     Chloride   Date Value Ref Range Status   06/06/2023 109 95 - 110 mmol/L Final     CO2   Date Value Ref Range Status   06/06/2023 24 23 - 29 mmol/L Final     Glucose   Date Value Ref Range Status   06/06/2023 105 70 - 110 mg/dL Final     BUN   Date Value Ref Range Status   06/06/2023 14 8 - 23 mg/dL Final     Creatinine   Date Value Ref Range Status   06/06/2023 1.0 0.5 - 1.4 mg/dL Final     Calcium   Date Value Ref Range Status   06/06/2023 8.9 8.7 - 10.5 mg/dL Final     Total Protein   Date Value Ref Range Status   06/06/2023 6.9 6.0 - 8.4 g/dL Final     Albumin   Date Value Ref Range Status   06/06/2023 3.8 3.5 - 5.2 g/dL Final     Total Bilirubin   Date Value Ref Range Status   06/06/2023 0.3 0.1 - 1.0 mg/dL Final     Comment:     For infants and newborns, interpretation of results should be based  on gestational age, weight and in agreement with clinical  observations.    Premature Infant recommended reference ranges:  Up to 24 hours.............<8.0 mg/dL  Up to 48 hours............<12.0 mg/dL  3-5 days..................<15.0 mg/dL  6-29 days.................<15.0 mg/dL       Alkaline Phosphatase   Date Value Ref Range Status   06/06/2023 61 55 - 135 U/L Final     AST   Date Value Ref Range Status   06/06/2023 20 10 - 40 U/L Final     ALT   Date Value Ref Range Status   06/06/2023 21 10 - 44 U/L Final     Anion Gap   Date Value Ref Range Status   06/06/2023 8 8 - 16 mmol/L Final     eGFR if    Date Value Ref Range Status   06/08/2022 >60.0 >60 mL/min/1.73 m^2 Final     eGFR if non    Date Value Ref Range Status   06/08/2022 >60.0 >60 mL/min/1.73 m^2  Final     Comment:     Calculation used to obtain the estimated glomerular filtration  rate (eGFR) is the CKD-EPI equation.        Health Maintenance Summary    PROSTATE-SPECIFIC ANTIGEN Overdue 1955    Hepatitis C Screening Overdue 1955    Lipid Panel Overdue 1955    HIV Screening Overdue 10/27/1969    TETANUS VACCINE Overdue 10/27/1972    Shingles Vaccine Overdue 10/27/2004    Pneumococcal Vaccine (65+ High/Highest Risk) Overdue 10/27/2019    Influenza Vaccine Overdue 8/1/2020    Colorectal Cancer Screening Next Due 10/21/2024      Feb 8 2021 Ct chest abd pelvis     Impression:     Prior diverting colostomy to the left lower quadrant.  Mild rectal wall thickening without a definite mass identified to day.  Prior right colectomy.  Prior cholecystectomy.  Degenerative disc disease at L5-S1.     Metastatic disease is not identified on this study.        Electronically signed by: Miguel Angel Cabrera MD  Date:                                            02/08/2021  Time:                                           11:21    CT abdomen pelvis without contrast  9/3/21  Impression:     Interval resolution of the small fluid collection previously noted in the anterior peritoneal cavity.     Status post right hemicolectomy, LAR with no findings to indicate residual or recurrent malignancy.     Mild small bowel wall thickening in the pelvis is more apparent on today's study secondary to the use of oral contrast, but probably unchanged in retrospect, perhaps the result of prior radiation therapy.     Assessment:    Rectal cancer remission     status post surgery hemicolectomy chemotherapy.  Last known chemotherapy completed on 09/20/2020 cycle 12 FOLFOX    Most recent CT scan reviewed no evidence of recurrent or residual malignancies 09/03/2021    Status post of closure ileostomy 07/12/2021    Port removal 05/28/2021    Colonoscopy 04/20/2021 no evidence of malignancy    colosoncopy 10/5/21 no malignancy      Colonoscopy 04/03/2020 no malignancy    > RTC 6 months with CBC CMP CEA.

## 2023-11-06 NOTE — ADDENDUM NOTE
Addended by: NADIR MCGINNIS on: 5/26/2020 10:53 AM     Modules accepted: Orders    
Addended by: NADIR MCGINNIS on: 5/26/2020 10:59 AM     Modules accepted: Orders    
no

## 2023-12-12 ENCOUNTER — LAB VISIT (OUTPATIENT)
Dept: LAB | Facility: HOSPITAL | Age: 68
End: 2023-12-12
Attending: FAMILY MEDICINE
Payer: MEDICARE

## 2023-12-12 DIAGNOSIS — C20 RECTAL CANCER: ICD-10-CM

## 2023-12-12 DIAGNOSIS — C77.9 REGIONAL LYMPH NODE METASTASIS PRESENT: ICD-10-CM

## 2023-12-12 DIAGNOSIS — T45.1X5A CHEMOTHERAPY-INDUCED PERIPHERAL NEUROPATHY: ICD-10-CM

## 2023-12-12 DIAGNOSIS — G62.0 CHEMOTHERAPY-INDUCED PERIPHERAL NEUROPATHY: ICD-10-CM

## 2023-12-12 LAB
ALBUMIN SERPL BCP-MCNC: 3.6 G/DL (ref 3.5–5.2)
ALP SERPL-CCNC: 60 U/L (ref 55–135)
ALT SERPL W/O P-5'-P-CCNC: 25 U/L (ref 10–44)
ANION GAP SERPL CALC-SCNC: 11 MMOL/L (ref 8–16)
AST SERPL-CCNC: 18 U/L (ref 10–40)
BASOPHILS # BLD AUTO: 0.02 K/UL (ref 0–0.2)
BASOPHILS NFR BLD: 0.3 % (ref 0–1.9)
BILIRUB SERPL-MCNC: 0.3 MG/DL (ref 0.1–1)
BUN SERPL-MCNC: 11 MG/DL (ref 8–23)
CALCIUM SERPL-MCNC: 9 MG/DL (ref 8.7–10.5)
CEA SERPL-MCNC: 4 NG/ML (ref 0–5)
CHLORIDE SERPL-SCNC: 108 MMOL/L (ref 95–110)
CO2 SERPL-SCNC: 22 MMOL/L (ref 23–29)
CREAT SERPL-MCNC: 1.2 MG/DL (ref 0.5–1.4)
DIFFERENTIAL METHOD: ABNORMAL
EOSINOPHIL # BLD AUTO: 0.1 K/UL (ref 0–0.5)
EOSINOPHIL NFR BLD: 1.2 % (ref 0–8)
ERYTHROCYTE [DISTWIDTH] IN BLOOD BY AUTOMATED COUNT: 12.1 % (ref 11.5–14.5)
EST. GFR  (NO RACE VARIABLE): >60 ML/MIN/1.73 M^2
GLUCOSE SERPL-MCNC: 111 MG/DL (ref 70–110)
HCT VFR BLD AUTO: 45 % (ref 40–54)
HGB BLD-MCNC: 15.3 G/DL (ref 14–18)
IMM GRANULOCYTES # BLD AUTO: 0.04 K/UL (ref 0–0.04)
IMM GRANULOCYTES NFR BLD AUTO: 0.5 % (ref 0–0.5)
LYMPHOCYTES # BLD AUTO: 1.4 K/UL (ref 1–4.8)
LYMPHOCYTES NFR BLD: 17.7 % (ref 18–48)
MAGNESIUM SERPL-MCNC: 2 MG/DL (ref 1.6–2.6)
MCH RBC QN AUTO: 32.6 PG (ref 27–31)
MCHC RBC AUTO-ENTMCNC: 34 G/DL (ref 32–36)
MCV RBC AUTO: 96 FL (ref 82–98)
MONOCYTES # BLD AUTO: 0.7 K/UL (ref 0.3–1)
MONOCYTES NFR BLD: 9.7 % (ref 4–15)
NEUTROPHILS # BLD AUTO: 5.4 K/UL (ref 1.8–7.7)
NEUTROPHILS NFR BLD: 70.6 % (ref 38–73)
NRBC BLD-RTO: 0 /100 WBC
PLATELET # BLD AUTO: 232 K/UL (ref 150–450)
PMV BLD AUTO: 9.9 FL (ref 9.2–12.9)
POTASSIUM SERPL-SCNC: 4.2 MMOL/L (ref 3.5–5.1)
PROT SERPL-MCNC: 7.2 G/DL (ref 6–8.4)
RBC # BLD AUTO: 4.7 M/UL (ref 4.6–6.2)
SODIUM SERPL-SCNC: 141 MMOL/L (ref 136–145)
WBC # BLD AUTO: 7.63 K/UL (ref 3.9–12.7)

## 2023-12-12 PROCEDURE — 36415 COLL VENOUS BLD VENIPUNCTURE: CPT | Performed by: INTERNAL MEDICINE

## 2023-12-12 PROCEDURE — 83735 ASSAY OF MAGNESIUM: CPT | Performed by: INTERNAL MEDICINE

## 2023-12-12 PROCEDURE — 82378 CARCINOEMBRYONIC ANTIGEN: CPT | Performed by: INTERNAL MEDICINE

## 2023-12-12 PROCEDURE — 80053 COMPREHEN METABOLIC PANEL: CPT | Performed by: INTERNAL MEDICINE

## 2023-12-12 PROCEDURE — 85025 COMPLETE CBC W/AUTO DIFF WBC: CPT | Performed by: INTERNAL MEDICINE

## 2023-12-14 ENCOUNTER — OFFICE VISIT (OUTPATIENT)
Dept: HEMATOLOGY/ONCOLOGY | Facility: CLINIC | Age: 68
End: 2023-12-14
Payer: MEDICARE

## 2023-12-14 VITALS
SYSTOLIC BLOOD PRESSURE: 136 MMHG | BODY MASS INDEX: 24.62 KG/M2 | HEART RATE: 71 BPM | WEIGHT: 172 LBS | DIASTOLIC BLOOD PRESSURE: 69 MMHG | OXYGEN SATURATION: 99 % | TEMPERATURE: 98 F | HEIGHT: 70 IN | RESPIRATION RATE: 18 BRPM

## 2023-12-14 DIAGNOSIS — C20 RECTAL CANCER: Primary | ICD-10-CM

## 2023-12-14 PROCEDURE — 3078F DIAST BP <80 MM HG: CPT | Mod: CPTII,S$GLB,, | Performed by: NURSE PRACTITIONER

## 2023-12-14 PROCEDURE — 3075F PR MOST RECENT SYSTOLIC BLOOD PRESS GE 130-139MM HG: ICD-10-PCS | Mod: CPTII,S$GLB,, | Performed by: NURSE PRACTITIONER

## 2023-12-14 PROCEDURE — 3288F FALL RISK ASSESSMENT DOCD: CPT | Mod: CPTII,S$GLB,, | Performed by: NURSE PRACTITIONER

## 2023-12-14 PROCEDURE — 3008F BODY MASS INDEX DOCD: CPT | Mod: CPTII,S$GLB,, | Performed by: NURSE PRACTITIONER

## 2023-12-14 PROCEDURE — 1160F PR REVIEW ALL MEDS BY PRESCRIBER/CLIN PHARMACIST DOCUMENTED: ICD-10-PCS | Mod: CPTII,S$GLB,, | Performed by: NURSE PRACTITIONER

## 2023-12-14 PROCEDURE — 1101F PT FALLS ASSESS-DOCD LE1/YR: CPT | Mod: CPTII,S$GLB,, | Performed by: NURSE PRACTITIONER

## 2023-12-14 PROCEDURE — 99999 PR PBB SHADOW E&M-EST. PATIENT-LVL III: ICD-10-PCS | Mod: PBBFAC,,, | Performed by: NURSE PRACTITIONER

## 2023-12-14 PROCEDURE — 3288F PR FALLS RISK ASSESSMENT DOCUMENTED: ICD-10-PCS | Mod: CPTII,S$GLB,, | Performed by: NURSE PRACTITIONER

## 2023-12-14 PROCEDURE — 3008F PR BODY MASS INDEX (BMI) DOCUMENTED: ICD-10-PCS | Mod: CPTII,S$GLB,, | Performed by: NURSE PRACTITIONER

## 2023-12-14 PROCEDURE — 1101F PR PT FALLS ASSESS DOC 0-1 FALLS W/OUT INJ PAST YR: ICD-10-PCS | Mod: CPTII,S$GLB,, | Performed by: NURSE PRACTITIONER

## 2023-12-14 PROCEDURE — 1126F AMNT PAIN NOTED NONE PRSNT: CPT | Mod: CPTII,S$GLB,, | Performed by: NURSE PRACTITIONER

## 2023-12-14 PROCEDURE — 3075F SYST BP GE 130 - 139MM HG: CPT | Mod: CPTII,S$GLB,, | Performed by: NURSE PRACTITIONER

## 2023-12-14 PROCEDURE — 1159F PR MEDICATION LIST DOCUMENTED IN MEDICAL RECORD: ICD-10-PCS | Mod: CPTII,S$GLB,, | Performed by: NURSE PRACTITIONER

## 2023-12-14 PROCEDURE — 99999 PR PBB SHADOW E&M-EST. PATIENT-LVL III: CPT | Mod: PBBFAC,,, | Performed by: NURSE PRACTITIONER

## 2023-12-14 PROCEDURE — 99214 OFFICE O/P EST MOD 30 MIN: CPT | Mod: S$GLB,,, | Performed by: NURSE PRACTITIONER

## 2023-12-14 PROCEDURE — 1160F RVW MEDS BY RX/DR IN RCRD: CPT | Mod: CPTII,S$GLB,, | Performed by: NURSE PRACTITIONER

## 2023-12-14 PROCEDURE — 1159F MED LIST DOCD IN RCRD: CPT | Mod: CPTII,S$GLB,, | Performed by: NURSE PRACTITIONER

## 2023-12-14 PROCEDURE — 99214 PR OFFICE/OUTPT VISIT, EST, LEVL IV, 30-39 MIN: ICD-10-PCS | Mod: S$GLB,,, | Performed by: NURSE PRACTITIONER

## 2023-12-14 PROCEDURE — 3078F PR MOST RECENT DIASTOLIC BLOOD PRESSURE < 80 MM HG: ICD-10-PCS | Mod: CPTII,S$GLB,, | Performed by: NURSE PRACTITIONER

## 2023-12-14 PROCEDURE — 1126F PR PAIN SEVERITY QUANTIFIED, NO PAIN PRESENT: ICD-10-PCS | Mod: CPTII,S$GLB,, | Performed by: NURSE PRACTITIONER

## 2023-12-14 NOTE — PROGRESS NOTES
CC: I have frozen shoulder on the right     Mr Apple is a 65 yr old male with Stage 3  T3 N1 M 0 rectal adenocarcinoma.    On October 21, 2019   a laparotomy was performed for perforated colon and patient underwent a right hemicolectomy with a rectal biopsy revealing rectal adenocarcinoma.   He completed neoadjuvant  xeloda with radiation  With chest port in place . On April 27 , 2020 he began adjuvant Folfox . He has had severe paresthesias and underwent a dose adjustment > Last adjuvant dose September 30 2020  Here to review scans and labs before F/U with Dr Lagunas to discuss surgerical intervention    Interval History: Patient presents for follow up of rectal cancer. The patient has a history of significant pain not under control; but better with opioid use  . B12 deficiency associated anemia  has not been well controlled on B replacement  Gerd has been stable on a PPI  The patient states compliance with all prescribed medications.   Here to review Ct chest abdomen and pelvis revealing no evidence of disease. Patient has new pain in shoulder and numbness down into hands   Diet has been stable       PHYSICAL EXAM:  There were no vitals filed for this visit.        General patient is in no distress well developed well nourished  Psych pleasant affect no evidence of depression no evidence of anxiety  Head normocephalic atraumatic, no hoarseness,  well-spoken speech intact  Eyes noninjected sclera conjunctiva appear pink  Face is symmetric  Skin intact no lesions noted no ecchymosis no rash  Neck with no limited range of motion no JVD evident  Chest with no use of accessory muscles no labored breathing no evidence of tachypnea  No respiratory distress, effort normal   Abdomen appears to be nondistended  Extremities with no cyanosis no evidence of edema  limited ROM right shoulder  No joint effusions  Behavior normal judgment normal    Labs reviewed: pertinent findings include  Lab Results   Component Value Date     WBC 7.63 12/12/2023    HGB 15.3 12/12/2023    HCT 45.0 12/12/2023    MCV 96 12/12/2023     12/12/2023       CMP  Sodium   Date Value Ref Range Status   12/12/2023 141 136 - 145 mmol/L Final     Potassium   Date Value Ref Range Status   12/12/2023 4.2 3.5 - 5.1 mmol/L Final     Chloride   Date Value Ref Range Status   12/12/2023 108 95 - 110 mmol/L Final     CO2   Date Value Ref Range Status   12/12/2023 22 (L) 23 - 29 mmol/L Final     Glucose   Date Value Ref Range Status   12/12/2023 111 (H) 70 - 110 mg/dL Final     BUN   Date Value Ref Range Status   12/12/2023 11 8 - 23 mg/dL Final     Creatinine   Date Value Ref Range Status   12/12/2023 1.2 0.5 - 1.4 mg/dL Final     Calcium   Date Value Ref Range Status   12/12/2023 9.0 8.7 - 10.5 mg/dL Final     Total Protein   Date Value Ref Range Status   12/12/2023 7.2 6.0 - 8.4 g/dL Final     Albumin   Date Value Ref Range Status   12/12/2023 3.6 3.5 - 5.2 g/dL Final     Total Bilirubin   Date Value Ref Range Status   12/12/2023 0.3 0.1 - 1.0 mg/dL Final     Comment:     For infants and newborns, interpretation of results should be based  on gestational age, weight and in agreement with clinical  observations.    Premature Infant recommended reference ranges:  Up to 24 hours.............<8.0 mg/dL  Up to 48 hours............<12.0 mg/dL  3-5 days..................<15.0 mg/dL  6-29 days.................<15.0 mg/dL       Alkaline Phosphatase   Date Value Ref Range Status   12/12/2023 60 55 - 135 U/L Final     AST   Date Value Ref Range Status   12/12/2023 18 10 - 40 U/L Final     ALT   Date Value Ref Range Status   12/12/2023 25 10 - 44 U/L Final     Anion Gap   Date Value Ref Range Status   12/12/2023 11 8 - 16 mmol/L Final     eGFR if    Date Value Ref Range Status   06/08/2022 >60.0 >60 mL/min/1.73 m^2 Final     eGFR if non    Date Value Ref Range Status   06/08/2022 >60.0 >60 mL/min/1.73 m^2 Final     Comment:     Calculation used to  obtain the estimated glomerular filtration  rate (eGFR) is the CKD-EPI equation.        Health Maintenance Summary    PROSTATE-SPECIFIC ANTIGEN Overdue 1954    Hepatitis C Screening Overdue 1955    Lipid Panel Overdue 1955    HIV Screening Overdue 10/27/1969    TETANUS VACCINE Overdue 10/27/1972    Shingles Vaccine Overdue 10/27/2004    Pneumococcal Vaccine (65+ High/Highest Risk) Overdue 10/27/2019    Influenza Vaccine Overdue 8/1/2020    Colorectal Cancer Screening Next Due 10/21/2024      Feb 8 2021 Ct chest abd pelvis     Impression:     Prior diverting colostomy to the left lower quadrant.  Mild rectal wall thickening without a definite mass identified to day.  Prior right colectomy.  Prior cholecystectomy.  Degenerative disc disease at L5-S1.     Metastatic disease is not identified on this study.        Electronically signed by: Miguel Angel Cabrera MD  Date:                                            02/08/2021  Time:                                           11:21    CT abdomen pelvis without contrast  9/3/21  Impression:     Interval resolution of the small fluid collection previously noted in the anterior peritoneal cavity.     Status post right hemicolectomy, LAR with no findings to indicate residual or recurrent malignancy.     Mild small bowel wall thickening in the pelvis is more apparent on today's study secondary to the use of oral contrast, but probably unchanged in retrospect, perhaps the result of prior radiation therapy.     Assessment/plan:    Stage IIIB rectal cancer  -status post surgery hemicolectomy chemotherapy.    -Last known chemotherapy completed on 09/20/2020 cycle 12 FOLFOX  -last ct showed no evidence of recurrent or residual malignancies 09/03/2021  -Status post of closure ileostomy 07/12/2021  -Port removal 05/28/2021  -Colonoscopy 04/03/2023 no malignancy  -CT abdomen and pelvis prior to follow-up  -see me in 6 months with labs and CT scan

## 2024-08-19 NOTE — PROGRESS NOTES
[facility] - [facility]  General Surgery  Daily Note    Patient Name: Miguel Angel Apple Sr.  MRN: [id number]  Admission Date: 10/19/2019  Attending Physician: Hima Mccoy MD   Consult Physician: Jose Lazo MD  Primary Care Provider: Primary Doctor No    Subjective:     Principle Problem: Large bowel obstruction    Last 24 hour history:  10/21/19  No issues last 24 hr.  Patient afebrile.  Vital signs stable.  White count up to 16,000 currently on antibiotics.  Potassium still low inspite of replacement yesterday.  Will be more aggressive at replacing today.  No nausea or vomiting.  Very scant diarrhea.  Enemas didn't help.  No other new issues or complaints.    Objective:     Vital Signs (Most Recent):  Temp: 97.4 °F (36.3 °C) (10/21/19 0728)  Pulse: 94 (10/21/19 0728)  Resp: 18 (10/21/19 0728)  BP: (!) 157/78 (10/21/19 0728)  SpO2: 97 % (10/21/19 0728) Vital Signs (24h Range):  Temp:  [96.6 °F (35.9 °C)-97.8 °F (36.6 °C)] 97.4 °F (36.3 °C)  Pulse:  [] 94  Resp:  [16-18] 18  SpO2:  [95 %-97 %] 97 %  BP: (134-182)/(70-86) 157/78     Intake/Output last 24 hours:    Intake/Output Summary (Last 24 hours) at 10/21/2019 0755  Last data filed at 10/21/2019 0528  Gross per 24 hour   Intake 3592.09 ml   Output 645 ml   Net 2947.09 ml       I/O last 3 completed shifts:  In: 4218.3 [P.O.:110; I.V.:3308.3; IV Piggyback:800]  Out: 1245 [Urine:1245]  No intake/output data recorded.    Weight: 64.3 kg (141 lb 12.1 oz)  Body mass index is 20.93 kg/m².    Gen: Wd Wn male currently in NAD  Heent: Nc/At, MMM  Eyes: Perrl, Eomi  Cv: RRR, no  M/g/r  Lung: Non-labored breathing, clear bilaterally  Abd: Soft, non-tender, Distention    Significant Labs:  CBC:   Recent Labs   Lab 10/21/19  0553   WBC 16.76*   RBC 3.52*   HGB 9.8*   HCT 31.4*   *   MCV 89   MCH 27.8   MCHC 31.2*     BMP:   Recent Labs   Lab 10/20/19  0623 10/21/19  0553   GLU 73 81    145   K 2.2* 2.4*    108   CO2 21*  21*   BUN 9 6*   CREATININE 0.6 0.7   CALCIUM 7.8* 8.1*   MG 2.0  --      CMP:   Recent Labs   Lab 10/21/19  0553   GLU 81   CALCIUM 8.1*   ALBUMIN 2.7*   PROT 6.5      K 2.4*   CO2 21*      BUN 6*   CREATININE 0.7   ALKPHOS 49*   ALT 12   AST 16   BILITOT 0.7     LFTs:   Recent Labs   Lab 10/21/19  0553   ALT 12   AST 16   ALKPHOS 49*   BILITOT 0.7   PROT 6.5   ALBUMIN 2.7*     Coagulation: No results for input(s): LABPROT, INR, APTT in the last 168 hours.  Specimen (12h ago, onward)    None        Recent Labs   Lab 10/19/19  1716   COLORU Yellow   SPECGRAV 1.015   PHUR 7.0   PROTEINUA 2+*   BACTERIA Few*   NITRITE Negative   LEUKOCYTESUR Negative   UROBILINOGEN Negative   HYALINECASTS 0       Cultures:    Microbiology Results (last 7 days)     Procedure Component Value Units Date/Time    Blood culture x two cultures. Draw prior to antibiotics. [[id number]] Collected:  10/19/19 1505    Order Status:  Completed Specimen:  Blood from Antecubital, Right  Arm Updated:  10/20/19 2212     Blood Culture, Routine No Growth to date      No Growth to date    Narrative:       Aerobic and anaerobic    Blood culture x two cultures. Draw prior to antibiotics. [[id number]] Collected:  10/19/19 1508    Order Status:  Completed Specimen:  Blood from Antecubital, Left  Arm Updated:  10/20/19 2212     Blood Culture, Routine No Growth to date      No Growth to date    Narrative:       Aerobic and anaerobic          Assessment:   Miguel Angel Apple Sr. is a 63 y.o. male who presents with Large bowel obstruction.    Active Diagnoses:    Diagnosis Date Noted POA    PRINCIPAL PROBLEM:  Large bowel obstruction [K56.609] 10/19/2019 Unknown    Rectal mass [K62.89] 10/20/2019 Yes    Hypokalemia [E87.6] 10/20/2019 Yes    Intestinal obstruction [K56.609]  Yes    Colitis [K52.9] 10/19/2019 Yes      Problems Resolved During this Admission:     VTE Risk Mitigation (From admission, onward)         Ordered     IP VTE LOW RISK PATIENT  Once       10/19/19 1752     Place MAE hardinge  Until discontinued      10/19/19 1752                Medical Decision Making/Plan:  Large bowel obstruction.  Will plan for flex sig this afternoon to evaluate for the reason for large bowel obstruction as there is concern of rectal mass.  Concern of perforation of the colon associated with flex sig were discussed with the patient, he and the family understand.  Plan afterwards pending flex sig results will be to proceed with laparotomy with possible bowel resection and end colostomy.  This was voiced with the patient and family and they understand.  Risks and benefits, including the possible permanent nature of the colostomy, were discussed.  Risks of bleeding, infection, need for further surgery, possible bowel injury, possible colon injury, possible ureter injury, etc were all discussed.  Patient and family voiced understanding and wish to proceed today.  All questions answered to the patient and family satisfaction.    Jose Lazo MD  General Surgery  [facility] - [facility]     [phone]

## (undated) DEVICE — SUT 1 48IN PDS II VIO MONO

## (undated) DEVICE — SUT CTD VICRYL VIL BR CT-2

## (undated) DEVICE — GLOVE PI ULTRA TOUCH G SURGEON

## (undated) DEVICE — CATH IV INTROCAN 14G X 2.

## (undated) DEVICE — SUT 2-0 NYLON D/A

## (undated) DEVICE — SUT MONOCRYL 4-0 PS-1 UND

## (undated) DEVICE — CLIPPER BLADE MOD 4406 (CAREF)

## (undated) DEVICE — DRAPE INCISE IOBAN 2 13X13IN

## (undated) DEVICE — SALINE .45% 1000ML VITEK2

## (undated) DEVICE — SEE MEDLINE ITEM 157117

## (undated) DEVICE — SET DECANTER MEDICHOICE

## (undated) DEVICE — ELECTRODE PENCIL W/ROCKER NDL

## (undated) DEVICE — DRAPE C-ARM I18X9X20

## (undated) DEVICE — BLADE EZ CLEAN 2.5IN MODIFIED

## (undated) DEVICE — DRESSING MEPILEX AG 4X4

## (undated) DEVICE — SPONGE LAP 18X18 PREWASHED

## (undated) DEVICE — UNDERGLOVE BIOGEL PI SZ 6.5 LF

## (undated) DEVICE — ELECTRODE REM PLYHSV RETURN 9

## (undated) DEVICE — STAPLER ECHELON PWR CIR 29MM

## (undated) DEVICE — SUT CTD VICRYL 0 VIL BR/CT

## (undated) DEVICE — Device

## (undated) DEVICE — SEE MEDLINE ITEM 146417

## (undated) DEVICE — SUT CHROMIC 3-0 SH 27IN GUT

## (undated) DEVICE — SUT CTD VICRYL VIL BR SH 27

## (undated) DEVICE — CANISTER SUCTION 3000CC

## (undated) DEVICE — CATH 16FR URETHRL RED RUB

## (undated) DEVICE — ADHESIVE DERMABOND ADVANCED

## (undated) DEVICE — TRAY MINOR GEN SURG

## (undated) DEVICE — SUT SA85H SILK 2-0

## (undated) DEVICE — SUT SILK 0 SUTUPAK SA86H

## (undated) DEVICE — SEE MEDLINE ITEM 156964

## (undated) DEVICE — SEE ITEM #153244

## (undated) DEVICE — LUBRICANT SURGILUBE 2 OZ

## (undated) DEVICE — SLEEVE SCD EXPRESS CALF MEDIUM

## (undated) DEVICE — SUT CTD VICRYL VIL BR CR/SH

## (undated) DEVICE — GLOVE SURG ULTRA TOUCH 7.5

## (undated) DEVICE — SUT ETHIBOND 0 CR/CT-1 8-18

## (undated) DEVICE — ELECTRODE EXTENDED BLADE

## (undated) DEVICE — SUT SILK BLK. BR. 3-0 10

## (undated) DEVICE — GLOVE SURG ULTRA TOUCH 7

## (undated) DEVICE — SEE MEDLINE ITEM 157148

## (undated) DEVICE — ELECTRODE BLD EXT 6.50 ST MDFD

## (undated) DEVICE — SHEET DRAPE FAN-FOLDED 3/4

## (undated) DEVICE — TRAY FOLEY 16FR INFECTION CONT

## (undated) DEVICE — GOWN B1 X-LG X-LONG

## (undated) DEVICE — LABEL FOR UTILITY MARKER

## (undated) DEVICE — CATH POLLACK OPEN-END FLEXI-TI

## (undated) DEVICE — GAUZE SPONGE 4X4 12 PLY NS

## (undated) DEVICE — KIT CANIST SUCTION 1200CC

## (undated) DEVICE — SUT 2-0 12-18IN SILK

## (undated) DEVICE — GLOVE SURGEONS ULTRA TOUCH 6.5

## (undated) DEVICE — SEE MEDLINE ITEM 152487

## (undated) DEVICE — SEALER LIGASURE IMPACT 18CM

## (undated) DEVICE — WARMER DRAPE STERILE LF

## (undated) DEVICE — ADHESIVE MASTISOL VIAL 48/BX

## (undated) DEVICE — SOL 9P NACL IRR PIC IL

## (undated) DEVICE — NDL 10CC 20GAX1 COMBO

## (undated) DEVICE — STAPLER INT PROX TX 30X3.5MM

## (undated) DEVICE — POWDER ARISTA AH 1GM

## (undated) DEVICE — STAPLER RELOADS TITANIUM 30

## (undated) DEVICE — CART STAPLE RELD 30X3.5 BLU

## (undated) DEVICE — NDL BOX COUNTER

## (undated) DEVICE — SEE MEDLINE ITEM 154981

## (undated) DEVICE — SUT SILK SH 2-0 30IN MP BLK

## (undated) DEVICE — COVER PROBE CIV-FLEX 96INCH

## (undated) DEVICE — SEE MEDLINE ITEM 157144

## (undated) DEVICE — WIRE GUIDE 0.038OLD

## (undated) DEVICE — CUTTER PROXIMATE BLUE 75MM

## (undated) DEVICE — DRAPE ABDOMINAL TIBURON 14X11

## (undated) DEVICE — GLOVE BIOGEL PI ORTHO PRO 7.5

## (undated) DEVICE — BOVIE SUCTION

## (undated) DEVICE — GAUZE SPONGE 4X4 12PLY

## (undated) DEVICE — APPLICATOR CHLORAPREP ORN 26ML

## (undated) DEVICE — DRESSING TRANS 4X4 TEGADERM

## (undated) DEVICE — APPLIER LIGACLIP MED 11IN

## (undated) DEVICE — SUT CTD VICRYL 3-0 CR/SH

## (undated) DEVICE — SPONGE NOVAPLUS LAP 18X18IN

## (undated) DEVICE — PAD ABD 8X10 STERILE

## (undated) DEVICE — COVER CLAMP SIL BLUE 7.6X130MM

## (undated) DEVICE — SEE MEDLINE ITEM 156902

## (undated) DEVICE — TRAY DRY SKIN SCRUB PREP

## (undated) DEVICE — RELOAD PROXIMATE CUT BLUE 75MM

## (undated) DEVICE — SYR SLIP TIP 5CC

## (undated) DEVICE — SUT MONOCRYL 4-0 PS-2

## (undated) DEVICE — DRAPE CORETEMP FLD WRM 56X62IN

## (undated) DEVICE — STAPLER SKIN ROTATING HEAD

## (undated) DEVICE — GLOVE SURG ULTRA TOUCH 6

## (undated) DEVICE — SEE MEDLINE ITEM 157181

## (undated) DEVICE — DRESSING ADH ISLAND 3.6 X 14

## (undated) DEVICE — SOL IRR NACL .9% 3000ML

## (undated) DEVICE — BARRIER COLOSTOMY 2 3/4IN

## (undated) DEVICE — DRESSING ABSRBNT ISLAND 3.6X8

## (undated) DEVICE — SEE MEDLINE ITEM 157116

## (undated) DEVICE — SEE MEDLINE ITEM 152622

## (undated) DEVICE — TUBING SUCTION 3/16X10 2 CONN

## (undated) DEVICE — SCRUB HIBICLENS 4% CHG 4OZ

## (undated) DEVICE — KIT COLLECTION E SWAB REGULAR

## (undated) DEVICE — SUT CTD VICRYL 4-0 BR PS-2

## (undated) DEVICE — SEALER LIGASURE LAP 37CM 5MM

## (undated) DEVICE — GOWN SURGICAL X-LARGE

## (undated) DEVICE — SUT 2/0 30IN SILK BLK BRAI

## (undated) DEVICE — LEGGINGS 48X31 INCH

## (undated) DEVICE — UNDERGLOVES BIOGEL PI SIZE 7.5

## (undated) DEVICE — SOL WATER STRL IRR 1000ML

## (undated) DEVICE — SET IRR URLGY 2LINE UNIV SPIKE

## (undated) DEVICE — GOWN SURGICAL XX LARGE X LONG

## (undated) DEVICE — KIT DEFENDO VLV  AIR WATER SUC

## (undated) DEVICE — INTERPULSE SET

## (undated) DEVICE — CATH RED RUBBER 8FR